# Patient Record
Sex: MALE | Race: WHITE | Employment: OTHER
[De-identification: names, ages, dates, MRNs, and addresses within clinical notes are randomized per-mention and may not be internally consistent; named-entity substitution may affect disease eponyms.]

---

## 2017-01-04 ENCOUNTER — TELEPHONE (OUTPATIENT)
Dept: ONCOLOGY | Facility: CLINIC | Age: 57
End: 2017-01-04

## 2017-01-04 DIAGNOSIS — C80.1 CARCINOMA (HCC): Primary | ICD-10-CM

## 2017-01-04 DIAGNOSIS — C19 COLORECTAL CANCER, STAGE IV (HCC): ICD-10-CM

## 2017-01-04 DIAGNOSIS — C78.7 HEPATIC METASTASES (HCC): ICD-10-CM

## 2017-01-04 RX ORDER — LACTOSE-REDUCED FOOD 0.06G-1/ML
LIQUID (ML) ORAL
Qty: 90 CAN | Refills: 3 | Status: SHIPPED | OUTPATIENT
Start: 2017-01-04 | End: 2017-02-07 | Stop reason: SDUPTHER

## 2017-01-06 ENCOUNTER — TELEPHONE (OUTPATIENT)
Dept: ONCOLOGY | Facility: CLINIC | Age: 57
End: 2017-01-06

## 2017-01-12 ENCOUNTER — OFFICE VISIT (OUTPATIENT)
Dept: ONCOLOGY | Facility: CLINIC | Age: 57
End: 2017-01-12

## 2017-01-12 VITALS
DIASTOLIC BLOOD PRESSURE: 50 MMHG | RESPIRATION RATE: 16 BRPM | HEART RATE: 69 BPM | SYSTOLIC BLOOD PRESSURE: 117 MMHG | WEIGHT: 231 LBS | BODY MASS INDEX: 30.48 KG/M2 | TEMPERATURE: 98 F

## 2017-01-12 DIAGNOSIS — I21.3 ST ELEVATION MYOCARDIAL INFARCTION (STEMI), UNSPECIFIED ARTERY (HCC): ICD-10-CM

## 2017-01-12 DIAGNOSIS — C19 COLORECTAL CANCER, STAGE IV (HCC): Primary | ICD-10-CM

## 2017-01-12 DIAGNOSIS — C78.7 HEPATIC METASTASES (HCC): ICD-10-CM

## 2017-01-12 PROCEDURE — 99214 OFFICE O/P EST MOD 30 MIN: CPT | Performed by: INTERNAL MEDICINE

## 2017-01-12 RX ORDER — OXYCODONE AND ACETAMINOPHEN 10; 325 MG/1; MG/1
1 TABLET ORAL EVERY 6 HOURS PRN
Qty: 120 TABLET | Refills: 0 | Status: SHIPPED | OUTPATIENT
Start: 2017-01-12 | End: 2017-02-10 | Stop reason: SDUPTHER

## 2017-01-16 ENCOUNTER — TELEPHONE (OUTPATIENT)
Dept: ONCOLOGY | Facility: CLINIC | Age: 57
End: 2017-01-16

## 2017-01-24 RX ORDER — SODIUM CHLORIDE 0.9 % (FLUSH) 0.9 %
5 SYRINGE (ML) INJECTION PRN
Status: CANCELLED | OUTPATIENT
Start: 2017-02-07

## 2017-01-24 RX ORDER — SODIUM CHLORIDE 9 MG/ML
INJECTION, SOLUTION INTRAVENOUS CONTINUOUS
Status: CANCELLED | OUTPATIENT
Start: 2017-01-24

## 2017-01-24 RX ORDER — SODIUM CHLORIDE 9 MG/ML
INJECTION, SOLUTION INTRAVENOUS CONTINUOUS
Status: CANCELLED | OUTPATIENT
Start: 2017-03-07

## 2017-01-24 RX ORDER — HEPARIN SODIUM (PORCINE) LOCK FLUSH IV SOLN 100 UNIT/ML 100 UNIT/ML
500 SOLUTION INTRAVENOUS PRN
Status: CANCELLED | OUTPATIENT
Start: 2017-02-07

## 2017-01-24 RX ORDER — PALONOSETRON 0.05 MG/ML
0.25 INJECTION, SOLUTION INTRAVENOUS ONCE
Status: CANCELLED | OUTPATIENT
Start: 2017-01-24

## 2017-01-24 RX ORDER — PALONOSETRON 0.05 MG/ML
0.25 INJECTION, SOLUTION INTRAVENOUS ONCE
Status: CANCELLED | OUTPATIENT
Start: 2017-03-07

## 2017-01-24 RX ORDER — METHYLPREDNISOLONE SODIUM SUCCINATE 125 MG/2ML
125 INJECTION, POWDER, LYOPHILIZED, FOR SOLUTION INTRAMUSCULAR; INTRAVENOUS ONCE
Status: CANCELLED | OUTPATIENT
Start: 2017-02-21 | End: 2017-02-21

## 2017-01-24 RX ORDER — FLUOROURACIL 50 MG/ML
900 INJECTION, SOLUTION INTRAVENOUS ONCE
Status: CANCELLED | OUTPATIENT
Start: 2017-02-21

## 2017-01-24 RX ORDER — SODIUM CHLORIDE 9 MG/ML
INJECTION, SOLUTION INTRAVENOUS CONTINUOUS
Status: CANCELLED | OUTPATIENT
Start: 2017-02-21

## 2017-01-24 RX ORDER — PALONOSETRON 0.05 MG/ML
0.25 INJECTION, SOLUTION INTRAVENOUS ONCE
Status: CANCELLED | OUTPATIENT
Start: 2017-02-21

## 2017-01-24 RX ORDER — DEXTROSE MONOHYDRATE 50 MG/ML
INJECTION, SOLUTION INTRAVENOUS ONCE
Status: CANCELLED | OUTPATIENT
Start: 2017-02-21 | End: 2017-02-21

## 2017-01-24 RX ORDER — SODIUM CHLORIDE 9 MG/ML
INJECTION, SOLUTION INTRAVENOUS ONCE
Status: CANCELLED | OUTPATIENT
Start: 2017-03-07 | End: 2017-03-07

## 2017-01-24 RX ORDER — DIPHENHYDRAMINE HYDROCHLORIDE 50 MG/ML
50 INJECTION INTRAMUSCULAR; INTRAVENOUS ONCE
Status: CANCELLED | OUTPATIENT
Start: 2017-03-07 | End: 2017-03-07

## 2017-01-24 RX ORDER — DIPHENHYDRAMINE HYDROCHLORIDE 50 MG/ML
50 INJECTION INTRAMUSCULAR; INTRAVENOUS ONCE
Status: CANCELLED | OUTPATIENT
Start: 2017-02-07 | End: 2017-02-07

## 2017-01-24 RX ORDER — FLUOROURACIL 50 MG/ML
900 INJECTION, SOLUTION INTRAVENOUS ONCE
Status: CANCELLED | OUTPATIENT
Start: 2017-03-07

## 2017-01-24 RX ORDER — SODIUM CHLORIDE 0.9 % (FLUSH) 0.9 %
10 SYRINGE (ML) INJECTION PRN
Status: CANCELLED | OUTPATIENT
Start: 2017-02-07

## 2017-01-24 RX ORDER — 0.9 % SODIUM CHLORIDE 0.9 %
10 VIAL (ML) INJECTION ONCE
Status: CANCELLED | OUTPATIENT
Start: 2017-02-07 | End: 2017-02-07

## 2017-01-24 RX ORDER — HEPARIN SODIUM (PORCINE) LOCK FLUSH IV SOLN 100 UNIT/ML 100 UNIT/ML
500 SOLUTION INTRAVENOUS PRN
Status: CANCELLED | OUTPATIENT
Start: 2017-02-21

## 2017-01-24 RX ORDER — SODIUM CHLORIDE 0.9 % (FLUSH) 0.9 %
5 SYRINGE (ML) INJECTION PRN
Status: CANCELLED | OUTPATIENT
Start: 2017-01-24

## 2017-01-24 RX ORDER — 0.9 % SODIUM CHLORIDE 0.9 %
10 VIAL (ML) INJECTION ONCE
Status: CANCELLED | OUTPATIENT
Start: 2017-03-07 | End: 2017-03-07

## 2017-01-24 RX ORDER — FLUOROURACIL 50 MG/ML
900 INJECTION, SOLUTION INTRAVENOUS ONCE
Status: CANCELLED | OUTPATIENT
Start: 2017-01-24

## 2017-01-24 RX ORDER — METHYLPREDNISOLONE SODIUM SUCCINATE 125 MG/2ML
125 INJECTION, POWDER, LYOPHILIZED, FOR SOLUTION INTRAMUSCULAR; INTRAVENOUS ONCE
Status: CANCELLED | OUTPATIENT
Start: 2017-03-07 | End: 2017-03-07

## 2017-01-24 RX ORDER — SODIUM CHLORIDE 0.9 % (FLUSH) 0.9 %
5 SYRINGE (ML) INJECTION PRN
Status: CANCELLED | OUTPATIENT
Start: 2017-03-07

## 2017-01-24 RX ORDER — SODIUM CHLORIDE 0.9 % (FLUSH) 0.9 %
10 SYRINGE (ML) INJECTION PRN
Status: CANCELLED | OUTPATIENT
Start: 2017-03-07

## 2017-01-24 RX ORDER — FLUOROURACIL 50 MG/ML
900 INJECTION, SOLUTION INTRAVENOUS ONCE
Status: CANCELLED | OUTPATIENT
Start: 2017-02-07

## 2017-01-24 RX ORDER — SODIUM CHLORIDE 0.9 % (FLUSH) 0.9 %
10 SYRINGE (ML) INJECTION PRN
Status: CANCELLED | OUTPATIENT
Start: 2017-02-21

## 2017-01-24 RX ORDER — METHYLPREDNISOLONE SODIUM SUCCINATE 125 MG/2ML
125 INJECTION, POWDER, LYOPHILIZED, FOR SOLUTION INTRAMUSCULAR; INTRAVENOUS ONCE
Status: CANCELLED | OUTPATIENT
Start: 2017-02-07 | End: 2017-02-07

## 2017-01-24 RX ORDER — METHYLPREDNISOLONE SODIUM SUCCINATE 125 MG/2ML
125 INJECTION, POWDER, LYOPHILIZED, FOR SOLUTION INTRAMUSCULAR; INTRAVENOUS ONCE
Status: CANCELLED | OUTPATIENT
Start: 2017-01-24 | End: 2017-01-24

## 2017-01-24 RX ORDER — SODIUM CHLORIDE 9 MG/ML
INJECTION, SOLUTION INTRAVENOUS ONCE
Status: CANCELLED | OUTPATIENT
Start: 2017-02-21 | End: 2017-02-21

## 2017-01-24 RX ORDER — HEPARIN SODIUM (PORCINE) LOCK FLUSH IV SOLN 100 UNIT/ML 100 UNIT/ML
500 SOLUTION INTRAVENOUS PRN
Status: CANCELLED | OUTPATIENT
Start: 2017-03-07

## 2017-01-24 RX ORDER — DIPHENHYDRAMINE HYDROCHLORIDE 50 MG/ML
50 INJECTION INTRAMUSCULAR; INTRAVENOUS ONCE
Status: CANCELLED | OUTPATIENT
Start: 2017-02-21 | End: 2017-02-21

## 2017-01-24 RX ORDER — DEXTROSE MONOHYDRATE 50 MG/ML
INJECTION, SOLUTION INTRAVENOUS ONCE
Status: CANCELLED | OUTPATIENT
Start: 2017-02-07 | End: 2017-02-07

## 2017-01-24 RX ORDER — PALONOSETRON 0.05 MG/ML
0.25 INJECTION, SOLUTION INTRAVENOUS ONCE
Status: CANCELLED | OUTPATIENT
Start: 2017-02-07

## 2017-01-24 RX ORDER — DEXTROSE MONOHYDRATE 50 MG/ML
INJECTION, SOLUTION INTRAVENOUS ONCE
Status: CANCELLED | OUTPATIENT
Start: 2017-03-07 | End: 2017-03-07

## 2017-01-24 RX ORDER — 0.9 % SODIUM CHLORIDE 0.9 %
10 VIAL (ML) INJECTION ONCE
Status: CANCELLED | OUTPATIENT
Start: 2017-01-24 | End: 2017-01-24

## 2017-01-24 RX ORDER — SODIUM CHLORIDE 9 MG/ML
INJECTION, SOLUTION INTRAVENOUS CONTINUOUS
Status: CANCELLED | OUTPATIENT
Start: 2017-02-07

## 2017-01-24 RX ORDER — 0.9 % SODIUM CHLORIDE 0.9 %
10 VIAL (ML) INJECTION ONCE
Status: CANCELLED | OUTPATIENT
Start: 2017-02-21 | End: 2017-02-21

## 2017-01-24 RX ORDER — SODIUM CHLORIDE 0.9 % (FLUSH) 0.9 %
5 SYRINGE (ML) INJECTION PRN
Status: CANCELLED | OUTPATIENT
Start: 2017-02-21

## 2017-01-24 RX ORDER — SODIUM CHLORIDE 9 MG/ML
INJECTION, SOLUTION INTRAVENOUS ONCE
Status: CANCELLED | OUTPATIENT
Start: 2017-02-07 | End: 2017-02-07

## 2017-01-24 RX ORDER — DIPHENHYDRAMINE HYDROCHLORIDE 50 MG/ML
50 INJECTION INTRAMUSCULAR; INTRAVENOUS ONCE
Status: CANCELLED | OUTPATIENT
Start: 2017-01-24 | End: 2017-01-24

## 2017-01-26 ENCOUNTER — OFFICE VISIT (OUTPATIENT)
Dept: ONCOLOGY | Facility: CLINIC | Age: 57
End: 2017-01-26

## 2017-01-26 VITALS
BODY MASS INDEX: 29.69 KG/M2 | RESPIRATION RATE: 16 BRPM | HEART RATE: 68 BPM | DIASTOLIC BLOOD PRESSURE: 71 MMHG | WEIGHT: 225 LBS | SYSTOLIC BLOOD PRESSURE: 99 MMHG | TEMPERATURE: 97.7 F

## 2017-01-26 DIAGNOSIS — C19 COLORECTAL CANCER, STAGE IV (HCC): Primary | ICD-10-CM

## 2017-01-26 DIAGNOSIS — I21.3 ST ELEVATION MYOCARDIAL INFARCTION (STEMI), UNSPECIFIED ARTERY (HCC): ICD-10-CM

## 2017-01-26 DIAGNOSIS — C78.7 HEPATIC METASTASES (HCC): ICD-10-CM

## 2017-01-26 PROCEDURE — 99214 OFFICE O/P EST MOD 30 MIN: CPT | Performed by: INTERNAL MEDICINE

## 2017-02-07 ENCOUNTER — HOSPITAL ENCOUNTER (OUTPATIENT)
Dept: INFUSION THERAPY | Age: 57
Discharge: HOME | End: 2017-02-07
Payer: COMMERCIAL

## 2017-02-07 VITALS
TEMPERATURE: 97.3 F | SYSTOLIC BLOOD PRESSURE: 136 MMHG | RESPIRATION RATE: 18 BRPM | WEIGHT: 228.6 LBS | DIASTOLIC BLOOD PRESSURE: 84 MMHG | HEART RATE: 75 BPM | BODY MASS INDEX: 30.16 KG/M2

## 2017-02-07 DIAGNOSIS — C78.7 HEPATIC METASTASES (HCC): ICD-10-CM

## 2017-02-07 DIAGNOSIS — C80.1 CARCINOMA (HCC): ICD-10-CM

## 2017-02-07 DIAGNOSIS — C19 COLORECTAL CANCER, STAGE IV (HCC): Primary | ICD-10-CM

## 2017-02-07 DIAGNOSIS — I21.A1 NON-ST ELEVATION MYOCARDIAL INFARCTION (NSTEMI), TYPE 2: ICD-10-CM

## 2017-02-07 DIAGNOSIS — C19 COLORECTAL CANCER, STAGE IV (HCC): ICD-10-CM

## 2017-02-07 LAB
ABSOLUTE EOS #: 0.1 K/UL (ref 0–0.4)
ABSOLUTE LYMPH #: 2.5 K/UL (ref 1–4.8)
ABSOLUTE MONO #: 1.3 K/UL (ref 0.1–1.2)
ALBUMIN SERPL-MCNC: 3.5 G/DL (ref 3.5–5.2)
ALBUMIN/GLOBULIN RATIO: 1.2 (ref 1–2.5)
ALP BLD-CCNC: 110 U/L (ref 40–129)
ALT SERPL-CCNC: 13 U/L (ref 5–41)
ANION GAP SERPL CALCULATED.3IONS-SCNC: 15 MMOL/L (ref 9–17)
AST SERPL-CCNC: 15 U/L
BASOPHILS # BLD: 1 % (ref 0–2)
BASOPHILS ABSOLUTE: 0.1 K/UL (ref 0–0.2)
BILIRUB SERPL-MCNC: 0.15 MG/DL (ref 0.3–1.2)
BUN BLDV-MCNC: 9 MG/DL (ref 6–20)
BUN/CREAT BLD: ABNORMAL (ref 9–20)
CALCIUM SERPL-MCNC: 8.6 MG/DL (ref 8.6–10.4)
CARCINOEMBRYONIC ANTIGEN: 52.8 NG/ML
CHLORIDE BLD-SCNC: 100 MMOL/L (ref 98–107)
CO2: 23 MMOL/L (ref 20–31)
CREAT SERPL-MCNC: 0.64 MG/DL (ref 0.7–1.2)
DIFFERENTIAL TYPE: ABNORMAL
EOSINOPHILS RELATIVE PERCENT: 2 % (ref 1–4)
GFR AFRICAN AMERICAN: >60 ML/MIN
GFR NON-AFRICAN AMERICAN: >60 ML/MIN
GFR SERPL CREATININE-BSD FRML MDRD: ABNORMAL ML/MIN/{1.73_M2}
GFR SERPL CREATININE-BSD FRML MDRD: ABNORMAL ML/MIN/{1.73_M2}
GLUCOSE BLD-MCNC: 253 MG/DL (ref 70–99)
HCT VFR BLD CALC: 32.1 % (ref 41–53)
HEMOGLOBIN: 10 G/DL (ref 13.5–17.5)
LYMPHOCYTES # BLD: 31 % (ref 24–44)
MCH RBC QN AUTO: 22.2 PG (ref 26–34)
MCHC RBC AUTO-ENTMCNC: 31.1 G/DL (ref 31–37)
MCV RBC AUTO: 71.3 FL (ref 80–100)
MONOCYTES # BLD: 16 % (ref 2–11)
PDW BLD-RTO: 16.3 % (ref 12.5–15.4)
PLATELET # BLD: 190 K/UL (ref 140–450)
PLATELET ESTIMATE: ABNORMAL
PMV BLD AUTO: 6.7 FL (ref 6–12)
POTASSIUM SERPL-SCNC: 3.7 MMOL/L (ref 3.7–5.3)
RBC # BLD: 4.5 M/UL (ref 4.5–5.9)
RBC # BLD: ABNORMAL 10*6/UL
SEG NEUTROPHILS: 50 % (ref 36–66)
SEGMENTED NEUTROPHILS ABSOLUTE COUNT: 4 K/UL (ref 1.8–7.7)
SODIUM BLD-SCNC: 138 MMOL/L (ref 135–144)
TOTAL PROTEIN: 6.4 G/DL (ref 6.4–8.3)
WBC # BLD: 8.1 K/UL (ref 3.5–11)
WBC # BLD: ABNORMAL 10*3/UL

## 2017-02-07 PROCEDURE — 80053 COMPREHEN METABOLIC PANEL: CPT

## 2017-02-07 PROCEDURE — 96411 CHEMO IV PUSH ADDL DRUG: CPT

## 2017-02-07 PROCEDURE — 36591 DRAW BLOOD OFF VENOUS DEVICE: CPT

## 2017-02-07 PROCEDURE — 6360000002 HC RX W HCPCS: Performed by: INTERNAL MEDICINE

## 2017-02-07 PROCEDURE — 96417 CHEMO IV INFUS EACH ADDL SEQ: CPT

## 2017-02-07 PROCEDURE — 96415 CHEMO IV INFUSION ADDL HR: CPT

## 2017-02-07 PROCEDURE — 96375 TX/PRO/DX INJ NEW DRUG ADDON: CPT

## 2017-02-07 PROCEDURE — 96368 THER/DIAG CONCURRENT INF: CPT

## 2017-02-07 PROCEDURE — 85025 COMPLETE CBC W/AUTO DIFF WBC: CPT

## 2017-02-07 PROCEDURE — 96416 CHEMO PROLONG INFUSE W/PUMP: CPT

## 2017-02-07 PROCEDURE — 2580000003 HC RX 258: Performed by: INTERNAL MEDICINE

## 2017-02-07 PROCEDURE — 82378 CARCINOEMBRYONIC ANTIGEN: CPT

## 2017-02-07 RX ORDER — FLUOROURACIL 50 MG/ML
900 INJECTION, SOLUTION INTRAVENOUS ONCE
Status: COMPLETED | OUTPATIENT
Start: 2017-02-07 | End: 2017-02-07

## 2017-02-07 RX ORDER — LACTOSE-REDUCED FOOD 0.06G-1/ML
LIQUID (ML) ORAL
Qty: 90 CAN | Refills: 3 | Status: SHIPPED | OUTPATIENT
Start: 2017-02-07 | End: 2017-02-08 | Stop reason: SDUPTHER

## 2017-02-07 RX ORDER — SODIUM CHLORIDE 0.9 % (FLUSH) 0.9 %
10 SYRINGE (ML) INJECTION PRN
Status: ACTIVE | OUTPATIENT
Start: 2017-02-07 | End: 2017-02-08

## 2017-02-07 RX ORDER — OXYCODONE AND ACETAMINOPHEN 10; 325 MG/1; MG/1
1 TABLET ORAL EVERY 6 HOURS PRN
Qty: 120 TABLET | Refills: 0 | Status: CANCELLED | OUTPATIENT
Start: 2017-02-07 | End: 2017-03-09

## 2017-02-07 RX ORDER — DEXTROSE MONOHYDRATE 50 MG/ML
INJECTION, SOLUTION INTRAVENOUS ONCE
Status: COMPLETED | OUTPATIENT
Start: 2017-02-07 | End: 2017-02-07

## 2017-02-07 RX ORDER — PALONOSETRON 0.05 MG/ML
0.25 INJECTION, SOLUTION INTRAVENOUS ONCE
Status: COMPLETED | OUTPATIENT
Start: 2017-02-07 | End: 2017-02-07

## 2017-02-07 RX ADMIN — SODIUM CHLORIDE 12 MG: 9 INJECTION, SOLUTION INTRAVENOUS at 09:19

## 2017-02-07 RX ADMIN — FLUOROURACIL 5550 MG: 50 INJECTION, SOLUTION INTRAVENOUS at 11:51

## 2017-02-07 RX ADMIN — Medication 20 ML: at 08:23

## 2017-02-07 RX ADMIN — DEXTROSE MONOHYDRATE: 50 INJECTION, SOLUTION INTRAVENOUS at 09:04

## 2017-02-07 RX ADMIN — FLUOROURACIL 900 MG: 50 INJECTION, SOLUTION INTRAVENOUS at 11:47

## 2017-02-07 RX ADMIN — LEUCOVORIN CALCIUM 900 MG: 350 INJECTION, POWDER, LYOPHILIZED, FOR SOLUTION INTRAMUSCULAR; INTRAVENOUS at 09:46

## 2017-02-07 RX ADMIN — OXALIPLATIN 200 MG: 5 INJECTION, SOLUTION, CONCENTRATE INTRAVENOUS at 09:46

## 2017-02-07 RX ADMIN — PALONOSETRON HYDROCHLORIDE 0.25 MG: 0.25 INJECTION INTRAVENOUS at 09:17

## 2017-02-07 NOTE — FLOWSHEET NOTE
08:25 Patient presents to infusion room for D1C4 treatment. Patient denies changes since last treatment. Patient c/o slight fatigue, but no other s/s of toxicity at this time. 11:58 Patient discharged in stable condition; no complications during treatment.

## 2017-02-08 ENCOUNTER — TELEPHONE (OUTPATIENT)
Dept: ONCOLOGY | Facility: CLINIC | Age: 57
End: 2017-02-08

## 2017-02-08 DIAGNOSIS — C19 COLORECTAL CANCER, STAGE IV (HCC): ICD-10-CM

## 2017-02-08 DIAGNOSIS — C78.7 HEPATIC METASTASES (HCC): ICD-10-CM

## 2017-02-08 DIAGNOSIS — C80.1 CARCINOMA (HCC): ICD-10-CM

## 2017-02-08 RX ORDER — LACTOSE-REDUCED FOOD 0.06G-1/ML
LIQUID (ML) ORAL
Qty: 90 CAN | Refills: 3 | Status: SHIPPED | OUTPATIENT
Start: 2017-02-08 | End: 2017-06-06

## 2017-02-09 ENCOUNTER — TELEPHONE (OUTPATIENT)
Dept: ONCOLOGY | Facility: CLINIC | Age: 57
End: 2017-02-09

## 2017-02-09 ENCOUNTER — HOSPITAL ENCOUNTER (OUTPATIENT)
Dept: INFUSION THERAPY | Age: 57
Discharge: HOME | End: 2017-02-09
Payer: COMMERCIAL

## 2017-02-09 DIAGNOSIS — C19 COLORECTAL CANCER, STAGE IV (HCC): ICD-10-CM

## 2017-02-09 PROCEDURE — 6360000002 HC RX W HCPCS: Performed by: INTERNAL MEDICINE

## 2017-02-09 PROCEDURE — 2580000003 HC RX 258: Performed by: INTERNAL MEDICINE

## 2017-02-09 PROCEDURE — 96523 IRRIG DRUG DELIVERY DEVICE: CPT

## 2017-02-09 RX ORDER — HEPARIN SODIUM (PORCINE) LOCK FLUSH IV SOLN 100 UNIT/ML 100 UNIT/ML
500 SOLUTION INTRAVENOUS PRN
Status: DISCONTINUED | OUTPATIENT
Start: 2017-02-09 | End: 2017-02-10 | Stop reason: HOSPADM

## 2017-02-09 RX ORDER — HEPARIN SODIUM (PORCINE) LOCK FLUSH IV SOLN 100 UNIT/ML 100 UNIT/ML
500 SOLUTION INTRAVENOUS PRN
Status: CANCELLED | OUTPATIENT
Start: 2017-02-09

## 2017-02-09 RX ORDER — SODIUM CHLORIDE 0.9 % (FLUSH) 0.9 %
10 SYRINGE (ML) INJECTION PRN
Status: CANCELLED | OUTPATIENT
Start: 2017-02-09

## 2017-02-09 RX ORDER — SODIUM CHLORIDE 0.9 % (FLUSH) 0.9 %
10 SYRINGE (ML) INJECTION PRN
Status: DISCONTINUED | OUTPATIENT
Start: 2017-02-09 | End: 2017-02-10 | Stop reason: HOSPADM

## 2017-02-09 RX ADMIN — SODIUM CHLORIDE, PRESERVATIVE FREE 500 UNITS: 5 INJECTION INTRAVENOUS at 10:59

## 2017-02-09 RX ADMIN — SODIUM CHLORIDE, PRESERVATIVE FREE 10 ML: 5 INJECTION INTRAVENOUS at 10:59

## 2017-02-09 NOTE — FLOWSHEET NOTE
rounding; offered support and presence. Chaplains will remain available to offer spiritual and emotional support as needed.      02/09/17 1509   Encounter Summary   Services provided to: Patient   Referral/Consult From: Rounding   Continue Visiting (02/09/17)   Complexity of Encounter Low   Length of Encounter 15 minutes   Routine   Type Follow up   Assessment Calm   Intervention Active listening;Explored feelings, thoughts, concerns;Explored coping resources;Nurtured hope   Outcome Engaged in conversation

## 2017-02-10 RX ORDER — OXYCODONE AND ACETAMINOPHEN 10; 325 MG/1; MG/1
1 TABLET ORAL EVERY 6 HOURS PRN
Qty: 120 TABLET | Refills: 0 | Status: SHIPPED | OUTPATIENT
Start: 2017-02-10 | End: 2017-03-09 | Stop reason: SDUPTHER

## 2017-02-12 ENCOUNTER — HOSPITAL ENCOUNTER (EMERGENCY)
Age: 57
Discharge: HOME | End: 2017-02-12
Attending: EMERGENCY MEDICINE
Payer: COMMERCIAL

## 2017-02-12 ENCOUNTER — APPOINTMENT (OUTPATIENT)
Dept: CT IMAGING | Age: 57
End: 2017-02-12
Payer: COMMERCIAL

## 2017-02-12 VITALS
SYSTOLIC BLOOD PRESSURE: 116 MMHG | OXYGEN SATURATION: 96 % | HEIGHT: 73 IN | WEIGHT: 225 LBS | BODY MASS INDEX: 29.82 KG/M2 | DIASTOLIC BLOOD PRESSURE: 73 MMHG | HEART RATE: 85 BPM | TEMPERATURE: 97.9 F | RESPIRATION RATE: 16 BRPM

## 2017-02-12 DIAGNOSIS — K61.1 PERIRECTAL ABSCESS: Primary | ICD-10-CM

## 2017-02-12 LAB
ABSOLUTE EOS #: 0 K/UL (ref 0–0.4)
ABSOLUTE LYMPH #: 1.95 K/UL (ref 1–4.8)
ABSOLUTE MONO #: 0.78 K/UL (ref 0.1–0.8)
ANION GAP SERPL CALCULATED.3IONS-SCNC: 11 MMOL/L (ref 9–17)
ATYPICAL LYMPHOCYTE ABSOLUTE COUNT: 0.07 K/UL
ATYPICAL LYMPHOCYTES: 1 % (ref 0–10)
BASOPHILS # BLD: 1 % (ref 0–2)
BASOPHILS ABSOLUTE: 0.07 K/UL (ref 0–0.2)
BUN BLDV-MCNC: 8 MG/DL (ref 6–20)
BUN/CREAT BLD: ABNORMAL (ref 9–20)
CALCIUM SERPL-MCNC: 8.8 MG/DL (ref 8.6–10.4)
CHLORIDE BLD-SCNC: 95 MMOL/L (ref 98–107)
CO2: 27 MMOL/L (ref 20–31)
CREAT SERPL-MCNC: 0.64 MG/DL (ref 0.7–1.2)
DIFFERENTIAL TYPE: ABNORMAL
EOSINOPHILS RELATIVE PERCENT: 0 % (ref 1–4)
GFR AFRICAN AMERICAN: >60 ML/MIN
GFR NON-AFRICAN AMERICAN: >60 ML/MIN
GFR SERPL CREATININE-BSD FRML MDRD: ABNORMAL ML/MIN/{1.73_M2}
GFR SERPL CREATININE-BSD FRML MDRD: ABNORMAL ML/MIN/{1.73_M2}
GLUCOSE BLD-MCNC: 169 MG/DL (ref 70–99)
HCT VFR BLD CALC: 32 % (ref 41–53)
HEMOGLOBIN: 10.3 G/DL (ref 13.5–17.5)
INR BLD: 1
LYMPHOCYTES # BLD: 30 % (ref 24–44)
MCH RBC QN AUTO: 22.2 PG (ref 26–34)
MCHC RBC AUTO-ENTMCNC: 32.1 G/DL (ref 31–37)
MCV RBC AUTO: 69.2 FL (ref 80–100)
MONOCYTES # BLD: 12 % (ref 1–7)
MORPHOLOGY: ABNORMAL
PARTIAL THROMBOPLASTIN TIME: 21.3 SEC (ref 21.3–31.3)
PDW BLD-RTO: 16.5 % (ref 12.5–15.4)
PLATELET # BLD: 210 K/UL (ref 140–450)
PLATELET ESTIMATE: ABNORMAL
PMV BLD AUTO: 6.6 FL (ref 6–12)
POTASSIUM SERPL-SCNC: 3.5 MMOL/L (ref 3.7–5.3)
PROTHROMBIN TIME: 10.3 SEC (ref 9.4–12.6)
RBC # BLD: 4.62 M/UL (ref 4.5–5.9)
RBC # BLD: ABNORMAL 10*6/UL
SEG NEUTROPHILS: 56 % (ref 36–66)
SEGMENTED NEUTROPHILS ABSOLUTE COUNT: 3.63 K/UL (ref 1.8–7.7)
SODIUM BLD-SCNC: 133 MMOL/L (ref 135–144)
WBC # BLD: 6.5 K/UL (ref 3.5–11)
WBC # BLD: ABNORMAL 10*3/UL

## 2017-02-12 PROCEDURE — 85730 THROMBOPLASTIN TIME PARTIAL: CPT

## 2017-02-12 PROCEDURE — 99284 EMERGENCY DEPT VISIT MOD MDM: CPT

## 2017-02-12 PROCEDURE — 72193 CT PELVIS W/DYE: CPT | Performed by: RADIOLOGY

## 2017-02-12 PROCEDURE — 6360000004 HC RX CONTRAST MEDICATION: Performed by: EMERGENCY MEDICINE

## 2017-02-12 PROCEDURE — 85025 COMPLETE CBC W/AUTO DIFF WBC: CPT

## 2017-02-12 PROCEDURE — 80048 BASIC METABOLIC PNL TOTAL CA: CPT

## 2017-02-12 PROCEDURE — 85610 PROTHROMBIN TIME: CPT

## 2017-02-12 PROCEDURE — 96374 THER/PROPH/DIAG INJ IV PUSH: CPT

## 2017-02-12 PROCEDURE — 6370000000 HC RX 637 (ALT 250 FOR IP): Performed by: EMERGENCY MEDICINE

## 2017-02-12 PROCEDURE — 6360000002 HC RX W HCPCS: Performed by: EMERGENCY MEDICINE

## 2017-02-12 PROCEDURE — 96372 THER/PROPH/DIAG INJ SC/IM: CPT

## 2017-02-12 PROCEDURE — 72193 CT PELVIS W/DYE: CPT

## 2017-02-12 PROCEDURE — G0383 LEV 4 HOSP TYPE B ED VISIT: HCPCS

## 2017-02-12 PROCEDURE — 2500000003 HC RX 250 WO HCPCS: Performed by: EMERGENCY MEDICINE

## 2017-02-12 RX ORDER — SULFAMETHOXAZOLE AND TRIMETHOPRIM 800; 160 MG/1; MG/1
1 TABLET ORAL 2 TIMES DAILY
Qty: 20 TABLET | Refills: 0 | Status: SHIPPED | OUTPATIENT
Start: 2017-02-12 | End: 2017-02-22

## 2017-02-12 RX ORDER — SULFAMETHOXAZOLE AND TRIMETHOPRIM 800; 160 MG/1; MG/1
1 TABLET ORAL ONCE
Status: COMPLETED | OUTPATIENT
Start: 2017-02-12 | End: 2017-02-12

## 2017-02-12 RX ORDER — LIDOCAINE HYDROCHLORIDE 10 MG/ML
20 INJECTION, SOLUTION INFILTRATION; PERINEURAL ONCE
Status: COMPLETED | OUTPATIENT
Start: 2017-02-12 | End: 2017-02-12

## 2017-02-12 RX ORDER — FENTANYL CITRATE 50 UG/ML
50 INJECTION, SOLUTION INTRAMUSCULAR; INTRAVENOUS ONCE
Status: COMPLETED | OUTPATIENT
Start: 2017-02-12 | End: 2017-02-12

## 2017-02-12 RX ADMIN — IOHEXOL 100 ML: 350 INJECTION, SOLUTION INTRAVENOUS at 21:08

## 2017-02-12 RX ADMIN — SULFAMETHOXAZOLE AND TRIMETHOPRIM 1 TABLET: 800; 160 TABLET ORAL at 23:37

## 2017-02-12 RX ADMIN — FENTANYL CITRATE 50 MCG: 50 INJECTION INTRAMUSCULAR; INTRAVENOUS at 20:15

## 2017-02-12 RX ADMIN — Medication 20 ML: at 20:15

## 2017-02-12 ASSESSMENT — PAIN DESCRIPTION - LOCATION: LOCATION: BUTTOCKS

## 2017-02-12 ASSESSMENT — PAIN DESCRIPTION - ORIENTATION: ORIENTATION: LOWER

## 2017-02-12 ASSESSMENT — PAIN SCALES - GENERAL
PAINLEVEL_OUTOF10: 6
PAINLEVEL_OUTOF10: 6

## 2017-02-13 ASSESSMENT — ENCOUNTER SYMPTOMS
CONSTIPATION: 0
NAUSEA: 0
DIARRHEA: 0
ABDOMINAL PAIN: 0
ANAL BLEEDING: 0
BLOOD IN STOOL: 0
VOMITING: 0
WHEEZING: 0
SHORTNESS OF BREATH: 0

## 2017-02-21 ENCOUNTER — HOSPITAL ENCOUNTER (OUTPATIENT)
Dept: INFUSION THERAPY | Age: 57
Discharge: HOME | End: 2017-02-21
Payer: COMMERCIAL

## 2017-02-21 VITALS
WEIGHT: 230.2 LBS | TEMPERATURE: 97.7 F | OXYGEN SATURATION: 96 % | BODY MASS INDEX: 30.37 KG/M2 | RESPIRATION RATE: 14 BRPM | HEART RATE: 81 BPM | SYSTOLIC BLOOD PRESSURE: 124 MMHG | DIASTOLIC BLOOD PRESSURE: 82 MMHG

## 2017-02-21 DIAGNOSIS — C78.7 HEPATIC METASTASES (HCC): ICD-10-CM

## 2017-02-21 DIAGNOSIS — I21.A1 NON-ST ELEVATION MYOCARDIAL INFARCTION (NSTEMI), TYPE 2: ICD-10-CM

## 2017-02-21 DIAGNOSIS — C19 COLORECTAL CANCER, STAGE IV (HCC): ICD-10-CM

## 2017-02-21 LAB
ABSOLUTE EOS #: 0.1 K/UL (ref 0–0.4)
ABSOLUTE LYMPH #: 1.4 K/UL (ref 1–4.8)
ABSOLUTE MONO #: 0.8 K/UL (ref 0.1–1.2)
ALBUMIN SERPL-MCNC: 3.3 G/DL (ref 3.5–5.2)
ALBUMIN/GLOBULIN RATIO: 1.3 (ref 1–2.5)
ALP BLD-CCNC: 111 U/L (ref 40–129)
ALT SERPL-CCNC: 13 U/L (ref 5–41)
ANION GAP SERPL CALCULATED.3IONS-SCNC: 16 MMOL/L (ref 9–17)
AST SERPL-CCNC: 16 U/L
BASOPHILS # BLD: 1 % (ref 0–2)
BASOPHILS ABSOLUTE: 0 K/UL (ref 0–0.2)
BILIRUB SERPL-MCNC: 0.14 MG/DL (ref 0.3–1.2)
BUN BLDV-MCNC: 6 MG/DL (ref 6–20)
BUN/CREAT BLD: ABNORMAL (ref 9–20)
CALCIUM SERPL-MCNC: 8.5 MG/DL (ref 8.6–10.4)
CARCINOEMBRYONIC ANTIGEN: 31 NG/ML
CHLORIDE BLD-SCNC: 101 MMOL/L (ref 98–107)
CO2: 24 MMOL/L (ref 20–31)
CREAT SERPL-MCNC: 0.63 MG/DL (ref 0.7–1.2)
DIFFERENTIAL TYPE: ABNORMAL
EOSINOPHILS RELATIVE PERCENT: 1 % (ref 1–4)
GFR AFRICAN AMERICAN: >60 ML/MIN
GFR NON-AFRICAN AMERICAN: >60 ML/MIN
GFR SERPL CREATININE-BSD FRML MDRD: ABNORMAL ML/MIN/{1.73_M2}
GFR SERPL CREATININE-BSD FRML MDRD: ABNORMAL ML/MIN/{1.73_M2}
GLUCOSE BLD-MCNC: 235 MG/DL (ref 70–99)
HCT VFR BLD CALC: 30.8 % (ref 41–53)
HEMOGLOBIN: 9.6 G/DL (ref 13.5–17.5)
LYMPHOCYTES # BLD: 28 % (ref 24–44)
MCH RBC QN AUTO: 21.8 PG (ref 26–34)
MCHC RBC AUTO-ENTMCNC: 31.1 G/DL (ref 31–37)
MCV RBC AUTO: 70 FL (ref 80–100)
MONOCYTES # BLD: 16 % (ref 2–11)
PDW BLD-RTO: 16.6 % (ref 12.5–15.4)
PLATELET # BLD: 183 K/UL (ref 140–450)
PLATELET ESTIMATE: ABNORMAL
PMV BLD AUTO: 6.5 FL (ref 6–12)
POTASSIUM SERPL-SCNC: 4 MMOL/L (ref 3.7–5.3)
RBC # BLD: 4.4 M/UL (ref 4.5–5.9)
RBC # BLD: ABNORMAL 10*6/UL
SEG NEUTROPHILS: 54 % (ref 36–66)
SEGMENTED NEUTROPHILS ABSOLUTE COUNT: 2.7 K/UL (ref 1.8–7.7)
SODIUM BLD-SCNC: 141 MMOL/L (ref 135–144)
TOTAL PROTEIN: 5.9 G/DL (ref 6.4–8.3)
WBC # BLD: 5 K/UL (ref 3.5–11)
WBC # BLD: ABNORMAL 10*3/UL

## 2017-02-21 PROCEDURE — 36591 DRAW BLOOD OFF VENOUS DEVICE: CPT

## 2017-02-21 PROCEDURE — 2580000003 HC RX 258: Performed by: INTERNAL MEDICINE

## 2017-02-21 PROCEDURE — 96375 TX/PRO/DX INJ NEW DRUG ADDON: CPT

## 2017-02-21 PROCEDURE — 85025 COMPLETE CBC W/AUTO DIFF WBC: CPT

## 2017-02-21 PROCEDURE — 82378 CARCINOEMBRYONIC ANTIGEN: CPT

## 2017-02-21 PROCEDURE — 96416 CHEMO PROLONG INFUSE W/PUMP: CPT

## 2017-02-21 PROCEDURE — 6360000002 HC RX W HCPCS: Performed by: INTERNAL MEDICINE

## 2017-02-21 PROCEDURE — 80053 COMPREHEN METABOLIC PANEL: CPT

## 2017-02-21 RX ORDER — FLUOROURACIL 50 MG/ML
900 INJECTION, SOLUTION INTRAVENOUS ONCE
Status: DISCONTINUED | OUTPATIENT
Start: 2017-02-21 | End: 2017-02-22 | Stop reason: HOSPADM

## 2017-02-21 RX ORDER — HEPARIN SODIUM (PORCINE) LOCK FLUSH IV SOLN 100 UNIT/ML 100 UNIT/ML
500 SOLUTION INTRAVENOUS PRN
Status: DISCONTINUED | OUTPATIENT
Start: 2017-02-21 | End: 2017-02-22 | Stop reason: HOSPADM

## 2017-02-21 RX ORDER — SODIUM CHLORIDE 0.9 % (FLUSH) 0.9 %
10 SYRINGE (ML) INJECTION PRN
Status: DISCONTINUED | OUTPATIENT
Start: 2017-02-21 | End: 2017-02-22 | Stop reason: HOSPADM

## 2017-02-21 RX ORDER — HEPARIN SODIUM (PORCINE) LOCK FLUSH IV SOLN 100 UNIT/ML 100 UNIT/ML
500 SOLUTION INTRAVENOUS PRN
Status: CANCELLED | OUTPATIENT
Start: 2017-02-21

## 2017-02-21 RX ORDER — DEXTROSE MONOHYDRATE 50 MG/ML
INJECTION, SOLUTION INTRAVENOUS ONCE
Status: DISCONTINUED | OUTPATIENT
Start: 2017-02-21 | End: 2017-02-22 | Stop reason: HOSPADM

## 2017-02-21 RX ORDER — SODIUM CHLORIDE 0.9 % (FLUSH) 0.9 %
10 SYRINGE (ML) INJECTION PRN
Status: CANCELLED | OUTPATIENT
Start: 2017-02-21

## 2017-02-21 RX ORDER — PALONOSETRON 0.05 MG/ML
0.25 INJECTION, SOLUTION INTRAVENOUS ONCE
Status: COMPLETED | OUTPATIENT
Start: 2017-02-21 | End: 2017-02-21

## 2017-02-21 RX ADMIN — PALONOSETRON HYDROCHLORIDE 0.25 MG: 0.25 INJECTION INTRAVENOUS at 11:15

## 2017-02-21 RX ADMIN — FLUOROURACIL 5550 MG: 50 INJECTION, SOLUTION INTRAVENOUS at 11:46

## 2017-02-21 RX ADMIN — Medication 30 ML: at 10:04

## 2017-02-21 RX ADMIN — SODIUM CHLORIDE 12 MG: 9 INJECTION, SOLUTION INTRAVENOUS at 11:18

## 2017-02-21 RX ADMIN — DEXTROSE MONOHYDRATE: 50 INJECTION, SOLUTION INTRAVENOUS at 11:15

## 2017-02-21 ASSESSMENT — PAIN SCALES - GENERAL
PAINLEVEL_OUTOF10: 0
PAINLEVEL_OUTOF10: 0

## 2017-02-21 NOTE — PROGRESS NOTES
Pt here for C5D1. Pt had recent Er visit and surgical consultation related to possible perianal abscess. Labs drawn and results reviewed. Dr. Terese Dietrich notified of Labs drawn from port and results reviewed via phone. Informed Dr. Terese Dietrich of recent Er visit in detail and read ER and Surgical consultation notes to him via phone. Explained that pt states he is not experiencing any current s/s of infection he states that he has no swelling drainage redness or pain in the effected area. Md also aware that pt stopped taking his antibiotics after 4 days when he was supposed to continue them for a total of ten per the Er Dr's orders. Pt also states he was aware he was supposed to follow up with oncology within two days of leaving ER on the 12th he stated he just did not have time for that stuff. After discussing pt condition and recent er visit with Dr Terese Dietrich verbal order to only give 5fu pump today for tx. Each medication was read off to Dr. Patino He on current tx plan. Read back order to Dr Terese Dietrich stating only 5fu pump to be given today he stated yes that is correct and to go ahead and tx him with the pump only. Spoke with Miguelina Maxwell about pump administration she released pre meds and pump for administration. Pt was educated again that if he were to experience any s/s of infection or change in condition to call the office or go to closest ED for evaluation. Pt educated to complete antibiotics as ordered he stated he would finish them as ordered he agreed. Pt was treated without incident and d/c'd in stable condition.   Pt will return for follow up appt on 2/23/17

## 2017-02-22 ENCOUNTER — TELEPHONE (OUTPATIENT)
Dept: INFUSION THERAPY | Age: 57
End: 2017-02-22

## 2017-02-23 ENCOUNTER — OFFICE VISIT (OUTPATIENT)
Dept: ONCOLOGY | Facility: CLINIC | Age: 57
End: 2017-02-23

## 2017-02-23 ENCOUNTER — HOSPITAL ENCOUNTER (OUTPATIENT)
Dept: INFUSION THERAPY | Age: 57
Discharge: HOME | End: 2017-02-23
Payer: COMMERCIAL

## 2017-02-23 VITALS
BODY MASS INDEX: 30.41 KG/M2 | DIASTOLIC BLOOD PRESSURE: 76 MMHG | HEART RATE: 75 BPM | TEMPERATURE: 97.3 F | RESPIRATION RATE: 16 BRPM | WEIGHT: 230.5 LBS | SYSTOLIC BLOOD PRESSURE: 122 MMHG

## 2017-02-23 DIAGNOSIS — C19 COLORECTAL CANCER, STAGE IV (HCC): ICD-10-CM

## 2017-02-23 DIAGNOSIS — I21.3 ST ELEVATION MYOCARDIAL INFARCTION (STEMI), UNSPECIFIED ARTERY (HCC): ICD-10-CM

## 2017-02-23 DIAGNOSIS — C78.7 HEPATIC METASTASES (HCC): ICD-10-CM

## 2017-02-23 DIAGNOSIS — C19 COLORECTAL CANCER, STAGE IV (HCC): Primary | ICD-10-CM

## 2017-02-23 DIAGNOSIS — D50.0 IRON DEFICIENCY ANEMIA DUE TO CHRONIC BLOOD LOSS: ICD-10-CM

## 2017-02-23 PROCEDURE — 96523 IRRIG DRUG DELIVERY DEVICE: CPT

## 2017-02-23 PROCEDURE — 2580000003 HC RX 258: Performed by: INTERNAL MEDICINE

## 2017-02-23 PROCEDURE — 6360000002 HC RX W HCPCS: Performed by: INTERNAL MEDICINE

## 2017-02-23 PROCEDURE — 99214 OFFICE O/P EST MOD 30 MIN: CPT | Performed by: INTERNAL MEDICINE

## 2017-02-23 RX ORDER — DIPHENHYDRAMINE HYDROCHLORIDE 50 MG/ML
50 INJECTION INTRAMUSCULAR; INTRAVENOUS ONCE
Status: CANCELLED | OUTPATIENT
Start: 2017-04-04 | End: 2017-04-04

## 2017-02-23 RX ORDER — FLUOROURACIL 50 MG/ML
900 INJECTION, SOLUTION INTRAVENOUS ONCE
Status: CANCELLED | OUTPATIENT
Start: 2017-04-04

## 2017-02-23 RX ORDER — SODIUM CHLORIDE 9 MG/ML
INJECTION, SOLUTION INTRAVENOUS ONCE
Status: CANCELLED | OUTPATIENT
Start: 2017-03-21 | End: 2017-03-21

## 2017-02-23 RX ORDER — SODIUM CHLORIDE 0.9 % (FLUSH) 0.9 %
20 SYRINGE (ML) INJECTION PRN
Status: CANCELLED | OUTPATIENT
Start: 2017-02-23

## 2017-02-23 RX ORDER — SODIUM CHLORIDE 9 MG/ML
INJECTION, SOLUTION INTRAVENOUS ONCE
Status: CANCELLED | OUTPATIENT
Start: 2017-04-04 | End: 2017-04-04

## 2017-02-23 RX ORDER — SODIUM CHLORIDE 9 MG/ML
INJECTION, SOLUTION INTRAVENOUS CONTINUOUS
Status: CANCELLED | OUTPATIENT
Start: 2017-03-21

## 2017-02-23 RX ORDER — SODIUM CHLORIDE 0.9 % (FLUSH) 0.9 %
10 SYRINGE (ML) INJECTION PRN
Status: CANCELLED | OUTPATIENT
Start: 2017-03-21

## 2017-02-23 RX ORDER — 0.9 % SODIUM CHLORIDE 0.9 %
10 VIAL (ML) INJECTION ONCE
Status: CANCELLED | OUTPATIENT
Start: 2017-03-21 | End: 2017-03-21

## 2017-02-23 RX ORDER — 0.9 % SODIUM CHLORIDE 0.9 %
10 VIAL (ML) INJECTION ONCE
Status: CANCELLED | OUTPATIENT
Start: 2017-04-04 | End: 2017-04-04

## 2017-02-23 RX ORDER — METHYLPREDNISOLONE SODIUM SUCCINATE 125 MG/2ML
125 INJECTION, POWDER, LYOPHILIZED, FOR SOLUTION INTRAMUSCULAR; INTRAVENOUS ONCE
Status: CANCELLED | OUTPATIENT
Start: 2017-03-21 | End: 2017-03-21

## 2017-02-23 RX ORDER — SODIUM CHLORIDE 0.9 % (FLUSH) 0.9 %
5 SYRINGE (ML) INJECTION PRN
Status: CANCELLED | OUTPATIENT
Start: 2017-04-04

## 2017-02-23 RX ORDER — DIPHENHYDRAMINE HYDROCHLORIDE 50 MG/ML
50 INJECTION INTRAMUSCULAR; INTRAVENOUS ONCE
Status: CANCELLED | OUTPATIENT
Start: 2017-03-21 | End: 2017-03-21

## 2017-02-23 RX ORDER — METHYLPREDNISOLONE SODIUM SUCCINATE 125 MG/2ML
125 INJECTION, POWDER, LYOPHILIZED, FOR SOLUTION INTRAMUSCULAR; INTRAVENOUS ONCE
Status: CANCELLED | OUTPATIENT
Start: 2017-04-04 | End: 2017-04-04

## 2017-02-23 RX ORDER — PALONOSETRON 0.05 MG/ML
0.25 INJECTION, SOLUTION INTRAVENOUS ONCE
Status: CANCELLED | OUTPATIENT
Start: 2017-04-04

## 2017-02-23 RX ORDER — HEPARIN SODIUM (PORCINE) LOCK FLUSH IV SOLN 100 UNIT/ML 100 UNIT/ML
500 SOLUTION INTRAVENOUS PRN
Status: CANCELLED | OUTPATIENT
Start: 2017-03-21

## 2017-02-23 RX ORDER — HEPARIN SODIUM (PORCINE) LOCK FLUSH IV SOLN 100 UNIT/ML 100 UNIT/ML
500 SOLUTION INTRAVENOUS PRN
Status: DISCONTINUED | OUTPATIENT
Start: 2017-02-23 | End: 2017-02-24 | Stop reason: HOSPADM

## 2017-02-23 RX ORDER — FLUOROURACIL 50 MG/ML
900 INJECTION, SOLUTION INTRAVENOUS ONCE
Status: CANCELLED | OUTPATIENT
Start: 2017-03-21

## 2017-02-23 RX ORDER — SODIUM CHLORIDE 0.9 % (FLUSH) 0.9 %
10 SYRINGE (ML) INJECTION PRN
Status: CANCELLED | OUTPATIENT
Start: 2017-03-08

## 2017-02-23 RX ORDER — HEPARIN SODIUM (PORCINE) LOCK FLUSH IV SOLN 100 UNIT/ML 100 UNIT/ML
500 SOLUTION INTRAVENOUS PRN
Status: CANCELLED | OUTPATIENT
Start: 2017-03-08

## 2017-02-23 RX ORDER — SODIUM CHLORIDE 0.9 % (FLUSH) 0.9 %
5 SYRINGE (ML) INJECTION PRN
Status: CANCELLED | OUTPATIENT
Start: 2017-03-21

## 2017-02-23 RX ORDER — HEPARIN SODIUM (PORCINE) LOCK FLUSH IV SOLN 100 UNIT/ML 100 UNIT/ML
500 SOLUTION INTRAVENOUS PRN
Status: CANCELLED | OUTPATIENT
Start: 2017-04-04

## 2017-02-23 RX ORDER — DEXTROSE MONOHYDRATE 50 MG/ML
INJECTION, SOLUTION INTRAVENOUS ONCE
Status: CANCELLED | OUTPATIENT
Start: 2017-03-21 | End: 2017-03-21

## 2017-02-23 RX ORDER — SODIUM CHLORIDE 0.9 % (FLUSH) 0.9 %
10 SYRINGE (ML) INJECTION PRN
Status: DISCONTINUED | OUTPATIENT
Start: 2017-02-23 | End: 2017-02-24 | Stop reason: HOSPADM

## 2017-02-23 RX ORDER — DEXTROSE MONOHYDRATE 50 MG/ML
INJECTION, SOLUTION INTRAVENOUS ONCE
Status: CANCELLED | OUTPATIENT
Start: 2017-04-04 | End: 2017-04-04

## 2017-02-23 RX ORDER — SODIUM CHLORIDE 0.9 % (FLUSH) 0.9 %
20 SYRINGE (ML) INJECTION PRN
Status: CANCELLED | OUTPATIENT
Start: 2017-03-08

## 2017-02-23 RX ORDER — SODIUM CHLORIDE 0.9 % (FLUSH) 0.9 %
10 SYRINGE (ML) INJECTION PRN
Status: CANCELLED | OUTPATIENT
Start: 2017-04-04

## 2017-02-23 RX ORDER — PALONOSETRON 0.05 MG/ML
0.25 INJECTION, SOLUTION INTRAVENOUS ONCE
Status: CANCELLED | OUTPATIENT
Start: 2017-03-21

## 2017-02-23 RX ORDER — SODIUM CHLORIDE 9 MG/ML
INJECTION, SOLUTION INTRAVENOUS CONTINUOUS
Status: CANCELLED | OUTPATIENT
Start: 2017-04-04

## 2017-02-23 RX ADMIN — SODIUM CHLORIDE, PRESERVATIVE FREE 500 UNITS: 5 INJECTION INTRAVENOUS at 10:18

## 2017-02-23 RX ADMIN — Medication 10 ML: at 10:18

## 2017-03-07 ENCOUNTER — HOSPITAL ENCOUNTER (OUTPATIENT)
Dept: INFUSION THERAPY | Age: 57
Discharge: HOME | End: 2017-03-07
Payer: COMMERCIAL

## 2017-03-07 VITALS
WEIGHT: 233 LBS | BODY MASS INDEX: 30.88 KG/M2 | TEMPERATURE: 97.5 F | RESPIRATION RATE: 16 BRPM | SYSTOLIC BLOOD PRESSURE: 147 MMHG | DIASTOLIC BLOOD PRESSURE: 87 MMHG | HEIGHT: 73 IN | HEART RATE: 73 BPM

## 2017-03-07 DIAGNOSIS — C78.7 HEPATIC METASTASES (HCC): ICD-10-CM

## 2017-03-07 DIAGNOSIS — I21.A1 NON-ST ELEVATION MYOCARDIAL INFARCTION (NSTEMI), TYPE 2: ICD-10-CM

## 2017-03-07 DIAGNOSIS — C19 COLORECTAL CANCER, STAGE IV (HCC): ICD-10-CM

## 2017-03-07 LAB
ABSOLUTE EOS #: 0.1 K/UL (ref 0–0.4)
ABSOLUTE LYMPH #: 1.4 K/UL (ref 1–4.8)
ABSOLUTE MONO #: 0.9 K/UL (ref 0.1–1.2)
ALBUMIN SERPL-MCNC: 3.7 G/DL (ref 3.5–5.2)
ALBUMIN/GLOBULIN RATIO: 1.3 (ref 1–2.5)
ALP BLD-CCNC: 136 U/L (ref 40–129)
ALT SERPL-CCNC: 12 U/L (ref 5–41)
ANION GAP SERPL CALCULATED.3IONS-SCNC: 16 MMOL/L (ref 9–17)
AST SERPL-CCNC: 18 U/L
BASOPHILS # BLD: 1 % (ref 0–2)
BASOPHILS ABSOLUTE: 0.1 K/UL (ref 0–0.2)
BILIRUB SERPL-MCNC: 0.2 MG/DL (ref 0.3–1.2)
BUN BLDV-MCNC: 9 MG/DL (ref 6–20)
BUN/CREAT BLD: ABNORMAL (ref 9–20)
CALCIUM SERPL-MCNC: 8.8 MG/DL (ref 8.6–10.4)
CARCINOEMBRYONIC ANTIGEN: 20.4 NG/ML
CHLORIDE BLD-SCNC: 102 MMOL/L (ref 98–107)
CO2: 22 MMOL/L (ref 20–31)
CREAT SERPL-MCNC: 0.58 MG/DL (ref 0.7–1.2)
DIFFERENTIAL TYPE: ABNORMAL
EOSINOPHILS RELATIVE PERCENT: 1 % (ref 1–4)
GFR AFRICAN AMERICAN: >60 ML/MIN
GFR NON-AFRICAN AMERICAN: >60 ML/MIN
GFR SERPL CREATININE-BSD FRML MDRD: ABNORMAL ML/MIN/{1.73_M2}
GFR SERPL CREATININE-BSD FRML MDRD: ABNORMAL ML/MIN/{1.73_M2}
GLUCOSE BLD-MCNC: 239 MG/DL (ref 70–99)
HCT VFR BLD CALC: 33.9 % (ref 41–53)
HEMOGLOBIN: 10.8 G/DL (ref 13.5–17.5)
LYMPHOCYTES # BLD: 20 % (ref 24–44)
MCH RBC QN AUTO: 22.7 PG (ref 26–34)
MCHC RBC AUTO-ENTMCNC: 31.8 G/DL (ref 31–37)
MCV RBC AUTO: 71.5 FL (ref 80–100)
MONOCYTES # BLD: 13 % (ref 2–11)
PDW BLD-RTO: 17.6 % (ref 12.5–15.4)
PLATELET # BLD: 185 K/UL (ref 140–450)
PLATELET ESTIMATE: ABNORMAL
PMV BLD AUTO: 6.7 FL (ref 6–12)
POTASSIUM SERPL-SCNC: 4.3 MMOL/L (ref 3.7–5.3)
PROTEIN UA: NEGATIVE
RBC # BLD: 4.74 M/UL (ref 4.5–5.9)
RBC # BLD: ABNORMAL 10*6/UL
SEG NEUTROPHILS: 65 % (ref 36–66)
SEGMENTED NEUTROPHILS ABSOLUTE COUNT: 4.4 K/UL (ref 1.8–7.7)
SODIUM BLD-SCNC: 140 MMOL/L (ref 135–144)
TOTAL PROTEIN: 6.5 G/DL (ref 6.4–8.3)
WBC # BLD: 6.9 K/UL (ref 3.5–11)
WBC # BLD: ABNORMAL 10*3/UL

## 2017-03-07 PROCEDURE — 96416 CHEMO PROLONG INFUSE W/PUMP: CPT

## 2017-03-07 PROCEDURE — 96415 CHEMO IV INFUSION ADDL HR: CPT

## 2017-03-07 PROCEDURE — 96413 CHEMO IV INFUSION 1 HR: CPT

## 2017-03-07 PROCEDURE — 96366 THER/PROPH/DIAG IV INF ADDON: CPT

## 2017-03-07 PROCEDURE — 85025 COMPLETE CBC W/AUTO DIFF WBC: CPT

## 2017-03-07 PROCEDURE — 82378 CARCINOEMBRYONIC ANTIGEN: CPT

## 2017-03-07 PROCEDURE — 96375 TX/PRO/DX INJ NEW DRUG ADDON: CPT

## 2017-03-07 PROCEDURE — 96368 THER/DIAG CONCURRENT INF: CPT

## 2017-03-07 PROCEDURE — 6360000002 HC RX W HCPCS: Performed by: INTERNAL MEDICINE

## 2017-03-07 PROCEDURE — 96411 CHEMO IV PUSH ADDL DRUG: CPT

## 2017-03-07 PROCEDURE — 81003 URINALYSIS AUTO W/O SCOPE: CPT

## 2017-03-07 PROCEDURE — 80053 COMPREHEN METABOLIC PANEL: CPT

## 2017-03-07 PROCEDURE — 2580000003 HC RX 258: Performed by: INTERNAL MEDICINE

## 2017-03-07 PROCEDURE — 36591 DRAW BLOOD OFF VENOUS DEVICE: CPT

## 2017-03-07 RX ORDER — SODIUM CHLORIDE 0.9 % (FLUSH) 0.9 %
20 SYRINGE (ML) INJECTION PRN
Status: CANCELLED | OUTPATIENT
Start: 2017-03-08

## 2017-03-07 RX ORDER — SODIUM CHLORIDE 0.9 % (FLUSH) 0.9 %
10 SYRINGE (ML) INJECTION PRN
Status: DISCONTINUED | OUTPATIENT
Start: 2017-03-07 | End: 2017-03-08 | Stop reason: HOSPADM

## 2017-03-07 RX ORDER — PALONOSETRON 0.05 MG/ML
0.25 INJECTION, SOLUTION INTRAVENOUS ONCE
Status: COMPLETED | OUTPATIENT
Start: 2017-03-07 | End: 2017-03-07

## 2017-03-07 RX ORDER — HEPARIN SODIUM (PORCINE) LOCK FLUSH IV SOLN 100 UNIT/ML 100 UNIT/ML
500 SOLUTION INTRAVENOUS PRN
Status: CANCELLED | OUTPATIENT
Start: 2017-03-08

## 2017-03-07 RX ORDER — FLUOROURACIL 50 MG/ML
900 INJECTION, SOLUTION INTRAVENOUS ONCE
Status: COMPLETED | OUTPATIENT
Start: 2017-03-07 | End: 2017-03-07

## 2017-03-07 RX ORDER — HEPARIN SODIUM (PORCINE) LOCK FLUSH IV SOLN 100 UNIT/ML 100 UNIT/ML
500 SOLUTION INTRAVENOUS PRN
Status: DISCONTINUED | OUTPATIENT
Start: 2017-03-07 | End: 2017-03-08 | Stop reason: HOSPADM

## 2017-03-07 RX ORDER — DEXTROSE MONOHYDRATE 50 MG/ML
INJECTION, SOLUTION INTRAVENOUS ONCE
Status: COMPLETED | OUTPATIENT
Start: 2017-03-07 | End: 2017-03-07

## 2017-03-07 RX ORDER — SODIUM CHLORIDE 9 MG/ML
INJECTION, SOLUTION INTRAVENOUS ONCE
Status: COMPLETED | OUTPATIENT
Start: 2017-03-07 | End: 2017-03-07

## 2017-03-07 RX ORDER — SODIUM CHLORIDE 0.9 % (FLUSH) 0.9 %
10 SYRINGE (ML) INJECTION PRN
Status: CANCELLED | OUTPATIENT
Start: 2017-03-08

## 2017-03-07 RX ADMIN — SODIUM CHLORIDE: 9 INJECTION, SOLUTION INTRAVENOUS at 09:34

## 2017-03-07 RX ADMIN — FLUOROURACIL 5550 MG: 50 INJECTION, SOLUTION INTRAVENOUS at 14:19

## 2017-03-07 RX ADMIN — Medication 10 ML: at 08:50

## 2017-03-07 RX ADMIN — PALONOSETRON HYDROCHLORIDE 0.25 MG: 0.25 INJECTION INTRAVENOUS at 11:36

## 2017-03-07 RX ADMIN — DEXAMETHASONE SODIUM PHOSPHATE 12 MG: 4 INJECTION, SOLUTION INTRA-ARTICULAR; INTRALESIONAL; INTRAMUSCULAR; INTRAVENOUS; SOFT TISSUE at 11:38

## 2017-03-07 RX ADMIN — LEUCOVORIN CALCIUM 900 MG: 200 INJECTION, POWDER, LYOPHILIZED, FOR SOLUTION INTRAMUSCULAR; INTRAVENOUS at 11:58

## 2017-03-07 RX ADMIN — OXALIPLATIN 200 MG: 100 INJECTION, SOLUTION, CONCENTRATE INTRAVENOUS at 11:58

## 2017-03-07 RX ADMIN — FLUOROURACIL 900 MG: 50 INJECTION, SOLUTION INTRAVENOUS at 14:11

## 2017-03-07 RX ADMIN — DEXTROSE MONOHYDRATE: 50 INJECTION, SOLUTION INTRAVENOUS at 11:34

## 2017-03-07 RX ADMIN — IRON SUCROSE 300 MG: 20 INJECTION, SOLUTION INTRAVENOUS at 09:41

## 2017-03-07 RX ADMIN — Medication 10 ML: at 08:49

## 2017-03-07 ASSESSMENT — PAIN DESCRIPTION - LOCATION: LOCATION: BACK;SHOULDER;KNEE

## 2017-03-07 ASSESSMENT — PAIN SCALES - GENERAL: PAINLEVEL_OUTOF10: 3

## 2017-03-07 ASSESSMENT — PAIN DESCRIPTION - PAIN TYPE: TYPE: CHRONIC PAIN

## 2017-03-09 ENCOUNTER — HOSPITAL ENCOUNTER (OUTPATIENT)
Dept: INFUSION THERAPY | Age: 57
Discharge: HOME | End: 2017-03-09
Payer: COMMERCIAL

## 2017-03-09 ENCOUNTER — HOSPITAL ENCOUNTER (OUTPATIENT)
Facility: MEDICAL CENTER | Age: 57
Discharge: HOME | End: 2017-03-09
Payer: COMMERCIAL

## 2017-03-09 ENCOUNTER — OFFICE VISIT (OUTPATIENT)
Dept: ONCOLOGY | Facility: CLINIC | Age: 57
End: 2017-03-09

## 2017-03-09 VITALS
DIASTOLIC BLOOD PRESSURE: 89 MMHG | RESPIRATION RATE: 16 BRPM | TEMPERATURE: 97.4 F | HEART RATE: 65 BPM | SYSTOLIC BLOOD PRESSURE: 142 MMHG | BODY MASS INDEX: 30.87 KG/M2 | WEIGHT: 234 LBS

## 2017-03-09 DIAGNOSIS — C78.7 HEPATIC METASTASES (HCC): ICD-10-CM

## 2017-03-09 DIAGNOSIS — C19 COLORECTAL CANCER, STAGE IV (HCC): Primary | ICD-10-CM

## 2017-03-09 DIAGNOSIS — C19 COLORECTAL CANCER, STAGE IV (HCC): ICD-10-CM

## 2017-03-09 PROCEDURE — 2580000003 HC RX 258: Performed by: INTERNAL MEDICINE

## 2017-03-09 PROCEDURE — 99214 OFFICE O/P EST MOD 30 MIN: CPT | Performed by: INTERNAL MEDICINE

## 2017-03-09 PROCEDURE — 6360000002 HC RX W HCPCS: Performed by: INTERNAL MEDICINE

## 2017-03-09 PROCEDURE — 96523 IRRIG DRUG DELIVERY DEVICE: CPT

## 2017-03-09 RX ORDER — OXYCODONE AND ACETAMINOPHEN 10; 325 MG/1; MG/1
1 TABLET ORAL EVERY 6 HOURS PRN
Qty: 120 TABLET | Refills: 0 | Status: SHIPPED | OUTPATIENT
Start: 2017-03-09 | End: 2017-03-30 | Stop reason: SDUPTHER

## 2017-03-09 RX ORDER — HEPARIN SODIUM (PORCINE) LOCK FLUSH IV SOLN 100 UNIT/ML 100 UNIT/ML
500 SOLUTION INTRAVENOUS PRN
Status: CANCELLED | OUTPATIENT
Start: 2017-03-09

## 2017-03-09 RX ORDER — HEPARIN SODIUM (PORCINE) LOCK FLUSH IV SOLN 100 UNIT/ML 100 UNIT/ML
500 SOLUTION INTRAVENOUS PRN
Status: DISCONTINUED | OUTPATIENT
Start: 2017-03-09 | End: 2017-03-10 | Stop reason: HOSPADM

## 2017-03-09 RX ORDER — SODIUM CHLORIDE 0.9 % (FLUSH) 0.9 %
10 SYRINGE (ML) INJECTION PRN
Status: DISCONTINUED | OUTPATIENT
Start: 2017-03-09 | End: 2017-03-10 | Stop reason: HOSPADM

## 2017-03-09 RX ORDER — SODIUM CHLORIDE 0.9 % (FLUSH) 0.9 %
20 SYRINGE (ML) INJECTION PRN
Status: CANCELLED | OUTPATIENT
Start: 2017-03-09

## 2017-03-09 RX ORDER — SODIUM CHLORIDE 0.9 % (FLUSH) 0.9 %
10 SYRINGE (ML) INJECTION PRN
Status: CANCELLED | OUTPATIENT
Start: 2017-03-09

## 2017-03-09 RX ADMIN — Medication 10 ML: at 12:53

## 2017-03-09 RX ADMIN — SODIUM CHLORIDE, PRESERVATIVE FREE 500 UNITS: 5 INJECTION INTRAVENOUS at 12:53

## 2017-03-13 ENCOUNTER — HOSPITAL ENCOUNTER (OUTPATIENT)
Age: 57
End: 2017-03-13
Payer: COMMERCIAL

## 2017-03-21 ENCOUNTER — HOSPITAL ENCOUNTER (OUTPATIENT)
Dept: INFUSION THERAPY | Age: 57
Discharge: HOME | End: 2017-03-21
Payer: COMMERCIAL

## 2017-03-21 VITALS
BODY MASS INDEX: 30.95 KG/M2 | SYSTOLIC BLOOD PRESSURE: 120 MMHG | HEART RATE: 59 BPM | WEIGHT: 234.6 LBS | TEMPERATURE: 97.6 F | DIASTOLIC BLOOD PRESSURE: 74 MMHG | RESPIRATION RATE: 20 BRPM

## 2017-03-21 DIAGNOSIS — I21.A1 NON-ST ELEVATION MYOCARDIAL INFARCTION (NSTEMI), TYPE 2: ICD-10-CM

## 2017-03-21 DIAGNOSIS — C78.7 HEPATIC METASTASES (HCC): ICD-10-CM

## 2017-03-21 DIAGNOSIS — C19 COLORECTAL CANCER, STAGE IV (HCC): ICD-10-CM

## 2017-03-21 LAB
ABSOLUTE EOS #: 0.13 K/UL (ref 0–0.4)
ABSOLUTE LYMPH #: 1.82 K/UL (ref 1–4.8)
ABSOLUTE MONO #: 1.17 K/UL (ref 0.1–0.8)
ALBUMIN SERPL-MCNC: 3.7 G/DL (ref 3.5–5.2)
ALBUMIN/GLOBULIN RATIO: 1.4 (ref 1–2.5)
ALP BLD-CCNC: 117 U/L (ref 40–129)
ALT SERPL-CCNC: 10 U/L (ref 5–41)
ANION GAP SERPL CALCULATED.3IONS-SCNC: 12 MMOL/L (ref 9–17)
AST SERPL-CCNC: 15 U/L
BASOPHILS # BLD: 1 % (ref 0–2)
BASOPHILS ABSOLUTE: 0.07 K/UL (ref 0–0.2)
BILIRUB SERPL-MCNC: 0.21 MG/DL (ref 0.3–1.2)
BUN BLDV-MCNC: 12 MG/DL (ref 6–20)
BUN/CREAT BLD: ABNORMAL (ref 9–20)
CALCIUM SERPL-MCNC: 8.7 MG/DL (ref 8.6–10.4)
CARCINOEMBRYONIC ANTIGEN: 12.8 NG/ML
CHLORIDE BLD-SCNC: 102 MMOL/L (ref 98–107)
CO2: 26 MMOL/L (ref 20–31)
CREAT SERPL-MCNC: 0.62 MG/DL (ref 0.7–1.2)
DIFFERENTIAL TYPE: ABNORMAL
EOSINOPHILS RELATIVE PERCENT: 2 % (ref 1–4)
GFR AFRICAN AMERICAN: >60 ML/MIN
GFR NON-AFRICAN AMERICAN: >60 ML/MIN
GFR SERPL CREATININE-BSD FRML MDRD: ABNORMAL ML/MIN/{1.73_M2}
GFR SERPL CREATININE-BSD FRML MDRD: ABNORMAL ML/MIN/{1.73_M2}
GLUCOSE BLD-MCNC: 163 MG/DL (ref 70–99)
HCT VFR BLD CALC: 33.7 % (ref 41–53)
HEMOGLOBIN: 10.7 G/DL (ref 13.5–17.5)
LYMPHOCYTES # BLD: 28 % (ref 24–44)
MCH RBC QN AUTO: 22.5 PG (ref 26–34)
MCHC RBC AUTO-ENTMCNC: 31.9 G/DL (ref 31–37)
MCV RBC AUTO: 70.5 FL (ref 80–100)
MONOCYTES # BLD: 18 % (ref 1–7)
MORPHOLOGY: ABNORMAL
PDW BLD-RTO: 20.1 % (ref 12.5–15.4)
PLATELET # BLD: 209 K/UL (ref 140–450)
PLATELET ESTIMATE: ABNORMAL
PMV BLD AUTO: 6.4 FL (ref 6–12)
POTASSIUM SERPL-SCNC: 4 MMOL/L (ref 3.7–5.3)
RBC # BLD: 4.77 M/UL (ref 4.5–5.9)
RBC # BLD: ABNORMAL 10*6/UL
SEG NEUTROPHILS: 51 % (ref 36–66)
SEGMENTED NEUTROPHILS ABSOLUTE COUNT: 3.31 K/UL (ref 1.8–7.7)
SODIUM BLD-SCNC: 140 MMOL/L (ref 135–144)
TOTAL PROTEIN: 6.3 G/DL (ref 6.4–8.3)
WBC # BLD: 6.5 K/UL (ref 3.5–11)
WBC # BLD: ABNORMAL 10*3/UL

## 2017-03-21 PROCEDURE — 96416 CHEMO PROLONG INFUSE W/PUMP: CPT

## 2017-03-21 PROCEDURE — 96415 CHEMO IV INFUSION ADDL HR: CPT

## 2017-03-21 PROCEDURE — 6360000002 HC RX W HCPCS: Performed by: INTERNAL MEDICINE

## 2017-03-21 PROCEDURE — 96368 THER/DIAG CONCURRENT INF: CPT

## 2017-03-21 PROCEDURE — 36591 DRAW BLOOD OFF VENOUS DEVICE: CPT

## 2017-03-21 PROCEDURE — 82378 CARCINOEMBRYONIC ANTIGEN: CPT

## 2017-03-21 PROCEDURE — 96411 CHEMO IV PUSH ADDL DRUG: CPT

## 2017-03-21 PROCEDURE — 85025 COMPLETE CBC W/AUTO DIFF WBC: CPT

## 2017-03-21 PROCEDURE — 96366 THER/PROPH/DIAG IV INF ADDON: CPT

## 2017-03-21 PROCEDURE — 80053 COMPREHEN METABOLIC PANEL: CPT

## 2017-03-21 PROCEDURE — 2580000003 HC RX 258: Performed by: INTERNAL MEDICINE

## 2017-03-21 PROCEDURE — 96375 TX/PRO/DX INJ NEW DRUG ADDON: CPT

## 2017-03-21 PROCEDURE — 96413 CHEMO IV INFUSION 1 HR: CPT

## 2017-03-21 RX ORDER — DEXTROSE MONOHYDRATE 50 MG/ML
INJECTION, SOLUTION INTRAVENOUS ONCE
Status: COMPLETED | OUTPATIENT
Start: 2017-03-21 | End: 2017-03-22

## 2017-03-21 RX ORDER — SODIUM CHLORIDE 0.9 % (FLUSH) 0.9 %
10 SYRINGE (ML) INJECTION PRN
Status: ACTIVE | OUTPATIENT
Start: 2017-03-21 | End: 2017-03-22

## 2017-03-21 RX ORDER — PALONOSETRON 0.05 MG/ML
0.25 INJECTION, SOLUTION INTRAVENOUS ONCE
Status: COMPLETED | OUTPATIENT
Start: 2017-03-21 | End: 2017-03-21

## 2017-03-21 RX ORDER — HEPARIN SODIUM (PORCINE) LOCK FLUSH IV SOLN 100 UNIT/ML 100 UNIT/ML
500 SOLUTION INTRAVENOUS PRN
Status: ACTIVE | OUTPATIENT
Start: 2017-03-21 | End: 2017-03-22

## 2017-03-21 RX ORDER — FLUOROURACIL 50 MG/ML
900 INJECTION, SOLUTION INTRAVENOUS ONCE
Status: COMPLETED | OUTPATIENT
Start: 2017-03-21 | End: 2017-03-21

## 2017-03-21 RX ADMIN — PALONOSETRON HYDROCHLORIDE 0.25 MG: 0.25 INJECTION INTRAVENOUS at 09:49

## 2017-03-21 RX ADMIN — DEXTROSE MONOHYDRATE: 50 INJECTION, SOLUTION INTRAVENOUS at 09:48

## 2017-03-21 RX ADMIN — Medication 10 ML: at 08:56

## 2017-03-21 RX ADMIN — FLUOROURACIL 5550 MG: 50 INJECTION, SOLUTION INTRAVENOUS at 12:57

## 2017-03-21 RX ADMIN — FLUOROURACIL 900 MG: 50 INJECTION, SOLUTION INTRAVENOUS at 12:53

## 2017-03-21 RX ADMIN — DEXAMETHASONE SODIUM PHOSPHATE 12 MG: 4 INJECTION, SOLUTION INTRA-ARTICULAR; INTRALESIONAL; INTRAMUSCULAR; INTRAVENOUS; SOFT TISSUE at 09:51

## 2017-03-21 RX ADMIN — LEUCOVORIN CALCIUM 900 MG: 200 INJECTION, POWDER, LYOPHILIZED, FOR SOLUTION INTRAMUSCULAR; INTRAVENOUS at 10:10

## 2017-03-21 RX ADMIN — OXALIPLATIN 200 MG: 5 INJECTION, SOLUTION, CONCENTRATE INTRAVENOUS at 10:10

## 2017-03-21 ASSESSMENT — PAIN SCALES - GENERAL
PAINLEVEL_OUTOF10: 0
PAINLEVEL_OUTOF10: 0

## 2017-03-21 NOTE — PROGRESS NOTES
Pt here for C7D1 folfox. Denies any new complaints. Labs drawn from port and results reviewed. Pt was treated without incident and d/c'd in stable condition. Pt will return in 2 days for pump d/c.

## 2017-03-22 ENCOUNTER — TELEPHONE (OUTPATIENT)
Dept: INFUSION THERAPY | Age: 57
End: 2017-03-22

## 2017-03-23 ENCOUNTER — HOSPITAL ENCOUNTER (OUTPATIENT)
Age: 57
End: 2017-03-23
Payer: COMMERCIAL

## 2017-03-23 ENCOUNTER — HOSPITAL ENCOUNTER (OUTPATIENT)
Dept: INFUSION THERAPY | Age: 57
Discharge: HOME | End: 2017-03-23
Payer: COMMERCIAL

## 2017-03-23 DIAGNOSIS — C19 COLORECTAL CANCER, STAGE IV (HCC): ICD-10-CM

## 2017-03-23 PROCEDURE — 2580000003 HC RX 258: Performed by: INTERNAL MEDICINE

## 2017-03-23 PROCEDURE — 6360000002 HC RX W HCPCS: Performed by: INTERNAL MEDICINE

## 2017-03-23 PROCEDURE — 96523 IRRIG DRUG DELIVERY DEVICE: CPT

## 2017-03-23 RX ORDER — SODIUM CHLORIDE 0.9 % (FLUSH) 0.9 %
20 SYRINGE (ML) INJECTION PRN
Status: CANCELLED | OUTPATIENT
Start: 2017-03-23

## 2017-03-23 RX ORDER — HEPARIN SODIUM (PORCINE) LOCK FLUSH IV SOLN 100 UNIT/ML 100 UNIT/ML
500 SOLUTION INTRAVENOUS PRN
Status: CANCELLED | OUTPATIENT
Start: 2017-03-23

## 2017-03-23 RX ORDER — SODIUM CHLORIDE 0.9 % (FLUSH) 0.9 %
10 SYRINGE (ML) INJECTION PRN
Status: ACTIVE | OUTPATIENT
Start: 2017-03-23 | End: 2017-03-24

## 2017-03-23 RX ORDER — SODIUM CHLORIDE 0.9 % (FLUSH) 0.9 %
10 SYRINGE (ML) INJECTION PRN
Status: CANCELLED | OUTPATIENT
Start: 2017-03-23

## 2017-03-23 RX ORDER — HEPARIN SODIUM (PORCINE) LOCK FLUSH IV SOLN 100 UNIT/ML 100 UNIT/ML
500 SOLUTION INTRAVENOUS PRN
Status: ACTIVE | OUTPATIENT
Start: 2017-03-23 | End: 2017-03-24

## 2017-03-23 RX ADMIN — SODIUM CHLORIDE, PRESERVATIVE FREE 500 UNITS: 5 INJECTION INTRAVENOUS at 11:16

## 2017-03-23 RX ADMIN — Medication 10 ML: at 11:16

## 2017-03-30 ENCOUNTER — HOSPITAL ENCOUNTER (OUTPATIENT)
Age: 57
Discharge: HOME | End: 2017-03-30
Payer: COMMERCIAL

## 2017-03-30 ENCOUNTER — OFFICE VISIT (OUTPATIENT)
Dept: ONCOLOGY | Age: 57
End: 2017-03-30
Payer: COMMERCIAL

## 2017-03-30 VITALS
HEART RATE: 62 BPM | TEMPERATURE: 97.7 F | WEIGHT: 238.9 LBS | SYSTOLIC BLOOD PRESSURE: 137 MMHG | DIASTOLIC BLOOD PRESSURE: 86 MMHG | RESPIRATION RATE: 18 BRPM | BODY MASS INDEX: 31.52 KG/M2

## 2017-03-30 DIAGNOSIS — C19 COLORECTAL CANCER, STAGE IV (HCC): Primary | ICD-10-CM

## 2017-03-30 DIAGNOSIS — C78.7 HEPATIC METASTASES (HCC): ICD-10-CM

## 2017-03-30 PROCEDURE — 99211 OFF/OP EST MAY X REQ PHY/QHP: CPT | Performed by: INTERNAL MEDICINE

## 2017-03-30 PROCEDURE — 99214 OFFICE O/P EST MOD 30 MIN: CPT | Performed by: INTERNAL MEDICINE

## 2017-03-30 RX ORDER — OXYCODONE AND ACETAMINOPHEN 10; 325 MG/1; MG/1
1 TABLET ORAL EVERY 6 HOURS PRN
Qty: 120 TABLET | Refills: 0 | Status: SHIPPED | OUTPATIENT
Start: 2017-04-04 | End: 2017-05-04 | Stop reason: SDUPTHER

## 2017-03-30 RX ORDER — CLOPIDOGREL BISULFATE 75 MG/1
75 TABLET ORAL DAILY
COMMUNITY
Start: 2017-02-27 | End: 2017-12-14 | Stop reason: ALTCHOICE

## 2017-03-30 RX ORDER — LISINOPRIL 5 MG/1
5 TABLET ORAL DAILY
COMMUNITY
Start: 2017-02-27 | End: 2018-02-13 | Stop reason: ALTCHOICE

## 2017-04-04 ENCOUNTER — HOSPITAL ENCOUNTER (OUTPATIENT)
Dept: INFUSION THERAPY | Age: 57
Discharge: HOME | End: 2017-04-04
Payer: COMMERCIAL

## 2017-04-04 VITALS
TEMPERATURE: 98 F | WEIGHT: 236 LBS | SYSTOLIC BLOOD PRESSURE: 129 MMHG | RESPIRATION RATE: 14 BRPM | OXYGEN SATURATION: 97 % | BODY MASS INDEX: 31.28 KG/M2 | HEART RATE: 80 BPM | DIASTOLIC BLOOD PRESSURE: 74 MMHG | HEIGHT: 73 IN

## 2017-04-04 DIAGNOSIS — C78.7 HEPATIC METASTASES (HCC): ICD-10-CM

## 2017-04-04 DIAGNOSIS — I21.A1 NON-ST ELEVATION MYOCARDIAL INFARCTION (NSTEMI), TYPE 2: ICD-10-CM

## 2017-04-04 DIAGNOSIS — C19 COLORECTAL CANCER, STAGE IV (HCC): ICD-10-CM

## 2017-04-04 LAB
ABSOLUTE EOS #: 0.07 K/UL (ref 0–0.4)
ABSOLUTE LYMPH #: 1.32 K/UL (ref 1–4.8)
ABSOLUTE MONO #: 1.06 K/UL (ref 0.1–0.8)
ALBUMIN SERPL-MCNC: 3.8 G/DL (ref 3.5–5.2)
ALBUMIN/GLOBULIN RATIO: 1.5 (ref 1–2.5)
ALP BLD-CCNC: 116 U/L (ref 40–129)
ALT SERPL-CCNC: 13 U/L (ref 5–41)
ANION GAP SERPL CALCULATED.3IONS-SCNC: 13 MMOL/L (ref 9–17)
AST SERPL-CCNC: 17 U/L
BASOPHILS # BLD: 1 % (ref 0–2)
BASOPHILS ABSOLUTE: 0.07 K/UL (ref 0–0.2)
BILIRUB SERPL-MCNC: 0.24 MG/DL (ref 0.3–1.2)
BUN BLDV-MCNC: 10 MG/DL (ref 6–20)
BUN/CREAT BLD: ABNORMAL (ref 9–20)
CALCIUM SERPL-MCNC: 8.8 MG/DL (ref 8.6–10.4)
CARCINOEMBRYONIC ANTIGEN: 8.1 NG/ML
CHLORIDE BLD-SCNC: 103 MMOL/L (ref 98–107)
CO2: 25 MMOL/L (ref 20–31)
CREAT SERPL-MCNC: 0.54 MG/DL (ref 0.7–1.2)
DIFFERENTIAL TYPE: ABNORMAL
EOSINOPHILS RELATIVE PERCENT: 1 % (ref 1–4)
GFR AFRICAN AMERICAN: >60 ML/MIN
GFR NON-AFRICAN AMERICAN: >60 ML/MIN
GFR SERPL CREATININE-BSD FRML MDRD: ABNORMAL ML/MIN/{1.73_M2}
GFR SERPL CREATININE-BSD FRML MDRD: ABNORMAL ML/MIN/{1.73_M2}
GLUCOSE BLD-MCNC: 193 MG/DL (ref 70–99)
HCT VFR BLD CALC: 35.7 % (ref 41–53)
HEMOGLOBIN: 11.6 G/DL (ref 13.5–17.5)
LYMPHOCYTES # BLD: 20 % (ref 24–44)
MCH RBC QN AUTO: 23.3 PG (ref 26–34)
MCHC RBC AUTO-ENTMCNC: 32.5 G/DL (ref 31–37)
MCV RBC AUTO: 71.5 FL (ref 80–100)
MONOCYTES # BLD: 16 % (ref 1–7)
MORPHOLOGY: ABNORMAL
PDW BLD-RTO: 22 % (ref 12.5–15.4)
PLATELET # BLD: 141 K/UL (ref 140–450)
PLATELET ESTIMATE: ABNORMAL
PMV BLD AUTO: 8.2 FL (ref 6–12)
POTASSIUM SERPL-SCNC: 4.1 MMOL/L (ref 3.7–5.3)
RBC # BLD: 5 M/UL (ref 4.5–5.9)
RBC # BLD: ABNORMAL 10*6/UL
SEG NEUTROPHILS: 62 % (ref 36–66)
SEGMENTED NEUTROPHILS ABSOLUTE COUNT: 4.08 K/UL (ref 1.8–7.7)
SODIUM BLD-SCNC: 141 MMOL/L (ref 135–144)
TOTAL PROTEIN: 6.3 G/DL (ref 6.4–8.3)
WBC # BLD: 6.6 K/UL (ref 3.5–11)
WBC # BLD: ABNORMAL 10*3/UL

## 2017-04-04 PROCEDURE — 36591 DRAW BLOOD OFF VENOUS DEVICE: CPT

## 2017-04-04 PROCEDURE — 82378 CARCINOEMBRYONIC ANTIGEN: CPT

## 2017-04-04 PROCEDURE — 96417 CHEMO IV INFUS EACH ADDL SEQ: CPT

## 2017-04-04 PROCEDURE — 80053 COMPREHEN METABOLIC PANEL: CPT

## 2017-04-04 PROCEDURE — 96416 CHEMO PROLONG INFUSE W/PUMP: CPT

## 2017-04-04 PROCEDURE — 6360000002 HC RX W HCPCS: Performed by: INTERNAL MEDICINE

## 2017-04-04 PROCEDURE — 96375 TX/PRO/DX INJ NEW DRUG ADDON: CPT

## 2017-04-04 PROCEDURE — 96415 CHEMO IV INFUSION ADDL HR: CPT

## 2017-04-04 PROCEDURE — 96411 CHEMO IV PUSH ADDL DRUG: CPT

## 2017-04-04 PROCEDURE — 96374 THER/PROPH/DIAG INJ IV PUSH: CPT

## 2017-04-04 PROCEDURE — 85025 COMPLETE CBC W/AUTO DIFF WBC: CPT

## 2017-04-04 PROCEDURE — 96413 CHEMO IV INFUSION 1 HR: CPT

## 2017-04-04 PROCEDURE — 96409 CHEMO IV PUSH SNGL DRUG: CPT

## 2017-04-04 PROCEDURE — 2580000003 HC RX 258: Performed by: INTERNAL MEDICINE

## 2017-04-04 RX ORDER — SODIUM CHLORIDE 9 MG/ML
INJECTION, SOLUTION INTRAVENOUS ONCE
Status: DISCONTINUED | OUTPATIENT
Start: 2017-04-04 | End: 2017-04-10 | Stop reason: HOSPADM

## 2017-04-04 RX ORDER — DEXTROSE MONOHYDRATE 50 MG/ML
INJECTION, SOLUTION INTRAVENOUS ONCE
Status: COMPLETED | OUTPATIENT
Start: 2017-04-04 | End: 2017-04-04

## 2017-04-04 RX ORDER — SODIUM CHLORIDE 0.9 % (FLUSH) 0.9 %
10 SYRINGE (ML) INJECTION PRN
Status: ACTIVE | OUTPATIENT
Start: 2017-04-04 | End: 2017-04-05

## 2017-04-04 RX ORDER — PALONOSETRON 0.05 MG/ML
0.25 INJECTION, SOLUTION INTRAVENOUS ONCE
Status: COMPLETED | OUTPATIENT
Start: 2017-04-04 | End: 2017-04-04

## 2017-04-04 RX ORDER — FLUOROURACIL 50 MG/ML
900 INJECTION, SOLUTION INTRAVENOUS ONCE
Status: COMPLETED | OUTPATIENT
Start: 2017-04-04 | End: 2017-04-04

## 2017-04-04 RX ORDER — HEPARIN SODIUM (PORCINE) LOCK FLUSH IV SOLN 100 UNIT/ML 100 UNIT/ML
500 SOLUTION INTRAVENOUS PRN
Status: ACTIVE | OUTPATIENT
Start: 2017-04-04 | End: 2017-04-05

## 2017-04-04 RX ADMIN — FLUOROURACIL 5550 MG: 50 INJECTION, SOLUTION INTRAVENOUS at 12:56

## 2017-04-04 RX ADMIN — Medication 20 ML: at 09:45

## 2017-04-04 RX ADMIN — DEXAMETHASONE SODIUM PHOSPHATE 12 MG: 4 INJECTION, SOLUTION INTRA-ARTICULAR; INTRALESIONAL; INTRAMUSCULAR; INTRAVENOUS; SOFT TISSUE at 10:18

## 2017-04-04 RX ADMIN — OXALIPLATIN 200 MG: 5 INJECTION, SOLUTION, CONCENTRATE INTRAVENOUS at 10:41

## 2017-04-04 RX ADMIN — PALONOSETRON HYDROCHLORIDE 0.25 MG: 0.25 INJECTION INTRAVENOUS at 10:17

## 2017-04-04 RX ADMIN — DEXTROSE MONOHYDRATE: 50 INJECTION, SOLUTION INTRAVENOUS at 10:16

## 2017-04-04 RX ADMIN — FLUOROURACIL 900 MG: 50 INJECTION, SOLUTION INTRAVENOUS at 12:51

## 2017-04-04 RX ADMIN — LEUCOVORIN CALCIUM 900 MG: 200 INJECTION, POWDER, LYOPHILIZED, FOR SOLUTION INTRAMUSCULAR; INTRAVENOUS at 10:41

## 2017-04-04 ASSESSMENT — PAIN SCALES - GENERAL
PAINLEVEL_OUTOF10: 0
PAINLEVEL_OUTOF10: 0

## 2017-04-04 NOTE — PROGRESS NOTES
Pt here today for c8d1 with [id number]. Pt labs drawn and reviewed and appropriate for tx. Pt had no new significant findings to report today during tox screen. Pt valdo medication well at this time. Pt to have ct done soon per Md notes between c8 c9. Pt port in good working order with noted blood return and pump connected without issue and functioning at this time. Pt was d/c in stable condition with no s/s of reaction.

## 2017-04-06 ENCOUNTER — HOSPITAL ENCOUNTER (OUTPATIENT)
Dept: INFUSION THERAPY | Age: 57
Discharge: HOME | End: 2017-04-06
Payer: COMMERCIAL

## 2017-04-06 DIAGNOSIS — C19 COLORECTAL CANCER, STAGE IV (HCC): ICD-10-CM

## 2017-04-06 PROCEDURE — 96523 IRRIG DRUG DELIVERY DEVICE: CPT

## 2017-04-06 PROCEDURE — 2580000003 HC RX 258: Performed by: INTERNAL MEDICINE

## 2017-04-06 PROCEDURE — 6360000002 HC RX W HCPCS: Performed by: INTERNAL MEDICINE

## 2017-04-06 RX ORDER — SODIUM CHLORIDE 0.9 % (FLUSH) 0.9 %
10 SYRINGE (ML) INJECTION PRN
Status: CANCELLED | OUTPATIENT
Start: 2017-04-06

## 2017-04-06 RX ORDER — HEPARIN SODIUM (PORCINE) LOCK FLUSH IV SOLN 100 UNIT/ML 100 UNIT/ML
500 SOLUTION INTRAVENOUS PRN
Status: CANCELLED | OUTPATIENT
Start: 2017-04-06

## 2017-04-06 RX ORDER — HEPARIN SODIUM (PORCINE) LOCK FLUSH IV SOLN 100 UNIT/ML 100 UNIT/ML
500 SOLUTION INTRAVENOUS PRN
Status: ACTIVE | OUTPATIENT
Start: 2017-04-06 | End: 2017-04-07

## 2017-04-06 RX ORDER — SODIUM CHLORIDE 0.9 % (FLUSH) 0.9 %
10 SYRINGE (ML) INJECTION PRN
Status: ACTIVE | OUTPATIENT
Start: 2017-04-06 | End: 2017-04-07

## 2017-04-06 RX ORDER — SODIUM CHLORIDE 0.9 % (FLUSH) 0.9 %
20 SYRINGE (ML) INJECTION PRN
Status: CANCELLED | OUTPATIENT
Start: 2017-04-06

## 2017-04-06 RX ADMIN — SODIUM CHLORIDE, PRESERVATIVE FREE 500 UNITS: 5 INJECTION INTRAVENOUS at 12:19

## 2017-04-06 RX ADMIN — Medication 10 ML: at 12:19

## 2017-04-06 NOTE — PROGRESS NOTES
Pt is here for CADD pump d/c. Denies any problems at this time. Pt was discharged in stable condition and will return tomorrow for CT scan.

## 2017-04-07 ENCOUNTER — HOSPITAL ENCOUNTER (OUTPATIENT)
Dept: INFUSION THERAPY | Age: 57
Discharge: HOME | End: 2017-04-07
Payer: COMMERCIAL

## 2017-04-07 ENCOUNTER — HOSPITAL ENCOUNTER (OUTPATIENT)
Dept: CT IMAGING | Age: 57
Discharge: HOME | End: 2017-04-07
Payer: COMMERCIAL

## 2017-04-07 DIAGNOSIS — C19 COLORECTAL CANCER, STAGE IV (HCC): ICD-10-CM

## 2017-04-07 DIAGNOSIS — C78.7 HEPATIC METASTASES (HCC): ICD-10-CM

## 2017-04-07 PROCEDURE — 71260 CT THORAX DX C+: CPT

## 2017-04-07 PROCEDURE — 2580000003 HC RX 258: Performed by: INTERNAL MEDICINE

## 2017-04-07 PROCEDURE — 6360000002 HC RX W HCPCS: Performed by: INTERNAL MEDICINE

## 2017-04-07 PROCEDURE — 6360000004 HC RX CONTRAST MEDICATION: Performed by: INTERNAL MEDICINE

## 2017-04-07 PROCEDURE — 74177 CT ABD & PELVIS W/CONTRAST: CPT

## 2017-04-07 RX ORDER — HEPARIN SODIUM (PORCINE) LOCK FLUSH IV SOLN 100 UNIT/ML 100 UNIT/ML
500 SOLUTION INTRAVENOUS PRN
Status: CANCELLED | OUTPATIENT
Start: 2017-04-07

## 2017-04-07 RX ORDER — SODIUM CHLORIDE 0.9 % (FLUSH) 0.9 %
10 SYRINGE (ML) INJECTION PRN
Status: CANCELLED | OUTPATIENT
Start: 2017-04-07

## 2017-04-07 RX ORDER — SODIUM CHLORIDE 0.9 % (FLUSH) 0.9 %
10 SYRINGE (ML) INJECTION PRN
Status: ACTIVE | OUTPATIENT
Start: 2017-04-07 | End: 2017-04-08

## 2017-04-07 RX ORDER — 0.9 % SODIUM CHLORIDE 0.9 %
100 INTRAVENOUS SOLUTION INTRAVENOUS ONCE
Status: COMPLETED | OUTPATIENT
Start: 2017-04-07 | End: 2017-04-07

## 2017-04-07 RX ORDER — SODIUM CHLORIDE 0.9 % (FLUSH) 0.9 %
20 SYRINGE (ML) INJECTION PRN
Status: CANCELLED | OUTPATIENT
Start: 2017-04-07

## 2017-04-07 RX ORDER — HEPARIN SODIUM (PORCINE) LOCK FLUSH IV SOLN 100 UNIT/ML 100 UNIT/ML
500 SOLUTION INTRAVENOUS PRN
Status: ACTIVE | OUTPATIENT
Start: 2017-04-07 | End: 2017-04-08

## 2017-04-07 RX ORDER — SODIUM CHLORIDE 0.9 % (FLUSH) 0.9 %
10 SYRINGE (ML) INJECTION PRN
Status: DISCONTINUED | OUTPATIENT
Start: 2017-04-07 | End: 2017-04-10 | Stop reason: HOSPADM

## 2017-04-07 RX ADMIN — Medication 10 ML: at 10:27

## 2017-04-07 RX ADMIN — Medication 10 ML: at 09:05

## 2017-04-07 RX ADMIN — Medication 10 ML: at 10:10

## 2017-04-07 RX ADMIN — SODIUM CHLORIDE, PRESERVATIVE FREE 500 UNITS: 5 INJECTION INTRAVENOUS at 10:27

## 2017-04-07 RX ADMIN — IOVERSOL 130 ML: 741 INJECTION INTRA-ARTERIAL; INTRAVENOUS at 10:10

## 2017-04-07 RX ADMIN — SODIUM CHLORIDE 100 ML: 9 INJECTION, SOLUTION INTRAVENOUS at 10:09

## 2017-04-07 RX ADMIN — IOHEXOL 50 ML: 240 INJECTION, SOLUTION INTRATHECAL; INTRAVASCULAR; INTRAVENOUS; ORAL at 10:09

## 2017-04-20 ENCOUNTER — HOSPITAL ENCOUNTER (OUTPATIENT)
Age: 57
Discharge: HOME | End: 2017-04-20
Payer: COMMERCIAL

## 2017-04-20 ENCOUNTER — OFFICE VISIT (OUTPATIENT)
Dept: ONCOLOGY | Age: 57
End: 2017-04-20
Payer: COMMERCIAL

## 2017-04-20 VITALS
SYSTOLIC BLOOD PRESSURE: 116 MMHG | HEART RATE: 59 BPM | WEIGHT: 239.7 LBS | BODY MASS INDEX: 31.62 KG/M2 | TEMPERATURE: 97.8 F | DIASTOLIC BLOOD PRESSURE: 75 MMHG

## 2017-04-20 DIAGNOSIS — C78.7 HEPATIC METASTASES (HCC): ICD-10-CM

## 2017-04-20 DIAGNOSIS — C19 COLORECTAL CANCER, STAGE IV (HCC): Primary | ICD-10-CM

## 2017-04-20 DIAGNOSIS — I21.3 ST ELEVATION MYOCARDIAL INFARCTION (STEMI), UNSPECIFIED ARTERY (HCC): ICD-10-CM

## 2017-04-20 PROCEDURE — 99211 OFF/OP EST MAY X REQ PHY/QHP: CPT

## 2017-04-20 PROCEDURE — 99215 OFFICE O/P EST HI 40 MIN: CPT | Performed by: INTERNAL MEDICINE

## 2017-04-20 RX ORDER — SODIUM CHLORIDE 0.9 % (FLUSH) 0.9 %
10 SYRINGE (ML) INJECTION PRN
Status: CANCELLED | OUTPATIENT
Start: 2017-06-06

## 2017-04-20 RX ORDER — DEXTROSE MONOHYDRATE 50 MG/ML
INJECTION, SOLUTION INTRAVENOUS ONCE
Status: CANCELLED | OUTPATIENT
Start: 2017-04-25

## 2017-04-20 RX ORDER — SODIUM CHLORIDE 0.9 % (FLUSH) 0.9 %
5 SYRINGE (ML) INJECTION PRN
Status: CANCELLED | OUTPATIENT
Start: 2017-06-06

## 2017-04-20 RX ORDER — DEXTROSE MONOHYDRATE 50 MG/ML
INJECTION, SOLUTION INTRAVENOUS ONCE
Status: CANCELLED | OUTPATIENT
Start: 2017-05-23

## 2017-04-20 RX ORDER — METHYLPREDNISOLONE SODIUM SUCCINATE 125 MG/2ML
125 INJECTION, POWDER, LYOPHILIZED, FOR SOLUTION INTRAMUSCULAR; INTRAVENOUS ONCE
Status: CANCELLED | OUTPATIENT
Start: 2017-06-06 | End: 2017-06-20

## 2017-04-20 RX ORDER — DIPHENHYDRAMINE HYDROCHLORIDE 50 MG/ML
50 INJECTION INTRAMUSCULAR; INTRAVENOUS ONCE
Status: CANCELLED | OUTPATIENT
Start: 2017-04-25 | End: 2017-04-25

## 2017-04-20 RX ORDER — METHYLPREDNISOLONE SODIUM SUCCINATE 125 MG/2ML
125 INJECTION, POWDER, LYOPHILIZED, FOR SOLUTION INTRAMUSCULAR; INTRAVENOUS ONCE
Status: CANCELLED | OUTPATIENT
Start: 2017-05-23 | End: 2017-05-30

## 2017-04-20 RX ORDER — DIPHENHYDRAMINE HYDROCHLORIDE 50 MG/ML
50 INJECTION INTRAMUSCULAR; INTRAVENOUS ONCE
Status: CANCELLED | OUTPATIENT
Start: 2017-05-09 | End: 2017-05-09

## 2017-04-20 RX ORDER — 0.9 % SODIUM CHLORIDE 0.9 %
10 VIAL (ML) INJECTION ONCE
Status: CANCELLED | OUTPATIENT
Start: 2017-05-23 | End: 2017-05-30

## 2017-04-20 RX ORDER — PALONOSETRON 0.05 MG/ML
0.25 INJECTION, SOLUTION INTRAVENOUS ONCE
Status: CANCELLED | OUTPATIENT
Start: 2017-04-25

## 2017-04-20 RX ORDER — FLUOROURACIL 50 MG/ML
900 INJECTION, SOLUTION INTRAVENOUS ONCE
Status: CANCELLED | OUTPATIENT
Start: 2017-05-09

## 2017-04-20 RX ORDER — METHYLPREDNISOLONE SODIUM SUCCINATE 125 MG/2ML
125 INJECTION, POWDER, LYOPHILIZED, FOR SOLUTION INTRAMUSCULAR; INTRAVENOUS ONCE
Status: CANCELLED | OUTPATIENT
Start: 2017-04-25 | End: 2017-04-25

## 2017-04-20 RX ORDER — PALONOSETRON 0.05 MG/ML
0.25 INJECTION, SOLUTION INTRAVENOUS ONCE
Status: CANCELLED | OUTPATIENT
Start: 2017-05-09

## 2017-04-20 RX ORDER — FLUOROURACIL 50 MG/ML
900 INJECTION, SOLUTION INTRAVENOUS ONCE
Status: CANCELLED | OUTPATIENT
Start: 2017-05-23

## 2017-04-20 RX ORDER — HEPARIN SODIUM (PORCINE) LOCK FLUSH IV SOLN 100 UNIT/ML 100 UNIT/ML
500 SOLUTION INTRAVENOUS PRN
Status: CANCELLED | OUTPATIENT
Start: 2017-06-06

## 2017-04-20 RX ORDER — PALONOSETRON 0.05 MG/ML
0.25 INJECTION, SOLUTION INTRAVENOUS ONCE
Status: CANCELLED | OUTPATIENT
Start: 2017-06-06

## 2017-04-20 RX ORDER — DEXTROSE MONOHYDRATE 50 MG/ML
INJECTION, SOLUTION INTRAVENOUS ONCE
Status: CANCELLED | OUTPATIENT
Start: 2017-06-06

## 2017-04-20 RX ORDER — SODIUM CHLORIDE 0.9 % (FLUSH) 0.9 %
5 SYRINGE (ML) INJECTION PRN
Status: CANCELLED | OUTPATIENT
Start: 2017-04-25

## 2017-04-20 RX ORDER — 0.9 % SODIUM CHLORIDE 0.9 %
10 VIAL (ML) INJECTION ONCE
Status: CANCELLED | OUTPATIENT
Start: 2017-05-09 | End: 2017-05-09

## 2017-04-20 RX ORDER — SODIUM CHLORIDE 0.9 % (FLUSH) 0.9 %
10 SYRINGE (ML) INJECTION PRN
Status: CANCELLED | OUTPATIENT
Start: 2017-04-25

## 2017-04-20 RX ORDER — SODIUM CHLORIDE 0.9 % (FLUSH) 0.9 %
10 SYRINGE (ML) INJECTION PRN
Status: CANCELLED | OUTPATIENT
Start: 2017-05-09

## 2017-04-20 RX ORDER — FLUOROURACIL 50 MG/ML
900 INJECTION, SOLUTION INTRAVENOUS ONCE
Status: CANCELLED | OUTPATIENT
Start: 2017-06-06

## 2017-04-20 RX ORDER — SODIUM CHLORIDE 0.9 % (FLUSH) 0.9 %
5 SYRINGE (ML) INJECTION PRN
Status: CANCELLED | OUTPATIENT
Start: 2017-05-23

## 2017-04-20 RX ORDER — 0.9 % SODIUM CHLORIDE 0.9 %
10 VIAL (ML) INJECTION ONCE
Status: CANCELLED | OUTPATIENT
Start: 2017-04-25 | End: 2017-04-25

## 2017-04-20 RX ORDER — HEPARIN SODIUM (PORCINE) LOCK FLUSH IV SOLN 100 UNIT/ML 100 UNIT/ML
500 SOLUTION INTRAVENOUS PRN
Status: CANCELLED | OUTPATIENT
Start: 2017-05-23

## 2017-04-20 RX ORDER — SODIUM CHLORIDE 9 MG/ML
INJECTION, SOLUTION INTRAVENOUS CONTINUOUS
Status: CANCELLED | OUTPATIENT
Start: 2017-04-25

## 2017-04-20 RX ORDER — SODIUM CHLORIDE 9 MG/ML
INJECTION, SOLUTION INTRAVENOUS ONCE
Status: CANCELLED | OUTPATIENT
Start: 2017-05-23

## 2017-04-20 RX ORDER — SODIUM CHLORIDE 0.9 % (FLUSH) 0.9 %
5 SYRINGE (ML) INJECTION PRN
Status: CANCELLED | OUTPATIENT
Start: 2017-05-09

## 2017-04-20 RX ORDER — DIPHENHYDRAMINE HYDROCHLORIDE 50 MG/ML
50 INJECTION INTRAMUSCULAR; INTRAVENOUS ONCE
Status: CANCELLED | OUTPATIENT
Start: 2017-06-06 | End: 2017-06-20

## 2017-04-20 RX ORDER — METHYLPREDNISOLONE SODIUM SUCCINATE 125 MG/2ML
125 INJECTION, POWDER, LYOPHILIZED, FOR SOLUTION INTRAMUSCULAR; INTRAVENOUS ONCE
Status: CANCELLED | OUTPATIENT
Start: 2017-05-09 | End: 2017-05-09

## 2017-04-20 RX ORDER — DEXTROSE MONOHYDRATE 50 MG/ML
INJECTION, SOLUTION INTRAVENOUS ONCE
Status: CANCELLED | OUTPATIENT
Start: 2017-05-09

## 2017-04-20 RX ORDER — SODIUM CHLORIDE 9 MG/ML
INJECTION, SOLUTION INTRAVENOUS CONTINUOUS
Status: CANCELLED | OUTPATIENT
Start: 2017-05-09

## 2017-04-20 RX ORDER — SODIUM CHLORIDE 9 MG/ML
INJECTION, SOLUTION INTRAVENOUS ONCE
Status: CANCELLED | OUTPATIENT
Start: 2017-05-09

## 2017-04-20 RX ORDER — DIPHENHYDRAMINE HYDROCHLORIDE 50 MG/ML
50 INJECTION INTRAMUSCULAR; INTRAVENOUS ONCE
Status: CANCELLED | OUTPATIENT
Start: 2017-05-23 | End: 2017-05-30

## 2017-04-20 RX ORDER — 0.9 % SODIUM CHLORIDE 0.9 %
10 VIAL (ML) INJECTION ONCE
Status: CANCELLED | OUTPATIENT
Start: 2017-06-06 | End: 2017-06-20

## 2017-04-20 RX ORDER — SODIUM CHLORIDE 9 MG/ML
INJECTION, SOLUTION INTRAVENOUS ONCE
Status: CANCELLED | OUTPATIENT
Start: 2017-06-06

## 2017-04-20 RX ORDER — HEPARIN SODIUM (PORCINE) LOCK FLUSH IV SOLN 100 UNIT/ML 100 UNIT/ML
500 SOLUTION INTRAVENOUS PRN
Status: CANCELLED | OUTPATIENT
Start: 2017-04-25

## 2017-04-20 RX ORDER — SODIUM CHLORIDE 0.9 % (FLUSH) 0.9 %
10 SYRINGE (ML) INJECTION PRN
Status: CANCELLED | OUTPATIENT
Start: 2017-05-23

## 2017-04-20 RX ORDER — SODIUM CHLORIDE 9 MG/ML
INJECTION, SOLUTION INTRAVENOUS CONTINUOUS
Status: CANCELLED | OUTPATIENT
Start: 2017-05-23

## 2017-04-20 RX ORDER — ASPIRIN 81 MG
81 TABLET, DELAYED RELEASE (ENTERIC COATED) ORAL DAILY
COMMUNITY
Start: 2017-04-10 | End: 2018-02-13 | Stop reason: ALTCHOICE

## 2017-04-20 RX ORDER — HEPARIN SODIUM (PORCINE) LOCK FLUSH IV SOLN 100 UNIT/ML 100 UNIT/ML
500 SOLUTION INTRAVENOUS PRN
Status: CANCELLED | OUTPATIENT
Start: 2017-05-09

## 2017-04-20 RX ORDER — FLUOROURACIL 50 MG/ML
900 INJECTION, SOLUTION INTRAVENOUS ONCE
Status: CANCELLED | OUTPATIENT
Start: 2017-04-25

## 2017-04-20 RX ORDER — SODIUM CHLORIDE 9 MG/ML
INJECTION, SOLUTION INTRAVENOUS CONTINUOUS
Status: CANCELLED | OUTPATIENT
Start: 2017-06-06

## 2017-04-20 RX ORDER — SODIUM CHLORIDE 9 MG/ML
INJECTION, SOLUTION INTRAVENOUS ONCE
Status: CANCELLED | OUTPATIENT
Start: 2017-04-25

## 2017-04-20 RX ORDER — PALONOSETRON 0.05 MG/ML
0.25 INJECTION, SOLUTION INTRAVENOUS ONCE
Status: CANCELLED | OUTPATIENT
Start: 2017-05-23

## 2017-04-24 DIAGNOSIS — C78.7 HEPATIC METASTASES (HCC): ICD-10-CM

## 2017-04-24 DIAGNOSIS — C19 COLORECTAL CANCER, STAGE IV (HCC): Primary | ICD-10-CM

## 2017-04-25 ENCOUNTER — HOSPITAL ENCOUNTER (OUTPATIENT)
Dept: INFUSION THERAPY | Age: 57
Discharge: HOME | End: 2017-04-25
Payer: COMMERCIAL

## 2017-04-25 ENCOUNTER — HOSPITAL ENCOUNTER (OUTPATIENT)
Age: 57
Discharge: HOME | End: 2017-04-25
Payer: COMMERCIAL

## 2017-04-25 ENCOUNTER — OFFICE VISIT (OUTPATIENT)
Dept: ONCOLOGY | Age: 57
End: 2017-04-25
Payer: COMMERCIAL

## 2017-04-25 VITALS
TEMPERATURE: 97.4 F | HEART RATE: 63 BPM | SYSTOLIC BLOOD PRESSURE: 124 MMHG | RESPIRATION RATE: 16 BRPM | WEIGHT: 242 LBS | DIASTOLIC BLOOD PRESSURE: 82 MMHG | BODY MASS INDEX: 31.93 KG/M2

## 2017-04-25 VITALS
SYSTOLIC BLOOD PRESSURE: 124 MMHG | HEART RATE: 63 BPM | DIASTOLIC BLOOD PRESSURE: 82 MMHG | BODY MASS INDEX: 32.03 KG/M2 | TEMPERATURE: 97.4 F | WEIGHT: 242.8 LBS | RESPIRATION RATE: 16 BRPM

## 2017-04-25 DIAGNOSIS — I21.A1 NON-ST ELEVATION MYOCARDIAL INFARCTION (NSTEMI), TYPE 2: ICD-10-CM

## 2017-04-25 DIAGNOSIS — C19 COLORECTAL CANCER, STAGE IV (HCC): Primary | ICD-10-CM

## 2017-04-25 DIAGNOSIS — C19 COLORECTAL CANCER, STAGE IV (HCC): ICD-10-CM

## 2017-04-25 DIAGNOSIS — C78.7 HEPATIC METASTASES (HCC): ICD-10-CM

## 2017-04-25 LAB
ABSOLUTE EOS #: 0.07 K/UL (ref 0–0.4)
ABSOLUTE LYMPH #: 1.58 K/UL (ref 1–4.8)
ABSOLUTE MONO #: 1.06 K/UL (ref 0.1–0.8)
ALBUMIN SERPL-MCNC: 3.9 G/DL (ref 3.5–5.2)
ALBUMIN/GLOBULIN RATIO: 1.6 (ref 1–2.5)
ALP BLD-CCNC: 126 U/L (ref 40–129)
ALT SERPL-CCNC: 16 U/L (ref 5–41)
ANION GAP SERPL CALCULATED.3IONS-SCNC: 16 MMOL/L (ref 9–17)
AST SERPL-CCNC: 19 U/L
BASOPHILS # BLD: 1 %
BASOPHILS ABSOLUTE: 0.07 K/UL (ref 0–0.2)
BILIRUB SERPL-MCNC: 0.28 MG/DL (ref 0.3–1.2)
BUN BLDV-MCNC: 13 MG/DL (ref 6–20)
BUN/CREAT BLD: ABNORMAL (ref 9–20)
CALCIUM SERPL-MCNC: 8.6 MG/DL (ref 8.6–10.4)
CHLORIDE BLD-SCNC: 100 MMOL/L (ref 98–107)
CO2: 23 MMOL/L (ref 20–31)
CREAT SERPL-MCNC: 0.56 MG/DL (ref 0.7–1.2)
DIFFERENTIAL TYPE: ABNORMAL
EOSINOPHILS RELATIVE PERCENT: 1 %
GFR AFRICAN AMERICAN: >60 ML/MIN
GFR NON-AFRICAN AMERICAN: >60 ML/MIN
GFR SERPL CREATININE-BSD FRML MDRD: ABNORMAL ML/MIN/{1.73_M2}
GFR SERPL CREATININE-BSD FRML MDRD: ABNORMAL ML/MIN/{1.73_M2}
GLUCOSE BLD-MCNC: 308 MG/DL (ref 70–99)
HCT VFR BLD CALC: 37.5 % (ref 41–53)
HEMOGLOBIN: 12.1 G/DL (ref 13.5–17.5)
LYMPHOCYTES # BLD: 24 %
MCH RBC QN AUTO: 23.7 PG (ref 26–34)
MCHC RBC AUTO-ENTMCNC: 32.2 G/DL (ref 31–37)
MCV RBC AUTO: 73.7 FL (ref 80–100)
MONOCYTES # BLD: 16 %
MORPHOLOGY: ABNORMAL
PDW BLD-RTO: 23.2 % (ref 12.5–15.4)
PLATELET # BLD: 172 K/UL (ref 140–450)
PLATELET ESTIMATE: ABNORMAL
PMV BLD AUTO: 6.6 FL (ref 6–12)
POTASSIUM SERPL-SCNC: 4.3 MMOL/L (ref 3.7–5.3)
RBC # BLD: 5.09 M/UL (ref 4.5–5.9)
RBC # BLD: ABNORMAL 10*6/UL
SEG NEUTROPHILS: 58 %
SEGMENTED NEUTROPHILS ABSOLUTE COUNT: 3.82 K/UL (ref 1.8–7.7)
SODIUM BLD-SCNC: 139 MMOL/L (ref 135–144)
TOTAL PROTEIN: 6.4 G/DL (ref 6.4–8.3)
WBC # BLD: 6.6 K/UL (ref 3.5–11)
WBC # BLD: ABNORMAL 10*3/UL

## 2017-04-25 PROCEDURE — 96368 THER/DIAG CONCURRENT INF: CPT

## 2017-04-25 PROCEDURE — 96411 CHEMO IV PUSH ADDL DRUG: CPT

## 2017-04-25 PROCEDURE — 96415 CHEMO IV INFUSION ADDL HR: CPT

## 2017-04-25 PROCEDURE — 80053 COMPREHEN METABOLIC PANEL: CPT

## 2017-04-25 PROCEDURE — 96375 TX/PRO/DX INJ NEW DRUG ADDON: CPT

## 2017-04-25 PROCEDURE — 96413 CHEMO IV INFUSION 1 HR: CPT

## 2017-04-25 PROCEDURE — C8957 PROLONGED IV INF, REQ PUMP: HCPCS

## 2017-04-25 PROCEDURE — 6360000002 HC RX W HCPCS: Performed by: INTERNAL MEDICINE

## 2017-04-25 PROCEDURE — 36591 DRAW BLOOD OFF VENOUS DEVICE: CPT

## 2017-04-25 PROCEDURE — 85025 COMPLETE CBC W/AUTO DIFF WBC: CPT

## 2017-04-25 PROCEDURE — 2580000003 HC RX 258: Performed by: INTERNAL MEDICINE

## 2017-04-25 PROCEDURE — 96549 UNLISTED CHEMOTHERAPY PX: CPT

## 2017-04-25 RX ORDER — FLUOROURACIL 50 MG/ML
900 INJECTION, SOLUTION INTRAVENOUS ONCE
Status: COMPLETED | OUTPATIENT
Start: 2017-04-25 | End: 2017-04-25

## 2017-04-25 RX ORDER — SODIUM CHLORIDE 9 MG/ML
INJECTION, SOLUTION INTRAVENOUS ONCE
Status: DISCONTINUED | OUTPATIENT
Start: 2017-04-25 | End: 2017-04-26 | Stop reason: HOSPADM

## 2017-04-25 RX ORDER — HEPARIN SODIUM (PORCINE) LOCK FLUSH IV SOLN 100 UNIT/ML 100 UNIT/ML
500 SOLUTION INTRAVENOUS PRN
Status: DISCONTINUED | OUTPATIENT
Start: 2017-04-25 | End: 2017-04-26 | Stop reason: HOSPADM

## 2017-04-25 RX ORDER — DEXTROSE MONOHYDRATE 50 MG/ML
INJECTION, SOLUTION INTRAVENOUS ONCE
Status: COMPLETED | OUTPATIENT
Start: 2017-04-25 | End: 2017-04-25

## 2017-04-25 RX ORDER — SODIUM CHLORIDE 0.9 % (FLUSH) 0.9 %
10 SYRINGE (ML) INJECTION PRN
Status: DISCONTINUED | OUTPATIENT
Start: 2017-04-25 | End: 2017-04-26 | Stop reason: HOSPADM

## 2017-04-25 RX ORDER — PALONOSETRON 0.05 MG/ML
0.25 INJECTION, SOLUTION INTRAVENOUS ONCE
Status: COMPLETED | OUTPATIENT
Start: 2017-04-25 | End: 2017-04-25

## 2017-04-25 RX ADMIN — PALONOSETRON HYDROCHLORIDE 0.25 MG: 0.25 INJECTION INTRAVENOUS at 10:16

## 2017-04-25 RX ADMIN — FLUOROURACIL 900 MG: 50 INJECTION, SOLUTION INTRAVENOUS at 12:55

## 2017-04-25 RX ADMIN — Medication 10 ML: at 08:45

## 2017-04-25 RX ADMIN — DEXAMETHASONE SODIUM PHOSPHATE 12 MG: 4 INJECTION, SOLUTION INTRAMUSCULAR; INTRAVENOUS at 10:17

## 2017-04-25 RX ADMIN — FLUOROURACIL 5550 MG: 50 INJECTION, SOLUTION INTRAVENOUS at 12:59

## 2017-04-25 RX ADMIN — Medication 10 ML: at 08:46

## 2017-04-25 RX ADMIN — LEUCOVORIN CALCIUM 900 MG: 200 INJECTION, POWDER, LYOPHILIZED, FOR SOLUTION INTRAMUSCULAR; INTRAVENOUS at 10:49

## 2017-04-25 RX ADMIN — DEXTROSE MONOHYDRATE: 50 INJECTION, SOLUTION INTRAVENOUS at 10:05

## 2017-04-25 RX ADMIN — OXALIPLATIN 200 MG: 5 INJECTION, SOLUTION, CONCENTRATE INTRAVENOUS at 10:49

## 2017-04-25 NOTE — PROGRESS NOTES
Pt here for C.9D1. Denies any new complaints. Labs drawn from port and blood gliucose was 308. Notified Dr. Liz Michelle and was instructed to cont with treatment and he will discuss his glucose today at his   Dr. Visit. Pt did not stay for his Dr visit due to another appt. Pt was treated without incident and d/c'd in stable condition.   David Rivers will see Dr. Liz Michelle on 4/27/16

## 2017-04-26 ENCOUNTER — TELEPHONE (OUTPATIENT)
Dept: INFUSION THERAPY | Age: 57
End: 2017-04-26

## 2017-04-27 ENCOUNTER — HOSPITAL ENCOUNTER (OUTPATIENT)
Age: 57
Discharge: HOME | End: 2017-04-27
Payer: COMMERCIAL

## 2017-04-27 ENCOUNTER — HOSPITAL ENCOUNTER (OUTPATIENT)
Dept: INFUSION THERAPY | Age: 57
Discharge: HOME | End: 2017-04-27
Payer: COMMERCIAL

## 2017-04-27 ENCOUNTER — OFFICE VISIT (OUTPATIENT)
Dept: ONCOLOGY | Age: 57
End: 2017-04-27
Payer: COMMERCIAL

## 2017-04-27 VITALS
HEART RATE: 59 BPM | BODY MASS INDEX: 31.53 KG/M2 | RESPIRATION RATE: 16 BRPM | SYSTOLIC BLOOD PRESSURE: 119 MMHG | WEIGHT: 239 LBS | TEMPERATURE: 97.5 F | DIASTOLIC BLOOD PRESSURE: 73 MMHG

## 2017-04-27 DIAGNOSIS — C19 COLORECTAL CANCER, STAGE IV (HCC): ICD-10-CM

## 2017-04-27 DIAGNOSIS — C78.7 HEPATIC METASTASES (HCC): Primary | ICD-10-CM

## 2017-04-27 DIAGNOSIS — I21.3 ST ELEVATION MYOCARDIAL INFARCTION (STEMI), UNSPECIFIED ARTERY (HCC): ICD-10-CM

## 2017-04-27 DIAGNOSIS — Z79.4 UNCONTROLLED TYPE 2 DIABETES MELLITUS WITH HYPEROSMOLARITY WITHOUT COMA, WITH LONG-TERM CURRENT USE OF INSULIN (HCC): ICD-10-CM

## 2017-04-27 DIAGNOSIS — E11.00 UNCONTROLLED TYPE 2 DIABETES MELLITUS WITH HYPEROSMOLARITY WITHOUT COMA, WITH LONG-TERM CURRENT USE OF INSULIN (HCC): ICD-10-CM

## 2017-04-27 PROCEDURE — 99214 OFFICE O/P EST MOD 30 MIN: CPT | Performed by: INTERNAL MEDICINE

## 2017-04-27 PROCEDURE — 6360000002 HC RX W HCPCS: Performed by: INTERNAL MEDICINE

## 2017-04-27 PROCEDURE — 96523 IRRIG DRUG DELIVERY DEVICE: CPT

## 2017-04-27 PROCEDURE — 2580000003 HC RX 258: Performed by: INTERNAL MEDICINE

## 2017-04-27 RX ORDER — SODIUM CHLORIDE 0.9 % (FLUSH) 0.9 %
10 SYRINGE (ML) INJECTION PRN
Status: DISCONTINUED | OUTPATIENT
Start: 2017-04-27 | End: 2017-04-28 | Stop reason: HOSPADM

## 2017-04-27 RX ORDER — SODIUM CHLORIDE 0.9 % (FLUSH) 0.9 %
10 SYRINGE (ML) INJECTION PRN
Status: CANCELLED | OUTPATIENT
Start: 2017-04-27

## 2017-04-27 RX ORDER — HEPARIN SODIUM (PORCINE) LOCK FLUSH IV SOLN 100 UNIT/ML 100 UNIT/ML
500 SOLUTION INTRAVENOUS PRN
Status: DISCONTINUED | OUTPATIENT
Start: 2017-04-27 | End: 2017-04-28 | Stop reason: HOSPADM

## 2017-04-27 RX ORDER — HEPARIN SODIUM (PORCINE) LOCK FLUSH IV SOLN 100 UNIT/ML 100 UNIT/ML
500 SOLUTION INTRAVENOUS PRN
Status: CANCELLED | OUTPATIENT
Start: 2017-04-27

## 2017-04-27 RX ORDER — SODIUM CHLORIDE 0.9 % (FLUSH) 0.9 %
20 SYRINGE (ML) INJECTION PRN
Status: CANCELLED | OUTPATIENT
Start: 2017-04-27

## 2017-04-27 RX ADMIN — SODIUM CHLORIDE, PRESERVATIVE FREE 500 UNITS: 5 INJECTION INTRAVENOUS at 11:14

## 2017-04-27 RX ADMIN — Medication 10 ML: at 11:14

## 2017-05-04 DIAGNOSIS — E11.65 TYPE 2 DIABETES MELLITUS WITH HYPERGLYCEMIA, WITHOUT LONG-TERM CURRENT USE OF INSULIN (HCC): Primary | ICD-10-CM

## 2017-05-04 DIAGNOSIS — C19 COLORECTAL CANCER, STAGE IV (HCC): ICD-10-CM

## 2017-05-07 RX ORDER — OXYCODONE AND ACETAMINOPHEN 10; 325 MG/1; MG/1
1 TABLET ORAL EVERY 6 HOURS PRN
Qty: 120 TABLET | Refills: 0 | Status: SHIPPED | OUTPATIENT
Start: 2017-05-07 | End: 2017-06-02 | Stop reason: SDUPTHER

## 2017-05-08 ENCOUNTER — TELEPHONE (OUTPATIENT)
Dept: ONCOLOGY | Age: 57
End: 2017-05-08

## 2017-05-09 ENCOUNTER — HOSPITAL ENCOUNTER (OUTPATIENT)
Dept: INFUSION THERAPY | Age: 57
Discharge: HOME | End: 2017-05-09
Payer: COMMERCIAL

## 2017-05-09 VITALS
TEMPERATURE: 98.1 F | HEIGHT: 73 IN | BODY MASS INDEX: 31.7 KG/M2 | HEART RATE: 53 BPM | WEIGHT: 239.2 LBS | DIASTOLIC BLOOD PRESSURE: 63 MMHG | SYSTOLIC BLOOD PRESSURE: 105 MMHG | RESPIRATION RATE: 16 BRPM

## 2017-05-09 DIAGNOSIS — C78.7 HEPATIC METASTASES (HCC): ICD-10-CM

## 2017-05-09 DIAGNOSIS — C19 COLORECTAL CANCER, STAGE IV (HCC): ICD-10-CM

## 2017-05-09 DIAGNOSIS — I21.A1 NON-ST ELEVATION MYOCARDIAL INFARCTION (NSTEMI), TYPE 2: ICD-10-CM

## 2017-05-09 LAB
ABSOLUTE EOS #: 0.06 K/UL (ref 0–0.4)
ABSOLUTE LYMPH #: 1.41 K/UL (ref 1–4.8)
ABSOLUTE MONO #: 1.02 K/UL (ref 0.1–0.8)
ALBUMIN SERPL-MCNC: 3.6 G/DL (ref 3.5–5.2)
ALBUMIN/GLOBULIN RATIO: 1.4 (ref 1–2.5)
ALP BLD-CCNC: 99 U/L (ref 40–129)
ALT SERPL-CCNC: 14 U/L (ref 5–41)
ANION GAP SERPL CALCULATED.3IONS-SCNC: 13 MMOL/L (ref 9–17)
AST SERPL-CCNC: 15 U/L
BASOPHILS # BLD: 1 %
BASOPHILS ABSOLUTE: 0.06 K/UL (ref 0–0.2)
BILIRUB SERPL-MCNC: 0.31 MG/DL (ref 0.3–1.2)
BUN BLDV-MCNC: 11 MG/DL (ref 6–20)
BUN/CREAT BLD: ABNORMAL (ref 9–20)
CALCIUM SERPL-MCNC: 8.8 MG/DL (ref 8.6–10.4)
CHLORIDE BLD-SCNC: 104 MMOL/L (ref 98–107)
CO2: 25 MMOL/L (ref 20–31)
CREAT SERPL-MCNC: 0.58 MG/DL (ref 0.7–1.2)
DIFFERENTIAL TYPE: ABNORMAL
EOSINOPHILS RELATIVE PERCENT: 1 %
GFR AFRICAN AMERICAN: >60 ML/MIN
GFR NON-AFRICAN AMERICAN: >60 ML/MIN
GFR SERPL CREATININE-BSD FRML MDRD: ABNORMAL ML/MIN/{1.73_M2}
GFR SERPL CREATININE-BSD FRML MDRD: ABNORMAL ML/MIN/{1.73_M2}
GLUCOSE BLD-MCNC: 189 MG/DL (ref 70–99)
HCT VFR BLD CALC: 35.8 % (ref 41–53)
HEMOGLOBIN: 11.4 G/DL (ref 13.5–17.5)
LYMPHOCYTES # BLD: 22 %
MCH RBC QN AUTO: 23.9 PG (ref 26–34)
MCHC RBC AUTO-ENTMCNC: 32 G/DL (ref 31–37)
MCV RBC AUTO: 74.7 FL (ref 80–100)
MONOCYTES # BLD: 16 %
MORPHOLOGY: ABNORMAL
PDW BLD-RTO: 22.7 % (ref 12.5–15.4)
PLATELET # BLD: 140 K/UL (ref 140–450)
PLATELET ESTIMATE: ABNORMAL
PMV BLD AUTO: 6.3 FL (ref 6–12)
POTASSIUM SERPL-SCNC: 4.2 MMOL/L (ref 3.7–5.3)
PROTEIN UA: NEGATIVE
RBC # BLD: 4.79 M/UL (ref 4.5–5.9)
RBC # BLD: ABNORMAL 10*6/UL
SEG NEUTROPHILS: 60 %
SEGMENTED NEUTROPHILS ABSOLUTE COUNT: 3.85 K/UL (ref 1.8–7.7)
SODIUM BLD-SCNC: 142 MMOL/L (ref 135–144)
TOTAL PROTEIN: 6.1 G/DL (ref 6.4–8.3)
WBC # BLD: 6.4 K/UL (ref 3.5–11)
WBC # BLD: ABNORMAL 10*3/UL

## 2017-05-09 PROCEDURE — 6360000002 HC RX W HCPCS: Performed by: INTERNAL MEDICINE

## 2017-05-09 PROCEDURE — 96415 CHEMO IV INFUSION ADDL HR: CPT

## 2017-05-09 PROCEDURE — 96416 CHEMO PROLONG INFUSE W/PUMP: CPT

## 2017-05-09 PROCEDURE — 80053 COMPREHEN METABOLIC PANEL: CPT

## 2017-05-09 PROCEDURE — 96413 CHEMO IV INFUSION 1 HR: CPT

## 2017-05-09 PROCEDURE — 96411 CHEMO IV PUSH ADDL DRUG: CPT

## 2017-05-09 PROCEDURE — 85025 COMPLETE CBC W/AUTO DIFF WBC: CPT

## 2017-05-09 PROCEDURE — 96375 TX/PRO/DX INJ NEW DRUG ADDON: CPT

## 2017-05-09 PROCEDURE — 81003 URINALYSIS AUTO W/O SCOPE: CPT

## 2017-05-09 PROCEDURE — 2580000003 HC RX 258: Performed by: INTERNAL MEDICINE

## 2017-05-09 PROCEDURE — 36591 DRAW BLOOD OFF VENOUS DEVICE: CPT

## 2017-05-09 PROCEDURE — 96368 THER/DIAG CONCURRENT INF: CPT

## 2017-05-09 RX ORDER — DEXTROSE MONOHYDRATE 50 MG/ML
INJECTION, SOLUTION INTRAVENOUS ONCE
Status: DISCONTINUED | OUTPATIENT
Start: 2017-05-09 | End: 2017-05-10 | Stop reason: HOSPADM

## 2017-05-09 RX ORDER — HEPARIN SODIUM (PORCINE) LOCK FLUSH IV SOLN 100 UNIT/ML 100 UNIT/ML
500 SOLUTION INTRAVENOUS PRN
Status: DISCONTINUED | OUTPATIENT
Start: 2017-05-09 | End: 2017-05-10 | Stop reason: HOSPADM

## 2017-05-09 RX ORDER — PALONOSETRON 0.05 MG/ML
0.25 INJECTION, SOLUTION INTRAVENOUS ONCE
Status: COMPLETED | OUTPATIENT
Start: 2017-05-09 | End: 2017-05-09

## 2017-05-09 RX ORDER — SODIUM CHLORIDE 0.9 % (FLUSH) 0.9 %
10 SYRINGE (ML) INJECTION PRN
Status: DISCONTINUED | OUTPATIENT
Start: 2017-05-09 | End: 2017-05-10 | Stop reason: HOSPADM

## 2017-05-09 RX ORDER — FLUOROURACIL 50 MG/ML
900 INJECTION, SOLUTION INTRAVENOUS ONCE
Status: COMPLETED | OUTPATIENT
Start: 2017-05-09 | End: 2017-05-09

## 2017-05-09 RX ORDER — SODIUM CHLORIDE 9 MG/ML
INJECTION, SOLUTION INTRAVENOUS ONCE
Status: DISCONTINUED | OUTPATIENT
Start: 2017-05-09 | End: 2017-05-10 | Stop reason: HOSPADM

## 2017-05-09 RX ADMIN — Medication 10 ML: at 10:15

## 2017-05-09 RX ADMIN — OXALIPLATIN 200 MG: 5 INJECTION, SOLUTION, CONCENTRATE INTRAVENOUS at 12:07

## 2017-05-09 RX ADMIN — DEXAMETHASONE SODIUM PHOSPHATE 12 MG: 4 INJECTION, SOLUTION INTRAMUSCULAR; INTRAVENOUS at 11:14

## 2017-05-09 RX ADMIN — PALONOSETRON HYDROCHLORIDE 0.25 MG: 0.25 INJECTION INTRAVENOUS at 11:13

## 2017-05-09 RX ADMIN — LEUCOVORIN CALCIUM 900 MG: 200 INJECTION, POWDER, LYOPHILIZED, FOR SOLUTION INTRAMUSCULAR; INTRAVENOUS at 12:07

## 2017-05-09 RX ADMIN — Medication 10 ML: at 10:16

## 2017-05-09 RX ADMIN — FLUOROURACIL 5550 MG: 50 INJECTION, SOLUTION INTRAVENOUS at 15:27

## 2017-05-09 RX ADMIN — DEXTROSE MONOHYDRATE: 50 INJECTION, SOLUTION INTRAVENOUS at 10:56

## 2017-05-09 RX ADMIN — FLUOROURACIL 900 MG: 50 INJECTION, SOLUTION INTRAVENOUS at 14:39

## 2017-05-09 ASSESSMENT — PAIN SCALES - GENERAL: PAINLEVEL_OUTOF10: 0

## 2017-05-09 NOTE — PROGRESS NOTES
Pt here for C.10D1. Denies any new complaints. Labs drawn from port and results reviewed. Pt was treated without incident and d/c'd in stable condition. Pt will return in 2 days for d/c pump.

## 2017-05-10 ENCOUNTER — TELEPHONE (OUTPATIENT)
Dept: INFUSION THERAPY | Age: 57
End: 2017-05-10

## 2017-05-11 ENCOUNTER — HOSPITAL ENCOUNTER (OUTPATIENT)
Dept: INFUSION THERAPY | Age: 57
Discharge: HOME | End: 2017-05-11
Payer: COMMERCIAL

## 2017-05-11 DIAGNOSIS — C19 COLORECTAL CANCER, STAGE IV (HCC): ICD-10-CM

## 2017-05-11 PROCEDURE — 6360000002 HC RX W HCPCS: Performed by: INTERNAL MEDICINE

## 2017-05-11 PROCEDURE — 96523 IRRIG DRUG DELIVERY DEVICE: CPT

## 2017-05-11 PROCEDURE — 2580000003 HC RX 258: Performed by: INTERNAL MEDICINE

## 2017-05-11 RX ORDER — SODIUM CHLORIDE 0.9 % (FLUSH) 0.9 %
10 SYRINGE (ML) INJECTION PRN
Status: DISCONTINUED | OUTPATIENT
Start: 2017-05-11 | End: 2017-05-12 | Stop reason: HOSPADM

## 2017-05-11 RX ORDER — SODIUM CHLORIDE 0.9 % (FLUSH) 0.9 %
10 SYRINGE (ML) INJECTION PRN
Status: CANCELLED | OUTPATIENT
Start: 2017-05-11

## 2017-05-11 RX ORDER — SODIUM CHLORIDE 0.9 % (FLUSH) 0.9 %
20 SYRINGE (ML) INJECTION PRN
Status: CANCELLED | OUTPATIENT
Start: 2017-05-11

## 2017-05-11 RX ORDER — HEPARIN SODIUM (PORCINE) LOCK FLUSH IV SOLN 100 UNIT/ML 100 UNIT/ML
500 SOLUTION INTRAVENOUS PRN
Status: CANCELLED | OUTPATIENT
Start: 2017-05-11

## 2017-05-11 RX ORDER — HEPARIN SODIUM (PORCINE) LOCK FLUSH IV SOLN 100 UNIT/ML 100 UNIT/ML
500 SOLUTION INTRAVENOUS PRN
Status: DISCONTINUED | OUTPATIENT
Start: 2017-05-11 | End: 2017-05-12 | Stop reason: HOSPADM

## 2017-05-11 RX ADMIN — SODIUM CHLORIDE, PRESERVATIVE FREE 500 UNITS: 5 INJECTION INTRAVENOUS at 14:00

## 2017-05-11 RX ADMIN — Medication 10 ML: at 14:00

## 2017-05-17 ENCOUNTER — OFFICE VISIT (OUTPATIENT)
Dept: GASTROENTEROLOGY | Age: 57
End: 2017-05-17
Payer: COMMERCIAL

## 2017-05-17 VITALS
WEIGHT: 240 LBS | HEART RATE: 65 BPM | DIASTOLIC BLOOD PRESSURE: 84 MMHG | HEIGHT: 73 IN | BODY MASS INDEX: 31.81 KG/M2 | TEMPERATURE: 98 F | SYSTOLIC BLOOD PRESSURE: 135 MMHG

## 2017-05-17 DIAGNOSIS — C19 COLORECTAL CANCER, STAGE IV (HCC): Primary | ICD-10-CM

## 2017-05-17 PROCEDURE — 99213 OFFICE O/P EST LOW 20 MIN: CPT | Performed by: INTERNAL MEDICINE

## 2017-05-23 ENCOUNTER — HOSPITAL ENCOUNTER (OUTPATIENT)
Dept: INFUSION THERAPY | Age: 57
Discharge: HOME | End: 2017-05-23
Payer: COMMERCIAL

## 2017-05-23 VITALS
RESPIRATION RATE: 18 BRPM | SYSTOLIC BLOOD PRESSURE: 134 MMHG | DIASTOLIC BLOOD PRESSURE: 79 MMHG | HEART RATE: 62 BPM | WEIGHT: 243.2 LBS | BODY MASS INDEX: 32.09 KG/M2 | TEMPERATURE: 97.7 F

## 2017-05-23 DIAGNOSIS — I21.A1 NON-ST ELEVATION MYOCARDIAL INFARCTION (NSTEMI), TYPE 2: ICD-10-CM

## 2017-05-23 DIAGNOSIS — C19 COLORECTAL CANCER, STAGE IV (HCC): ICD-10-CM

## 2017-05-23 DIAGNOSIS — C78.7 HEPATIC METASTASES (HCC): ICD-10-CM

## 2017-05-23 LAB
ABSOLUTE EOS #: 0 K/UL (ref 0–0.4)
ABSOLUTE LYMPH #: 1.44 K/UL (ref 1–4.8)
ABSOLUTE MONO #: 0.6 K/UL (ref 0.1–0.8)
ALBUMIN SERPL-MCNC: 3.7 G/DL (ref 3.5–5.2)
ALBUMIN/GLOBULIN RATIO: 1.5 (ref 1–2.5)
ALP BLD-CCNC: 109 U/L (ref 40–129)
ALT SERPL-CCNC: 18 U/L (ref 5–41)
ANION GAP SERPL CALCULATED.3IONS-SCNC: 12 MMOL/L (ref 9–17)
AST SERPL-CCNC: 19 U/L
BASOPHILS # BLD: 1 %
BASOPHILS ABSOLUTE: 0.06 K/UL (ref 0–0.2)
BILIRUB SERPL-MCNC: 0.17 MG/DL (ref 0.3–1.2)
BUN BLDV-MCNC: 12 MG/DL (ref 6–20)
BUN/CREAT BLD: ABNORMAL (ref 9–20)
CALCIUM SERPL-MCNC: 9 MG/DL (ref 8.6–10.4)
CHLORIDE BLD-SCNC: 105 MMOL/L (ref 98–107)
CO2: 25 MMOL/L (ref 20–31)
CREAT SERPL-MCNC: 0.58 MG/DL (ref 0.7–1.2)
DIFFERENTIAL TYPE: ABNORMAL
EOSINOPHILS RELATIVE PERCENT: 0 %
GFR AFRICAN AMERICAN: >60 ML/MIN
GFR NON-AFRICAN AMERICAN: >60 ML/MIN
GFR SERPL CREATININE-BSD FRML MDRD: ABNORMAL ML/MIN/{1.73_M2}
GFR SERPL CREATININE-BSD FRML MDRD: ABNORMAL ML/MIN/{1.73_M2}
GLUCOSE BLD-MCNC: 221 MG/DL (ref 70–99)
HCT VFR BLD CALC: 35.4 % (ref 41–53)
HEMOGLOBIN: 11.7 G/DL (ref 13.5–17.5)
LYMPHOCYTES # BLD: 24 %
MCH RBC QN AUTO: 25.2 PG (ref 26–34)
MCHC RBC AUTO-ENTMCNC: 33 G/DL (ref 31–37)
MCV RBC AUTO: 76.3 FL (ref 80–100)
MONOCYTES # BLD: 10 %
MORPHOLOGY: ABNORMAL
PDW BLD-RTO: 21.4 % (ref 12.5–15.4)
PLATELET # BLD: 135 K/UL (ref 140–450)
PLATELET ESTIMATE: ABNORMAL
PMV BLD AUTO: 6.5 FL (ref 6–12)
POTASSIUM SERPL-SCNC: 4.4 MMOL/L (ref 3.7–5.3)
RBC # BLD: 4.64 M/UL (ref 4.5–5.9)
RBC # BLD: ABNORMAL 10*6/UL
SEG NEUTROPHILS: 65 %
SEGMENTED NEUTROPHILS ABSOLUTE COUNT: 3.9 K/UL (ref 1.8–7.7)
SODIUM BLD-SCNC: 142 MMOL/L (ref 135–144)
TOTAL PROTEIN: 6.2 G/DL (ref 6.4–8.3)
WBC # BLD: 6 K/UL (ref 3.5–11)
WBC # BLD: ABNORMAL 10*3/UL

## 2017-05-23 PROCEDURE — 96375 TX/PRO/DX INJ NEW DRUG ADDON: CPT

## 2017-05-23 PROCEDURE — 80053 COMPREHEN METABOLIC PANEL: CPT

## 2017-05-23 PROCEDURE — 96368 THER/DIAG CONCURRENT INF: CPT

## 2017-05-23 PROCEDURE — 96415 CHEMO IV INFUSION ADDL HR: CPT

## 2017-05-23 PROCEDURE — 96413 CHEMO IV INFUSION 1 HR: CPT

## 2017-05-23 PROCEDURE — 2580000003 HC RX 258: Performed by: INTERNAL MEDICINE

## 2017-05-23 PROCEDURE — 96411 CHEMO IV PUSH ADDL DRUG: CPT

## 2017-05-23 PROCEDURE — 96416 CHEMO PROLONG INFUSE W/PUMP: CPT

## 2017-05-23 PROCEDURE — 36591 DRAW BLOOD OFF VENOUS DEVICE: CPT

## 2017-05-23 PROCEDURE — 85025 COMPLETE CBC W/AUTO DIFF WBC: CPT

## 2017-05-23 PROCEDURE — 96549 UNLISTED CHEMOTHERAPY PX: CPT

## 2017-05-23 PROCEDURE — 6360000002 HC RX W HCPCS: Performed by: INTERNAL MEDICINE

## 2017-05-23 RX ORDER — PALONOSETRON 0.05 MG/ML
0.25 INJECTION, SOLUTION INTRAVENOUS ONCE
Status: COMPLETED | OUTPATIENT
Start: 2017-05-23 | End: 2017-05-23

## 2017-05-23 RX ORDER — DEXTROSE MONOHYDRATE 50 MG/ML
1000 INJECTION, SOLUTION INTRAVENOUS ONCE
Status: COMPLETED | OUTPATIENT
Start: 2017-05-23 | End: 2017-05-23

## 2017-05-23 RX ORDER — SODIUM CHLORIDE 0.9 % (FLUSH) 0.9 %
10 SYRINGE (ML) INJECTION PRN
Status: DISCONTINUED | OUTPATIENT
Start: 2017-05-23 | End: 2017-05-24 | Stop reason: HOSPADM

## 2017-05-23 RX ORDER — HEPARIN SODIUM (PORCINE) LOCK FLUSH IV SOLN 100 UNIT/ML 100 UNIT/ML
500 SOLUTION INTRAVENOUS PRN
Status: DISCONTINUED | OUTPATIENT
Start: 2017-05-23 | End: 2017-05-24 | Stop reason: HOSPADM

## 2017-05-23 RX ORDER — FLUOROURACIL 50 MG/ML
900 INJECTION, SOLUTION INTRAVENOUS ONCE
Status: COMPLETED | OUTPATIENT
Start: 2017-05-23 | End: 2017-05-23

## 2017-05-23 RX ORDER — SODIUM CHLORIDE 9 MG/ML
1000 INJECTION, SOLUTION INTRAVENOUS ONCE
Status: DISCONTINUED | OUTPATIENT
Start: 2017-05-23 | End: 2017-05-24 | Stop reason: HOSPADM

## 2017-05-23 RX ADMIN — LEUCOVORIN CALCIUM 900 MG: 350 INJECTION, POWDER, LYOPHILIZED, FOR SOLUTION INTRAMUSCULAR; INTRAVENOUS at 12:06

## 2017-05-23 RX ADMIN — PALONOSETRON HYDROCHLORIDE 0.25 MG: 0.25 INJECTION INTRAVENOUS at 11:40

## 2017-05-23 RX ADMIN — DEXAMETHASONE SODIUM PHOSPHATE 12 MG: 4 INJECTION, SOLUTION INTRAMUSCULAR; INTRAVENOUS at 11:42

## 2017-05-23 RX ADMIN — FLUOROURACIL 5550 MG: 50 INJECTION, SOLUTION INTRAVENOUS at 14:22

## 2017-05-23 RX ADMIN — OXALIPLATIN 200 MG: 5 INJECTION, SOLUTION, CONCENTRATE INTRAVENOUS at 12:05

## 2017-05-23 RX ADMIN — Medication 10 ML: at 10:26

## 2017-05-23 RX ADMIN — Medication 10 ML: at 10:25

## 2017-05-23 RX ADMIN — FLUOROURACIL 900 MG: 50 INJECTION, SOLUTION INTRAVENOUS at 14:13

## 2017-05-23 RX ADMIN — DEXTROSE MONOHYDRATE 250 ML: 50 INJECTION, SOLUTION INTRAVENOUS at 11:25

## 2017-05-23 NOTE — PROGRESS NOTES
Pt here for C.11D1. Denies any new complaints. Labs drawn from port and results reviewed. Pt was treated without incident and d/c'd in stable condition. [facility] will return 5/25/17 for Dr. Emily Nj and marianela ESTES

## 2017-05-24 ENCOUNTER — HOSPITAL ENCOUNTER (OUTPATIENT)
Age: 57
End: 2017-05-24
Payer: COMMERCIAL

## 2017-05-25 ENCOUNTER — ANESTHESIA (OUTPATIENT)
Dept: ENDOSCOPY | Age: 57
End: 2017-05-25
Payer: COMMERCIAL

## 2017-05-25 ENCOUNTER — ANESTHESIA EVENT (OUTPATIENT)
Dept: ENDOSCOPY | Age: 57
End: 2017-05-25
Payer: COMMERCIAL

## 2017-05-25 ENCOUNTER — HOSPITAL ENCOUNTER (OUTPATIENT)
Age: 57
Setting detail: OUTPATIENT SURGERY
Discharge: HOME | End: 2017-05-25
Attending: INTERNAL MEDICINE | Admitting: INTERNAL MEDICINE
Payer: COMMERCIAL

## 2017-05-25 ENCOUNTER — HOSPITAL ENCOUNTER (OUTPATIENT)
Dept: INFUSION THERAPY | Age: 57
Discharge: HOME | End: 2017-05-25
Payer: COMMERCIAL

## 2017-05-25 VITALS — DIASTOLIC BLOOD PRESSURE: 65 MMHG | SYSTOLIC BLOOD PRESSURE: 99 MMHG | OXYGEN SATURATION: 95 %

## 2017-05-25 VITALS
OXYGEN SATURATION: 99 % | HEIGHT: 73 IN | HEART RATE: 57 BPM | DIASTOLIC BLOOD PRESSURE: 74 MMHG | WEIGHT: 240 LBS | BODY MASS INDEX: 31.81 KG/M2 | SYSTOLIC BLOOD PRESSURE: 123 MMHG | TEMPERATURE: 98.1 F | RESPIRATION RATE: 20 BRPM

## 2017-05-25 DIAGNOSIS — C19 COLORECTAL CANCER, STAGE IV (HCC): ICD-10-CM

## 2017-05-25 PROCEDURE — 2580000003 HC RX 258: Performed by: INTERNAL MEDICINE

## 2017-05-25 PROCEDURE — 7100000011 HC PHASE II RECOVERY - ADDTL 15 MIN: Performed by: INTERNAL MEDICINE

## 2017-05-25 PROCEDURE — 2500000003 HC RX 250 WO HCPCS: Performed by: NURSE ANESTHETIST, CERTIFIED REGISTERED

## 2017-05-25 PROCEDURE — 2580000003 HC RX 258: Performed by: NURSE ANESTHETIST, CERTIFIED REGISTERED

## 2017-05-25 PROCEDURE — 96523 IRRIG DRUG DELIVERY DEVICE: CPT

## 2017-05-25 PROCEDURE — 3609008300 HC SIGMOIDOSCOPY W/BIOPSY SINGLE/MULTIPLE: Performed by: INTERNAL MEDICINE

## 2017-05-25 PROCEDURE — 6360000002 HC RX W HCPCS: Performed by: INTERNAL MEDICINE

## 2017-05-25 PROCEDURE — 7100000010 HC PHASE II RECOVERY - FIRST 15 MIN: Performed by: INTERNAL MEDICINE

## 2017-05-25 PROCEDURE — 3700000000 HC ANESTHESIA ATTENDED CARE: Performed by: INTERNAL MEDICINE

## 2017-05-25 PROCEDURE — 6360000002 HC RX W HCPCS: Performed by: NURSE ANESTHETIST, CERTIFIED REGISTERED

## 2017-05-25 RX ORDER — SODIUM CHLORIDE 0.9 % (FLUSH) 0.9 %
20 SYRINGE (ML) INJECTION PRN
Status: CANCELLED | OUTPATIENT
Start: 2017-05-25

## 2017-05-25 RX ORDER — PROPOFOL 10 MG/ML
INJECTION, EMULSION INTRAVENOUS PRN
Status: DISCONTINUED | OUTPATIENT
Start: 2017-05-25 | End: 2017-05-25

## 2017-05-25 RX ORDER — SODIUM CHLORIDE 0.9 % (FLUSH) 0.9 %
10 SYRINGE (ML) INJECTION PRN
Status: DISCONTINUED | OUTPATIENT
Start: 2017-05-25 | End: 2017-05-26 | Stop reason: HOSPADM

## 2017-05-25 RX ORDER — HEPARIN SODIUM (PORCINE) LOCK FLUSH IV SOLN 100 UNIT/ML 100 UNIT/ML
500 SOLUTION INTRAVENOUS PRN
Status: ACTIVE | OUTPATIENT
Start: 2017-05-25 | End: 2017-05-26

## 2017-05-25 RX ORDER — SODIUM CHLORIDE 0.9 % (FLUSH) 0.9 %
10 SYRINGE (ML) INJECTION PRN
Status: CANCELLED | OUTPATIENT
Start: 2017-05-25

## 2017-05-25 RX ORDER — SODIUM CHLORIDE 0.9 % (FLUSH) 0.9 %
10 SYRINGE (ML) INJECTION PRN
Status: ACTIVE | OUTPATIENT
Start: 2017-05-25 | End: 2017-05-26

## 2017-05-25 RX ORDER — HEPARIN SODIUM (PORCINE) LOCK FLUSH IV SOLN 100 UNIT/ML 100 UNIT/ML
500 SOLUTION INTRAVENOUS PRN
Status: DISCONTINUED | OUTPATIENT
Start: 2017-05-25 | End: 2017-05-26 | Stop reason: HOSPADM

## 2017-05-25 RX ORDER — PROPOFOL 10 MG/ML
INJECTION, EMULSION INTRAVENOUS PRN
Status: DISCONTINUED | OUTPATIENT
Start: 2017-05-25 | End: 2017-05-25 | Stop reason: SDUPTHER

## 2017-05-25 RX ORDER — LIDOCAINE HYDROCHLORIDE 10 MG/ML
INJECTION, SOLUTION INFILTRATION; PERINEURAL PRN
Status: DISCONTINUED | OUTPATIENT
Start: 2017-05-25 | End: 2017-05-25 | Stop reason: SDUPTHER

## 2017-05-25 RX ORDER — SODIUM CHLORIDE 9 MG/ML
INJECTION, SOLUTION INTRAVENOUS CONTINUOUS PRN
Status: DISCONTINUED | OUTPATIENT
Start: 2017-05-25 | End: 2017-05-25 | Stop reason: SDUPTHER

## 2017-05-25 RX ORDER — HEPARIN SODIUM (PORCINE) LOCK FLUSH IV SOLN 100 UNIT/ML 100 UNIT/ML
500 SOLUTION INTRAVENOUS PRN
Status: CANCELLED | OUTPATIENT
Start: 2017-05-25

## 2017-05-25 RX ADMIN — SODIUM CHLORIDE: 9 INJECTION, SOLUTION INTRAVENOUS at 13:41

## 2017-05-25 RX ADMIN — Medication 10 ML: at 15:44

## 2017-05-25 RX ADMIN — PROPOFOL 100 MG: 10 INJECTION, EMULSION INTRAVENOUS at 13:41

## 2017-05-25 RX ADMIN — SODIUM CHLORIDE, PRESERVATIVE FREE 500 UNITS: 5 INJECTION INTRAVENOUS at 15:44

## 2017-05-25 RX ADMIN — LIDOCAINE HYDROCHLORIDE 50 MG: 10 INJECTION, SOLUTION INFILTRATION; PERINEURAL at 13:41

## 2017-05-25 RX ADMIN — PROPOFOL 50 MG: 10 INJECTION, EMULSION INTRAVENOUS at 13:43

## 2017-05-25 ASSESSMENT — PAIN - FUNCTIONAL ASSESSMENT: PAIN_FUNCTIONAL_ASSESSMENT: 0-10

## 2017-05-25 ASSESSMENT — PAIN SCALES - GENERAL
PAINLEVEL_OUTOF10: 0

## 2017-06-02 DIAGNOSIS — C19 COLORECTAL CANCER, STAGE IV (HCC): Primary | ICD-10-CM

## 2017-06-02 RX ORDER — OXYCODONE AND ACETAMINOPHEN 10; 325 MG/1; MG/1
1 TABLET ORAL EVERY 6 HOURS PRN
Qty: 120 TABLET | Refills: 0 | Status: SHIPPED | OUTPATIENT
Start: 2017-06-02 | End: 2017-07-06 | Stop reason: SDUPTHER

## 2017-06-06 ENCOUNTER — HOSPITAL ENCOUNTER (OUTPATIENT)
Dept: INFUSION THERAPY | Age: 57
Discharge: HOME | End: 2017-06-06
Payer: COMMERCIAL

## 2017-06-06 VITALS
SYSTOLIC BLOOD PRESSURE: 140 MMHG | RESPIRATION RATE: 16 BRPM | HEART RATE: 79 BPM | TEMPERATURE: 97.9 F | DIASTOLIC BLOOD PRESSURE: 79 MMHG

## 2017-06-06 DIAGNOSIS — C19 COLORECTAL CANCER, STAGE IV (HCC): Primary | ICD-10-CM

## 2017-06-06 DIAGNOSIS — C78.7 HEPATIC METASTASES (HCC): ICD-10-CM

## 2017-06-06 DIAGNOSIS — I21.A1 NON-ST ELEVATION MYOCARDIAL INFARCTION (NSTEMI), TYPE 2: ICD-10-CM

## 2017-06-06 LAB
ABSOLUTE EOS #: 0.06 K/UL (ref 0–0.4)
ABSOLUTE LYMPH #: 1.22 K/UL (ref 1–4.8)
ABSOLUTE MONO #: 1.04 K/UL (ref 0.1–0.8)
ALBUMIN SERPL-MCNC: 3.7 G/DL (ref 3.5–5.2)
ALBUMIN/GLOBULIN RATIO: 1.3 (ref 1–2.5)
ALP BLD-CCNC: 107 U/L (ref 40–129)
ALT SERPL-CCNC: 21 U/L (ref 5–41)
ANION GAP SERPL CALCULATED.3IONS-SCNC: 14 MMOL/L (ref 9–17)
AST SERPL-CCNC: 23 U/L
BASOPHILS # BLD: 1 %
BASOPHILS ABSOLUTE: 0.06 K/UL (ref 0–0.2)
BILIRUB SERPL-MCNC: 0.21 MG/DL (ref 0.3–1.2)
BUN BLDV-MCNC: 10 MG/DL (ref 6–20)
BUN/CREAT BLD: ABNORMAL (ref 9–20)
CALCIUM SERPL-MCNC: 9 MG/DL (ref 8.6–10.4)
CARCINOEMBRYONIC ANTIGEN: 3.4 NG/ML
CHLORIDE BLD-SCNC: 104 MMOL/L (ref 98–107)
CO2: 23 MMOL/L (ref 20–31)
CREAT SERPL-MCNC: 0.63 MG/DL (ref 0.7–1.2)
DIFFERENTIAL TYPE: ABNORMAL
EOSINOPHILS RELATIVE PERCENT: 1 %
GFR AFRICAN AMERICAN: >60 ML/MIN
GFR NON-AFRICAN AMERICAN: >60 ML/MIN
GFR SERPL CREATININE-BSD FRML MDRD: ABNORMAL ML/MIN/{1.73_M2}
GFR SERPL CREATININE-BSD FRML MDRD: ABNORMAL ML/MIN/{1.73_M2}
GLUCOSE BLD-MCNC: 195 MG/DL (ref 70–99)
HCT VFR BLD CALC: 37.6 % (ref 41–53)
HEMOGLOBIN: 12.1 G/DL (ref 13.5–17.5)
LYMPHOCYTES # BLD: 21 %
MCH RBC QN AUTO: 24.6 PG (ref 26–34)
MCHC RBC AUTO-ENTMCNC: 32.1 G/DL (ref 31–37)
MCV RBC AUTO: 76.6 FL (ref 80–100)
MONOCYTES # BLD: 18 %
MORPHOLOGY: ABNORMAL
PDW BLD-RTO: 20.8 % (ref 12.5–15.4)
PLATELET # BLD: 123 K/UL (ref 140–450)
PLATELET ESTIMATE: ABNORMAL
PMV BLD AUTO: 6.8 FL (ref 6–12)
POTASSIUM SERPL-SCNC: 4.2 MMOL/L (ref 3.7–5.3)
RBC # BLD: 4.92 M/UL (ref 4.5–5.9)
RBC # BLD: ABNORMAL 10*6/UL
SEG NEUTROPHILS: 59 %
SEGMENTED NEUTROPHILS ABSOLUTE COUNT: 3.42 K/UL (ref 1.8–7.7)
SODIUM BLD-SCNC: 141 MMOL/L (ref 135–144)
TOTAL PROTEIN: 6.5 G/DL (ref 6.4–8.3)
WBC # BLD: 5.8 K/UL (ref 3.5–11)
WBC # BLD: ABNORMAL 10*3/UL

## 2017-06-06 PROCEDURE — 96413 CHEMO IV INFUSION 1 HR: CPT

## 2017-06-06 PROCEDURE — 96416 CHEMO PROLONG INFUSE W/PUMP: CPT

## 2017-06-06 PROCEDURE — 96415 CHEMO IV INFUSION ADDL HR: CPT

## 2017-06-06 PROCEDURE — 96368 THER/DIAG CONCURRENT INF: CPT

## 2017-06-06 PROCEDURE — 96375 TX/PRO/DX INJ NEW DRUG ADDON: CPT

## 2017-06-06 PROCEDURE — 82378 CARCINOEMBRYONIC ANTIGEN: CPT

## 2017-06-06 PROCEDURE — 80053 COMPREHEN METABOLIC PANEL: CPT

## 2017-06-06 PROCEDURE — 36591 DRAW BLOOD OFF VENOUS DEVICE: CPT

## 2017-06-06 PROCEDURE — 85025 COMPLETE CBC W/AUTO DIFF WBC: CPT

## 2017-06-06 PROCEDURE — 96411 CHEMO IV PUSH ADDL DRUG: CPT

## 2017-06-06 PROCEDURE — 2580000003 HC RX 258: Performed by: INTERNAL MEDICINE

## 2017-06-06 PROCEDURE — 6360000002 HC RX W HCPCS: Performed by: INTERNAL MEDICINE

## 2017-06-06 RX ORDER — FLUOROURACIL 50 MG/ML
900 INJECTION, SOLUTION INTRAVENOUS ONCE
Status: COMPLETED | OUTPATIENT
Start: 2017-06-06 | End: 2017-06-06

## 2017-06-06 RX ORDER — HEPARIN SODIUM (PORCINE) LOCK FLUSH IV SOLN 100 UNIT/ML 100 UNIT/ML
500 SOLUTION INTRAVENOUS PRN
Status: DISCONTINUED | OUTPATIENT
Start: 2017-06-06 | End: 2017-06-07 | Stop reason: HOSPADM

## 2017-06-06 RX ORDER — DEXTROSE MONOHYDRATE 50 MG/ML
INJECTION, SOLUTION INTRAVENOUS ONCE
Status: COMPLETED | OUTPATIENT
Start: 2017-06-06 | End: 2017-06-06

## 2017-06-06 RX ORDER — SODIUM CHLORIDE 0.9 % (FLUSH) 0.9 %
10 SYRINGE (ML) INJECTION PRN
Status: DISCONTINUED | OUTPATIENT
Start: 2017-06-06 | End: 2017-06-07 | Stop reason: HOSPADM

## 2017-06-06 RX ORDER — PALONOSETRON 0.05 MG/ML
0.25 INJECTION, SOLUTION INTRAVENOUS ONCE
Status: COMPLETED | OUTPATIENT
Start: 2017-06-06 | End: 2017-06-06

## 2017-06-06 RX ADMIN — OXALIPLATIN 200 MG: 5 INJECTION, SOLUTION, CONCENTRATE INTRAVENOUS at 11:56

## 2017-06-06 RX ADMIN — Medication 10 ML: at 10:24

## 2017-06-06 RX ADMIN — PALONOSETRON HYDROCHLORIDE 0.25 MG: 0.25 INJECTION INTRAVENOUS at 11:12

## 2017-06-06 RX ADMIN — DEXTROSE MONOHYDRATE: 50 INJECTION, SOLUTION INTRAVENOUS at 10:24

## 2017-06-06 RX ADMIN — FLUOROURACIL 5550 MG: 50 INJECTION, SOLUTION INTRAVENOUS at 14:29

## 2017-06-06 RX ADMIN — DEXAMETHASONE SODIUM PHOSPHATE 12 MG: 4 INJECTION, SOLUTION INTRAMUSCULAR; INTRAVENOUS at 11:12

## 2017-06-06 RX ADMIN — LEUCOVORIN CALCIUM 900 MG: 350 INJECTION, POWDER, LYOPHILIZED, FOR SOLUTION INTRAMUSCULAR; INTRAVENOUS at 11:56

## 2017-06-06 RX ADMIN — Medication 10 ML: at 14:27

## 2017-06-06 RX ADMIN — FLUOROURACIL 900 MG: 50 INJECTION, SOLUTION INTRAVENOUS at 14:26

## 2017-06-06 ASSESSMENT — PAIN DESCRIPTION - LOCATION: LOCATION: BACK

## 2017-06-06 ASSESSMENT — PAIN SCALES - GENERAL: PAINLEVEL_OUTOF10: 2

## 2017-06-06 ASSESSMENT — PAIN DESCRIPTION - ORIENTATION: ORIENTATION: LOWER

## 2017-06-06 NOTE — PROGRESS NOTES
Pt here for C.12D1. Denies any new complaints. Labs drawn from port and results reviewed. Pt did not bring in blood sugar readings or glucometer as Dr. Iglesia Jones had asked pt to do. Reminded pt to bring to next MD visit, pt states understanding. Glucose today = 195. Pt was treated without incident and d/c'd in stable condition. He will return on Thursda for pump d/c.

## 2017-06-06 NOTE — PROGRESS NOTES
Clinical Pharmacist Medication Reconciliation    List of medications patient is currently taking is complete. Source of medications in list is the patient. Medication Compliance Concerns:  Pt states he has not taken any insulin for months as he does not believe he needs to take it. Stressed importance of medication with pt. The following medications were removed from the home medication list as the pt no longer takes:  Baclofen, Pepcid, Milk of Mag, Boost nutritional supp      Please let me know if you have any questions about this encounter. Thanks! Sisi BRONSON,  6/6/2017  11:42 AM

## 2017-06-08 ENCOUNTER — HOSPITAL ENCOUNTER (OUTPATIENT)
Dept: INFUSION THERAPY | Age: 57
Discharge: HOME | End: 2017-06-08
Payer: COMMERCIAL

## 2017-06-08 ENCOUNTER — OFFICE VISIT (OUTPATIENT)
Dept: ONCOLOGY | Age: 57
End: 2017-06-08
Payer: COMMERCIAL

## 2017-06-08 VITALS
RESPIRATION RATE: 18 BRPM | WEIGHT: 233.2 LBS | BODY MASS INDEX: 30.77 KG/M2 | HEART RATE: 78 BPM | TEMPERATURE: 98.3 F | DIASTOLIC BLOOD PRESSURE: 68 MMHG | SYSTOLIC BLOOD PRESSURE: 126 MMHG

## 2017-06-08 DIAGNOSIS — C19 COLORECTAL CANCER, STAGE IV (HCC): Primary | ICD-10-CM

## 2017-06-08 DIAGNOSIS — Z98.61 S/P PTCA (PERCUTANEOUS TRANSLUMINAL CORONARY ANGIOPLASTY): ICD-10-CM

## 2017-06-08 DIAGNOSIS — C78.7 HEPATIC METASTASES (HCC): ICD-10-CM

## 2017-06-08 DIAGNOSIS — C19 COLORECTAL CANCER, STAGE IV (HCC): ICD-10-CM

## 2017-06-08 DIAGNOSIS — I21.3 ST ELEVATION MYOCARDIAL INFARCTION (STEMI), UNSPECIFIED ARTERY (HCC): ICD-10-CM

## 2017-06-08 PROCEDURE — 2580000003 HC RX 258: Performed by: INTERNAL MEDICINE

## 2017-06-08 PROCEDURE — 6360000002 HC RX W HCPCS: Performed by: INTERNAL MEDICINE

## 2017-06-08 PROCEDURE — 96523 IRRIG DRUG DELIVERY DEVICE: CPT

## 2017-06-08 PROCEDURE — 99214 OFFICE O/P EST MOD 30 MIN: CPT | Performed by: INTERNAL MEDICINE

## 2017-06-08 RX ORDER — HEPARIN SODIUM (PORCINE) LOCK FLUSH IV SOLN 100 UNIT/ML 100 UNIT/ML
500 SOLUTION INTRAVENOUS PRN
Status: DISCONTINUED | OUTPATIENT
Start: 2017-06-08 | End: 2017-06-09 | Stop reason: HOSPADM

## 2017-06-08 RX ORDER — SODIUM CHLORIDE 0.9 % (FLUSH) 0.9 %
10 SYRINGE (ML) INJECTION PRN
Status: CANCELLED | OUTPATIENT
Start: 2017-06-08

## 2017-06-08 RX ORDER — HEPARIN SODIUM (PORCINE) LOCK FLUSH IV SOLN 100 UNIT/ML 100 UNIT/ML
500 SOLUTION INTRAVENOUS PRN
Status: CANCELLED | OUTPATIENT
Start: 2017-06-08

## 2017-06-08 RX ORDER — SODIUM CHLORIDE 0.9 % (FLUSH) 0.9 %
10 SYRINGE (ML) INJECTION PRN
Status: DISCONTINUED | OUTPATIENT
Start: 2017-06-08 | End: 2017-06-09 | Stop reason: HOSPADM

## 2017-06-08 RX ORDER — SODIUM CHLORIDE 0.9 % (FLUSH) 0.9 %
20 SYRINGE (ML) INJECTION PRN
Status: CANCELLED | OUTPATIENT
Start: 2017-06-08

## 2017-06-08 RX ADMIN — Medication 10 ML: at 14:38

## 2017-06-08 RX ADMIN — SODIUM CHLORIDE, PRESERVATIVE FREE 500 UNITS: 5 INJECTION INTRAVENOUS at 14:38

## 2017-06-21 ENCOUNTER — HOSPITAL ENCOUNTER (OUTPATIENT)
Dept: RADIATION ONCOLOGY | Age: 57
Discharge: HOME | End: 2017-06-21
Payer: COMMERCIAL

## 2017-06-26 ENCOUNTER — HOSPITAL ENCOUNTER (OUTPATIENT)
Dept: RADIATION ONCOLOGY | Age: 57
Discharge: HOME | End: 2017-06-26
Payer: COMMERCIAL

## 2017-06-30 PROCEDURE — 77332 RADIATION TREATMENT AID(S): CPT | Performed by: RADIOLOGY

## 2017-06-30 PROCEDURE — 77290 THER RAD SIMULAJ FIELD CPLX: CPT | Performed by: RADIOLOGY

## 2017-07-03 ENCOUNTER — HOSPITAL ENCOUNTER (OUTPATIENT)
Dept: RADIATION ONCOLOGY | Age: 57
Discharge: HOME | End: 2017-07-03
Payer: COMMERCIAL

## 2017-07-06 ENCOUNTER — OFFICE VISIT (OUTPATIENT)
Dept: ONCOLOGY | Age: 57
End: 2017-07-06
Payer: COMMERCIAL

## 2017-07-06 VITALS
TEMPERATURE: 97.7 F | BODY MASS INDEX: 31.32 KG/M2 | WEIGHT: 237.4 LBS | HEART RATE: 66 BPM | DIASTOLIC BLOOD PRESSURE: 66 MMHG | SYSTOLIC BLOOD PRESSURE: 125 MMHG

## 2017-07-06 DIAGNOSIS — G62.0 NEUROPATHY DUE TO CHEMOTHERAPEUTIC DRUG (HCC): ICD-10-CM

## 2017-07-06 DIAGNOSIS — T45.1X5A NEUROPATHY DUE TO CHEMOTHERAPEUTIC DRUG (HCC): ICD-10-CM

## 2017-07-06 DIAGNOSIS — M51.37 DEGENERATION OF LUMBAR OR LUMBOSACRAL INTERVERTEBRAL DISC: ICD-10-CM

## 2017-07-06 DIAGNOSIS — C19 COLORECTAL CANCER, STAGE IV (HCC): ICD-10-CM

## 2017-07-06 DIAGNOSIS — C78.7 HEPATIC METASTASES (HCC): Primary | ICD-10-CM

## 2017-07-06 PROCEDURE — 77334 RADIATION TREATMENT AID(S): CPT | Performed by: RADIOLOGY

## 2017-07-06 PROCEDURE — 77300 RADIATION THERAPY DOSE PLAN: CPT | Performed by: RADIOLOGY

## 2017-07-06 PROCEDURE — 99211 OFF/OP EST MAY X REQ PHY/QHP: CPT

## 2017-07-06 PROCEDURE — 77295 3-D RADIOTHERAPY PLAN: CPT | Performed by: RADIOLOGY

## 2017-07-06 PROCEDURE — 99214 OFFICE O/P EST MOD 30 MIN: CPT | Performed by: INTERNAL MEDICINE

## 2017-07-06 PROCEDURE — 77470 SPECIAL RADIATION TREATMENT: CPT | Performed by: RADIOLOGY

## 2017-07-06 RX ORDER — OXYCODONE AND ACETAMINOPHEN 10; 325 MG/1; MG/1
1 TABLET ORAL EVERY 6 HOURS PRN
Qty: 120 TABLET | Refills: 0 | Status: SHIPPED | OUTPATIENT
Start: 2017-07-06 | End: 2017-08-03 | Stop reason: SDUPTHER

## 2017-07-07 ENCOUNTER — TELEPHONE (OUTPATIENT)
Dept: INFUSION THERAPY | Age: 57
End: 2017-07-07

## 2017-07-10 ENCOUNTER — HOSPITAL ENCOUNTER (OUTPATIENT)
Dept: RADIATION ONCOLOGY | Age: 57
Discharge: HOME | End: 2017-07-10
Payer: COMMERCIAL

## 2017-07-10 ENCOUNTER — HOSPITAL ENCOUNTER (OUTPATIENT)
Dept: INFUSION THERAPY | Age: 57
Discharge: HOME | End: 2017-07-10
Payer: COMMERCIAL

## 2017-07-10 VITALS
WEIGHT: 236.4 LBS | TEMPERATURE: 98.2 F | SYSTOLIC BLOOD PRESSURE: 146 MMHG | RESPIRATION RATE: 18 BRPM | DIASTOLIC BLOOD PRESSURE: 88 MMHG | BODY MASS INDEX: 31.19 KG/M2 | HEART RATE: 83 BPM

## 2017-07-10 DIAGNOSIS — I21.A1 NON-ST ELEVATION MYOCARDIAL INFARCTION (NSTEMI), TYPE 2: ICD-10-CM

## 2017-07-10 DIAGNOSIS — C78.7 HEPATIC METASTASES (HCC): ICD-10-CM

## 2017-07-10 DIAGNOSIS — C19 COLORECTAL CANCER, STAGE IV (HCC): ICD-10-CM

## 2017-07-10 LAB
ABSOLUTE EOS #: 0.1 K/UL (ref 0–0.4)
ABSOLUTE LYMPH #: 1.6 K/UL (ref 1–4.8)
ABSOLUTE MONO #: 1 K/UL (ref 0.1–1.2)
ALBUMIN SERPL-MCNC: 3.9 G/DL (ref 3.5–5.2)
ALBUMIN/GLOBULIN RATIO: 1.3 (ref 1–2.5)
ALP BLD-CCNC: 101 U/L (ref 40–129)
ALT SERPL-CCNC: 18 U/L (ref 5–41)
ANION GAP SERPL CALCULATED.3IONS-SCNC: 14 MMOL/L (ref 9–17)
AST SERPL-CCNC: 18 U/L
BASOPHILS # BLD: 1 %
BASOPHILS ABSOLUTE: 0.1 K/UL (ref 0–0.2)
BILIRUB SERPL-MCNC: 0.27 MG/DL (ref 0.3–1.2)
BUN BLDV-MCNC: 8 MG/DL (ref 6–20)
BUN/CREAT BLD: ABNORMAL (ref 9–20)
CALCIUM SERPL-MCNC: 8.9 MG/DL (ref 8.6–10.4)
CHLORIDE BLD-SCNC: 102 MMOL/L (ref 98–107)
CO2: 23 MMOL/L (ref 20–31)
CREAT SERPL-MCNC: 0.66 MG/DL (ref 0.7–1.2)
DIFFERENTIAL TYPE: ABNORMAL
EOSINOPHILS RELATIVE PERCENT: 1 %
GFR AFRICAN AMERICAN: >60 ML/MIN
GFR NON-AFRICAN AMERICAN: >60 ML/MIN
GFR SERPL CREATININE-BSD FRML MDRD: ABNORMAL ML/MIN/{1.73_M2}
GFR SERPL CREATININE-BSD FRML MDRD: ABNORMAL ML/MIN/{1.73_M2}
GLUCOSE BLD-MCNC: 233 MG/DL (ref 70–99)
HCT VFR BLD CALC: 40 % (ref 41–53)
HEMOGLOBIN: 13 G/DL (ref 13.5–17.5)
LYMPHOCYTES # BLD: 19 %
MCH RBC QN AUTO: 25.6 PG (ref 26–34)
MCHC RBC AUTO-ENTMCNC: 32.6 G/DL (ref 31–37)
MCV RBC AUTO: 78.6 FL (ref 80–100)
MONOCYTES # BLD: 11 %
PDW BLD-RTO: 18.6 % (ref 12.5–15.4)
PLATELET # BLD: 196 K/UL (ref 140–450)
PLATELET ESTIMATE: ABNORMAL
PMV BLD AUTO: 6.6 FL (ref 6–12)
POTASSIUM SERPL-SCNC: 3.9 MMOL/L (ref 3.7–5.3)
RBC # BLD: 5.08 M/UL (ref 4.5–5.9)
RBC # BLD: ABNORMAL 10*6/UL
SEG NEUTROPHILS: 68 %
SEGMENTED NEUTROPHILS ABSOLUTE COUNT: 5.9 K/UL (ref 1.8–7.7)
SODIUM BLD-SCNC: 139 MMOL/L (ref 135–144)
TOTAL PROTEIN: 6.9 G/DL (ref 6.4–8.3)
WBC # BLD: 8.6 K/UL (ref 3.5–11)
WBC # BLD: ABNORMAL 10*3/UL

## 2017-07-10 PROCEDURE — 77412 RADIATION TX DELIVERY LVL 3: CPT | Performed by: RADIOLOGY

## 2017-07-10 PROCEDURE — 85025 COMPLETE CBC W/AUTO DIFF WBC: CPT

## 2017-07-10 PROCEDURE — 2580000003 HC RX 258: Performed by: INTERNAL MEDICINE

## 2017-07-10 PROCEDURE — 77280 THER RAD SIMULAJ FIELD SMPL: CPT | Performed by: RADIOLOGY

## 2017-07-10 PROCEDURE — 6360000002 HC RX W HCPCS: Performed by: INTERNAL MEDICINE

## 2017-07-10 PROCEDURE — 96374 THER/PROPH/DIAG INJ IV PUSH: CPT

## 2017-07-10 PROCEDURE — 77387 GUIDANCE FOR RADJ TX DLVR: CPT | Performed by: RADIOLOGY

## 2017-07-10 PROCEDURE — 36591 DRAW BLOOD OFF VENOUS DEVICE: CPT

## 2017-07-10 PROCEDURE — 80053 COMPREHEN METABOLIC PANEL: CPT

## 2017-07-10 PROCEDURE — 96416 CHEMO PROLONG INFUSE W/PUMP: CPT

## 2017-07-10 RX ORDER — SODIUM CHLORIDE 9 MG/ML
INJECTION, SOLUTION INTRAVENOUS ONCE
Status: CANCELLED | OUTPATIENT
Start: 2017-07-24 | End: 2017-07-24

## 2017-07-10 RX ORDER — HEPARIN SODIUM (PORCINE) LOCK FLUSH IV SOLN 100 UNIT/ML 100 UNIT/ML
500 SOLUTION INTRAVENOUS PRN
Status: DISCONTINUED | OUTPATIENT
Start: 2017-07-10 | End: 2017-07-11 | Stop reason: HOSPADM

## 2017-07-10 RX ORDER — SODIUM CHLORIDE 9 MG/ML
INJECTION, SOLUTION INTRAVENOUS CONTINUOUS
Status: CANCELLED | OUTPATIENT
Start: 2017-07-17

## 2017-07-10 RX ORDER — HEPARIN SODIUM (PORCINE) LOCK FLUSH IV SOLN 100 UNIT/ML 100 UNIT/ML
500 SOLUTION INTRAVENOUS PRN
Status: CANCELLED | OUTPATIENT
Start: 2017-07-17

## 2017-07-10 RX ORDER — SODIUM CHLORIDE 9 MG/ML
INJECTION, SOLUTION INTRAVENOUS ONCE
Status: COMPLETED | OUTPATIENT
Start: 2017-07-10 | End: 2017-07-10

## 2017-07-10 RX ORDER — SODIUM CHLORIDE 0.9 % (FLUSH) 0.9 %
10 SYRINGE (ML) INJECTION PRN
Status: CANCELLED | OUTPATIENT
Start: 2017-07-24

## 2017-07-10 RX ORDER — 0.9 % SODIUM CHLORIDE 0.9 %
10 VIAL (ML) INJECTION ONCE
Status: CANCELLED | OUTPATIENT
Start: 2017-07-17 | End: 2017-07-17

## 2017-07-10 RX ORDER — DIPHENHYDRAMINE HYDROCHLORIDE 50 MG/ML
50 INJECTION INTRAMUSCULAR; INTRAVENOUS ONCE
Status: CANCELLED | OUTPATIENT
Start: 2017-07-17 | End: 2017-07-17

## 2017-07-10 RX ORDER — SODIUM CHLORIDE 0.9 % (FLUSH) 0.9 %
5 SYRINGE (ML) INJECTION PRN
Status: CANCELLED | OUTPATIENT
Start: 2017-07-24

## 2017-07-10 RX ORDER — DIPHENHYDRAMINE HYDROCHLORIDE 50 MG/ML
50 INJECTION INTRAMUSCULAR; INTRAVENOUS ONCE
Status: CANCELLED | OUTPATIENT
Start: 2017-07-24 | End: 2017-07-24

## 2017-07-10 RX ORDER — SODIUM CHLORIDE 0.9 % (FLUSH) 0.9 %
10 SYRINGE (ML) INJECTION PRN
Status: DISCONTINUED | OUTPATIENT
Start: 2017-07-10 | End: 2017-07-11 | Stop reason: HOSPADM

## 2017-07-10 RX ORDER — SODIUM CHLORIDE 0.9 % (FLUSH) 0.9 %
5 SYRINGE (ML) INJECTION PRN
Status: CANCELLED | OUTPATIENT
Start: 2017-07-10

## 2017-07-10 RX ORDER — HEPARIN SODIUM (PORCINE) LOCK FLUSH IV SOLN 100 UNIT/ML 100 UNIT/ML
500 SOLUTION INTRAVENOUS PRN
Status: CANCELLED | OUTPATIENT
Start: 2017-07-10

## 2017-07-10 RX ORDER — 0.9 % SODIUM CHLORIDE 0.9 %
10 VIAL (ML) INJECTION ONCE
Status: CANCELLED | OUTPATIENT
Start: 2017-07-10 | End: 2017-07-10

## 2017-07-10 RX ORDER — METHYLPREDNISOLONE SODIUM SUCCINATE 125 MG/2ML
125 INJECTION, POWDER, LYOPHILIZED, FOR SOLUTION INTRAMUSCULAR; INTRAVENOUS ONCE
Status: CANCELLED | OUTPATIENT
Start: 2017-07-24 | End: 2017-07-24

## 2017-07-10 RX ORDER — SODIUM CHLORIDE 9 MG/ML
INJECTION, SOLUTION INTRAVENOUS ONCE
Status: CANCELLED | OUTPATIENT
Start: 2017-07-17 | End: 2017-07-17

## 2017-07-10 RX ORDER — METHYLPREDNISOLONE SODIUM SUCCINATE 125 MG/2ML
125 INJECTION, POWDER, LYOPHILIZED, FOR SOLUTION INTRAMUSCULAR; INTRAVENOUS ONCE
Status: CANCELLED | OUTPATIENT
Start: 2017-07-17 | End: 2017-07-17

## 2017-07-10 RX ORDER — METHYLPREDNISOLONE SODIUM SUCCINATE 125 MG/2ML
125 INJECTION, POWDER, LYOPHILIZED, FOR SOLUTION INTRAMUSCULAR; INTRAVENOUS ONCE
Status: CANCELLED | OUTPATIENT
Start: 2017-07-10 | End: 2017-07-10

## 2017-07-10 RX ORDER — SODIUM CHLORIDE 9 MG/ML
INJECTION, SOLUTION INTRAVENOUS CONTINUOUS
Status: CANCELLED | OUTPATIENT
Start: 2017-07-24

## 2017-07-10 RX ORDER — SODIUM CHLORIDE 0.9 % (FLUSH) 0.9 %
5 SYRINGE (ML) INJECTION PRN
Status: CANCELLED | OUTPATIENT
Start: 2017-07-17

## 2017-07-10 RX ORDER — SODIUM CHLORIDE 9 MG/ML
INJECTION, SOLUTION INTRAVENOUS ONCE
Status: CANCELLED | OUTPATIENT
Start: 2017-07-10 | End: 2017-07-10

## 2017-07-10 RX ORDER — HEPARIN SODIUM (PORCINE) LOCK FLUSH IV SOLN 100 UNIT/ML 100 UNIT/ML
500 SOLUTION INTRAVENOUS PRN
Status: CANCELLED | OUTPATIENT
Start: 2017-07-24

## 2017-07-10 RX ORDER — SODIUM CHLORIDE 0.9 % (FLUSH) 0.9 %
10 SYRINGE (ML) INJECTION PRN
Status: CANCELLED | OUTPATIENT
Start: 2017-07-17

## 2017-07-10 RX ORDER — SODIUM CHLORIDE 0.9 % (FLUSH) 0.9 %
10 SYRINGE (ML) INJECTION PRN
Status: CANCELLED | OUTPATIENT
Start: 2017-07-10

## 2017-07-10 RX ORDER — 0.9 % SODIUM CHLORIDE 0.9 %
10 VIAL (ML) INJECTION ONCE
Status: CANCELLED | OUTPATIENT
Start: 2017-07-24 | End: 2017-07-24

## 2017-07-10 RX ORDER — DIPHENHYDRAMINE HYDROCHLORIDE 50 MG/ML
50 INJECTION INTRAMUSCULAR; INTRAVENOUS ONCE
Status: CANCELLED | OUTPATIENT
Start: 2017-07-10 | End: 2017-07-10

## 2017-07-10 RX ORDER — SODIUM CHLORIDE 9 MG/ML
INJECTION, SOLUTION INTRAVENOUS CONTINUOUS
Status: CANCELLED | OUTPATIENT
Start: 2017-07-10

## 2017-07-10 RX ADMIN — DEXAMETHASONE SODIUM PHOSPHATE 12 MG: 4 INJECTION, SOLUTION INTRAMUSCULAR; INTRAVENOUS at 10:56

## 2017-07-10 RX ADMIN — SODIUM CHLORIDE: 9 INJECTION, SOLUTION INTRAVENOUS at 10:56

## 2017-07-10 RX ADMIN — Medication 10 ML: at 10:03

## 2017-07-10 RX ADMIN — FLUOROURACIL 2100 MG: 50 INJECTION, SOLUTION INTRAVENOUS at 11:40

## 2017-07-10 RX ADMIN — Medication 10 ML: at 10:02

## 2017-07-11 PROCEDURE — 77387 GUIDANCE FOR RADJ TX DLVR: CPT | Performed by: RADIOLOGY

## 2017-07-11 PROCEDURE — 77412 RADIATION TX DELIVERY LVL 3: CPT | Performed by: RADIOLOGY

## 2017-07-12 PROCEDURE — 77387 GUIDANCE FOR RADJ TX DLVR: CPT | Performed by: RADIOLOGY

## 2017-07-12 PROCEDURE — 77412 RADIATION TX DELIVERY LVL 3: CPT | Performed by: RADIOLOGY

## 2017-07-13 PROCEDURE — 77412 RADIATION TX DELIVERY LVL 3: CPT | Performed by: RADIOLOGY

## 2017-07-13 PROCEDURE — 77387 GUIDANCE FOR RADJ TX DLVR: CPT | Performed by: RADIOLOGY

## 2017-07-14 ENCOUNTER — HOSPITAL ENCOUNTER (OUTPATIENT)
Dept: INFUSION THERAPY | Age: 57
Discharge: HOME | End: 2017-07-14
Payer: COMMERCIAL

## 2017-07-14 DIAGNOSIS — C19 COLORECTAL CANCER, STAGE IV (HCC): ICD-10-CM

## 2017-07-14 PROCEDURE — 77387 GUIDANCE FOR RADJ TX DLVR: CPT | Performed by: RADIOLOGY

## 2017-07-14 PROCEDURE — 77336 RADIATION PHYSICS CONSULT: CPT | Performed by: RADIOLOGY

## 2017-07-14 PROCEDURE — 2580000003 HC RX 258: Performed by: INTERNAL MEDICINE

## 2017-07-14 PROCEDURE — 6360000002 HC RX W HCPCS: Performed by: INTERNAL MEDICINE

## 2017-07-14 PROCEDURE — 96523 IRRIG DRUG DELIVERY DEVICE: CPT

## 2017-07-14 PROCEDURE — 77412 RADIATION TX DELIVERY LVL 3: CPT | Performed by: RADIOLOGY

## 2017-07-14 RX ORDER — HEPARIN SODIUM (PORCINE) LOCK FLUSH IV SOLN 100 UNIT/ML 100 UNIT/ML
500 SOLUTION INTRAVENOUS PRN
Status: CANCELLED | OUTPATIENT
Start: 2017-07-14

## 2017-07-14 RX ORDER — SODIUM CHLORIDE 0.9 % (FLUSH) 0.9 %
20 SYRINGE (ML) INJECTION PRN
Status: DISCONTINUED | OUTPATIENT
Start: 2017-07-14 | End: 2017-07-15 | Stop reason: HOSPADM

## 2017-07-14 RX ORDER — SODIUM CHLORIDE 0.9 % (FLUSH) 0.9 %
20 SYRINGE (ML) INJECTION PRN
Status: CANCELLED | OUTPATIENT
Start: 2017-07-14

## 2017-07-14 RX ORDER — HEPARIN SODIUM (PORCINE) LOCK FLUSH IV SOLN 100 UNIT/ML 100 UNIT/ML
500 SOLUTION INTRAVENOUS PRN
Status: DISCONTINUED | OUTPATIENT
Start: 2017-07-14 | End: 2017-07-15 | Stop reason: HOSPADM

## 2017-07-14 RX ORDER — SODIUM CHLORIDE 0.9 % (FLUSH) 0.9 %
10 SYRINGE (ML) INJECTION PRN
Status: CANCELLED | OUTPATIENT
Start: 2017-07-14

## 2017-07-14 RX ADMIN — SODIUM CHLORIDE, PRESERVATIVE FREE 500 UNITS: 5 INJECTION INTRAVENOUS at 10:35

## 2017-07-14 RX ADMIN — Medication 20 ML: at 10:35

## 2017-07-14 NOTE — PROGRESS NOTES
Pt here today for pump d/c. Pt reported to infusion room early today and pump had not completed entire infusion. Pt spoke with unit manager prior to entering the unit about pump. Pt was educated that he has not received the full dose at this time and that he may not receive the full benefit of the medication. The pt expressed understanding of this and requested that the pump be removed so he could go home and rest.  Unit manager was made aware of situation and pt request pump was d/c as requested and port was flushed pt d/c in stable condition.

## 2017-07-17 ENCOUNTER — HOSPITAL ENCOUNTER (OUTPATIENT)
Dept: INFUSION THERAPY | Age: 57
Discharge: HOME | End: 2017-07-17
Payer: COMMERCIAL

## 2017-07-17 ENCOUNTER — HOSPITAL ENCOUNTER (OUTPATIENT)
Dept: RADIATION ONCOLOGY | Age: 57
Discharge: HOME | End: 2017-07-17
Payer: COMMERCIAL

## 2017-07-17 VITALS
DIASTOLIC BLOOD PRESSURE: 81 MMHG | TEMPERATURE: 97.6 F | WEIGHT: 236.2 LBS | RESPIRATION RATE: 16 BRPM | HEART RATE: 76 BPM | SYSTOLIC BLOOD PRESSURE: 123 MMHG | BODY MASS INDEX: 31.16 KG/M2

## 2017-07-17 DIAGNOSIS — C19 COLORECTAL CANCER, STAGE IV (HCC): ICD-10-CM

## 2017-07-17 DIAGNOSIS — I21.A1 NON-ST ELEVATION MYOCARDIAL INFARCTION (NSTEMI), TYPE 2: ICD-10-CM

## 2017-07-17 DIAGNOSIS — C78.7 HEPATIC METASTASES (HCC): ICD-10-CM

## 2017-07-17 LAB
ABSOLUTE EOS #: 0 K/UL (ref 0–0.4)
ABSOLUTE LYMPH #: 1.2 K/UL (ref 1–4.8)
ABSOLUTE MONO #: 0.8 K/UL (ref 0.1–1.2)
ALBUMIN SERPL-MCNC: 3.6 G/DL (ref 3.5–5.2)
ALBUMIN/GLOBULIN RATIO: 1.2 (ref 1–2.5)
ALP BLD-CCNC: 98 U/L (ref 40–129)
ALT SERPL-CCNC: 11 U/L (ref 5–41)
ANION GAP SERPL CALCULATED.3IONS-SCNC: 13 MMOL/L (ref 9–17)
AST SERPL-CCNC: 13 U/L
BASOPHILS # BLD: 1 %
BASOPHILS ABSOLUTE: 0 K/UL (ref 0–0.2)
BILIRUB SERPL-MCNC: 0.35 MG/DL (ref 0.3–1.2)
BUN BLDV-MCNC: 11 MG/DL (ref 6–20)
BUN/CREAT BLD: ABNORMAL (ref 9–20)
CALCIUM SERPL-MCNC: 9.1 MG/DL (ref 8.6–10.4)
CHLORIDE BLD-SCNC: 97 MMOL/L (ref 98–107)
CO2: 25 MMOL/L (ref 20–31)
CREAT SERPL-MCNC: 0.76 MG/DL (ref 0.7–1.2)
DIFFERENTIAL TYPE: ABNORMAL
EOSINOPHILS RELATIVE PERCENT: 1 %
GFR AFRICAN AMERICAN: >60 ML/MIN
GFR NON-AFRICAN AMERICAN: >60 ML/MIN
GFR SERPL CREATININE-BSD FRML MDRD: ABNORMAL ML/MIN/{1.73_M2}
GFR SERPL CREATININE-BSD FRML MDRD: ABNORMAL ML/MIN/{1.73_M2}
GLUCOSE BLD-MCNC: 307 MG/DL (ref 70–99)
HCT VFR BLD CALC: 40.3 % (ref 41–53)
HEMOGLOBIN: 13 G/DL (ref 13.5–17.5)
LYMPHOCYTES # BLD: 19 %
MCH RBC QN AUTO: 26 PG (ref 26–34)
MCHC RBC AUTO-ENTMCNC: 32.2 G/DL (ref 31–37)
MCV RBC AUTO: 80.9 FL (ref 80–100)
MONOCYTES # BLD: 12 %
PDW BLD-RTO: 17.7 % (ref 12.5–15.4)
PLATELET # BLD: 175 K/UL (ref 140–450)
PLATELET ESTIMATE: ABNORMAL
PMV BLD AUTO: 6.8 FL (ref 6–12)
POTASSIUM SERPL-SCNC: 4.1 MMOL/L (ref 3.7–5.3)
RBC # BLD: 4.98 M/UL (ref 4.5–5.9)
RBC # BLD: ABNORMAL 10*6/UL
SEG NEUTROPHILS: 67 %
SEGMENTED NEUTROPHILS ABSOLUTE COUNT: 4.2 K/UL (ref 1.8–7.7)
SODIUM BLD-SCNC: 135 MMOL/L (ref 135–144)
TOTAL PROTEIN: 6.7 G/DL (ref 6.4–8.3)
WBC # BLD: 6.2 K/UL (ref 3.5–11)
WBC # BLD: ABNORMAL 10*3/UL

## 2017-07-17 PROCEDURE — 6360000002 HC RX W HCPCS: Performed by: INTERNAL MEDICINE

## 2017-07-17 PROCEDURE — 6370000000 HC RX 637 (ALT 250 FOR IP): Performed by: INTERNAL MEDICINE

## 2017-07-17 PROCEDURE — 36591 DRAW BLOOD OFF VENOUS DEVICE: CPT

## 2017-07-17 PROCEDURE — 77387 GUIDANCE FOR RADJ TX DLVR: CPT | Performed by: RADIOLOGY

## 2017-07-17 PROCEDURE — 85025 COMPLETE CBC W/AUTO DIFF WBC: CPT

## 2017-07-17 PROCEDURE — 96416 CHEMO PROLONG INFUSE W/PUMP: CPT

## 2017-07-17 PROCEDURE — 2580000003 HC RX 258: Performed by: INTERNAL MEDICINE

## 2017-07-17 PROCEDURE — 96372 THER/PROPH/DIAG INJ SC/IM: CPT

## 2017-07-17 PROCEDURE — 80053 COMPREHEN METABOLIC PANEL: CPT

## 2017-07-17 PROCEDURE — 96375 TX/PRO/DX INJ NEW DRUG ADDON: CPT

## 2017-07-17 PROCEDURE — 96374 THER/PROPH/DIAG INJ IV PUSH: CPT

## 2017-07-17 PROCEDURE — 77412 RADIATION TX DELIVERY LVL 3: CPT | Performed by: RADIOLOGY

## 2017-07-17 RX ORDER — SODIUM CHLORIDE 0.9 % (FLUSH) 0.9 %
10 SYRINGE (ML) INJECTION PRN
Status: DISCONTINUED | OUTPATIENT
Start: 2017-07-17 | End: 2017-07-18 | Stop reason: HOSPADM

## 2017-07-17 RX ORDER — SODIUM CHLORIDE 9 MG/ML
INJECTION, SOLUTION INTRAVENOUS ONCE
Status: DISCONTINUED | OUTPATIENT
Start: 2017-07-17 | End: 2017-07-18 | Stop reason: HOSPADM

## 2017-07-17 RX ORDER — HEPARIN SODIUM (PORCINE) LOCK FLUSH IV SOLN 100 UNIT/ML 100 UNIT/ML
500 SOLUTION INTRAVENOUS PRN
Status: DISCONTINUED | OUTPATIENT
Start: 2017-07-17 | End: 2017-07-18 | Stop reason: HOSPADM

## 2017-07-17 RX ORDER — DEXAMETHASONE SODIUM PHOSPHATE 4 MG/ML
4 INJECTION, SOLUTION INTRA-ARTICULAR; INTRALESIONAL; INTRAMUSCULAR; INTRAVENOUS; SOFT TISSUE ONCE
Status: COMPLETED | OUTPATIENT
Start: 2017-07-17 | End: 2017-07-17

## 2017-07-17 RX ADMIN — INSULIN HUMAN 12 UNITS: 100 INJECTION, SOLUTION PARENTERAL at 11:26

## 2017-07-17 RX ADMIN — Medication 20 ML: at 09:46

## 2017-07-17 RX ADMIN — Medication 10 ML: at 09:45

## 2017-07-17 RX ADMIN — DEXAMETHASONE SODIUM PHOSPHATE 4 MG: 4 INJECTION, SOLUTION INTRAMUSCULAR; INTRAVENOUS at 11:42

## 2017-07-17 RX ADMIN — Medication 10 ML: at 11:45

## 2017-07-17 RX ADMIN — FLUOROURACIL 2050 MG: 50 INJECTION, SOLUTION INTRAVENOUS at 12:20

## 2017-07-17 NOTE — PROGRESS NOTES
Pt here for C2D1 of 5-FU CIV x 96hrs with RT. Denies any new complaints. Labs drawn from port and results reviewed. Blood sugar was 307. Dr. Indu Burrows notified. 12units regular insulin given as ordered and Geovanni Kathleen in pharmacy notified that patient is to receive 4mg. of Dexamethasone today only due to high blood sugar. Patient denies that he is on a sliding scale, but states that he is supposed to check his blood sugar before meals. States that he is supposed to check his blood sugar at bedtime and then give himself Lantus insulin if his blood sugar is greater than 160. Patient was notified that Dr. Indu Burrows would like him to check his blood sugar before breakfast in a.m. and at bedtime and keep a record of it to bring with him to his next appt. Pt was treated without incident and d/c'd in stable condition. Will return on 7/20/17 for appt. with Dr. Indu Burrwos.

## 2017-07-18 PROCEDURE — 77387 GUIDANCE FOR RADJ TX DLVR: CPT | Performed by: RADIOLOGY

## 2017-07-18 PROCEDURE — 77412 RADIATION TX DELIVERY LVL 3: CPT | Performed by: RADIOLOGY

## 2017-07-19 ENCOUNTER — HOSPITAL ENCOUNTER (EMERGENCY)
Age: 57
Discharge: HOME | End: 2017-07-19
Attending: EMERGENCY MEDICINE
Payer: COMMERCIAL

## 2017-07-19 VITALS
OXYGEN SATURATION: 98 % | WEIGHT: 236 LBS | TEMPERATURE: 98 F | DIASTOLIC BLOOD PRESSURE: 89 MMHG | SYSTOLIC BLOOD PRESSURE: 144 MMHG | BODY MASS INDEX: 31.14 KG/M2 | HEART RATE: 88 BPM | RESPIRATION RATE: 16 BRPM

## 2017-07-19 DIAGNOSIS — Z78.9 PROBLEM WITH VASCULAR ACCESS: Primary | ICD-10-CM

## 2017-07-19 PROCEDURE — 99283 EMERGENCY DEPT VISIT LOW MDM: CPT

## 2017-07-19 PROCEDURE — 77412 RADIATION TX DELIVERY LVL 3: CPT | Performed by: RADIOLOGY

## 2017-07-19 PROCEDURE — 77387 GUIDANCE FOR RADJ TX DLVR: CPT | Performed by: RADIOLOGY

## 2017-07-19 ASSESSMENT — ENCOUNTER SYMPTOMS
FACIAL SWELLING: 0
DIARRHEA: 0
BLOOD IN STOOL: 0
VOMITING: 0
COUGH: 0
SHORTNESS OF BREATH: 0
NAUSEA: 0
SINUS PRESSURE: 0
EYE DISCHARGE: 0
COLOR CHANGE: 0
TROUBLE SWALLOWING: 0
ABDOMINAL PAIN: 0
CONSTIPATION: 0
SORE THROAT: 0
RHINORRHEA: 0
WHEEZING: 0
CHEST TIGHTNESS: 0
EYE REDNESS: 0
EYE PAIN: 0
BACK PAIN: 0

## 2017-07-20 ENCOUNTER — HOSPITAL ENCOUNTER (EMERGENCY)
Age: 57
Discharge: HOME | End: 2017-07-20
Attending: EMERGENCY MEDICINE
Payer: COMMERCIAL

## 2017-07-20 ENCOUNTER — OFFICE VISIT (OUTPATIENT)
Dept: ONCOLOGY | Age: 57
End: 2017-07-20
Payer: COMMERCIAL

## 2017-07-20 ENCOUNTER — APPOINTMENT (OUTPATIENT)
Dept: GENERAL RADIOLOGY | Age: 57
End: 2017-07-20
Payer: COMMERCIAL

## 2017-07-20 VITALS
BODY MASS INDEX: 32.07 KG/M2 | TEMPERATURE: 97.3 F | DIASTOLIC BLOOD PRESSURE: 82 MMHG | OXYGEN SATURATION: 96 % | SYSTOLIC BLOOD PRESSURE: 156 MMHG | HEIGHT: 73 IN | WEIGHT: 242 LBS | HEART RATE: 75 BPM

## 2017-07-20 VITALS
TEMPERATURE: 98.3 F | BODY MASS INDEX: 31.99 KG/M2 | HEART RATE: 74 BPM | WEIGHT: 242.5 LBS | DIASTOLIC BLOOD PRESSURE: 76 MMHG | SYSTOLIC BLOOD PRESSURE: 138 MMHG | RESPIRATION RATE: 16 BRPM

## 2017-07-20 DIAGNOSIS — T45.1X5A NEUROPATHY DUE TO CHEMOTHERAPEUTIC DRUG (HCC): ICD-10-CM

## 2017-07-20 DIAGNOSIS — C19 COLORECTAL CANCER, STAGE IV (HCC): Primary | ICD-10-CM

## 2017-07-20 DIAGNOSIS — M25.551 PAIN IN RIGHT HIP: Primary | ICD-10-CM

## 2017-07-20 DIAGNOSIS — G62.0 NEUROPATHY DUE TO CHEMOTHERAPEUTIC DRUG (HCC): ICD-10-CM

## 2017-07-20 DIAGNOSIS — Z98.61 S/P PTCA (PERCUTANEOUS TRANSLUMINAL CORONARY ANGIOPLASTY): ICD-10-CM

## 2017-07-20 DIAGNOSIS — C78.7 HEPATIC METASTASES (HCC): ICD-10-CM

## 2017-07-20 LAB
ABSOLUTE EOS #: 0.08 K/UL (ref 0–0.4)
ABSOLUTE LYMPH #: 0.53 K/UL (ref 1–4.8)
ABSOLUTE MONO #: 0.33 K/UL (ref 0.1–0.8)
ALBUMIN SERPL-MCNC: 3.8 G/DL (ref 3.5–5.2)
ALBUMIN/GLOBULIN RATIO: 1.6 (ref 1–2.5)
ALP BLD-CCNC: 82 U/L (ref 40–129)
ALT SERPL-CCNC: 15 U/L (ref 5–41)
ANION GAP SERPL CALCULATED.3IONS-SCNC: 14 MMOL/L (ref 9–17)
AST SERPL-CCNC: 15 U/L
BASOPHILS # BLD: 1 %
BASOPHILS ABSOLUTE: 0.04 K/UL (ref 0–0.2)
BILIRUB SERPL-MCNC: 0.26 MG/DL (ref 0.3–1.2)
BUN BLDV-MCNC: 8 MG/DL (ref 6–20)
BUN/CREAT BLD: ABNORMAL (ref 9–20)
CALCIUM SERPL-MCNC: 8.8 MG/DL (ref 8.6–10.4)
CHLORIDE BLD-SCNC: 96 MMOL/L (ref 98–107)
CO2: 26 MMOL/L (ref 20–31)
CREAT SERPL-MCNC: 0.66 MG/DL (ref 0.7–1.2)
DIFFERENTIAL TYPE: ABNORMAL
EOSINOPHILS RELATIVE PERCENT: 2 %
GFR AFRICAN AMERICAN: >60 ML/MIN
GFR NON-AFRICAN AMERICAN: >60 ML/MIN
GFR SERPL CREATININE-BSD FRML MDRD: ABNORMAL ML/MIN/{1.73_M2}
GFR SERPL CREATININE-BSD FRML MDRD: ABNORMAL ML/MIN/{1.73_M2}
GLUCOSE BLD-MCNC: 243 MG/DL (ref 70–99)
HCT VFR BLD CALC: 36.8 % (ref 41–53)
HEMOGLOBIN: 12.1 G/DL (ref 13.5–17.5)
LYMPHOCYTES # BLD: 13 %
MCH RBC QN AUTO: 26.1 PG (ref 26–34)
MCHC RBC AUTO-ENTMCNC: 32.9 G/DL (ref 31–37)
MCV RBC AUTO: 79.2 FL (ref 80–100)
MONOCYTES # BLD: 8 %
MORPHOLOGY: ABNORMAL
PDW BLD-RTO: 19.4 % (ref 12.5–15.4)
PLATELET # BLD: 161 K/UL (ref 140–450)
PLATELET ESTIMATE: ABNORMAL
PMV BLD AUTO: 6.2 FL (ref 6–12)
POTASSIUM SERPL-SCNC: 3.7 MMOL/L (ref 3.7–5.3)
RBC # BLD: 4.65 M/UL (ref 4.5–5.9)
RBC # BLD: ABNORMAL 10*6/UL
SEG NEUTROPHILS: 76 %
SEGMENTED NEUTROPHILS ABSOLUTE COUNT: 3.12 K/UL (ref 1.8–7.7)
SODIUM BLD-SCNC: 136 MMOL/L (ref 135–144)
TOTAL PROTEIN: 6.2 G/DL (ref 6.4–8.3)
WBC # BLD: 4.1 K/UL (ref 3.5–11)
WBC # BLD: ABNORMAL 10*3/UL

## 2017-07-20 PROCEDURE — 85025 COMPLETE CBC W/AUTO DIFF WBC: CPT

## 2017-07-20 PROCEDURE — 77387 GUIDANCE FOR RADJ TX DLVR: CPT | Performed by: RADIOLOGY

## 2017-07-20 PROCEDURE — 96374 THER/PROPH/DIAG INJ IV PUSH: CPT

## 2017-07-20 PROCEDURE — 6360000002 HC RX W HCPCS: Performed by: NURSE PRACTITIONER

## 2017-07-20 PROCEDURE — 80053 COMPREHEN METABOLIC PANEL: CPT

## 2017-07-20 PROCEDURE — 73502 X-RAY EXAM HIP UNI 2-3 VIEWS: CPT

## 2017-07-20 PROCEDURE — 99211 OFF/OP EST MAY X REQ PHY/QHP: CPT

## 2017-07-20 PROCEDURE — 99214 OFFICE O/P EST MOD 30 MIN: CPT | Performed by: INTERNAL MEDICINE

## 2017-07-20 PROCEDURE — 99283 EMERGENCY DEPT VISIT LOW MDM: CPT

## 2017-07-20 PROCEDURE — 77412 RADIATION TX DELIVERY LVL 3: CPT | Performed by: RADIOLOGY

## 2017-07-20 PROCEDURE — 96375 TX/PRO/DX INJ NEW DRUG ADDON: CPT

## 2017-07-20 RX ORDER — 0.9 % SODIUM CHLORIDE 0.9 %
10 VIAL (ML) INJECTION ONCE
Status: CANCELLED | OUTPATIENT
Start: 2017-08-07 | End: 2017-08-07

## 2017-07-20 RX ORDER — HEPARIN SODIUM (PORCINE) LOCK FLUSH IV SOLN 100 UNIT/ML 100 UNIT/ML
500 SOLUTION INTRAVENOUS PRN
Status: CANCELLED | OUTPATIENT
Start: 2017-08-07

## 2017-07-20 RX ORDER — 0.9 % SODIUM CHLORIDE 0.9 %
10 VIAL (ML) INJECTION ONCE
Status: CANCELLED | OUTPATIENT
Start: 2017-07-31 | End: 2017-07-31

## 2017-07-20 RX ORDER — METHYLPREDNISOLONE SODIUM SUCCINATE 125 MG/2ML
125 INJECTION, POWDER, LYOPHILIZED, FOR SOLUTION INTRAMUSCULAR; INTRAVENOUS ONCE
Status: CANCELLED | OUTPATIENT
Start: 2017-07-31 | End: 2017-07-31

## 2017-07-20 RX ORDER — SODIUM CHLORIDE 0.9 % (FLUSH) 0.9 %
5 SYRINGE (ML) INJECTION PRN
Status: CANCELLED | OUTPATIENT
Start: 2017-07-31

## 2017-07-20 RX ORDER — SODIUM CHLORIDE 9 MG/ML
INJECTION, SOLUTION INTRAVENOUS ONCE
Status: CANCELLED | OUTPATIENT
Start: 2017-08-07 | End: 2017-08-07

## 2017-07-20 RX ORDER — DIPHENHYDRAMINE HYDROCHLORIDE 50 MG/ML
50 INJECTION INTRAMUSCULAR; INTRAVENOUS ONCE
Status: CANCELLED | OUTPATIENT
Start: 2017-08-07 | End: 2017-08-07

## 2017-07-20 RX ORDER — HEPARIN SODIUM (PORCINE) LOCK FLUSH IV SOLN 100 UNIT/ML 100 UNIT/ML
500 SOLUTION INTRAVENOUS PRN
Status: CANCELLED | OUTPATIENT
Start: 2017-07-31

## 2017-07-20 RX ORDER — SODIUM CHLORIDE 9 MG/ML
INJECTION, SOLUTION INTRAVENOUS CONTINUOUS
Status: CANCELLED | OUTPATIENT
Start: 2017-08-07

## 2017-07-20 RX ORDER — SODIUM CHLORIDE 0.9 % (FLUSH) 0.9 %
10 SYRINGE (ML) INJECTION PRN
Status: CANCELLED | OUTPATIENT
Start: 2017-08-07

## 2017-07-20 RX ORDER — SODIUM CHLORIDE 0.9 % (FLUSH) 0.9 %
10 SYRINGE (ML) INJECTION PRN
Status: CANCELLED | OUTPATIENT
Start: 2017-07-31

## 2017-07-20 RX ORDER — KETOROLAC TROMETHAMINE 30 MG/ML
30 INJECTION, SOLUTION INTRAMUSCULAR; INTRAVENOUS ONCE
Status: COMPLETED | OUTPATIENT
Start: 2017-07-20 | End: 2017-07-20

## 2017-07-20 RX ORDER — DIPHENHYDRAMINE HYDROCHLORIDE 50 MG/ML
50 INJECTION INTRAMUSCULAR; INTRAVENOUS ONCE
Status: CANCELLED | OUTPATIENT
Start: 2017-07-31 | End: 2017-07-31

## 2017-07-20 RX ORDER — SODIUM CHLORIDE 9 MG/ML
INJECTION, SOLUTION INTRAVENOUS CONTINUOUS
Status: CANCELLED | OUTPATIENT
Start: 2017-07-31

## 2017-07-20 RX ORDER — METHYLPREDNISOLONE SODIUM SUCCINATE 125 MG/2ML
125 INJECTION, POWDER, LYOPHILIZED, FOR SOLUTION INTRAMUSCULAR; INTRAVENOUS ONCE
Status: CANCELLED | OUTPATIENT
Start: 2017-08-07 | End: 2017-08-07

## 2017-07-20 RX ORDER — SODIUM CHLORIDE 0.9 % (FLUSH) 0.9 %
5 SYRINGE (ML) INJECTION PRN
Status: CANCELLED | OUTPATIENT
Start: 2017-08-07

## 2017-07-20 RX ORDER — SODIUM CHLORIDE 9 MG/ML
INJECTION, SOLUTION INTRAVENOUS ONCE
Status: CANCELLED | OUTPATIENT
Start: 2017-07-31 | End: 2017-07-31

## 2017-07-20 RX ADMIN — HYDROMORPHONE HYDROCHLORIDE 1 MG: 1 INJECTION, SOLUTION INTRAMUSCULAR; INTRAVENOUS; SUBCUTANEOUS at 21:30

## 2017-07-20 RX ADMIN — KETOROLAC TROMETHAMINE 30 MG: 30 INJECTION, SOLUTION INTRAMUSCULAR at 21:29

## 2017-07-20 ASSESSMENT — PAIN DESCRIPTION - DESCRIPTORS: DESCRIPTORS: STABBING

## 2017-07-20 ASSESSMENT — PAIN DESCRIPTION - LOCATION: LOCATION: LEG;HIP;KNEE

## 2017-07-20 ASSESSMENT — PAIN DESCRIPTION - PAIN TYPE: TYPE: ACUTE PAIN

## 2017-07-20 ASSESSMENT — PAIN SCALES - GENERAL
PAINLEVEL_OUTOF10: 6
PAINLEVEL_OUTOF10: 8

## 2017-07-20 ASSESSMENT — PAIN DESCRIPTION - FREQUENCY: FREQUENCY: CONTINUOUS

## 2017-07-20 ASSESSMENT — PAIN DESCRIPTION - ORIENTATION: ORIENTATION: RIGHT

## 2017-07-21 ENCOUNTER — HOSPITAL ENCOUNTER (OUTPATIENT)
Dept: INFUSION THERAPY | Age: 57
Discharge: HOME | End: 2017-07-21
Payer: COMMERCIAL

## 2017-07-21 DIAGNOSIS — C19 COLORECTAL CANCER, STAGE IV (HCC): ICD-10-CM

## 2017-07-21 PROCEDURE — 77412 RADIATION TX DELIVERY LVL 3: CPT | Performed by: RADIOLOGY

## 2017-07-21 PROCEDURE — 77336 RADIATION PHYSICS CONSULT: CPT | Performed by: RADIOLOGY

## 2017-07-21 PROCEDURE — 77387 GUIDANCE FOR RADJ TX DLVR: CPT | Performed by: RADIOLOGY

## 2017-07-21 PROCEDURE — 2580000003 HC RX 258: Performed by: INTERNAL MEDICINE

## 2017-07-21 PROCEDURE — 6360000002 HC RX W HCPCS: Performed by: INTERNAL MEDICINE

## 2017-07-21 PROCEDURE — 96523 IRRIG DRUG DELIVERY DEVICE: CPT

## 2017-07-21 RX ORDER — SODIUM CHLORIDE 0.9 % (FLUSH) 0.9 %
20 SYRINGE (ML) INJECTION PRN
Status: CANCELLED | OUTPATIENT
Start: 2017-07-21

## 2017-07-21 RX ORDER — SODIUM CHLORIDE 0.9 % (FLUSH) 0.9 %
10 SYRINGE (ML) INJECTION PRN
Status: DISCONTINUED | OUTPATIENT
Start: 2017-07-21 | End: 2017-07-22 | Stop reason: HOSPADM

## 2017-07-21 RX ORDER — HEPARIN SODIUM (PORCINE) LOCK FLUSH IV SOLN 100 UNIT/ML 100 UNIT/ML
500 SOLUTION INTRAVENOUS PRN
Status: DISCONTINUED | OUTPATIENT
Start: 2017-07-21 | End: 2017-07-22 | Stop reason: HOSPADM

## 2017-07-21 RX ORDER — SODIUM CHLORIDE 0.9 % (FLUSH) 0.9 %
10 SYRINGE (ML) INJECTION PRN
Status: CANCELLED | OUTPATIENT
Start: 2017-07-21

## 2017-07-21 RX ORDER — HEPARIN SODIUM (PORCINE) LOCK FLUSH IV SOLN 100 UNIT/ML 100 UNIT/ML
500 SOLUTION INTRAVENOUS PRN
Status: CANCELLED | OUTPATIENT
Start: 2017-07-21

## 2017-07-21 RX ADMIN — SODIUM CHLORIDE, PRESERVATIVE FREE 500 UNITS: 5 INJECTION INTRAVENOUS at 10:43

## 2017-07-21 RX ADMIN — Medication 10 ML: at 10:43

## 2017-07-21 ASSESSMENT — ENCOUNTER SYMPTOMS
SHORTNESS OF BREATH: 0
ABDOMINAL PAIN: 0

## 2017-07-21 NOTE — PROGRESS NOTES
Pt is here for CADD pump d/c. Denies any problems at this time. Pt was discharged in stable condition and will return on 7/24 for chemotherapy.

## 2017-07-23 ASSESSMENT — PAIN DESCRIPTION - PAIN TYPE: TYPE: ACUTE PAIN

## 2017-07-23 ASSESSMENT — PAIN SCALES - GENERAL: PAINLEVEL_OUTOF10: 8

## 2017-07-23 ASSESSMENT — PAIN DESCRIPTION - DESCRIPTORS: DESCRIPTORS: THROBBING;STABBING;CONSTANT

## 2017-07-23 ASSESSMENT — PAIN DESCRIPTION - LOCATION: LOCATION: ABDOMEN;FLANK

## 2017-07-23 ASSESSMENT — PAIN DESCRIPTION - FREQUENCY: FREQUENCY: CONTINUOUS

## 2017-07-23 ASSESSMENT — PAIN DESCRIPTION - ORIENTATION: ORIENTATION: RIGHT

## 2017-07-24 ENCOUNTER — OFFICE VISIT (OUTPATIENT)
Dept: ONCOLOGY | Age: 57
End: 2017-07-24
Payer: COMMERCIAL

## 2017-07-24 ENCOUNTER — HOSPITAL ENCOUNTER (EMERGENCY)
Age: 57
Discharge: HOME | End: 2017-07-24
Attending: EMERGENCY MEDICINE
Payer: COMMERCIAL

## 2017-07-24 ENCOUNTER — HOSPITAL ENCOUNTER (OUTPATIENT)
Dept: INFUSION THERAPY | Age: 57
Discharge: HOME | End: 2017-07-24
Payer: COMMERCIAL

## 2017-07-24 ENCOUNTER — HOSPITAL ENCOUNTER (EMERGENCY)
Age: 57
Discharge: HOME | End: 2017-07-25
Attending: EMERGENCY MEDICINE
Payer: COMMERCIAL

## 2017-07-24 ENCOUNTER — HOSPITAL ENCOUNTER (OUTPATIENT)
Dept: RADIATION ONCOLOGY | Age: 57
Discharge: HOME | End: 2017-07-24
Payer: COMMERCIAL

## 2017-07-24 VITALS
SYSTOLIC BLOOD PRESSURE: 137 MMHG | HEART RATE: 79 BPM | HEIGHT: 73 IN | OXYGEN SATURATION: 96 % | BODY MASS INDEX: 31.81 KG/M2 | DIASTOLIC BLOOD PRESSURE: 78 MMHG | WEIGHT: 240 LBS | RESPIRATION RATE: 20 BRPM | TEMPERATURE: 100.1 F

## 2017-07-24 VITALS
BODY MASS INDEX: 32.2 KG/M2 | SYSTOLIC BLOOD PRESSURE: 133 MMHG | HEIGHT: 73 IN | OXYGEN SATURATION: 97 % | TEMPERATURE: 97.7 F | WEIGHT: 243 LBS | HEART RATE: 105 BPM | DIASTOLIC BLOOD PRESSURE: 98 MMHG | RESPIRATION RATE: 18 BRPM

## 2017-07-24 VITALS
RESPIRATION RATE: 16 BRPM | HEART RATE: 73 BPM | BODY MASS INDEX: 32.06 KG/M2 | SYSTOLIC BLOOD PRESSURE: 126 MMHG | WEIGHT: 243 LBS | TEMPERATURE: 98.1 F | DIASTOLIC BLOOD PRESSURE: 92 MMHG

## 2017-07-24 VITALS
RESPIRATION RATE: 16 BRPM | WEIGHT: 243 LBS | DIASTOLIC BLOOD PRESSURE: 92 MMHG | TEMPERATURE: 98.1 F | SYSTOLIC BLOOD PRESSURE: 126 MMHG | HEART RATE: 73 BPM | BODY MASS INDEX: 32.06 KG/M2

## 2017-07-24 DIAGNOSIS — C19 COLORECTAL CANCER, STAGE IV (HCC): ICD-10-CM

## 2017-07-24 DIAGNOSIS — M25.551 RIGHT HIP PAIN: ICD-10-CM

## 2017-07-24 DIAGNOSIS — M54.50 CHRONIC BILATERAL LOW BACK PAIN WITHOUT SCIATICA: Primary | ICD-10-CM

## 2017-07-24 DIAGNOSIS — C19 COLORECTAL CANCER, STAGE IV (HCC): Primary | ICD-10-CM

## 2017-07-24 DIAGNOSIS — C78.7 HEPATIC METASTASES (HCC): ICD-10-CM

## 2017-07-24 DIAGNOSIS — G89.29 CHRONIC BILATERAL LOW BACK PAIN WITHOUT SCIATICA: Primary | ICD-10-CM

## 2017-07-24 DIAGNOSIS — I21.A1 NON-ST ELEVATION MYOCARDIAL INFARCTION (NSTEMI), TYPE 2: ICD-10-CM

## 2017-07-24 DIAGNOSIS — G89.3 CHRONIC PAIN DUE TO NEOPLASM: Primary | ICD-10-CM

## 2017-07-24 LAB
ABSOLUTE EOS #: 0 K/UL (ref 0–0.4)
ABSOLUTE LYMPH #: 0.8 K/UL (ref 1–4.8)
ABSOLUTE MONO #: 0.8 K/UL (ref 0.1–1.2)
ALBUMIN SERPL-MCNC: 3.6 G/DL (ref 3.5–5.2)
ALBUMIN/GLOBULIN RATIO: 1.3 (ref 1–2.5)
ALP BLD-CCNC: 106 U/L (ref 40–129)
ALT SERPL-CCNC: 14 U/L (ref 5–41)
ANION GAP SERPL CALCULATED.3IONS-SCNC: 15 MMOL/L (ref 9–17)
AST SERPL-CCNC: 15 U/L
BASOPHILS # BLD: 0 %
BASOPHILS ABSOLUTE: 0 K/UL (ref 0–0.2)
BILIRUB SERPL-MCNC: 0.29 MG/DL (ref 0.3–1.2)
BUN BLDV-MCNC: 9 MG/DL (ref 6–20)
BUN/CREAT BLD: ABNORMAL (ref 9–20)
CALCIUM SERPL-MCNC: 8.6 MG/DL (ref 8.6–10.4)
CHLORIDE BLD-SCNC: 100 MMOL/L (ref 98–107)
CO2: 23 MMOL/L (ref 20–31)
CREAT SERPL-MCNC: 0.64 MG/DL (ref 0.7–1.2)
DIFFERENTIAL TYPE: ABNORMAL
EOSINOPHILS RELATIVE PERCENT: 1 %
GFR AFRICAN AMERICAN: >60 ML/MIN
GFR NON-AFRICAN AMERICAN: >60 ML/MIN
GFR SERPL CREATININE-BSD FRML MDRD: ABNORMAL ML/MIN/{1.73_M2}
GFR SERPL CREATININE-BSD FRML MDRD: ABNORMAL ML/MIN/{1.73_M2}
GLUCOSE BLD-MCNC: 247 MG/DL (ref 70–99)
HCT VFR BLD CALC: 36.2 % (ref 41–53)
HEMOGLOBIN: 11.9 G/DL (ref 13.5–17.5)
LYMPHOCYTES # BLD: 14 %
MCH RBC QN AUTO: 26.2 PG (ref 26–34)
MCHC RBC AUTO-ENTMCNC: 32.8 G/DL (ref 31–37)
MCV RBC AUTO: 79.8 FL (ref 80–100)
MONOCYTES # BLD: 13 %
PDW BLD-RTO: 18.5 % (ref 12.5–15.4)
PLATELET # BLD: 124 K/UL (ref 140–450)
PLATELET ESTIMATE: ABNORMAL
PMV BLD AUTO: 6.6 FL (ref 6–12)
POTASSIUM SERPL-SCNC: 3.9 MMOL/L (ref 3.7–5.3)
RBC # BLD: 4.54 M/UL (ref 4.5–5.9)
RBC # BLD: ABNORMAL 10*6/UL
SEG NEUTROPHILS: 72 %
SEGMENTED NEUTROPHILS ABSOLUTE COUNT: 4.1 K/UL (ref 1.8–7.7)
SODIUM BLD-SCNC: 138 MMOL/L (ref 135–144)
TOTAL PROTEIN: 6.4 G/DL (ref 6.4–8.3)
WBC # BLD: 5.8 K/UL (ref 3.5–11)
WBC # BLD: ABNORMAL 10*3/UL

## 2017-07-24 PROCEDURE — 85025 COMPLETE CBC W/AUTO DIFF WBC: CPT

## 2017-07-24 PROCEDURE — 96416 CHEMO PROLONG INFUSE W/PUMP: CPT

## 2017-07-24 PROCEDURE — 77387 GUIDANCE FOR RADJ TX DLVR: CPT | Performed by: RADIOLOGY

## 2017-07-24 PROCEDURE — 36591 DRAW BLOOD OFF VENOUS DEVICE: CPT

## 2017-07-24 PROCEDURE — 96372 THER/PROPH/DIAG INJ SC/IM: CPT

## 2017-07-24 PROCEDURE — 6360000002 HC RX W HCPCS: Performed by: STUDENT IN AN ORGANIZED HEALTH CARE EDUCATION/TRAINING PROGRAM

## 2017-07-24 PROCEDURE — 2580000003 HC RX 258: Performed by: INTERNAL MEDICINE

## 2017-07-24 PROCEDURE — 80053 COMPREHEN METABOLIC PANEL: CPT

## 2017-07-24 PROCEDURE — 99214 OFFICE O/P EST MOD 30 MIN: CPT | Performed by: INTERNAL MEDICINE

## 2017-07-24 PROCEDURE — 96374 THER/PROPH/DIAG INJ IV PUSH: CPT

## 2017-07-24 PROCEDURE — 6360000002 HC RX W HCPCS: Performed by: INTERNAL MEDICINE

## 2017-07-24 PROCEDURE — 99283 EMERGENCY DEPT VISIT LOW MDM: CPT

## 2017-07-24 PROCEDURE — 6360000002 HC RX W HCPCS: Performed by: EMERGENCY MEDICINE

## 2017-07-24 PROCEDURE — 77412 RADIATION TX DELIVERY LVL 3: CPT | Performed by: RADIOLOGY

## 2017-07-24 PROCEDURE — 96375 TX/PRO/DX INJ NEW DRUG ADDON: CPT

## 2017-07-24 RX ORDER — HEPARIN SODIUM (PORCINE) LOCK FLUSH IV SOLN 100 UNIT/ML 100 UNIT/ML
500 SOLUTION INTRAVENOUS PRN
Status: DISCONTINUED | OUTPATIENT
Start: 2017-07-24 | End: 2017-07-25 | Stop reason: HOSPADM

## 2017-07-24 RX ORDER — SODIUM CHLORIDE 9 MG/ML
INJECTION, SOLUTION INTRAVENOUS ONCE
Status: COMPLETED | OUTPATIENT
Start: 2017-07-24 | End: 2017-07-24

## 2017-07-24 RX ORDER — SODIUM CHLORIDE 0.9 % (FLUSH) 0.9 %
10 SYRINGE (ML) INJECTION PRN
Status: DISCONTINUED | OUTPATIENT
Start: 2017-07-24 | End: 2017-07-25 | Stop reason: HOSPADM

## 2017-07-24 RX ADMIN — Medication 10 ML: at 09:49

## 2017-07-24 RX ADMIN — HYDROMORPHONE HYDROCHLORIDE 1 MG: 1 INJECTION, SOLUTION INTRAMUSCULAR; INTRAVENOUS; SUBCUTANEOUS at 02:22

## 2017-07-24 RX ADMIN — SODIUM CHLORIDE: 9 INJECTION, SOLUTION INTRAVENOUS at 13:10

## 2017-07-24 RX ADMIN — HYDROMORPHONE HYDROCHLORIDE 1 MG: 1 INJECTION, SOLUTION INTRAMUSCULAR; INTRAVENOUS; SUBCUTANEOUS at 23:43

## 2017-07-24 RX ADMIN — FLUOROURACIL 2100 MG: 50 INJECTION, SOLUTION INTRAVENOUS at 13:27

## 2017-07-24 RX ADMIN — DEXAMETHASONE SODIUM PHOSPHATE 12 MG: 4 INJECTION, SOLUTION INTRAMUSCULAR; INTRAVENOUS at 13:10

## 2017-07-24 ASSESSMENT — PAIN SCALES - GENERAL
PAINLEVEL_OUTOF10: 8
PAINLEVEL_OUTOF10: 9

## 2017-07-24 ASSESSMENT — PAIN DESCRIPTION - LOCATION
LOCATION: HIP
LOCATION: BACK

## 2017-07-24 ASSESSMENT — ENCOUNTER SYMPTOMS
SORE THROAT: 0
WHEEZING: 0
SHORTNESS OF BREATH: 0
NAUSEA: 0
CHEST TIGHTNESS: 0
VOMITING: 0
COUGH: 0
TROUBLE SWALLOWING: 0
ABDOMINAL PAIN: 0
BACK PAIN: 1

## 2017-07-24 ASSESSMENT — PAIN DESCRIPTION - ONSET: ONSET: PROGRESSIVE

## 2017-07-24 ASSESSMENT — PAIN DESCRIPTION - PAIN TYPE: TYPE: ACUTE PAIN

## 2017-07-24 ASSESSMENT — PAIN DESCRIPTION - ORIENTATION: ORIENTATION: RIGHT

## 2017-07-24 ASSESSMENT — PAIN DESCRIPTION - DESCRIPTORS: DESCRIPTORS: STABBING;CONSTANT

## 2017-07-25 ENCOUNTER — TELEPHONE (OUTPATIENT)
Dept: INFUSION THERAPY | Age: 57
End: 2017-07-25

## 2017-07-25 PROCEDURE — 77412 RADIATION TX DELIVERY LVL 3: CPT | Performed by: RADIOLOGY

## 2017-07-25 PROCEDURE — 77387 GUIDANCE FOR RADJ TX DLVR: CPT | Performed by: RADIOLOGY

## 2017-07-25 RX ORDER — OXYCODONE HYDROCHLORIDE 30 MG/1
30 TABLET, FILM COATED, EXTENDED RELEASE ORAL 2 TIMES DAILY
COMMUNITY
Start: 2017-07-25 | End: 2017-07-25

## 2017-07-25 RX ORDER — FENTANYL 25 UG/H
1 PATCH TRANSDERMAL
Qty: 10 PATCH | Refills: 0 | Status: SHIPPED | OUTPATIENT
Start: 2017-07-25 | End: 2017-08-22 | Stop reason: SDUPTHER

## 2017-07-25 ASSESSMENT — ENCOUNTER SYMPTOMS
CONSTIPATION: 0
SHORTNESS OF BREATH: 0
ABDOMINAL PAIN: 0
BACK PAIN: 1
NAUSEA: 0
DIARRHEA: 0

## 2017-07-25 ASSESSMENT — PAIN DESCRIPTION - LOCATION: LOCATION: BACK

## 2017-07-25 ASSESSMENT — PAIN DESCRIPTION - PAIN TYPE: TYPE: ACUTE PAIN

## 2017-07-25 ASSESSMENT — PAIN SCALES - GENERAL: PAINLEVEL_OUTOF10: 5

## 2017-07-26 PROCEDURE — 77387 GUIDANCE FOR RADJ TX DLVR: CPT | Performed by: RADIOLOGY

## 2017-07-26 PROCEDURE — 77412 RADIATION TX DELIVERY LVL 3: CPT | Performed by: RADIOLOGY

## 2017-07-27 PROCEDURE — 77412 RADIATION TX DELIVERY LVL 3: CPT | Performed by: RADIOLOGY

## 2017-07-27 PROCEDURE — 77387 GUIDANCE FOR RADJ TX DLVR: CPT | Performed by: RADIOLOGY

## 2017-07-28 ENCOUNTER — HOSPITAL ENCOUNTER (OUTPATIENT)
Dept: INFUSION THERAPY | Age: 57
Discharge: HOME | End: 2017-07-28
Payer: COMMERCIAL

## 2017-07-28 ENCOUNTER — TELEPHONE (OUTPATIENT)
Dept: INFUSION THERAPY | Age: 57
End: 2017-07-28

## 2017-07-28 DIAGNOSIS — C19 COLORECTAL CANCER, STAGE IV (HCC): ICD-10-CM

## 2017-07-28 PROCEDURE — 2580000003 HC RX 258: Performed by: INTERNAL MEDICINE

## 2017-07-28 PROCEDURE — 77336 RADIATION PHYSICS CONSULT: CPT | Performed by: RADIOLOGY

## 2017-07-28 PROCEDURE — 96523 IRRIG DRUG DELIVERY DEVICE: CPT

## 2017-07-28 PROCEDURE — 6360000002 HC RX W HCPCS: Performed by: INTERNAL MEDICINE

## 2017-07-28 PROCEDURE — 77387 GUIDANCE FOR RADJ TX DLVR: CPT | Performed by: RADIOLOGY

## 2017-07-28 PROCEDURE — 77412 RADIATION TX DELIVERY LVL 3: CPT | Performed by: RADIOLOGY

## 2017-07-28 RX ORDER — SODIUM CHLORIDE 0.9 % (FLUSH) 0.9 %
10 SYRINGE (ML) INJECTION PRN
Status: DISCONTINUED | OUTPATIENT
Start: 2017-07-28 | End: 2017-07-29 | Stop reason: HOSPADM

## 2017-07-28 RX ORDER — SODIUM CHLORIDE 0.9 % (FLUSH) 0.9 %
10 SYRINGE (ML) INJECTION PRN
Status: CANCELLED | OUTPATIENT
Start: 2017-07-28

## 2017-07-28 RX ORDER — HEPARIN SODIUM (PORCINE) LOCK FLUSH IV SOLN 100 UNIT/ML 100 UNIT/ML
500 SOLUTION INTRAVENOUS PRN
Status: DISCONTINUED | OUTPATIENT
Start: 2017-07-28 | End: 2017-07-29 | Stop reason: HOSPADM

## 2017-07-28 RX ORDER — HEPARIN SODIUM (PORCINE) LOCK FLUSH IV SOLN 100 UNIT/ML 100 UNIT/ML
500 SOLUTION INTRAVENOUS PRN
Status: CANCELLED | OUTPATIENT
Start: 2017-07-28

## 2017-07-28 RX ORDER — SODIUM CHLORIDE 0.9 % (FLUSH) 0.9 %
20 SYRINGE (ML) INJECTION PRN
Status: CANCELLED | OUTPATIENT
Start: 2017-07-28

## 2017-07-28 RX ADMIN — Medication 10 ML: at 11:16

## 2017-07-28 RX ADMIN — SODIUM CHLORIDE, PRESERVATIVE FREE 500 UNITS: 5 INJECTION INTRAVENOUS at 11:16

## 2017-07-28 NOTE — PROGRESS NOTES
Pt is here for CADD pump d/c. Pt stated his pump disconnected while he was sleeping in [facility]. Pt said he reconnected his pump. Advised him not to reconnect but to let us know in the future. Writer noted blood in tubing from port access to CADD tubing connector. Disconnected pump. Pt was discharged in stable condition and will return on Monday for chemotherapy.

## 2017-07-31 ENCOUNTER — HOSPITAL ENCOUNTER (OUTPATIENT)
Dept: INFUSION THERAPY | Age: 57
Discharge: HOME | End: 2017-07-31
Payer: COMMERCIAL

## 2017-07-31 ENCOUNTER — HOSPITAL ENCOUNTER (OUTPATIENT)
Dept: RADIATION ONCOLOGY | Age: 57
Discharge: HOME | End: 2017-07-31
Payer: COMMERCIAL

## 2017-07-31 VITALS
SYSTOLIC BLOOD PRESSURE: 119 MMHG | HEART RATE: 64 BPM | TEMPERATURE: 98.1 F | DIASTOLIC BLOOD PRESSURE: 75 MMHG | WEIGHT: 238 LBS | BODY MASS INDEX: 31.4 KG/M2 | RESPIRATION RATE: 16 BRPM

## 2017-07-31 DIAGNOSIS — C78.7 HEPATIC METASTASES (HCC): ICD-10-CM

## 2017-07-31 DIAGNOSIS — C19 COLORECTAL CANCER, STAGE IV (HCC): ICD-10-CM

## 2017-07-31 DIAGNOSIS — I21.A1 NON-ST ELEVATION MYOCARDIAL INFARCTION (NSTEMI), TYPE 2: ICD-10-CM

## 2017-07-31 LAB
ABSOLUTE EOS #: 0 K/UL (ref 0–0.4)
ABSOLUTE LYMPH #: 0.42 K/UL (ref 1–4.8)
ABSOLUTE MONO #: 0.64 K/UL (ref 0.1–0.8)
ALBUMIN SERPL-MCNC: 3.8 G/DL (ref 3.5–5.2)
ALBUMIN/GLOBULIN RATIO: 1.4 (ref 1–2.5)
ALP BLD-CCNC: 95 U/L (ref 40–129)
ALT SERPL-CCNC: 16 U/L (ref 5–41)
ANION GAP SERPL CALCULATED.3IONS-SCNC: 11 MMOL/L (ref 9–17)
AST SERPL-CCNC: 16 U/L
BASOPHILS # BLD: 0 %
BASOPHILS ABSOLUTE: 0 K/UL (ref 0–0.2)
BILIRUB SERPL-MCNC: 0.35 MG/DL (ref 0.3–1.2)
BUN BLDV-MCNC: 10 MG/DL (ref 6–20)
BUN/CREAT BLD: ABNORMAL (ref 9–20)
CALCIUM SERPL-MCNC: 9 MG/DL (ref 8.6–10.4)
CHLORIDE BLD-SCNC: 103 MMOL/L (ref 98–107)
CO2: 23 MMOL/L (ref 20–31)
CREAT SERPL-MCNC: 0.67 MG/DL (ref 0.7–1.2)
DIFFERENTIAL TYPE: ABNORMAL
EOSINOPHILS RELATIVE PERCENT: 0 %
GFR AFRICAN AMERICAN: >60 ML/MIN
GFR NON-AFRICAN AMERICAN: >60 ML/MIN
GFR SERPL CREATININE-BSD FRML MDRD: ABNORMAL ML/MIN/{1.73_M2}
GFR SERPL CREATININE-BSD FRML MDRD: ABNORMAL ML/MIN/{1.73_M2}
GLUCOSE BLD-MCNC: 266 MG/DL (ref 70–99)
HCT VFR BLD CALC: 38.1 % (ref 41–53)
HEMOGLOBIN: 12.6 G/DL (ref 13.5–17.5)
LYMPHOCYTES # BLD: 8 %
MCH RBC QN AUTO: 26.8 PG (ref 26–34)
MCHC RBC AUTO-ENTMCNC: 33 G/DL (ref 31–37)
MCV RBC AUTO: 81.3 FL (ref 80–100)
MONOCYTES # BLD: 12 %
MORPHOLOGY: ABNORMAL
PDW BLD-RTO: 19 % (ref 12.5–15.4)
PLATELET # BLD: 148 K/UL (ref 140–450)
PLATELET ESTIMATE: ABNORMAL
PMV BLD AUTO: 6.4 FL (ref 6–12)
POTASSIUM SERPL-SCNC: 4.2 MMOL/L (ref 3.7–5.3)
RBC # BLD: 4.69 M/UL (ref 4.5–5.9)
RBC # BLD: ABNORMAL 10*6/UL
SEG NEUTROPHILS: 80 %
SEGMENTED NEUTROPHILS ABSOLUTE COUNT: 4.24 K/UL (ref 1.8–7.7)
SODIUM BLD-SCNC: 137 MMOL/L (ref 135–144)
TOTAL PROTEIN: 6.5 G/DL (ref 6.4–8.3)
WBC # BLD: 5.3 K/UL (ref 3.5–11)
WBC # BLD: ABNORMAL 10*3/UL

## 2017-07-31 PROCEDURE — 2580000003 HC RX 258: Performed by: INTERNAL MEDICINE

## 2017-07-31 PROCEDURE — 77412 RADIATION TX DELIVERY LVL 3: CPT | Performed by: RADIOLOGY

## 2017-07-31 PROCEDURE — 80053 COMPREHEN METABOLIC PANEL: CPT

## 2017-07-31 PROCEDURE — 36591 DRAW BLOOD OFF VENOUS DEVICE: CPT

## 2017-07-31 PROCEDURE — 77387 GUIDANCE FOR RADJ TX DLVR: CPT | Performed by: RADIOLOGY

## 2017-07-31 PROCEDURE — 6360000002 HC RX W HCPCS: Performed by: INTERNAL MEDICINE

## 2017-07-31 PROCEDURE — 96375 TX/PRO/DX INJ NEW DRUG ADDON: CPT

## 2017-07-31 PROCEDURE — 96416 CHEMO PROLONG INFUSE W/PUMP: CPT

## 2017-07-31 PROCEDURE — 85025 COMPLETE CBC W/AUTO DIFF WBC: CPT

## 2017-07-31 RX ORDER — SODIUM CHLORIDE 9 MG/ML
INJECTION, SOLUTION INTRAVENOUS ONCE
Status: DISCONTINUED | OUTPATIENT
Start: 2017-07-31 | End: 2017-08-01 | Stop reason: HOSPADM

## 2017-07-31 RX ORDER — HEPARIN SODIUM (PORCINE) LOCK FLUSH IV SOLN 100 UNIT/ML 100 UNIT/ML
500 SOLUTION INTRAVENOUS PRN
Status: DISCONTINUED | OUTPATIENT
Start: 2017-07-31 | End: 2017-08-01 | Stop reason: HOSPADM

## 2017-07-31 RX ORDER — SODIUM CHLORIDE 0.9 % (FLUSH) 0.9 %
10 SYRINGE (ML) INJECTION PRN
Status: DISCONTINUED | OUTPATIENT
Start: 2017-07-31 | End: 2017-08-01 | Stop reason: HOSPADM

## 2017-07-31 RX ADMIN — Medication 20 ML: at 11:20

## 2017-07-31 RX ADMIN — SODIUM CHLORIDE: 9 INJECTION, SOLUTION INTRAVENOUS at 12:15

## 2017-07-31 RX ADMIN — FLUOROURACIL 2050 MG: 50 INJECTION, SOLUTION INTRAVENOUS at 12:42

## 2017-07-31 RX ADMIN — SODIUM CHLORIDE 12 MG: 9 INJECTION, SOLUTION INTRAVENOUS at 12:19

## 2017-07-31 NOTE — FLOWSHEET NOTE
11:10 Patient presents to infusion room for C4D1 5-FU pump with concurrent radiation therapy. Patient denies any issues or changes since last treatment. 13:15 Patient discharged in stable condition; no complications during treatment today. Patient will return Friday, 8/4/17 for discontinuation of 5-FU pump.

## 2017-08-01 ENCOUNTER — HOSPITAL ENCOUNTER (OUTPATIENT)
Age: 57
Discharge: HOME | End: 2017-08-01
Payer: COMMERCIAL

## 2017-08-01 DIAGNOSIS — C20 MALIGNANT NEOPLASM OF RECTUM (HCC): Primary | ICD-10-CM

## 2017-08-01 DIAGNOSIS — R31.0 HEMATURIA, GROSS: ICD-10-CM

## 2017-08-01 LAB
-: ABNORMAL
AMORPHOUS: ABNORMAL
BACTERIA: ABNORMAL
BILIRUBIN URINE: NEGATIVE
CASTS UA: ABNORMAL /LPF
COLOR: YELLOW
COMMENT UA: ABNORMAL
CRYSTALS, UA: ABNORMAL /HPF
EPITHELIAL CELLS UA: ABNORMAL /HPF (ref 0–5)
GLUCOSE URINE: ABNORMAL
KETONES, URINE: NEGATIVE
LEUKOCYTE ESTERASE, URINE: NEGATIVE
MUCUS: ABNORMAL
NITRITE, URINE: NEGATIVE
OTHER OBSERVATIONS UA: ABNORMAL
PH UA: 5 (ref 5–8)
PROTEIN UA: NEGATIVE
RBC UA: ABNORMAL /HPF (ref 0–2)
RENAL EPITHELIAL, UA: ABNORMAL /HPF
SPECIFIC GRAVITY UA: 1.01 (ref 1–1.03)
TRICHOMONAS: ABNORMAL
TURBIDITY: ABNORMAL
URINE HGB: ABNORMAL
UROBILINOGEN, URINE: NORMAL
WBC UA: ABNORMAL /HPF (ref 0–5)
YEAST: ABNORMAL

## 2017-08-01 PROCEDURE — 77387 GUIDANCE FOR RADJ TX DLVR: CPT | Performed by: RADIOLOGY

## 2017-08-01 PROCEDURE — 81001 URINALYSIS AUTO W/SCOPE: CPT

## 2017-08-01 PROCEDURE — 77412 RADIATION TX DELIVERY LVL 3: CPT | Performed by: RADIOLOGY

## 2017-08-01 RX ORDER — METHYLPREDNISOLONE SODIUM SUCCINATE 125 MG/2ML
125 INJECTION, POWDER, LYOPHILIZED, FOR SOLUTION INTRAMUSCULAR; INTRAVENOUS ONCE
Status: CANCELLED | OUTPATIENT
Start: 2017-08-14 | End: 2017-08-14

## 2017-08-01 RX ORDER — SODIUM CHLORIDE 9 MG/ML
INJECTION, SOLUTION INTRAVENOUS CONTINUOUS
Status: CANCELLED | OUTPATIENT
Start: 2017-08-14

## 2017-08-01 RX ORDER — SODIUM CHLORIDE 9 MG/ML
INJECTION, SOLUTION INTRAVENOUS ONCE
Status: CANCELLED | OUTPATIENT
Start: 2017-08-14 | End: 2017-08-14

## 2017-08-01 RX ORDER — DIPHENHYDRAMINE HYDROCHLORIDE 50 MG/ML
50 INJECTION INTRAMUSCULAR; INTRAVENOUS ONCE
Status: CANCELLED | OUTPATIENT
Start: 2017-08-14 | End: 2017-08-14

## 2017-08-01 RX ORDER — HEPARIN SODIUM (PORCINE) LOCK FLUSH IV SOLN 100 UNIT/ML 100 UNIT/ML
500 SOLUTION INTRAVENOUS PRN
Status: CANCELLED | OUTPATIENT
Start: 2017-08-14

## 2017-08-01 RX ORDER — SODIUM CHLORIDE 0.9 % (FLUSH) 0.9 %
10 SYRINGE (ML) INJECTION PRN
Status: CANCELLED | OUTPATIENT
Start: 2017-08-14

## 2017-08-01 RX ORDER — SODIUM CHLORIDE 0.9 % (FLUSH) 0.9 %
5 SYRINGE (ML) INJECTION PRN
Status: CANCELLED | OUTPATIENT
Start: 2017-08-14

## 2017-08-01 RX ORDER — 0.9 % SODIUM CHLORIDE 0.9 %
10 VIAL (ML) INJECTION ONCE
Status: CANCELLED | OUTPATIENT
Start: 2017-08-14 | End: 2017-08-14

## 2017-08-02 PROCEDURE — 77387 GUIDANCE FOR RADJ TX DLVR: CPT | Performed by: RADIOLOGY

## 2017-08-02 PROCEDURE — 77412 RADIATION TX DELIVERY LVL 3: CPT | Performed by: RADIOLOGY

## 2017-08-03 ENCOUNTER — OFFICE VISIT (OUTPATIENT)
Dept: ONCOLOGY | Age: 57
End: 2017-08-03
Payer: COMMERCIAL

## 2017-08-03 DIAGNOSIS — C78.7 HEPATIC METASTASES (HCC): Primary | ICD-10-CM

## 2017-08-03 DIAGNOSIS — N30.40 RADIATION CYSTITIS: ICD-10-CM

## 2017-08-03 DIAGNOSIS — C19 COLORECTAL CANCER, STAGE IV (HCC): ICD-10-CM

## 2017-08-03 PROCEDURE — 99214 OFFICE O/P EST MOD 30 MIN: CPT | Performed by: INTERNAL MEDICINE

## 2017-08-03 PROCEDURE — 99211 OFF/OP EST MAY X REQ PHY/QHP: CPT

## 2017-08-03 PROCEDURE — 77387 GUIDANCE FOR RADJ TX DLVR: CPT | Performed by: RADIOLOGY

## 2017-08-03 PROCEDURE — 77412 RADIATION TX DELIVERY LVL 3: CPT | Performed by: RADIOLOGY

## 2017-08-03 RX ORDER — OXYCODONE AND ACETAMINOPHEN 10; 325 MG/1; MG/1
1 TABLET ORAL EVERY 6 HOURS PRN
Qty: 120 TABLET | Refills: 0 | Status: SHIPPED | OUTPATIENT
Start: 2017-08-03 | End: 2017-08-31 | Stop reason: SDUPTHER

## 2017-08-03 RX ORDER — GABAPENTIN 300 MG/1
300 CAPSULE ORAL 3 TIMES DAILY
Qty: 90 CAPSULE | Refills: 3 | Status: SHIPPED | OUTPATIENT
Start: 2017-08-03 | End: 2018-02-13 | Stop reason: ALTCHOICE

## 2017-08-04 ENCOUNTER — HOSPITAL ENCOUNTER (OUTPATIENT)
Dept: INFUSION THERAPY | Age: 57
Discharge: HOME | End: 2017-08-04
Payer: COMMERCIAL

## 2017-08-04 DIAGNOSIS — C19 COLORECTAL CANCER, STAGE IV (HCC): ICD-10-CM

## 2017-08-04 DIAGNOSIS — D50.0 BLOOD LOSS ANEMIA: Primary | ICD-10-CM

## 2017-08-04 PROCEDURE — 6360000002 HC RX W HCPCS: Performed by: INTERNAL MEDICINE

## 2017-08-04 PROCEDURE — 77412 RADIATION TX DELIVERY LVL 3: CPT | Performed by: RADIOLOGY

## 2017-08-04 PROCEDURE — 77336 RADIATION PHYSICS CONSULT: CPT | Performed by: RADIOLOGY

## 2017-08-04 PROCEDURE — 2580000003 HC RX 258: Performed by: INTERNAL MEDICINE

## 2017-08-04 PROCEDURE — 96523 IRRIG DRUG DELIVERY DEVICE: CPT

## 2017-08-04 PROCEDURE — 77387 GUIDANCE FOR RADJ TX DLVR: CPT | Performed by: RADIOLOGY

## 2017-08-04 RX ORDER — SODIUM CHLORIDE 0.9 % (FLUSH) 0.9 %
20 SYRINGE (ML) INJECTION PRN
Status: CANCELLED | OUTPATIENT
Start: 2017-08-04

## 2017-08-04 RX ORDER — SODIUM CHLORIDE 0.9 % (FLUSH) 0.9 %
10 SYRINGE (ML) INJECTION PRN
Status: DISCONTINUED | OUTPATIENT
Start: 2017-08-04 | End: 2017-08-05 | Stop reason: HOSPADM

## 2017-08-04 RX ORDER — SODIUM CHLORIDE 0.9 % (FLUSH) 0.9 %
10 SYRINGE (ML) INJECTION PRN
Status: CANCELLED | OUTPATIENT
Start: 2017-08-04

## 2017-08-04 RX ORDER — HEPARIN SODIUM (PORCINE) LOCK FLUSH IV SOLN 100 UNIT/ML 100 UNIT/ML
500 SOLUTION INTRAVENOUS PRN
Status: CANCELLED | OUTPATIENT
Start: 2017-08-04

## 2017-08-04 RX ORDER — HEPARIN SODIUM (PORCINE) LOCK FLUSH IV SOLN 100 UNIT/ML 100 UNIT/ML
500 SOLUTION INTRAVENOUS PRN
Status: DISCONTINUED | OUTPATIENT
Start: 2017-08-04 | End: 2017-08-05 | Stop reason: HOSPADM

## 2017-08-04 RX ADMIN — Medication 10 ML: at 14:31

## 2017-08-04 RX ADMIN — SODIUM CHLORIDE, PRESERVATIVE FREE 500 UNITS: 5 INJECTION INTRAVENOUS at 14:31

## 2017-08-04 NOTE — PROGRESS NOTES
Pt is here for CADD pump d/c. Denies any problems at this time. Pt was discharged in stable condition and will return on 8/7 for chemotherapy.

## 2017-08-07 ENCOUNTER — HOSPITAL ENCOUNTER (OUTPATIENT)
Dept: RADIATION ONCOLOGY | Age: 57
Discharge: HOME | End: 2017-08-07
Payer: COMMERCIAL

## 2017-08-07 ENCOUNTER — HOSPITAL ENCOUNTER (OUTPATIENT)
Dept: INFUSION THERAPY | Age: 57
Discharge: HOME | End: 2017-08-07
Payer: COMMERCIAL

## 2017-08-07 VITALS
RESPIRATION RATE: 16 BRPM | DIASTOLIC BLOOD PRESSURE: 76 MMHG | HEART RATE: 65 BPM | TEMPERATURE: 97.8 F | SYSTOLIC BLOOD PRESSURE: 119 MMHG | WEIGHT: 241.6 LBS | BODY MASS INDEX: 31.88 KG/M2

## 2017-08-07 DIAGNOSIS — C78.7 HEPATIC METASTASES (HCC): ICD-10-CM

## 2017-08-07 DIAGNOSIS — C19 COLORECTAL CANCER, STAGE IV (HCC): ICD-10-CM

## 2017-08-07 DIAGNOSIS — I21.A1 NON-ST ELEVATION MYOCARDIAL INFARCTION (NSTEMI), TYPE 2: ICD-10-CM

## 2017-08-07 LAB
ABSOLUTE EOS #: 0.1 K/UL (ref 0–0.4)
ABSOLUTE LYMPH #: 0.5 K/UL (ref 1–4.8)
ABSOLUTE MONO #: 0.7 K/UL (ref 0.1–1.2)
ALBUMIN SERPL-MCNC: 4 G/DL (ref 3.5–5.2)
ALBUMIN/GLOBULIN RATIO: 1.6 (ref 1–2.5)
ALP BLD-CCNC: 88 U/L (ref 40–129)
ALT SERPL-CCNC: 13 U/L (ref 5–41)
ANION GAP SERPL CALCULATED.3IONS-SCNC: 14 MMOL/L (ref 9–17)
AST SERPL-CCNC: 13 U/L
BASOPHILS # BLD: 0 %
BASOPHILS ABSOLUTE: 0 K/UL (ref 0–0.2)
BILIRUB SERPL-MCNC: 0.37 MG/DL (ref 0.3–1.2)
BUN BLDV-MCNC: 7 MG/DL (ref 6–20)
BUN/CREAT BLD: ABNORMAL (ref 9–20)
CALCIUM SERPL-MCNC: 9 MG/DL (ref 8.6–10.4)
CHLORIDE BLD-SCNC: 100 MMOL/L (ref 98–107)
CO2: 25 MMOL/L (ref 20–31)
CREAT SERPL-MCNC: 0.6 MG/DL (ref 0.7–1.2)
DIFFERENTIAL TYPE: ABNORMAL
EOSINOPHILS RELATIVE PERCENT: 1 %
GFR AFRICAN AMERICAN: >60 ML/MIN
GFR NON-AFRICAN AMERICAN: >60 ML/MIN
GFR SERPL CREATININE-BSD FRML MDRD: ABNORMAL ML/MIN/{1.73_M2}
GFR SERPL CREATININE-BSD FRML MDRD: ABNORMAL ML/MIN/{1.73_M2}
GLUCOSE BLD-MCNC: 293 MG/DL (ref 70–99)
HCT VFR BLD CALC: 37.9 % (ref 41–53)
HEMOGLOBIN: 12.3 G/DL (ref 13.5–17.5)
LYMPHOCYTES # BLD: 9 %
MCH RBC QN AUTO: 26.9 PG (ref 26–34)
MCHC RBC AUTO-ENTMCNC: 32.6 G/DL (ref 31–37)
MCV RBC AUTO: 82.4 FL (ref 80–100)
MONOCYTES # BLD: 14 %
PDW BLD-RTO: 18.6 % (ref 12.5–15.4)
PLATELET # BLD: 161 K/UL (ref 140–450)
PLATELET ESTIMATE: ABNORMAL
PMV BLD AUTO: 6.1 FL (ref 6–12)
POTASSIUM SERPL-SCNC: 3.9 MMOL/L (ref 3.7–5.3)
RBC # BLD: 4.6 M/UL (ref 4.5–5.9)
RBC # BLD: ABNORMAL 10*6/UL
SEG NEUTROPHILS: 76 %
SEGMENTED NEUTROPHILS ABSOLUTE COUNT: 3.8 K/UL (ref 1.8–7.7)
SODIUM BLD-SCNC: 139 MMOL/L (ref 135–144)
TOTAL PROTEIN: 6.5 G/DL (ref 6.4–8.3)
WBC # BLD: 5 K/UL (ref 3.5–11)
WBC # BLD: ABNORMAL 10*3/UL

## 2017-08-07 PROCEDURE — 36591 DRAW BLOOD OFF VENOUS DEVICE: CPT

## 2017-08-07 PROCEDURE — 96413 CHEMO IV INFUSION 1 HR: CPT

## 2017-08-07 PROCEDURE — 85025 COMPLETE CBC W/AUTO DIFF WBC: CPT

## 2017-08-07 PROCEDURE — 96416 CHEMO PROLONG INFUSE W/PUMP: CPT

## 2017-08-07 PROCEDURE — 77412 RADIATION TX DELIVERY LVL 3: CPT | Performed by: RADIOLOGY

## 2017-08-07 PROCEDURE — 96375 TX/PRO/DX INJ NEW DRUG ADDON: CPT

## 2017-08-07 PROCEDURE — 77387 GUIDANCE FOR RADJ TX DLVR: CPT | Performed by: RADIOLOGY

## 2017-08-07 PROCEDURE — 80053 COMPREHEN METABOLIC PANEL: CPT

## 2017-08-07 PROCEDURE — 6360000002 HC RX W HCPCS: Performed by: INTERNAL MEDICINE

## 2017-08-07 PROCEDURE — 2580000003 HC RX 258: Performed by: INTERNAL MEDICINE

## 2017-08-07 RX ORDER — HEPARIN SODIUM (PORCINE) LOCK FLUSH IV SOLN 100 UNIT/ML 100 UNIT/ML
500 SOLUTION INTRAVENOUS PRN
Status: DISCONTINUED | OUTPATIENT
Start: 2017-08-07 | End: 2017-08-08 | Stop reason: HOSPADM

## 2017-08-07 RX ORDER — SODIUM CHLORIDE 9 MG/ML
INJECTION, SOLUTION INTRAVENOUS ONCE
Status: DISCONTINUED | OUTPATIENT
Start: 2017-08-07 | End: 2017-08-08 | Stop reason: HOSPADM

## 2017-08-07 RX ORDER — SODIUM CHLORIDE 0.9 % (FLUSH) 0.9 %
10 SYRINGE (ML) INJECTION PRN
Status: DISCONTINUED | OUTPATIENT
Start: 2017-08-07 | End: 2017-08-08 | Stop reason: HOSPADM

## 2017-08-07 RX ADMIN — FLUOROURACIL 2050 MG: 50 INJECTION, SOLUTION INTRAVENOUS at 10:58

## 2017-08-07 RX ADMIN — SODIUM CHLORIDE 12 MG: 9 INJECTION, SOLUTION INTRAVENOUS at 10:20

## 2017-08-07 RX ADMIN — SODIUM CHLORIDE: 9 INJECTION, SOLUTION INTRAVENOUS at 10:20

## 2017-08-07 RX ADMIN — Medication 10 ML: at 09:46

## 2017-08-07 RX ADMIN — Medication 10 ML: at 09:45

## 2017-08-07 NOTE — PROGRESS NOTES
Pt here for C5D1. Denies any new complaints. Labs drawn from port and results reviewed. Pt was treated without incident and d/c'd in stable condition. Pt will return in 4 days for pump dc.

## 2017-08-08 ENCOUNTER — HOSPITAL ENCOUNTER (OUTPATIENT)
Dept: INFUSION THERAPY | Age: 57
Discharge: HOME | End: 2017-08-08
Payer: COMMERCIAL

## 2017-08-08 ENCOUNTER — HOSPITAL ENCOUNTER (OUTPATIENT)
Age: 57
Discharge: HOME | End: 2017-08-08
Payer: COMMERCIAL

## 2017-08-08 DIAGNOSIS — C19 COLORECTAL CANCER, STAGE IV (HCC): ICD-10-CM

## 2017-08-08 LAB
-: NORMAL
AMORPHOUS: NORMAL
BACTERIA: NORMAL
BILIRUBIN URINE: NEGATIVE
CASTS UA: NORMAL /LPF
COLOR: YELLOW
COMMENT UA: ABNORMAL
CRYSTALS, UA: NORMAL /HPF
EPITHELIAL CELLS UA: NORMAL /HPF (ref 0–5)
GLUCOSE URINE: ABNORMAL
KETONES, URINE: NEGATIVE
LEUKOCYTE ESTERASE, URINE: NEGATIVE
MUCUS: NORMAL
NITRITE, URINE: NEGATIVE
OTHER OBSERVATIONS UA: NORMAL
PH UA: 5 (ref 5–8)
PROTEIN UA: NEGATIVE
RBC UA: NORMAL /HPF (ref 0–2)
RENAL EPITHELIAL, UA: NORMAL /HPF
SPECIFIC GRAVITY UA: 1.01 (ref 1–1.03)
TRICHOMONAS: NORMAL
TURBIDITY: ABNORMAL
URINE HGB: ABNORMAL
UROBILINOGEN, URINE: NORMAL
WBC UA: NORMAL /HPF (ref 0–5)
YEAST: NORMAL

## 2017-08-08 PROCEDURE — 2580000003 HC RX 258: Performed by: INTERNAL MEDICINE

## 2017-08-08 PROCEDURE — 77412 RADIATION TX DELIVERY LVL 3: CPT | Performed by: RADIOLOGY

## 2017-08-08 PROCEDURE — 96523 IRRIG DRUG DELIVERY DEVICE: CPT

## 2017-08-08 PROCEDURE — 77334 RADIATION TREATMENT AID(S): CPT | Performed by: RADIOLOGY

## 2017-08-08 PROCEDURE — 81001 URINALYSIS AUTO W/SCOPE: CPT

## 2017-08-08 PROCEDURE — 77387 GUIDANCE FOR RADJ TX DLVR: CPT | Performed by: RADIOLOGY

## 2017-08-08 PROCEDURE — 77307 TELETHX ISODOSE PLAN CPLX: CPT | Performed by: RADIOLOGY

## 2017-08-08 RX ORDER — SODIUM CHLORIDE 0.9 % (FLUSH) 0.9 %
10 SYRINGE (ML) INJECTION PRN
Status: DISCONTINUED | OUTPATIENT
Start: 2017-08-08 | End: 2017-08-09 | Stop reason: HOSPADM

## 2017-08-08 RX ORDER — SODIUM CHLORIDE 0.9 % (FLUSH) 0.9 %
20 SYRINGE (ML) INJECTION PRN
Status: CANCELLED | OUTPATIENT
Start: 2017-08-08

## 2017-08-08 RX ORDER — HEPARIN SODIUM (PORCINE) LOCK FLUSH IV SOLN 100 UNIT/ML 100 UNIT/ML
500 SOLUTION INTRAVENOUS PRN
Status: CANCELLED | OUTPATIENT
Start: 2017-08-08

## 2017-08-08 RX ORDER — SODIUM CHLORIDE 0.9 % (FLUSH) 0.9 %
10 SYRINGE (ML) INJECTION PRN
Status: CANCELLED | OUTPATIENT
Start: 2017-08-08

## 2017-08-08 RX ADMIN — Medication 10 ML: at 16:05

## 2017-08-08 NOTE — PROGRESS NOTES
Pt is here because his CADD pump was disconnected. Tubing was clotted and CADD pump was running. Educated pt if this happens in the future he is to clamp off and stop chemo pump. Dr. Kobi Higginbotham was notified. Pt was discharged in stable condition and will return on 8/11 for CADD pump d/c.

## 2017-08-09 PROCEDURE — 77412 RADIATION TX DELIVERY LVL 3: CPT | Performed by: RADIOLOGY

## 2017-08-09 PROCEDURE — 77387 GUIDANCE FOR RADJ TX DLVR: CPT | Performed by: RADIOLOGY

## 2017-08-10 PROCEDURE — 77412 RADIATION TX DELIVERY LVL 3: CPT | Performed by: RADIOLOGY

## 2017-08-10 PROCEDURE — 77387 GUIDANCE FOR RADJ TX DLVR: CPT | Performed by: RADIOLOGY

## 2017-08-11 ENCOUNTER — HOSPITAL ENCOUNTER (OUTPATIENT)
Dept: INFUSION THERAPY | Age: 57
Discharge: HOME | End: 2017-08-11
Payer: COMMERCIAL

## 2017-08-11 ENCOUNTER — TELEPHONE (OUTPATIENT)
Dept: INFUSION THERAPY | Age: 57
End: 2017-08-11

## 2017-08-11 DIAGNOSIS — C19 COLORECTAL CANCER, STAGE IV (HCC): ICD-10-CM

## 2017-08-11 PROCEDURE — 96523 IRRIG DRUG DELIVERY DEVICE: CPT

## 2017-08-11 PROCEDURE — 6360000002 HC RX W HCPCS: Performed by: INTERNAL MEDICINE

## 2017-08-11 PROCEDURE — 77412 RADIATION TX DELIVERY LVL 3: CPT | Performed by: RADIOLOGY

## 2017-08-11 PROCEDURE — 2580000003 HC RX 258: Performed by: INTERNAL MEDICINE

## 2017-08-11 PROCEDURE — 77336 RADIATION PHYSICS CONSULT: CPT | Performed by: RADIOLOGY

## 2017-08-11 PROCEDURE — 77387 GUIDANCE FOR RADJ TX DLVR: CPT | Performed by: RADIOLOGY

## 2017-08-11 RX ORDER — HEPARIN SODIUM (PORCINE) LOCK FLUSH IV SOLN 100 UNIT/ML 100 UNIT/ML
500 SOLUTION INTRAVENOUS PRN
Status: CANCELLED | OUTPATIENT
Start: 2017-08-11

## 2017-08-11 RX ORDER — SODIUM CHLORIDE 0.9 % (FLUSH) 0.9 %
10 SYRINGE (ML) INJECTION PRN
Status: CANCELLED | OUTPATIENT
Start: 2017-08-11

## 2017-08-11 RX ORDER — HEPARIN SODIUM (PORCINE) LOCK FLUSH IV SOLN 100 UNIT/ML 100 UNIT/ML
500 SOLUTION INTRAVENOUS PRN
Status: DISCONTINUED | OUTPATIENT
Start: 2017-08-11 | End: 2017-08-12 | Stop reason: HOSPADM

## 2017-08-11 RX ORDER — SODIUM CHLORIDE 0.9 % (FLUSH) 0.9 %
20 SYRINGE (ML) INJECTION PRN
Status: CANCELLED | OUTPATIENT
Start: 2017-08-11

## 2017-08-11 RX ORDER — SODIUM CHLORIDE 0.9 % (FLUSH) 0.9 %
20 SYRINGE (ML) INJECTION PRN
Status: DISCONTINUED | OUTPATIENT
Start: 2017-08-11 | End: 2017-08-12 | Stop reason: HOSPADM

## 2017-08-11 RX ADMIN — SODIUM CHLORIDE, PRESERVATIVE FREE 500 UNITS: 5 INJECTION INTRAVENOUS at 12:42

## 2017-08-11 RX ADMIN — Medication 20 ML: at 12:42

## 2017-08-11 NOTE — PROGRESS NOTES
Pt here for pump d/c today. Pump had completed and pt had pump off when he arrived. Pt port was flushed and tolerated well. Pt was d/c in stable condition.

## 2017-08-11 NOTE — TELEPHONE ENCOUNTER
Left message for pt to remind him that he has an appt today to have his pump removed.   No answer from pt

## 2017-08-14 ENCOUNTER — HOSPITAL ENCOUNTER (OUTPATIENT)
Dept: INFUSION THERAPY | Age: 57
Discharge: HOME | End: 2017-08-14
Payer: COMMERCIAL

## 2017-08-14 ENCOUNTER — HOSPITAL ENCOUNTER (OUTPATIENT)
Dept: RADIATION ONCOLOGY | Age: 57
Discharge: HOME | End: 2017-08-14
Payer: COMMERCIAL

## 2017-08-14 VITALS
DIASTOLIC BLOOD PRESSURE: 84 MMHG | RESPIRATION RATE: 16 BRPM | BODY MASS INDEX: 32.19 KG/M2 | TEMPERATURE: 97.8 F | HEART RATE: 77 BPM | SYSTOLIC BLOOD PRESSURE: 152 MMHG | WEIGHT: 244 LBS

## 2017-08-14 DIAGNOSIS — C19 COLORECTAL CANCER, STAGE IV (HCC): ICD-10-CM

## 2017-08-14 DIAGNOSIS — I21.A1 NON-ST ELEVATION MYOCARDIAL INFARCTION (NSTEMI), TYPE 2: ICD-10-CM

## 2017-08-14 DIAGNOSIS — C78.7 HEPATIC METASTASES (HCC): ICD-10-CM

## 2017-08-14 LAB
ABSOLUTE EOS #: 0 K/UL (ref 0–0.4)
ABSOLUTE LYMPH #: 0.58 K/UL (ref 1–4.8)
ABSOLUTE MONO #: 0.81 K/UL (ref 0.1–0.8)
ALBUMIN SERPL-MCNC: 3.6 G/DL (ref 3.5–5.2)
ALBUMIN/GLOBULIN RATIO: 1.4 (ref 1–2.5)
ALP BLD-CCNC: 90 U/L (ref 40–129)
ALT SERPL-CCNC: 15 U/L (ref 5–41)
ANION GAP SERPL CALCULATED.3IONS-SCNC: 15 MMOL/L (ref 9–17)
AST SERPL-CCNC: 16 U/L
BASOPHILS # BLD: 0 %
BASOPHILS ABSOLUTE: 0 K/UL (ref 0–0.2)
BILIRUB SERPL-MCNC: 0.32 MG/DL (ref 0.3–1.2)
BUN BLDV-MCNC: 9 MG/DL (ref 6–20)
BUN/CREAT BLD: ABNORMAL (ref 9–20)
CALCIUM SERPL-MCNC: 8.5 MG/DL (ref 8.6–10.4)
CARCINOEMBRYONIC ANTIGEN: 8.2 NG/ML
CHLORIDE BLD-SCNC: 99 MMOL/L (ref 98–107)
CO2: 23 MMOL/L (ref 20–31)
CREAT SERPL-MCNC: 0.63 MG/DL (ref 0.7–1.2)
DIFFERENTIAL TYPE: ABNORMAL
EOSINOPHILS RELATIVE PERCENT: 0 %
GFR AFRICAN AMERICAN: >60 ML/MIN
GFR NON-AFRICAN AMERICAN: >60 ML/MIN
GFR SERPL CREATININE-BSD FRML MDRD: ABNORMAL ML/MIN/{1.73_M2}
GFR SERPL CREATININE-BSD FRML MDRD: ABNORMAL ML/MIN/{1.73_M2}
GLUCOSE BLD-MCNC: 294 MG/DL (ref 70–99)
HCT VFR BLD CALC: 37.3 % (ref 41–53)
HEMOGLOBIN: 12.5 G/DL (ref 13.5–17.5)
LYMPHOCYTES # BLD: 10 %
MCH RBC QN AUTO: 27.4 PG (ref 26–34)
MCHC RBC AUTO-ENTMCNC: 33.4 G/DL (ref 31–37)
MCV RBC AUTO: 82.1 FL (ref 80–100)
MONOCYTES # BLD: 14 %
MORPHOLOGY: ABNORMAL
PDW BLD-RTO: 18.9 % (ref 12.5–15.4)
PLATELET # BLD: 157 K/UL (ref 140–450)
PLATELET ESTIMATE: ABNORMAL
PMV BLD AUTO: 6.4 FL (ref 6–12)
POTASSIUM SERPL-SCNC: 4.1 MMOL/L (ref 3.7–5.3)
RBC # BLD: 4.55 M/UL (ref 4.5–5.9)
RBC # BLD: ABNORMAL 10*6/UL
SEG NEUTROPHILS: 76 %
SEGMENTED NEUTROPHILS ABSOLUTE COUNT: 4.41 K/UL (ref 1.8–7.7)
SODIUM BLD-SCNC: 137 MMOL/L (ref 135–144)
TOTAL PROTEIN: 6.2 G/DL (ref 6.4–8.3)
WBC # BLD: 5.8 K/UL (ref 3.5–11)
WBC # BLD: ABNORMAL 10*3/UL

## 2017-08-14 PROCEDURE — 36591 DRAW BLOOD OFF VENOUS DEVICE: CPT

## 2017-08-14 PROCEDURE — 82378 CARCINOEMBRYONIC ANTIGEN: CPT

## 2017-08-14 PROCEDURE — 77387 GUIDANCE FOR RADJ TX DLVR: CPT | Performed by: RADIOLOGY

## 2017-08-14 PROCEDURE — 96416 CHEMO PROLONG INFUSE W/PUMP: CPT

## 2017-08-14 PROCEDURE — 96374 THER/PROPH/DIAG INJ IV PUSH: CPT

## 2017-08-14 PROCEDURE — 77412 RADIATION TX DELIVERY LVL 3: CPT | Performed by: RADIOLOGY

## 2017-08-14 PROCEDURE — 77280 THER RAD SIMULAJ FIELD SMPL: CPT | Performed by: RADIOLOGY

## 2017-08-14 PROCEDURE — 2580000003 HC RX 258: Performed by: INTERNAL MEDICINE

## 2017-08-14 PROCEDURE — 85025 COMPLETE CBC W/AUTO DIFF WBC: CPT

## 2017-08-14 PROCEDURE — 80053 COMPREHEN METABOLIC PANEL: CPT

## 2017-08-14 PROCEDURE — 6360000002 HC RX W HCPCS: Performed by: INTERNAL MEDICINE

## 2017-08-14 RX ORDER — SODIUM CHLORIDE 0.9 % (FLUSH) 0.9 %
10 SYRINGE (ML) INJECTION PRN
Status: DISCONTINUED | OUTPATIENT
Start: 2017-08-14 | End: 2017-08-15 | Stop reason: HOSPADM

## 2017-08-14 RX ORDER — SODIUM CHLORIDE 9 MG/ML
INJECTION, SOLUTION INTRAVENOUS ONCE
Status: COMPLETED | OUTPATIENT
Start: 2017-08-14 | End: 2017-08-14

## 2017-08-14 RX ADMIN — SODIUM CHLORIDE: 9 INJECTION, SOLUTION INTRAVENOUS at 09:22

## 2017-08-14 RX ADMIN — Medication 10 ML: at 10:35

## 2017-08-14 RX ADMIN — DEXAMETHASONE SODIUM PHOSPHATE 12 MG: 4 INJECTION, SOLUTION INTRAMUSCULAR; INTRAVENOUS at 09:39

## 2017-08-14 RX ADMIN — FLUOROURACIL 2100 MG: 50 INJECTION, SOLUTION INTRAVENOUS at 10:37

## 2017-08-14 RX ADMIN — Medication 10 ML: at 08:46

## 2017-08-14 ASSESSMENT — PAIN SCALES - GENERAL: PAINLEVEL_OUTOF10: 7

## 2017-08-14 ASSESSMENT — PAIN DESCRIPTION - ORIENTATION: ORIENTATION: LOWER

## 2017-08-14 ASSESSMENT — PAIN DESCRIPTION - LOCATION: LOCATION: BACK

## 2017-08-14 NOTE — PROGRESS NOTES
Pt here for C.6D1 5FU pump. Denies any new complaints. Labs drawn from port and results reviewed. Pt was treated without incident and d/c'd in stable condition. Pt will return on Friday for pump d/c.

## 2017-08-15 PROCEDURE — 77412 RADIATION TX DELIVERY LVL 3: CPT | Performed by: RADIOLOGY

## 2017-08-15 PROCEDURE — 77387 GUIDANCE FOR RADJ TX DLVR: CPT | Performed by: RADIOLOGY

## 2017-08-16 PROCEDURE — 77412 RADIATION TX DELIVERY LVL 3: CPT | Performed by: RADIOLOGY

## 2017-08-16 PROCEDURE — 77387 GUIDANCE FOR RADJ TX DLVR: CPT | Performed by: RADIOLOGY

## 2017-08-17 ENCOUNTER — OFFICE VISIT (OUTPATIENT)
Dept: ONCOLOGY | Age: 57
End: 2017-08-17
Payer: COMMERCIAL

## 2017-08-17 VITALS
TEMPERATURE: 97.6 F | DIASTOLIC BLOOD PRESSURE: 72 MMHG | HEART RATE: 85 BPM | BODY MASS INDEX: 32.1 KG/M2 | SYSTOLIC BLOOD PRESSURE: 117 MMHG | WEIGHT: 243.3 LBS

## 2017-08-17 DIAGNOSIS — C19 COLORECTAL CANCER, STAGE IV (HCC): Primary | ICD-10-CM

## 2017-08-17 DIAGNOSIS — C78.7 HEPATIC METASTASES (HCC): ICD-10-CM

## 2017-08-17 DIAGNOSIS — K52.0 RADIATION ENTERITIS: ICD-10-CM

## 2017-08-17 PROCEDURE — 99214 OFFICE O/P EST MOD 30 MIN: CPT | Performed by: INTERNAL MEDICINE

## 2017-08-17 PROCEDURE — 77387 GUIDANCE FOR RADJ TX DLVR: CPT | Performed by: RADIOLOGY

## 2017-08-17 PROCEDURE — 77412 RADIATION TX DELIVERY LVL 3: CPT | Performed by: RADIOLOGY

## 2017-08-18 ENCOUNTER — HOSPITAL ENCOUNTER (OUTPATIENT)
Dept: INFUSION THERAPY | Age: 57
Discharge: HOME | End: 2017-08-18
Payer: COMMERCIAL

## 2017-08-18 DIAGNOSIS — C19 COLORECTAL CANCER, STAGE IV (HCC): ICD-10-CM

## 2017-08-18 PROCEDURE — 2580000003 HC RX 258: Performed by: INTERNAL MEDICINE

## 2017-08-18 PROCEDURE — 77412 RADIATION TX DELIVERY LVL 3: CPT | Performed by: RADIOLOGY

## 2017-08-18 PROCEDURE — 77387 GUIDANCE FOR RADJ TX DLVR: CPT | Performed by: RADIOLOGY

## 2017-08-18 PROCEDURE — 6360000002 HC RX W HCPCS: Performed by: INTERNAL MEDICINE

## 2017-08-18 PROCEDURE — 77336 RADIATION PHYSICS CONSULT: CPT | Performed by: RADIOLOGY

## 2017-08-18 PROCEDURE — 96523 IRRIG DRUG DELIVERY DEVICE: CPT

## 2017-08-18 RX ORDER — SODIUM CHLORIDE 0.9 % (FLUSH) 0.9 %
10 SYRINGE (ML) INJECTION PRN
Status: DISCONTINUED | OUTPATIENT
Start: 2017-08-18 | End: 2017-08-19 | Stop reason: HOSPADM

## 2017-08-18 RX ORDER — HEPARIN SODIUM (PORCINE) LOCK FLUSH IV SOLN 100 UNIT/ML 100 UNIT/ML
500 SOLUTION INTRAVENOUS PRN
Status: CANCELLED | OUTPATIENT
Start: 2017-08-18

## 2017-08-18 RX ORDER — HEPARIN SODIUM (PORCINE) LOCK FLUSH IV SOLN 100 UNIT/ML 100 UNIT/ML
500 SOLUTION INTRAVENOUS PRN
Status: DISCONTINUED | OUTPATIENT
Start: 2017-08-18 | End: 2017-08-19 | Stop reason: HOSPADM

## 2017-08-18 RX ORDER — SODIUM CHLORIDE 0.9 % (FLUSH) 0.9 %
20 SYRINGE (ML) INJECTION PRN
Status: CANCELLED | OUTPATIENT
Start: 2017-08-18

## 2017-08-18 RX ORDER — SODIUM CHLORIDE 0.9 % (FLUSH) 0.9 %
10 SYRINGE (ML) INJECTION PRN
Status: CANCELLED | OUTPATIENT
Start: 2017-08-18

## 2017-08-18 RX ADMIN — SODIUM CHLORIDE, PRESERVATIVE FREE 500 UNITS: 5 INJECTION INTRAVENOUS at 11:05

## 2017-08-18 RX ADMIN — Medication 10 ML: at 11:05

## 2017-08-18 NOTE — PROGRESS NOTES
Pt is here for CADD pump d/c. Denies any problems at this time. Pt was discharged in stable condition and will return in 5 weeks for port access and CT.

## 2017-08-18 NOTE — FLOWSHEET NOTE
encountered patient in the lobby waiting for a ride.  offered support. Chaplains will remain available to offer spiritual and emotional support as needed.      08/18/17 1127   Encounter Summary   Services provided to: Patient   Referral/Consult From: Vick   Continue Visiting (08/18/17)   Complexity of Encounter Low   Length of Encounter 15 minutes   Routine   Type Follow up   Assessment Passive   Intervention Active listening;Explored feelings, thoughts, concerns;Explored coping resources   Outcome Engaged in conversation

## 2017-08-23 RX ORDER — FENTANYL 25 UG/H
PATCH TRANSDERMAL
Qty: 10 PATCH | Refills: 0 | Status: SHIPPED | OUTPATIENT
Start: 2017-08-23 | End: 2017-09-21 | Stop reason: SDUPTHER

## 2017-08-31 DIAGNOSIS — C19 COLORECTAL CANCER, STAGE IV (HCC): ICD-10-CM

## 2017-08-31 RX ORDER — OXYCODONE AND ACETAMINOPHEN 10; 325 MG/1; MG/1
TABLET ORAL
Qty: 120 TABLET | Refills: 0 | Status: SHIPPED | OUTPATIENT
Start: 2017-08-31 | End: 2017-09-28 | Stop reason: SDUPTHER

## 2017-09-06 ENCOUNTER — APPOINTMENT (OUTPATIENT)
Dept: GENERAL RADIOLOGY | Age: 57
End: 2017-09-06
Payer: COMMERCIAL

## 2017-09-06 ENCOUNTER — HOSPITAL ENCOUNTER (EMERGENCY)
Age: 57
Discharge: HOME | End: 2017-09-07
Attending: EMERGENCY MEDICINE
Payer: COMMERCIAL

## 2017-09-06 DIAGNOSIS — R68.84 JAW PAIN: Primary | ICD-10-CM

## 2017-09-06 LAB
ABSOLUTE BANDS #: 0.05 K/UL (ref 0–1)
ABSOLUTE EOS #: 0 K/UL (ref 0–0.4)
ABSOLUTE LYMPH #: 0.54 K/UL (ref 1–4.8)
ABSOLUTE MONO #: 0.81 K/UL (ref 0.1–1.3)
ANION GAP SERPL CALCULATED.3IONS-SCNC: 13 MMOL/L (ref 9–17)
BANDS: 1 %
BASOPHILS # BLD: 0 %
BASOPHILS ABSOLUTE: 0 K/UL (ref 0–0.2)
BUN BLDV-MCNC: 7 MG/DL (ref 6–20)
BUN/CREAT BLD: ABNORMAL (ref 9–20)
CALCIUM SERPL-MCNC: 8.8 MG/DL (ref 8.6–10.4)
CHLORIDE BLD-SCNC: 102 MMOL/L (ref 98–107)
CO2: 26 MMOL/L (ref 20–31)
CREAT SERPL-MCNC: 0.57 MG/DL (ref 0.7–1.2)
DIFFERENTIAL TYPE: ABNORMAL
EOSINOPHILS RELATIVE PERCENT: 0 %
GFR AFRICAN AMERICAN: >60 ML/MIN
GFR NON-AFRICAN AMERICAN: >60 ML/MIN
GFR SERPL CREATININE-BSD FRML MDRD: ABNORMAL ML/MIN/{1.73_M2}
GFR SERPL CREATININE-BSD FRML MDRD: ABNORMAL ML/MIN/{1.73_M2}
GLUCOSE BLD-MCNC: 202 MG/DL (ref 70–99)
HCT VFR BLD CALC: 37 % (ref 41–53)
HEMOGLOBIN: 12.7 G/DL (ref 13.5–17.5)
LYMPHOCYTES # BLD: 10 %
MCH RBC QN AUTO: 28.2 PG (ref 26–34)
MCHC RBC AUTO-ENTMCNC: 34.2 G/DL (ref 31–37)
MCV RBC AUTO: 82.5 FL (ref 80–100)
MONOCYTES # BLD: 15 %
MORPHOLOGY: ABNORMAL
PDW BLD-RTO: 17.4 % (ref 11.5–14.9)
PLATELET # BLD: 167 K/UL (ref 150–450)
PLATELET ESTIMATE: ABNORMAL
PMV BLD AUTO: 6.4 FL (ref 6–12)
POTASSIUM SERPL-SCNC: 4 MMOL/L (ref 3.7–5.3)
RBC # BLD: 4.49 M/UL (ref 4.5–5.9)
RBC # BLD: ABNORMAL 10*6/UL
SEG NEUTROPHILS: 74 %
SEGMENTED NEUTROPHILS ABSOLUTE COUNT: 4 K/UL (ref 1.3–9.1)
SODIUM BLD-SCNC: 141 MMOL/L (ref 135–144)
TROPONIN INTERP: NORMAL
TROPONIN T: <0.03 NG/ML
WBC # BLD: 5.4 K/UL (ref 3.5–11)
WBC # BLD: ABNORMAL 10*3/UL

## 2017-09-06 PROCEDURE — 80048 BASIC METABOLIC PNL TOTAL CA: CPT

## 2017-09-06 PROCEDURE — 99285 EMERGENCY DEPT VISIT HI MDM: CPT

## 2017-09-06 PROCEDURE — 93005 ELECTROCARDIOGRAM TRACING: CPT

## 2017-09-06 PROCEDURE — 36415 COLL VENOUS BLD VENIPUNCTURE: CPT

## 2017-09-06 PROCEDURE — 71010 XR CHEST PORTABLE: CPT

## 2017-09-06 PROCEDURE — 84484 ASSAY OF TROPONIN QUANT: CPT

## 2017-09-06 PROCEDURE — 70110 X-RAY EXAM OF JAW 4/> VIEWS: CPT

## 2017-09-06 PROCEDURE — 85025 COMPLETE CBC W/AUTO DIFF WBC: CPT

## 2017-09-07 VITALS
HEART RATE: 72 BPM | SYSTOLIC BLOOD PRESSURE: 110 MMHG | TEMPERATURE: 97.9 F | OXYGEN SATURATION: 94 % | HEIGHT: 74 IN | DIASTOLIC BLOOD PRESSURE: 52 MMHG | BODY MASS INDEX: 30.8 KG/M2 | WEIGHT: 240 LBS | RESPIRATION RATE: 17 BRPM

## 2017-09-13 LAB
EKG ATRIAL RATE: 63 BPM
EKG P AXIS: 44 DEGREES
EKG P-R INTERVAL: 176 MS
EKG Q-T INTERVAL: 420 MS
EKG QRS DURATION: 114 MS
EKG QTC CALCULATION (BAZETT): 429 MS
EKG R AXIS: 49 DEGREES
EKG T AXIS: 40 DEGREES
EKG VENTRICULAR RATE: 63 BPM

## 2017-09-21 DIAGNOSIS — C19 COLORECTAL CANCER, STAGE IV (HCC): Primary | ICD-10-CM

## 2017-09-22 ENCOUNTER — HOSPITAL ENCOUNTER (OUTPATIENT)
Dept: CT IMAGING | Age: 57
Discharge: HOME | End: 2017-09-22
Payer: COMMERCIAL

## 2017-09-22 DIAGNOSIS — C19 COLORECTAL CANCER, STAGE IV (HCC): ICD-10-CM

## 2017-09-22 DIAGNOSIS — C78.7 HEPATIC METASTASES (HCC): ICD-10-CM

## 2017-09-22 PROCEDURE — 74177 CT ABD & PELVIS W/CONTRAST: CPT

## 2017-09-22 PROCEDURE — 2580000003 HC RX 258: Performed by: INTERNAL MEDICINE

## 2017-09-22 PROCEDURE — 71260 CT THORAX DX C+: CPT

## 2017-09-22 PROCEDURE — 6360000004 HC RX CONTRAST MEDICATION: Performed by: INTERNAL MEDICINE

## 2017-09-22 RX ORDER — FENTANYL 25 UG/H
1 PATCH TRANSDERMAL
Qty: 10 PATCH | Refills: 0 | Status: SHIPPED | OUTPATIENT
Start: 2017-09-22 | End: 2017-10-26

## 2017-09-22 RX ORDER — SODIUM CHLORIDE 0.9 % (FLUSH) 0.9 %
10 SYRINGE (ML) INJECTION PRN
Status: DISCONTINUED | OUTPATIENT
Start: 2017-09-22 | End: 2017-09-25 | Stop reason: HOSPADM

## 2017-09-22 RX ORDER — 0.9 % SODIUM CHLORIDE 0.9 %
100 INTRAVENOUS SOLUTION INTRAVENOUS ONCE
Status: COMPLETED | OUTPATIENT
Start: 2017-09-22 | End: 2017-09-22

## 2017-09-22 RX ADMIN — IOPAMIDOL 100 ML: 755 INJECTION, SOLUTION INTRAVENOUS at 09:53

## 2017-09-22 RX ADMIN — SODIUM CHLORIDE 100 ML: 9 INJECTION, SOLUTION INTRAVENOUS at 09:53

## 2017-09-22 RX ADMIN — Medication 10 ML: at 09:53

## 2017-09-22 RX ADMIN — IOHEXOL 50 ML: 240 INJECTION, SOLUTION INTRATHECAL; INTRAVASCULAR; INTRAVENOUS; ORAL at 09:53

## 2017-09-25 ENCOUNTER — TELEPHONE (OUTPATIENT)
Dept: ONCOLOGY | Age: 57
End: 2017-09-25

## 2017-09-27 ENCOUNTER — TELEPHONE (OUTPATIENT)
Dept: ONCOLOGY | Age: 57
End: 2017-09-27

## 2017-09-27 ENCOUNTER — HOSPITAL ENCOUNTER (EMERGENCY)
Age: 57
Discharge: HOME | End: 2017-09-27
Attending: EMERGENCY MEDICINE
Payer: COMMERCIAL

## 2017-09-27 ENCOUNTER — APPOINTMENT (OUTPATIENT)
Dept: GENERAL RADIOLOGY | Age: 57
End: 2017-09-27
Payer: COMMERCIAL

## 2017-09-27 VITALS
RESPIRATION RATE: 20 BRPM | SYSTOLIC BLOOD PRESSURE: 130 MMHG | WEIGHT: 245 LBS | DIASTOLIC BLOOD PRESSURE: 83 MMHG | HEART RATE: 93 BPM | OXYGEN SATURATION: 93 % | TEMPERATURE: 98.2 F | BODY MASS INDEX: 31.44 KG/M2 | HEIGHT: 74 IN

## 2017-09-27 DIAGNOSIS — J20.9 ACUTE WHEEZY BRONCHITIS: Primary | ICD-10-CM

## 2017-09-27 LAB
ABSOLUTE BANDS #: 0.21 K/UL (ref 0–1)
ABSOLUTE EOS #: 0.07 K/UL (ref 0–0.4)
ABSOLUTE LYMPH #: 0.64 K/UL (ref 1–4.8)
ABSOLUTE MONO #: 0.92 K/UL (ref 0.1–1.3)
ANION GAP SERPL CALCULATED.3IONS-SCNC: 15 MMOL/L (ref 9–17)
ATYPICAL LYMPHOCYTE ABSOLUTE COUNT: 0.14 K/UL
ATYPICAL LYMPHOCYTES: 2 %
BANDS: 3 %
BASOPHILS # BLD: 0 %
BASOPHILS ABSOLUTE: 0 K/UL (ref 0–0.2)
BUN BLDV-MCNC: 12 MG/DL (ref 6–20)
BUN/CREAT BLD: ABNORMAL (ref 9–20)
CALCIUM SERPL-MCNC: 9.2 MG/DL (ref 8.6–10.4)
CHLORIDE BLD-SCNC: 94 MMOL/L (ref 98–107)
CO2: 25 MMOL/L (ref 20–31)
CREAT SERPL-MCNC: 0.59 MG/DL (ref 0.7–1.2)
DIFFERENTIAL TYPE: ABNORMAL
EOSINOPHILS RELATIVE PERCENT: 1 %
GFR AFRICAN AMERICAN: >60 ML/MIN
GFR NON-AFRICAN AMERICAN: >60 ML/MIN
GFR SERPL CREATININE-BSD FRML MDRD: ABNORMAL ML/MIN/{1.73_M2}
GFR SERPL CREATININE-BSD FRML MDRD: ABNORMAL ML/MIN/{1.73_M2}
GLUCOSE BLD-MCNC: 259 MG/DL (ref 70–99)
HCT VFR BLD CALC: 39.6 % (ref 41–53)
HEMOGLOBIN: 13.6 G/DL (ref 13.5–17.5)
LYMPHOCYTES # BLD: 9 %
MCH RBC QN AUTO: 28.1 PG (ref 26–34)
MCHC RBC AUTO-ENTMCNC: 34.4 G/DL (ref 31–37)
MCV RBC AUTO: 81.6 FL (ref 80–100)
MONOCYTES # BLD: 13 %
MORPHOLOGY: ABNORMAL
PDW BLD-RTO: 16 % (ref 11.5–14.9)
PLATELET # BLD: 164 K/UL (ref 150–450)
PLATELET ESTIMATE: ABNORMAL
PMV BLD AUTO: 6.7 FL (ref 6–12)
POTASSIUM SERPL-SCNC: 4.1 MMOL/L (ref 3.7–5.3)
RBC # BLD: 4.85 M/UL (ref 4.5–5.9)
RBC # BLD: ABNORMAL 10*6/UL
SEG NEUTROPHILS: 72 %
SEGMENTED NEUTROPHILS ABSOLUTE COUNT: 5.12 K/UL (ref 1.3–9.1)
SODIUM BLD-SCNC: 134 MMOL/L (ref 135–144)
WBC # BLD: 7.1 K/UL (ref 3.5–11)
WBC # BLD: ABNORMAL 10*3/UL

## 2017-09-27 PROCEDURE — 94762 N-INVAS EAR/PLS OXIMTRY CONT: CPT

## 2017-09-27 PROCEDURE — 71020 XR CHEST STANDARD TWO VW: CPT

## 2017-09-27 PROCEDURE — 80048 BASIC METABOLIC PNL TOTAL CA: CPT

## 2017-09-27 PROCEDURE — 94664 DEMO&/EVAL PT USE INHALER: CPT

## 2017-09-27 PROCEDURE — 99285 EMERGENCY DEPT VISIT HI MDM: CPT

## 2017-09-27 PROCEDURE — 6370000000 HC RX 637 (ALT 250 FOR IP): Performed by: EMERGENCY MEDICINE

## 2017-09-27 PROCEDURE — 94640 AIRWAY INHALATION TREATMENT: CPT

## 2017-09-27 PROCEDURE — 36415 COLL VENOUS BLD VENIPUNCTURE: CPT

## 2017-09-27 PROCEDURE — 85025 COMPLETE CBC W/AUTO DIFF WBC: CPT

## 2017-09-27 RX ORDER — PREDNISONE 10 MG/1
TABLET ORAL
Qty: 20 TABLET | Refills: 0 | Status: SHIPPED | OUTPATIENT
Start: 2017-09-27 | End: 2017-10-07

## 2017-09-27 RX ORDER — ALBUTEROL SULFATE 2.5 MG/3ML
5 SOLUTION RESPIRATORY (INHALATION)
Status: DISCONTINUED | OUTPATIENT
Start: 2017-09-27 | End: 2017-09-27

## 2017-09-27 RX ORDER — ALBUTEROL SULFATE 90 UG/1
2 AEROSOL, METERED RESPIRATORY (INHALATION)
Status: DISCONTINUED | OUTPATIENT
Start: 2017-09-27 | End: 2017-09-27

## 2017-09-27 RX ORDER — BENZONATATE 100 MG/1
100 CAPSULE ORAL 3 TIMES DAILY PRN
Qty: 30 CAPSULE | Refills: 0 | Status: SHIPPED | OUTPATIENT
Start: 2017-09-27 | End: 2017-10-04

## 2017-09-27 RX ORDER — IPRATROPIUM BROMIDE AND ALBUTEROL SULFATE 2.5; .5 MG/3ML; MG/3ML
1 SOLUTION RESPIRATORY (INHALATION)
Status: DISCONTINUED | OUTPATIENT
Start: 2017-09-27 | End: 2017-09-27 | Stop reason: HOSPADM

## 2017-09-27 RX ORDER — ALBUTEROL SULFATE 90 UG/1
2 AEROSOL, METERED RESPIRATORY (INHALATION) EVERY 6 HOURS PRN
Qty: 1 INHALER | Refills: 0 | Status: SHIPPED | OUTPATIENT
Start: 2017-09-27 | End: 2018-01-31

## 2017-09-27 RX ORDER — BENZONATATE 100 MG/1
100 CAPSULE ORAL ONCE
Status: COMPLETED | OUTPATIENT
Start: 2017-09-27 | End: 2017-09-27

## 2017-09-27 RX ORDER — PREDNISONE 20 MG/1
60 TABLET ORAL ONCE
Status: COMPLETED | OUTPATIENT
Start: 2017-09-27 | End: 2017-09-27

## 2017-09-27 RX ADMIN — PREDNISONE 60 MG: 20 TABLET ORAL at 17:56

## 2017-09-27 RX ADMIN — BENZONATATE 100 MG: 100 CAPSULE ORAL at 17:56

## 2017-09-27 RX ADMIN — IPRATROPIUM BROMIDE AND ALBUTEROL SULFATE 1 AMPULE: .5; 3 SOLUTION RESPIRATORY (INHALATION) at 17:48

## 2017-09-27 RX ADMIN — IPRATROPIUM BROMIDE AND ALBUTEROL SULFATE 1 AMPULE: .5; 3 SOLUTION RESPIRATORY (INHALATION) at 17:39

## 2017-09-27 ASSESSMENT — ENCOUNTER SYMPTOMS
DIARRHEA: 0
SORE THROAT: 0
SHORTNESS OF BREATH: 1
NAUSEA: 0
EYE PAIN: 0
VOMITING: 0
ABDOMINAL PAIN: 0
BACK PAIN: 0
COUGH: 1

## 2017-09-28 DIAGNOSIS — C19 COLORECTAL CANCER, STAGE IV (HCC): ICD-10-CM

## 2017-09-30 RX ORDER — OXYCODONE AND ACETAMINOPHEN 10; 325 MG/1; MG/1
TABLET ORAL
Qty: 120 TABLET | Refills: 0 | OUTPATIENT
Start: 2017-09-30

## 2017-09-30 RX ORDER — OXYCODONE AND ACETAMINOPHEN 10; 325 MG/1; MG/1
TABLET ORAL
Qty: 120 TABLET | Refills: 0 | Status: SHIPPED | OUTPATIENT
Start: 2017-09-30 | End: 2017-10-31 | Stop reason: SDUPTHER

## 2017-10-10 ENCOUNTER — OFFICE VISIT (OUTPATIENT)
Dept: ONCOLOGY | Age: 57
End: 2017-10-10
Payer: COMMERCIAL

## 2017-10-10 VITALS
TEMPERATURE: 98.2 F | DIASTOLIC BLOOD PRESSURE: 85 MMHG | SYSTOLIC BLOOD PRESSURE: 129 MMHG | RESPIRATION RATE: 18 BRPM | WEIGHT: 253.1 LBS | HEART RATE: 85 BPM | BODY MASS INDEX: 32.5 KG/M2

## 2017-10-10 DIAGNOSIS — C78.7 HEPATIC METASTASES (HCC): ICD-10-CM

## 2017-10-10 DIAGNOSIS — C19 COLORECTAL CANCER, STAGE IV (HCC): Primary | ICD-10-CM

## 2017-10-10 PROCEDURE — 99214 OFFICE O/P EST MOD 30 MIN: CPT | Performed by: INTERNAL MEDICINE

## 2017-10-10 PROCEDURE — 99211 OFF/OP EST MAY X REQ PHY/QHP: CPT

## 2017-10-10 NOTE — PROGRESS NOTES
Chemistry        Component Value Date/Time     (L) 09/27/2017 1743    K 4.1 09/27/2017 1743    CL 94 (L) 09/27/2017 1743    CO2 25 09/27/2017 1743    BUN 12 09/27/2017 1743    CREATININE 0.59 (L) 09/27/2017 1743        Component Value Date/Time    CALCIUM 9.2 09/27/2017 1743    ALKPHOS 90 08/14/2017 0846    AST 16 08/14/2017 0846    ALT 15 08/14/2017 0846    BILITOT 0.32 08/14/2017 0846        Lab Results   Component Value Date    CEA 8.2 (H) 08/14/2017         IMPRESSION:   1. Metastatic rectal cancer with liver metastases  2. Active coronary artery disease status post stenting  3. Rectal bleeding secondary to tumor and antiplatelets, slowed down after switching to plavix   4. Excellent response in the liver with minimal response in the primary tumor  5. Undergoing chemoradiation with 5-FU  6. 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 Management conservative   7. Neuropathy is contributing to his pain. 8. Pain, poorly controlled. PLAN:   1. We reviewed his recent CT which shows enlargement of liver mass. There is no progression of metastatic disease outside of the liver. 2. I believe the increase in cancer marker is reflection of liver mass growth . 3. I explained that ablation would be next step in treatment to address liver lesions. 4. I Would like to repeat his endoscopy to assess his response to chemoradiation  5. I am referring him to Dr. Bronson Dunbar for scope. 6. I am referring him to Dr. Nya Frederick for consideration of liver directed therapy, possibly ablation  7. It is likely that he will go back to chemotherapy but we will await evaluation by GI and IR  8. Return in 1 month to review results and discuss further treatment.

## 2017-10-10 NOTE — LETTER
Clay Dunlap MD    10/11/2017     No primary care provider on file. No primary provider on file. Dear Miguel Mckenzie : Thank you for referring Hugo Urrutia, 1960, to me for evaluation. Below are the relevant portions of my assessment and plan of care. DIAGNOSIS:   1. Metastatic rectal cancer, Kras mutated. 2. Bleeding secondary to the tumor  3. Liver metastases  4. Coronary artery disease, presenting with ventricular fibrillation, needed urgent cardiac catheterization and stent, diagnosis was one week prior to diagnosis of metastatic cancer  CURRENT THERAPY:  1. Status post coronary stenting  2. Started palliative chemotherapy with FOLFOX, Avastin was held because of active bleeding  3. CT scan showed excellent response in the liver. But the primary tumor was almost the same size. 4. Chemoradiation with 5-FU sensitization to deal with the primary tumor  BRIEF CASE HISTORY:   Hugo Urrutia is a very pleasant 49 y.o. male who was admitted to the hospital with  rectal bleeding and change in bowel habits. He is found to have rectal mass. And multiple colonic polyps. bx was done. And showed adenocarcinoma of the rectum, CT scan showed liver metastases that were biopsy-proven to be metastatic disease. Pt had recent MI and stenting. He is on double antiplatelet agents. Initially he was on Berlinta and aspirin but that was later changed to Plavix and aspirin  We decided to start with chemotherapy, we chose the combination of FOLFOX. We held the Avastin because of ongoing bleeding and possible need for surgery. Chemotherapy was well tolerated. Interim CT scan showed excellent response in the liver with significant shrinkage of the liver metastases. However, the primary tumor was almost the same size and that was confirmed with endoscopic ultrasound. The patient was sent back for chemoradiation with 5-FU.   He developed significant radiation enteritis and cystitis , managed conservatively. INTERIM HISTORY: The patient comes in today for a follow up and discuss CT results. His pain is currently under control. His bowel movements are regular, he does not have blood in the stool. He has a cough with wheezing - he had recent chest x-ray that was essentially negative. He is still struggling with neuropathy in the feet but he feels Neurontin is not working. He continues to use Fentanyl patch and Percocet. PAST MEDICAL HISTORY: has a past medical history of Back pain; CAD S/P percutaneous coronary angioplasty; Carcinoma ([facility] [address] Degeneration of lumbar or lumbosacral intervertebral disc; Depression; Diabetes mellitus ([facility] [address] H/O cardiac catheterization; Hepatic metastases ([facility] [address] Hyperlipidemia; Hypertension; Knee pain; and MI, old. PAST SURGICAL HISTORY: has a past surgical history that includes hernia repair; Tunneled venous port placement (Right); Cardiac surgery (11/2016); and sigmoidoscopy flx w/rmvl foreign body (N/A, 5/25/2017). CURRENT MEDICATIONS:  has a current medication list which includes the following prescription(s): oxycodone-acetaminophen, albuterol sulfate hfa, fentanyl, blood glucose test strips, gabapentin, insulin pen needle, aspirin low dose, lisinopril, metoprolol tartrate, clopidogrel, insulin glargine, atorvastatin, amiodarone, and nitroglycerin, and the following Facility-Administered Medications: sodium chloride flush. ALLERGIES:  is allergic to morphine. FAMILY HISTORY: Negative for any hematological or oncological conditions. SOCIAL HISTORY:  reports that he has never smoked. He has never used smokeless tobacco. He reports that he does not drink alcohol or use drugs. REVIEW OF SYSTEMS:  General: no fever or night sweats, + abd pain . Moderate fatigue. ENT: No double or blurred vision, no tinnitus or hearing problem, no dysphagia. Mild Sore throat and dryness, chemo induced.   Respiratory: No chest pain, no shortness of breath, no cough or hemoptysis. Cardiovascular: Denies chest pain, PND or orthopnea. No L E swelling or palpitations. Gastrointestinal: No nausea or vomiting, +abdominal pain, + diarrhea/ rectal bleeding bleeding has stopped. Genitourinary: + dysuria, + intermittent  Hematuria (none recently) , no frequency, urgency or incontinence. Neurological: Denies headaches, decreased LOC, grade 2 sensory neuropathy   Musculoskeletal: No arthralgia or joint swelling. Skin: There are no rashes or bleeding. Psychiatric: No anxiety, no depression. Endocrine: no diabetes or thyroid disease. Hematologic: no bleeding , no adenopathy. PHYSICAL EXAM: Shows a well appearing 49y.o.-year-old male who is not in pain or distress. Vital Signs: Blood pressure 129/85, pulse 85, temperature 98.2 °F (36.8 °C), temperature source Oral, resp. rate 18, weight 253 lb 1.6 oz (114.8 kg). HEENT: Normocephalic and atraumatic. Pupils are equal, round, reactive to light and accommodation. Extraocular muscles are intact. Tooth extraction top right, no bleeding, no infection, well healed. Neck: Showed no JVD, no carotid bruit. Lungs: Clear to auscultation bilaterally. Heart: Regular without any murmur. Abdomen: Soft, nontender. No hepatosplenomegaly. Extremities: Lower extremities show no edema, clubbing, or cyanosis. Neuro exam: intact cranial nerves bilaterally no motor or sensory deficit, gait is normal. Lymphatic: no adenopathy appreciated in the supraclavicular, axillary, cervical or inguinal area  Rectal exam was not done today    REVIEW OF RADIOLOGICAL RESULTS:   09/27/2017 XR CHEST IMPRESSION:  No acute findings. No change.      09/22/2017 CT CHEST ABDOMEN PELVIS IMPRESSION:  *No evidence of metastatic disease seen within the chest.   *Interval enlargement of the patient's known metastatic lesions involving the   liver largest seen within segment 4A/ 8 of the liver measuring 2.8 x 2.5 cm in greatest axial dimensions.  No CT evidence of progression of metastatic   disease outside the liver within the abdomen or pelvis. REVIEW OF LABORATORY DATA:   Lab Results   Component Value Date    WBC 7.1 09/27/2017    HGB 13.6 09/27/2017    HCT 39.6 (L) 09/27/2017    MCV 81.6 09/27/2017     09/27/2017       Chemistry        Component Value Date/Time     (L) 09/27/2017 1743    K 4.1 09/27/2017 1743    CL 94 (L) 09/27/2017 1743    CO2 25 09/27/2017 1743    BUN 12 09/27/2017 1743    CREATININE 0.59 (L) 09/27/2017 1743        Component Value Date/Time    CALCIUM 9.2 09/27/2017 1743    ALKPHOS 90 08/14/2017 0846    AST 16 08/14/2017 0846    ALT 15 08/14/2017 0846    BILITOT 0.32 08/14/2017 0846        Lab Results   Component Value Date    CEA 8.2 (H) 08/14/2017         IMPRESSION:   1. Metastatic rectal cancer with liver metastases  2. Active coronary artery disease status post stenting  3. Rectal bleeding secondary to tumor and antiplatelets, slowed down after switching to plavix   4. Excellent response in the liver with minimal response in the primary tumor  5. Undergoing chemoradiation with 5-FU  6. 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 Management conservative   7. Neuropathy is contributing to his pain. 8. Pain, poorly controlled. PLAN:   1. We reviewed his recent CT which shows enlargement of liver mass. There is no progression of metastatic disease outside of the liver. 2. I believe the increase in cancer marker is reflection of liver mass growth . 3. I explained that ablation would be next step in treatment to address liver lesions. 4. I Would like to repeat his endoscopy to assess his response to chemoradiation  5. I am referring him to Dr. Aby Alas for scope. 6. I am referring him to Dr. Mahnaz Bell for consideration of liver directed therapy, possibly ablation  7.  It is likely that he will go back to chemotherapy but we will await evaluation by GI and IR  8. Return in 1 month to review results and discuss further treatment. If you have questions, please do not hesitate to call me. I look forward to following Lila Higuera along with you.     Sincerely,    Michel Harris MD  Hematology/ Oncology  Cell [phone]

## 2017-10-18 ENCOUNTER — HOSPITAL ENCOUNTER (OUTPATIENT)
Dept: INTERVENTIONAL RADIOLOGY/VASCULAR | Age: 57
Discharge: HOME | End: 2017-10-18

## 2017-10-18 DIAGNOSIS — C78.7 HEPATIC METASTASES (HCC): ICD-10-CM

## 2017-10-24 ENCOUNTER — HOSPITAL ENCOUNTER (OUTPATIENT)
Dept: PREADMISSION TESTING | Age: 57
Discharge: HOME | End: 2017-10-24
Payer: COMMERCIAL

## 2017-10-24 VITALS
DIASTOLIC BLOOD PRESSURE: 92 MMHG | HEART RATE: 76 BPM | RESPIRATION RATE: 20 BRPM | WEIGHT: 252 LBS | SYSTOLIC BLOOD PRESSURE: 128 MMHG | BODY MASS INDEX: 32.34 KG/M2 | HEIGHT: 74 IN

## 2017-10-24 LAB
ABSOLUTE EOS #: 0.2 K/UL (ref 0–0.4)
ABSOLUTE IMMATURE GRANULOCYTE: ABNORMAL K/UL (ref 0–0.3)
ABSOLUTE LYMPH #: 0.7 K/UL (ref 1–4.8)
ABSOLUTE MONO #: 0.6 K/UL (ref 0.2–0.8)
ANION GAP SERPL CALCULATED.3IONS-SCNC: 10 MMOL/L (ref 9–17)
BASOPHILS # BLD: 1 %
BASOPHILS ABSOLUTE: 0 K/UL (ref 0–0.2)
BUN BLDV-MCNC: 9 MG/DL (ref 6–20)
BUN/CREAT BLD: 14 (ref 9–20)
CALCIUM SERPL-MCNC: 9 MG/DL (ref 8.6–10.4)
CHLORIDE BLD-SCNC: 102 MMOL/L (ref 98–107)
CO2: 28 MMOL/L (ref 20–31)
CREAT SERPL-MCNC: 0.64 MG/DL (ref 0.7–1.2)
DIFFERENTIAL TYPE: ABNORMAL
EOSINOPHILS RELATIVE PERCENT: 4 %
GFR AFRICAN AMERICAN: >60 ML/MIN
GFR NON-AFRICAN AMERICAN: >60 ML/MIN
GFR SERPL CREATININE-BSD FRML MDRD: ABNORMAL ML/MIN/{1.73_M2}
GFR SERPL CREATININE-BSD FRML MDRD: ABNORMAL ML/MIN/{1.73_M2}
GLUCOSE BLD-MCNC: 273 MG/DL (ref 70–99)
HCT VFR BLD CALC: 42 % (ref 41–53)
HEMOGLOBIN: 13.9 G/DL (ref 13.5–17.5)
IMMATURE GRANULOCYTES: ABNORMAL %
INR BLD: 1
LYMPHOCYTES # BLD: 12 %
MCH RBC QN AUTO: 27.1 PG (ref 26–34)
MCHC RBC AUTO-ENTMCNC: 33 G/DL (ref 31–37)
MCV RBC AUTO: 82.3 FL (ref 80–100)
MONOCYTES # BLD: 12 %
PARTIAL THROMBOPLASTIN TIME: 24.5 SEC (ref 23–31)
PDW BLD-RTO: 15.3 % (ref 11.5–14.5)
PLATELET # BLD: 229 K/UL (ref 130–400)
PLATELET ESTIMATE: ABNORMAL
PMV BLD AUTO: 6.1 FL (ref 6–12)
POTASSIUM SERPL-SCNC: 5.2 MMOL/L (ref 3.7–5.3)
PROTHROMBIN TIME: 9.9 SEC (ref 9.7–11.6)
RBC # BLD: 5.1 M/UL (ref 4.5–5.9)
RBC # BLD: ABNORMAL 10*6/UL
SEG NEUTROPHILS: 71 %
SEGMENTED NEUTROPHILS ABSOLUTE COUNT: 3.7 K/UL (ref 1.8–7.7)
SODIUM BLD-SCNC: 140 MMOL/L (ref 135–144)
WBC # BLD: 5.3 K/UL (ref 3.5–11)
WBC # BLD: ABNORMAL 10*3/UL

## 2017-10-24 PROCEDURE — 36415 COLL VENOUS BLD VENIPUNCTURE: CPT

## 2017-10-24 PROCEDURE — 85025 COMPLETE CBC W/AUTO DIFF WBC: CPT

## 2017-10-24 PROCEDURE — 85610 PROTHROMBIN TIME: CPT

## 2017-10-24 PROCEDURE — 85730 THROMBOPLASTIN TIME PARTIAL: CPT

## 2017-10-24 PROCEDURE — 80048 BASIC METABOLIC PNL TOTAL CA: CPT

## 2017-10-24 ASSESSMENT — PAIN SCALES - GENERAL: PAINLEVEL_OUTOF10: 3

## 2017-10-24 ASSESSMENT — PAIN DESCRIPTION - ORIENTATION: ORIENTATION: LOWER

## 2017-10-24 ASSESSMENT — PAIN DESCRIPTION - LOCATION: LOCATION: BACK;NECK

## 2017-10-24 ASSESSMENT — PAIN DESCRIPTION - PAIN TYPE: TYPE: CHRONIC PAIN

## 2017-10-24 NOTE — H&P
History and Physical    Pt Name: Siobhan Tijerina  MRN: [id number]  YOB: 1960  Date of evaluation: 10/24/2017  Primary Care Physician: No primary care provider on file. SUBJECTIVE:   History of Chief Complaint: This is Siobhan Tijerina a 50 yo male who presents today for a PAT for scheduled at 8:30 am for a microwave ablation liver. Patient was diagnosed with metastatic rectal cancer (12/2016), with liver metastases. Patient has a histroy of chemotherapy and radiation treatments. Was diagnosed with bronchitis on 9/27/17, patient states still has a cough with wheezing present. Patient takes plavix daily, has a history of  an old MI and cardiac stenting 11/2016. Patient will be seeing cardiologist for cardiac clearance. Patient denies fever or chills, denies chest pain. Past Medical History    has a past medical history of Back pain; CAD S/P percutaneous coronary angioplasty; Carcinoma ([address] Degeneration of lumbar or lumbosacral intervertebral disc; Depression; Diabetes mellitus ([address] H/O cardiac catheterization; Hepatic metastases ([address] Hyperlipidemia; Hypertension; Knee pain; and MI, old. Past Surgical History   has a past surgical history that includes Tunneled venous port placement (Right); Cardiac surgery (11/2016); sigmoidoscopy flx w/rmvl foreign body (N/A, 5/25/2017); and hernia repair. Medications    Current Outpatient Prescriptions:     METFORMIN HCL PO, Take by mouth, Disp: , Rfl:     oxyCODONE-acetaminophen (PERCOCET)  MG per tablet, Take 1 tablet by mouth every 6 hours as needed for Pain ., Disp: 120 tablet, Rfl: 0    albuterol sulfate HFA (PROAIR HFA) 108 (90 Base) MCG/ACT inhaler, Inhale 2 puffs into the lungs every 6 hours as needed for Wheezing, Disp: 1 Inhaler, Rfl: 0    fentaNYL (DURAGESIC) 25 MCG/HR, Place 1 patch onto the skin every 72 hours . , Disp: 10 patch, Rfl: 0    Glucose Blood (BLOOD GLUCOSE TEST STRIPS) STRP, 1 each by In Vitro route daily As needed. , Disp: 100 strip, Rfl: 5    gabapentin (NEURONTIN) 300 MG capsule, Take 1 capsule by mouth 3 times daily, Disp: 90 capsule, Rfl: 3    Insulin Pen Needle 29G X 12.7MM MISC, 1 each by Does not apply route daily, Disp: 100 each, Rfl: 0    ASPIRIN LOW DOSE 81 MG EC tablet, Take 81 mg by mouth daily , Disp: , Rfl:     lisinopril (PRINIVIL;ZESTRIL) 5 MG tablet, Take 5 mg by mouth daily , Disp: , Rfl:     metoprolol tartrate (LOPRESSOR) 25 MG tablet, Take 25 mg by mouth daily , Disp: , Rfl:     clopidogrel (PLAVIX) 75 MG tablet, Take 75 mg by mouth daily , Disp: , Rfl:     insulin glargine (LANTUS) 100 UNIT/ML injection vial, Inject 15 Units into the skin nightly. If blood glucose >200mg/dL then inject 18 Units into the skin., Disp: 1 vial, Rfl: 3    atorvastatin (LIPITOR) 80 MG tablet, Take 1 tablet by mouth nightly, Disp: 30 tablet, Rfl: 3    amiodarone (PACERONE) 400 MG tablet, Take 1 tablet by mouth daily, Disp: 30 tablet, Rfl: 3    nitroGLYCERIN (NITROSTAT) 0.4 MG SL tablet, Place 1 tablet under the tongue every 5 minutes as needed for Chest pain Dissolve 1 tablet under tongue for chest pain and repeat every 5 min up to max of 3 total doses. If no relief after 3 doses call 911, Disp: 25 tablet, Rfl: 3    Current Facility-Administered Medications:     sodium chloride flush 0.9 % injection 10 mL, 10 mL, Intravenous, PRN, Reena Fitzpatrick MD  Allergies  is allergic to morphine. Family History  family history includes Heart Disease in his mother; High Blood Pressure in his mother; High Cholesterol in his father; Stroke in his mother. Social History  TOBACCO:   reports that he has never smoked. He has never used smokeless tobacco.  Drug use:  reports that he does not use drugs. ETOH:   reports that he does not drink alcohol.   OCCUPATION:  Not employed  Lives with: spouse  Who do you see on a regular basis that helps you: spouse    ROS:  CONSTITUTIONAL: Denies fevers, chills, sweats, fatigue, rhythm without murmur, gallop or rub. ABDOMEN: soft, bowel sounds active, non tender, non distended, no masses or organomegaly   EXTREMITIES: no edema bilateral lower extremities , pulses within normal limits  NEURO: Cranial Nerves II-XII  grossly intact  Testing:   EKG: NSR    Hemoglobin   Date/Time Value Ref Range Status   09/27/2017 05:43 PM 13.6 13.5 - 17.5 g/dL Final     Hematocrit   Date/Time Value Ref Range Status   09/27/2017 05:43 PM 39.6 (L) 41 - 53 % Final     WBC   Date/Time Value Ref Range Status   09/27/2017 05:43 PM 7.1 3.5 - 11.0 k/uL Final     Sodium   Date/Time Value Ref Range Status   09/27/2017 05:43  (L) 135 - 144 mmol/L Final     Potassium   Date/Time Value Ref Range Status   09/27/2017 05:43 PM 4.1 3.7 - 5.3 mmol/L Final     Chloride   Date/Time Value Ref Range Status   09/27/2017 05:43 PM 94 (L) 98 - 107 mmol/L Final     CO2   Date/Time Value Ref Range Status   09/27/2017 05:43 PM 25 20 - 31 mmol/L Final     BUN   Date/Time Value Ref Range Status   09/27/2017 05:43 PM 12 6 - 20 mg/dL Final     CREATININE   Date/Time Value Ref Range Status   09/27/2017 05:43 PM 0.59 (L) 0.70 - 1.20 mg/dL Final     Glucose   Date/Time Value Ref Range Status   09/27/2017 05:43  (H) 70 - 99 mg/dL Final   04/26/2012 10:56  (H) 74 - 106 mg/dL Final     INR   Date/Time Value Ref Range Status   02/12/2017 08:12 PM 1.0  Final     Comment:           Therapeutic Range: Moderate Anticoagulant Intensity:     INR = 2.0-3.0   High Anticoagulant Intensity:     INR = 2.5-3.5        [address], [address] [place] [address] [phone]       Chest XRay:        IMPRESSIONS:   Provisional Diagnosis:  Metastatic      has a past medical history of Back pain (1/30/2013); CAD S/P percutaneous coronary angioplasty (11/27/2016); Carcin[address] ([facility] (12/04/2016); Degeneration of lumbar or lumbosacral intervertebral disc (4/15/2014); Depression (5/10/2012);  Diabetes mellitus ([facility] H/O cardiac catheterization; Hepatic metastases ([facility] (12/4/2016); Hyperlipidemia; Hypertension; Knee pain; and MI, old. PLANS:   No diagnosis found.   Microwave ablation liver    Xena All FNP NP-C  Electronically signed 10/24/2017 at 11:15 AM

## 2017-10-24 NOTE — PRE-PROCEDURE INSTRUCTIONS
ARRIVE AT [facility] [address] ON Tuesday, 11/7/2017 at 700 PM    Once you enter the hospital lobby, take the elevators to the second floor. Check-In is at the surgery registration desk. If you have been given a blood band, you must bring it with you the day of surgery. Continue to take your home medications as you normally do up to and including the night before surgery with the exception of any blood thinning medications. Please stop any blood thinning medications as directed by your surgeon or prescribing physician. Failure to stop certain medications may interfere with your scheduled surgery. These may include:  Aspirin, Warfarin (Coumadin), Clopidogrel (Plavix), Ibuprofen (Motrin, Advil), Naproxen (Aleve), Meloxicam (Mobic), Celecoxib (Celebrex), Eliquis, Pradaxa, Xarelto, Effient, Fish Oil, Herbal supplements. Check with your Cardiologist regarding stopping the  Plavix and ASA    If you are diabetic, do not take any of your diabetic medications by mouth the morning of surgery. If you are taking insulin contact the doctor that manages your diabetes for instructions about any changes to your insulin dosages the day before surgery. Do not inject insulin or other injectable diabetic medications the morning of surgery unless otherwise instructed by the doctor who manages your diabetes. Please take the following medication(s) the day of surgery with a small sip of water:  Metoprolol Amiodarone  Please use your inhalers at home the day of surgery. PREPARING FOR YOUR SURGERY:     Before surgery, you can play an important role in your own health. Because skin is not sterile, we need to be sure that your skin is as free of germs as possible before surgery by carefully washing before surgery. Preparing or prepping skin before surgery can reduce the risk of a surgical site infection.   Do not shave the area of your body where your surgery will be performed unless you received specific permission from your physician. You will need to shower at home the night before surgery and the morning of surgery with a special soap called chlorhexidine gluconate (CHG*). *Not to be used by people allergic to Chlorhexidine Gluconate (CHG). Following these instructions will help you be sure that your skin is clean before surgery. Instructions on cleaning your skin before surgery: The night before your surgery:      You will need to shower with warm water (not hot) and the CHG soap.  Use a clean wash cloth and a clean towel. Have clean clothes available to put on after the shower.    First wash your hair with regular shampoo. Rinse your hair and body thoroughly to remove the shampoo. [facility] Wash your face with your regular soap or water only. Thoroughly rinse your body with warm water from the neck down.  Turn water off to prevent rinsing the soap off too soon.  With a clean wet washcloth and half of the CHG soap in the bottle, lather your entire body from the neck down. Do not use CHG soap near your eyes or ears to avoid injury to those areas.  Wash thoroughly, paying special attention to the area where your surgery will be performed.  Wash your body gently for five (5) minutes. Avoid scrubbing your skin too hard.  Turn the water back on and rinse your body thoroughly.  Pat yourself dry with a clean, soft towel. Do not apply lotion, cream or powder.  Dress with clean freshly washed clothes. The morning of surgery:     Repeat shower following steps above - using remaining half of CHG soap in bottle. Patient Instructions:    [facility] If you are having any type of anesthesia you are to have nothing to eat or drink after midnight the night before your surgery. This includes gum, mints, water or smoking or chewing tobacco.  The only exception to this is a small sip of water to take with any morning dose of heart, blood pressure, or seizure medications.   No alcoholic beverages for 24 hours prior to surgery.  Bring a list of all medications you take, along with the dose of the medications and how often you take it. If more convenient bring the pharmacy bottles in a zip lock bag.  Brush your teeth but do not swallow water.  Bring your eyeglasses and case with you. No contacts are to be worn the day of surgery. You also may bring your hearing aids.  If you are on C-PAP or Bi-PAP at home and plan on staying in the hospital overnight for your surgery please bring the machine with you. · Do not wear any jewelry or body piercings day of surgery. Also, NO lotion, perfume or deodorant to be used the day of surgery. No nail polish on the operative extremity (arm/leg surgeries)    · Do not bring any valuables such as jewelry, cash, or credit cards. If you are staying overnight with us, please bring a small bag of personal items.  Please wear loose, comfortable clothing. If you are potentially going to have a cast or brace bring clothing that will fit over them.  In case of illness - If you have cold or flu like symptoms (high fever, runny nose, sore throat, cough, etc.) rash, nausea, vomiting, loose stools, and/or recent contact with someone who has a contagious disease (chicken pox, measles, etc.) Please call your doctor before coming to the hospital.     If your child is having surgery please make arrangements for any other children to be cared for at home on the day of surgery. Other children are not permitted in recovery room and we want you to be able to spend time with the patient. If other arrangements are not available then we suggest that you have a second adult to stay in the waiting room.        Day of Surgery/Procedure:    As a patient at [facility] you can expect quality medical and nursing care that is centered on your individual needs. Our goal is to make your surgical experience as comfortable as possible    . Transportation After Your Surgery/Procedure: You will need a friend or family member to drive you home after your procedure. Your  must be 25years of age or older and able to sign off on your discharge instructions. A taxi cab or any other form of public transportation is not acceptable. Your friend or family member must stay at the hospital throughout your procedure. Someone must remain with you for the first 24 hours after your surgery if you receive anesthesia or medication. If you do not have someone to stay with you, your procedure may be cancelled.       If you have any other questions regarding your procedure or the day of surgery, please call [phone]      _________________________  ____________________________  Signature (Patient)              Signature (Provider) & date

## 2017-10-26 DIAGNOSIS — C19 COLORECTAL CANCER, STAGE IV (HCC): ICD-10-CM

## 2017-10-26 RX ORDER — FENTANYL 25 UG/H
PATCH TRANSDERMAL
Qty: 10 PATCH | Refills: 0 | Status: SHIPPED | OUTPATIENT
Start: 2017-10-26 | End: 2017-11-29 | Stop reason: SDUPTHER

## 2017-10-31 DIAGNOSIS — C19 COLORECTAL CANCER, STAGE IV (HCC): ICD-10-CM

## 2017-11-01 ENCOUNTER — TELEPHONE (OUTPATIENT)
Dept: ONCOLOGY | Age: 57
End: 2017-11-01

## 2017-11-01 RX ORDER — OXYCODONE AND ACETAMINOPHEN 10; 325 MG/1; MG/1
TABLET ORAL
Qty: 120 TABLET | Refills: 0 | Status: SHIPPED | OUTPATIENT
Start: 2017-11-01 | End: 2017-11-29 | Stop reason: SDUPTHER

## 2017-11-01 NOTE — TELEPHONE ENCOUNTER
Manjinder Lu called again today about his percocets. I informed him the request was sent to Dr. Ellen Lama yesterday. So it waiting for his approval. He stated he see's the doctor tomorrow.

## 2017-11-03 ENCOUNTER — TELEPHONE (OUTPATIENT)
Dept: ONCOLOGY | Age: 57
End: 2017-11-03

## 2017-11-03 NOTE — TELEPHONE ENCOUNTER
Patient called the office stating that his Medicare is . Christel Méndez stated that he also no longer has [employer]. Dr Alphonso Hernandez ordered for him to get a colonoscopy and liver ablation, which requires him to get clearance from his cardiologist. Christel Méndez stated that his cardiologist refused to see him because he no longer has insurance and Medicaid is not active. Andrewdevicastillo Méndez is asking if there is any assistance for him to get his cariologist's appt paid for. I sent a secure email to [facility] and Christin Bueno to see if any assistance is available. Will f/u with patient once I talk to Floresita Richmond or [place].  Kelsey Mrorell

## 2017-11-03 NOTE — TELEPHONE ENCOUNTER
I spoke with Sourav Griffith and she stated she spoke with Dr Beata Burgos office and they will not waive $50 copay fee for patient to see cardiologist. Dr Werner Head, please advise how you want to address this.  Kelsey Morrell

## 2017-11-06 ENCOUNTER — TELEPHONE (OUTPATIENT)
Dept: INTERVENTIONAL RADIOLOGY/VASCULAR | Age: 57
End: 2017-11-06

## 2017-11-06 RX ORDER — SODIUM CHLORIDE 9 MG/ML
INJECTION, SOLUTION INTRAVENOUS CONTINUOUS
Status: CANCELLED | OUTPATIENT
Start: 2017-11-07

## 2017-11-06 RX ORDER — SODIUM CHLORIDE 0.9 % (FLUSH) 0.9 %
10 SYRINGE (ML) INJECTION EVERY 12 HOURS SCHEDULED
Status: CANCELLED | OUTPATIENT
Start: 2017-11-06

## 2017-11-06 RX ORDER — SODIUM CHLORIDE, SODIUM LACTATE, POTASSIUM CHLORIDE, CALCIUM CHLORIDE 600; 310; 30; 20 MG/100ML; MG/100ML; MG/100ML; MG/100ML
INJECTION, SOLUTION INTRAVENOUS CONTINUOUS
Status: CANCELLED | OUTPATIENT
Start: 2017-11-07

## 2017-11-06 RX ORDER — LIDOCAINE HYDROCHLORIDE 10 MG/ML
1 INJECTION, SOLUTION EPIDURAL; INFILTRATION; INTRACAUDAL; PERINEURAL
Status: CANCELLED | OUTPATIENT
Start: 2017-11-07 | End: 2017-11-07

## 2017-11-06 RX ORDER — SODIUM CHLORIDE 0.9 % (FLUSH) 0.9 %
10 SYRINGE (ML) INJECTION PRN
Status: CANCELLED | OUTPATIENT
Start: 2017-11-06

## 2017-11-07 ENCOUNTER — HOSPITAL ENCOUNTER (OUTPATIENT)
Dept: CT IMAGING | Age: 57
Discharge: HOME | End: 2017-11-07
Payer: COMMERCIAL

## 2017-11-29 DIAGNOSIS — C19 COLORECTAL CANCER, STAGE IV (HCC): ICD-10-CM

## 2017-11-30 RX ORDER — FENTANYL 25 UG/H
1 PATCH TRANSDERMAL
Qty: 10 PATCH | Refills: 0 | Status: SHIPPED | OUTPATIENT
Start: 2017-11-30 | End: 2017-12-27 | Stop reason: SDUPTHER

## 2017-11-30 RX ORDER — OXYCODONE AND ACETAMINOPHEN 10; 325 MG/1; MG/1
TABLET ORAL
Qty: 120 TABLET | Refills: 0 | Status: SHIPPED | OUTPATIENT
Start: 2017-11-30 | End: 2017-12-27 | Stop reason: SDUPTHER

## 2017-12-08 ENCOUNTER — ANESTHESIA EVENT (OUTPATIENT)
Dept: ENDOSCOPY | Age: 57
End: 2017-12-08
Payer: COMMERCIAL

## 2017-12-08 ENCOUNTER — HOSPITAL ENCOUNTER (OUTPATIENT)
Dept: ULTRASOUND IMAGING | Age: 57
Setting detail: OUTPATIENT SURGERY
Discharge: HOME | End: 2017-12-08
Attending: INTERNAL MEDICINE
Payer: COMMERCIAL

## 2017-12-08 ENCOUNTER — ANESTHESIA (OUTPATIENT)
Dept: ENDOSCOPY | Age: 57
End: 2017-12-08
Payer: COMMERCIAL

## 2017-12-08 ENCOUNTER — HOSPITAL ENCOUNTER (OUTPATIENT)
Age: 57
Setting detail: OUTPATIENT SURGERY
Discharge: HOME | End: 2017-12-08
Attending: INTERNAL MEDICINE | Admitting: INTERNAL MEDICINE
Payer: COMMERCIAL

## 2017-12-08 VITALS
RESPIRATION RATE: 16 BRPM | HEART RATE: 78 BPM | WEIGHT: 260 LBS | OXYGEN SATURATION: 96 % | BODY MASS INDEX: 33.37 KG/M2 | TEMPERATURE: 98 F | DIASTOLIC BLOOD PRESSURE: 77 MMHG | SYSTOLIC BLOOD PRESSURE: 125 MMHG | HEIGHT: 74 IN

## 2017-12-08 VITALS
DIASTOLIC BLOOD PRESSURE: 69 MMHG | SYSTOLIC BLOOD PRESSURE: 100 MMHG | OXYGEN SATURATION: 94 % | RESPIRATION RATE: 19 BRPM

## 2017-12-08 DIAGNOSIS — R10.84 GENERALIZED ABDOMINAL PAIN: ICD-10-CM

## 2017-12-08 LAB — GLUCOSE BLD-MCNC: 246 MG/DL (ref 75–110)

## 2017-12-08 PROCEDURE — 76975 GI ENDOSCOPIC ULTRASOUND: CPT

## 2017-12-08 PROCEDURE — 2580000003 HC RX 258: Performed by: INTERNAL MEDICINE

## 2017-12-08 PROCEDURE — 3609018700 HC ENDOSCOPIC ULTRASOUND: Performed by: INTERNAL MEDICINE

## 2017-12-08 PROCEDURE — 7100000010 HC PHASE II RECOVERY - FIRST 15 MIN: Performed by: INTERNAL MEDICINE

## 2017-12-08 PROCEDURE — 2500000003 HC RX 250 WO HCPCS: Performed by: NURSE ANESTHETIST, CERTIFIED REGISTERED

## 2017-12-08 PROCEDURE — 6360000002 HC RX W HCPCS: Performed by: NURSE ANESTHETIST, CERTIFIED REGISTERED

## 2017-12-08 PROCEDURE — 82947 ASSAY GLUCOSE BLOOD QUANT: CPT

## 2017-12-08 PROCEDURE — 3609008300 HC SIGMOIDOSCOPY W/BIOPSY SINGLE/MULTIPLE: Performed by: INTERNAL MEDICINE

## 2017-12-08 PROCEDURE — 88305 TISSUE EXAM BY PATHOLOGIST: CPT

## 2017-12-08 PROCEDURE — 3700000001 HC ADD 15 MINUTES (ANESTHESIA): Performed by: INTERNAL MEDICINE

## 2017-12-08 PROCEDURE — 3700000000 HC ANESTHESIA ATTENDED CARE: Performed by: INTERNAL MEDICINE

## 2017-12-08 PROCEDURE — 7100000011 HC PHASE II RECOVERY - ADDTL 15 MIN: Performed by: INTERNAL MEDICINE

## 2017-12-08 RX ORDER — PROPOFOL 10 MG/ML
INJECTION, EMULSION INTRAVENOUS CONTINUOUS PRN
Status: DISCONTINUED | OUTPATIENT
Start: 2017-12-08 | End: 2017-12-08 | Stop reason: SDUPTHER

## 2017-12-08 RX ORDER — SODIUM CHLORIDE 9 MG/ML
INJECTION, SOLUTION INTRAVENOUS CONTINUOUS
Status: DISCONTINUED | OUTPATIENT
Start: 2017-12-08 | End: 2017-12-08 | Stop reason: HOSPADM

## 2017-12-08 RX ORDER — LIDOCAINE HYDROCHLORIDE 10 MG/ML
INJECTION, SOLUTION INFILTRATION; PERINEURAL PRN
Status: DISCONTINUED | OUTPATIENT
Start: 2017-12-08 | End: 2017-12-08 | Stop reason: SDUPTHER

## 2017-12-08 RX ADMIN — LIDOCAINE HYDROCHLORIDE 50 MG: 10 INJECTION, SOLUTION INFILTRATION; PERINEURAL at 11:44

## 2017-12-08 RX ADMIN — PROPOFOL 150 MCG/KG/MIN: 10 INJECTION, EMULSION INTRAVENOUS at 11:44

## 2017-12-08 RX ADMIN — SODIUM CHLORIDE: 9 INJECTION, SOLUTION INTRAVENOUS at 10:04

## 2017-12-08 ASSESSMENT — PAIN SCALES - GENERAL
PAINLEVEL_OUTOF10: 0
PAINLEVEL_OUTOF10: 0

## 2017-12-08 ASSESSMENT — ENCOUNTER SYMPTOMS: STRIDOR: 0

## 2017-12-08 ASSESSMENT — PAIN - FUNCTIONAL ASSESSMENT: PAIN_FUNCTIONAL_ASSESSMENT: 0-10

## 2017-12-08 NOTE — H&P
mild.  NOSE:  Nares patent. No rhinorrhea. MOUTH/THROAT:  benign  NECK:supple, no lymphadenopathy  LUNGS: overall decreased breath sounds with prolonged expiration. CARDIOVASCULAR: Heart sounds are normal.  Regular rate and rhythm without murmur. ABDOMEN: soft, non tender. Rotund/distended. EXTREMITIES: mild edema bilateral lower extremities. IMPRESSIONS:   1. Colon cancer history  2.  has a past medical history of Back pain (1/30/2013); CAD S/P percutaneous coronary angioplasty (11/27/2016); Carcinoma ([facility] (12/04/2016); Colon cancer ([facility] Degeneration of lumbar or lumbosacral intervertebral disc (4/15/2014); Depression (5/10/2012); Diabetes mellitus ([facility] H/O cardiac catheterization; Hepatic metastases ([facility] (12/4/2016); Hyperlipidemia; Hypertension; Knee pain; and MI, old. PLANS:   1.  Rectal US    CAITY CAZARES PA-C  Electronically signed 12/8/2017 at 9:59 AM

## 2017-12-08 NOTE — ANESTHESIA PRE PROCEDURE
Department of Anesthesiology  Preprocedure Note       Name:  Fina Pate   Age:  49 y.o.  :  1960                                          MRN:  [id number]         Date:  2017      Surgeon: Jackei Joy):  Emerita Padron MD    Procedure: Procedure(s):  RECTAL ENDOSCOPIC ULTRASOUND WITH PATHOLOGY    Medications prior to admission:   Prior to Admission medications    Medication Sig Start Date End Date Taking? Authorizing Provider   oxyCODONE-acetaminophen (PERCOCET)  MG per tablet Take 1 tablet by mouth every 6 hours as needed for Pain . [address]   Dionisio Fitzpatrick MD   fentaNYL (DURAGESIC) 25 MCG/HR Place 1 patch onto the skin every 72 hours . 17   Dionisio Fitzpatrick MD   METFORMIN HCL PO Take by mouth    Historical Provider, MD   albuterol sulfate HFA (PROAIR HFA) 108 (90 Base) MCG/ACT inhaler Inhale 2 puffs into the lungs every 6 hours as needed for Wheezing 17   Joe Dockery MD   Glucose Blood (BLOOD GLUCOSE TEST STRIPS) STRP 1 each by In Vitro route daily As needed. 17   Shellia Kawasaki, MD   gabapentin (NEURONTIN) 300 MG capsule Take 1 capsule by mouth 3 times daily 8/3/17   Vivien Fitzpatrick MD   Insulin Pen Needle 29G X 12.7MM MISC 1 each by Does not apply route daily 17   [facility], MD   ASPIRIN LOW DOSE 81 MG EC tablet Take 81 mg by mouth daily  4/10/17   Historical Provider, MD   lisinopril (PRINIVIL;ZESTRIL) 5 MG tablet Take 5 mg by mouth daily  17   Historical Provider, MD   metoprolol tartrate (LOPRESSOR) 25 MG tablet Take 25 mg by mouth daily  17   Historical Provider, MD   clopidogrel (PLAVIX) 75 MG tablet Take 75 mg by mouth daily  17   Historical Provider, MD   insulin glargine (LANTUS) 100 UNIT/ML injection vial Inject 15 Units into the skin nightly. If blood glucose >200mg/dL then inject 18 Units into the skin.  16   Fady Merino DPM   atorvastatin (LIPITOR) 80 MG tablet Take 1 tablet by mouth nightly 16 10/24/2017     10/24/2017       CMP:   Lab Results   Component Value Date     10/24/2017    K 5.2 10/24/2017     10/24/2017    CO2 28 10/24/2017    BUN 9 10/24/2017    CREATININE 0.64 10/24/2017    GFRAA >60 10/24/2017    LABGLOM >60 10/24/2017    GLUCOSE 273 10/24/2017    GLUCOSE 195 04/26/2012    PROT 6.2 08/14/2017    CALCIUM 9.0 10/24/2017    BILITOT 0.32 08/14/2017    ALKPHOS 90 08/14/2017    AST 16 08/14/2017    ALT 15 08/14/2017       POC Tests: No results for input(s): POCGLU, POCNA, POCK, POCCL, POCBUN, POCHEMO, POCHCT in the last 72 hours. Coags:   Lab Results   Component Value Date    PROTIME 9.9 10/24/2017    INR 1.0 10/24/2017    APTT 24.5 10/24/2017       HCG (If Applicable): No results found for: PREGTESTUR, PREGSERUM, HCG, HCGQUANT     ABGs: No results found for: PHART, PO2ART, CFY9RUW, NFB0OVU, BEART, A4THAVFX     Type & Screen (If Applicable):  No results found for: LABABO, [address]    Anesthesia Evaluation  Patient summary reviewed and Nursing notes reviewed no history of anesthetic complications:   Airway: Mallampati: II  TM distance: >3 FB   Neck ROM: full  Mouth opening: > = 3 FB Dental:      Comment: Poor dentition, multiple missing teeth    Pulmonary:       (-) stridor                           Cardiovascular:  Exercise tolerance: good (>4 METS),   (+) hypertension:, past MI: no interval change, CAD:, CABG/stent (1 stent a year ago, pt states he saw Cardiologist 1 week ago):, hyperlipidemia        Rhythm: regular  Rate: normal                 ROS comment: Summary  Overall left ventricular systolic function appears to be low normal;  Estimated left ventricular ejection fraction 50%  Inferior wall severe hypokinesis. Aortic valve structure and function normal.  Normal mitral valve structure and function. No pulmonary hypertension.      Neuro/Psych:   (+) depression/anxiety             GI/Hepatic/Renal:   (+) liver disease:,           Endo/Other:    (+) Type II DM, poorly controlled, , .                 Abdominal:   (+) obese,         Vascular:                                        Anesthesia Plan      MAC     ASA 3       Induction: intravenous. Anesthetic plan and risks discussed with patient. Plan discussed with attending.                   Avni Vega CRNA   12/8/2017

## 2017-12-08 NOTE — PROGRESS NOTES
Pt comes to bay asleep. Skin warm and dry. resp easy on 02 @ 3L per nc. IV infusing without problems. side rails up. Wife at bedside.

## 2017-12-08 NOTE — OP NOTE
FLEXIBLE SIGMOIDOSCOPY/ RECTAL EUS       Patient:   Madonna Tirado    :    1960    Facility:   [address] [place] [address]   Referring/PCP: No primary care provider on file. Procedure:   Flexible Sigmoidoscopy with biopsy  Date:     2017  Endoscopist:  Hallie Hernandez MD    Preoperative Diagnosis:  History of metastatic rectal cancer . Anesthesia: MAC    Complications:  None    Description of Procedure:  Informed consent was obtained from the patient after explanation of the procedure including indications, description of the procedure,  benefits and possible risks and complications of the procedure, and alternatives. Questions were answered. The patient's history was reviewed and a directed physical examination was performed prior to the procedure. Patient was monitored throughout the procedure with pulse oximetry and periodic assessment of vital signs. With the patient initially in the left lateral decubitus position, a digital rectal examination was performed and revealed negative without mass, lesions or tenderness. The Olympus video colonoscope was placed in the patient's rectum and advanced without difficulty  to  a distance of 20 cm. The prep was fair. Examination of the mucosa was performed during both introduction and withdrawal of the colonoscope. Retroflexed view of the rectum was performed. The patient  was taken to the recovery area in good condition. Findings:     1. Ulcerated, friable partially circumferential mass causing mild lumen stenosis was seen in the rectum. Biopsies were done  2. Internal hemorrhoids seen during retroflexion view       RECTAL EUS:    - Hypoechoic , heterogenous solid partially circumferential mass measuring 7 x 23 mm in maximum cross sectional diameter was seen in the rectum . Muscularis propria was disrupted   - Internal and external anal sphincters were intact.  - No Lymphadenopathy seen in perirectal area.         Recommendations: Stage T2 N0 MX applies                                       Follow up with oncology .     Electronically signed by Delicia Nelson MD on 12/8/2017 at 12:15 PM

## 2017-12-11 LAB — SURGICAL PATHOLOGY REPORT: NORMAL

## 2017-12-14 ENCOUNTER — HOSPITAL ENCOUNTER (OUTPATIENT)
Dept: INFUSION THERAPY | Age: 57
Discharge: HOME | End: 2017-12-14
Payer: COMMERCIAL

## 2017-12-14 ENCOUNTER — OFFICE VISIT (OUTPATIENT)
Dept: ONCOLOGY | Age: 57
End: 2017-12-14
Payer: COMMERCIAL

## 2017-12-14 VITALS
BODY MASS INDEX: 33.05 KG/M2 | TEMPERATURE: 98 F | DIASTOLIC BLOOD PRESSURE: 91 MMHG | RESPIRATION RATE: 16 BRPM | WEIGHT: 257.4 LBS | SYSTOLIC BLOOD PRESSURE: 128 MMHG | HEART RATE: 110 BPM

## 2017-12-14 DIAGNOSIS — C19 COLORECTAL CANCER, STAGE IV (HCC): ICD-10-CM

## 2017-12-14 DIAGNOSIS — C78.7 HEPATIC METASTASES (HCC): Primary | ICD-10-CM

## 2017-12-14 DIAGNOSIS — C78.7 HEPATIC METASTASES (HCC): ICD-10-CM

## 2017-12-14 PROCEDURE — 99214 OFFICE O/P EST MOD 30 MIN: CPT | Performed by: INTERNAL MEDICINE

## 2017-12-14 PROCEDURE — 1036F TOBACCO NON-USER: CPT | Performed by: INTERNAL MEDICINE

## 2017-12-14 PROCEDURE — G8417 CALC BMI ABV UP PARAM F/U: HCPCS | Performed by: INTERNAL MEDICINE

## 2017-12-14 PROCEDURE — G8428 CUR MEDS NOT DOCUMENT: HCPCS | Performed by: INTERNAL MEDICINE

## 2017-12-14 PROCEDURE — 3017F COLORECTAL CA SCREEN DOC REV: CPT | Performed by: INTERNAL MEDICINE

## 2017-12-14 PROCEDURE — G8598 ASA/ANTIPLAT THER USED: HCPCS | Performed by: INTERNAL MEDICINE

## 2017-12-14 PROCEDURE — G8484 FLU IMMUNIZE NO ADMIN: HCPCS | Performed by: INTERNAL MEDICINE

## 2017-12-14 RX ORDER — DIPHENHYDRAMINE HYDROCHLORIDE 50 MG/ML
50 INJECTION INTRAMUSCULAR; INTRAVENOUS ONCE
Status: CANCELLED | OUTPATIENT
Start: 2018-01-16 | End: 2018-01-02

## 2017-12-14 RX ORDER — SODIUM CHLORIDE 0.9 % (FLUSH) 0.9 %
5 SYRINGE (ML) INJECTION PRN
Status: CANCELLED | OUTPATIENT
Start: 2018-01-16

## 2017-12-14 RX ORDER — SODIUM CHLORIDE 0.9 % (FLUSH) 0.9 %
10 SYRINGE (ML) INJECTION PRN
Status: CANCELLED | OUTPATIENT
Start: 2018-01-16

## 2017-12-14 RX ORDER — ATROPINE SULFATE 0.4 MG/ML
0.4 AMPUL (ML) INJECTION
Status: CANCELLED | OUTPATIENT
Start: 2018-01-30

## 2017-12-14 RX ORDER — SODIUM CHLORIDE 0.9 % (FLUSH) 0.9 %
5 SYRINGE (ML) INJECTION PRN
Status: CANCELLED | OUTPATIENT
Start: 2018-01-30

## 2017-12-14 RX ORDER — SODIUM CHLORIDE 9 MG/ML
INJECTION, SOLUTION INTRAVENOUS ONCE
Status: CANCELLED | OUTPATIENT
Start: 2018-01-30 | End: 2018-01-16

## 2017-12-14 RX ORDER — SODIUM CHLORIDE 9 MG/ML
INJECTION, SOLUTION INTRAVENOUS CONTINUOUS
Status: CANCELLED | OUTPATIENT
Start: 2018-01-16

## 2017-12-14 RX ORDER — DEXTROSE MONOHYDRATE 50 MG/ML
INJECTION, SOLUTION INTRAVENOUS ONCE
Status: CANCELLED | OUTPATIENT
Start: 2018-01-16 | End: 2018-01-02

## 2017-12-14 RX ORDER — HEPARIN SODIUM (PORCINE) LOCK FLUSH IV SOLN 100 UNIT/ML 100 UNIT/ML
500 SOLUTION INTRAVENOUS PRN
Status: CANCELLED | OUTPATIENT
Start: 2018-01-30

## 2017-12-14 RX ORDER — HEPARIN SODIUM (PORCINE) LOCK FLUSH IV SOLN 100 UNIT/ML 100 UNIT/ML
500 SOLUTION INTRAVENOUS PRN
Status: CANCELLED | OUTPATIENT
Start: 2018-01-16

## 2017-12-14 RX ORDER — DIPHENHYDRAMINE HYDROCHLORIDE 50 MG/ML
50 INJECTION INTRAMUSCULAR; INTRAVENOUS ONCE
Status: CANCELLED | OUTPATIENT
Start: 2018-01-30 | End: 2018-01-16

## 2017-12-14 RX ORDER — METHYLPREDNISOLONE SODIUM SUCCINATE 125 MG/2ML
125 INJECTION, POWDER, LYOPHILIZED, FOR SOLUTION INTRAMUSCULAR; INTRAVENOUS ONCE
Status: CANCELLED | OUTPATIENT
Start: 2018-01-16 | End: 2018-01-02

## 2017-12-14 RX ORDER — SODIUM CHLORIDE 9 MG/ML
INJECTION, SOLUTION INTRAVENOUS ONCE
Status: CANCELLED | OUTPATIENT
Start: 2018-01-16 | End: 2018-01-02

## 2017-12-14 RX ORDER — 0.9 % SODIUM CHLORIDE 0.9 %
10 VIAL (ML) INJECTION ONCE
Status: CANCELLED | OUTPATIENT
Start: 2018-01-30 | End: 2018-01-16

## 2017-12-14 RX ORDER — SODIUM CHLORIDE 9 MG/ML
INJECTION, SOLUTION INTRAVENOUS CONTINUOUS
Status: CANCELLED | OUTPATIENT
Start: 2018-01-30

## 2017-12-14 RX ORDER — PALONOSETRON 0.05 MG/ML
0.25 INJECTION, SOLUTION INTRAVENOUS ONCE
Status: CANCELLED | OUTPATIENT
Start: 2018-01-30

## 2017-12-14 RX ORDER — PALONOSETRON 0.05 MG/ML
0.25 INJECTION, SOLUTION INTRAVENOUS ONCE
Status: CANCELLED | OUTPATIENT
Start: 2018-01-16

## 2017-12-14 RX ORDER — 0.9 % SODIUM CHLORIDE 0.9 %
10 VIAL (ML) INJECTION ONCE
Status: CANCELLED | OUTPATIENT
Start: 2018-01-16 | End: 2018-01-02

## 2017-12-14 RX ORDER — ATROPINE SULFATE 0.4 MG/ML
0.4 AMPUL (ML) INJECTION
Status: CANCELLED | OUTPATIENT
Start: 2018-01-16

## 2017-12-14 RX ORDER — SODIUM CHLORIDE 0.9 % (FLUSH) 0.9 %
10 SYRINGE (ML) INJECTION PRN
Status: CANCELLED | OUTPATIENT
Start: 2018-01-30

## 2017-12-14 RX ORDER — DEXTROSE MONOHYDRATE 50 MG/ML
INJECTION, SOLUTION INTRAVENOUS ONCE
Status: CANCELLED | OUTPATIENT
Start: 2018-01-30 | End: 2018-01-16

## 2017-12-14 RX ORDER — METHYLPREDNISOLONE SODIUM SUCCINATE 125 MG/2ML
125 INJECTION, POWDER, LYOPHILIZED, FOR SOLUTION INTRAMUSCULAR; INTRAVENOUS ONCE
Status: CANCELLED | OUTPATIENT
Start: 2018-01-30 | End: 2018-01-16

## 2017-12-14 NOTE — PROGRESS NOTES
[id number]        Lab Results   Component Value Date    CEA 8.2 (H) 08/14/2017         IMPRESSION:   1. Metastatic rectal cancer with liver metastases  2. Active coronary artery disease status post stenting  3. Rectal bleeding secondary to tumor and antiplatelets, slowed down after switching to plavix   4. Excellent response in the liver with minimal response in the primary tumor  5. Undergoing chemoradiation with 5-FU  6. 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 Management conservative   7. Neuropathy is contributing to his pain. 8. Pain, poorly controlled. 9. Progressive/ refractory disease. PLAN:   1. We reviewed recent conloscopy results which shows large tumor and CT shows disease in the liver. 2. We most reviewed CEA which shows increase. 3. We discussed resuming chemo and the patient is agreeable. 4. The patient is very interested in surgery and I explained that he wasn't candidate previously due to cardiac concerns. I will consult with surgeon following CT and commencement of chemo. 5. I am writing for CT to assess disease status. 6. I am writing for chemo orders. 7. Return in 3 weeks to resume chemo.

## 2017-12-14 NOTE — PATIENT INSTRUCTIONS
Need Ct scan last week of December.    Plan chemo first week of January , likely to start chemo then

## 2017-12-18 RX ORDER — DEXAMETHASONE SODIUM PHOSPHATE 10 MG/ML
10 INJECTION INTRAMUSCULAR; INTRAVENOUS ONCE
Status: CANCELLED | OUTPATIENT
Start: 2018-01-16

## 2017-12-21 ENCOUNTER — TELEPHONE (OUTPATIENT)
Dept: ONCOLOGY | Age: 57
End: 2017-12-21

## 2017-12-21 NOTE — TELEPHONE ENCOUNTER
I discussed above with Dr Denis Butler and he stated that he wants liver ablation postponed for the time being due to progression of disease. Dr Denis Butler stated that we will contact IR when he wants to proceed with liver ablation. I called Fidelina Giles and notified her of above. Fidelina Giles communicated understanding.  Erika Evans

## 2017-12-21 NOTE — TELEPHONE ENCOUNTER
Cheyenne Cantu, from IR, called and questioned if Dr Adryan Pérez wants patient's previously scheduled liver ablation that was originally scheduled from October to be scheduled now? Cheyenne Cantu stated that patient lost insurance in October and his liver ablation had to be postponed. Dr Flanagan's note states that patient is to start chemotherapy in January due to worsening of disease (large tumor and disease in the liver). I will call Cheyenne Cantu back at [phone] once above is discussed with Dr Adryan Pérez.  Ana Maria Coles

## 2017-12-27 ENCOUNTER — APPOINTMENT (OUTPATIENT)
Dept: GENERAL RADIOLOGY | Age: 57
End: 2017-12-27
Payer: COMMERCIAL

## 2017-12-27 ENCOUNTER — HOSPITAL ENCOUNTER (EMERGENCY)
Age: 57
Discharge: HOME | End: 2017-12-27
Attending: EMERGENCY MEDICINE
Payer: COMMERCIAL

## 2017-12-27 VITALS
HEIGHT: 73 IN | BODY MASS INDEX: 34.19 KG/M2 | DIASTOLIC BLOOD PRESSURE: 72 MMHG | OXYGEN SATURATION: 96 % | WEIGHT: 258 LBS | HEART RATE: 97 BPM | RESPIRATION RATE: 18 BRPM | SYSTOLIC BLOOD PRESSURE: 110 MMHG | TEMPERATURE: 97.7 F

## 2017-12-27 DIAGNOSIS — R10.9 FLANK PAIN: Primary | ICD-10-CM

## 2017-12-27 DIAGNOSIS — C19 COLORECTAL CANCER, STAGE IV (HCC): ICD-10-CM

## 2017-12-27 LAB
-: ABNORMAL
AMORPHOUS: ABNORMAL
BACTERIA: ABNORMAL
BILIRUBIN URINE: NEGATIVE
CASTS UA: ABNORMAL /LPF
COLOR: YELLOW
COMMENT UA: ABNORMAL
CRYSTALS, UA: ABNORMAL /HPF
EPITHELIAL CELLS UA: ABNORMAL /HPF
GLUCOSE URINE: ABNORMAL
KETONES, URINE: NEGATIVE
LEUKOCYTE ESTERASE, URINE: NEGATIVE
MUCUS: ABNORMAL
NITRITE, URINE: NEGATIVE
OTHER OBSERVATIONS UA: ABNORMAL
PH UA: 5 (ref 5–8)
PROTEIN UA: ABNORMAL
RBC UA: ABNORMAL /HPF
RENAL EPITHELIAL, UA: ABNORMAL /HPF
SPECIFIC GRAVITY UA: 1.04 (ref 1–1.03)
TRICHOMONAS: ABNORMAL
TURBIDITY: CLEAR
URINE HGB: NEGATIVE
UROBILINOGEN, URINE: NORMAL
WBC UA: ABNORMAL /HPF
YEAST: ABNORMAL

## 2017-12-27 PROCEDURE — 81001 URINALYSIS AUTO W/SCOPE: CPT

## 2017-12-27 PROCEDURE — 99283 EMERGENCY DEPT VISIT LOW MDM: CPT

## 2017-12-27 PROCEDURE — 71020 XR CHEST STANDARD TWO VW: CPT

## 2017-12-27 RX ORDER — OXYCODONE AND ACETAMINOPHEN 10; 325 MG/1; MG/1
TABLET ORAL
Qty: 120 TABLET | Refills: 0 | Status: SHIPPED | OUTPATIENT
Start: 2017-12-27 | End: 2018-01-29 | Stop reason: SDUPTHER

## 2017-12-27 RX ORDER — FENTANYL 25 UG/H
1 PATCH TRANSDERMAL
Qty: 10 PATCH | Refills: 0 | Status: SHIPPED | OUTPATIENT
Start: 2017-12-27 | End: 2018-01-29 | Stop reason: SDUPTHER

## 2017-12-27 ASSESSMENT — ENCOUNTER SYMPTOMS
BACK PAIN: 0
ABDOMINAL PAIN: 0
EYE PAIN: 0
VOMITING: 0
DIARRHEA: 0
COUGH: 0
EYE REDNESS: 0
SHORTNESS OF BREATH: 0
EYE DISCHARGE: 0
RHINORRHEA: 0

## 2017-12-27 ASSESSMENT — PAIN DESCRIPTION - PAIN TYPE: TYPE: ACUTE PAIN

## 2017-12-27 ASSESSMENT — PAIN SCALES - GENERAL: PAINLEVEL_OUTOF10: 5

## 2017-12-27 NOTE — ED PROVIDER NOTES
NITROGLYCERIN (NITROSTAT) 0.4 MG SL TABLET    Place 1 tablet under the tongue every 5 minutes as needed for Chest pain Dissolve 1 tablet under tongue for chest pain and repeat every 5 min up to max of 3 total doses. If no relief after 3 doses call 911    OXYCODONE-ACETAMINOPHEN (PERCOCET)  MG PER TABLET    Take 1 tablet by mouth every 6 hours as needed for Pain . Arvell Ross ALLERGIES     Morphine    FAMILY HISTORY       Family Status   Relation Status    Mother     Father Alive    Sister Alive    Brother Alive    Sister Alive    Sister Alive      Family History   Problem Relation Age of Onset    Heart Disease Mother     Stroke Mother     High Blood Pressure Mother     High Cholesterol Father        SOCIAL HISTORY       Social History     Social History    Marital status:      Spouse name: N/A    Number of children: N/A    Years of education: N/A     Social History Main Topics    Smoking status: Never Smoker    Smokeless tobacco: Never Used    Alcohol use No    Drug use: No    Sexual activity: Not Asked     Other Topics Concern    None     Social History Narrative    None       PHYSICAL EXAM     INITIAL VITALS:  height is 6' 1\" (1.854 m) and weight is 258 lb (117 kg). His oral temperature is 97.7 °F (36.5 °C). His blood pressure is 110/72 and his pulse is 97. His respiration is 18 and oxygen saturation is 96%. Physical Exam   Constitutional: He is oriented to person, place, and time. He appears well-developed and well-nourished. HENT:   Head: Normocephalic and atraumatic. Mouth/Throat: Oropharynx is clear and moist.   Eyes: Conjunctivae and EOM are normal. Pupils are equal, round, and reactive to light. Right eye exhibits no discharge. Left eye exhibits no discharge. Neck: Normal range of motion. Neck supple. Cardiovascular: Normal rate, regular rhythm, normal heart sounds and intact distal pulses.     Pulmonary/Chest: Effort normal and breath sounds normal. No completed with a voice recognition program.  Efforts were made to edit the dictations but occasionally words are mis-transcribed.)    Dasia Bernabe MD, DORIE, [facility]  Attending Emergency Physician                     Dasia Bernabe MD  12/27/17 [address], MD  12/27/17 9396

## 2017-12-28 ENCOUNTER — HOSPITAL ENCOUNTER (OUTPATIENT)
Dept: CT IMAGING | Age: 57
Discharge: HOME | End: 2017-12-28
Payer: COMMERCIAL

## 2017-12-28 DIAGNOSIS — C19 COLORECTAL CANCER, STAGE IV (HCC): ICD-10-CM

## 2017-12-28 DIAGNOSIS — C78.7 HEPATIC METASTASES (HCC): ICD-10-CM

## 2017-12-28 LAB
BUN BLDV-MCNC: 7 MG/DL (ref 6–20)
CREAT SERPL-MCNC: 0.59 MG/DL (ref 0.7–1.2)
GFR AFRICAN AMERICAN: >60 ML/MIN
GFR NON-AFRICAN AMERICAN: >60 ML/MIN
GFR SERPL CREATININE-BSD FRML MDRD: ABNORMAL ML/MIN/{1.73_M2}
GFR SERPL CREATININE-BSD FRML MDRD: ABNORMAL ML/MIN/{1.73_M2}

## 2017-12-28 PROCEDURE — 82565 ASSAY OF CREATININE: CPT

## 2017-12-28 PROCEDURE — 74177 CT ABD & PELVIS W/CONTRAST: CPT

## 2017-12-28 PROCEDURE — 36415 COLL VENOUS BLD VENIPUNCTURE: CPT

## 2017-12-28 PROCEDURE — 71260 CT THORAX DX C+: CPT

## 2017-12-28 PROCEDURE — 2580000003 HC RX 258: Performed by: INTERNAL MEDICINE

## 2017-12-28 PROCEDURE — 6360000004 HC RX CONTRAST MEDICATION: Performed by: INTERNAL MEDICINE

## 2017-12-28 PROCEDURE — 84520 ASSAY OF UREA NITROGEN: CPT

## 2017-12-28 RX ORDER — 0.9 % SODIUM CHLORIDE 0.9 %
100 INTRAVENOUS SOLUTION INTRAVENOUS ONCE
Status: COMPLETED | OUTPATIENT
Start: 2017-12-28 | End: 2017-12-28

## 2017-12-28 RX ORDER — SODIUM CHLORIDE 0.9 % (FLUSH) 0.9 %
10 SYRINGE (ML) INJECTION PRN
Status: DISCONTINUED | OUTPATIENT
Start: 2017-12-28 | End: 2017-12-31 | Stop reason: HOSPADM

## 2017-12-28 RX ADMIN — IOHEXOL 50 ML: 240 INJECTION, SOLUTION INTRATHECAL; INTRAVASCULAR; INTRAVENOUS; ORAL at 10:28

## 2017-12-28 RX ADMIN — Medication 10 ML: at 10:28

## 2017-12-28 RX ADMIN — SODIUM CHLORIDE 100 ML: 9 INJECTION, SOLUTION INTRAVENOUS at 10:28

## 2017-12-28 RX ADMIN — IOPAMIDOL 100 ML: 755 INJECTION, SOLUTION INTRAVENOUS at 10:28

## 2018-01-01 ENCOUNTER — HOSPITAL ENCOUNTER (OUTPATIENT)
Dept: INFUSION THERAPY | Age: 58
Discharge: HOME | End: 2018-09-13
Payer: COMMERCIAL

## 2018-01-01 ENCOUNTER — HOSPITAL ENCOUNTER (OUTPATIENT)
Dept: INFUSION THERAPY | Age: 58
Discharge: HOME | End: 2018-06-19
Payer: COMMERCIAL

## 2018-01-01 ENCOUNTER — APPOINTMENT (OUTPATIENT)
Dept: INFUSION THERAPY | Age: 58
End: 2018-01-01
Payer: COMMERCIAL

## 2018-01-01 ENCOUNTER — TELEPHONE (OUTPATIENT)
Dept: ONCOLOGY | Age: 58
End: 2018-01-01

## 2018-01-01 ENCOUNTER — OFFICE VISIT (OUTPATIENT)
Dept: ONCOLOGY | Age: 58
End: 2018-01-01
Payer: COMMERCIAL

## 2018-01-01 ENCOUNTER — HOSPITAL ENCOUNTER (OUTPATIENT)
Dept: INFUSION THERAPY | Age: 58
Discharge: HOME | End: 2018-10-23
Payer: COMMERCIAL

## 2018-01-01 ENCOUNTER — HOSPITAL ENCOUNTER (OUTPATIENT)
Dept: INFUSION THERAPY | Age: 58
Discharge: HOME | End: 2018-06-05
Payer: COMMERCIAL

## 2018-01-01 ENCOUNTER — ANESTHESIA (OUTPATIENT)
Dept: CT IMAGING | Age: 58
End: 2018-01-01

## 2018-01-01 ENCOUNTER — HOSPITAL ENCOUNTER (OUTPATIENT)
Dept: CT IMAGING | Age: 58
Discharge: HOME | End: 2018-10-05
Payer: COMMERCIAL

## 2018-01-01 ENCOUNTER — HOSPITAL ENCOUNTER (OUTPATIENT)
Dept: INFUSION THERAPY | Age: 58
Discharge: HOME | End: 2018-09-27
Payer: COMMERCIAL

## 2018-01-01 ENCOUNTER — HOSPITAL ENCOUNTER (OUTPATIENT)
Dept: INFUSION THERAPY | Age: 58
Discharge: HOME | End: 2018-08-17
Payer: COMMERCIAL

## 2018-01-01 ENCOUNTER — TELEPHONE (OUTPATIENT)
Dept: INFUSION THERAPY | Age: 58
End: 2018-01-01

## 2018-01-01 ENCOUNTER — HOSPITAL ENCOUNTER (OUTPATIENT)
Dept: CT IMAGING | Age: 58
Discharge: HOME | End: 2018-08-26
Payer: COMMERCIAL

## 2018-01-01 ENCOUNTER — HOSPITAL ENCOUNTER (EMERGENCY)
Age: 58
Discharge: HOME | End: 2018-10-10
Attending: EMERGENCY MEDICINE
Payer: COMMERCIAL

## 2018-01-01 ENCOUNTER — HOSPITAL ENCOUNTER (OUTPATIENT)
Dept: INFUSION THERAPY | Age: 58
Discharge: HOME | End: 2018-08-03
Payer: COMMERCIAL

## 2018-01-01 ENCOUNTER — HOSPITAL ENCOUNTER (OUTPATIENT)
Dept: CT IMAGING | Age: 58
Discharge: HOME | End: 2018-07-04
Payer: COMMERCIAL

## 2018-01-01 ENCOUNTER — HOSPITAL ENCOUNTER (OUTPATIENT)
Dept: INFUSION THERAPY | Age: 58
Discharge: HOME | End: 2018-06-21
Payer: COMMERCIAL

## 2018-01-01 ENCOUNTER — HOSPITAL ENCOUNTER (OUTPATIENT)
Dept: INFUSION THERAPY | Age: 58
Discharge: HOME | End: 2018-09-25
Payer: COMMERCIAL

## 2018-01-01 ENCOUNTER — HOSPITAL ENCOUNTER (OUTPATIENT)
Dept: INFUSION THERAPY | Age: 58
Discharge: HOME | End: 2018-06-07
Payer: COMMERCIAL

## 2018-01-01 ENCOUNTER — HOSPITAL ENCOUNTER (OUTPATIENT)
Dept: CT IMAGING | Age: 58
Discharge: HOME | End: 2018-08-25
Payer: COMMERCIAL

## 2018-01-01 ENCOUNTER — HOSPITAL ENCOUNTER (OUTPATIENT)
Dept: INFUSION THERAPY | Age: 58
Discharge: HOME | End: 2018-08-01
Payer: COMMERCIAL

## 2018-01-01 ENCOUNTER — HOSPITAL ENCOUNTER (OUTPATIENT)
Dept: INFUSION THERAPY | Age: 58
Discharge: HOME | End: 2018-07-19
Payer: COMMERCIAL

## 2018-01-01 ENCOUNTER — ANESTHESIA EVENT (OUTPATIENT)
Dept: CT IMAGING | Age: 58
End: 2018-01-01

## 2018-01-01 ENCOUNTER — HOSPITAL ENCOUNTER (OUTPATIENT)
Dept: RADIATION ONCOLOGY | Age: 58
Discharge: HOME | End: 2018-06-13
Payer: COMMERCIAL

## 2018-01-01 ENCOUNTER — HOSPITAL ENCOUNTER (OUTPATIENT)
Dept: INFUSION THERAPY | Age: 58
Discharge: HOME | End: 2018-08-30
Payer: COMMERCIAL

## 2018-01-01 ENCOUNTER — HOSPITAL ENCOUNTER (OUTPATIENT)
Dept: PREADMISSION TESTING | Age: 58
Discharge: HOME | End: 2018-08-14
Payer: COMMERCIAL

## 2018-01-01 ENCOUNTER — HOSPITAL ENCOUNTER (OUTPATIENT)
Dept: INFUSION THERAPY | Age: 58
Discharge: HOME | End: 2018-10-09
Payer: COMMERCIAL

## 2018-01-01 ENCOUNTER — HOSPITAL ENCOUNTER (OUTPATIENT)
Dept: INFUSION THERAPY | Age: 58
Discharge: HOME | End: 2018-09-11
Payer: COMMERCIAL

## 2018-01-01 ENCOUNTER — HOSPITAL ENCOUNTER (OUTPATIENT)
Dept: INFUSION THERAPY | Age: 58
Discharge: HOME | End: 2018-10-10
Payer: COMMERCIAL

## 2018-01-01 ENCOUNTER — HOSPITAL ENCOUNTER (OUTPATIENT)
Dept: INFUSION THERAPY | Age: 58
Discharge: HOME | End: 2018-07-17
Payer: COMMERCIAL

## 2018-01-01 ENCOUNTER — HOSPITAL ENCOUNTER (OUTPATIENT)
Dept: INFUSION THERAPY | Age: 58
End: 2018-01-01
Payer: COMMERCIAL

## 2018-01-01 ENCOUNTER — HOSPITAL ENCOUNTER (EMERGENCY)
Age: 58
Discharge: HOME | End: 2018-09-02
Attending: EMERGENCY MEDICINE
Payer: COMMERCIAL

## 2018-01-01 ENCOUNTER — HOSPITAL ENCOUNTER (OUTPATIENT)
Dept: INFUSION THERAPY | Age: 58
Discharge: HOME | End: 2018-08-28
Payer: COMMERCIAL

## 2018-01-01 ENCOUNTER — HOSPITAL ENCOUNTER (OUTPATIENT)
Dept: INFUSION THERAPY | Age: 58
Discharge: HOME | End: 2018-08-15
Payer: COMMERCIAL

## 2018-01-01 ENCOUNTER — HOSPITAL ENCOUNTER (OUTPATIENT)
Dept: INFUSION THERAPY | Age: 58
Discharge: HOME | End: 2018-06-20
Payer: COMMERCIAL

## 2018-01-01 VITALS
TEMPERATURE: 97.7 F | HEART RATE: 104 BPM | DIASTOLIC BLOOD PRESSURE: 95 MMHG | HEIGHT: 73 IN | WEIGHT: 245 LBS | SYSTOLIC BLOOD PRESSURE: 141 MMHG | BODY MASS INDEX: 32.47 KG/M2 | RESPIRATION RATE: 18 BRPM | OXYGEN SATURATION: 95 %

## 2018-01-01 VITALS
SYSTOLIC BLOOD PRESSURE: 146 MMHG | HEART RATE: 89 BPM | DIASTOLIC BLOOD PRESSURE: 77 MMHG | HEIGHT: 74 IN | OXYGEN SATURATION: 99 % | BODY MASS INDEX: 30.93 KG/M2 | WEIGHT: 241 LBS | TEMPERATURE: 97.2 F | RESPIRATION RATE: 19 BRPM

## 2018-01-01 VITALS — SYSTOLIC BLOOD PRESSURE: 117 MMHG | DIASTOLIC BLOOD PRESSURE: 76 MMHG | TEMPERATURE: 96.4 F | OXYGEN SATURATION: 98 %

## 2018-01-01 VITALS
HEIGHT: 74 IN | BODY MASS INDEX: 30.52 KG/M2 | RESPIRATION RATE: 20 BRPM | WEIGHT: 237.8 LBS | HEART RATE: 91 BPM | TEMPERATURE: 98.1 F | DIASTOLIC BLOOD PRESSURE: 88 MMHG | SYSTOLIC BLOOD PRESSURE: 141 MMHG

## 2018-01-01 VITALS
WEIGHT: 250.4 LBS | RESPIRATION RATE: 17 BRPM | DIASTOLIC BLOOD PRESSURE: 86 MMHG | SYSTOLIC BLOOD PRESSURE: 133 MMHG | HEART RATE: 90 BPM | TEMPERATURE: 97.1 F | BODY MASS INDEX: 33.04 KG/M2

## 2018-01-01 VITALS
SYSTOLIC BLOOD PRESSURE: 156 MMHG | HEART RATE: 89 BPM | BODY MASS INDEX: 32.46 KG/M2 | WEIGHT: 246 LBS | TEMPERATURE: 97.4 F | RESPIRATION RATE: 18 BRPM | DIASTOLIC BLOOD PRESSURE: 93 MMHG

## 2018-01-01 VITALS
RESPIRATION RATE: 16 BRPM | HEART RATE: 88 BPM | BODY MASS INDEX: 32.39 KG/M2 | HEIGHT: 73 IN | DIASTOLIC BLOOD PRESSURE: 90 MMHG | SYSTOLIC BLOOD PRESSURE: 143 MMHG | WEIGHT: 244.4 LBS | TEMPERATURE: 98.2 F

## 2018-01-01 VITALS
WEIGHT: 237 LBS | DIASTOLIC BLOOD PRESSURE: 88 MMHG | HEART RATE: 91 BPM | BODY MASS INDEX: 30.43 KG/M2 | TEMPERATURE: 98.1 F | SYSTOLIC BLOOD PRESSURE: 141 MMHG

## 2018-01-01 VITALS
HEART RATE: 88 BPM | SYSTOLIC BLOOD PRESSURE: 143 MMHG | RESPIRATION RATE: 16 BRPM | WEIGHT: 244.4 LBS | TEMPERATURE: 98.2 F | BODY MASS INDEX: 32.24 KG/M2 | DIASTOLIC BLOOD PRESSURE: 90 MMHG

## 2018-01-01 VITALS
HEART RATE: 98 BPM | BODY MASS INDEX: 32.92 KG/M2 | WEIGHT: 249.5 LBS | SYSTOLIC BLOOD PRESSURE: 150 MMHG | DIASTOLIC BLOOD PRESSURE: 102 MMHG | TEMPERATURE: 97.7 F | RESPIRATION RATE: 18 BRPM

## 2018-01-01 VITALS
RESPIRATION RATE: 17 BRPM | DIASTOLIC BLOOD PRESSURE: 88 MMHG | WEIGHT: 244.6 LBS | HEART RATE: 79 BPM | TEMPERATURE: 96.8 F | SYSTOLIC BLOOD PRESSURE: 144 MMHG | BODY MASS INDEX: 32.27 KG/M2

## 2018-01-01 VITALS
WEIGHT: 240.56 LBS | BODY MASS INDEX: 31.74 KG/M2 | SYSTOLIC BLOOD PRESSURE: 136 MMHG | DIASTOLIC BLOOD PRESSURE: 86 MMHG | TEMPERATURE: 97.7 F | HEART RATE: 69 BPM

## 2018-01-01 VITALS
HEART RATE: 64 BPM | SYSTOLIC BLOOD PRESSURE: 141 MMHG | TEMPERATURE: 97.5 F | BODY MASS INDEX: 32.69 KG/M2 | DIASTOLIC BLOOD PRESSURE: 91 MMHG | WEIGHT: 247.8 LBS

## 2018-01-01 VITALS
RESPIRATION RATE: 16 BRPM | WEIGHT: 242 LBS | OXYGEN SATURATION: 98 % | HEIGHT: 73 IN | BODY MASS INDEX: 32.07 KG/M2 | DIASTOLIC BLOOD PRESSURE: 93 MMHG | SYSTOLIC BLOOD PRESSURE: 142 MMHG | HEART RATE: 85 BPM

## 2018-01-01 VITALS
SYSTOLIC BLOOD PRESSURE: 141 MMHG | TEMPERATURE: 98.7 F | WEIGHT: 247.44 LBS | BODY MASS INDEX: 32.65 KG/M2 | DIASTOLIC BLOOD PRESSURE: 94 MMHG | HEART RATE: 100 BPM

## 2018-01-01 VITALS
HEIGHT: 73 IN | HEART RATE: 94 BPM | WEIGHT: 235 LBS | RESPIRATION RATE: 14 BRPM | DIASTOLIC BLOOD PRESSURE: 80 MMHG | BODY MASS INDEX: 31.14 KG/M2 | SYSTOLIC BLOOD PRESSURE: 137 MMHG | TEMPERATURE: 98.2 F | OXYGEN SATURATION: 95 %

## 2018-01-01 VITALS
WEIGHT: 245.3 LBS | HEART RATE: 93 BPM | BODY MASS INDEX: 32.36 KG/M2 | SYSTOLIC BLOOD PRESSURE: 126 MMHG | TEMPERATURE: 97.5 F | DIASTOLIC BLOOD PRESSURE: 87 MMHG

## 2018-01-01 VITALS
HEART RATE: 81 BPM | SYSTOLIC BLOOD PRESSURE: 156 MMHG | TEMPERATURE: 98.2 F | BODY MASS INDEX: 32.61 KG/M2 | WEIGHT: 247.2 LBS | DIASTOLIC BLOOD PRESSURE: 87 MMHG

## 2018-01-01 VITALS
SYSTOLIC BLOOD PRESSURE: 138 MMHG | TEMPERATURE: 97.7 F | DIASTOLIC BLOOD PRESSURE: 81 MMHG | BODY MASS INDEX: 32.88 KG/M2 | HEART RATE: 83 BPM | RESPIRATION RATE: 16 BRPM | WEIGHT: 249.2 LBS

## 2018-01-01 VITALS — RESPIRATION RATE: 16 BRPM

## 2018-01-01 VITALS
TEMPERATURE: 98.2 F | DIASTOLIC BLOOD PRESSURE: 91 MMHG | HEART RATE: 98 BPM | WEIGHT: 247.3 LBS | BODY MASS INDEX: 32.63 KG/M2 | SYSTOLIC BLOOD PRESSURE: 140 MMHG

## 2018-01-01 DIAGNOSIS — E83.51 HYPOCALCEMIA: ICD-10-CM

## 2018-01-01 DIAGNOSIS — K76.9 LIVER LESION: ICD-10-CM

## 2018-01-01 DIAGNOSIS — C78.7 HEPATIC METASTASES (HCC): ICD-10-CM

## 2018-01-01 DIAGNOSIS — C19 COLORECTAL CANCER, STAGE IV (HCC): ICD-10-CM

## 2018-01-01 DIAGNOSIS — C19 COLORECTAL CANCER, STAGE IV (HCC): Primary | ICD-10-CM

## 2018-01-01 DIAGNOSIS — G62.0 NEUROPATHY DUE TO CHEMOTHERAPEUTIC DRUG (HCC): ICD-10-CM

## 2018-01-01 DIAGNOSIS — I21.3 ST ELEVATION MYOCARDIAL INFARCTION (STEMI), UNSPECIFIED ARTERY (HCC): ICD-10-CM

## 2018-01-01 DIAGNOSIS — T45.1X5A NEUROPATHY DUE TO CHEMOTHERAPEUTIC DRUG (HCC): ICD-10-CM

## 2018-01-01 DIAGNOSIS — C18.7 MALIGNANT NEOPLASM OF SIGMOID COLON (HCC): ICD-10-CM

## 2018-01-01 DIAGNOSIS — C80.1 CARCINOMA (HCC): Primary | ICD-10-CM

## 2018-01-01 DIAGNOSIS — C80.1 CARCINOMA (HCC): ICD-10-CM

## 2018-01-01 DIAGNOSIS — N52.01 ERECTILE DYSFUNCTION DUE TO ARTERIAL INSUFFICIENCY: ICD-10-CM

## 2018-01-01 DIAGNOSIS — C78.7 HEPATIC METASTASES (HCC): Primary | ICD-10-CM

## 2018-01-01 DIAGNOSIS — T82.9XXA CENTRAL LINE COMPLICATION, INITIAL ENCOUNTER: Primary | ICD-10-CM

## 2018-01-01 DIAGNOSIS — Z98.61 S/P PTCA (PERCUTANEOUS TRANSLUMINAL CORONARY ANGIOPLASTY): ICD-10-CM

## 2018-01-01 DIAGNOSIS — E11.65 TYPE 2 DIABETES MELLITUS WITH HYPERGLYCEMIA, WITHOUT LONG-TERM CURRENT USE OF INSULIN (HCC): ICD-10-CM

## 2018-01-01 DIAGNOSIS — M79.661 RIGHT CALF PAIN: Primary | ICD-10-CM

## 2018-01-01 DIAGNOSIS — E87.6 HYPOKALEMIA: ICD-10-CM

## 2018-01-01 LAB
ABSOLUTE EOS #: 0 K/UL (ref 0–0.4)
ABSOLUTE EOS #: 0 K/UL (ref 0–0.4)
ABSOLUTE EOS #: 0.05 K/UL (ref 0–0.4)
ABSOLUTE EOS #: 0.1 K/UL (ref 0–0.4)
ABSOLUTE IMMATURE GRANULOCYTE: ABNORMAL K/UL (ref 0–0.3)
ABSOLUTE LYMPH #: 0.4 K/UL (ref 1–4.8)
ABSOLUTE LYMPH #: 0.5 K/UL (ref 1–4.8)
ABSOLUTE LYMPH #: 0.6 K/UL (ref 1–4.8)
ABSOLUTE LYMPH #: 0.6 K/UL (ref 1–4.8)
ABSOLUTE LYMPH #: 0.62 K/UL (ref 1–4.8)
ABSOLUTE LYMPH #: 0.7 K/UL (ref 1–4.8)
ABSOLUTE LYMPH #: 0.7 K/UL (ref 1–4.8)
ABSOLUTE LYMPH #: 0.8 K/UL (ref 1–4.8)
ABSOLUTE LYMPH #: 0.8 K/UL (ref 1–4.8)
ABSOLUTE LYMPH #: 0.9 K/UL (ref 1–4.8)
ABSOLUTE MONO #: 0.7 K/UL (ref 0.1–1.2)
ABSOLUTE MONO #: 0.7 K/UL (ref 0.1–1.2)
ABSOLUTE MONO #: 0.8 K/UL (ref 0.1–1.2)
ABSOLUTE MONO #: 0.95 K/UL (ref 0.1–0.8)
ABSOLUTE MONO #: 1 K/UL (ref 0.1–1.2)
ABSOLUTE MONO #: 1.2 K/UL (ref 0.1–1.2)
ABSOLUTE MONO #: 1.52 K/UL (ref 0.1–1.2)
ALBUMIN SERPL-MCNC: 2.6 G/DL (ref 3.5–5.2)
ALBUMIN SERPL-MCNC: 3.5 G/DL (ref 3.5–5.2)
ALBUMIN SERPL-MCNC: 3.5 G/DL (ref 3.5–5.2)
ALBUMIN SERPL-MCNC: 3.6 G/DL (ref 3.5–5.2)
ALBUMIN SERPL-MCNC: 3.6 G/DL (ref 3.5–5.2)
ALBUMIN SERPL-MCNC: 3.7 G/DL (ref 3.5–5.2)
ALBUMIN SERPL-MCNC: 3.8 G/DL (ref 3.5–5.2)
ALBUMIN SERPL-MCNC: 4 G/DL (ref 3.5–5.2)
ALBUMIN/GLOBULIN RATIO: 1.2 (ref 1–2.5)
ALBUMIN/GLOBULIN RATIO: 1.3 (ref 1–2.5)
ALBUMIN/GLOBULIN RATIO: 1.4 (ref 1–2.5)
ALBUMIN/GLOBULIN RATIO: 1.5 (ref 1–2.5)
ALP BLD-CCNC: 100 U/L (ref 40–129)
ALP BLD-CCNC: 100 U/L (ref 40–129)
ALP BLD-CCNC: 116 U/L (ref 40–129)
ALP BLD-CCNC: 118 U/L (ref 40–129)
ALP BLD-CCNC: 122 U/L (ref 40–129)
ALP BLD-CCNC: 136 U/L (ref 40–129)
ALP BLD-CCNC: 139 U/L (ref 40–129)
ALP BLD-CCNC: 152 U/L (ref 40–129)
ALP BLD-CCNC: 88 U/L (ref 40–129)
ALP BLD-CCNC: 94 U/L (ref 40–129)
ALT SERPL-CCNC: 16 U/L (ref 5–41)
ALT SERPL-CCNC: 19 U/L (ref 5–41)
ALT SERPL-CCNC: 20 U/L (ref 5–41)
ALT SERPL-CCNC: 22 U/L (ref 5–41)
ALT SERPL-CCNC: 22 U/L (ref 5–41)
ALT SERPL-CCNC: 23 U/L (ref 5–41)
ALT SERPL-CCNC: 24 U/L (ref 5–41)
ALT SERPL-CCNC: 24 U/L (ref 5–41)
ALT SERPL-CCNC: 25 U/L (ref 5–41)
ALT SERPL-CCNC: 52 U/L (ref 5–41)
ANION GAP SERPL CALCULATED.3IONS-SCNC: 11 MMOL/L (ref 9–17)
ANION GAP SERPL CALCULATED.3IONS-SCNC: 11 MMOL/L (ref 9–17)
ANION GAP SERPL CALCULATED.3IONS-SCNC: 13 MMOL/L (ref 9–17)
ANION GAP SERPL CALCULATED.3IONS-SCNC: 14 MMOL/L (ref 9–17)
ANION GAP SERPL CALCULATED.3IONS-SCNC: 15 MMOL/L (ref 9–17)
AST SERPL-CCNC: 18 U/L
AST SERPL-CCNC: 21 U/L
AST SERPL-CCNC: 21 U/L
AST SERPL-CCNC: 24 U/L
AST SERPL-CCNC: 28 U/L
AST SERPL-CCNC: 28 U/L
AST SERPL-CCNC: 29 U/L
AST SERPL-CCNC: 30 U/L
AST SERPL-CCNC: 32 U/L
AST SERPL-CCNC: 32 U/L
BASOPHILS # BLD: 0 % (ref 0–2)
BASOPHILS # BLD: 1 % (ref 0–2)
BASOPHILS ABSOLUTE: 0 K/UL (ref 0–0.2)
BASOPHILS ABSOLUTE: 0.1 K/UL (ref 0–0.2)
BILIRUB SERPL-MCNC: 0.19 MG/DL (ref 0.3–1.2)
BILIRUB SERPL-MCNC: 0.24 MG/DL (ref 0.3–1.2)
BILIRUB SERPL-MCNC: 0.26 MG/DL (ref 0.3–1.2)
BILIRUB SERPL-MCNC: 0.28 MG/DL (ref 0.3–1.2)
BILIRUB SERPL-MCNC: 0.3 MG/DL (ref 0.3–1.2)
BILIRUB SERPL-MCNC: 0.3 MG/DL (ref 0.3–1.2)
BILIRUB SERPL-MCNC: 0.34 MG/DL (ref 0.3–1.2)
BILIRUB SERPL-MCNC: 0.4 MG/DL (ref 0.3–1.2)
BILIRUB SERPL-MCNC: 0.44 MG/DL (ref 0.3–1.2)
BILIRUB SERPL-MCNC: 0.48 MG/DL (ref 0.3–1.2)
BUN BLDV-MCNC: 11 MG/DL (ref 6–20)
BUN BLDV-MCNC: 12 MG/DL (ref 6–20)
BUN BLDV-MCNC: 4 MG/DL (ref 6–20)
BUN BLDV-MCNC: 5 MG/DL (ref 6–20)
BUN BLDV-MCNC: 5 MG/DL (ref 6–20)
BUN BLDV-MCNC: 6 MG/DL (ref 6–20)
BUN BLDV-MCNC: 7 MG/DL (ref 6–20)
BUN BLDV-MCNC: 8 MG/DL (ref 6–20)
BUN BLDV-MCNC: 9 MG/DL (ref 6–20)
BUN BLDV-MCNC: 9 MG/DL (ref 6–20)
BUN/CREAT BLD: ABNORMAL (ref 9–20)
CALCIUM SERPL-MCNC: 5.9 MG/DL (ref 8.6–10.4)
CALCIUM SERPL-MCNC: 8.3 MG/DL (ref 8.6–10.4)
CALCIUM SERPL-MCNC: 8.8 MG/DL (ref 8.6–10.4)
CALCIUM SERPL-MCNC: 8.9 MG/DL (ref 8.6–10.4)
CALCIUM SERPL-MCNC: 8.9 MG/DL (ref 8.6–10.4)
CALCIUM SERPL-MCNC: 9 MG/DL (ref 8.6–10.4)
CALCIUM SERPL-MCNC: 9.1 MG/DL (ref 8.6–10.4)
CARCINOEMBRYONIC ANTIGEN: 125.8 NG/ML
CARCINOEMBRYONIC ANTIGEN: 58.4 NG/ML
CARCINOEMBRYONIC ANTIGEN: 61.2 NG/ML
CARCINOEMBRYONIC ANTIGEN: 78.1 NG/ML
CARCINOEMBRYONIC ANTIGEN: 83.9 NG/ML
CHLORIDE BLD-SCNC: 101 MMOL/L (ref 98–107)
CHLORIDE BLD-SCNC: 102 MMOL/L (ref 98–107)
CHLORIDE BLD-SCNC: 103 MMOL/L (ref 98–107)
CHLORIDE BLD-SCNC: 104 MMOL/L (ref 98–107)
CHLORIDE BLD-SCNC: 116 MMOL/L (ref 98–107)
CHLORIDE BLD-SCNC: 95 MMOL/L (ref 98–107)
CHLORIDE BLD-SCNC: 98 MMOL/L (ref 98–107)
CHLORIDE BLD-SCNC: 98 MMOL/L (ref 98–107)
CHLORIDE BLD-SCNC: 99 MMOL/L (ref 98–107)
CHLORIDE BLD-SCNC: 99 MMOL/L (ref 98–107)
CO2: 16 MMOL/L (ref 20–31)
CO2: 23 MMOL/L (ref 20–31)
CO2: 24 MMOL/L (ref 20–31)
CO2: 24 MMOL/L (ref 20–31)
CO2: 25 MMOL/L (ref 20–31)
CO2: 26 MMOL/L (ref 20–31)
CREAT SERPL-MCNC: 0.41 MG/DL (ref 0.7–1.2)
CREAT SERPL-MCNC: 0.45 MG/DL (ref 0.7–1.2)
CREAT SERPL-MCNC: 0.48 MG/DL (ref 0.7–1.2)
CREAT SERPL-MCNC: 0.5 MG/DL (ref 0.7–1.2)
CREAT SERPL-MCNC: 0.51 MG/DL (ref 0.7–1.2)
CREAT SERPL-MCNC: 0.54 MG/DL (ref 0.7–1.2)
CREAT SERPL-MCNC: 0.55 MG/DL (ref 0.7–1.2)
CREAT SERPL-MCNC: 0.55 MG/DL (ref 0.7–1.2)
CREAT SERPL-MCNC: 0.58 MG/DL (ref 0.7–1.2)
CREAT SERPL-MCNC: <0.4 MG/DL (ref 0.7–1.2)
D-DIMER QUANTITATIVE: 1.84 MG/L FEU
DIFFERENTIAL TYPE: ABNORMAL
EKG ATRIAL RATE: 70 BPM
EKG P AXIS: 33 DEGREES
EKG P-R INTERVAL: 170 MS
EKG Q-T INTERVAL: 392 MS
EKG QRS DURATION: 102 MS
EKG QTC CALCULATION (BAZETT): 423 MS
EKG R AXIS: -9 DEGREES
EKG T AXIS: 36 DEGREES
EKG VENTRICULAR RATE: 70 BPM
EOSINOPHILS RELATIVE PERCENT: 0 % (ref 1–4)
EOSINOPHILS RELATIVE PERCENT: 1 % (ref 1–4)
EOSINOPHILS RELATIVE PERCENT: 2 % (ref 1–4)
EOSINOPHILS RELATIVE PERCENT: 2 % (ref 1–4)
GFR AFRICAN AMERICAN: >60 ML/MIN
GFR AFRICAN AMERICAN: ABNORMAL ML/MIN
GFR NON-AFRICAN AMERICAN: >60 ML/MIN
GFR NON-AFRICAN AMERICAN: ABNORMAL ML/MIN
GFR SERPL CREATININE-BSD FRML MDRD: ABNORMAL ML/MIN/{1.73_M2}
GLUCOSE BLD-MCNC: 200 MG/DL (ref 75–110)
GLUCOSE BLD-MCNC: 213 MG/DL (ref 70–99)
GLUCOSE BLD-MCNC: 215 MG/DL (ref 75–110)
GLUCOSE BLD-MCNC: 223 MG/DL (ref 70–99)
GLUCOSE BLD-MCNC: 223 MG/DL (ref 75–110)
GLUCOSE BLD-MCNC: 246 MG/DL (ref 70–99)
GLUCOSE BLD-MCNC: 259 MG/DL (ref 70–99)
GLUCOSE BLD-MCNC: 270 MG/DL (ref 75–110)
GLUCOSE BLD-MCNC: 274 MG/DL (ref 70–99)
GLUCOSE BLD-MCNC: 281 MG/DL (ref 70–99)
GLUCOSE BLD-MCNC: 288 MG/DL (ref 70–99)
GLUCOSE BLD-MCNC: 300 MG/DL (ref 70–99)
GLUCOSE BLD-MCNC: 328 MG/DL (ref 70–99)
GLUCOSE BLD-MCNC: 368 MG/DL (ref 70–99)
HCT VFR BLD CALC: 30.7 % (ref 41–53)
HCT VFR BLD CALC: 36.3 % (ref 41–53)
HCT VFR BLD CALC: 37.4 % (ref 41–53)
HCT VFR BLD CALC: 37.6 % (ref 41–53)
HCT VFR BLD CALC: 37.6 % (ref 41–53)
HCT VFR BLD CALC: 38.3 % (ref 41–53)
HCT VFR BLD CALC: 38.5 % (ref 41–53)
HCT VFR BLD CALC: 38.6 % (ref 41–53)
HCT VFR BLD CALC: 40.3 % (ref 41–53)
HCT VFR BLD CALC: 41 % (ref 41–53)
HEMOGLOBIN: 10 G/DL (ref 13.5–17.5)
HEMOGLOBIN: 12.1 G/DL (ref 13.5–17.5)
HEMOGLOBIN: 12.4 G/DL (ref 13.5–17.5)
HEMOGLOBIN: 12.5 G/DL (ref 13.5–17.5)
HEMOGLOBIN: 12.7 G/DL (ref 13.5–17.5)
HEMOGLOBIN: 12.8 G/DL (ref 13.5–17.5)
HEMOGLOBIN: 12.8 G/DL (ref 13.5–17.5)
HEMOGLOBIN: 12.9 G/DL (ref 13.5–17.5)
HEMOGLOBIN: 13.4 G/DL (ref 13.5–17.5)
HEMOGLOBIN: 13.6 G/DL (ref 13.5–17.5)
IMMATURE GRANULOCYTES: ABNORMAL %
INR BLD: 1
LYMPHOCYTES # BLD: 11 % (ref 24–44)
LYMPHOCYTES # BLD: 12 % (ref 24–44)
LYMPHOCYTES # BLD: 13 % (ref 24–44)
LYMPHOCYTES # BLD: 14 % (ref 24–44)
LYMPHOCYTES # BLD: 8 % (ref 24–44)
LYMPHOCYTES # BLD: 9 % (ref 24–44)
LYMPHOCYTES # BLD: 9 % (ref 24–44)
MCH RBC QN AUTO: 26.8 PG (ref 26–34)
MCH RBC QN AUTO: 26.9 PG (ref 26–34)
MCH RBC QN AUTO: 27.2 PG (ref 26–34)
MCH RBC QN AUTO: 27.3 PG (ref 26–34)
MCH RBC QN AUTO: 27.5 PG (ref 26–34)
MCH RBC QN AUTO: 27.7 PG (ref 26–34)
MCHC RBC AUTO-ENTMCNC: 32.7 G/DL (ref 31–37)
MCHC RBC AUTO-ENTMCNC: 33.1 G/DL (ref 31–37)
MCHC RBC AUTO-ENTMCNC: 33.2 G/DL (ref 31–37)
MCHC RBC AUTO-ENTMCNC: 33.2 G/DL (ref 31–37)
MCHC RBC AUTO-ENTMCNC: 33.3 G/DL (ref 31–37)
MCHC RBC AUTO-ENTMCNC: 33.4 G/DL (ref 31–37)
MCHC RBC AUTO-ENTMCNC: 33.9 G/DL (ref 31–37)
MCV RBC AUTO: 80.4 FL (ref 80–100)
MCV RBC AUTO: 80.4 FL (ref 80–100)
MCV RBC AUTO: 80.6 FL (ref 80–100)
MCV RBC AUTO: 80.7 FL (ref 80–100)
MCV RBC AUTO: 81.2 FL (ref 80–100)
MCV RBC AUTO: 81.6 FL (ref 80–100)
MCV RBC AUTO: 81.7 FL (ref 80–100)
MCV RBC AUTO: 82.1 FL (ref 80–100)
MCV RBC AUTO: 83.2 FL (ref 80–100)
MCV RBC AUTO: 84 FL (ref 80–100)
MONOCYTES # BLD: 12 % (ref 2–11)
MONOCYTES # BLD: 13 % (ref 2–11)
MONOCYTES # BLD: 15 % (ref 2–11)
MONOCYTES # BLD: 16 % (ref 2–11)
MONOCYTES # BLD: 19 % (ref 1–7)
MONOCYTES # BLD: 22 % (ref 2–11)
MORPHOLOGY: NORMAL
MORPHOLOGY: NORMAL
NRBC AUTOMATED: ABNORMAL PER 100 WBC
PARTIAL THROMBOPLASTIN TIME: 24.3 SEC (ref 23–31)
PDW BLD-RTO: 17.6 % (ref 12.5–15.4)
PDW BLD-RTO: 17.7 % (ref 12.5–15.4)
PDW BLD-RTO: 17.8 % (ref 12.5–15.4)
PDW BLD-RTO: 17.8 % (ref 12.5–15.4)
PDW BLD-RTO: 18.1 % (ref 12.5–15.4)
PDW BLD-RTO: 18.3 % (ref 12.5–15.4)
PDW BLD-RTO: 18.4 % (ref 12.5–15.4)
PDW BLD-RTO: 18.4 % (ref 12.5–15.4)
PDW BLD-RTO: 18.6 % (ref 12.5–15.4)
PDW BLD-RTO: 19 % (ref 12.5–15.4)
PLATELET # BLD: 105 K/UL (ref 140–450)
PLATELET # BLD: 119 K/UL (ref 140–450)
PLATELET # BLD: 124 K/UL (ref 140–450)
PLATELET # BLD: 125 K/UL (ref 140–450)
PLATELET # BLD: 129 K/UL (ref 140–450)
PLATELET # BLD: 133 K/UL (ref 140–450)
PLATELET # BLD: 152 K/UL (ref 140–450)
PLATELET # BLD: 168 K/UL (ref 140–450)
PLATELET # BLD: 176 K/UL (ref 130–400)
PLATELET # BLD: 182 K/UL (ref 140–450)
PLATELET # BLD: 202 K/UL (ref 140–450)
PLATELET ESTIMATE: ABNORMAL
PMV BLD AUTO: 6.4 FL (ref 6–12)
PMV BLD AUTO: 6.4 FL (ref 6–12)
PMV BLD AUTO: 6.6 FL (ref 6–12)
PMV BLD AUTO: 6.7 FL (ref 6–12)
PMV BLD AUTO: 6.7 FL (ref 6–12)
PMV BLD AUTO: 6.8 FL (ref 6–12)
PMV BLD AUTO: 6.8 FL (ref 6–12)
PMV BLD AUTO: 6.9 FL (ref 6–12)
PMV BLD AUTO: 7 FL (ref 6–12)
PMV BLD AUTO: 7.1 FL (ref 6–12)
POTASSIUM SERPL-SCNC: 2.7 MMOL/L (ref 3.7–5.3)
POTASSIUM SERPL-SCNC: 3.9 MMOL/L (ref 3.7–5.3)
POTASSIUM SERPL-SCNC: 4 MMOL/L (ref 3.7–5.3)
POTASSIUM SERPL-SCNC: 4.1 MMOL/L (ref 3.7–5.3)
POTASSIUM SERPL-SCNC: 4.2 MMOL/L (ref 3.7–5.3)
POTASSIUM SERPL-SCNC: 4.4 MMOL/L (ref 3.7–5.3)
PROTEIN UA: NEGATIVE
PROTEIN UA: NEGATIVE
PROTHROMBIN TIME: 10.2 SEC (ref 9.7–11.6)
RBC # BLD: 3.66 M/UL (ref 4.5–5.9)
RBC # BLD: 4.49 M/UL (ref 4.5–5.9)
RBC # BLD: 4.52 M/UL (ref 4.5–5.9)
RBC # BLD: 4.63 M/UL (ref 4.5–5.9)
RBC # BLD: 4.68 M/UL (ref 4.5–5.9)
RBC # BLD: 4.69 M/UL (ref 4.5–5.9)
RBC # BLD: 4.72 M/UL (ref 4.5–5.9)
RBC # BLD: 4.78 M/UL (ref 4.5–5.9)
RBC # BLD: 4.91 M/UL (ref 4.5–5.9)
RBC # BLD: 5.09 M/UL (ref 4.5–5.9)
RBC # BLD: ABNORMAL 10*6/UL
SEG NEUTROPHILS: 69 % (ref 36–66)
SEG NEUTROPHILS: 69 % (ref 36–66)
SEG NEUTROPHILS: 70 % (ref 36–66)
SEG NEUTROPHILS: 72 % (ref 36–66)
SEG NEUTROPHILS: 73 % (ref 36–66)
SEG NEUTROPHILS: 75 % (ref 36–66)
SEGMENTED NEUTROPHILS ABSOLUTE COUNT: 3.3 K/UL (ref 1.8–7.7)
SEGMENTED NEUTROPHILS ABSOLUTE COUNT: 3.3 K/UL (ref 1.8–7.7)
SEGMENTED NEUTROPHILS ABSOLUTE COUNT: 3.6 K/UL (ref 1.8–7.7)
SEGMENTED NEUTROPHILS ABSOLUTE COUNT: 3.6 K/UL (ref 1.8–7.7)
SEGMENTED NEUTROPHILS ABSOLUTE COUNT: 4.2 K/UL (ref 1.8–7.7)
SEGMENTED NEUTROPHILS ABSOLUTE COUNT: 4.4 K/UL (ref 1.8–7.7)
SEGMENTED NEUTROPHILS ABSOLUTE COUNT: 4.76 K/UL (ref 1.8–7.7)
SEGMENTED NEUTROPHILS ABSOLUTE COUNT: 4.9 K/UL (ref 1.8–7.7)
SEGMENTED NEUTROPHILS ABSOLUTE COUNT: 5.3 K/UL (ref 1.8–7.7)
SEGMENTED NEUTROPHILS ABSOLUTE COUNT: 5.6 K/UL (ref 1.8–7.7)
SODIUM BLD-SCNC: 133 MMOL/L (ref 135–144)
SODIUM BLD-SCNC: 135 MMOL/L (ref 135–144)
SODIUM BLD-SCNC: 137 MMOL/L (ref 135–144)
SODIUM BLD-SCNC: 137 MMOL/L (ref 135–144)
SODIUM BLD-SCNC: 138 MMOL/L (ref 135–144)
SODIUM BLD-SCNC: 139 MMOL/L (ref 135–144)
SODIUM BLD-SCNC: 140 MMOL/L (ref 135–144)
SODIUM BLD-SCNC: 141 MMOL/L (ref 135–144)
SODIUM BLD-SCNC: 141 MMOL/L (ref 135–144)
SODIUM BLD-SCNC: 145 MMOL/L (ref 135–144)
TOTAL PROTEIN: 4.5 G/DL (ref 6.4–8.3)
TOTAL PROTEIN: 6 G/DL (ref 6.4–8.3)
TOTAL PROTEIN: 6.3 G/DL (ref 6.4–8.3)
TOTAL PROTEIN: 6.3 G/DL (ref 6.4–8.3)
TOTAL PROTEIN: 6.5 G/DL (ref 6.4–8.3)
TOTAL PROTEIN: 6.6 G/DL (ref 6.4–8.3)
TOTAL PROTEIN: 6.6 G/DL (ref 6.4–8.3)
TOTAL PROTEIN: 6.8 G/DL (ref 6.4–8.3)
TOTAL PROTEIN: 7 G/DL (ref 6.4–8.3)
TOTAL PROTEIN: 7 G/DL (ref 6.4–8.3)
WBC # BLD: 4.7 K/UL (ref 3.5–11)
WBC # BLD: 4.8 K/UL (ref 3.5–11)
WBC # BLD: 4.9 K/UL (ref 3.5–11)
WBC # BLD: 5 K/UL (ref 3.5–11)
WBC # BLD: 5.8 K/UL (ref 3.5–11)
WBC # BLD: 6.2 K/UL (ref 3.5–11)
WBC # BLD: 6.5 K/UL (ref 3.5–11)
WBC # BLD: 6.9 K/UL (ref 3.5–11)
WBC # BLD: 7.3 K/UL (ref 3.5–11)
WBC # BLD: 7.6 K/UL (ref 3.5–11)
WBC # BLD: ABNORMAL 10*3/UL

## 2018-01-01 PROCEDURE — 96413 CHEMO IV INFUSION 1 HR: CPT

## 2018-01-01 PROCEDURE — 2580000003 HC RX 258: Performed by: INTERNAL MEDICINE

## 2018-01-01 PROCEDURE — G8598 ASA/ANTIPLAT THER USED: HCPCS | Performed by: INTERNAL MEDICINE

## 2018-01-01 PROCEDURE — 85025 COMPLETE CBC W/AUTO DIFF WBC: CPT

## 2018-01-01 PROCEDURE — 1036F TOBACCO NON-USER: CPT | Performed by: INTERNAL MEDICINE

## 2018-01-01 PROCEDURE — 96368 THER/DIAG CONCURRENT INF: CPT

## 2018-01-01 PROCEDURE — 7100000010 HC PHASE II RECOVERY - FIRST 15 MIN

## 2018-01-01 PROCEDURE — 3017F COLORECTAL CA SCREEN DOC REV: CPT | Performed by: INTERNAL MEDICINE

## 2018-01-01 PROCEDURE — 96523 IRRIG DRUG DELIVERY DEVICE: CPT

## 2018-01-01 PROCEDURE — 77013 CT GUIDE FOR TISSUE ABLATION: CPT

## 2018-01-01 PROCEDURE — 6360000002 HC RX W HCPCS: Performed by: RADIOLOGY

## 2018-01-01 PROCEDURE — 2720000010 CT GUIDED VISCERAL TISSUE ABLATION

## 2018-01-01 PROCEDURE — 7100000000 HC PACU RECOVERY - FIRST 15 MIN

## 2018-01-01 PROCEDURE — 6360000002 HC RX W HCPCS: Performed by: INTERNAL MEDICINE

## 2018-01-01 PROCEDURE — 6360000002 HC RX W HCPCS: Performed by: NURSE ANESTHETIST, CERTIFIED REGISTERED

## 2018-01-01 PROCEDURE — 3046F HEMOGLOBIN A1C LEVEL >9.0%: CPT | Performed by: INTERNAL MEDICINE

## 2018-01-01 PROCEDURE — G8417 CALC BMI ABV UP PARAM F/U: HCPCS | Performed by: INTERNAL MEDICINE

## 2018-01-01 PROCEDURE — 36591 DRAW BLOOD OFF VENOUS DEVICE: CPT

## 2018-01-01 PROCEDURE — 96549 UNLISTED CHEMOTHERAPY PX: CPT

## 2018-01-01 PROCEDURE — 99212 OFFICE O/P EST SF 10 MIN: CPT | Performed by: RADIOLOGY

## 2018-01-01 PROCEDURE — G8427 DOCREV CUR MEDS BY ELIG CLIN: HCPCS | Performed by: INTERNAL MEDICINE

## 2018-01-01 PROCEDURE — 81003 URINALYSIS AUTO W/O SCOPE: CPT

## 2018-01-01 PROCEDURE — 96415 CHEMO IV INFUSION ADDL HR: CPT

## 2018-01-01 PROCEDURE — 3700000000 HC ANESTHESIA ATTENDED CARE

## 2018-01-01 PROCEDURE — 7100000011 HC PHASE II RECOVERY - ADDTL 15 MIN

## 2018-01-01 PROCEDURE — 90686 IIV4 VACC NO PRSV 0.5 ML IM: CPT | Performed by: INTERNAL MEDICINE

## 2018-01-01 PROCEDURE — 2022F DILAT RTA XM EVC RTNOPTHY: CPT | Performed by: INTERNAL MEDICINE

## 2018-01-01 PROCEDURE — 96523 IRRIG DRUG DELIVERY DEVICE: CPT | Performed by: NURSE PRACTITIONER

## 2018-01-01 PROCEDURE — 80053 COMPREHEN METABOLIC PANEL: CPT

## 2018-01-01 PROCEDURE — 96411 CHEMO IV PUSH ADDL DRUG: CPT

## 2018-01-01 PROCEDURE — 99214 OFFICE O/P EST MOD 30 MIN: CPT | Performed by: INTERNAL MEDICINE

## 2018-01-01 PROCEDURE — 74177 CT ABD & PELVIS W/CONTRAST: CPT

## 2018-01-01 PROCEDURE — G8428 CUR MEDS NOT DOCUMENT: HCPCS | Performed by: INTERNAL MEDICINE

## 2018-01-01 PROCEDURE — 36415 COLL VENOUS BLD VENIPUNCTURE: CPT

## 2018-01-01 PROCEDURE — 71260 CT THORAX DX C+: CPT

## 2018-01-01 PROCEDURE — 82947 ASSAY GLUCOSE BLOOD QUANT: CPT

## 2018-01-01 PROCEDURE — 82378 CARCINOEMBRYONIC ANTIGEN: CPT

## 2018-01-01 PROCEDURE — 96367 TX/PROPH/DG ADDL SEQ IV INF: CPT

## 2018-01-01 PROCEDURE — 7100000001 HC PACU RECOVERY - ADDTL 15 MIN

## 2018-01-01 PROCEDURE — 96375 TX/PRO/DX INJ NEW DRUG ADDON: CPT

## 2018-01-01 PROCEDURE — G8482 FLU IMMUNIZE ORDER/ADMIN: HCPCS | Performed by: INTERNAL MEDICINE

## 2018-01-01 PROCEDURE — 6360000004 HC RX CONTRAST MEDICATION: Performed by: INTERNAL MEDICINE

## 2018-01-01 PROCEDURE — 6370000000 HC RX 637 (ALT 250 FOR IP): Performed by: INTERNAL MEDICINE

## 2018-01-01 PROCEDURE — 96416 CHEMO PROLONG INFUSE W/PUMP: CPT

## 2018-01-01 PROCEDURE — 85379 FIBRIN DEGRADATION QUANT: CPT

## 2018-01-01 PROCEDURE — 85730 THROMBOPLASTIN TIME PARTIAL: CPT

## 2018-01-01 PROCEDURE — 2500000003 HC RX 250 WO HCPCS: Performed by: NURSE ANESTHETIST, CERTIFIED REGISTERED

## 2018-01-01 PROCEDURE — 3700000001 HC ADD 15 MINUTES (ANESTHESIA)

## 2018-01-01 PROCEDURE — 6370000000 HC RX 637 (ALT 250 FOR IP): Performed by: ANESTHESIOLOGY

## 2018-01-01 PROCEDURE — 85610 PROTHROMBIN TIME: CPT

## 2018-01-01 PROCEDURE — 96409 CHEMO IV PUSH SNGL DRUG: CPT

## 2018-01-01 PROCEDURE — 99215 OFFICE O/P EST HI 40 MIN: CPT | Performed by: INTERNAL MEDICINE

## 2018-01-01 PROCEDURE — G0008 ADMIN INFLUENZA VIRUS VAC: HCPCS | Performed by: INTERNAL MEDICINE

## 2018-01-01 PROCEDURE — 93005 ELECTROCARDIOGRAM TRACING: CPT

## 2018-01-01 PROCEDURE — 85049 AUTOMATED PLATELET COUNT: CPT

## 2018-01-01 PROCEDURE — 96417 CHEMO IV INFUS EACH ADDL SEQ: CPT

## 2018-01-01 PROCEDURE — 2500000003 HC RX 250 WO HCPCS: Performed by: RADIOLOGY

## 2018-01-01 PROCEDURE — 99284 EMERGENCY DEPT VISIT MOD MDM: CPT

## 2018-01-01 PROCEDURE — 47382 PERCUT ABLATE LIVER RF: CPT

## 2018-01-01 PROCEDURE — 96365 THER/PROPH/DIAG IV INF INIT: CPT

## 2018-01-01 PROCEDURE — 2580000003 HC RX 258: Performed by: NURSE ANESTHETIST, CERTIFIED REGISTERED

## 2018-01-01 PROCEDURE — 2580000003 HC RX 258: Performed by: RADIOLOGY

## 2018-01-01 PROCEDURE — 99211 OFF/OP EST MAY X REQ PHY/QHP: CPT | Performed by: INTERNAL MEDICINE

## 2018-01-01 PROCEDURE — 36430 TRANSFUSION BLD/BLD COMPNT: CPT

## 2018-01-01 PROCEDURE — 99283 EMERGENCY DEPT VISIT LOW MDM: CPT

## 2018-01-01 RX ORDER — SODIUM CHLORIDE 0.9 % (FLUSH) 0.9 %
10 SYRINGE (ML) INJECTION PRN
Status: CANCELLED | OUTPATIENT
Start: 2018-01-01

## 2018-01-01 RX ORDER — PALONOSETRON 0.05 MG/ML
0.25 INJECTION, SOLUTION INTRAVENOUS ONCE
Status: CANCELLED | OUTPATIENT
Start: 2018-01-01

## 2018-01-01 RX ORDER — HEPARIN SODIUM (PORCINE) LOCK FLUSH IV SOLN 100 UNIT/ML 100 UNIT/ML
500 SOLUTION INTRAVENOUS PRN
Status: ACTIVE | OUTPATIENT
Start: 2018-01-01 | End: 2018-01-01

## 2018-01-01 RX ORDER — HEPARIN SODIUM (PORCINE) LOCK FLUSH IV SOLN 100 UNIT/ML 100 UNIT/ML
500 SOLUTION INTRAVENOUS PRN
Status: DISCONTINUED | OUTPATIENT
Start: 2018-01-01 | End: 2018-01-01 | Stop reason: HOSPADM

## 2018-01-01 RX ORDER — DEXAMETHASONE SODIUM PHOSPHATE 4 MG/ML
4 INJECTION, SOLUTION INTRA-ARTICULAR; INTRALESIONAL; INTRAMUSCULAR; INTRAVENOUS; SOFT TISSUE EVERY 6 HOURS
Status: DISCONTINUED | OUTPATIENT
Start: 2018-01-01 | End: 2018-01-01

## 2018-01-01 RX ORDER — METRONIDAZOLE 500 MG/1
500 TABLET ORAL 2 TIMES DAILY WITH MEALS
Qty: 26 TABLET | Refills: 0 | Status: SHIPPED | OUTPATIENT
Start: 2018-01-01 | End: 2018-01-01

## 2018-01-01 RX ORDER — MIDAZOLAM HYDROCHLORIDE 1 MG/ML
INJECTION INTRAMUSCULAR; INTRAVENOUS PRN
Status: DISCONTINUED | OUTPATIENT
Start: 2018-01-01 | End: 2018-01-01 | Stop reason: SDUPTHER

## 2018-01-01 RX ORDER — SODIUM CHLORIDE 0.9 % (FLUSH) 0.9 %
10 SYRINGE (ML) INJECTION PRN
Status: ACTIVE | OUTPATIENT
Start: 2018-01-01 | End: 2018-01-01

## 2018-01-01 RX ORDER — HYDRALAZINE HYDROCHLORIDE 20 MG/ML
5 INJECTION INTRAMUSCULAR; INTRAVENOUS EVERY 10 MIN PRN
Status: DISCONTINUED | OUTPATIENT
Start: 2018-01-01 | End: 2018-01-01 | Stop reason: CLARIF

## 2018-01-01 RX ORDER — HEPARIN SODIUM (PORCINE) LOCK FLUSH IV SOLN 100 UNIT/ML 100 UNIT/ML
500 SOLUTION INTRAVENOUS PRN
Status: CANCELLED | OUTPATIENT
Start: 2018-01-01

## 2018-01-01 RX ORDER — SODIUM CHLORIDE 0.9 % (FLUSH) 0.9 %
10 SYRINGE (ML) INJECTION PRN
Status: DISCONTINUED | OUTPATIENT
Start: 2018-01-01 | End: 2018-01-01 | Stop reason: HOSPADM

## 2018-01-01 RX ORDER — DIPHENHYDRAMINE HYDROCHLORIDE 50 MG/ML
50 INJECTION INTRAMUSCULAR; INTRAVENOUS ONCE
Status: CANCELLED | OUTPATIENT
Start: 2018-01-01 | End: 2018-01-01

## 2018-01-01 RX ORDER — SODIUM CHLORIDE 9 MG/ML
INJECTION, SOLUTION INTRAVENOUS CONTINUOUS
Status: CANCELLED | OUTPATIENT
Start: 2018-01-01

## 2018-01-01 RX ORDER — DEXAMETHASONE SODIUM PHOSPHATE 10 MG/ML
10 INJECTION, SOLUTION INTRAMUSCULAR; INTRAVENOUS ONCE
Status: COMPLETED | OUTPATIENT
Start: 2018-01-01 | End: 2018-01-01

## 2018-01-01 RX ORDER — 0.9 % SODIUM CHLORIDE 0.9 %
80 INTRAVENOUS SOLUTION INTRAVENOUS ONCE
Status: COMPLETED | OUTPATIENT
Start: 2018-01-01 | End: 2018-01-01

## 2018-01-01 RX ORDER — SODIUM CHLORIDE 0.9 % (FLUSH) 0.9 %
5 SYRINGE (ML) INJECTION PRN
Status: CANCELLED | OUTPATIENT
Start: 2018-01-01

## 2018-01-01 RX ORDER — DEXTROSE MONOHYDRATE 50 MG/ML
INJECTION, SOLUTION INTRAVENOUS ONCE
Status: COMPLETED | OUTPATIENT
Start: 2018-01-01 | End: 2018-01-01

## 2018-01-01 RX ORDER — DEXTROSE MONOHYDRATE 50 MG/ML
INJECTION, SOLUTION INTRAVENOUS ONCE
Status: CANCELLED | OUTPATIENT
Start: 2018-01-01 | End: 2018-01-01

## 2018-01-01 RX ORDER — PALONOSETRON 0.05 MG/ML
0.25 INJECTION, SOLUTION INTRAVENOUS ONCE
Status: COMPLETED | OUTPATIENT
Start: 2018-01-01 | End: 2018-01-01

## 2018-01-01 RX ORDER — DEXAMETHASONE SODIUM PHOSPHATE 4 MG/ML
4 INJECTION, SOLUTION INTRA-ARTICULAR; INTRALESIONAL; INTRAMUSCULAR; INTRAVENOUS; SOFT TISSUE ONCE
Status: COMPLETED | OUTPATIENT
Start: 2018-01-01 | End: 2018-01-01

## 2018-01-01 RX ORDER — FENTANYL 25 UG/H
1 PATCH TRANSDERMAL
Qty: 10 PATCH | Refills: 0 | Status: SHIPPED | OUTPATIENT
Start: 2018-01-01 | End: 2018-01-01 | Stop reason: SDUPTHER

## 2018-01-01 RX ORDER — OXYCODONE AND ACETAMINOPHEN 10; 325 MG/1; MG/1
1 TABLET ORAL EVERY 6 HOURS PRN
Qty: 120 TABLET | Refills: 0 | Status: SHIPPED | OUTPATIENT
Start: 2018-01-01 | End: 2018-01-01 | Stop reason: SDUPTHER

## 2018-01-01 RX ORDER — FENTANYL 25 UG/H
1 PATCH TRANSDERMAL
Qty: 10 PATCH | Refills: 0 | Status: SHIPPED | OUTPATIENT
Start: 2018-01-01 | End: 2018-01-01

## 2018-01-01 RX ORDER — 0.9 % SODIUM CHLORIDE 0.9 %
10 VIAL (ML) INJECTION ONCE
Status: CANCELLED | OUTPATIENT
Start: 2018-01-01 | End: 2018-01-01

## 2018-01-01 RX ORDER — METHYLPREDNISOLONE SODIUM SUCCINATE 125 MG/2ML
125 INJECTION, POWDER, LYOPHILIZED, FOR SOLUTION INTRAMUSCULAR; INTRAVENOUS ONCE
Status: CANCELLED | OUTPATIENT
Start: 2018-01-01 | End: 2018-01-01

## 2018-01-01 RX ORDER — ASPIRIN 81 MG/1
81 TABLET, CHEWABLE ORAL ONCE
Status: COMPLETED | OUTPATIENT
Start: 2018-01-01 | End: 2018-01-01

## 2018-01-01 RX ORDER — SODIUM CHLORIDE 9 MG/ML
INJECTION, SOLUTION INTRAVENOUS CONTINUOUS
Status: DISCONTINUED | OUTPATIENT
Start: 2018-01-01 | End: 2018-01-01 | Stop reason: HOSPADM

## 2018-01-01 RX ORDER — POTASSIUM CHLORIDE 20 MEQ/1
40 TABLET, EXTENDED RELEASE ORAL ONCE
Status: DISCONTINUED | OUTPATIENT
Start: 2018-01-01 | End: 2018-01-01 | Stop reason: HOSPADM

## 2018-01-01 RX ORDER — ONDANSETRON 4 MG/1
4 TABLET, FILM COATED ORAL EVERY 8 HOURS PRN
Qty: 15 TABLET | Refills: 0 | Status: SHIPPED | OUTPATIENT
Start: 2018-01-01 | End: 2018-01-01

## 2018-01-01 RX ORDER — ATROPINE SULFATE 0.4 MG/ML
0.4 AMPUL (ML) INJECTION
Status: COMPLETED | OUTPATIENT
Start: 2018-01-01 | End: 2018-01-01

## 2018-01-01 RX ORDER — SODIUM CHLORIDE 0.9 % (FLUSH) 0.9 %
20 SYRINGE (ML) INJECTION PRN
Status: CANCELLED | OUTPATIENT
Start: 2018-01-01

## 2018-01-01 RX ORDER — FLUOROURACIL 50 MG/ML
400 INJECTION, SOLUTION INTRAVENOUS ONCE
Status: CANCELLED | OUTPATIENT
Start: 2018-01-01

## 2018-01-01 RX ORDER — FLUOROURACIL 50 MG/ML
950 INJECTION, SOLUTION INTRAVENOUS ONCE
Status: COMPLETED | OUTPATIENT
Start: 2018-01-01 | End: 2018-01-01

## 2018-01-01 RX ORDER — FENTANYL CITRATE 50 UG/ML
25 INJECTION, SOLUTION INTRAMUSCULAR; INTRAVENOUS EVERY 5 MIN PRN
Status: DISCONTINUED | OUTPATIENT
Start: 2018-01-01 | End: 2018-01-01 | Stop reason: CLARIF

## 2018-01-01 RX ORDER — POTASSIUM CHLORIDE 20 MEQ/1
20 TABLET, EXTENDED RELEASE ORAL DAILY
Qty: 30 TABLET | Refills: 3 | Status: SHIPPED | OUTPATIENT
Start: 2018-01-01

## 2018-01-01 RX ORDER — FLUOROURACIL 50 MG/ML
950 INJECTION, SOLUTION INTRAVENOUS ONCE
Status: CANCELLED | OUTPATIENT
Start: 2018-01-01

## 2018-01-01 RX ORDER — TADALAFIL 10 MG/1
10 TABLET ORAL PRN
Qty: 6 TABLET | Refills: 3 | Status: SHIPPED | OUTPATIENT
Start: 2018-01-01 | End: 2019-01-01

## 2018-01-01 RX ORDER — SODIUM CHLORIDE 9 MG/ML
INJECTION, SOLUTION INTRAVENOUS ONCE
Status: CANCELLED | OUTPATIENT
Start: 2018-01-01 | End: 2018-01-01

## 2018-01-01 RX ORDER — ONDANSETRON 2 MG/ML
4 INJECTION INTRAMUSCULAR; INTRAVENOUS
Status: DISCONTINUED | OUTPATIENT
Start: 2018-01-01 | End: 2018-01-01 | Stop reason: CLARIF

## 2018-01-01 RX ORDER — PROMETHAZINE HYDROCHLORIDE 25 MG/ML
6.25 INJECTION, SOLUTION INTRAMUSCULAR; INTRAVENOUS
Status: DISCONTINUED | OUTPATIENT
Start: 2018-01-01 | End: 2018-01-01 | Stop reason: CLARIF

## 2018-01-01 RX ORDER — LEVOFLOXACIN 5 MG/ML
500 INJECTION, SOLUTION INTRAVENOUS ONCE
Status: COMPLETED | OUTPATIENT
Start: 2018-01-01 | End: 2018-01-01

## 2018-01-01 RX ORDER — FENTANYL CITRATE 50 UG/ML
INJECTION, SOLUTION INTRAMUSCULAR; INTRAVENOUS PRN
Status: DISCONTINUED | OUTPATIENT
Start: 2018-01-01 | End: 2018-01-01 | Stop reason: SDUPTHER

## 2018-01-01 RX ORDER — SODIUM CHLORIDE 9 MG/ML
INJECTION, SOLUTION INTRAVENOUS ONCE
Status: COMPLETED | OUTPATIENT
Start: 2018-01-01 | End: 2018-01-01

## 2018-01-01 RX ORDER — LEVOFLOXACIN 500 MG/1
500 TABLET, FILM COATED ORAL DAILY
Qty: 13 TABLET | Refills: 0 | Status: SHIPPED | OUTPATIENT
Start: 2018-01-01 | End: 2018-01-01

## 2018-01-01 RX ORDER — EPINEPHRINE 1 MG/ML
0.3 INJECTION, SOLUTION, CONCENTRATE INTRAVENOUS PRN
Status: CANCELLED | OUTPATIENT
Start: 2018-01-01

## 2018-01-01 RX ORDER — PROPOFOL 10 MG/ML
INJECTION, EMULSION INTRAVENOUS PRN
Status: DISCONTINUED | OUTPATIENT
Start: 2018-01-01 | End: 2018-01-01 | Stop reason: SDUPTHER

## 2018-01-01 RX ORDER — LIDOCAINE HYDROCHLORIDE 20 MG/ML
INJECTION, SOLUTION INFILTRATION; PERINEURAL PRN
Status: DISCONTINUED | OUTPATIENT
Start: 2018-01-01 | End: 2018-01-01 | Stop reason: SDUPTHER

## 2018-01-01 RX ORDER — METHYLPREDNISOLONE 4 MG/1
TABLET ORAL
Qty: 1 KIT | Refills: 0 | Status: SHIPPED | OUTPATIENT
Start: 2018-01-01 | End: 2018-01-01

## 2018-01-01 RX ORDER — SODIUM CHLORIDE 9 MG/ML
INJECTION, SOLUTION INTRAVENOUS CONTINUOUS
Status: CANCELLED | OUTPATIENT
Start: 2018-01-01 | End: 2019-08-22

## 2018-01-01 RX ORDER — LABETALOL HYDROCHLORIDE 5 MG/ML
5 INJECTION, SOLUTION INTRAVENOUS EVERY 10 MIN PRN
Status: DISCONTINUED | OUTPATIENT
Start: 2018-01-01 | End: 2018-01-01 | Stop reason: CLARIF

## 2018-01-01 RX ORDER — SODIUM CHLORIDE 9 MG/ML
INJECTION, SOLUTION INTRAVENOUS CONTINUOUS PRN
Status: DISCONTINUED | OUTPATIENT
Start: 2018-01-01 | End: 2018-01-01 | Stop reason: SDUPTHER

## 2018-01-01 RX ORDER — ROCURONIUM BROMIDE 10 MG/ML
INJECTION, SOLUTION INTRAVENOUS PRN
Status: DISCONTINUED | OUTPATIENT
Start: 2018-01-01 | End: 2018-01-01 | Stop reason: SDUPTHER

## 2018-01-01 RX ORDER — ONDANSETRON 2 MG/ML
INJECTION INTRAMUSCULAR; INTRAVENOUS PRN
Status: DISCONTINUED | OUTPATIENT
Start: 2018-01-01 | End: 2018-01-01 | Stop reason: SDUPTHER

## 2018-01-01 RX ORDER — OXYCODONE AND ACETAMINOPHEN 10; 325 MG/1; MG/1
1 TABLET ORAL EVERY 6 HOURS PRN
Qty: 120 TABLET | Refills: 0 | Status: SHIPPED | OUTPATIENT
Start: 2018-01-01 | End: 2018-01-01

## 2018-01-01 RX ORDER — POTASSIUM CHLORIDE 20 MEQ/1
20 TABLET, EXTENDED RELEASE ORAL
Status: COMPLETED | OUTPATIENT
Start: 2018-01-01 | End: 2018-01-01

## 2018-01-01 RX ORDER — DEXTROSE MONOHYDRATE 50 MG/ML
INJECTION, SOLUTION INTRAVENOUS ONCE
Status: DISCONTINUED | OUTPATIENT
Start: 2018-01-01 | End: 2018-01-01 | Stop reason: HOSPADM

## 2018-01-01 RX ORDER — DEXAMETHASONE SODIUM PHOSPHATE 10 MG/ML
10 INJECTION INTRAMUSCULAR; INTRAVENOUS ONCE
Status: COMPLETED | OUTPATIENT
Start: 2018-01-01 | End: 2018-01-01

## 2018-01-01 RX ADMIN — POTASSIUM CHLORIDE 20 MEQ: 20 TABLET, EXTENDED RELEASE ORAL at 12:20

## 2018-01-01 RX ADMIN — LEUCOVORIN CALCIUM 950 MG: 350 INJECTION, POWDER, LYOPHILIZED, FOR SOLUTION INTRAMUSCULAR; INTRAVENOUS at 10:20

## 2018-01-01 RX ADMIN — Medication 10 ML: at 13:58

## 2018-01-01 RX ADMIN — ASPIRIN 81 MG 81 MG: 81 TABLET ORAL at 10:32

## 2018-01-01 RX ADMIN — DEXTROSE MONOHYDRATE: 50 INJECTION, SOLUTION INTRAVENOUS at 09:31

## 2018-01-01 RX ADMIN — IRINOTECAN HYDROCHLORIDE 440 MG: 100 INJECTION, SOLUTION INTRAVENOUS at 13:59

## 2018-01-01 RX ADMIN — BEVACIZUMAB 600 MG: 400 INJECTION, SOLUTION INTRAVENOUS at 11:57

## 2018-01-01 RX ADMIN — ATROPINE SULFATE 0.4 MG: 0.4 INJECTION, SOLUTION INTRAMUSCULAR; INTRAVENOUS; SUBCUTANEOUS at 12:37

## 2018-01-01 RX ADMIN — FLUOROURACIL 5750 MG: 50 INJECTION, SOLUTION INTRAVENOUS at 13:26

## 2018-01-01 RX ADMIN — Medication 10 ML: at 16:06

## 2018-01-01 RX ADMIN — PALONOSETRON 0.25 MG: 0.05 INJECTION, SOLUTION INTRAVENOUS at 10:18

## 2018-01-01 RX ADMIN — PALONOSETRON 0.25 MG: 0.05 INJECTION, SOLUTION INTRAVENOUS at 09:53

## 2018-01-01 RX ADMIN — PALONOSETRON 0.25 MG: 0.05 INJECTION, SOLUTION INTRAVENOUS at 10:13

## 2018-01-01 RX ADMIN — Medication 10 ML: at 15:30

## 2018-01-01 RX ADMIN — Medication 10 ML: at 09:31

## 2018-01-01 RX ADMIN — SODIUM CHLORIDE, PRESERVATIVE FREE 500 UNITS: 5 INJECTION INTRAVENOUS at 15:50

## 2018-01-01 RX ADMIN — FLUOROURACIL 5950 MG: 50 INJECTION, SOLUTION INTRAVENOUS at 15:41

## 2018-01-01 RX ADMIN — PALONOSETRON 0.25 MG: 0.05 INJECTION, SOLUTION INTRAVENOUS at 12:46

## 2018-01-01 RX ADMIN — PHENYLEPHRINE HYDROCHLORIDE 100 MCG: 10 INJECTION INTRAVENOUS at 12:09

## 2018-01-01 RX ADMIN — DEXAMETHASONE SODIUM PHOSPHATE 10 MG: 10 INJECTION, SOLUTION INTRAMUSCULAR; INTRAVENOUS at 10:13

## 2018-01-01 RX ADMIN — ONDANSETRON 4 MG: 2 INJECTION INTRAMUSCULAR; INTRAVENOUS at 13:04

## 2018-01-01 RX ADMIN — FENTANYL CITRATE 100 MCG: 50 INJECTION, SOLUTION INTRAMUSCULAR; INTRAVENOUS at 10:56

## 2018-01-01 RX ADMIN — FLUOROURACIL 950 MG: 50 INJECTION, SOLUTION INTRAVENOUS at 12:29

## 2018-01-01 RX ADMIN — METOPROLOL TARTRATE 25 MG: 25 TABLET ORAL at 10:28

## 2018-01-01 RX ADMIN — Medication 10 ML: at 15:49

## 2018-01-01 RX ADMIN — FLUOROURACIL 950 MG: 50 INJECTION, SOLUTION INTRAVENOUS at 13:22

## 2018-01-01 RX ADMIN — DEXAMETHASONE SODIUM PHOSPHATE 4 MG: 4 INJECTION, SOLUTION INTRAMUSCULAR; INTRAVENOUS at 10:31

## 2018-01-01 RX ADMIN — IOHEXOL 50 ML: 240 INJECTION, SOLUTION INTRATHECAL; INTRAVASCULAR; INTRAVENOUS; ORAL at 12:10

## 2018-01-01 RX ADMIN — Medication 10 ML: at 12:07

## 2018-01-01 RX ADMIN — LIDOCAINE HYDROCHLORIDE 80 MG: 20 INJECTION, SOLUTION INFILTRATION; PERINEURAL at 10:56

## 2018-01-01 RX ADMIN — Medication 10 ML: at 13:23

## 2018-01-01 RX ADMIN — LEUCOVORIN CALCIUM 950 MG: 350 INJECTION, POWDER, LYOPHILIZED, FOR SOLUTION INTRAMUSCULAR; INTRAVENOUS at 10:16

## 2018-01-01 RX ADMIN — OXALIPLATIN 200 MG: 5 INJECTION, SOLUTION, CONCENTRATE INTRAVENOUS at 11:42

## 2018-01-01 RX ADMIN — SODIUM CHLORIDE: 9 INJECTION, SOLUTION INTRAVENOUS at 11:57

## 2018-01-01 RX ADMIN — IOPAMIDOL 100 ML: 755 INJECTION, SOLUTION INTRAVENOUS at 13:52

## 2018-01-01 RX ADMIN — PROPOFOL 170 MG: 10 INJECTION, EMULSION INTRAVENOUS at 10:56

## 2018-01-01 RX ADMIN — OXALIPLATIN 200 MG: 5 INJECTION, SOLUTION, CONCENTRATE INTRAVENOUS at 13:24

## 2018-01-01 RX ADMIN — OXALIPLATIN 200 MG: 5 INJECTION, SOLUTION, CONCENTRATE INTRAVENOUS at 10:23

## 2018-01-01 RX ADMIN — Medication 10 ML: at 09:59

## 2018-01-01 RX ADMIN — IOPAMIDOL 100 ML: 755 INJECTION, SOLUTION INTRAVENOUS at 12:10

## 2018-01-01 RX ADMIN — Medication 10 ML: at 13:21

## 2018-01-01 RX ADMIN — SUGAMMADEX 200 MG: 100 INJECTION, SOLUTION INTRAVENOUS at 13:38

## 2018-01-01 RX ADMIN — Medication 10 ML: at 09:18

## 2018-01-01 RX ADMIN — Medication 10 ML: at 09:14

## 2018-01-01 RX ADMIN — ROCURONIUM BROMIDE 50 MG: 10 INJECTION, SOLUTION INTRAVENOUS at 10:56

## 2018-01-01 RX ADMIN — FLUOROURACIL 5750 MG: 50 INJECTION, SOLUTION INTRAVENOUS at 15:32

## 2018-01-01 RX ADMIN — DEXTROSE MONOHYDRATE: 50 INJECTION, SOLUTION INTRAVENOUS at 12:40

## 2018-01-01 RX ADMIN — Medication 10 ML: at 09:30

## 2018-01-01 RX ADMIN — Medication 10 ML: at 10:16

## 2018-01-01 RX ADMIN — DEXAMETHASONE SODIUM PHOSPHATE 10 MG: 10 INJECTION, SOLUTION INTRAMUSCULAR; INTRAVENOUS at 10:08

## 2018-01-01 RX ADMIN — FLUOROURACIL 950 MG: 50 INJECTION, SOLUTION INTRAVENOUS at 12:15

## 2018-01-01 RX ADMIN — SODIUM CHLORIDE, PRESERVATIVE FREE 500 UNITS: 5 INJECTION INTRAVENOUS at 12:07

## 2018-01-01 RX ADMIN — HEPARIN 500 UNITS: 100 SYRINGE at 13:58

## 2018-01-01 RX ADMIN — Medication 10 ML: at 09:15

## 2018-01-01 RX ADMIN — DEXTROSE MONOHYDRATE: 50 INJECTION, SOLUTION INTRAVENOUS at 11:41

## 2018-01-01 RX ADMIN — HEPARIN SODIUM (PORCINE) LOCK FLUSH IV SOLN 100 UNIT/ML 500 UNITS: 100 SOLUTION at 16:12

## 2018-01-01 RX ADMIN — Medication 10 ML: at 08:15

## 2018-01-01 RX ADMIN — DEXAMETHASONE SODIUM PHOSPHATE 10 MG: 10 INJECTION INTRAMUSCULAR; INTRAVENOUS at 10:38

## 2018-01-01 RX ADMIN — IRINOTECAN HYDROCHLORIDE 440 MG: 40 INJECTION, SOLUTION INTRAVENOUS at 12:46

## 2018-01-01 RX ADMIN — FLUOROURACIL 950 MG: 50 INJECTION, SOLUTION INTRAVENOUS at 15:28

## 2018-01-01 RX ADMIN — DEXTROSE MONOHYDRATE: 50 INJECTION, SOLUTION INTRAVENOUS at 10:17

## 2018-01-01 RX ADMIN — Medication 10 ML: at 10:31

## 2018-01-01 RX ADMIN — Medication 10 ML: at 11:09

## 2018-01-01 RX ADMIN — INFLUENZA A VIRUS A/MICHIGAN/45/2015 X-275 (H1N1) ANTIGEN (FORMALDEHYDE INACTIVATED), INFLUENZA A VIRUS A/SINGAPORE/INFIMH-16-0019/2016 IVR-186 (H3N2) ANTIGEN (FORMALDEHYDE INACTIVATED), INFLUENZA B VIRUS B/PHUKET/3073/2013 ANTIGEN (FORMALDEHYDE INACTIVATED), AND INFLUENZA B VIRUS B/MARYLAND/15/2016 BX-69A ANTIGEN (FORMALDEHYDE INACTIVATED) 0.5 ML: 15; 15; 15; 15 INJECTION, SUSPENSION INTRAMUSCULAR at 11:09

## 2018-01-01 RX ADMIN — SODIUM CHLORIDE, PRESERVATIVE FREE 500 UNITS: 5 INJECTION INTRAVENOUS at 15:30

## 2018-01-01 RX ADMIN — LEUCOVORIN CALCIUM 950 MG: 350 INJECTION, POWDER, LYOPHILIZED, FOR SOLUTION INTRAMUSCULAR; INTRAVENOUS at 11:41

## 2018-01-01 RX ADMIN — BEVACIZUMAB 600 MG: 400 INJECTION, SOLUTION INTRAVENOUS at 13:09

## 2018-01-01 RX ADMIN — Medication 20 ML: at 11:26

## 2018-01-01 RX ADMIN — LEUCOVORIN CALCIUM 950 MG: 350 INJECTION, POWDER, LYOPHILIZED, FOR SOLUTION INTRAMUSCULAR; INTRAVENOUS at 13:24

## 2018-01-01 RX ADMIN — FLUOROURACIL 5750 MG: 50 INJECTION, SOLUTION INTRAVENOUS at 12:22

## 2018-01-01 RX ADMIN — PALONOSETRON 0.25 MG: 0.05 INJECTION, SOLUTION INTRAVENOUS at 10:37

## 2018-01-01 RX ADMIN — FENTANYL CITRATE 50 MCG: 50 INJECTION, SOLUTION INTRAMUSCULAR; INTRAVENOUS at 13:01

## 2018-01-01 RX ADMIN — PALONOSETRON 0.25 MG: 0.05 INJECTION, SOLUTION INTRAVENOUS at 12:37

## 2018-01-01 RX ADMIN — PALONOSETRON 0.25 MG: 0.05 INJECTION, SOLUTION INTRAVENOUS at 13:20

## 2018-01-01 RX ADMIN — DEXTROSE MONOHYDRATE: 50 INJECTION, SOLUTION INTRAVENOUS at 10:12

## 2018-01-01 RX ADMIN — PALONOSETRON 0.25 MG: 0.05 INJECTION, SOLUTION INTRAVENOUS at 11:30

## 2018-01-01 RX ADMIN — DEXAMETHASONE SODIUM PHOSPHATE 10 MG: 10 INJECTION, SOLUTION INTRAMUSCULAR; INTRAVENOUS at 12:57

## 2018-01-01 RX ADMIN — OXALIPLATIN 200 MG: 5 INJECTION, SOLUTION, CONCENTRATE INTRAVENOUS at 10:20

## 2018-01-01 RX ADMIN — Medication 10 ML: at 08:16

## 2018-01-01 RX ADMIN — FLUOROURACIL 950 MG: 50 INJECTION, SOLUTION INTRAVENOUS at 13:47

## 2018-01-01 RX ADMIN — Medication 10 ML: at 14:24

## 2018-01-01 RX ADMIN — DEXAMETHASONE SODIUM PHOSPHATE 10 MG: 10 INJECTION, SOLUTION INTRAMUSCULAR; INTRAVENOUS at 10:24

## 2018-01-01 RX ADMIN — Medication 10 ML: at 16:12

## 2018-01-01 RX ADMIN — Medication 10 ML: at 16:23

## 2018-01-01 RX ADMIN — HEPARIN SODIUM (PORCINE) LOCK FLUSH IV SOLN 100 UNIT/ML 500 UNITS: 100 SOLUTION at 13:59

## 2018-01-01 RX ADMIN — DEXAMETHASONE SODIUM PHOSPHATE 10 MG: 10 INJECTION INTRAMUSCULAR; INTRAVENOUS at 13:21

## 2018-01-01 RX ADMIN — HEPARIN SODIUM (PORCINE) LOCK FLUSH IV SOLN 100 UNIT/ML 500 UNITS: 100 SOLUTION at 13:53

## 2018-01-01 RX ADMIN — ATROPINE SULFATE 0.4 MG: 0.4 INJECTION, SOLUTION INTRAMUSCULAR; INTRAVENOUS; SUBCUTANEOUS at 13:51

## 2018-01-01 RX ADMIN — SODIUM CHLORIDE 80 ML: 0.9 INJECTION, SOLUTION INTRAVENOUS at 12:10

## 2018-01-01 RX ADMIN — LEUCOVORIN CALCIUM 950 MG: 350 INJECTION, POWDER, LYOPHILIZED, FOR SOLUTION INTRAMUSCULAR; INTRAVENOUS at 11:15

## 2018-01-01 RX ADMIN — Medication 10 ML: at 13:51

## 2018-01-01 RX ADMIN — DEXTROSE MONOHYDRATE: 50 INJECTION, SOLUTION INTRAVENOUS at 13:51

## 2018-01-01 RX ADMIN — Medication 10 ML: at 10:00

## 2018-01-01 RX ADMIN — DEXTROSE MONOHYDRATE: 50 INJECTION, SOLUTION INTRAVENOUS at 11:04

## 2018-01-01 RX ADMIN — FLUOROURACIL 950 MG: 50 INJECTION, SOLUTION INTRAVENOUS at 13:28

## 2018-01-01 RX ADMIN — MIDAZOLAM 2 MG: 1 INJECTION INTRAMUSCULAR; INTRAVENOUS at 10:52

## 2018-01-01 RX ADMIN — SODIUM CHLORIDE 80 ML: 9 INJECTION, SOLUTION INTRAVENOUS at 13:51

## 2018-01-01 RX ADMIN — POTASSIUM CHLORIDE 20 MEQ: 20 TABLET, EXTENDED RELEASE ORAL at 13:16

## 2018-01-01 RX ADMIN — IOHEXOL 50 ML: 240 INJECTION, SOLUTION INTRATHECAL; INTRAVASCULAR; INTRAVENOUS; ORAL at 13:51

## 2018-01-01 RX ADMIN — SODIUM CHLORIDE: 9 INJECTION, SOLUTION INTRAVENOUS at 12:28

## 2018-01-01 RX ADMIN — OXALIPLATIN 200 MG: 5 INJECTION, SOLUTION, CONCENTRATE INTRAVENOUS at 11:15

## 2018-01-01 RX ADMIN — INSULIN HUMAN 5 UNITS: 100 INJECTION, SOLUTION PARENTERAL at 12:46

## 2018-01-01 RX ADMIN — Medication 20 ML: at 13:24

## 2018-01-01 RX ADMIN — SODIUM CHLORIDE, PRESERVATIVE FREE 10 ML: 5 INJECTION INTRAVENOUS at 12:10

## 2018-01-01 RX ADMIN — OXALIPLATIN 200 MG: 5 INJECTION, SOLUTION, CONCENTRATE INTRAVENOUS at 10:49

## 2018-01-01 RX ADMIN — FLUOROURACIL 950 MG: 50 INJECTION, SOLUTION INTRAVENOUS at 12:33

## 2018-01-01 RX ADMIN — Medication 10 ML: at 10:32

## 2018-01-01 RX ADMIN — PALONOSETRON 0.25 MG: 0.05 INJECTION, SOLUTION INTRAVENOUS at 10:08

## 2018-01-01 RX ADMIN — OXALIPLATIN 200 MG: 5 INJECTION, SOLUTION, CONCENTRATE INTRAVENOUS at 10:16

## 2018-01-01 RX ADMIN — Medication 10 ML: at 10:28

## 2018-01-01 RX ADMIN — ROCURONIUM BROMIDE 20 MG: 10 INJECTION, SOLUTION INTRAVENOUS at 11:47

## 2018-01-01 RX ADMIN — FLUOROURACIL 5750 MG: 50 INJECTION, SOLUTION INTRAVENOUS at 13:53

## 2018-01-01 RX ADMIN — LEVOFLOXACIN 500 MG: 5 INJECTION, SOLUTION INTRAVENOUS at 11:32

## 2018-01-01 RX ADMIN — DEXAMETHASONE SODIUM PHOSPHATE 10 MG: 10 INJECTION, SOLUTION INTRAMUSCULAR; INTRAVENOUS at 12:38

## 2018-01-01 RX ADMIN — CALCIUM GLUCONATE: 98 INJECTION, SOLUTION INTRAVENOUS at 12:17

## 2018-01-01 RX ADMIN — DEXAMETHASONE SODIUM PHOSPHATE 10 MG: 10 INJECTION, SOLUTION INTRAMUSCULAR; INTRAVENOUS at 09:54

## 2018-01-01 RX ADMIN — METRONIDAZOLE 500 MG: 500 INJECTION, SOLUTION INTRAVENOUS at 11:12

## 2018-01-01 RX ADMIN — LEUCOVORIN CALCIUM 950 MG: 350 INJECTION, POWDER, LYOPHILIZED, FOR SOLUTION INTRAMUSCULAR; INTRAVENOUS at 10:23

## 2018-01-01 RX ADMIN — SODIUM CHLORIDE: 9 INJECTION, SOLUTION INTRAVENOUS at 10:02

## 2018-01-01 RX ADMIN — FLUOROURACIL 5950 MG: 50 INJECTION, SOLUTION INTRAVENOUS at 14:24

## 2018-01-01 RX ADMIN — FLUOROURACIL 5750 MG: 50 INJECTION, SOLUTION INTRAVENOUS at 12:32

## 2018-01-01 RX ADMIN — DEXTROSE MONOHYDRATE: 50 INJECTION, SOLUTION INTRAVENOUS at 09:48

## 2018-01-01 RX ADMIN — FLUOROURACIL 5750 MG: 50 INJECTION, SOLUTION INTRAVENOUS at 13:37

## 2018-01-01 RX ADMIN — SODIUM CHLORIDE: 9 INJECTION, SOLUTION INTRAVENOUS at 10:49

## 2018-01-01 RX ADMIN — DEXAMETHASONE SODIUM PHOSPHATE 10 MG: 10 INJECTION, SOLUTION INTRAMUSCULAR; INTRAVENOUS at 11:31

## 2018-01-01 RX ADMIN — INSULIN HUMAN 8 UNITS: 100 INJECTION, SOLUTION PARENTERAL at 10:29

## 2018-01-01 RX ADMIN — Medication 60 ML: at 11:25

## 2018-01-01 RX ADMIN — INSULIN HUMAN 5 UNITS: 100 INJECTION, SOLUTION PARENTERAL at 11:31

## 2018-01-01 RX ADMIN — DEXTROSE MONOHYDRATE: 50 INJECTION, SOLUTION INTRAVENOUS at 10:35

## 2018-01-01 RX ADMIN — HEPARIN 500 UNITS: 100 SYRINGE at 16:06

## 2018-01-01 RX ADMIN — Medication 10 ML: at 10:27

## 2018-01-01 RX ADMIN — SODIUM CHLORIDE, PRESERVATIVE FREE 500 UNITS: 5 INJECTION INTRAVENOUS at 16:23

## 2018-01-01 RX ADMIN — FLUOROURACIL 5750 MG: 50 INJECTION, SOLUTION INTRAVENOUS at 12:34

## 2018-01-01 RX ADMIN — FENTANYL CITRATE 50 MCG: 50 INJECTION, SOLUTION INTRAMUSCULAR; INTRAVENOUS at 11:49

## 2018-01-01 RX ADMIN — Medication 10 ML: at 13:53

## 2018-01-01 RX ADMIN — Medication 10 ML: at 09:19

## 2018-01-01 RX ADMIN — POTASSIUM CHLORIDE 20 MEQ: 20 TABLET, EXTENDED RELEASE ORAL at 14:46

## 2018-01-01 RX ADMIN — LEUCOVORIN CALCIUM 950 MG: 350 INJECTION, POWDER, LYOPHILIZED, FOR SOLUTION INTRAMUSCULAR; INTRAVENOUS at 10:50

## 2018-01-01 RX ADMIN — Medication 10 ML: at 11:08

## 2018-01-01 ASSESSMENT — PULMONARY FUNCTION TESTS
PIF_VALUE: 18
PIF_VALUE: 8
PIF_VALUE: 15
PIF_VALUE: 16
PIF_VALUE: 40
PIF_VALUE: 19
PIF_VALUE: 19
PIF_VALUE: 18
PIF_VALUE: 0
PIF_VALUE: 29
PIF_VALUE: 18
PIF_VALUE: 19
PIF_VALUE: 18
PIF_VALUE: 0
PIF_VALUE: 19
PIF_VALUE: 0
PIF_VALUE: 13
PIF_VALUE: 18
PIF_VALUE: 0
PIF_VALUE: 0
PIF_VALUE: 18
PIF_VALUE: 18
PIF_VALUE: 19
PIF_VALUE: 13
PIF_VALUE: 19
PIF_VALUE: 19
PIF_VALUE: 0
PIF_VALUE: 18
PIF_VALUE: 19
PIF_VALUE: 17
PIF_VALUE: 18
PIF_VALUE: 18
PIF_VALUE: 0
PIF_VALUE: 2
PIF_VALUE: 18
PIF_VALUE: 18
PIF_VALUE: 3
PIF_VALUE: 19
PIF_VALUE: 18
PIF_VALUE: 0
PIF_VALUE: 19
PIF_VALUE: 0
PIF_VALUE: 0
PIF_VALUE: 23
PIF_VALUE: 19
PIF_VALUE: 32
PIF_VALUE: 18
PIF_VALUE: 1
PIF_VALUE: 18
PIF_VALUE: 19
PIF_VALUE: 2
PIF_VALUE: 18
PIF_VALUE: 19
PIF_VALUE: 19
PIF_VALUE: 18
PIF_VALUE: 19
PIF_VALUE: 19
PIF_VALUE: 4
PIF_VALUE: 18
PIF_VALUE: 0
PIF_VALUE: 0
PIF_VALUE: 19
PIF_VALUE: 18
PIF_VALUE: 0
PIF_VALUE: 18
PIF_VALUE: 18
PIF_VALUE: 5
PIF_VALUE: 14
PIF_VALUE: 0
PIF_VALUE: 18
PIF_VALUE: 2
PIF_VALUE: 19
PIF_VALUE: 0
PIF_VALUE: 19
PIF_VALUE: 18
PIF_VALUE: 19
PIF_VALUE: 3
PIF_VALUE: 2
PIF_VALUE: 0
PIF_VALUE: 18
PIF_VALUE: 0
PIF_VALUE: 19
PIF_VALUE: 18
PIF_VALUE: 16
PIF_VALUE: 18
PIF_VALUE: 1
PIF_VALUE: 18
PIF_VALUE: 19
PIF_VALUE: 18
PIF_VALUE: 18
PIF_VALUE: 19
PIF_VALUE: 18
PIF_VALUE: 8
PIF_VALUE: 19
PIF_VALUE: 18
PIF_VALUE: 19
PIF_VALUE: 18
PIF_VALUE: 18
PIF_VALUE: 16
PIF_VALUE: 18
PIF_VALUE: 20
PIF_VALUE: 18
PIF_VALUE: 19
PIF_VALUE: 16
PIF_VALUE: 1
PIF_VALUE: 8
PIF_VALUE: 18
PIF_VALUE: 19
PIF_VALUE: 17
PIF_VALUE: 18
PIF_VALUE: 0
PIF_VALUE: 18
PIF_VALUE: 0
PIF_VALUE: 18
PIF_VALUE: 19
PIF_VALUE: 19
PIF_VALUE: 18
PIF_VALUE: 19
PIF_VALUE: 19
PIF_VALUE: 18
PIF_VALUE: 19
PIF_VALUE: 18
PIF_VALUE: 19
PIF_VALUE: 16
PIF_VALUE: 18
PIF_VALUE: 17
PIF_VALUE: 18
PIF_VALUE: 18
PIF_VALUE: 19
PIF_VALUE: 18
PIF_VALUE: 3
PIF_VALUE: 18
PIF_VALUE: 19
PIF_VALUE: 17
PIF_VALUE: 18
PIF_VALUE: 18
PIF_VALUE: 0
PIF_VALUE: 19
PIF_VALUE: 2
PIF_VALUE: 18
PIF_VALUE: 19
PIF_VALUE: 18
PIF_VALUE: 6
PIF_VALUE: 18
PIF_VALUE: 28
PIF_VALUE: 0
PIF_VALUE: 30
PIF_VALUE: 18
PIF_VALUE: 0
PIF_VALUE: 20
PIF_VALUE: 19
PIF_VALUE: 18
PIF_VALUE: 0
PIF_VALUE: 18
PIF_VALUE: 19
PIF_VALUE: 19
PIF_VALUE: 18
PIF_VALUE: 19
PIF_VALUE: 18
PIF_VALUE: 0
PIF_VALUE: 18
PIF_VALUE: 19
PIF_VALUE: 16
PIF_VALUE: 19
PIF_VALUE: 20
PIF_VALUE: 19
PIF_VALUE: 19
PIF_VALUE: 0
PIF_VALUE: 17
PIF_VALUE: 0
PIF_VALUE: 19
PIF_VALUE: 18
PIF_VALUE: 0
PIF_VALUE: 19
PIF_VALUE: 30
PIF_VALUE: 18
PIF_VALUE: 0
PIF_VALUE: 18
PIF_VALUE: 18
PIF_VALUE: 0
PIF_VALUE: 0
PIF_VALUE: 20
PIF_VALUE: 1
PIF_VALUE: 18
PIF_VALUE: 19

## 2018-01-01 ASSESSMENT — ENCOUNTER SYMPTOMS
COLOR CHANGE: 0
COLOR CHANGE: 0
NAUSEA: 0
SHORTNESS OF BREATH: 0
TROUBLE SWALLOWING: 0
WHEEZING: 0
ABDOMINAL PAIN: 0
RHINORRHEA: 0
EYE DISCHARGE: 0
BACK PAIN: 0
SHORTNESS OF BREATH: 0
EYE PAIN: 0
DIARRHEA: 0
EYE ITCHING: 0
VOMITING: 0
SORE THROAT: 0
VOICE CHANGE: 0
CONSTIPATION: 0
COUGH: 0
ABDOMINAL DISTENTION: 0

## 2018-01-01 ASSESSMENT — PAIN SCALES - GENERAL
PAINLEVEL_OUTOF10: 7
PAINLEVEL_OUTOF10: 6
PAINLEVEL_OUTOF10: 0

## 2018-01-01 ASSESSMENT — PAIN DESCRIPTION - PAIN TYPE
TYPE: CHRONIC PAIN
TYPE: ACUTE PAIN

## 2018-01-01 ASSESSMENT — PAIN DESCRIPTION - DESCRIPTORS
DESCRIPTORS: ACHING
DESCRIPTORS: ACHING

## 2018-01-01 ASSESSMENT — PAIN DESCRIPTION - LOCATION
LOCATION: GENERALIZED
LOCATION: LEG

## 2018-01-01 ASSESSMENT — PAIN DESCRIPTION - FREQUENCY
FREQUENCY: CONTINUOUS
FREQUENCY: CONTINUOUS

## 2018-01-01 ASSESSMENT — PAIN - FUNCTIONAL ASSESSMENT: PAIN_FUNCTIONAL_ASSESSMENT: 0-10

## 2018-01-01 ASSESSMENT — PAIN DESCRIPTION - PROGRESSION: CLINICAL_PROGRESSION: NOT CHANGED

## 2018-01-01 ASSESSMENT — PAIN DESCRIPTION - ONSET: ONSET: ON-GOING

## 2018-01-01 ASSESSMENT — PAIN DESCRIPTION - ORIENTATION: ORIENTATION: RIGHT

## 2018-01-09 ENCOUNTER — TELEPHONE (OUTPATIENT)
Dept: INFUSION THERAPY | Age: 58
End: 2018-01-09

## 2018-01-10 ENCOUNTER — APPOINTMENT (OUTPATIENT)
Dept: GENERAL RADIOLOGY | Age: 58
End: 2018-01-10
Payer: COMMERCIAL

## 2018-01-10 ENCOUNTER — HOSPITAL ENCOUNTER (EMERGENCY)
Age: 58
Discharge: HOME | End: 2018-01-10
Attending: EMERGENCY MEDICINE
Payer: COMMERCIAL

## 2018-01-10 VITALS
HEIGHT: 73 IN | HEART RATE: 94 BPM | OXYGEN SATURATION: 95 % | DIASTOLIC BLOOD PRESSURE: 85 MMHG | SYSTOLIC BLOOD PRESSURE: 149 MMHG | WEIGHT: 255 LBS | BODY MASS INDEX: 33.8 KG/M2 | RESPIRATION RATE: 16 BRPM | TEMPERATURE: 98.5 F

## 2018-01-10 DIAGNOSIS — M62.838 SPASM OF MUSCLE: Primary | ICD-10-CM

## 2018-01-10 PROCEDURE — 72072 X-RAY EXAM THORAC SPINE 3VWS: CPT

## 2018-01-10 PROCEDURE — 6360000002 HC RX W HCPCS: Performed by: PHYSICIAN ASSISTANT

## 2018-01-10 PROCEDURE — 96372 THER/PROPH/DIAG INJ SC/IM: CPT

## 2018-01-10 PROCEDURE — 99283 EMERGENCY DEPT VISIT LOW MDM: CPT

## 2018-01-10 RX ORDER — KETOROLAC TROMETHAMINE 30 MG/ML
30 INJECTION, SOLUTION INTRAMUSCULAR; INTRAVENOUS ONCE
Status: COMPLETED | OUTPATIENT
Start: 2018-01-10 | End: 2018-01-10

## 2018-01-10 RX ORDER — ORPHENADRINE CITRATE 30 MG/ML
60 INJECTION INTRAMUSCULAR; INTRAVENOUS ONCE
Status: COMPLETED | OUTPATIENT
Start: 2018-01-10 | End: 2018-01-10

## 2018-01-10 RX ADMIN — KETOROLAC TROMETHAMINE 30 MG: 30 INJECTION, SOLUTION INTRAMUSCULAR at 21:08

## 2018-01-10 RX ADMIN — ORPHENADRINE CITRATE 60 MG: 30 INJECTION INTRAMUSCULAR; INTRAVENOUS at 21:08

## 2018-01-10 ASSESSMENT — PAIN DESCRIPTION - PAIN TYPE: TYPE: ACUTE PAIN

## 2018-01-10 ASSESSMENT — PAIN DESCRIPTION - ORIENTATION: ORIENTATION: UPPER

## 2018-01-10 ASSESSMENT — PAIN SCALES - GENERAL
PAINLEVEL_OUTOF10: 6
PAINLEVEL_OUTOF10: 8

## 2018-01-10 ASSESSMENT — PAIN DESCRIPTION - LOCATION: LOCATION: BACK

## 2018-01-10 ASSESSMENT — PAIN DESCRIPTION - FREQUENCY: FREQUENCY: CONTINUOUS

## 2018-01-10 ASSESSMENT — PAIN DESCRIPTION - DESCRIPTORS: DESCRIPTORS: ACHING

## 2018-01-11 NOTE — ED PROVIDER NOTES
[address] ED  eMERGENCY dEPARTMENT eNCOUnter   Independent Attestation     Pt Name: Addie Terrell  MRN: [id number]  [place] 1960  Date of evaluation: 1/10/18       Addie Terrell is a 56 y.o. male who presents with Back Pain        I was personally available for consultation in the Emergency Department. Saloni Barnett MD, [address]cy [address], [address] [facility], [place],[address]  Attending Emergency Physician                    Alfred Huston MD  01/10/18 4066
breath sounds normal. No respiratory distress. No wheezes. No rales. No chest tenderness. Musculoskeletal: Normal range of motion. Mild paraspinal muscle tenderness over Thoracic area, no midline tenderness, no step-off deformity, no swelling, no rashes, pt able to stand on toes and heels. Pt ambulatory.  are 5 over 5 bilaterally   Neurological: Alert and oriented to person, place, and time. GCS score is 15.  5/5 strength in bilateral lower extremities with sensation to light touch intact. Skin: Skin is warm and dry. No rash noted. No erythema. No pallor. DIAGNOSTIC RESULTS   RADIOLOGY:   All plain film, CT, MRI, and formal ultrasound images (except ED bedside ultrasound) are read by the radiologist and the images and interpretations are directly viewed by the emergency physician. XR THORACIC SPINE (3 VIEWS)   Preliminary Result   1. No evidence of acute compression fracture or malalignment in the thoracic   spine. 2. Poorly visualized left T2 pedicle, compatible with known metastasis in   this location (seen on CT chest dated 12/28/2017. LABS:  Labs Reviewed - No data to display    All other labs were within normal range or not returned as of this dictation. EMERGENCY DEPARTMENT COURSE and DIFFERENTIAL DIAGNOSIS/MDM:   Vitals:    Vitals:    01/10/18 1912   BP: (!) 149/85   Pulse: 94   Resp: 16   Temp: 98.5 °F (36.9 °C)   TempSrc: Oral   SpO2: 95%   Weight: 255 lb (115.7 kg)   Height: 6' 1\" (1.854 m)           ED MEDICATIONS  Orders Placed This Encounter   Medications    ketorolac (TORADOL) injection 30 mg    orphenadrine (NORFLEX) injection 60 mg         CONSULTS:  None    PROCEDURES:  None    Patient instructed to return to the emergency room if symptoms worsen, return, or any other concern right away which is agreed. Follow up with PCP in 2-3 days for re-evaluation.   The patient presents with low back pain without signs of spinal cord compression, cauda equina

## 2018-01-12 ENCOUNTER — TELEPHONE (OUTPATIENT)
Dept: ONCOLOGY | Age: 58
End: 2018-01-12

## 2018-01-15 DIAGNOSIS — C19 COLORECTAL CANCER, STAGE IV (HCC): Primary | ICD-10-CM

## 2018-01-16 ENCOUNTER — OFFICE VISIT (OUTPATIENT)
Dept: ONCOLOGY | Age: 58
End: 2018-01-16
Payer: COMMERCIAL

## 2018-01-16 ENCOUNTER — HOSPITAL ENCOUNTER (OUTPATIENT)
Dept: INFUSION THERAPY | Age: 58
Discharge: HOME | End: 2018-01-16
Payer: COMMERCIAL

## 2018-01-16 VITALS
HEART RATE: 80 BPM | SYSTOLIC BLOOD PRESSURE: 120 MMHG | RESPIRATION RATE: 16 BRPM | WEIGHT: 254.5 LBS | DIASTOLIC BLOOD PRESSURE: 85 MMHG | TEMPERATURE: 97.8 F | BODY MASS INDEX: 33.58 KG/M2

## 2018-01-16 DIAGNOSIS — C19 COLORECTAL CANCER, STAGE IV (HCC): ICD-10-CM

## 2018-01-16 DIAGNOSIS — C19 COLORECTAL CANCER, STAGE IV (HCC): Primary | ICD-10-CM

## 2018-01-16 DIAGNOSIS — C78.7 HEPATIC METASTASES (HCC): ICD-10-CM

## 2018-01-16 LAB
ABSOLUTE EOS #: 0.1 K/UL (ref 0–0.4)
ABSOLUTE IMMATURE GRANULOCYTE: ABNORMAL K/UL (ref 0–0.3)
ABSOLUTE LYMPH #: 0.7 K/UL (ref 1–4.8)
ABSOLUTE MONO #: 0.7 K/UL (ref 0.1–1.2)
ALBUMIN SERPL-MCNC: 3.8 G/DL (ref 3.5–5.2)
ALBUMIN/GLOBULIN RATIO: 1.2 (ref 1–2.5)
ALP BLD-CCNC: 135 U/L (ref 40–129)
ALT SERPL-CCNC: 11 U/L (ref 5–41)
ANION GAP SERPL CALCULATED.3IONS-SCNC: 15 MMOL/L (ref 9–17)
AST SERPL-CCNC: 14 U/L
BASOPHILS # BLD: 1 % (ref 0–2)
BASOPHILS ABSOLUTE: 0.1 K/UL (ref 0–0.2)
BILIRUB SERPL-MCNC: 0.2 MG/DL (ref 0.3–1.2)
BUN BLDV-MCNC: 9 MG/DL (ref 6–20)
BUN/CREAT BLD: ABNORMAL (ref 9–20)
CALCIUM SERPL-MCNC: 9 MG/DL (ref 8.6–10.4)
CARCINOEMBRYONIC ANTIGEN: 247.2 NG/ML
CHLORIDE BLD-SCNC: 99 MMOL/L (ref 98–107)
CO2: 26 MMOL/L (ref 20–31)
CREAT SERPL-MCNC: 0.61 MG/DL (ref 0.7–1.2)
DIFFERENTIAL TYPE: ABNORMAL
EOSINOPHILS RELATIVE PERCENT: 2 % (ref 1–4)
GFR AFRICAN AMERICAN: >60 ML/MIN
GFR NON-AFRICAN AMERICAN: >60 ML/MIN
GFR SERPL CREATININE-BSD FRML MDRD: ABNORMAL ML/MIN/{1.73_M2}
GFR SERPL CREATININE-BSD FRML MDRD: ABNORMAL ML/MIN/{1.73_M2}
GLUCOSE BLD-MCNC: 350 MG/DL (ref 70–99)
HCT VFR BLD CALC: 39.6 % (ref 41–53)
HEMOGLOBIN: 13.3 G/DL (ref 13.5–17.5)
IMMATURE GRANULOCYTES: ABNORMAL %
LYMPHOCYTES # BLD: 12 % (ref 24–44)
MCH RBC QN AUTO: 26.1 PG (ref 26–34)
MCHC RBC AUTO-ENTMCNC: 33.6 G/DL (ref 31–37)
MCV RBC AUTO: 77.5 FL (ref 80–100)
MONOCYTES # BLD: 11 % (ref 2–11)
NRBC AUTOMATED: ABNORMAL PER 100 WBC
PDW BLD-RTO: 15.2 % (ref 12.5–15.4)
PLATELET # BLD: 209 K/UL (ref 140–450)
PLATELET ESTIMATE: ABNORMAL
PMV BLD AUTO: 6.4 FL (ref 6–12)
POTASSIUM SERPL-SCNC: 4.3 MMOL/L (ref 3.7–5.3)
PROTEIN UA: NEGATIVE
RBC # BLD: 5.11 M/UL (ref 4.5–5.9)
RBC # BLD: ABNORMAL 10*6/UL
SEG NEUTROPHILS: 74 % (ref 36–66)
SEGMENTED NEUTROPHILS ABSOLUTE COUNT: 4.7 K/UL (ref 1.8–7.7)
SODIUM BLD-SCNC: 140 MMOL/L (ref 135–144)
TOTAL PROTEIN: 7 G/DL (ref 6.4–8.3)
WBC # BLD: 6.3 K/UL (ref 3.5–11)
WBC # BLD: ABNORMAL 10*3/UL

## 2018-01-16 PROCEDURE — G8427 DOCREV CUR MEDS BY ELIG CLIN: HCPCS | Performed by: INTERNAL MEDICINE

## 2018-01-16 PROCEDURE — 82378 CARCINOEMBRYONIC ANTIGEN: CPT

## 2018-01-16 PROCEDURE — 99214 OFFICE O/P EST MOD 30 MIN: CPT | Performed by: INTERNAL MEDICINE

## 2018-01-16 PROCEDURE — 1036F TOBACCO NON-USER: CPT | Performed by: INTERNAL MEDICINE

## 2018-01-16 PROCEDURE — 96417 CHEMO IV INFUS EACH ADDL SEQ: CPT

## 2018-01-16 PROCEDURE — 80053 COMPREHEN METABOLIC PANEL: CPT

## 2018-01-16 PROCEDURE — 3017F COLORECTAL CA SCREEN DOC REV: CPT | Performed by: INTERNAL MEDICINE

## 2018-01-16 PROCEDURE — 85025 COMPLETE CBC W/AUTO DIFF WBC: CPT

## 2018-01-16 PROCEDURE — 96375 TX/PRO/DX INJ NEW DRUG ADDON: CPT

## 2018-01-16 PROCEDURE — 96415 CHEMO IV INFUSION ADDL HR: CPT

## 2018-01-16 PROCEDURE — 6360000002 HC RX W HCPCS: Performed by: INTERNAL MEDICINE

## 2018-01-16 PROCEDURE — G8598 ASA/ANTIPLAT THER USED: HCPCS | Performed by: INTERNAL MEDICINE

## 2018-01-16 PROCEDURE — 36591 DRAW BLOOD OFF VENOUS DEVICE: CPT

## 2018-01-16 PROCEDURE — 96413 CHEMO IV INFUSION 1 HR: CPT

## 2018-01-16 PROCEDURE — G8484 FLU IMMUNIZE NO ADMIN: HCPCS | Performed by: INTERNAL MEDICINE

## 2018-01-16 PROCEDURE — 96416 CHEMO PROLONG INFUSE W/PUMP: CPT

## 2018-01-16 PROCEDURE — 81003 URINALYSIS AUTO W/O SCOPE: CPT

## 2018-01-16 PROCEDURE — G8417 CALC BMI ABV UP PARAM F/U: HCPCS | Performed by: INTERNAL MEDICINE

## 2018-01-16 PROCEDURE — 2580000003 HC RX 258: Performed by: INTERNAL MEDICINE

## 2018-01-16 RX ORDER — PALONOSETRON 0.05 MG/ML
0.25 INJECTION, SOLUTION INTRAVENOUS ONCE
Status: COMPLETED | OUTPATIENT
Start: 2018-01-16 | End: 2018-01-16

## 2018-01-16 RX ORDER — SODIUM CHLORIDE 9 MG/ML
INJECTION, SOLUTION INTRAVENOUS ONCE
Status: COMPLETED | OUTPATIENT
Start: 2018-01-16 | End: 2018-01-16

## 2018-01-16 RX ORDER — CYCLOBENZAPRINE HCL 10 MG
10 TABLET ORAL 2 TIMES DAILY PRN
Qty: 20 TABLET | Refills: 0 | Status: SHIPPED | OUTPATIENT
Start: 2018-01-16 | End: 2018-01-26

## 2018-01-16 RX ORDER — DEXTROSE MONOHYDRATE 50 MG/ML
INJECTION, SOLUTION INTRAVENOUS ONCE
Status: COMPLETED | OUTPATIENT
Start: 2018-01-16 | End: 2018-01-16

## 2018-01-16 RX ORDER — HEPARIN SODIUM (PORCINE) LOCK FLUSH IV SOLN 100 UNIT/ML 100 UNIT/ML
500 SOLUTION INTRAVENOUS PRN
Status: DISCONTINUED | OUTPATIENT
Start: 2018-01-16 | End: 2018-01-17 | Stop reason: HOSPADM

## 2018-01-16 RX ORDER — SODIUM CHLORIDE 0.9 % (FLUSH) 0.9 %
10 SYRINGE (ML) INJECTION PRN
Status: DISCONTINUED | OUTPATIENT
Start: 2018-01-16 | End: 2018-01-17 | Stop reason: HOSPADM

## 2018-01-16 RX ORDER — FAMOTIDINE 20 MG/1
TABLET, FILM COATED ORAL
COMMUNITY
Start: 2017-12-22 | End: 2018-03-07

## 2018-01-16 RX ORDER — CLOPIDOGREL BISULFATE 75 MG/1
TABLET ORAL
COMMUNITY
Start: 2017-12-21 | End: 2018-02-13 | Stop reason: ALTCHOICE

## 2018-01-16 RX ORDER — DEXAMETHASONE SODIUM PHOSPHATE 10 MG/ML
10 INJECTION INTRAMUSCULAR; INTRAVENOUS ONCE
Status: COMPLETED | OUTPATIENT
Start: 2018-01-16 | End: 2018-01-16

## 2018-01-16 RX ORDER — ATROPINE SULFATE 0.4 MG/ML
0.4 AMPUL (ML) INJECTION
Status: COMPLETED | OUTPATIENT
Start: 2018-01-16 | End: 2018-01-16

## 2018-01-16 RX ADMIN — ATROPINE SULFATE 0.4 MG: 0.4 INJECTION, SOLUTION INTRAMUSCULAR; INTRAVENOUS; SUBCUTANEOUS at 12:30

## 2018-01-16 RX ADMIN — Medication 600 MG: at 10:59

## 2018-01-16 RX ADMIN — DEXTROSE MONOHYDRATE: 50 INJECTION, SOLUTION INTRAVENOUS at 12:43

## 2018-01-16 RX ADMIN — IRINOTECAN HYDROCHLORIDE 440 MG: 100 INJECTION, SOLUTION INTRAVENOUS at 12:45

## 2018-01-16 RX ADMIN — Medication 10 MG: at 10:42

## 2018-01-16 RX ADMIN — SODIUM CHLORIDE: 9 INJECTION, SOLUTION INTRAVENOUS at 10:29

## 2018-01-16 RX ADMIN — Medication 5950 MG: at 14:59

## 2018-01-16 RX ADMIN — PALONOSETRON HYDROCHLORIDE 0.25 MG: 0.25 INJECTION INTRAVENOUS at 10:34
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
## 2018-01-16 NOTE — PROGRESS NOTES
Pt here for C1D1. Denies any new complaints. Labs drawn from port and results reviewed. Pt was treated without incident and d/c'd in stable condition Sulema Coelho will return 1/18 for pump DC and 1/24 for Dr. Rakesh Arvizu. Saw Dr. Hernandez prior to treatment. Informed him of blood sugar 350. Will continue with treatment.

## 2018-01-18 ENCOUNTER — HOSPITAL ENCOUNTER (OUTPATIENT)
Dept: INFUSION THERAPY | Age: 58
Discharge: HOME | End: 2018-01-18
Payer: COMMERCIAL

## 2018-01-18 DIAGNOSIS — C19 COLORECTAL CANCER, STAGE IV (HCC): ICD-10-CM

## 2018-01-18 PROCEDURE — 96523 IRRIG DRUG DELIVERY DEVICE: CPT

## 2018-01-18 PROCEDURE — 2580000003 HC RX 258: Performed by: INTERNAL MEDICINE

## 2018-01-18 PROCEDURE — 6360000002 HC RX W HCPCS: Performed by: INTERNAL MEDICINE

## 2018-01-18 RX ORDER — SODIUM CHLORIDE 0.9 % (FLUSH) 0.9 %
10 SYRINGE (ML) INJECTION PRN
Status: CANCELLED | OUTPATIENT
Start: 2018-01-18

## 2018-01-18 RX ORDER — SODIUM CHLORIDE 0.9 % (FLUSH) 0.9 %
20 SYRINGE (ML) INJECTION PRN
Status: CANCELLED | OUTPATIENT
Start: 2018-01-18

## 2018-01-18 RX ORDER — SODIUM CHLORIDE 0.9 % (FLUSH) 0.9 %
10 SYRINGE (ML) INJECTION PRN
Status: DISCONTINUED | OUTPATIENT
Start: 2018-01-18 | End: 2018-01-19 | Stop reason: HOSPADM

## 2018-01-18 RX ORDER — HEPARIN SODIUM (PORCINE) LOCK FLUSH IV SOLN 100 UNIT/ML 100 UNIT/ML
500 SOLUTION INTRAVENOUS PRN
Status: CANCELLED | OUTPATIENT
Start: 2018-01-18

## 2018-01-18 RX ORDER — HEPARIN SODIUM (PORCINE) LOCK FLUSH IV SOLN 100 UNIT/ML 100 UNIT/ML
500 SOLUTION INTRAVENOUS PRN
Status: DISCONTINUED | OUTPATIENT
Start: 2018-01-18 | End: 2018-01-19 | Stop reason: HOSPADM

## 2018-01-18 RX ADMIN — Medication 10 ML: at 15:20

## 2018-01-18 RX ADMIN — SODIUM CHLORIDE, PRESERVATIVE FREE 500 UNITS: 5 INJECTION INTRAVENOUS at 15:20

## 2018-01-18 NOTE — PROGRESS NOTES
Pt is here for CADD pump d/c. Denies any problems at this time. Pt was discharged in stable condition and will return on 1/30 for chemotherapy.

## 2018-01-25 ENCOUNTER — HOSPITAL ENCOUNTER (EMERGENCY)
Age: 58
Discharge: HOME | End: 2018-01-26
Attending: EMERGENCY MEDICINE
Payer: COMMERCIAL

## 2018-01-25 VITALS
TEMPERATURE: 97.4 F | SYSTOLIC BLOOD PRESSURE: 141 MMHG | HEIGHT: 74 IN | DIASTOLIC BLOOD PRESSURE: 92 MMHG | OXYGEN SATURATION: 94 % | BODY MASS INDEX: 32.73 KG/M2 | HEART RATE: 106 BPM | RESPIRATION RATE: 18 BRPM | WEIGHT: 255 LBS

## 2018-01-25 DIAGNOSIS — M25.512 ACUTE PAIN OF LEFT SHOULDER: Primary | ICD-10-CM

## 2018-01-25 PROCEDURE — 99284 EMERGENCY DEPT VISIT MOD MDM: CPT

## 2018-01-25 ASSESSMENT — PAIN DESCRIPTION - LOCATION: LOCATION: SHOULDER

## 2018-01-25 ASSESSMENT — PAIN SCALES - GENERAL: PAINLEVEL_OUTOF10: 8

## 2018-01-25 ASSESSMENT — PAIN DESCRIPTION - PAIN TYPE: TYPE: ACUTE PAIN

## 2018-01-25 ASSESSMENT — PAIN DESCRIPTION - ORIENTATION: ORIENTATION: LEFT

## 2018-01-26 ENCOUNTER — APPOINTMENT (OUTPATIENT)
Dept: GENERAL RADIOLOGY | Age: 58
End: 2018-01-26
Payer: COMMERCIAL

## 2018-01-26 PROCEDURE — 73030 X-RAY EXAM OF SHOULDER: CPT

## 2018-01-26 PROCEDURE — 96372 THER/PROPH/DIAG INJ SC/IM: CPT

## 2018-01-26 PROCEDURE — 6360000002 HC RX W HCPCS: Performed by: PHYSICIAN ASSISTANT

## 2018-01-26 RX ORDER — ORPHENADRINE CITRATE 30 MG/ML
60 INJECTION INTRAMUSCULAR; INTRAVENOUS ONCE
Status: COMPLETED | OUTPATIENT
Start: 2018-01-26 | End: 2018-01-26

## 2018-01-26 RX ADMIN — ORPHENADRINE CITRATE 60 MG: 30 INJECTION INTRAMUSCULAR; INTRAVENOUS at 00:14

## 2018-01-26 NOTE — ED NOTES
..Pt discharged; pt A&Ox4 at this time. Pt given discharge instructions, this RN went over discharge instructions with pt and pt denies questions about care at home. Pt informed to follow up with (PCP/SPECIALSIT). Pt informed that they can return to the department for reevaluation at anytime if symptoms worsen. Pt ambulatory out of department with steady gait and paperwork in hand.        Kenney Ta RN  01/26/18 4137
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
## 2018-01-29 DIAGNOSIS — C19 COLORECTAL CANCER, STAGE IV (HCC): ICD-10-CM

## 2018-01-29 RX ORDER — OXYCODONE AND ACETAMINOPHEN 10; 325 MG/1; MG/1
TABLET ORAL
Qty: 120 TABLET | Refills: 0 | Status: SHIPPED | OUTPATIENT
Start: 2018-01-29 | End: 2018-02-23 | Stop reason: SDUPTHER

## 2018-01-29 RX ORDER — FENTANYL 25 UG/H
1 PATCH TRANSDERMAL
Qty: 10 PATCH | Refills: 0 | Status: SHIPPED | OUTPATIENT
Start: 2018-01-29 | End: 2018-02-23 | Stop reason: SDUPTHER

## 2018-01-29 NOTE — TELEPHONE ENCOUNTER
Pt requesting refill on percocet and fentanyl patches.  Will route to Dr. Daphne Duncan for approval.

## 2018-01-30 ENCOUNTER — HOSPITAL ENCOUNTER (OUTPATIENT)
Dept: INFUSION THERAPY | Age: 58
Discharge: HOME | End: 2018-01-30
Payer: COMMERCIAL

## 2018-01-30 ENCOUNTER — OFFICE VISIT (OUTPATIENT)
Dept: ONCOLOGY | Age: 58
End: 2018-01-30
Payer: COMMERCIAL

## 2018-01-30 VITALS
BODY MASS INDEX: 32.87 KG/M2 | DIASTOLIC BLOOD PRESSURE: 89 MMHG | TEMPERATURE: 97.5 F | SYSTOLIC BLOOD PRESSURE: 134 MMHG | WEIGHT: 256 LBS | RESPIRATION RATE: 18 BRPM | HEART RATE: 94 BPM

## 2018-01-30 VITALS
BODY MASS INDEX: 32.89 KG/M2 | HEART RATE: 94 BPM | RESPIRATION RATE: 18 BRPM | DIASTOLIC BLOOD PRESSURE: 89 MMHG | TEMPERATURE: 97.5 F | SYSTOLIC BLOOD PRESSURE: 134 MMHG | WEIGHT: 256.2 LBS

## 2018-01-30 DIAGNOSIS — C19 COLORECTAL CANCER, STAGE IV (HCC): Primary | ICD-10-CM

## 2018-01-30 DIAGNOSIS — C78.7 HEPATIC METASTASES (HCC): ICD-10-CM

## 2018-01-30 DIAGNOSIS — C19 COLORECTAL CANCER, STAGE IV (HCC): ICD-10-CM

## 2018-01-30 LAB
ABSOLUTE EOS #: 0.2 K/UL (ref 0–0.4)
ABSOLUTE IMMATURE GRANULOCYTE: ABNORMAL K/UL (ref 0–0.3)
ABSOLUTE LYMPH #: 0.9 K/UL (ref 1–4.8)
ABSOLUTE MONO #: 0.8 K/UL (ref 0.1–1.2)
ALBUMIN SERPL-MCNC: 3.6 G/DL (ref 3.5–5.2)
ALBUMIN/GLOBULIN RATIO: 1.2 (ref 1–2.5)
ALP BLD-CCNC: 125 U/L (ref 40–129)
ALT SERPL-CCNC: <5 U/L (ref 5–41)
ANION GAP SERPL CALCULATED.3IONS-SCNC: 15 MMOL/L (ref 9–17)
AST SERPL-CCNC: <5 U/L
BASOPHILS # BLD: 1 % (ref 0–2)
BASOPHILS ABSOLUTE: 0.1 K/UL (ref 0–0.2)
BILIRUB SERPL-MCNC: 0.16 MG/DL (ref 0.3–1.2)
BUN BLDV-MCNC: 6 MG/DL (ref 6–20)
BUN/CREAT BLD: ABNORMAL (ref 9–20)
CALCIUM SERPL-MCNC: 8.5 MG/DL (ref 8.6–10.4)
CARCINOEMBRYONIC ANTIGEN: 227.9 NG/ML
CHLORIDE BLD-SCNC: 95 MMOL/L (ref 98–107)
CO2: 25 MMOL/L (ref 20–31)
CREAT SERPL-MCNC: 0.54 MG/DL (ref 0.7–1.2)
DIFFERENTIAL TYPE: ABNORMAL
EOSINOPHILS RELATIVE PERCENT: 3 % (ref 1–4)
GFR AFRICAN AMERICAN: >60 ML/MIN
GFR NON-AFRICAN AMERICAN: >60 ML/MIN
GFR SERPL CREATININE-BSD FRML MDRD: ABNORMAL ML/MIN/{1.73_M2}
GFR SERPL CREATININE-BSD FRML MDRD: ABNORMAL ML/MIN/{1.73_M2}
GLUCOSE BLD-MCNC: 298 MG/DL (ref 70–99)
HCT VFR BLD CALC: 38.3 % (ref 41–53)
HEMOGLOBIN: 13.4 G/DL (ref 13.5–17.5)
IMMATURE GRANULOCYTES: ABNORMAL %
LYMPHOCYTES # BLD: 16 % (ref 24–44)
MCH RBC QN AUTO: 27.1 PG (ref 26–34)
MCHC RBC AUTO-ENTMCNC: 35.2 G/DL (ref 31–37)
MCV RBC AUTO: 77.2 FL (ref 80–100)
MONOCYTES # BLD: 14 % (ref 2–11)
NRBC AUTOMATED: ABNORMAL PER 100 WBC
PDW BLD-RTO: 15.3 % (ref 12.5–15.4)
PLATELET # BLD: 205 K/UL (ref 140–450)
PLATELET ESTIMATE: ABNORMAL
PMV BLD AUTO: 6.5 FL (ref 6–12)
POTASSIUM SERPL-SCNC: 4.2 MMOL/L (ref 3.7–5.3)
PROTEIN UA: NEGATIVE
RBC # BLD: 4.96 M/UL (ref 4.5–5.9)
RBC # BLD: ABNORMAL 10*6/UL
SEG NEUTROPHILS: 66 % (ref 36–66)
SEGMENTED NEUTROPHILS ABSOLUTE COUNT: 3.7 K/UL (ref 1.8–7.7)
SODIUM BLD-SCNC: 135 MMOL/L (ref 135–144)
TOTAL PROTEIN: 6.5 G/DL (ref 6.4–8.3)
WBC # BLD: 5.6 K/UL (ref 3.5–11)
WBC # BLD: ABNORMAL 10*3/UL

## 2018-01-30 PROCEDURE — 96417 CHEMO IV INFUS EACH ADDL SEQ: CPT | Performed by: NURSE PRACTITIONER

## 2018-01-30 PROCEDURE — 3017F COLORECTAL CA SCREEN DOC REV: CPT | Performed by: INTERNAL MEDICINE

## 2018-01-30 PROCEDURE — G8428 CUR MEDS NOT DOCUMENT: HCPCS | Performed by: INTERNAL MEDICINE

## 2018-01-30 PROCEDURE — 81003 URINALYSIS AUTO W/O SCOPE: CPT

## 2018-01-30 PROCEDURE — 99215 OFFICE O/P EST HI 40 MIN: CPT | Performed by: INTERNAL MEDICINE

## 2018-01-30 PROCEDURE — 1036F TOBACCO NON-USER: CPT | Performed by: INTERNAL MEDICINE

## 2018-01-30 PROCEDURE — G8417 CALC BMI ABV UP PARAM F/U: HCPCS | Performed by: INTERNAL MEDICINE

## 2018-01-30 PROCEDURE — 80053 COMPREHEN METABOLIC PANEL: CPT

## 2018-01-30 PROCEDURE — 85025 COMPLETE CBC W/AUTO DIFF WBC: CPT

## 2018-01-30 PROCEDURE — 6360000002 HC RX W HCPCS: Performed by: INTERNAL MEDICINE

## 2018-01-30 PROCEDURE — G8598 ASA/ANTIPLAT THER USED: HCPCS | Performed by: INTERNAL MEDICINE

## 2018-01-30 PROCEDURE — 96413 CHEMO IV INFUSION 1 HR: CPT | Performed by: NURSE PRACTITIONER

## 2018-01-30 PROCEDURE — 96415 CHEMO IV INFUSION ADDL HR: CPT

## 2018-01-30 PROCEDURE — 96375 TX/PRO/DX INJ NEW DRUG ADDON: CPT | Performed by: NURSE PRACTITIONER

## 2018-01-30 PROCEDURE — 2580000003 HC RX 258: Performed by: INTERNAL MEDICINE

## 2018-01-30 PROCEDURE — 82378 CARCINOEMBRYONIC ANTIGEN: CPT

## 2018-01-30 PROCEDURE — 36591 DRAW BLOOD OFF VENOUS DEVICE: CPT | Performed by: NURSE PRACTITIONER

## 2018-01-30 PROCEDURE — G8484 FLU IMMUNIZE NO ADMIN: HCPCS | Performed by: INTERNAL MEDICINE

## 2018-01-30 PROCEDURE — 96416 CHEMO PROLONG INFUSE W/PUMP: CPT | Performed by: NURSE PRACTITIONER

## 2018-01-30 RX ORDER — DEXTROSE MONOHYDRATE 50 MG/ML
INJECTION, SOLUTION INTRAVENOUS ONCE
Status: DISCONTINUED | OUTPATIENT
Start: 2018-01-30 | End: 2018-01-31 | Stop reason: HOSPADM

## 2018-01-30 RX ORDER — ATROPINE SULFATE 0.4 MG/ML
0.4 AMPUL (ML) INJECTION
Status: COMPLETED | OUTPATIENT
Start: 2018-01-30 | End: 2018-01-30

## 2018-01-30 RX ORDER — DEXAMETHASONE SODIUM PHOSPHATE 10 MG/ML
10 INJECTION INTRAMUSCULAR; INTRAVENOUS ONCE
Status: COMPLETED | OUTPATIENT
Start: 2018-01-30 | End: 2018-01-30

## 2018-01-30 RX ORDER — SODIUM CHLORIDE 0.9 % (FLUSH) 0.9 %
10 SYRINGE (ML) INJECTION PRN
Status: DISCONTINUED | OUTPATIENT
Start: 2018-01-30 | End: 2018-01-31 | Stop reason: HOSPADM

## 2018-01-30 RX ORDER — PALONOSETRON 0.05 MG/ML
0.25 INJECTION, SOLUTION INTRAVENOUS ONCE
Status: COMPLETED | OUTPATIENT
Start: 2018-01-30 | End: 2018-01-30

## 2018-01-30 RX ORDER — SODIUM CHLORIDE 9 MG/ML
INJECTION, SOLUTION INTRAVENOUS ONCE
Status: COMPLETED | OUTPATIENT
Start: 2018-01-30 | End: 2018-01-30

## 2018-01-30 RX ADMIN — PALONOSETRON HYDROCHLORIDE 0.25 MG: 0.25 INJECTION INTRAVENOUS at 12:30

## 2018-01-30 RX ADMIN — IRINOTECAN HYDROCHLORIDE 440 MG: 100 INJECTION, SOLUTION INTRAVENOUS at 12:38

## 2018-01-30 RX ADMIN — Medication 10 ML: at 10:13

## 2018-01-30 RX ADMIN — Medication 10 ML: at 10:19

## 2018-01-30 RX ADMIN — DEXAMETHASONE SODIUM PHOSPHATE 10 MG: 10 INJECTION INTRAMUSCULAR; INTRAVENOUS at 12:31

## 2018-01-30 RX ADMIN — ATROPINE SULFATE 0.4 MG: 0.4 INJECTION, SOLUTION INTRAMUSCULAR; INTRAVENOUS; SUBCUTANEOUS at 12:34

## 2018-01-30 RX ADMIN — Medication 600 MG: at 11:26

## 2018-01-30 RX ADMIN — FLUOROURACIL 5950 MG: 50 INJECTION, SOLUTION INTRAVENOUS at 14:07

## 2018-01-30 RX ADMIN — DEXTROSE MONOHYDRATE: 50 INJECTION, SOLUTION INTRAVENOUS at 12:35

## 2018-01-30 RX ADMIN — SODIUM CHLORIDE: 9 INJECTION, SOLUTION INTRAVENOUS at 10:57

## 2018-01-30 ASSESSMENT — PAIN DESCRIPTION - ORIENTATION: ORIENTATION: LEFT

## 2018-01-30 ASSESSMENT — PAIN SCALES - GENERAL: PAINLEVEL_OUTOF10: 3

## 2018-01-30 ASSESSMENT — PAIN DESCRIPTION - DESCRIPTORS: DESCRIPTORS: ACHING

## 2018-01-30 ASSESSMENT — PAIN DESCRIPTION - LOCATION: LOCATION: SHOULDER

## 2018-01-30 ASSESSMENT — PAIN DESCRIPTION - FREQUENCY: FREQUENCY: CONTINUOUS

## 2018-01-30 ASSESSMENT — PAIN DESCRIPTION - PAIN TYPE: TYPE: CHRONIC PAIN

## 2018-01-30 NOTE — PROGRESS NOTES
CREATININE 0.61 (L) 01/16/2018 0908        Component Value Date/Time    CALCIUM 9.0 01/16/2018 0908    ALKPHOS 135 (H) 01/16/2018 0908    AST 14 01/16/2018 0908    ALT 11 01/16/2018 0908    BILITOT 0.20 (L) 01/16/2018 0908        Lab Results   Component Value Date    .2 (H) 01/16/2018         IMPRESSION:   1. Metastatic rectal cancer with liver metastases  2. Active coronary artery disease status post stenting  3. Rectal bleeding secondary to tumor and antiplatelets, slowed down after switching to plavix   4. Excellent response in the liver with minimal response in the primary tumor  5. Undergoing chemoradiation with 5-FU  6. 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 Management conservative   7. Neuropathy is contributing to his pain. 8. Pain, poorly controlled. 9. Progressive/ refractory disease. 10. 01/2018 palliative chemo started    PLAN:   1. We reviewed treatment plan , hoping for response  2. The patient is still insisting on removal of primary tumor. If he has a good response,w e will consider surgery, likely in tertiary center   3. I am writing for refill in inhaler. 4. Return in 2 weeks for cycle #3.

## 2018-01-31 ENCOUNTER — HOSPITAL ENCOUNTER (EMERGENCY)
Age: 58
Discharge: HOME | End: 2018-01-31
Attending: EMERGENCY MEDICINE
Payer: COMMERCIAL

## 2018-01-31 ENCOUNTER — APPOINTMENT (OUTPATIENT)
Dept: GENERAL RADIOLOGY | Age: 58
End: 2018-01-31
Payer: COMMERCIAL

## 2018-01-31 VITALS
OXYGEN SATURATION: 98 % | RESPIRATION RATE: 18 BRPM | HEART RATE: 98 BPM | BODY MASS INDEX: 32.85 KG/M2 | SYSTOLIC BLOOD PRESSURE: 138 MMHG | TEMPERATURE: 97.5 F | DIASTOLIC BLOOD PRESSURE: 77 MMHG | HEIGHT: 74 IN | WEIGHT: 256 LBS

## 2018-01-31 DIAGNOSIS — B34.9 VIRAL ILLNESS: Primary | ICD-10-CM

## 2018-01-31 DIAGNOSIS — R05.9 COUGH: ICD-10-CM

## 2018-01-31 LAB
DIRECT EXAM: NORMAL
Lab: NORMAL
SPECIMEN DESCRIPTION: NORMAL
SPECIMEN DESCRIPTION: NORMAL
STATUS: NORMAL

## 2018-01-31 PROCEDURE — 6370000000 HC RX 637 (ALT 250 FOR IP): Performed by: EMERGENCY MEDICINE

## 2018-01-31 PROCEDURE — 99283 EMERGENCY DEPT VISIT LOW MDM: CPT

## 2018-01-31 PROCEDURE — 71046 X-RAY EXAM CHEST 2 VIEWS: CPT

## 2018-01-31 PROCEDURE — 87804 INFLUENZA ASSAY W/OPTIC: CPT

## 2018-01-31 RX ORDER — ACETAMINOPHEN 500 MG
500 TABLET ORAL EVERY 6 HOURS PRN
Qty: 60 TABLET | Refills: 0 | Status: SHIPPED | OUTPATIENT
Start: 2018-01-31 | End: 2018-03-07

## 2018-01-31 RX ORDER — LORATADINE 10 MG/1
10 TABLET ORAL DAILY
Qty: 30 TABLET | Refills: 0 | Status: SHIPPED | OUTPATIENT
Start: 2018-01-31 | End: 2018-03-07

## 2018-01-31 RX ORDER — ALBUTEROL SULFATE 90 UG/1
2 AEROSOL, METERED RESPIRATORY (INHALATION) EVERY 6 HOURS PRN
Qty: 1 INHALER | Refills: 0 | Status: SHIPPED | OUTPATIENT
Start: 2018-01-31 | End: 2018-03-07

## 2018-01-31 RX ORDER — ACETAMINOPHEN 325 MG/1
650 TABLET ORAL ONCE
Status: COMPLETED | OUTPATIENT
Start: 2018-01-31 | End: 2018-01-31

## 2018-01-31 RX ADMIN — ACETAMINOPHEN 650 MG: 325 TABLET, FILM COATED ORAL at 02:30

## 2018-01-31 ASSESSMENT — ENCOUNTER SYMPTOMS
VOMITING: 0
DIARRHEA: 0
COUGH: 1
BACK PAIN: 0
SORE THROAT: 0
SHORTNESS OF BREATH: 0
EYE PAIN: 0
ABDOMINAL PAIN: 0
NAUSEA: 0
RHINORRHEA: 1

## 2018-01-31 ASSESSMENT — PAIN SCALES - GENERAL
PAINLEVEL_OUTOF10: 5
PAINLEVEL_OUTOF10: 5

## 2018-01-31 NOTE — ED PROVIDER NOTES
emergency department for uncontrolled fevers, vomiting, shortness of breath, chest pain, or any other concerns. Patient acknowledges plan, voices understanding of it and is in agreement with it. CRITICAL CARE:       CONSULTS:  None      FINAL IMPRESSION      1. Viral illness    2.  Cough          DISPOSITION/PLAN:  DISPOSITION Decision To Discharge 01/31/2018 02:18:19 AM        PATIENT REFERRED TO:  [facility] ED  [address] [id number]  [phone]  Go to   As needed, If symptoms worsen      DISCHARGE MEDICATIONS:  New Prescriptions    ACETAMINOPHEN (APAP EXTRA STRENGTH) 500 MG TABLET    Take 1 tablet by mouth every 6 hours as needed for Pain    LORATADINE (CLARITIN) 10 MG TABLET    Take 1 tablet by mouth daily       (Please note that portions of this note were completed with a voice recognition program.  Efforts were made to edit the dictations but occasionally words are mis-transcribed.)    Jaskaran Chauhan MD  Attending Emergency Physician            Jaskaran Chauhan MD  01/31/18 3968

## 2018-02-01 ENCOUNTER — HOSPITAL ENCOUNTER (OUTPATIENT)
Dept: INFUSION THERAPY | Age: 58
Discharge: HOME | End: 2018-02-01
Payer: COMMERCIAL

## 2018-02-01 ENCOUNTER — HOSPITAL ENCOUNTER (OUTPATIENT)
Dept: RADIATION ONCOLOGY | Age: 58
Discharge: HOME | End: 2018-02-01
Payer: COMMERCIAL

## 2018-02-01 DIAGNOSIS — C19 COLORECTAL CANCER, STAGE IV (HCC): ICD-10-CM

## 2018-02-01 PROCEDURE — 6360000002 HC RX W HCPCS: Performed by: INTERNAL MEDICINE

## 2018-02-01 PROCEDURE — 99212 OFFICE O/P EST SF 10 MIN: CPT | Performed by: RADIOLOGY

## 2018-02-01 PROCEDURE — 2580000003 HC RX 258: Performed by: INTERNAL MEDICINE

## 2018-02-01 PROCEDURE — 96523 IRRIG DRUG DELIVERY DEVICE: CPT

## 2018-02-01 RX ORDER — SODIUM CHLORIDE 0.9 % (FLUSH) 0.9 %
20 SYRINGE (ML) INJECTION PRN
Status: CANCELLED | OUTPATIENT
Start: 2018-02-01

## 2018-02-01 RX ORDER — HEPARIN SODIUM (PORCINE) LOCK FLUSH IV SOLN 100 UNIT/ML 100 UNIT/ML
500 SOLUTION INTRAVENOUS PRN
Status: CANCELLED | OUTPATIENT
Start: 2018-02-01

## 2018-02-01 RX ORDER — SODIUM CHLORIDE 0.9 % (FLUSH) 0.9 %
10 SYRINGE (ML) INJECTION PRN
Status: CANCELLED | OUTPATIENT
Start: 2018-02-01

## 2018-02-01 RX ORDER — SODIUM CHLORIDE 0.9 % (FLUSH) 0.9 %
10 SYRINGE (ML) INJECTION PRN
Status: DISCONTINUED | OUTPATIENT
Start: 2018-02-01 | End: 2018-02-02 | Stop reason: HOSPADM

## 2018-02-01 RX ORDER — HEPARIN SODIUM (PORCINE) LOCK FLUSH IV SOLN 100 UNIT/ML 100 UNIT/ML
500 SOLUTION INTRAVENOUS PRN
Status: DISCONTINUED | OUTPATIENT
Start: 2018-02-01 | End: 2018-02-02 | Stop reason: HOSPADM

## 2018-02-01 RX ADMIN — SODIUM CHLORIDE, PRESERVATIVE FREE 500 UNITS: 5 INJECTION INTRAVENOUS at 14:05

## 2018-02-01 RX ADMIN — Medication 10 ML: at 14:05

## 2018-02-01 NOTE — PROGRESS NOTES
Pt is here for CADD pump d/c. Denies any problems at this time. Pt was discharged in stable condition and will return on 2/13 for C3D1.

## 2018-02-13 ENCOUNTER — OFFICE VISIT (OUTPATIENT)
Dept: ONCOLOGY | Age: 58
End: 2018-02-13
Payer: COMMERCIAL

## 2018-02-13 ENCOUNTER — HOSPITAL ENCOUNTER (OUTPATIENT)
Dept: INFUSION THERAPY | Age: 58
Discharge: HOME | End: 2018-02-13
Payer: COMMERCIAL

## 2018-02-13 VITALS
HEART RATE: 90 BPM | TEMPERATURE: 97.3 F | WEIGHT: 253 LBS | BODY MASS INDEX: 32.48 KG/M2 | SYSTOLIC BLOOD PRESSURE: 146 MMHG | DIASTOLIC BLOOD PRESSURE: 92 MMHG

## 2018-02-13 VITALS
SYSTOLIC BLOOD PRESSURE: 146 MMHG | WEIGHT: 253 LBS | DIASTOLIC BLOOD PRESSURE: 92 MMHG | TEMPERATURE: 97.3 F | HEART RATE: 90 BPM | RESPIRATION RATE: 20 BRPM | BODY MASS INDEX: 32.48 KG/M2

## 2018-02-13 DIAGNOSIS — C19 COLORECTAL CANCER, STAGE IV (HCC): Primary | ICD-10-CM

## 2018-02-13 DIAGNOSIS — I21.3 ST ELEVATION MYOCARDIAL INFARCTION (STEMI), UNSPECIFIED ARTERY (HCC): ICD-10-CM

## 2018-02-13 DIAGNOSIS — C78.7 HEPATIC METASTASES (HCC): ICD-10-CM

## 2018-02-13 DIAGNOSIS — Z98.61 S/P PTCA (PERCUTANEOUS TRANSLUMINAL CORONARY ANGIOPLASTY): ICD-10-CM

## 2018-02-13 DIAGNOSIS — C19 COLORECTAL CANCER, STAGE IV (HCC): ICD-10-CM

## 2018-02-13 LAB
ABSOLUTE EOS #: 0.1 K/UL (ref 0–0.4)
ABSOLUTE IMMATURE GRANULOCYTE: ABNORMAL K/UL (ref 0–0.3)
ABSOLUTE LYMPH #: 1 K/UL (ref 1–4.8)
ABSOLUTE MONO #: 0.8 K/UL (ref 0.1–1.2)
ALBUMIN SERPL-MCNC: 4 G/DL (ref 3.5–5.2)
ALBUMIN/GLOBULIN RATIO: 1.3 (ref 1–2.5)
ALP BLD-CCNC: 134 U/L (ref 40–129)
ALT SERPL-CCNC: 18 U/L (ref 5–41)
ANION GAP SERPL CALCULATED.3IONS-SCNC: 16 MMOL/L (ref 9–17)
AST SERPL-CCNC: 15 U/L
BASOPHILS # BLD: 1 % (ref 0–2)
BASOPHILS ABSOLUTE: 0.1 K/UL (ref 0–0.2)
BILIRUB SERPL-MCNC: 0.24 MG/DL (ref 0.3–1.2)
BUN BLDV-MCNC: 9 MG/DL (ref 6–20)
BUN/CREAT BLD: ABNORMAL (ref 9–20)
CALCIUM SERPL-MCNC: 9.3 MG/DL (ref 8.6–10.4)
CARCINOEMBRYONIC ANTIGEN: 176.6 NG/ML
CHLORIDE BLD-SCNC: 96 MMOL/L (ref 98–107)
CO2: 25 MMOL/L (ref 20–31)
CREAT SERPL-MCNC: 0.61 MG/DL (ref 0.7–1.2)
DIFFERENTIAL TYPE: ABNORMAL
EOSINOPHILS RELATIVE PERCENT: 1 % (ref 1–4)
GFR AFRICAN AMERICAN: >60 ML/MIN
GFR NON-AFRICAN AMERICAN: >60 ML/MIN
GFR SERPL CREATININE-BSD FRML MDRD: ABNORMAL ML/MIN/{1.73_M2}
GFR SERPL CREATININE-BSD FRML MDRD: ABNORMAL ML/MIN/{1.73_M2}
GLUCOSE BLD-MCNC: 389 MG/DL (ref 70–99)
HCT VFR BLD CALC: 40.5 % (ref 41–53)
HEMOGLOBIN: 13.9 G/DL (ref 13.5–17.5)
IMMATURE GRANULOCYTES: ABNORMAL %
LYMPHOCYTES # BLD: 15 % (ref 24–44)
MCH RBC QN AUTO: 26.5 PG (ref 26–34)
MCHC RBC AUTO-ENTMCNC: 34.3 G/DL (ref 31–37)
MCV RBC AUTO: 77.4 FL (ref 80–100)
MONOCYTES # BLD: 12 % (ref 2–11)
NRBC AUTOMATED: ABNORMAL PER 100 WBC
PDW BLD-RTO: 15.9 % (ref 12.5–15.4)
PLATELET # BLD: 199 K/UL (ref 140–450)
PLATELET ESTIMATE: ABNORMAL
PMV BLD AUTO: 6.5 FL (ref 6–12)
POTASSIUM SERPL-SCNC: 4.4 MMOL/L (ref 3.7–5.3)
PROTEIN UA: NEGATIVE
RBC # BLD: 5.24 M/UL (ref 4.5–5.9)
RBC # BLD: ABNORMAL 10*6/UL
SEG NEUTROPHILS: 71 % (ref 36–66)
SEGMENTED NEUTROPHILS ABSOLUTE COUNT: 5 K/UL (ref 1.8–7.7)
SODIUM BLD-SCNC: 137 MMOL/L (ref 135–144)
TOTAL PROTEIN: 7.1 G/DL (ref 6.4–8.3)
WBC # BLD: 7 K/UL (ref 3.5–11)
WBC # BLD: ABNORMAL 10*3/UL

## 2018-02-13 PROCEDURE — 96372 THER/PROPH/DIAG INJ SC/IM: CPT

## 2018-02-13 PROCEDURE — 96375 TX/PRO/DX INJ NEW DRUG ADDON: CPT

## 2018-02-13 PROCEDURE — 82378 CARCINOEMBRYONIC ANTIGEN: CPT

## 2018-02-13 PROCEDURE — 36591 DRAW BLOOD OFF VENOUS DEVICE: CPT

## 2018-02-13 PROCEDURE — 96415 CHEMO IV INFUSION ADDL HR: CPT

## 2018-02-13 PROCEDURE — 80053 COMPREHEN METABOLIC PANEL: CPT

## 2018-02-13 PROCEDURE — 3017F COLORECTAL CA SCREEN DOC REV: CPT | Performed by: INTERNAL MEDICINE

## 2018-02-13 PROCEDURE — G8427 DOCREV CUR MEDS BY ELIG CLIN: HCPCS | Performed by: INTERNAL MEDICINE

## 2018-02-13 PROCEDURE — 96417 CHEMO IV INFUS EACH ADDL SEQ: CPT

## 2018-02-13 PROCEDURE — 96366 THER/PROPH/DIAG IV INF ADDON: CPT

## 2018-02-13 PROCEDURE — G8484 FLU IMMUNIZE NO ADMIN: HCPCS | Performed by: INTERNAL MEDICINE

## 2018-02-13 PROCEDURE — 6360000002 HC RX W HCPCS: Performed by: INTERNAL MEDICINE

## 2018-02-13 PROCEDURE — 1036F TOBACCO NON-USER: CPT | Performed by: INTERNAL MEDICINE

## 2018-02-13 PROCEDURE — 85025 COMPLETE CBC W/AUTO DIFF WBC: CPT

## 2018-02-13 PROCEDURE — 99214 OFFICE O/P EST MOD 30 MIN: CPT | Performed by: INTERNAL MEDICINE

## 2018-02-13 PROCEDURE — 96413 CHEMO IV INFUSION 1 HR: CPT

## 2018-02-13 PROCEDURE — 81003 URINALYSIS AUTO W/O SCOPE: CPT

## 2018-02-13 PROCEDURE — G8598 ASA/ANTIPLAT THER USED: HCPCS | Performed by: INTERNAL MEDICINE

## 2018-02-13 PROCEDURE — 2580000003 HC RX 258: Performed by: INTERNAL MEDICINE

## 2018-02-13 PROCEDURE — G8417 CALC BMI ABV UP PARAM F/U: HCPCS | Performed by: INTERNAL MEDICINE

## 2018-02-13 PROCEDURE — 96416 CHEMO PROLONG INFUSE W/PUMP: CPT

## 2018-02-13 PROCEDURE — 6370000000 HC RX 637 (ALT 250 FOR IP): Performed by: INTERNAL MEDICINE

## 2018-02-13 RX ORDER — METHYLPREDNISOLONE SODIUM SUCCINATE 125 MG/2ML
125 INJECTION, POWDER, LYOPHILIZED, FOR SOLUTION INTRAMUSCULAR; INTRAVENOUS ONCE
Status: CANCELLED | OUTPATIENT
Start: 2018-02-13 | End: 2018-02-13

## 2018-02-13 RX ORDER — SODIUM CHLORIDE 9 MG/ML
INJECTION, SOLUTION INTRAVENOUS ONCE
Status: COMPLETED | OUTPATIENT
Start: 2018-02-13 | End: 2018-02-13

## 2018-02-13 RX ORDER — SODIUM CHLORIDE 9 MG/ML
INJECTION, SOLUTION INTRAVENOUS CONTINUOUS
Status: CANCELLED | OUTPATIENT
Start: 2018-02-13

## 2018-02-13 RX ORDER — SODIUM CHLORIDE 0.9 % (FLUSH) 0.9 %
5 SYRINGE (ML) INJECTION PRN
Status: CANCELLED | OUTPATIENT
Start: 2018-02-13

## 2018-02-13 RX ORDER — HEPARIN SODIUM (PORCINE) LOCK FLUSH IV SOLN 100 UNIT/ML 100 UNIT/ML
500 SOLUTION INTRAVENOUS PRN
Status: DISCONTINUED | OUTPATIENT
Start: 2018-02-13 | End: 2018-02-14 | Stop reason: HOSPADM

## 2018-02-13 RX ORDER — DEXTROSE MONOHYDRATE 50 MG/ML
INJECTION, SOLUTION INTRAVENOUS ONCE
Status: COMPLETED | OUTPATIENT
Start: 2018-02-13 | End: 2018-02-13

## 2018-02-13 RX ORDER — DIPHENHYDRAMINE HYDROCHLORIDE 50 MG/ML
50 INJECTION INTRAMUSCULAR; INTRAVENOUS ONCE
Status: CANCELLED | OUTPATIENT
Start: 2018-02-13 | End: 2018-02-13

## 2018-02-13 RX ORDER — PALONOSETRON 0.05 MG/ML
0.25 INJECTION, SOLUTION INTRAVENOUS ONCE
Status: COMPLETED | OUTPATIENT
Start: 2018-02-13 | End: 2018-02-13

## 2018-02-13 RX ORDER — 0.9 % SODIUM CHLORIDE 0.9 %
10 VIAL (ML) INJECTION ONCE
Status: CANCELLED | OUTPATIENT
Start: 2018-02-13 | End: 2018-02-13

## 2018-02-13 RX ORDER — SODIUM CHLORIDE 0.9 % (FLUSH) 0.9 %
10 SYRINGE (ML) INJECTION PRN
Status: DISCONTINUED | OUTPATIENT
Start: 2018-02-13 | End: 2018-02-14 | Stop reason: HOSPADM

## 2018-02-13 RX ORDER — DEXAMETHASONE SODIUM PHOSPHATE 10 MG/ML
10 INJECTION INTRAMUSCULAR; INTRAVENOUS ONCE
Status: COMPLETED | OUTPATIENT
Start: 2018-02-13 | End: 2018-02-13

## 2018-02-13 RX ORDER — EPINEPHRINE 1 MG/ML
0.3 INJECTION, SOLUTION, CONCENTRATE INTRAVENOUS PRN
Status: CANCELLED | OUTPATIENT
Start: 2018-02-13

## 2018-02-13 RX ORDER — ATROPINE SULFATE 0.4 MG/ML
0.4 AMPUL (ML) INJECTION
Status: COMPLETED | OUTPATIENT
Start: 2018-02-13 | End: 2018-02-13

## 2018-02-13 RX ADMIN — Medication 10 MG: at 10:33

## 2018-02-13 RX ADMIN — Medication 10 ML: at 08:59

## 2018-02-13 RX ADMIN — DEXTROSE MONOHYDRATE: 50 INJECTION, SOLUTION INTRAVENOUS at 10:31

## 2018-02-13 RX ADMIN — ATROPINE SULFATE 0.4 MG: 0.4 INJECTION, SOLUTION INTRAMUSCULAR; INTRAVENOUS; SUBCUTANEOUS at 11:06

## 2018-02-13 RX ADMIN — FLUOROURACIL 5950 MG: 50 INJECTION, SOLUTION INTRAVENOUS at 12:51

## 2018-02-13 RX ADMIN — Medication 10 ML: at 12:51

## 2018-02-13 RX ADMIN — INSULIN HUMAN 12 UNITS: 100 INJECTION, SOLUTION PARENTERAL at 11:42

## 2018-02-13 RX ADMIN — BEVACIZUMAB 600 MG: 400 INJECTION, SOLUTION INTRAVENOUS at 09:53

## 2018-02-13 RX ADMIN — Medication 10 ML: at 09:00

## 2018-02-13 RX ADMIN — PALONOSETRON HYDROCHLORIDE 0.25 MG: 0.25 INJECTION INTRAVENOUS at 10:31

## 2018-02-13 RX ADMIN — SODIUM CHLORIDE: 9 INJECTION, SOLUTION INTRAVENOUS at 09:53

## 2018-02-13 RX ADMIN — IRINOTECAN HYDROCHLORIDE 440 MG: 100 INJECTION, SOLUTION INTRAVENOUS at 11:07

## 2018-02-13 NOTE — PROGRESS NOTES
DIAGNOSIS:   1. Metastatic rectal cancer, Kras mutated. 2. Bleeding secondary to the tumor  3. Liver metastases  4. Coronary artery disease, presenting with ventricular fibrillation, needed urgent cardiac catheterization and stent, diagnosis was one week prior to diagnosis of metastatic cancer  CURRENT THERAPY:  1. Status post coronary stenting  2. Started palliative chemotherapy with FOLFOX, Avastin was held because of active bleeding  3. CT scan showed excellent response in the liver. But the primary tumor was almost the same size. 4. Chemoradiation with 5-FU sensitization to deal with the primary tumor  5. Palliative Avastin/Camptosar/Adrucil started 01/2018  BRIEF CASE HISTORY:   Donte Wolf is a very pleasant 56 y.o. male who was admitted to the hospital with  rectal bleeding and change in bowel habits. He is found to have rectal mass. And multiple colonic polyps. bx was done. And showed adenocarcinoma of the rectum, CT scan showed liver metastases that were biopsy-proven to be metastatic disease. Pt had recent MI and stenting. He is on double antiplatelet agents. Initially he was on Berlinta and aspirin but that was later changed to Plavix and aspirin  We decided to start with chemotherapy, we chose the combination of FOLFOX. We held the Avastin because of ongoing bleeding and possible need for surgery. Chemotherapy was well tolerated. Interim CT scan showed excellent response in the liver with significant shrinkage of the liver metastases. However, the primary tumor was almost the same size and that was confirmed with endoscopic ultrasound. The patient was sent back for chemoradiation with 5-FU. He developed significant radiation enteritis and cystitis, managed conservatively. INTERIM HISTORY: The patient comes in today for a follow up and cycle #3 of chemo. He feels he tolerated treatment well. He presented in ER with dry mouth, cough, chills, flu testing was negative.  He feels

## 2018-02-13 NOTE — PATIENT INSTRUCTIONS
Proceed with chemo per orders.    rv in 2 weeks with chemo and labs  Refer to [facility] colorectal surgery

## 2018-02-15 ENCOUNTER — HOSPITAL ENCOUNTER (OUTPATIENT)
Dept: INFUSION THERAPY | Age: 58
Discharge: HOME | End: 2018-02-15
Payer: COMMERCIAL

## 2018-02-15 DIAGNOSIS — C19 COLORECTAL CANCER, STAGE IV (HCC): ICD-10-CM

## 2018-02-15 PROCEDURE — 2580000003 HC RX 258: Performed by: INTERNAL MEDICINE

## 2018-02-15 PROCEDURE — 6360000002 HC RX W HCPCS: Performed by: INTERNAL MEDICINE

## 2018-02-15 PROCEDURE — 96523 IRRIG DRUG DELIVERY DEVICE: CPT

## 2018-02-15 RX ORDER — SODIUM CHLORIDE 0.9 % (FLUSH) 0.9 %
10 SYRINGE (ML) INJECTION PRN
Status: DISCONTINUED | OUTPATIENT
Start: 2018-02-15 | End: 2018-02-16 | Stop reason: HOSPADM

## 2018-02-15 RX ORDER — SODIUM CHLORIDE 0.9 % (FLUSH) 0.9 %
20 SYRINGE (ML) INJECTION PRN
Status: CANCELLED | OUTPATIENT
Start: 2018-02-15

## 2018-02-15 RX ORDER — HEPARIN SODIUM (PORCINE) LOCK FLUSH IV SOLN 100 UNIT/ML 100 UNIT/ML
500 SOLUTION INTRAVENOUS PRN
Status: DISCONTINUED | OUTPATIENT
Start: 2018-02-15 | End: 2018-02-16 | Stop reason: HOSPADM

## 2018-02-15 RX ORDER — SODIUM CHLORIDE 0.9 % (FLUSH) 0.9 %
10 SYRINGE (ML) INJECTION PRN
Status: CANCELLED | OUTPATIENT
Start: 2018-02-15

## 2018-02-15 RX ORDER — HEPARIN SODIUM (PORCINE) LOCK FLUSH IV SOLN 100 UNIT/ML 100 UNIT/ML
500 SOLUTION INTRAVENOUS PRN
Status: CANCELLED | OUTPATIENT
Start: 2018-02-15

## 2018-02-15 RX ADMIN — SODIUM CHLORIDE, PRESERVATIVE FREE 500 UNITS: 5 INJECTION INTRAVENOUS at 11:01

## 2018-02-15 RX ADMIN — Medication 10 ML: at 11:01

## 2018-02-15 NOTE — PROGRESS NOTES
Patient here to have CADD pump DC'd. Pt. denies C/O. CADD pump DC'd and port flushed and heparinized. Patient will return on 2/27/18 for C4D1 FOLFIRI with Avastin.

## 2018-02-23 DIAGNOSIS — C19 COLORECTAL CANCER, STAGE IV (HCC): ICD-10-CM

## 2018-02-26 RX ORDER — SODIUM CHLORIDE 0.9 % (FLUSH) 0.9 %
5 SYRINGE (ML) INJECTION PRN
Status: CANCELLED | OUTPATIENT
Start: 2018-02-27

## 2018-02-26 RX ORDER — OXYCODONE AND ACETAMINOPHEN 10; 325 MG/1; MG/1
TABLET ORAL
Qty: 120 TABLET | Refills: 0 | Status: SHIPPED | OUTPATIENT
Start: 2018-02-27 | End: 2018-03-26 | Stop reason: SDUPTHER

## 2018-02-26 RX ORDER — SODIUM CHLORIDE 9 MG/ML
INJECTION, SOLUTION INTRAVENOUS CONTINUOUS
Status: CANCELLED | OUTPATIENT
Start: 2018-02-27

## 2018-02-26 RX ORDER — DIPHENHYDRAMINE HYDROCHLORIDE 50 MG/ML
50 INJECTION INTRAMUSCULAR; INTRAVENOUS ONCE
Status: CANCELLED | OUTPATIENT
Start: 2018-02-27 | End: 2018-02-27

## 2018-02-26 RX ORDER — 0.9 % SODIUM CHLORIDE 0.9 %
10 VIAL (ML) INJECTION ONCE
Status: CANCELLED | OUTPATIENT
Start: 2018-02-27 | End: 2018-02-27

## 2018-02-26 RX ORDER — EPINEPHRINE 1 MG/ML
0.3 INJECTION, SOLUTION, CONCENTRATE INTRAVENOUS PRN
Status: CANCELLED | OUTPATIENT
Start: 2018-02-27

## 2018-02-26 RX ORDER — FENTANYL 25 UG/H
1 PATCH TRANSDERMAL
Qty: 10 PATCH | Refills: 0 | Status: SHIPPED | OUTPATIENT
Start: 2018-02-27 | End: 2018-03-26 | Stop reason: SDUPTHER

## 2018-02-26 RX ORDER — METHYLPREDNISOLONE SODIUM SUCCINATE 125 MG/2ML
125 INJECTION, POWDER, LYOPHILIZED, FOR SOLUTION INTRAMUSCULAR; INTRAVENOUS ONCE
Status: CANCELLED | OUTPATIENT
Start: 2018-02-27 | End: 2018-02-27

## 2018-02-27 ENCOUNTER — HOSPITAL ENCOUNTER (OUTPATIENT)
Dept: INFUSION THERAPY | Age: 58
Discharge: HOME | End: 2018-02-27
Payer: COMMERCIAL

## 2018-02-27 ENCOUNTER — OFFICE VISIT (OUTPATIENT)
Dept: ONCOLOGY | Age: 58
End: 2018-02-27
Payer: COMMERCIAL

## 2018-02-27 VITALS
BODY MASS INDEX: 32.84 KG/M2 | RESPIRATION RATE: 20 BRPM | HEART RATE: 89 BPM | DIASTOLIC BLOOD PRESSURE: 95 MMHG | SYSTOLIC BLOOD PRESSURE: 150 MMHG | TEMPERATURE: 97 F | WEIGHT: 255.8 LBS

## 2018-02-27 VITALS
WEIGHT: 255 LBS | HEART RATE: 89 BPM | BODY MASS INDEX: 32.74 KG/M2 | SYSTOLIC BLOOD PRESSURE: 150 MMHG | TEMPERATURE: 97 F | DIASTOLIC BLOOD PRESSURE: 95 MMHG

## 2018-02-27 DIAGNOSIS — C19 COLORECTAL CANCER, STAGE IV (HCC): ICD-10-CM

## 2018-02-27 DIAGNOSIS — C78.7 HEPATIC METASTASES (HCC): ICD-10-CM

## 2018-02-27 DIAGNOSIS — C19 COLORECTAL CANCER, STAGE IV (HCC): Primary | ICD-10-CM

## 2018-02-27 LAB
ABSOLUTE EOS #: 0.1 K/UL (ref 0–0.4)
ABSOLUTE IMMATURE GRANULOCYTE: ABNORMAL K/UL (ref 0–0.3)
ABSOLUTE LYMPH #: 0.8 K/UL (ref 1–4.8)
ABSOLUTE MONO #: 0.7 K/UL (ref 0.1–1.2)
ALBUMIN SERPL-MCNC: 4 G/DL (ref 3.5–5.2)
ALBUMIN/GLOBULIN RATIO: 1.4 (ref 1–2.5)
ALP BLD-CCNC: 111 U/L (ref 40–129)
ALT SERPL-CCNC: 20 U/L (ref 5–41)
ANION GAP SERPL CALCULATED.3IONS-SCNC: 14 MMOL/L (ref 9–17)
AST SERPL-CCNC: 19 U/L
BASOPHILS # BLD: 1 % (ref 0–2)
BASOPHILS ABSOLUTE: 0 K/UL (ref 0–0.2)
BILIRUB SERPL-MCNC: 0.3 MG/DL (ref 0.3–1.2)
BUN BLDV-MCNC: 7 MG/DL (ref 6–20)
BUN/CREAT BLD: ABNORMAL (ref 9–20)
CALCIUM SERPL-MCNC: 9.2 MG/DL (ref 8.6–10.4)
CARCINOEMBRYONIC ANTIGEN: 150.2 NG/ML
CHLORIDE BLD-SCNC: 95 MMOL/L (ref 98–107)
CO2: 25 MMOL/L (ref 20–31)
CREAT SERPL-MCNC: 0.58 MG/DL (ref 0.7–1.2)
DIFFERENTIAL TYPE: ABNORMAL
EOSINOPHILS RELATIVE PERCENT: 2 % (ref 1–4)
GFR AFRICAN AMERICAN: >60 ML/MIN
GFR NON-AFRICAN AMERICAN: >60 ML/MIN
GFR SERPL CREATININE-BSD FRML MDRD: ABNORMAL ML/MIN/{1.73_M2}
GFR SERPL CREATININE-BSD FRML MDRD: ABNORMAL ML/MIN/{1.73_M2}
GLUCOSE BLD-MCNC: 367 MG/DL (ref 70–99)
HCT VFR BLD CALC: 39 % (ref 41–53)
HEMOGLOBIN: 13.2 G/DL (ref 13.5–17.5)
IMMATURE GRANULOCYTES: ABNORMAL %
LYMPHOCYTES # BLD: 14 % (ref 24–44)
MCH RBC QN AUTO: 26.1 PG (ref 26–34)
MCHC RBC AUTO-ENTMCNC: 33.8 G/DL (ref 31–37)
MCV RBC AUTO: 77.4 FL (ref 80–100)
MONOCYTES # BLD: 12 % (ref 2–11)
NRBC AUTOMATED: ABNORMAL PER 100 WBC
PDW BLD-RTO: 16.5 % (ref 12.5–15.4)
PLATELET # BLD: 192 K/UL (ref 140–450)
PLATELET ESTIMATE: ABNORMAL
PMV BLD AUTO: 6.5 FL (ref 6–12)
POTASSIUM SERPL-SCNC: 4.4 MMOL/L (ref 3.7–5.3)
PROTEIN UA: NEGATIVE
RBC # BLD: 5.04 M/UL (ref 4.5–5.9)
RBC # BLD: ABNORMAL 10*6/UL
SEG NEUTROPHILS: 71 % (ref 36–66)
SEGMENTED NEUTROPHILS ABSOLUTE COUNT: 4.1 K/UL (ref 1.8–7.7)
SODIUM BLD-SCNC: 134 MMOL/L (ref 135–144)
TOTAL PROTEIN: 6.8 G/DL (ref 6.4–8.3)
WBC # BLD: 5.8 K/UL (ref 3.5–11)
WBC # BLD: ABNORMAL 10*3/UL

## 2018-02-27 PROCEDURE — 96413 CHEMO IV INFUSION 1 HR: CPT

## 2018-02-27 PROCEDURE — 96417 CHEMO IV INFUS EACH ADDL SEQ: CPT

## 2018-02-27 PROCEDURE — 2580000003 HC RX 258: Performed by: INTERNAL MEDICINE

## 2018-02-27 PROCEDURE — G8598 ASA/ANTIPLAT THER USED: HCPCS | Performed by: INTERNAL MEDICINE

## 2018-02-27 PROCEDURE — 81003 URINALYSIS AUTO W/O SCOPE: CPT

## 2018-02-27 PROCEDURE — 99214 OFFICE O/P EST MOD 30 MIN: CPT | Performed by: INTERNAL MEDICINE

## 2018-02-27 PROCEDURE — 1036F TOBACCO NON-USER: CPT | Performed by: INTERNAL MEDICINE

## 2018-02-27 PROCEDURE — 36591 DRAW BLOOD OFF VENOUS DEVICE: CPT

## 2018-02-27 PROCEDURE — 96416 CHEMO PROLONG INFUSE W/PUMP: CPT

## 2018-02-27 PROCEDURE — 82378 CARCINOEMBRYONIC ANTIGEN: CPT

## 2018-02-27 PROCEDURE — 80053 COMPREHEN METABOLIC PANEL: CPT

## 2018-02-27 PROCEDURE — G8417 CALC BMI ABV UP PARAM F/U: HCPCS | Performed by: INTERNAL MEDICINE

## 2018-02-27 PROCEDURE — 3017F COLORECTAL CA SCREEN DOC REV: CPT | Performed by: INTERNAL MEDICINE

## 2018-02-27 PROCEDURE — 96415 CHEMO IV INFUSION ADDL HR: CPT

## 2018-02-27 PROCEDURE — 96375 TX/PRO/DX INJ NEW DRUG ADDON: CPT

## 2018-02-27 PROCEDURE — G8484 FLU IMMUNIZE NO ADMIN: HCPCS | Performed by: INTERNAL MEDICINE

## 2018-02-27 PROCEDURE — G8427 DOCREV CUR MEDS BY ELIG CLIN: HCPCS | Performed by: INTERNAL MEDICINE

## 2018-02-27 PROCEDURE — 85025 COMPLETE CBC W/AUTO DIFF WBC: CPT

## 2018-02-27 PROCEDURE — 6360000002 HC RX W HCPCS: Performed by: INTERNAL MEDICINE

## 2018-02-27 RX ORDER — DEXTROSE MONOHYDRATE 50 MG/ML
INJECTION, SOLUTION INTRAVENOUS ONCE
Status: COMPLETED | OUTPATIENT
Start: 2018-02-27 | End: 2018-02-27

## 2018-02-27 RX ORDER — PALONOSETRON 0.05 MG/ML
0.25 INJECTION, SOLUTION INTRAVENOUS ONCE
Status: CANCELLED | OUTPATIENT
Start: 2018-03-13

## 2018-02-27 RX ORDER — SODIUM CHLORIDE 9 MG/ML
INJECTION, SOLUTION INTRAVENOUS CONTINUOUS
Status: CANCELLED | OUTPATIENT
Start: 2018-03-27

## 2018-02-27 RX ORDER — HEPARIN SODIUM (PORCINE) LOCK FLUSH IV SOLN 100 UNIT/ML 100 UNIT/ML
500 SOLUTION INTRAVENOUS PRN
Status: CANCELLED | OUTPATIENT
Start: 2018-03-13

## 2018-02-27 RX ORDER — SODIUM CHLORIDE 0.9 % (FLUSH) 0.9 %
10 SYRINGE (ML) INJECTION PRN
Status: DISCONTINUED | OUTPATIENT
Start: 2018-02-27 | End: 2018-02-28 | Stop reason: HOSPADM

## 2018-02-27 RX ORDER — HEPARIN SODIUM (PORCINE) LOCK FLUSH IV SOLN 100 UNIT/ML 100 UNIT/ML
500 SOLUTION INTRAVENOUS PRN
Status: CANCELLED | OUTPATIENT
Start: 2018-03-27

## 2018-02-27 RX ORDER — DEXTROSE MONOHYDRATE 50 MG/ML
INJECTION, SOLUTION INTRAVENOUS ONCE
Status: CANCELLED | OUTPATIENT
Start: 2018-03-27 | End: 2018-03-27

## 2018-02-27 RX ORDER — DEXAMETHASONE SODIUM PHOSPHATE 10 MG/ML
10 INJECTION INTRAMUSCULAR; INTRAVENOUS ONCE
Status: COMPLETED | OUTPATIENT
Start: 2018-02-27 | End: 2018-02-27

## 2018-02-27 RX ORDER — HEPARIN SODIUM (PORCINE) LOCK FLUSH IV SOLN 100 UNIT/ML 100 UNIT/ML
500 SOLUTION INTRAVENOUS PRN
Status: DISCONTINUED | OUTPATIENT
Start: 2018-02-27 | End: 2018-02-28 | Stop reason: HOSPADM

## 2018-02-27 RX ORDER — SODIUM CHLORIDE 9 MG/ML
INJECTION, SOLUTION INTRAVENOUS ONCE
Status: COMPLETED | OUTPATIENT
Start: 2018-02-27 | End: 2018-02-27

## 2018-02-27 RX ORDER — DEXTROSE MONOHYDRATE 50 MG/ML
INJECTION, SOLUTION INTRAVENOUS ONCE
Status: CANCELLED | OUTPATIENT
Start: 2018-03-13 | End: 2018-03-13

## 2018-02-27 RX ORDER — ATROPINE SULFATE 0.4 MG/ML
0.4 AMPUL (ML) INJECTION
Status: CANCELLED | OUTPATIENT
Start: 2018-03-27

## 2018-02-27 RX ORDER — DIPHENHYDRAMINE HYDROCHLORIDE 50 MG/ML
50 INJECTION INTRAMUSCULAR; INTRAVENOUS ONCE
Status: CANCELLED | OUTPATIENT
Start: 2018-03-13 | End: 2018-03-13

## 2018-02-27 RX ORDER — SODIUM CHLORIDE 0.9 % (FLUSH) 0.9 %
10 SYRINGE (ML) INJECTION PRN
Status: CANCELLED | OUTPATIENT
Start: 2018-03-13

## 2018-02-27 RX ORDER — SODIUM CHLORIDE 9 MG/ML
INJECTION, SOLUTION INTRAVENOUS ONCE
Status: CANCELLED | OUTPATIENT
Start: 2018-03-27 | End: 2018-03-27

## 2018-02-27 RX ORDER — PALONOSETRON 0.05 MG/ML
0.25 INJECTION, SOLUTION INTRAVENOUS ONCE
Status: COMPLETED | OUTPATIENT
Start: 2018-02-27 | End: 2018-02-27

## 2018-02-27 RX ORDER — 0.9 % SODIUM CHLORIDE 0.9 %
10 VIAL (ML) INJECTION ONCE
Status: CANCELLED | OUTPATIENT
Start: 2018-03-27 | End: 2018-03-27

## 2018-02-27 RX ORDER — SODIUM CHLORIDE 0.9 % (FLUSH) 0.9 %
5 SYRINGE (ML) INJECTION PRN
Status: CANCELLED | OUTPATIENT
Start: 2018-03-13

## 2018-02-27 RX ORDER — ATROPINE SULFATE 0.4 MG/ML
0.4 AMPUL (ML) INJECTION
Status: COMPLETED | OUTPATIENT
Start: 2018-02-27 | End: 2018-02-27

## 2018-02-27 RX ORDER — METHYLPREDNISOLONE SODIUM SUCCINATE 125 MG/2ML
125 INJECTION, POWDER, LYOPHILIZED, FOR SOLUTION INTRAMUSCULAR; INTRAVENOUS ONCE
Status: CANCELLED | OUTPATIENT
Start: 2018-03-27 | End: 2018-03-27

## 2018-02-27 RX ORDER — SODIUM CHLORIDE 9 MG/ML
INJECTION, SOLUTION INTRAVENOUS CONTINUOUS
Status: CANCELLED | OUTPATIENT
Start: 2018-03-13

## 2018-02-27 RX ORDER — PALONOSETRON 0.05 MG/ML
0.25 INJECTION, SOLUTION INTRAVENOUS ONCE
Status: CANCELLED | OUTPATIENT
Start: 2018-03-27

## 2018-02-27 RX ORDER — DIPHENHYDRAMINE HYDROCHLORIDE 50 MG/ML
50 INJECTION INTRAMUSCULAR; INTRAVENOUS ONCE
Status: CANCELLED | OUTPATIENT
Start: 2018-03-27 | End: 2018-03-27

## 2018-02-27 RX ORDER — 0.9 % SODIUM CHLORIDE 0.9 %
10 VIAL (ML) INJECTION ONCE
Status: CANCELLED | OUTPATIENT
Start: 2018-03-13 | End: 2018-03-13

## 2018-02-27 RX ORDER — METHYLPREDNISOLONE SODIUM SUCCINATE 125 MG/2ML
125 INJECTION, POWDER, LYOPHILIZED, FOR SOLUTION INTRAMUSCULAR; INTRAVENOUS ONCE
Status: CANCELLED | OUTPATIENT
Start: 2018-03-13 | End: 2018-03-13

## 2018-02-27 RX ORDER — SODIUM CHLORIDE 9 MG/ML
INJECTION, SOLUTION INTRAVENOUS ONCE
Status: CANCELLED | OUTPATIENT
Start: 2018-03-13 | End: 2018-03-13

## 2018-02-27 RX ORDER — SODIUM CHLORIDE 0.9 % (FLUSH) 0.9 %
5 SYRINGE (ML) INJECTION PRN
Status: CANCELLED | OUTPATIENT
Start: 2018-03-27

## 2018-02-27 RX ORDER — SODIUM CHLORIDE 0.9 % (FLUSH) 0.9 %
10 SYRINGE (ML) INJECTION PRN
Status: CANCELLED | OUTPATIENT
Start: 2018-03-27

## 2018-02-27 RX ORDER — ATROPINE SULFATE 0.4 MG/ML
0.4 AMPUL (ML) INJECTION
Status: CANCELLED | OUTPATIENT
Start: 2018-03-13

## 2018-02-27 RX ADMIN — Medication 10 MG: at 10:42

## 2018-02-27 RX ADMIN — DEXTROSE MONOHYDRATE: 50 INJECTION, SOLUTION INTRAVENOUS at 11:45

## 2018-02-27 RX ADMIN — Medication 10 ML: at 09:23

## 2018-02-27 RX ADMIN — Medication 10 ML: at 09:24

## 2018-02-27 RX ADMIN — Medication 10 ML: at 13:38

## 2018-02-27 RX ADMIN — IRINOTECAN HYDROCHLORIDE 440 MG: 100 INJECTION, SOLUTION INTRAVENOUS at 11:51

## 2018-02-27 RX ADMIN — FLUOROURACIL 5950 MG: 50 INJECTION, SOLUTION INTRAVENOUS at 13:38

## 2018-02-27 RX ADMIN — SODIUM CHLORIDE: 9 INJECTION, SOLUTION INTRAVENOUS at 10:40

## 2018-02-27 RX ADMIN — BEVACIZUMAB 600 MG: 400 INJECTION, SOLUTION INTRAVENOUS at 11:03

## 2018-02-27 RX ADMIN — ATROPINE SULFATE 0.4 MG: 0.4 INJECTION, SOLUTION INTRAMUSCULAR; INTRAVENOUS; SUBCUTANEOUS at 11:43

## 2018-02-27 RX ADMIN — PALONOSETRON HYDROCHLORIDE 0.25 MG: 0.25 INJECTION INTRAVENOUS at 10:41

## 2018-02-27 ASSESSMENT — PAIN DESCRIPTION - ORIENTATION: ORIENTATION: UPPER

## 2018-02-27 ASSESSMENT — PAIN SCALES - GENERAL: PAINLEVEL_OUTOF10: 5

## 2018-02-27 ASSESSMENT — PAIN DESCRIPTION - PAIN TYPE: TYPE: ACUTE PAIN

## 2018-02-27 ASSESSMENT — PAIN DESCRIPTION - LOCATION: LOCATION: BACK

## 2018-02-27 NOTE — PROGRESS NOTES
DIAGNOSIS:   1. Metastatic rectal cancer, Kras mutated. 2. Bleeding secondary to the tumor  3. Liver metastases  4. Coronary artery disease, presenting with ventricular fibrillation, needed urgent cardiac catheterization and stent, diagnosis was one week prior to diagnosis of metastatic cancer  CURRENT THERAPY:  1. Status post coronary stenting  2. Started palliative chemotherapy with FOLFOX, Avastin was held because of active bleeding  3. CT scan showed excellent response in the liver. But the primary tumor was almost the same size. 4. Chemoradiation with 5-FU sensitization to deal with the primary tumor  5. Palliative Avastin/Camptosar/Adrucil started 01/2018  BRIEF CASE HISTORY:   Donte Wolf is a very pleasant 56 y.o. male who was admitted to the hospital with  rectal bleeding and change in bowel habits. He is found to have rectal mass. And multiple colonic polyps. bx was done. And showed adenocarcinoma of the rectum, CT scan showed liver metastases that were biopsy-proven to be metastatic disease. Pt had recent MI and stenting. He is on double antiplatelet agents. Initially he was on Berlinta and aspirin but that was later changed to Plavix and aspirin  We decided to start with chemotherapy, we chose the combination of FOLFOX. We held the Avastin because of ongoing bleeding and possible need for surgery. Chemotherapy was well tolerated. Interim CT scan showed excellent response in the liver with significant shrinkage of the liver metastases. However, the primary tumor was almost the same size and that was confirmed with endoscopic ultrasound. The patient was sent back for chemoradiation with 5-FU. He developed significant radiation enteritis and cystitis, managed conservatively. INTERIM HISTORY: The patient comes in today for a follow up and cycle #4 of chemo. He has fatigue but denies nausea, vomiting, neuropathy.  He hasn't been using insulin - he claims he doesn't understand how

## 2018-02-27 NOTE — PROGRESS NOTES
Pt here for C4D1 Avastin, Irinotecan, 5FU. Denies any new complaints. Labs drawn from port and results reviewed. Dr Sanjay Levi notified of glucose 367, no orders received. Dr Sanjay Levi saw pt at chairside, refer to his note. Voicemail left for Adamaris Dale, dietician, to speak with pt in regards to a diabetic diet and his elevated glucose levels. Pt was treated without incident and d/c'd in stable condition. Pt will return in 2 days for pump dc.

## 2018-02-28 ENCOUNTER — TELEPHONE (OUTPATIENT)
Dept: ONCOLOGY | Age: 58
End: 2018-02-28

## 2018-02-28 NOTE — TELEPHONE ENCOUNTER
NUTRITION NOTE    Received verbal referral from MANUEL Ospina per Dr Tommie Finley for diet education on carb controlled diet. Will follow up with patient. Note: Pt has received diabetic diet education in the past and is likely noncompliant, but will offer education.     Mabel Carney RD, LD  Oncology Dietitian  [facility]  Office: [phone]  Pager: [phone]

## 2018-03-01 ENCOUNTER — HOSPITAL ENCOUNTER (OUTPATIENT)
Dept: INFUSION THERAPY | Age: 58
End: 2018-03-01
Payer: COMMERCIAL

## 2018-03-01 ENCOUNTER — TELEPHONE (OUTPATIENT)
Dept: INFUSION THERAPY | Age: 58
End: 2018-03-01

## 2018-03-01 ENCOUNTER — TELEPHONE (OUTPATIENT)
Dept: ONCOLOGY | Age: 58
End: 2018-03-01

## 2018-03-01 NOTE — TELEPHONE ENCOUNTER
Pt called and states his pump shut off last night for about 18 hours. He states he did not turn the pump off, but thinks he \"bumped it\". He states there is still \"a ton of chemo in the bag\" and he will come in tomorrow for it to be taken off. Writer told pt he needed to come in today to have the pump checked by nurse to make sure it is not malfunctioned. He states it is working well now, and his needle in intact. Writer told him he still needed to come in to be evaluated. Pt states he will come in around 2 after his wife gets home from work so she can bring him. Writer informed infusion nurses.

## 2018-03-02 ENCOUNTER — HOSPITAL ENCOUNTER (OUTPATIENT)
Dept: INFUSION THERAPY | Age: 58
Discharge: HOME | End: 2018-03-02
Payer: COMMERCIAL

## 2018-03-02 DIAGNOSIS — C19 COLORECTAL CANCER, STAGE IV (HCC): ICD-10-CM

## 2018-03-02 PROCEDURE — 96523 IRRIG DRUG DELIVERY DEVICE: CPT

## 2018-03-02 PROCEDURE — 6360000002 HC RX W HCPCS: Performed by: INTERNAL MEDICINE

## 2018-03-02 PROCEDURE — 2580000003 HC RX 258: Performed by: INTERNAL MEDICINE

## 2018-03-02 RX ORDER — SODIUM CHLORIDE 0.9 % (FLUSH) 0.9 %
20 SYRINGE (ML) INJECTION PRN
Status: CANCELLED | OUTPATIENT
Start: 2018-03-02

## 2018-03-02 RX ORDER — SODIUM CHLORIDE 0.9 % (FLUSH) 0.9 %
20 SYRINGE (ML) INJECTION PRN
Status: DISCONTINUED | OUTPATIENT
Start: 2018-03-02 | End: 2018-03-03 | Stop reason: HOSPADM

## 2018-03-02 RX ORDER — SODIUM CHLORIDE 0.9 % (FLUSH) 0.9 %
10 SYRINGE (ML) INJECTION PRN
Status: CANCELLED | OUTPATIENT
Start: 2018-03-02

## 2018-03-02 RX ORDER — HEPARIN SODIUM (PORCINE) LOCK FLUSH IV SOLN 100 UNIT/ML 100 UNIT/ML
500 SOLUTION INTRAVENOUS PRN
Status: CANCELLED | OUTPATIENT
Start: 2018-03-02

## 2018-03-02 RX ORDER — HEPARIN SODIUM (PORCINE) LOCK FLUSH IV SOLN 100 UNIT/ML 100 UNIT/ML
500 SOLUTION INTRAVENOUS PRN
Status: DISCONTINUED | OUTPATIENT
Start: 2018-03-02 | End: 2018-03-03 | Stop reason: HOSPADM

## 2018-03-02 RX ADMIN — SODIUM CHLORIDE, PRESERVATIVE FREE 500 UNITS: 5 INJECTION INTRAVENOUS at 09:33

## 2018-03-02 RX ADMIN — Medication 10 ML: at 09:33

## 2018-03-07 ENCOUNTER — HOSPITAL ENCOUNTER (EMERGENCY)
Age: 58
Discharge: HOME | End: 2018-03-07
Attending: EMERGENCY MEDICINE
Payer: COMMERCIAL

## 2018-03-07 ENCOUNTER — APPOINTMENT (OUTPATIENT)
Dept: GENERAL RADIOLOGY | Age: 58
End: 2018-03-07
Payer: COMMERCIAL

## 2018-03-07 VITALS
TEMPERATURE: 97.5 F | OXYGEN SATURATION: 94 % | SYSTOLIC BLOOD PRESSURE: 129 MMHG | DIASTOLIC BLOOD PRESSURE: 70 MMHG | RESPIRATION RATE: 20 BRPM | WEIGHT: 250 LBS | HEIGHT: 74 IN | HEART RATE: 97 BPM | BODY MASS INDEX: 32.08 KG/M2

## 2018-03-07 DIAGNOSIS — M79.642 LEFT HAND PAIN: Primary | ICD-10-CM

## 2018-03-07 DIAGNOSIS — M25.532 LEFT WRIST PAIN: ICD-10-CM

## 2018-03-07 PROCEDURE — 73130 X-RAY EXAM OF HAND: CPT

## 2018-03-07 PROCEDURE — 99283 EMERGENCY DEPT VISIT LOW MDM: CPT

## 2018-03-07 PROCEDURE — 73110 X-RAY EXAM OF WRIST: CPT

## 2018-03-07 RX ORDER — CYCLOBENZAPRINE HCL 10 MG
10 TABLET ORAL 3 TIMES DAILY PRN
Qty: 15 TABLET | Refills: 0 | Status: SHIPPED | OUTPATIENT
Start: 2018-03-07 | End: 2018-01-01 | Stop reason: ALTCHOICE

## 2018-03-07 ASSESSMENT — ENCOUNTER SYMPTOMS
SHORTNESS OF BREATH: 0
NAUSEA: 0
VOMITING: 0
COUGH: 0
TROUBLE SWALLOWING: 0
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
## 2018-03-13 ENCOUNTER — HOSPITAL ENCOUNTER (OUTPATIENT)
Dept: INFUSION THERAPY | Age: 58
Discharge: HOME | End: 2018-03-13
Payer: COMMERCIAL

## 2018-03-13 ENCOUNTER — OFFICE VISIT (OUTPATIENT)
Dept: ONCOLOGY | Age: 58
End: 2018-03-13
Payer: COMMERCIAL

## 2018-03-13 VITALS
HEART RATE: 99 BPM | BODY MASS INDEX: 32.35 KG/M2 | DIASTOLIC BLOOD PRESSURE: 91 MMHG | SYSTOLIC BLOOD PRESSURE: 138 MMHG | WEIGHT: 252 LBS | TEMPERATURE: 97.8 F

## 2018-03-13 DIAGNOSIS — C19 COLORECTAL CANCER, STAGE IV (HCC): ICD-10-CM

## 2018-03-13 DIAGNOSIS — C78.7 HEPATIC METASTASES (HCC): ICD-10-CM

## 2018-03-13 DIAGNOSIS — C19 COLORECTAL CANCER, STAGE IV (HCC): Primary | ICD-10-CM

## 2018-03-13 LAB
ABSOLUTE EOS #: 0.1 K/UL (ref 0–0.4)
ABSOLUTE IMMATURE GRANULOCYTE: ABNORMAL K/UL (ref 0–0.3)
ABSOLUTE LYMPH #: 0.8 K/UL (ref 1–4.8)
ABSOLUTE MONO #: 0.7 K/UL (ref 0.1–1.2)
ALBUMIN SERPL-MCNC: 3.7 G/DL (ref 3.5–5.2)
ALBUMIN/GLOBULIN RATIO: 1.3 (ref 1–2.5)
ALP BLD-CCNC: 122 U/L (ref 40–129)
ALT SERPL-CCNC: 15 U/L (ref 5–41)
ANION GAP SERPL CALCULATED.3IONS-SCNC: 15 MMOL/L (ref 9–17)
AST SERPL-CCNC: 19 U/L
BASOPHILS # BLD: 1 % (ref 0–2)
BASOPHILS ABSOLUTE: 0 K/UL (ref 0–0.2)
BILIRUB SERPL-MCNC: 0.3 MG/DL (ref 0.3–1.2)
BUN BLDV-MCNC: 10 MG/DL (ref 6–20)
BUN/CREAT BLD: ABNORMAL (ref 9–20)
CALCIUM SERPL-MCNC: 8.8 MG/DL (ref 8.6–10.4)
CARCINOEMBRYONIC ANTIGEN: 111.4 NG/ML
CHLORIDE BLD-SCNC: 99 MMOL/L (ref 98–107)
CO2: 24 MMOL/L (ref 20–31)
CREAT SERPL-MCNC: 0.52 MG/DL (ref 0.7–1.2)
DIFFERENTIAL TYPE: ABNORMAL
EOSINOPHILS RELATIVE PERCENT: 1 % (ref 1–4)
GFR AFRICAN AMERICAN: >60 ML/MIN
GFR NON-AFRICAN AMERICAN: >60 ML/MIN
GFR SERPL CREATININE-BSD FRML MDRD: ABNORMAL ML/MIN/{1.73_M2}
GFR SERPL CREATININE-BSD FRML MDRD: ABNORMAL ML/MIN/{1.73_M2}
GLUCOSE BLD-MCNC: 332 MG/DL (ref 70–99)
HCT VFR BLD CALC: 37.8 % (ref 41–53)
HEMOGLOBIN: 12.9 G/DL (ref 13.5–17.5)
IMMATURE GRANULOCYTES: ABNORMAL %
LYMPHOCYTES # BLD: 15 % (ref 24–44)
MCH RBC QN AUTO: 26.3 PG (ref 26–34)
MCHC RBC AUTO-ENTMCNC: 34 G/DL (ref 31–37)
MCV RBC AUTO: 77.4 FL (ref 80–100)
MONOCYTES # BLD: 15 % (ref 2–11)
NRBC AUTOMATED: ABNORMAL PER 100 WBC
PDW BLD-RTO: 17.7 % (ref 12.5–15.4)
PLATELET # BLD: 199 K/UL (ref 140–450)
PLATELET ESTIMATE: ABNORMAL
PMV BLD AUTO: 6.6 FL (ref 6–12)
POTASSIUM SERPL-SCNC: 4.3 MMOL/L (ref 3.7–5.3)
PROTEIN UA: NEGATIVE
RBC # BLD: 4.89 M/UL (ref 4.5–5.9)
RBC # BLD: ABNORMAL 10*6/UL
SEG NEUTROPHILS: 68 % (ref 36–66)
SEGMENTED NEUTROPHILS ABSOLUTE COUNT: 3.5 K/UL (ref 1.8–7.7)
SODIUM BLD-SCNC: 138 MMOL/L (ref 135–144)
TOTAL PROTEIN: 6.5 G/DL (ref 6.4–8.3)
WBC # BLD: 5.1 K/UL (ref 3.5–11)
WBC # BLD: ABNORMAL 10*3/UL

## 2018-03-13 PROCEDURE — G8484 FLU IMMUNIZE NO ADMIN: HCPCS | Performed by: INTERNAL MEDICINE

## 2018-03-13 PROCEDURE — 80053 COMPREHEN METABOLIC PANEL: CPT

## 2018-03-13 PROCEDURE — 85025 COMPLETE CBC W/AUTO DIFF WBC: CPT

## 2018-03-13 PROCEDURE — G8417 CALC BMI ABV UP PARAM F/U: HCPCS | Performed by: INTERNAL MEDICINE

## 2018-03-13 PROCEDURE — 96375 TX/PRO/DX INJ NEW DRUG ADDON: CPT | Performed by: NURSE PRACTITIONER

## 2018-03-13 PROCEDURE — 3017F COLORECTAL CA SCREEN DOC REV: CPT | Performed by: INTERNAL MEDICINE

## 2018-03-13 PROCEDURE — 96415 CHEMO IV INFUSION ADDL HR: CPT | Performed by: NURSE PRACTITIONER

## 2018-03-13 PROCEDURE — 99214 OFFICE O/P EST MOD 30 MIN: CPT | Performed by: INTERNAL MEDICINE

## 2018-03-13 PROCEDURE — 6370000000 HC RX 637 (ALT 250 FOR IP): Performed by: INTERNAL MEDICINE

## 2018-03-13 PROCEDURE — 6360000002 HC RX W HCPCS: Performed by: INTERNAL MEDICINE

## 2018-03-13 PROCEDURE — 81003 URINALYSIS AUTO W/O SCOPE: CPT

## 2018-03-13 PROCEDURE — 36591 DRAW BLOOD OFF VENOUS DEVICE: CPT | Performed by: NURSE PRACTITIONER

## 2018-03-13 PROCEDURE — G8598 ASA/ANTIPLAT THER USED: HCPCS | Performed by: INTERNAL MEDICINE

## 2018-03-13 PROCEDURE — 96413 CHEMO IV INFUSION 1 HR: CPT | Performed by: NURSE PRACTITIONER

## 2018-03-13 PROCEDURE — G8427 DOCREV CUR MEDS BY ELIG CLIN: HCPCS | Performed by: INTERNAL MEDICINE

## 2018-03-13 PROCEDURE — 82378 CARCINOEMBRYONIC ANTIGEN: CPT

## 2018-03-13 PROCEDURE — 2580000003 HC RX 258: Performed by: INTERNAL MEDICINE

## 2018-03-13 PROCEDURE — 1036F TOBACCO NON-USER: CPT | Performed by: INTERNAL MEDICINE

## 2018-03-13 PROCEDURE — 96417 CHEMO IV INFUS EACH ADDL SEQ: CPT | Performed by: NURSE PRACTITIONER

## 2018-03-13 RX ORDER — DIPHENHYDRAMINE HYDROCHLORIDE 50 MG/ML
50 INJECTION INTRAMUSCULAR; INTRAVENOUS ONCE
Status: CANCELLED | OUTPATIENT
Start: 2018-04-10 | End: 2018-04-10

## 2018-03-13 RX ORDER — ATROPINE SULFATE 0.4 MG/ML
0.4 AMPUL (ML) INJECTION
Status: CANCELLED | OUTPATIENT
Start: 2018-04-10

## 2018-03-13 RX ORDER — SODIUM CHLORIDE 9 MG/ML
INJECTION, SOLUTION INTRAVENOUS CONTINUOUS
Status: CANCELLED | OUTPATIENT
Start: 2018-04-10

## 2018-03-13 RX ORDER — PALONOSETRON 0.05 MG/ML
0.25 INJECTION, SOLUTION INTRAVENOUS ONCE
Status: COMPLETED | OUTPATIENT
Start: 2018-03-13 | End: 2018-03-13

## 2018-03-13 RX ORDER — HEPARIN SODIUM (PORCINE) LOCK FLUSH IV SOLN 100 UNIT/ML 100 UNIT/ML
500 SOLUTION INTRAVENOUS PRN
Status: CANCELLED | OUTPATIENT
Start: 2018-04-24

## 2018-03-13 RX ORDER — ATROPINE SULFATE 0.4 MG/ML
0.4 AMPUL (ML) INJECTION
Status: CANCELLED | OUTPATIENT
Start: 2018-04-24

## 2018-03-13 RX ORDER — 0.9 % SODIUM CHLORIDE 0.9 %
10 VIAL (ML) INJECTION ONCE
Status: COMPLETED | OUTPATIENT
Start: 2018-03-13 | End: 2018-03-13

## 2018-03-13 RX ORDER — SODIUM CHLORIDE 9 MG/ML
INJECTION, SOLUTION INTRAVENOUS ONCE
Status: CANCELLED | OUTPATIENT
Start: 2018-05-08 | End: 2018-05-08

## 2018-03-13 RX ORDER — 0.9 % SODIUM CHLORIDE 0.9 %
10 VIAL (ML) INJECTION ONCE
Status: CANCELLED | OUTPATIENT
Start: 2018-05-22 | End: 2018-05-22

## 2018-03-13 RX ORDER — SODIUM CHLORIDE 9 MG/ML
INJECTION, SOLUTION INTRAVENOUS ONCE
Status: CANCELLED | OUTPATIENT
Start: 2018-04-10 | End: 2018-04-10

## 2018-03-13 RX ORDER — DEXTROSE MONOHYDRATE 50 MG/ML
INJECTION, SOLUTION INTRAVENOUS ONCE
Status: CANCELLED | OUTPATIENT
Start: 2018-05-22 | End: 2018-05-22

## 2018-03-13 RX ORDER — SODIUM CHLORIDE 0.9 % (FLUSH) 0.9 %
5 SYRINGE (ML) INJECTION PRN
Status: CANCELLED | OUTPATIENT
Start: 2018-04-10

## 2018-03-13 RX ORDER — SODIUM CHLORIDE 9 MG/ML
INJECTION, SOLUTION INTRAVENOUS CONTINUOUS
Status: CANCELLED | OUTPATIENT
Start: 2018-05-08

## 2018-03-13 RX ORDER — METHYLPREDNISOLONE SODIUM SUCCINATE 125 MG/2ML
125 INJECTION, POWDER, LYOPHILIZED, FOR SOLUTION INTRAMUSCULAR; INTRAVENOUS ONCE
Status: CANCELLED | OUTPATIENT
Start: 2018-05-22 | End: 2018-05-22

## 2018-03-13 RX ORDER — ATROPINE SULFATE 0.4 MG/ML
0.4 AMPUL (ML) INJECTION
Status: CANCELLED | OUTPATIENT
Start: 2018-05-22

## 2018-03-13 RX ORDER — DEXTROSE MONOHYDRATE 50 MG/ML
INJECTION, SOLUTION INTRAVENOUS ONCE
Status: CANCELLED | OUTPATIENT
Start: 2018-04-24 | End: 2018-04-24

## 2018-03-13 RX ORDER — PALONOSETRON 0.05 MG/ML
0.25 INJECTION, SOLUTION INTRAVENOUS ONCE
Status: CANCELLED | OUTPATIENT
Start: 2018-05-22

## 2018-03-13 RX ORDER — PALONOSETRON 0.05 MG/ML
0.25 INJECTION, SOLUTION INTRAVENOUS ONCE
Status: CANCELLED | OUTPATIENT
Start: 2018-04-10

## 2018-03-13 RX ORDER — SODIUM CHLORIDE 0.9 % (FLUSH) 0.9 %
10 SYRINGE (ML) INJECTION PRN
Status: CANCELLED | OUTPATIENT
Start: 2018-05-08

## 2018-03-13 RX ORDER — METHYLPREDNISOLONE SODIUM SUCCINATE 125 MG/2ML
125 INJECTION, POWDER, LYOPHILIZED, FOR SOLUTION INTRAMUSCULAR; INTRAVENOUS ONCE
Status: CANCELLED | OUTPATIENT
Start: 2018-04-24 | End: 2018-04-24

## 2018-03-13 RX ORDER — SODIUM CHLORIDE 0.9 % (FLUSH) 0.9 %
10 SYRINGE (ML) INJECTION PRN
Status: CANCELLED | OUTPATIENT
Start: 2018-04-10

## 2018-03-13 RX ORDER — DEXTROSE MONOHYDRATE 50 MG/ML
INJECTION, SOLUTION INTRAVENOUS ONCE
Status: CANCELLED | OUTPATIENT
Start: 2018-04-10 | End: 2018-04-10

## 2018-03-13 RX ORDER — 0.9 % SODIUM CHLORIDE 0.9 %
10 VIAL (ML) INJECTION ONCE
Status: CANCELLED | OUTPATIENT
Start: 2018-04-10 | End: 2018-04-10

## 2018-03-13 RX ORDER — DEXTROSE MONOHYDRATE 50 MG/ML
INJECTION, SOLUTION INTRAVENOUS ONCE
Status: CANCELLED | OUTPATIENT
Start: 2018-05-08 | End: 2018-05-08

## 2018-03-13 RX ORDER — PALONOSETRON 0.05 MG/ML
0.25 INJECTION, SOLUTION INTRAVENOUS ONCE
Status: CANCELLED | OUTPATIENT
Start: 2018-05-08

## 2018-03-13 RX ORDER — ATROPINE SULFATE 0.4 MG/ML
0.4 AMPUL (ML) INJECTION
Status: COMPLETED | OUTPATIENT
Start: 2018-03-13 | End: 2018-03-13

## 2018-03-13 RX ORDER — DEXTROSE MONOHYDRATE 50 MG/ML
INJECTION, SOLUTION INTRAVENOUS ONCE
Status: DISCONTINUED | OUTPATIENT
Start: 2018-03-13 | End: 2018-03-14 | Stop reason: HOSPADM

## 2018-03-13 RX ORDER — ATROPINE SULFATE 0.4 MG/ML
0.4 AMPUL (ML) INJECTION
Status: CANCELLED | OUTPATIENT
Start: 2018-05-08

## 2018-03-13 RX ORDER — PALONOSETRON 0.05 MG/ML
0.25 INJECTION, SOLUTION INTRAVENOUS ONCE
Status: CANCELLED | OUTPATIENT
Start: 2018-04-24

## 2018-03-13 RX ORDER — SODIUM CHLORIDE 0.9 % (FLUSH) 0.9 %
5 SYRINGE (ML) INJECTION PRN
Status: CANCELLED | OUTPATIENT
Start: 2018-05-08

## 2018-03-13 RX ORDER — METHYLPREDNISOLONE SODIUM SUCCINATE 125 MG/2ML
125 INJECTION, POWDER, LYOPHILIZED, FOR SOLUTION INTRAMUSCULAR; INTRAVENOUS ONCE
Status: CANCELLED | OUTPATIENT
Start: 2018-04-10 | End: 2018-04-10

## 2018-03-13 RX ORDER — DIPHENHYDRAMINE HYDROCHLORIDE 50 MG/ML
50 INJECTION INTRAMUSCULAR; INTRAVENOUS ONCE
Status: CANCELLED | OUTPATIENT
Start: 2018-05-22 | End: 2018-05-22

## 2018-03-13 RX ORDER — SODIUM CHLORIDE 9 MG/ML
INJECTION, SOLUTION INTRAVENOUS CONTINUOUS
Status: CANCELLED | OUTPATIENT
Start: 2018-04-24

## 2018-03-13 RX ORDER — DIPHENHYDRAMINE HYDROCHLORIDE 50 MG/ML
50 INJECTION INTRAMUSCULAR; INTRAVENOUS ONCE
Status: CANCELLED | OUTPATIENT
Start: 2018-05-08 | End: 2018-05-08

## 2018-03-13 RX ORDER — DEXAMETHASONE SODIUM PHOSPHATE 10 MG/ML
10 INJECTION INTRAMUSCULAR; INTRAVENOUS ONCE
Status: COMPLETED | OUTPATIENT
Start: 2018-03-13 | End: 2018-03-13

## 2018-03-13 RX ORDER — 0.9 % SODIUM CHLORIDE 0.9 %
10 VIAL (ML) INJECTION ONCE
Status: CANCELLED | OUTPATIENT
Start: 2018-04-24 | End: 2018-04-24

## 2018-03-13 RX ORDER — HEPARIN SODIUM (PORCINE) LOCK FLUSH IV SOLN 100 UNIT/ML 100 UNIT/ML
500 SOLUTION INTRAVENOUS PRN
Status: CANCELLED | OUTPATIENT
Start: 2018-05-22

## 2018-03-13 RX ORDER — SODIUM CHLORIDE 0.9 % (FLUSH) 0.9 %
5 SYRINGE (ML) INJECTION PRN
Status: CANCELLED | OUTPATIENT
Start: 2018-04-24

## 2018-03-13 RX ORDER — SODIUM CHLORIDE 0.9 % (FLUSH) 0.9 %
10 SYRINGE (ML) INJECTION PRN
Status: CANCELLED | OUTPATIENT
Start: 2018-04-24

## 2018-03-13 RX ORDER — SODIUM CHLORIDE 0.9 % (FLUSH) 0.9 %
5 SYRINGE (ML) INJECTION PRN
Status: CANCELLED | OUTPATIENT
Start: 2018-05-22

## 2018-03-13 RX ORDER — METHYLPREDNISOLONE SODIUM SUCCINATE 125 MG/2ML
125 INJECTION, POWDER, LYOPHILIZED, FOR SOLUTION INTRAMUSCULAR; INTRAVENOUS ONCE
Status: CANCELLED | OUTPATIENT
Start: 2018-05-08 | End: 2018-05-08

## 2018-03-13 RX ORDER — SODIUM CHLORIDE 9 MG/ML
INJECTION, SOLUTION INTRAVENOUS ONCE
Status: CANCELLED | OUTPATIENT
Start: 2018-05-22 | End: 2018-05-22

## 2018-03-13 RX ORDER — SODIUM CHLORIDE 9 MG/ML
INJECTION, SOLUTION INTRAVENOUS ONCE
Status: CANCELLED | OUTPATIENT
Start: 2018-04-24 | End: 2018-04-24

## 2018-03-13 RX ORDER — DIPHENHYDRAMINE HYDROCHLORIDE 50 MG/ML
50 INJECTION INTRAMUSCULAR; INTRAVENOUS ONCE
Status: CANCELLED | OUTPATIENT
Start: 2018-04-24 | End: 2018-04-24

## 2018-03-13 RX ORDER — SODIUM CHLORIDE 0.9 % (FLUSH) 0.9 %
10 SYRINGE (ML) INJECTION PRN
Status: CANCELLED | OUTPATIENT
Start: 2018-05-22

## 2018-03-13 RX ORDER — HEPARIN SODIUM (PORCINE) LOCK FLUSH IV SOLN 100 UNIT/ML 100 UNIT/ML
500 SOLUTION INTRAVENOUS PRN
Status: CANCELLED | OUTPATIENT
Start: 2018-04-10

## 2018-03-13 RX ORDER — SODIUM CHLORIDE 9 MG/ML
INJECTION, SOLUTION INTRAVENOUS CONTINUOUS
Status: CANCELLED | OUTPATIENT
Start: 2018-05-22

## 2018-03-13 RX ORDER — HEPARIN SODIUM (PORCINE) LOCK FLUSH IV SOLN 100 UNIT/ML 100 UNIT/ML
500 SOLUTION INTRAVENOUS PRN
Status: CANCELLED | OUTPATIENT
Start: 2018-05-08

## 2018-03-13 RX ORDER — SODIUM CHLORIDE 9 MG/ML
INJECTION, SOLUTION INTRAVENOUS ONCE
Status: COMPLETED | OUTPATIENT
Start: 2018-03-13 | End: 2018-03-13

## 2018-03-13 RX ORDER — 0.9 % SODIUM CHLORIDE 0.9 %
10 VIAL (ML) INJECTION ONCE
Status: CANCELLED | OUTPATIENT
Start: 2018-05-08 | End: 2018-05-08

## 2018-03-13 RX ADMIN — IRINOTECAN HYDROCHLORIDE 440 MG: 100 INJECTION, SOLUTION INTRAVENOUS at 11:44

## 2018-03-13 RX ADMIN — FLUOROURACIL 5950 MG: 50 INJECTION, SOLUTION INTRAVENOUS at 13:36

## 2018-03-13 RX ADMIN — DEXAMETHASONE SODIUM PHOSPHATE 10 MG: 10 INJECTION INTRAMUSCULAR; INTRAVENOUS at 10:34

## 2018-03-13 RX ADMIN — Medication 10 ML: at 09:23

## 2018-03-13 RX ADMIN — PALONOSETRON HYDROCHLORIDE 0.25 MG: 0.25 INJECTION INTRAVENOUS at 10:33

## 2018-03-13 RX ADMIN — SODIUM CHLORIDE: 9 INJECTION, SOLUTION INTRAVENOUS at 10:07

## 2018-03-13 RX ADMIN — INSULIN HUMAN 10 UNITS: 100 INJECTION, SOLUTION PARENTERAL at 10:37

## 2018-03-13 RX ADMIN — BEVACIZUMAB 600 MG: 400 INJECTION, SOLUTION INTRAVENOUS at 10:41

## 2018-03-13 RX ADMIN — ATROPINE SULFATE 0.4 MG: 0.4 INJECTION, SOLUTION INTRAMUSCULAR; INTRAVENOUS; SUBCUTANEOUS at 11:17
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
## 2018-03-15 ENCOUNTER — HOSPITAL ENCOUNTER (OUTPATIENT)
Dept: INFUSION THERAPY | Age: 58
Discharge: HOME | End: 2018-03-15
Payer: COMMERCIAL

## 2018-03-15 DIAGNOSIS — C19 COLORECTAL CANCER, STAGE IV (HCC): ICD-10-CM

## 2018-03-15 PROCEDURE — 96523 IRRIG DRUG DELIVERY DEVICE: CPT

## 2018-03-15 PROCEDURE — 6360000002 HC RX W HCPCS: Performed by: INTERNAL MEDICINE

## 2018-03-15 PROCEDURE — 2580000003 HC RX 258: Performed by: INTERNAL MEDICINE

## 2018-03-15 RX ORDER — HEPARIN SODIUM (PORCINE) LOCK FLUSH IV SOLN 100 UNIT/ML 100 UNIT/ML
500 SOLUTION INTRAVENOUS PRN
Status: DISCONTINUED | OUTPATIENT
Start: 2018-03-15 | End: 2018-03-16 | Stop reason: HOSPADM

## 2018-03-15 RX ORDER — SODIUM CHLORIDE 0.9 % (FLUSH) 0.9 %
10 SYRINGE (ML) INJECTION PRN
Status: DISCONTINUED | OUTPATIENT
Start: 2018-03-15 | End: 2018-03-16 | Stop reason: HOSPADM

## 2018-03-15 RX ORDER — SODIUM CHLORIDE 0.9 % (FLUSH) 0.9 %
20 SYRINGE (ML) INJECTION PRN
Status: CANCELLED | OUTPATIENT
Start: 2018-03-15

## 2018-03-15 RX ORDER — HEPARIN SODIUM (PORCINE) LOCK FLUSH IV SOLN 100 UNIT/ML 100 UNIT/ML
500 SOLUTION INTRAVENOUS PRN
Status: CANCELLED | OUTPATIENT
Start: 2018-03-15

## 2018-03-15 RX ORDER — SODIUM CHLORIDE 0.9 % (FLUSH) 0.9 %
10 SYRINGE (ML) INJECTION PRN
Status: CANCELLED | OUTPATIENT
Start: 2018-03-15

## 2018-03-15 RX ADMIN — Medication 10 ML: at 12:29

## 2018-03-15 RX ADMIN — SODIUM CHLORIDE, PRESERVATIVE FREE 500 UNITS: 5 INJECTION INTRAVENOUS at 12:29

## 2018-03-26 DIAGNOSIS — C19 COLORECTAL CANCER, STAGE IV (HCC): ICD-10-CM

## 2018-03-26 DIAGNOSIS — C78.7 HEPATIC METASTASES (HCC): Primary | ICD-10-CM

## 2018-03-26 RX ORDER — FENTANYL 25 UG/H
1 PATCH TRANSDERMAL
Qty: 10 PATCH | Refills: 0 | Status: SHIPPED | OUTPATIENT
Start: 2018-03-26 | End: 2018-04-23 | Stop reason: SDUPTHER

## 2018-03-26 RX ORDER — OXYCODONE AND ACETAMINOPHEN 10; 325 MG/1; MG/1
TABLET ORAL
Qty: 120 TABLET | Refills: 0 | Status: SHIPPED | OUTPATIENT
Start: 2018-03-26 | End: 2018-04-23 | Stop reason: SDUPTHER

## 2018-03-27 ENCOUNTER — HOSPITAL ENCOUNTER (OUTPATIENT)
Dept: INFUSION THERAPY | Age: 58
Discharge: HOME | End: 2018-03-27
Payer: COMMERCIAL

## 2018-03-27 ENCOUNTER — OFFICE VISIT (OUTPATIENT)
Dept: ONCOLOGY | Age: 58
End: 2018-03-27
Payer: COMMERCIAL

## 2018-03-27 VITALS
DIASTOLIC BLOOD PRESSURE: 94 MMHG | BODY MASS INDEX: 32.95 KG/M2 | WEIGHT: 256.6 LBS | TEMPERATURE: 97.6 F | RESPIRATION RATE: 16 BRPM | HEART RATE: 84 BPM | SYSTOLIC BLOOD PRESSURE: 148 MMHG

## 2018-03-27 VITALS
BODY MASS INDEX: 32.95 KG/M2 | TEMPERATURE: 97.6 F | DIASTOLIC BLOOD PRESSURE: 89 MMHG | HEART RATE: 84 BPM | RESPIRATION RATE: 16 BRPM | SYSTOLIC BLOOD PRESSURE: 154 MMHG | WEIGHT: 256.6 LBS

## 2018-03-27 DIAGNOSIS — C78.7 HEPATIC METASTASES (HCC): ICD-10-CM

## 2018-03-27 DIAGNOSIS — C19 COLORECTAL CANCER, STAGE IV (HCC): ICD-10-CM

## 2018-03-27 DIAGNOSIS — I21.3 ST ELEVATION MYOCARDIAL INFARCTION (STEMI), UNSPECIFIED ARTERY (HCC): ICD-10-CM

## 2018-03-27 DIAGNOSIS — C19 COLORECTAL CANCER, STAGE IV (HCC): Primary | ICD-10-CM

## 2018-03-27 LAB
ABSOLUTE EOS #: 0.2 K/UL (ref 0–0.4)
ABSOLUTE IMMATURE GRANULOCYTE: ABNORMAL K/UL (ref 0–0.3)
ABSOLUTE LYMPH #: 0.8 K/UL (ref 1–4.8)
ABSOLUTE MONO #: 0.8 K/UL (ref 0.1–1.2)
ALBUMIN SERPL-MCNC: 3.7 G/DL (ref 3.5–5.2)
ALBUMIN/GLOBULIN RATIO: 1.4 (ref 1–2.5)
ALP BLD-CCNC: 107 U/L (ref 40–129)
ALT SERPL-CCNC: 22 U/L (ref 5–41)
ANION GAP SERPL CALCULATED.3IONS-SCNC: 13 MMOL/L (ref 9–17)
AST SERPL-CCNC: 22 U/L
BASOPHILS # BLD: 1 % (ref 0–2)
BASOPHILS ABSOLUTE: 0.1 K/UL (ref 0–0.2)
BILIRUB SERPL-MCNC: 0.27 MG/DL (ref 0.3–1.2)
BUN BLDV-MCNC: 10 MG/DL (ref 6–20)
BUN/CREAT BLD: ABNORMAL (ref 9–20)
CALCIUM SERPL-MCNC: 8.7 MG/DL (ref 8.6–10.4)
CARCINOEMBRYONIC ANTIGEN: 75.8 NG/ML
CHLORIDE BLD-SCNC: 97 MMOL/L (ref 98–107)
CO2: 26 MMOL/L (ref 20–31)
CREAT SERPL-MCNC: 0.51 MG/DL (ref 0.7–1.2)
DIFFERENTIAL TYPE: ABNORMAL
EOSINOPHILS RELATIVE PERCENT: 3 % (ref 1–4)
GFR AFRICAN AMERICAN: >60 ML/MIN
GFR NON-AFRICAN AMERICAN: >60 ML/MIN
GFR SERPL CREATININE-BSD FRML MDRD: ABNORMAL ML/MIN/{1.73_M2}
GFR SERPL CREATININE-BSD FRML MDRD: ABNORMAL ML/MIN/{1.73_M2}
GLUCOSE BLD-MCNC: 290 MG/DL (ref 70–99)
HCT VFR BLD CALC: 37 % (ref 41–53)
HEMOGLOBIN: 12.3 G/DL (ref 13.5–17.5)
IMMATURE GRANULOCYTES: ABNORMAL %
LYMPHOCYTES # BLD: 12 % (ref 24–44)
MCH RBC QN AUTO: 26.5 PG (ref 26–34)
MCHC RBC AUTO-ENTMCNC: 33.3 G/DL (ref 31–37)
MCV RBC AUTO: 79.5 FL (ref 80–100)
MONOCYTES # BLD: 12 % (ref 2–11)
NRBC AUTOMATED: ABNORMAL PER 100 WBC
PDW BLD-RTO: 18.3 % (ref 12.5–15.4)
PLATELET # BLD: 180 K/UL (ref 140–450)
PLATELET ESTIMATE: ABNORMAL
PMV BLD AUTO: 6.5 FL (ref 6–12)
POTASSIUM SERPL-SCNC: 4.2 MMOL/L (ref 3.7–5.3)
PROTEIN UA: NEGATIVE
RBC # BLD: 4.66 M/UL (ref 4.5–5.9)
RBC # BLD: ABNORMAL 10*6/UL
SEG NEUTROPHILS: 72 % (ref 36–66)
SEGMENTED NEUTROPHILS ABSOLUTE COUNT: 4.5 K/UL (ref 1.8–7.7)
SODIUM BLD-SCNC: 136 MMOL/L (ref 135–144)
TOTAL PROTEIN: 6.3 G/DL (ref 6.4–8.3)
WBC # BLD: 6.2 K/UL (ref 3.5–11)
WBC # BLD: ABNORMAL 10*3/UL

## 2018-03-27 PROCEDURE — 6360000002 HC RX W HCPCS: Performed by: INTERNAL MEDICINE

## 2018-03-27 PROCEDURE — 96375 TX/PRO/DX INJ NEW DRUG ADDON: CPT | Performed by: NURSE PRACTITIONER

## 2018-03-27 PROCEDURE — 36415 COLL VENOUS BLD VENIPUNCTURE: CPT

## 2018-03-27 PROCEDURE — 96413 CHEMO IV INFUSION 1 HR: CPT | Performed by: NURSE PRACTITIONER

## 2018-03-27 PROCEDURE — G8427 DOCREV CUR MEDS BY ELIG CLIN: HCPCS | Performed by: INTERNAL MEDICINE

## 2018-03-27 PROCEDURE — 96415 CHEMO IV INFUSION ADDL HR: CPT | Performed by: NURSE PRACTITIONER

## 2018-03-27 PROCEDURE — G8598 ASA/ANTIPLAT THER USED: HCPCS | Performed by: INTERNAL MEDICINE

## 2018-03-27 PROCEDURE — 96416 CHEMO PROLONG INFUSE W/PUMP: CPT | Performed by: NURSE PRACTITIONER

## 2018-03-27 PROCEDURE — 82378 CARCINOEMBRYONIC ANTIGEN: CPT

## 2018-03-27 PROCEDURE — 96417 CHEMO IV INFUS EACH ADDL SEQ: CPT | Performed by: NURSE PRACTITIONER

## 2018-03-27 PROCEDURE — 80053 COMPREHEN METABOLIC PANEL: CPT

## 2018-03-27 PROCEDURE — 36591 DRAW BLOOD OFF VENOUS DEVICE: CPT | Performed by: NURSE PRACTITIONER

## 2018-03-27 PROCEDURE — 2580000003 HC RX 258: Performed by: INTERNAL MEDICINE

## 2018-03-27 PROCEDURE — 85025 COMPLETE CBC W/AUTO DIFF WBC: CPT

## 2018-03-27 PROCEDURE — G8417 CALC BMI ABV UP PARAM F/U: HCPCS | Performed by: INTERNAL MEDICINE

## 2018-03-27 PROCEDURE — 3017F COLORECTAL CA SCREEN DOC REV: CPT | Performed by: INTERNAL MEDICINE

## 2018-03-27 PROCEDURE — 81003 URINALYSIS AUTO W/O SCOPE: CPT

## 2018-03-27 PROCEDURE — G8484 FLU IMMUNIZE NO ADMIN: HCPCS | Performed by: INTERNAL MEDICINE

## 2018-03-27 PROCEDURE — 1036F TOBACCO NON-USER: CPT | Performed by: INTERNAL MEDICINE

## 2018-03-27 PROCEDURE — 99214 OFFICE O/P EST MOD 30 MIN: CPT | Performed by: INTERNAL MEDICINE

## 2018-03-27 RX ORDER — HEPARIN SODIUM (PORCINE) LOCK FLUSH IV SOLN 100 UNIT/ML 100 UNIT/ML
500 SOLUTION INTRAVENOUS PRN
Status: DISCONTINUED | OUTPATIENT
Start: 2018-03-27 | End: 2018-03-28 | Stop reason: HOSPADM

## 2018-03-27 RX ORDER — ATROPINE SULFATE 0.4 MG/ML
0.4 AMPUL (ML) INJECTION
Status: COMPLETED | OUTPATIENT
Start: 2018-03-27 | End: 2018-03-27

## 2018-03-27 RX ORDER — SODIUM CHLORIDE 9 MG/ML
INJECTION, SOLUTION INTRAVENOUS ONCE
Status: DISCONTINUED | OUTPATIENT
Start: 2018-03-27 | End: 2018-03-28 | Stop reason: HOSPADM

## 2018-03-27 RX ORDER — PALONOSETRON 0.05 MG/ML
0.25 INJECTION, SOLUTION INTRAVENOUS ONCE
Status: COMPLETED | OUTPATIENT
Start: 2018-03-27 | End: 2018-03-27

## 2018-03-27 RX ORDER — SODIUM CHLORIDE 0.9 % (FLUSH) 0.9 %
10 SYRINGE (ML) INJECTION PRN
Status: DISCONTINUED | OUTPATIENT
Start: 2018-03-27 | End: 2018-03-28 | Stop reason: HOSPADM

## 2018-03-27 RX ORDER — DEXTROSE MONOHYDRATE 50 MG/ML
INJECTION, SOLUTION INTRAVENOUS ONCE
Status: DISCONTINUED | OUTPATIENT
Start: 2018-03-27 | End: 2018-03-28 | Stop reason: HOSPADM

## 2018-03-27 RX ORDER — DEXAMETHASONE SODIUM PHOSPHATE 10 MG/ML
10 INJECTION, SOLUTION INTRAMUSCULAR; INTRAVENOUS ONCE
Status: COMPLETED | OUTPATIENT
Start: 2018-03-27 | End: 2018-03-27

## 2018-03-27 RX ADMIN — ATROPINE SULFATE 0.4 MG: 0.4 INJECTION, SOLUTION INTRAMUSCULAR; INTRAVENOUS; SUBCUTANEOUS at 11:57

## 2018-03-27 RX ADMIN — DEXAMETHASONE SODIUM PHOSPHATE 10 MG: 10 INJECTION, SOLUTION INTRAMUSCULAR; INTRAVENOUS at 10:49

## 2018-03-27 RX ADMIN — BEVACIZUMAB 600 MG: 400 INJECTION, SOLUTION INTRAVENOUS at 10:53

## 2018-03-27 RX ADMIN — Medication 10 ML: at 09:26

## 2018-03-27 RX ADMIN — SODIUM CHLORIDE: 9 INJECTION, SOLUTION INTRAVENOUS at 10:47

## 2018-03-27 RX ADMIN — PALONOSETRON HYDROCHLORIDE 0.25 MG: 0.25 INJECTION INTRAVENOUS at 10:48

## 2018-03-27 RX ADMIN — Medication 10 ML: at 09:29

## 2018-03-27 RX ADMIN — IRINOTECAN HYDROCHLORIDE 440 MG: 100 INJECTION, SOLUTION INTRAVENOUS at 11:59

## 2018-03-27 RX ADMIN — FLUOROURACIL 5950 MG: 50 INJECTION, SOLUTION INTRAVENOUS at 13:42

## 2018-03-27 ASSESSMENT — PAIN DESCRIPTION - PAIN TYPE: TYPE: CHRONIC PAIN

## 2018-03-27 ASSESSMENT — PAIN DESCRIPTION - DESCRIPTORS: DESCRIPTORS: ACHING

## 2018-03-27 ASSESSMENT — PAIN SCALES - GENERAL: PAINLEVEL_OUTOF10: 6

## 2018-03-27 ASSESSMENT — PAIN DESCRIPTION - FREQUENCY: FREQUENCY: INTERMITTENT

## 2018-03-27 NOTE — PROGRESS NOTES
Patient tolerated treatment well. Dr. John Laura discussed patient's glucose level. Patient did not give Dr. John Laura a direct answer at first regarding if he was taking his insulin or not. Dr. John Laura explained how important it was for him to be compliant with all of his medications. Patient verbalized understanding. He stated he has not been taking his insulin. He has been eating and drinking whatever he wants. He stated it was one on the pleasures in life that he still has. I explained to him the importance again of getting his blood sugars under control. How it effects all of his bodies organs. That if he was going to fight the cancer he needed to be compliant with his insulin as well. He called his pharmacy and he stated he was going to get his insulin when he left here.

## 2018-03-27 NOTE — PROGRESS NOTES
DIAGNOSIS:   1. Metastatic rectal cancer, Kras mutated. 2. Bleeding secondary to the tumor  3. Liver metastases  4. Coronary artery disease, presenting with ventricular fibrillation, needed urgent cardiac catheterization and stent, diagnosis was one week prior to diagnosis of metastatic cancer  CURRENT THERAPY:  1. Status post coronary stenting  2. Started palliative chemotherapy with FOLFOX, Avastin was held because of active bleeding  3. CT scan showed excellent response in the liver. But the primary tumor was almost the same size. 4. Chemoradiation with 5-FU sensitization to deal with the primary tumor  5. Palliative Avastin+ FOLFIRI started 1/16/18  BRIEF CASE HISTORY:   Harry Benson is a very pleasant 56 y.o. male who was admitted to the hospital with  rectal bleeding and change in bowel habits. He is found to have rectal mass. And multiple colonic polyps. bx was done. And showed adenocarcinoma of the rectum, CT scan showed liver metastases that were biopsy-proven to be metastatic disease. Pt had recent MI and stenting. He is on double antiplatelet agents. Initially he was on Berlinta and aspirin but that was later changed to Plavix and aspirin  We decided to start with chemotherapy, we chose the combination of FOLFOX. We held the Avastin because of ongoing bleeding and possible need for surgery. Chemotherapy was well tolerated. Interim CT scan showed excellent response in the liver with significant shrinkage of the liver metastases. However, the primary tumor was almost the same size and that was confirmed with endoscopic ultrasound. The patient was sent back for chemoradiation with 5-FU. He developed significant radiation enteritis and cystitis, managed conservatively. After completion of chemoradiation, there was significant progression of his liver metastases. So he was restarted on chemotherapy. We chose the combination of FOLFIRI.   Plus Avastin  The patient was sent to [address] [place] [address] for consideration of palliative resection of primary tumor. INTERIM HISTORY: The patient comes in today for a follow up and cycle #6 of chemo. He reports his pain, fatigue, and neuropathy are unchanged. He denies rectal bleeding, diarrhea. His glucose is not under control and he is not using insulin. PAST MEDICAL HISTORY: has a past medical history of Back pain; CAD S/P percutaneous coronary angioplasty; Carcinoma ([address] Colon cancer ([address] Degeneration of lumbar or lumbosacral intervertebral disc; Depression; Diabetes mellitus ([address] H/O cardiac catheterization; Hepatic metastases ([address] Hyperlipidemia; Hypertension; Knee pain; and MI, old. PAST SURGICAL HISTORY: has a past surgical history that includes Tunneled venous port placement (Right); pr sigmoidoscopy flx w/rmvl foreign body (N/A, 5/25/2017); hernia repair; Coronary angioplasty with stent (11/2016); pr sigmoidoscopy flx w/rmvl foreign body (12/8/2017); and Endoscopic ultrasonography, GI (N/A, 12/8/2017). CURRENT MEDICATIONS:  has a current medication list which includes the following prescription(s): metoprolol tartrate, fentanyl, oxycodone-acetaminophen, blood glucose test strips, insulin pen needle, insulin glargine, nitroglycerin, and cyclobenzaprine, and the following Facility-Administered Medications: sodium chloride flush and sodium chloride flush. ALLERGIES:  is allergic to morphine. FAMILY HISTORY: Negative for any hematological or oncological conditions. SOCIAL HISTORY:  reports that he has never smoked. He has never used smokeless tobacco. He reports that he does not drink alcohol or use drugs. REVIEW OF SYSTEMS:  General: no fever or night sweats. Moderate fatigue. Sweating   ENT: No double or blurred vision, no tinnitus or hearing problem, no dysphagia. Mild Sore throat and dryness, chemo induced. Dry mouth.   Respiratory: No chest pain, no shortness of breath, no cough or

## 2018-03-28 NOTE — PROGRESS NOTES
NUTRITION NOTE    Received verbal referral from RN re: DM noncompliance. Pt known to be noncompliant as he believes it interferes with his quality of life. Will follow up with pt.     Yvon Lee RD, LD  Oncology Dietitian  [facility]  Office: [phone]  Pager: [phone]

## 2018-03-29 ENCOUNTER — HOSPITAL ENCOUNTER (OUTPATIENT)
Dept: INFUSION THERAPY | Age: 58
Discharge: HOME | End: 2018-03-29
Payer: COMMERCIAL

## 2018-03-29 DIAGNOSIS — C19 COLORECTAL CANCER, STAGE IV (HCC): ICD-10-CM

## 2018-03-29 PROCEDURE — 2580000003 HC RX 258: Performed by: INTERNAL MEDICINE

## 2018-03-29 PROCEDURE — 96523 IRRIG DRUG DELIVERY DEVICE: CPT

## 2018-03-29 PROCEDURE — 6360000002 HC RX W HCPCS: Performed by: INTERNAL MEDICINE

## 2018-03-29 RX ORDER — HEPARIN SODIUM (PORCINE) LOCK FLUSH IV SOLN 100 UNIT/ML 100 UNIT/ML
500 SOLUTION INTRAVENOUS PRN
Status: DISCONTINUED | OUTPATIENT
Start: 2018-03-29 | End: 2018-03-30 | Stop reason: HOSPADM

## 2018-03-29 RX ORDER — SODIUM CHLORIDE 0.9 % (FLUSH) 0.9 %
10 SYRINGE (ML) INJECTION PRN
Status: CANCELLED | OUTPATIENT
Start: 2018-03-29

## 2018-03-29 RX ORDER — SODIUM CHLORIDE 0.9 % (FLUSH) 0.9 %
20 SYRINGE (ML) INJECTION PRN
Status: CANCELLED | OUTPATIENT
Start: 2018-03-29

## 2018-03-29 RX ORDER — HEPARIN SODIUM (PORCINE) LOCK FLUSH IV SOLN 100 UNIT/ML 100 UNIT/ML
500 SOLUTION INTRAVENOUS PRN
Status: CANCELLED | OUTPATIENT
Start: 2018-03-29

## 2018-03-29 RX ORDER — SODIUM CHLORIDE 0.9 % (FLUSH) 0.9 %
10 SYRINGE (ML) INJECTION PRN
Status: DISCONTINUED | OUTPATIENT
Start: 2018-03-29 | End: 2018-03-30 | Stop reason: HOSPADM

## 2018-03-29 RX ADMIN — SODIUM CHLORIDE, PRESERVATIVE FREE 500 UNITS: 5 INJECTION INTRAVENOUS at 12:25

## 2018-03-29 RX ADMIN — Medication 10 ML: at 12:25

## 2018-03-29 NOTE — ONCOLOGY
Waldemar Patel here for pump discontinuation, he denies any complaints   Port flushed with normal saline and heparin solution per protocal   Good blood return, needle removed and bandaid applied

## 2018-04-10 ENCOUNTER — HOSPITAL ENCOUNTER (OUTPATIENT)
Dept: INFUSION THERAPY | Age: 58
Discharge: HOME | End: 2018-04-10
Payer: COMMERCIAL

## 2018-04-10 ENCOUNTER — OFFICE VISIT (OUTPATIENT)
Dept: ONCOLOGY | Age: 58
End: 2018-04-10
Payer: COMMERCIAL

## 2018-04-10 VITALS
WEIGHT: 250.4 LBS | HEART RATE: 58 BPM | TEMPERATURE: 97.3 F | HEIGHT: 74 IN | BODY MASS INDEX: 32.14 KG/M2 | RESPIRATION RATE: 18 BRPM | SYSTOLIC BLOOD PRESSURE: 155 MMHG | DIASTOLIC BLOOD PRESSURE: 89 MMHG

## 2018-04-10 VITALS
RESPIRATION RATE: 18 BRPM | HEART RATE: 68 BPM | BODY MASS INDEX: 32.15 KG/M2 | DIASTOLIC BLOOD PRESSURE: 94 MMHG | SYSTOLIC BLOOD PRESSURE: 146 MMHG | TEMPERATURE: 97.3 F | WEIGHT: 250.4 LBS

## 2018-04-10 DIAGNOSIS — C78.7 HEPATIC METASTASES (HCC): ICD-10-CM

## 2018-04-10 DIAGNOSIS — C19 COLORECTAL CANCER, STAGE IV (HCC): Primary | ICD-10-CM

## 2018-04-10 DIAGNOSIS — C19 COLORECTAL CANCER, STAGE IV (HCC): ICD-10-CM

## 2018-04-10 LAB
ABSOLUTE EOS #: 0.1 K/UL (ref 0–0.4)
ABSOLUTE IMMATURE GRANULOCYTE: ABNORMAL K/UL (ref 0–0.3)
ABSOLUTE LYMPH #: 0.7 K/UL (ref 1–4.8)
ABSOLUTE MONO #: 0.7 K/UL (ref 0.1–1.2)
ALBUMIN SERPL-MCNC: 3.8 G/DL (ref 3.5–5.2)
ALBUMIN/GLOBULIN RATIO: 1.5 (ref 1–2.5)
ALP BLD-CCNC: 92 U/L (ref 40–129)
ALT SERPL-CCNC: 23 U/L (ref 5–41)
ANION GAP SERPL CALCULATED.3IONS-SCNC: 11 MMOL/L (ref 9–17)
AST SERPL-CCNC: 19 U/L
BASOPHILS # BLD: 1 % (ref 0–2)
BASOPHILS ABSOLUTE: 0 K/UL (ref 0–0.2)
BILIRUB SERPL-MCNC: 0.32 MG/DL (ref 0.3–1.2)
BUN BLDV-MCNC: 9 MG/DL (ref 6–20)
BUN/CREAT BLD: ABNORMAL (ref 9–20)
CALCIUM SERPL-MCNC: 8.9 MG/DL (ref 8.6–10.4)
CHLORIDE BLD-SCNC: 100 MMOL/L (ref 98–107)
CO2: 27 MMOL/L (ref 20–31)
CREAT SERPL-MCNC: 0.51 MG/DL (ref 0.7–1.2)
DIFFERENTIAL TYPE: ABNORMAL
EOSINOPHILS RELATIVE PERCENT: 3 % (ref 1–4)
GFR AFRICAN AMERICAN: >60 ML/MIN
GFR NON-AFRICAN AMERICAN: >60 ML/MIN
GFR SERPL CREATININE-BSD FRML MDRD: ABNORMAL ML/MIN/{1.73_M2}
GFR SERPL CREATININE-BSD FRML MDRD: ABNORMAL ML/MIN/{1.73_M2}
GLUCOSE BLD-MCNC: 233 MG/DL (ref 70–99)
HCT VFR BLD CALC: 38.2 % (ref 41–53)
HEMOGLOBIN: 12.7 G/DL (ref 13.5–17.5)
IMMATURE GRANULOCYTES: ABNORMAL %
LYMPHOCYTES # BLD: 14 % (ref 24–44)
MCH RBC QN AUTO: 26.6 PG (ref 26–34)
MCHC RBC AUTO-ENTMCNC: 33.3 G/DL (ref 31–37)
MCV RBC AUTO: 79.9 FL (ref 80–100)
MONOCYTES # BLD: 13 % (ref 2–11)
NRBC AUTOMATED: ABNORMAL PER 100 WBC
PDW BLD-RTO: 18.5 % (ref 12.5–15.4)
PLATELET # BLD: 189 K/UL (ref 140–450)
PLATELET ESTIMATE: ABNORMAL
PMV BLD AUTO: 6.4 FL (ref 6–12)
POTASSIUM SERPL-SCNC: 4.6 MMOL/L (ref 3.7–5.3)
PROTEIN UA: NEGATIVE
RBC # BLD: 4.78 M/UL (ref 4.5–5.9)
RBC # BLD: ABNORMAL 10*6/UL
SEG NEUTROPHILS: 69 % (ref 36–66)
SEGMENTED NEUTROPHILS ABSOLUTE COUNT: 3.7 K/UL (ref 1.8–7.7)
SODIUM BLD-SCNC: 138 MMOL/L (ref 135–144)
TOTAL PROTEIN: 6.4 G/DL (ref 6.4–8.3)
WBC # BLD: 5.2 K/UL (ref 3.5–11)
WBC # BLD: ABNORMAL 10*3/UL

## 2018-04-10 PROCEDURE — 1036F TOBACCO NON-USER: CPT | Performed by: INTERNAL MEDICINE

## 2018-04-10 PROCEDURE — 80053 COMPREHEN METABOLIC PANEL: CPT

## 2018-04-10 PROCEDURE — 81003 URINALYSIS AUTO W/O SCOPE: CPT

## 2018-04-10 PROCEDURE — 2580000003 HC RX 258: Performed by: INTERNAL MEDICINE

## 2018-04-10 PROCEDURE — 6360000002 HC RX W HCPCS: Performed by: INTERNAL MEDICINE

## 2018-04-10 PROCEDURE — 99214 OFFICE O/P EST MOD 30 MIN: CPT | Performed by: INTERNAL MEDICINE

## 2018-04-10 PROCEDURE — 96416 CHEMO PROLONG INFUSE W/PUMP: CPT

## 2018-04-10 PROCEDURE — 96413 CHEMO IV INFUSION 1 HR: CPT

## 2018-04-10 PROCEDURE — 36591 DRAW BLOOD OFF VENOUS DEVICE: CPT

## 2018-04-10 PROCEDURE — 96375 TX/PRO/DX INJ NEW DRUG ADDON: CPT

## 2018-04-10 PROCEDURE — G8598 ASA/ANTIPLAT THER USED: HCPCS | Performed by: INTERNAL MEDICINE

## 2018-04-10 PROCEDURE — 96417 CHEMO IV INFUS EACH ADDL SEQ: CPT

## 2018-04-10 PROCEDURE — 96415 CHEMO IV INFUSION ADDL HR: CPT

## 2018-04-10 PROCEDURE — 85025 COMPLETE CBC W/AUTO DIFF WBC: CPT

## 2018-04-10 PROCEDURE — 3017F COLORECTAL CA SCREEN DOC REV: CPT | Performed by: INTERNAL MEDICINE

## 2018-04-10 PROCEDURE — G8417 CALC BMI ABV UP PARAM F/U: HCPCS | Performed by: INTERNAL MEDICINE

## 2018-04-10 PROCEDURE — G8427 DOCREV CUR MEDS BY ELIG CLIN: HCPCS | Performed by: INTERNAL MEDICINE

## 2018-04-10 RX ORDER — METHYLPREDNISOLONE SODIUM SUCCINATE 125 MG/2ML
125 INJECTION, POWDER, LYOPHILIZED, FOR SOLUTION INTRAMUSCULAR; INTRAVENOUS ONCE
Status: CANCELLED | OUTPATIENT
Start: 2018-01-01 | End: 2018-01-01

## 2018-04-10 RX ORDER — HEPARIN SODIUM (PORCINE) LOCK FLUSH IV SOLN 100 UNIT/ML 100 UNIT/ML
500 SOLUTION INTRAVENOUS PRN
Status: CANCELLED | OUTPATIENT
Start: 2018-01-01

## 2018-04-10 RX ORDER — 0.9 % SODIUM CHLORIDE 0.9 %
10 VIAL (ML) INJECTION ONCE
Status: CANCELLED | OUTPATIENT
Start: 2018-01-01 | End: 2018-01-01

## 2018-04-10 RX ORDER — ATROPINE SULFATE 0.4 MG/ML
0.4 AMPUL (ML) INJECTION
Status: CANCELLED | OUTPATIENT
Start: 2018-01-01

## 2018-04-10 RX ORDER — DIPHENHYDRAMINE HYDROCHLORIDE 50 MG/ML
50 INJECTION INTRAMUSCULAR; INTRAVENOUS ONCE
Status: CANCELLED | OUTPATIENT
Start: 2018-01-01 | End: 2018-01-01

## 2018-04-10 RX ORDER — PALONOSETRON 0.05 MG/ML
0.25 INJECTION, SOLUTION INTRAVENOUS ONCE
Status: COMPLETED | OUTPATIENT
Start: 2018-04-10 | End: 2018-04-10

## 2018-04-10 RX ORDER — SODIUM CHLORIDE 0.9 % (FLUSH) 0.9 %
5 SYRINGE (ML) INJECTION PRN
Status: CANCELLED | OUTPATIENT
Start: 2018-01-01

## 2018-04-10 RX ORDER — PALONOSETRON 0.05 MG/ML
0.25 INJECTION, SOLUTION INTRAVENOUS ONCE
Status: CANCELLED | OUTPATIENT
Start: 2018-01-01

## 2018-04-10 RX ORDER — SODIUM CHLORIDE 9 MG/ML
INJECTION, SOLUTION INTRAVENOUS ONCE
Status: COMPLETED | OUTPATIENT
Start: 2018-04-10 | End: 2018-04-10

## 2018-04-10 RX ORDER — DEXTROSE MONOHYDRATE 50 MG/ML
INJECTION, SOLUTION INTRAVENOUS ONCE
Status: CANCELLED | OUTPATIENT
Start: 2018-01-01 | End: 2018-01-01

## 2018-04-10 RX ORDER — SODIUM CHLORIDE 9 MG/ML
INJECTION, SOLUTION INTRAVENOUS CONTINUOUS
Status: CANCELLED | OUTPATIENT
Start: 2018-01-01

## 2018-04-10 RX ORDER — SODIUM CHLORIDE 9 MG/ML
INJECTION, SOLUTION INTRAVENOUS ONCE
Status: CANCELLED | OUTPATIENT
Start: 2018-01-01 | End: 2018-01-01

## 2018-04-10 RX ORDER — ATROPINE SULFATE 0.4 MG/ML
0.4 AMPUL (ML) INJECTION
Status: COMPLETED | OUTPATIENT
Start: 2018-04-10 | End: 2018-04-10

## 2018-04-10 RX ORDER — HEPARIN SODIUM (PORCINE) LOCK FLUSH IV SOLN 100 UNIT/ML 100 UNIT/ML
500 SOLUTION INTRAVENOUS PRN
Status: DISCONTINUED | OUTPATIENT
Start: 2018-04-10 | End: 2018-04-11 | Stop reason: HOSPADM

## 2018-04-10 RX ORDER — SODIUM CHLORIDE 0.9 % (FLUSH) 0.9 %
10 SYRINGE (ML) INJECTION PRN
Status: CANCELLED | OUTPATIENT
Start: 2018-01-01

## 2018-04-10 RX ORDER — DEXAMETHASONE SODIUM PHOSPHATE 10 MG/ML
10 INJECTION, SOLUTION INTRAMUSCULAR; INTRAVENOUS ONCE
Status: COMPLETED | OUTPATIENT
Start: 2018-04-10 | End: 2018-04-10

## 2018-04-10 RX ORDER — DEXTROSE MONOHYDRATE 50 MG/ML
INJECTION, SOLUTION INTRAVENOUS ONCE
Status: COMPLETED | OUTPATIENT
Start: 2018-04-10 | End: 2018-04-10

## 2018-04-10 RX ORDER — SODIUM CHLORIDE 0.9 % (FLUSH) 0.9 %
10 SYRINGE (ML) INJECTION PRN
Status: DISCONTINUED | OUTPATIENT
Start: 2018-04-10 | End: 2018-04-11 | Stop reason: HOSPADM

## 2018-04-10 RX ADMIN — ALTEPLASE 2 MG: 2.2 INJECTION, POWDER, LYOPHILIZED, FOR SOLUTION INTRAVENOUS at 10:17

## 2018-04-10 RX ADMIN — Medication 10 ML: at 10:45

## 2018-04-10 RX ADMIN — FLUOROURACIL 5950 MG: 50 INJECTION, SOLUTION INTRAVENOUS at 14:11

## 2018-04-10 RX ADMIN — DEXAMETHASONE SODIUM PHOSPHATE 10 MG: 10 INJECTION, SOLUTION INTRAMUSCULAR; INTRAVENOUS at 11:20

## 2018-04-10 RX ADMIN — Medication 10 ML: at 09:43

## 2018-04-10 RX ADMIN — BEVACIZUMAB 600 MG: 400 INJECTION, SOLUTION INTRAVENOUS at 11:34

## 2018-04-10 RX ADMIN — SODIUM CHLORIDE: 9 INJECTION, SOLUTION INTRAVENOUS at 11:04

## 2018-04-10 RX ADMIN — DEXTROSE MONOHYDRATE: 50 INJECTION, SOLUTION INTRAVENOUS at 12:17

## 2018-04-10 RX ADMIN — Medication 10 ML: at 10:46

## 2018-04-10 RX ADMIN — IRINOTECAN HYDROCHLORIDE 440 MG: 100 INJECTION, SOLUTION INTRAVENOUS at 12:26

## 2018-04-10 RX ADMIN — PALONOSETRON HYDROCHLORIDE 0.25 MG: 0.25 INJECTION INTRAVENOUS at 11:18

## 2018-04-10 RX ADMIN — ATROPINE SULFATE 0.4 MG: 0.4 INJECTION, SOLUTION INTRAMUSCULAR; INTRAVENOUS; SUBCUTANEOUS at 12:13

## 2018-04-10 ASSESSMENT — PAIN DESCRIPTION - PAIN TYPE: TYPE: CHRONIC PAIN

## 2018-04-10 ASSESSMENT — PAIN DESCRIPTION - FREQUENCY: FREQUENCY: CONTINUOUS

## 2018-04-10 ASSESSMENT — PAIN DESCRIPTION - DESCRIPTORS: DESCRIPTORS: ACHING

## 2018-04-10 ASSESSMENT — PAIN DESCRIPTION - ORIENTATION: ORIENTATION: UPPER

## 2018-04-10 ASSESSMENT — PAIN DESCRIPTION - LOCATION: LOCATION: BACK

## 2018-04-10 ASSESSMENT — PAIN SCALES - GENERAL: PAINLEVEL_OUTOF10: 7

## 2018-04-10 NOTE — PROGRESS NOTES
NUTRITION NOTE    RD offered pt diabetic education. Pt reports \"the only thing I get pleasure from right now is food so I am going to eat what I want. I already cut out pop, so that's where i'm starting. \" RD encouraged pt to request diet education once ready for lifestyle change. Pt not compliant with insulin at this time. RD gave pt Carbohydrate Counting for Diabetics and Nutrition Label Reading for Diabetics handouts.      Antonette Acosta RD, LD  Oncology Dietitian  [facility]  Office: [phone]  Cell: [phone]

## 2018-04-10 NOTE — PROGRESS NOTES
Pt here for C7D1. Pt seen by Dr. Geovani Farris today. Labs drawn and results reviewed. Discussed insulin with pt as he has not been taking it. Pt states he will  at the pharmacy, since it is a pen he will need a teach on the use of it. Pt states he will bring insulin pen in when he returns for his pump d/c. Pt was treated without incident and d/c'd in stable condition. Pt will return in 2 days for pump d/c and teach by treating nurse on insulin pen usage.

## 2018-04-12 ENCOUNTER — HOSPITAL ENCOUNTER (OUTPATIENT)
Dept: INFUSION THERAPY | Age: 58
Discharge: HOME | End: 2018-04-12
Payer: COMMERCIAL

## 2018-04-12 DIAGNOSIS — C19 COLORECTAL CANCER, STAGE IV (HCC): ICD-10-CM

## 2018-04-12 PROCEDURE — 96523 IRRIG DRUG DELIVERY DEVICE: CPT

## 2018-04-12 PROCEDURE — 2580000003 HC RX 258: Performed by: INTERNAL MEDICINE

## 2018-04-12 PROCEDURE — 6360000002 HC RX W HCPCS: Performed by: INTERNAL MEDICINE

## 2018-04-12 RX ORDER — HEPARIN SODIUM (PORCINE) LOCK FLUSH IV SOLN 100 UNIT/ML 100 UNIT/ML
500 SOLUTION INTRAVENOUS PRN
Status: CANCELLED | OUTPATIENT
Start: 2018-04-12

## 2018-04-12 RX ORDER — SODIUM CHLORIDE 0.9 % (FLUSH) 0.9 %
20 SYRINGE (ML) INJECTION PRN
Status: CANCELLED | OUTPATIENT
Start: 2018-04-12

## 2018-04-12 RX ORDER — SODIUM CHLORIDE 0.9 % (FLUSH) 0.9 %
10 SYRINGE (ML) INJECTION PRN
Status: DISCONTINUED | OUTPATIENT
Start: 2018-04-12 | End: 2018-04-13 | Stop reason: HOSPADM

## 2018-04-12 RX ORDER — HEPARIN SODIUM (PORCINE) LOCK FLUSH IV SOLN 100 UNIT/ML 100 UNIT/ML
500 SOLUTION INTRAVENOUS PRN
Status: DISCONTINUED | OUTPATIENT
Start: 2018-04-12 | End: 2018-04-13 | Stop reason: HOSPADM

## 2018-04-12 RX ORDER — SODIUM CHLORIDE 0.9 % (FLUSH) 0.9 %
10 SYRINGE (ML) INJECTION PRN
Status: CANCELLED | OUTPATIENT
Start: 2018-04-12

## 2018-04-12 RX ADMIN — Medication 10 ML: at 14:23

## 2018-04-12 RX ADMIN — SODIUM CHLORIDE, PRESERVATIVE FREE 500 UNITS: 5 INJECTION INTRAVENOUS at 14:23

## 2018-04-23 DIAGNOSIS — C78.7 HEPATIC METASTASES (HCC): ICD-10-CM

## 2018-04-23 DIAGNOSIS — C19 COLORECTAL CANCER, STAGE IV (HCC): ICD-10-CM

## 2018-04-23 RX ORDER — OXYCODONE AND ACETAMINOPHEN 10; 325 MG/1; MG/1
TABLET ORAL
Qty: 120 TABLET | Refills: 0 | Status: SHIPPED | OUTPATIENT
Start: 2018-04-23 | End: 2018-05-17

## 2018-04-23 RX ORDER — FENTANYL 25 UG/H
1 PATCH TRANSDERMAL
Qty: 10 PATCH | Refills: 0 | Status: SHIPPED | OUTPATIENT
Start: 2018-04-23 | End: 2018-05-22 | Stop reason: SDUPTHER

## 2018-04-24 ENCOUNTER — OFFICE VISIT (OUTPATIENT)
Dept: ONCOLOGY | Age: 58
End: 2018-04-24
Payer: COMMERCIAL

## 2018-04-24 ENCOUNTER — HOSPITAL ENCOUNTER (OUTPATIENT)
Dept: INFUSION THERAPY | Age: 58
Discharge: HOME | End: 2018-04-24
Payer: COMMERCIAL

## 2018-04-24 VITALS
DIASTOLIC BLOOD PRESSURE: 88 MMHG | BODY MASS INDEX: 32.25 KG/M2 | HEART RATE: 84 BPM | SYSTOLIC BLOOD PRESSURE: 132 MMHG | TEMPERATURE: 97.5 F | WEIGHT: 251.2 LBS | RESPIRATION RATE: 16 BRPM

## 2018-04-24 VITALS
RESPIRATION RATE: 18 BRPM | BODY MASS INDEX: 32.25 KG/M2 | TEMPERATURE: 97.5 F | WEIGHT: 251.2 LBS | DIASTOLIC BLOOD PRESSURE: 88 MMHG | SYSTOLIC BLOOD PRESSURE: 132 MMHG | HEART RATE: 84 BPM

## 2018-04-24 DIAGNOSIS — C19 COLORECTAL CANCER, STAGE IV (HCC): Primary | ICD-10-CM

## 2018-04-24 DIAGNOSIS — C78.7 HEPATIC METASTASES (HCC): ICD-10-CM

## 2018-04-24 DIAGNOSIS — C19 COLORECTAL CANCER, STAGE IV (HCC): ICD-10-CM

## 2018-04-24 DIAGNOSIS — I21.3 ST ELEVATION MYOCARDIAL INFARCTION (STEMI), UNSPECIFIED ARTERY (HCC): ICD-10-CM

## 2018-04-24 LAB
ABSOLUTE EOS #: 0.1 K/UL (ref 0–0.4)
ABSOLUTE IMMATURE GRANULOCYTE: ABNORMAL K/UL (ref 0–0.3)
ABSOLUTE LYMPH #: 0.7 K/UL (ref 1–4.8)
ABSOLUTE MONO #: 0.7 K/UL (ref 0.1–1.2)
ALBUMIN SERPL-MCNC: 3.9 G/DL (ref 3.5–5.2)
ALBUMIN/GLOBULIN RATIO: 1.4 (ref 1–2.5)
ALP BLD-CCNC: 88 U/L (ref 40–129)
ALT SERPL-CCNC: 25 U/L (ref 5–41)
ANION GAP SERPL CALCULATED.3IONS-SCNC: 17 MMOL/L (ref 9–17)
AST SERPL-CCNC: 21 U/L
BASOPHILS # BLD: 1 % (ref 0–2)
BASOPHILS ABSOLUTE: 0 K/UL (ref 0–0.2)
BILIRUB SERPL-MCNC: 0.25 MG/DL (ref 0.3–1.2)
BUN BLDV-MCNC: 13 MG/DL (ref 6–20)
BUN/CREAT BLD: ABNORMAL (ref 9–20)
CALCIUM SERPL-MCNC: 8.5 MG/DL (ref 8.6–10.4)
CARCINOEMBRYONIC ANTIGEN: 57.1 NG/ML
CHLORIDE BLD-SCNC: 97 MMOL/L (ref 98–107)
CO2: 23 MMOL/L (ref 20–31)
CREAT SERPL-MCNC: 0.55 MG/DL (ref 0.7–1.2)
DIFFERENTIAL TYPE: ABNORMAL
EOSINOPHILS RELATIVE PERCENT: 1 % (ref 1–4)
GFR AFRICAN AMERICAN: >60 ML/MIN
GFR NON-AFRICAN AMERICAN: >60 ML/MIN
GFR SERPL CREATININE-BSD FRML MDRD: ABNORMAL ML/MIN/{1.73_M2}
GFR SERPL CREATININE-BSD FRML MDRD: ABNORMAL ML/MIN/{1.73_M2}
GLUCOSE BLD-MCNC: 316 MG/DL (ref 70–99)
HCT VFR BLD CALC: 37 % (ref 41–53)
HEMOGLOBIN: 12.4 G/DL (ref 13.5–17.5)
IMMATURE GRANULOCYTES: ABNORMAL %
LYMPHOCYTES # BLD: 12 % (ref 24–44)
MCH RBC QN AUTO: 27 PG (ref 26–34)
MCHC RBC AUTO-ENTMCNC: 33.5 G/DL (ref 31–37)
MCV RBC AUTO: 80.6 FL (ref 80–100)
MONOCYTES # BLD: 12 % (ref 2–11)
NRBC AUTOMATED: ABNORMAL PER 100 WBC
PDW BLD-RTO: 18.9 % (ref 12.5–15.4)
PLATELET # BLD: 167 K/UL (ref 140–450)
PLATELET ESTIMATE: ABNORMAL
PMV BLD AUTO: 6.6 FL (ref 6–12)
POTASSIUM SERPL-SCNC: 4.2 MMOL/L (ref 3.7–5.3)
PROTEIN UA: NEGATIVE
RBC # BLD: 4.59 M/UL (ref 4.5–5.9)
RBC # BLD: ABNORMAL 10*6/UL
SEG NEUTROPHILS: 74 % (ref 36–66)
SEGMENTED NEUTROPHILS ABSOLUTE COUNT: 4.3 K/UL (ref 1.8–7.7)
SODIUM BLD-SCNC: 137 MMOL/L (ref 135–144)
TOTAL PROTEIN: 6.6 G/DL (ref 6.4–8.3)
WBC # BLD: 5.7 K/UL (ref 3.5–11)
WBC # BLD: ABNORMAL 10*3/UL

## 2018-04-24 PROCEDURE — 82378 CARCINOEMBRYONIC ANTIGEN: CPT

## 2018-04-24 PROCEDURE — 2580000003 HC RX 258: Performed by: INTERNAL MEDICINE

## 2018-04-24 PROCEDURE — 1036F TOBACCO NON-USER: CPT | Performed by: INTERNAL MEDICINE

## 2018-04-24 PROCEDURE — 85025 COMPLETE CBC W/AUTO DIFF WBC: CPT

## 2018-04-24 PROCEDURE — 96413 CHEMO IV INFUSION 1 HR: CPT | Performed by: NURSE PRACTITIONER

## 2018-04-24 PROCEDURE — G8427 DOCREV CUR MEDS BY ELIG CLIN: HCPCS | Performed by: INTERNAL MEDICINE

## 2018-04-24 PROCEDURE — G8417 CALC BMI ABV UP PARAM F/U: HCPCS | Performed by: INTERNAL MEDICINE

## 2018-04-24 PROCEDURE — 81003 URINALYSIS AUTO W/O SCOPE: CPT

## 2018-04-24 PROCEDURE — 80053 COMPREHEN METABOLIC PANEL: CPT

## 2018-04-24 PROCEDURE — 96416 CHEMO PROLONG INFUSE W/PUMP: CPT | Performed by: NURSE PRACTITIONER

## 2018-04-24 PROCEDURE — G8598 ASA/ANTIPLAT THER USED: HCPCS | Performed by: INTERNAL MEDICINE

## 2018-04-24 PROCEDURE — 96375 TX/PRO/DX INJ NEW DRUG ADDON: CPT | Performed by: NURSE PRACTITIONER

## 2018-04-24 PROCEDURE — 36591 DRAW BLOOD OFF VENOUS DEVICE: CPT | Performed by: NURSE PRACTITIONER

## 2018-04-24 PROCEDURE — 96415 CHEMO IV INFUSION ADDL HR: CPT | Performed by: NURSE PRACTITIONER

## 2018-04-24 PROCEDURE — 6360000002 HC RX W HCPCS: Performed by: INTERNAL MEDICINE

## 2018-04-24 PROCEDURE — 96417 CHEMO IV INFUS EACH ADDL SEQ: CPT | Performed by: NURSE PRACTITIONER

## 2018-04-24 PROCEDURE — 99214 OFFICE O/P EST MOD 30 MIN: CPT | Performed by: INTERNAL MEDICINE

## 2018-04-24 PROCEDURE — 3017F COLORECTAL CA SCREEN DOC REV: CPT | Performed by: INTERNAL MEDICINE

## 2018-04-24 RX ORDER — HEPARIN SODIUM (PORCINE) LOCK FLUSH IV SOLN 100 UNIT/ML 100 UNIT/ML
500 SOLUTION INTRAVENOUS PRN
Status: DISCONTINUED | OUTPATIENT
Start: 2018-04-24 | End: 2018-04-25 | Stop reason: HOSPADM

## 2018-04-24 RX ORDER — SODIUM CHLORIDE 9 MG/ML
INJECTION, SOLUTION INTRAVENOUS ONCE
Status: COMPLETED | OUTPATIENT
Start: 2018-04-24 | End: 2018-04-24

## 2018-04-24 RX ORDER — ATROPINE SULFATE 0.4 MG/ML
0.4 AMPUL (ML) INJECTION
Status: COMPLETED | OUTPATIENT
Start: 2018-04-24 | End: 2018-04-24

## 2018-04-24 RX ORDER — DEXAMETHASONE SODIUM PHOSPHATE 10 MG/ML
10 INJECTION, SOLUTION INTRAMUSCULAR; INTRAVENOUS ONCE
Status: COMPLETED | OUTPATIENT
Start: 2018-04-24 | End: 2018-04-24

## 2018-04-24 RX ORDER — SODIUM CHLORIDE 0.9 % (FLUSH) 0.9 %
10 SYRINGE (ML) INJECTION PRN
Status: DISCONTINUED | OUTPATIENT
Start: 2018-04-24 | End: 2018-04-25 | Stop reason: HOSPADM

## 2018-04-24 RX ORDER — PALONOSETRON 0.05 MG/ML
0.25 INJECTION, SOLUTION INTRAVENOUS ONCE
Status: COMPLETED | OUTPATIENT
Start: 2018-04-24 | End: 2018-04-24

## 2018-04-24 RX ADMIN — IRINOTECAN HYDROCHLORIDE 440 MG: 100 INJECTION, SOLUTION INTRAVENOUS at 13:24

## 2018-04-24 RX ADMIN — ATROPINE SULFATE 0.4 MG: 0.4 INJECTION, SOLUTION INTRAMUSCULAR; INTRAVENOUS; SUBCUTANEOUS at 13:23

## 2018-04-24 RX ADMIN — FLUOROURACIL 5950 MG: 50 INJECTION, SOLUTION INTRAVENOUS at 15:12

## 2018-04-24 RX ADMIN — Medication 10 ML: at 11:20

## 2018-04-24 RX ADMIN — SODIUM CHLORIDE: 9 INJECTION, SOLUTION INTRAVENOUS at 12:12

## 2018-04-24 RX ADMIN — DEXAMETHASONE SODIUM PHOSPHATE 10 MG: 10 INJECTION, SOLUTION INTRAMUSCULAR; INTRAVENOUS at 13:19

## 2018-04-24 RX ADMIN — BEVACIZUMAB 600 MG: 400 INJECTION, SOLUTION INTRAVENOUS at 12:39

## 2018-04-24 RX ADMIN — PALONOSETRON 0.25 MG: 0.05 INJECTION, SOLUTION INTRAVENOUS at 13:17

## 2018-04-24 ASSESSMENT — PAIN SCALES - GENERAL: PAINLEVEL_OUTOF10: 4

## 2018-04-24 ASSESSMENT — PAIN DESCRIPTION - PAIN TYPE: TYPE: CHRONIC PAIN

## 2018-04-24 NOTE — PROGRESS NOTES
Patient was seen by Dr. Merced Rodriguez today. Blood sugar reviewed with Dr. Paulina Jamil. Dr. Merced Rodriguez wanted to know if the patient was giving himself insulin according to the sliding scale. I spoke to Sd Borja and he stated he is not using the sliding scale. He has not been back to the pharmacy. Unfortunately every cycle of chemo we educate the patient on this. He is noncompliant with his blood glucose and its treatment. Dr. Paulina Jamil reordered his insulin with sliding scale and sent it to his pharmacy. I printed the sliding scale and reviewed it with Sd Borja and gave him the copy with the sliding scale highlighted. Dr. Paulina Jamil gave patient the insulin with sliding scale script to take to the pharmacy today. Sd Borja verbalized understanding.

## 2018-04-24 NOTE — PLAN OF CARE
Problem: Pain:  Goal: Pain level will decrease  Pain level will decrease   Outcome: Met This Shift    Goal: Control of acute pain  Control of acute pain   Outcome: Met This Shift    Goal: Control of chronic pain  Control of chronic pain   Outcome: Ongoing

## 2018-04-26 ENCOUNTER — HOSPITAL ENCOUNTER (OUTPATIENT)
Dept: INFUSION THERAPY | Age: 58
Discharge: HOME | End: 2018-04-26
Payer: COMMERCIAL

## 2018-04-26 DIAGNOSIS — C19 COLORECTAL CANCER, STAGE IV (HCC): ICD-10-CM

## 2018-04-26 PROCEDURE — 6360000002 HC RX W HCPCS: Performed by: INTERNAL MEDICINE

## 2018-04-26 PROCEDURE — 96523 IRRIG DRUG DELIVERY DEVICE: CPT

## 2018-04-26 PROCEDURE — 2580000003 HC RX 258: Performed by: INTERNAL MEDICINE

## 2018-04-26 RX ORDER — SODIUM CHLORIDE 0.9 % (FLUSH) 0.9 %
20 SYRINGE (ML) INJECTION PRN
Status: CANCELLED | OUTPATIENT
Start: 2018-04-26

## 2018-04-26 RX ORDER — HEPARIN SODIUM (PORCINE) LOCK FLUSH IV SOLN 100 UNIT/ML 100 UNIT/ML
500 SOLUTION INTRAVENOUS PRN
Status: DISCONTINUED | OUTPATIENT
Start: 2018-04-26 | End: 2018-04-27 | Stop reason: HOSPADM

## 2018-04-26 RX ORDER — SODIUM CHLORIDE 0.9 % (FLUSH) 0.9 %
10 SYRINGE (ML) INJECTION PRN
Status: CANCELLED | OUTPATIENT
Start: 2018-04-26

## 2018-04-26 RX ORDER — HEPARIN SODIUM (PORCINE) LOCK FLUSH IV SOLN 100 UNIT/ML 100 UNIT/ML
500 SOLUTION INTRAVENOUS PRN
Status: CANCELLED | OUTPATIENT
Start: 2018-04-26

## 2018-04-26 RX ORDER — SODIUM CHLORIDE 0.9 % (FLUSH) 0.9 %
20 SYRINGE (ML) INJECTION PRN
Status: DISCONTINUED | OUTPATIENT
Start: 2018-04-26 | End: 2018-04-27 | Stop reason: HOSPADM

## 2018-04-26 RX ADMIN — Medication 10 ML: at 13:45

## 2018-04-26 RX ADMIN — SODIUM CHLORIDE, PRESERVATIVE FREE 500 UNITS: 5 INJECTION INTRAVENOUS at 13:45

## 2018-04-26 NOTE — PROGRESS NOTES
Pt is here for CADD pump d/c. Pt states that he his blood sugars are still elevated. Asked pt if he is taking his Lantus and he replied no, pt states that he has to  his new sliding scale from the pharmacy. Educated pt on the importance of monitoring his glucose levels and taking his insulin as prescribed. Pt was discharged in stable condition and will return on 5-8-18 for C9D1.

## 2018-05-06 ENCOUNTER — APPOINTMENT (OUTPATIENT)
Dept: GENERAL RADIOLOGY | Age: 58
End: 2018-05-06
Payer: COMMERCIAL

## 2018-05-06 ENCOUNTER — HOSPITAL ENCOUNTER (EMERGENCY)
Age: 58
Discharge: HOME | End: 2018-05-06
Attending: EMERGENCY MEDICINE
Payer: COMMERCIAL

## 2018-05-06 VITALS
DIASTOLIC BLOOD PRESSURE: 81 MMHG | WEIGHT: 250 LBS | RESPIRATION RATE: 18 BRPM | BODY MASS INDEX: 33.13 KG/M2 | HEIGHT: 73 IN | TEMPERATURE: 98.3 F | SYSTOLIC BLOOD PRESSURE: 137 MMHG | HEART RATE: 83 BPM | OXYGEN SATURATION: 98 %

## 2018-05-06 DIAGNOSIS — L03.116 CELLULITIS OF LEFT LOWER EXTREMITY: Primary | ICD-10-CM

## 2018-05-06 PROCEDURE — 6370000000 HC RX 637 (ALT 250 FOR IP): Performed by: PHYSICIAN ASSISTANT

## 2018-05-06 PROCEDURE — 99283 EMERGENCY DEPT VISIT LOW MDM: CPT

## 2018-05-06 PROCEDURE — 73610 X-RAY EXAM OF ANKLE: CPT

## 2018-05-06 RX ORDER — CEPHALEXIN 250 MG/1
500 CAPSULE ORAL ONCE
Status: COMPLETED | OUTPATIENT
Start: 2018-05-06 | End: 2018-05-06

## 2018-05-06 RX ORDER — CEPHALEXIN 500 MG/1
500 CAPSULE ORAL 4 TIMES DAILY
Qty: 40 CAPSULE | Refills: 0 | Status: SHIPPED | OUTPATIENT
Start: 2018-05-06 | End: 2018-05-16

## 2018-05-06 RX ADMIN — CEPHALEXIN 500 MG: 250 CAPSULE ORAL at 21:12

## 2018-05-06 ASSESSMENT — PAIN SCALES - GENERAL: PAINLEVEL_OUTOF10: 5

## 2018-05-07 ASSESSMENT — ENCOUNTER SYMPTOMS: COLOR CHANGE: 1

## 2018-05-08 ENCOUNTER — OFFICE VISIT (OUTPATIENT)
Dept: ONCOLOGY | Age: 58
End: 2018-05-08
Payer: COMMERCIAL

## 2018-05-08 ENCOUNTER — HOSPITAL ENCOUNTER (OUTPATIENT)
Dept: INFUSION THERAPY | Age: 58
Discharge: HOME | End: 2018-05-08
Payer: COMMERCIAL

## 2018-05-08 VITALS
SYSTOLIC BLOOD PRESSURE: 137 MMHG | HEART RATE: 80 BPM | BODY MASS INDEX: 32.72 KG/M2 | TEMPERATURE: 97.6 F | WEIGHT: 248 LBS | DIASTOLIC BLOOD PRESSURE: 86 MMHG

## 2018-05-08 VITALS
HEART RATE: 80 BPM | SYSTOLIC BLOOD PRESSURE: 137 MMHG | DIASTOLIC BLOOD PRESSURE: 86 MMHG | BODY MASS INDEX: 32.72 KG/M2 | TEMPERATURE: 97.6 F | RESPIRATION RATE: 18 BRPM | WEIGHT: 248 LBS

## 2018-05-08 DIAGNOSIS — C19 COLORECTAL CANCER, STAGE IV (HCC): Primary | ICD-10-CM

## 2018-05-08 DIAGNOSIS — C78.7 HEPATIC METASTASES (HCC): ICD-10-CM

## 2018-05-08 DIAGNOSIS — C19 COLORECTAL CANCER, STAGE IV (HCC): ICD-10-CM

## 2018-05-08 LAB
ABSOLUTE EOS #: 0 K/UL (ref 0–0.4)
ABSOLUTE IMMATURE GRANULOCYTE: ABNORMAL K/UL (ref 0–0.3)
ABSOLUTE LYMPH #: 0.5 K/UL (ref 1–4.8)
ABSOLUTE MONO #: 0.7 K/UL (ref 0.1–1.2)
ALBUMIN SERPL-MCNC: 3.9 G/DL (ref 3.5–5.2)
ALBUMIN/GLOBULIN RATIO: 1.4 (ref 1–2.5)
ALP BLD-CCNC: 96 U/L (ref 40–129)
ALT SERPL-CCNC: 21 U/L (ref 5–41)
ANION GAP SERPL CALCULATED.3IONS-SCNC: 11 MMOL/L (ref 9–17)
AST SERPL-CCNC: 21 U/L
BASOPHILS # BLD: 0 % (ref 0–2)
BASOPHILS ABSOLUTE: 0 K/UL (ref 0–0.2)
BILIRUB SERPL-MCNC: 0.39 MG/DL (ref 0.3–1.2)
BUN BLDV-MCNC: 8 MG/DL (ref 6–20)
BUN/CREAT BLD: ABNORMAL (ref 9–20)
CALCIUM SERPL-MCNC: 9.4 MG/DL (ref 8.6–10.4)
CARCINOEMBRYONIC ANTIGEN: 64.3 NG/ML
CHLORIDE BLD-SCNC: 103 MMOL/L (ref 98–107)
CO2: 27 MMOL/L (ref 20–31)
CREAT SERPL-MCNC: 0.51 MG/DL (ref 0.7–1.2)
DIFFERENTIAL TYPE: ABNORMAL
EOSINOPHILS RELATIVE PERCENT: 1 % (ref 1–4)
GFR AFRICAN AMERICAN: >60 ML/MIN
GFR NON-AFRICAN AMERICAN: >60 ML/MIN
GFR SERPL CREATININE-BSD FRML MDRD: ABNORMAL ML/MIN/{1.73_M2}
GFR SERPL CREATININE-BSD FRML MDRD: ABNORMAL ML/MIN/{1.73_M2}
GLUCOSE BLD-MCNC: 278 MG/DL (ref 70–99)
HCT VFR BLD CALC: 38 % (ref 41–53)
HEMOGLOBIN: 12.5 G/DL (ref 13.5–17.5)
IMMATURE GRANULOCYTES: ABNORMAL %
LYMPHOCYTES # BLD: 10 % (ref 24–44)
MCH RBC QN AUTO: 27 PG (ref 26–34)
MCHC RBC AUTO-ENTMCNC: 32.8 G/DL (ref 31–37)
MCV RBC AUTO: 82.3 FL (ref 80–100)
MONOCYTES # BLD: 13 % (ref 2–11)
NRBC AUTOMATED: ABNORMAL PER 100 WBC
PDW BLD-RTO: 18.6 % (ref 12.5–15.4)
PLATELET # BLD: 188 K/UL (ref 140–450)
PLATELET ESTIMATE: ABNORMAL
PMV BLD AUTO: 6.6 FL (ref 6–12)
POTASSIUM SERPL-SCNC: 4.5 MMOL/L (ref 3.7–5.3)
PROTEIN UA: NEGATIVE
RBC # BLD: 4.62 M/UL (ref 4.5–5.9)
RBC # BLD: ABNORMAL 10*6/UL
SEG NEUTROPHILS: 76 % (ref 36–66)
SEGMENTED NEUTROPHILS ABSOLUTE COUNT: 3.9 K/UL (ref 1.8–7.7)
SODIUM BLD-SCNC: 141 MMOL/L (ref 135–144)
TOTAL PROTEIN: 6.7 G/DL (ref 6.4–8.3)
WBC # BLD: 5.2 K/UL (ref 3.5–11)
WBC # BLD: ABNORMAL 10*3/UL

## 2018-05-08 PROCEDURE — 96413 CHEMO IV INFUSION 1 HR: CPT

## 2018-05-08 PROCEDURE — 80053 COMPREHEN METABOLIC PANEL: CPT

## 2018-05-08 PROCEDURE — 81003 URINALYSIS AUTO W/O SCOPE: CPT

## 2018-05-08 PROCEDURE — G8417 CALC BMI ABV UP PARAM F/U: HCPCS | Performed by: INTERNAL MEDICINE

## 2018-05-08 PROCEDURE — 99214 OFFICE O/P EST MOD 30 MIN: CPT | Performed by: INTERNAL MEDICINE

## 2018-05-08 PROCEDURE — 96417 CHEMO IV INFUS EACH ADDL SEQ: CPT

## 2018-05-08 PROCEDURE — 6360000002 HC RX W HCPCS: Performed by: INTERNAL MEDICINE

## 2018-05-08 PROCEDURE — G8598 ASA/ANTIPLAT THER USED: HCPCS | Performed by: INTERNAL MEDICINE

## 2018-05-08 PROCEDURE — 96415 CHEMO IV INFUSION ADDL HR: CPT

## 2018-05-08 PROCEDURE — 1036F TOBACCO NON-USER: CPT | Performed by: INTERNAL MEDICINE

## 2018-05-08 PROCEDURE — G8427 DOCREV CUR MEDS BY ELIG CLIN: HCPCS | Performed by: INTERNAL MEDICINE

## 2018-05-08 PROCEDURE — 2580000003 HC RX 258: Performed by: INTERNAL MEDICINE

## 2018-05-08 PROCEDURE — 96375 TX/PRO/DX INJ NEW DRUG ADDON: CPT

## 2018-05-08 PROCEDURE — 82378 CARCINOEMBRYONIC ANTIGEN: CPT

## 2018-05-08 PROCEDURE — 85025 COMPLETE CBC W/AUTO DIFF WBC: CPT

## 2018-05-08 PROCEDURE — 36591 DRAW BLOOD OFF VENOUS DEVICE: CPT

## 2018-05-08 PROCEDURE — 96416 CHEMO PROLONG INFUSE W/PUMP: CPT

## 2018-05-08 PROCEDURE — 3017F COLORECTAL CA SCREEN DOC REV: CPT | Performed by: INTERNAL MEDICINE

## 2018-05-08 RX ORDER — SODIUM CHLORIDE 9 MG/ML
INJECTION, SOLUTION INTRAVENOUS ONCE
Status: DISCONTINUED | OUTPATIENT
Start: 2018-05-08 | End: 2018-05-09 | Stop reason: HOSPADM

## 2018-05-08 RX ORDER — DEXTROSE MONOHYDRATE 50 MG/ML
INJECTION, SOLUTION INTRAVENOUS ONCE
Status: COMPLETED | OUTPATIENT
Start: 2018-05-08 | End: 2018-05-08

## 2018-05-08 RX ORDER — HEPARIN SODIUM (PORCINE) LOCK FLUSH IV SOLN 100 UNIT/ML 100 UNIT/ML
500 SOLUTION INTRAVENOUS PRN
Status: DISCONTINUED | OUTPATIENT
Start: 2018-05-08 | End: 2018-05-09 | Stop reason: HOSPADM

## 2018-05-08 RX ORDER — ATROPINE SULFATE 0.4 MG/ML
0.4 AMPUL (ML) INJECTION
Status: COMPLETED | OUTPATIENT
Start: 2018-05-08 | End: 2018-05-08

## 2018-05-08 RX ORDER — PALONOSETRON 0.05 MG/ML
0.25 INJECTION, SOLUTION INTRAVENOUS ONCE
Status: COMPLETED | OUTPATIENT
Start: 2018-05-08 | End: 2018-05-08

## 2018-05-08 RX ORDER — DEXAMETHASONE SODIUM PHOSPHATE 10 MG/ML
10 INJECTION, SOLUTION INTRAMUSCULAR; INTRAVENOUS ONCE
Status: COMPLETED | OUTPATIENT
Start: 2018-05-08 | End: 2018-05-08

## 2018-05-08 RX ORDER — SODIUM CHLORIDE 0.9 % (FLUSH) 0.9 %
10 SYRINGE (ML) INJECTION PRN
Status: DISCONTINUED | OUTPATIENT
Start: 2018-05-08 | End: 2018-05-09 | Stop reason: HOSPADM

## 2018-05-08 RX ADMIN — SODIUM CHLORIDE: 9 INJECTION, SOLUTION INTRAVENOUS at 10:12

## 2018-05-08 RX ADMIN — BEVACIZUMAB 600 MG: 400 INJECTION, SOLUTION INTRAVENOUS at 11:20

## 2018-05-08 RX ADMIN — IRINOTECAN HYDROCHLORIDE 440 MG: 100 INJECTION, SOLUTION INTRAVENOUS at 11:59

## 2018-05-08 RX ADMIN — DEXAMETHASONE SODIUM PHOSPHATE 10 MG: 10 INJECTION, SOLUTION INTRAMUSCULAR; INTRAVENOUS at 10:41

## 2018-05-08 RX ADMIN — FLUOROURACIL 5950 MG: 50 INJECTION, SOLUTION INTRAVENOUS at 13:52

## 2018-05-08 RX ADMIN — Medication 10 ML: at 09:41

## 2018-05-08 RX ADMIN — ATROPINE SULFATE 0.4 MG: 0.4 INJECTION, SOLUTION INTRAMUSCULAR; INTRAVENOUS; SUBCUTANEOUS at 11:07

## 2018-05-08 RX ADMIN — DEXTROSE MONOHYDRATE: 50 INJECTION, SOLUTION INTRAVENOUS at 11:50

## 2018-05-08 RX ADMIN — Medication 10 ML: at 09:40

## 2018-05-08 RX ADMIN — PALONOSETRON HYDROCHLORIDE 0.25 MG: 0.25 INJECTION INTRAVENOUS at 10:35

## 2018-05-08 NOTE — PLAN OF CARE
Problem: Safety:  Goal: Free from accidental physical injury  Free from accidental physical injury  Outcome: Ongoing    Goal: Free from intentional harm  Free from intentional harm  Outcome: Ongoing

## 2018-05-08 NOTE — PROGRESS NOTES
Pt here for C.9D1. Denies any new complaints. Labs drawn from port and results reviewed. Pt was treated without incident and d/c'd in stable condition. Prince Abi will return 5/22 for treatment.

## 2018-05-10 ENCOUNTER — HOSPITAL ENCOUNTER (OUTPATIENT)
Dept: INFUSION THERAPY | Age: 58
Discharge: HOME | End: 2018-05-10
Payer: COMMERCIAL

## 2018-05-10 DIAGNOSIS — C19 COLORECTAL CANCER, STAGE IV (HCC): ICD-10-CM

## 2018-05-10 PROCEDURE — 96523 IRRIG DRUG DELIVERY DEVICE: CPT

## 2018-05-10 PROCEDURE — 2580000003 HC RX 258: Performed by: INTERNAL MEDICINE

## 2018-05-10 PROCEDURE — 6360000002 HC RX W HCPCS: Performed by: INTERNAL MEDICINE

## 2018-05-10 RX ORDER — HEPARIN SODIUM (PORCINE) LOCK FLUSH IV SOLN 100 UNIT/ML 100 UNIT/ML
500 SOLUTION INTRAVENOUS PRN
Status: CANCELLED | OUTPATIENT
Start: 2018-05-10

## 2018-05-10 RX ORDER — SODIUM CHLORIDE 0.9 % (FLUSH) 0.9 %
10 SYRINGE (ML) INJECTION PRN
Status: CANCELLED | OUTPATIENT
Start: 2018-05-10

## 2018-05-10 RX ORDER — HEPARIN SODIUM (PORCINE) LOCK FLUSH IV SOLN 100 UNIT/ML 100 UNIT/ML
500 SOLUTION INTRAVENOUS PRN
Status: DISCONTINUED | OUTPATIENT
Start: 2018-05-10 | End: 2018-05-11 | Stop reason: HOSPADM

## 2018-05-10 RX ORDER — SODIUM CHLORIDE 0.9 % (FLUSH) 0.9 %
20 SYRINGE (ML) INJECTION PRN
Status: CANCELLED | OUTPATIENT
Start: 2018-05-10

## 2018-05-10 RX ORDER — SODIUM CHLORIDE 0.9 % (FLUSH) 0.9 %
10 SYRINGE (ML) INJECTION PRN
Status: DISCONTINUED | OUTPATIENT
Start: 2018-05-10 | End: 2018-05-11 | Stop reason: HOSPADM

## 2018-05-10 RX ADMIN — HEPARIN SODIUM (PORCINE) LOCK FLUSH IV SOLN 100 UNIT/ML 500 UNITS: 100 SOLUTION at 12:01

## 2018-05-10 RX ADMIN — Medication 10 ML: at 12:00

## 2018-05-22 ENCOUNTER — HOSPITAL ENCOUNTER (OUTPATIENT)
Dept: INFUSION THERAPY | Age: 58
Discharge: HOME | End: 2018-05-22
Payer: COMMERCIAL

## 2018-05-22 ENCOUNTER — OFFICE VISIT (OUTPATIENT)
Dept: ONCOLOGY | Age: 58
End: 2018-05-22
Payer: COMMERCIAL

## 2018-05-22 VITALS
WEIGHT: 249.6 LBS | RESPIRATION RATE: 16 BRPM | HEART RATE: 72 BPM | DIASTOLIC BLOOD PRESSURE: 79 MMHG | SYSTOLIC BLOOD PRESSURE: 161 MMHG | BODY MASS INDEX: 32.93 KG/M2 | TEMPERATURE: 97.5 F

## 2018-05-22 VITALS
RESPIRATION RATE: 16 BRPM | DIASTOLIC BLOOD PRESSURE: 79 MMHG | TEMPERATURE: 97.5 F | HEART RATE: 72 BPM | SYSTOLIC BLOOD PRESSURE: 161 MMHG | WEIGHT: 249.6 LBS | BODY MASS INDEX: 32.93 KG/M2

## 2018-05-22 DIAGNOSIS — G62.0 NEUROPATHY DUE TO CHEMOTHERAPEUTIC DRUG (HCC): ICD-10-CM

## 2018-05-22 DIAGNOSIS — C19 COLORECTAL CANCER, STAGE IV (HCC): Primary | ICD-10-CM

## 2018-05-22 DIAGNOSIS — T45.1X5A NEUROPATHY DUE TO CHEMOTHERAPEUTIC DRUG (HCC): ICD-10-CM

## 2018-05-22 DIAGNOSIS — C78.7 HEPATIC METASTASES (HCC): ICD-10-CM

## 2018-05-22 DIAGNOSIS — C19 COLORECTAL CANCER, STAGE IV (HCC): ICD-10-CM

## 2018-05-22 LAB
ABSOLUTE EOS #: 0.1 K/UL (ref 0–0.4)
ABSOLUTE IMMATURE GRANULOCYTE: ABNORMAL K/UL (ref 0–0.3)
ABSOLUTE LYMPH #: 0.8 K/UL (ref 1–4.8)
ABSOLUTE MONO #: 0.7 K/UL (ref 0.1–1.2)
ALBUMIN SERPL-MCNC: 3.5 G/DL (ref 3.5–5.2)
ALBUMIN/GLOBULIN RATIO: 1.5 (ref 1–2.5)
ALP BLD-CCNC: 78 U/L (ref 40–129)
ALT SERPL-CCNC: 21 U/L (ref 5–41)
ANION GAP SERPL CALCULATED.3IONS-SCNC: 13 MMOL/L (ref 9–17)
AST SERPL-CCNC: 21 U/L
BASOPHILS # BLD: 1 % (ref 0–2)
BASOPHILS ABSOLUTE: 0 K/UL (ref 0–0.2)
BILIRUB SERPL-MCNC: 0.31 MG/DL (ref 0.3–1.2)
BUN BLDV-MCNC: 9 MG/DL (ref 6–20)
BUN/CREAT BLD: ABNORMAL (ref 9–20)
CALCIUM SERPL-MCNC: 8.6 MG/DL (ref 8.6–10.4)
CARCINOEMBRYONIC ANTIGEN: 48.9 NG/ML
CHLORIDE BLD-SCNC: 104 MMOL/L (ref 98–107)
CO2: 24 MMOL/L (ref 20–31)
CREAT SERPL-MCNC: 0.47 MG/DL (ref 0.7–1.2)
DIFFERENTIAL TYPE: ABNORMAL
EOSINOPHILS RELATIVE PERCENT: 1 % (ref 1–4)
GFR AFRICAN AMERICAN: >60 ML/MIN
GFR NON-AFRICAN AMERICAN: >60 ML/MIN
GFR SERPL CREATININE-BSD FRML MDRD: ABNORMAL ML/MIN/{1.73_M2}
GFR SERPL CREATININE-BSD FRML MDRD: ABNORMAL ML/MIN/{1.73_M2}
GLUCOSE BLD-MCNC: 202 MG/DL (ref 70–99)
HCT VFR BLD CALC: 35.4 % (ref 41–53)
HEMOGLOBIN: 11.9 G/DL (ref 13.5–17.5)
IMMATURE GRANULOCYTES: ABNORMAL %
LYMPHOCYTES # BLD: 15 % (ref 24–44)
MCH RBC QN AUTO: 27 PG (ref 26–34)
MCHC RBC AUTO-ENTMCNC: 33.6 G/DL (ref 31–37)
MCV RBC AUTO: 80.4 FL (ref 80–100)
MONOCYTES # BLD: 13 % (ref 2–11)
NRBC AUTOMATED: ABNORMAL PER 100 WBC
PDW BLD-RTO: 18.3 % (ref 12.5–15.4)
PLATELET # BLD: 157 K/UL (ref 140–450)
PLATELET ESTIMATE: ABNORMAL
PMV BLD AUTO: 6.7 FL (ref 6–12)
POTASSIUM SERPL-SCNC: 4 MMOL/L (ref 3.7–5.3)
PROTEIN UA: NEGATIVE
RBC # BLD: 4.4 M/UL (ref 4.5–5.9)
RBC # BLD: ABNORMAL 10*6/UL
SEG NEUTROPHILS: 70 % (ref 36–66)
SEGMENTED NEUTROPHILS ABSOLUTE COUNT: 3.7 K/UL (ref 1.8–7.7)
SODIUM BLD-SCNC: 141 MMOL/L (ref 135–144)
TOTAL PROTEIN: 5.9 G/DL (ref 6.4–8.3)
WBC # BLD: 5.2 K/UL (ref 3.5–11)
WBC # BLD: ABNORMAL 10*3/UL

## 2018-05-22 PROCEDURE — 36593 DECLOT VASCULAR DEVICE: CPT

## 2018-05-22 PROCEDURE — G8417 CALC BMI ABV UP PARAM F/U: HCPCS | Performed by: INTERNAL MEDICINE

## 2018-05-22 PROCEDURE — 6360000002 HC RX W HCPCS: Performed by: INTERNAL MEDICINE

## 2018-05-22 PROCEDURE — 96413 CHEMO IV INFUSION 1 HR: CPT

## 2018-05-22 PROCEDURE — G8427 DOCREV CUR MEDS BY ELIG CLIN: HCPCS | Performed by: INTERNAL MEDICINE

## 2018-05-22 PROCEDURE — 85025 COMPLETE CBC W/AUTO DIFF WBC: CPT

## 2018-05-22 PROCEDURE — 36591 DRAW BLOOD OFF VENOUS DEVICE: CPT

## 2018-05-22 PROCEDURE — 96417 CHEMO IV INFUS EACH ADDL SEQ: CPT

## 2018-05-22 PROCEDURE — 96416 CHEMO PROLONG INFUSE W/PUMP: CPT

## 2018-05-22 PROCEDURE — 81003 URINALYSIS AUTO W/O SCOPE: CPT

## 2018-05-22 PROCEDURE — 3017F COLORECTAL CA SCREEN DOC REV: CPT | Performed by: INTERNAL MEDICINE

## 2018-05-22 PROCEDURE — 82378 CARCINOEMBRYONIC ANTIGEN: CPT

## 2018-05-22 PROCEDURE — 96415 CHEMO IV INFUSION ADDL HR: CPT

## 2018-05-22 PROCEDURE — G8598 ASA/ANTIPLAT THER USED: HCPCS | Performed by: INTERNAL MEDICINE

## 2018-05-22 PROCEDURE — 80053 COMPREHEN METABOLIC PANEL: CPT

## 2018-05-22 PROCEDURE — 96375 TX/PRO/DX INJ NEW DRUG ADDON: CPT

## 2018-05-22 PROCEDURE — 99214 OFFICE O/P EST MOD 30 MIN: CPT | Performed by: INTERNAL MEDICINE

## 2018-05-22 PROCEDURE — 1036F TOBACCO NON-USER: CPT | Performed by: INTERNAL MEDICINE

## 2018-05-22 PROCEDURE — 2580000003 HC RX 258: Performed by: INTERNAL MEDICINE

## 2018-05-22 RX ORDER — FENTANYL 25 UG/H
1 PATCH TRANSDERMAL
Qty: 10 PATCH | Refills: 0 | Status: SHIPPED | OUTPATIENT
Start: 2018-05-22 | End: 2018-01-01 | Stop reason: SDUPTHER

## 2018-05-22 RX ORDER — OXYCODONE AND ACETAMINOPHEN 10; 325 MG/1; MG/1
1 TABLET ORAL EVERY 4 HOURS PRN
COMMUNITY
End: 2018-05-22 | Stop reason: SDUPTHER

## 2018-05-22 RX ORDER — PALONOSETRON 0.05 MG/ML
0.25 INJECTION, SOLUTION INTRAVENOUS ONCE
Status: COMPLETED | OUTPATIENT
Start: 2018-05-22 | End: 2018-05-22

## 2018-05-22 RX ORDER — DEXAMETHASONE SODIUM PHOSPHATE 10 MG/ML
10 INJECTION, SOLUTION INTRAMUSCULAR; INTRAVENOUS ONCE
Status: COMPLETED | OUTPATIENT
Start: 2018-05-22 | End: 2018-05-22

## 2018-05-22 RX ORDER — DEXTROSE MONOHYDRATE 50 MG/ML
INJECTION, SOLUTION INTRAVENOUS ONCE
Status: DISCONTINUED | OUTPATIENT
Start: 2018-05-22 | End: 2018-05-23 | Stop reason: HOSPADM

## 2018-05-22 RX ORDER — OXYCODONE AND ACETAMINOPHEN 10; 325 MG/1; MG/1
1 TABLET ORAL EVERY 6 HOURS PRN
Qty: 120 TABLET | Refills: 0 | Status: SHIPPED | OUTPATIENT
Start: 2018-05-22 | End: 2018-01-01 | Stop reason: SDUPTHER

## 2018-05-22 RX ORDER — ATROPINE SULFATE 0.4 MG/ML
0.4 AMPUL (ML) INJECTION
Status: COMPLETED | OUTPATIENT
Start: 2018-05-22 | End: 2018-05-22

## 2018-05-22 RX ORDER — SODIUM CHLORIDE 0.9 % (FLUSH) 0.9 %
20 SYRINGE (ML) INJECTION PRN
Status: CANCELLED | OUTPATIENT
Start: 2018-05-22

## 2018-05-22 RX ORDER — HEPARIN SODIUM (PORCINE) LOCK FLUSH IV SOLN 100 UNIT/ML 100 UNIT/ML
500 SOLUTION INTRAVENOUS PRN
Status: CANCELLED | OUTPATIENT
Start: 2018-05-22

## 2018-05-22 RX ORDER — SODIUM CHLORIDE 0.9 % (FLUSH) 0.9 %
10 SYRINGE (ML) INJECTION PRN
Status: DISCONTINUED | OUTPATIENT
Start: 2018-05-22 | End: 2018-05-23 | Stop reason: HOSPADM

## 2018-05-22 RX ORDER — SODIUM CHLORIDE 0.9 % (FLUSH) 0.9 %
10 SYRINGE (ML) INJECTION PRN
Status: CANCELLED | OUTPATIENT
Start: 2018-05-22

## 2018-05-22 RX ORDER — SODIUM CHLORIDE 9 MG/ML
INJECTION, SOLUTION INTRAVENOUS ONCE
Status: DISCONTINUED | OUTPATIENT
Start: 2018-05-22 | End: 2018-05-23 | Stop reason: HOSPADM

## 2018-05-22 RX ADMIN — PALONOSETRON 0.25 MG: 0.05 INJECTION, SOLUTION INTRAVENOUS at 11:15

## 2018-05-22 RX ADMIN — SODIUM CHLORIDE: 9 INJECTION, SOLUTION INTRAVENOUS at 11:15

## 2018-05-22 RX ADMIN — FLUOROURACIL 5950 MG: 50 INJECTION, SOLUTION INTRAVENOUS at 14:08

## 2018-05-22 RX ADMIN — Medication 10 ML: at 10:06

## 2018-05-22 RX ADMIN — Medication 10 ML: at 10:28

## 2018-05-22 RX ADMIN — IRINOTECAN HYDROCHLORIDE 440 MG: 100 INJECTION, SOLUTION INTRAVENOUS at 12:37

## 2018-05-22 RX ADMIN — Medication 10 ML: at 10:04

## 2018-05-22 RX ADMIN — ATROPINE SULFATE 0.4 MG: 0.4 INJECTION, SOLUTION INTRAMUSCULAR; INTRAVENOUS; SUBCUTANEOUS at 12:32

## 2018-05-22 RX ADMIN — BEVACIZUMAB 600 MG: 400 INJECTION, SOLUTION INTRAVENOUS at 11:44

## 2018-05-22 RX ADMIN — DEXAMETHASONE SODIUM PHOSPHATE 10 MG: 10 INJECTION, SOLUTION INTRAMUSCULAR; INTRAVENOUS at 11:18

## 2018-05-22 RX ADMIN — WATER 2.2 ML: 1 INJECTION INTRAMUSCULAR; INTRAVENOUS; SUBCUTANEOUS at 10:10

## 2018-05-22 RX ADMIN — ALTEPLASE 2 MG: 2.2 INJECTION, POWDER, LYOPHILIZED, FOR SOLUTION INTRAVENOUS at 10:10

## 2018-05-22 ASSESSMENT — PAIN SCALES - GENERAL: PAINLEVEL_OUTOF10: 7

## 2018-05-22 ASSESSMENT — PAIN DESCRIPTION - PAIN TYPE: TYPE: CHRONIC PAIN

## 2018-05-22 ASSESSMENT — PAIN DESCRIPTION - LOCATION: LOCATION: GENERALIZED

## 2018-05-22 NOTE — PROGRESS NOTES
Dose check of Avastin with pharmacy with patients weight change   Will continue dose of 600 mg since this is how the medication is supplied   Patient was seen by Dr Liliam Chua today and will continue with chemotherapy   Valentina Bosworth RN

## 2018-05-22 NOTE — PLAN OF CARE
Problem: SAFETY  Goal: Free from accidental physical injury  Outcome: Met This Shift    Goal: Free from intentional harm  Outcome: Met This Shift

## 2018-05-24 ENCOUNTER — HOSPITAL ENCOUNTER (OUTPATIENT)
Dept: INFUSION THERAPY | Age: 58
Discharge: HOME | End: 2018-05-24
Payer: COMMERCIAL

## 2018-05-24 DIAGNOSIS — C19 COLORECTAL CANCER, STAGE IV (HCC): ICD-10-CM

## 2018-05-24 DIAGNOSIS — C78.7 HEPATIC METASTASES (HCC): ICD-10-CM

## 2018-05-24 PROCEDURE — 96523 IRRIG DRUG DELIVERY DEVICE: CPT | Performed by: NURSE PRACTITIONER

## 2018-05-24 PROCEDURE — 6360000002 HC RX W HCPCS: Performed by: INTERNAL MEDICINE

## 2018-05-24 RX ORDER — HEPARIN SODIUM (PORCINE) LOCK FLUSH IV SOLN 100 UNIT/ML 100 UNIT/ML
500 SOLUTION INTRAVENOUS PRN
Status: DISCONTINUED | OUTPATIENT
Start: 2018-05-24 | End: 2018-05-25 | Stop reason: HOSPADM

## 2018-05-24 RX ORDER — HEPARIN SODIUM (PORCINE) LOCK FLUSH IV SOLN 100 UNIT/ML 100 UNIT/ML
500 SOLUTION INTRAVENOUS PRN
Status: CANCELLED | OUTPATIENT
Start: 2018-05-24

## 2018-05-24 RX ORDER — SODIUM CHLORIDE 0.9 % (FLUSH) 0.9 %
20 SYRINGE (ML) INJECTION PRN
Status: CANCELLED | OUTPATIENT
Start: 2018-05-24

## 2018-05-24 RX ORDER — SODIUM CHLORIDE 0.9 % (FLUSH) 0.9 %
10 SYRINGE (ML) INJECTION PRN
Status: DISCONTINUED | OUTPATIENT
Start: 2018-05-24 | End: 2018-05-25 | Stop reason: HOSPADM

## 2018-05-24 RX ORDER — SODIUM CHLORIDE 0.9 % (FLUSH) 0.9 %
10 SYRINGE (ML) INJECTION PRN
Status: CANCELLED | OUTPATIENT
Start: 2018-05-24

## 2018-05-24 RX ADMIN — HEPARIN SODIUM (PORCINE) LOCK FLUSH IV SOLN 100 UNIT/ML 500 UNITS: 100 SOLUTION at 12:48

## 2018-06-05 NOTE — FLOWSHEET NOTE
Patient saw Dr. Pito Florentino this a.m. prior to chemo appt. See physician's office visit note for toxicity assessment.

## 2018-06-05 NOTE — PROGRESS NOTES
Pt here for C11D1 Avastin, Irinotecan, 5-FU. Denies any new complaints. Labs drawn from port and results reviewed. Pt was treated without incident and d/c'd in stable condition. Will return on 6/19/18 for C12D1 Avastin, Irinotecan, 5-FU.

## 2018-06-07 NOTE — PROGRESS NOTES
Pt is here for CADD pump d/c. Denies any problems at this time. Pt was discharged in stable condition and will return on 6-19-18 for C12D1.

## 2018-06-19 NOTE — PROGRESS NOTES
Pt here for C12D1 Avastin, Irinotecan, 5Fu. Denies any new complaints. Labs drawn from port and results reviewed. Pt was treated without incident and d/c'd in stable condition. Pt will return in 2 days for pump dc and follow up with Dr Elizabeth Gatica.

## 2018-06-20 NOTE — PROGRESS NOTES
Pt came in stating that his port needle was d/c'd when he caught the tubing while taking out the trash. States he stopped the pump and clamped the tubing right away. Tubing clamped proximal to needle. 140.6 ml 5FU remaining in cadd pump. Needle and connector removed and cadd pump given to pharmacy to flush. Pt re-accessed and cadd pump restarted. Attempted to reach Dr. Luis Bullard to update. Message left on his voicemail.

## 2018-06-21 NOTE — PROGRESS NOTES
Pt is here for CADD pump d/c. Denies any problems at this time. Pt was discharged in stable condition.

## 2018-07-10 NOTE — PROGRESS NOTES
DIAGNOSIS:   1. Metastatic rectal cancer, Kras mutated. 2. Bleeding secondary to the tumor  3. Liver metastases  4. Coronary artery disease, presenting with ventricular fibrillation, needed urgent cardiac catheterization and stent, diagnosis was one week prior to diagnosis of metastatic cancer  CURRENT THERAPY:  1. Status post coronary stenting  2. Started palliative chemotherapy with FOLFOX, Avastin was held because of active bleeding  3. CT scan showed excellent response in the liver. But the primary tumor was almost the same size. 4. Chemoradiation with 5-FU sensitization to deal with the primary tumor  5. Palliative Avastin+ FOLFIRI started 1/16/18  6. CT showed interval response, CEA rising, 07/2018, plan for FOLFOX with liver directed therapy  BRIEF CASE HISTORY:   Kajal Arguello is a very pleasant 56 y.o. male who was admitted to the hospital with  rectal bleeding and change in bowel habits. He is found to have rectal mass. And multiple colonic polyps. bx was done. And showed adenocarcinoma of the rectum, CT scan showed liver metastases that were biopsy-proven to be metastatic disease. Pt had recent MI and stenting. He is on double antiplatelet agents. Initially he was on Berlinta and aspirin but that was later changed to Plavix and aspirin  We decided to start with chemotherapy, we chose the combination of FOLFOX. We held the Avastin because of ongoing bleeding and possible need for surgery. Chemotherapy was well tolerated. Interim CT scan showed excellent response in the liver with significant shrinkage of the liver metastases. However, the primary tumor was almost the same size and that was confirmed with endoscopic ultrasound. The patient was sent back for chemoradiation with 5-FU. He developed significant radiation enteritis and cystitis, managed conservatively. After completion of chemoradiation, there was significant progression of his liver metastases.   So he was restarted on chemotherapy. We chose the combination of FOLFIRI. Plus Avastin  The patient was sent to [facility] for consideration of palliative resection of primary tumor. INTERIM HISTORY: The patient comes in today for a follow up and to review recent CT. He continues to have intermittent abd pain and peripheral neuropathy grade 1, symptoms are unchanged, he is worried about his rising markers       PAST MEDICAL HISTORY: has a past medical history of Back pain; CAD S/P percutaneous coronary angioplasty; Carcin[address] [address] Colon cancer ([address] Degeneration of lumbar or lumbosacral intervertebral disc; Depression; Diabetes mellitus ([facility] [address] H/O cardiac catheterization; Hepatic metastases ([facility] [address] Hyperlipidemia; Hypertension; Knee pain; and MI, old. PAST SURGICAL HISTORY: has a past surgical history that includes Tunneled venous port placement (Right); pr sigmoidoscopy flx w/rmvl foreign body (N/A, 5/25/2017); hernia repair; Coronary angioplasty with stent (11/2016); pr sigmoidoscopy flx w/rmvl foreign body (12/8/2017); and Endoscopic ultrasonography, GI (N/A, 12/8/2017). CURRENT MEDICATIONS:  has a current medication list which includes the following prescription(s): fentanyl, oxycodone-acetaminophen, metoprolol tartrate, blood glucose test strips, insulin pen needle, insulin glargine, nitroglycerin, insulin regular, and cyclobenzaprine, and the following Facility-Administered Medications: sodium chloride flush. ALLERGIES:  is allergic to morphine. FAMILY HISTORY: Negative for any hematological or oncological conditions. SOCIAL HISTORY:  reports that he has never smoked. He has never used smokeless tobacco. He reports that he does not drink alcohol or use drugs. REVIEW OF SYSTEMS:  General: no fever or night sweats. Moderate fatigue - unchanged  ENT: No double or blurred vision, no tinnitus or hearing problem, no dysphagia.    Respiratory: No chest pain, no shortness of breath, no cough or  Overall this appears similar in appearance to the prior exam but   likely could reflect residual or recurrent tumor. 3. Partially calcified soft tissue lesions left pelvic sidewall overall   decreased in size since 12/03/2016 but are otherwise stable from recent   exams.  Findings likely reflect treated metastatic lymph nodes however active   disease cannot be excluded. 4. No other evidence of metastatic disease in the chest.   5. Although the bladder is not well-distended, there is diffuse   circumferential bladder wall thickening which could be related to infection   or inflammation.  Recommend correlation with any recent radiation. 6. Slight interval increase in size with areas of mixed sclerosis of a T2   lytic lesion, which could reflect post treatment changes as well. PATHOLOGY:      REVIEW OF LABORATORY DATA:   Lab Results   Component Value Date    WBC 6.2 06/19/2018    HGB 12.8 (L) 06/19/2018    HCT 38.5 (L) 06/19/2018    MCV 80.6 06/19/2018     06/19/2018       Chemistry        Component Value Date/Time     06/19/2018 1115    K 4.1 06/19/2018 1115    CL 99 06/19/2018 1115    CO2 25 06/19/2018 1115    BUN 8 06/19/2018 1115    CREATININE 0.55 (L) 06/19/2018 1115        Component Value Date/Time    CALCIUM 8.3 (L) 06/19/2018 1115    ALKPHOS 100 06/19/2018 1115    AST 21 06/19/2018 1115    ALT 23 06/19/2018 1115    BILITOT 0.24 (L) 06/19/2018 1115        Lab Results   Component Value Date    CEA 83.9 (H) 06/19/2018         IMPRESSION:   1. Metastatic rectal cancer with liver metastases  2. Active coronary artery disease status post stenting  3. Rectal bleeding secondary to tumor and antiplatelets, slowed down after switching to plavix   4. Excellent response in the liver with minimal response in the primary tumor after FOLFOX. 5. Underwent chemoradiation for the primary tumor with 5-FU based therapy  6.  Rapid progression of liver metastases and development of T2 metastatic

## 2018-07-17 NOTE — PROGRESS NOTES
Patient arrived for C1D1 new protocal of oxaliplatin, leucovorin and 5FU  Patient denies any new complaints   Labs collected and reviewed - proceed with treatment   Tolerated treatment well  Return 7/19 for pump off  Patient ambulated to exit in stable condition   Marci Soriano RN

## 2018-07-19 NOTE — TELEPHONE ENCOUNTER
Called patient to see when he would be in. His appt was at 1330 pm. His pump would have completed earlier today. He stated he was sleeping, he would be here soon. Reminded patient that it could cause his port to clot off if he left it off without flushing it. He verbalized understanding.

## 2018-07-31 NOTE — TELEPHONE ENCOUNTER
Pt missed chemo appt for 0800 and MD follow up at [address] [facility]. Called pt and he states he thought his appt was at 1000. Pt rescheduled for chemo at 0800 tomorrow , MD visit 8/2/18 at 1400, and pump d/c Friday at 1130. Pt voices understanding and states he will need a ride home tomorrow and transportation for Thursday and Friday. His wife can drop him off on her way to work tomorrow. [address] [facility] [address] notified of pt's need for transportation home tomorrow and states he will make arrangements. States pt has medicaid that will pay for transportation on Thursday and Friday. Pt aware that he needs to schedule transportation for his MD follow up on Thursday and his pump d/c on Friday.

## 2018-07-31 NOTE — TELEPHONE ENCOUNTER
received call from [employer] Nurse asking about transportation for patient this week. Nurse reported patient has appointments Wednesday, Thursday, Friday. Patient has Medicaid provided transportation available to him which he is aware of. He needs to schedule rides 48 hours in advance. Patient is able to use Medicaid transport for Thursday and Friday appointments. Nurse reported patient can get to appointment tomorrow but will need ride home.  will make referral to [employer] for ride home.

## 2018-08-01 NOTE — PROGRESS NOTES
Patient was offered new alisha pack for his pump but declined d/t he had a alisha pack at home. Patient tolerated treatment well and was discharged home per self in stable condition.
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
## 2018-08-10 NOTE — PRE-PROCEDURE INSTRUCTIONS
clothing after bathing.  Shampoo hair before surgery   Keep nails clean    Do not apply alcohol-based hair or skin products,    Do not apply lotion, emollients, cosmetics, perfumes or deodorant the day of surgery.  Do not shave the area of your body where your surgery will be performed unless you receive specific permission from your surgeon. Patient Instructions:    [facility] If you are having any type of anesthesia you are to have nothing to eat or drink after midnight the night before your surgery. This includes gum, hard candy, mints, water or smoking or chewing tobacco.  The only exception to this is a small sip of water to take with any morning dose of heart, blood pressure, or seizure medications. No alcoholic beverages for 24 hours prior to surgery.  Brush your teeth but do not swallow water.  Bring your eyeglasses and case with you. No contacts are to be worn the day of surgery. You also may bring your hearing aids.  If you are on C-PAP or Bi-PAP at home and plan on staying in the hospital overnight for your surgery please bring the machine with you.  Do not wear any jewelry or body piercings day of surgery. Also, NO lotion, perfume or deodorant to be used the day of surgery. No nail polish on the operative extremity (arm/leg surgeries)     If you are staying overnight with us, please bring a small bag of necessary personal items.  Please wear loose, comfortable clothing. If you are potentially going to have a cast or brace bring clothing that will fit over them.                                                                                                                            In case of illness - If you have cold or flu like symptoms (high fever, runny nose, sore throat, cough, etc.) rash, nausea, vomiting, loose stools, and/or recent contact with someone who has a contagious disease (chicken pox, measles, etc.) Please call your doctor before coming to the
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
## 2018-08-15 NOTE — LETTER
[facility]  [facility] [address]  Phone: [address] Jadyn Alejandra MD        August 15, 2018     [facility] [address]      Dear Michael Kirby: We are sorry that you missed your appointment with [facility] on 8/15/2018. Your health and follow-up medical care are important to us. Please call our office as soon as possible so that we may reschedule your appointment. If you have already rescheduled your appointment, please disregard this letter.     Sincerely,    Sunil Trejo MD

## 2018-08-15 NOTE — TELEPHONE ENCOUNTER
Patient was a NO Show this morning for his chemotherapy. I tried to call him. His voicemail stated he was not receiving calls at this time. I was unable to leave a message. This is not the first time the patient has not shown for his appointment time/day. I will send a letter.

## 2018-08-23 NOTE — BRIEF OP NOTE
Brief Postoperative Note    Jossy Tello  YOB: 1960  [id number]    Pre-operative Diagnosis: Colorectal metastasis to liver     Post-operative Diagnosis: Same    Procedure: CT guided thermal ablation of liver masses     Anesthesia: General and Local    Surgeons/Assistants: Stefani Bustos MD    Estimated Blood Loss: less than 50     Complications: None    Specimens: Was Not Obtained    Findings: Cryoablation of a mass at junction of segments 3/4B with two 17 gauge Icerod needles at (10 min freeze, 5 min thaw x 2). Microwave ablation of lesion at segment 8/4A with a 14 gauge Amica antenna at 40 W x 15 minutes.      Electronically signed by Stefani Bustos MD on 8/23/2018 at 1:57 PM

## 2018-08-28 NOTE — PROGRESS NOTES
he was restarted on chemotherapy. We chose the combination of FOLFIRI plus Avastin  The patient was sent to [facility] for consideration of palliative resection of primary tumor. He underwent cryoablation of the liver 08/23/2018      INTERIM HISTORY: The patient comes in today for a follow up and cycle #4 of treatment. He has undergone liver directed therapy, he was unable to eat for a few days and has lost some weight. His appetite has improved but not fully recovered. He denies fever, chills, night sweats, nausea, vomiting. PAST MEDICAL HISTORY: has a past medical history of Back pain; CAD S/P percutaneous coronary angioplasty; Carcinoma ([facility] [address] Colon cancer ([address] Degeneration of lumbar or lumbosacral intervertebral disc; Depression; Diabetes mellitus ([address] H/O cardiac catheterization; Hepatic metastases ([address] Hyperlipidemia; Hypertension; Knee pain; and MI, old. PAST SURGICAL HISTORY: has a past surgical history that includes Tunneled venous port placement (Right); pr sigmoidoscopy flx w/rmvl foreign body (N/A, 5/25/2017); hernia repair; Coronary angioplasty with stent (11/2016); pr sigmoidoscopy flx w/rmvl foreign body (12/8/2017); Endoscopic ultrasonography, GI (N/A, 12/8/2017); and liver biopsy (Right, 08/23/2018).      CURRENT MEDICATIONS:  has a current medication list which includes the following prescription(s): levofloxacin, metronidazole, ondansetron, methylprednisolone, fentanyl, oxycodone-acetaminophen, insulin regular, metoprolol tartrate, blood glucose test strips, insulin pen needle, insulin glargine, and nitroglycerin, and the following Facility-Administered Medications: dextrose, oxaliplatin (ELOXATIN) 200 mg in dextrose 5 % 250 mL chemo IVPB, leucovorin calcium (WELLCOVORIN) 950 mg in dextrose 5 % 250 mL IVPB, fluorouracil, fluorouracil (ADRUCIL) 5,750 mg in sodium chloride 0.9 % 250 mL chemo infusion, sodium chloride flush, heparin flush, and sodium chloride flush.    ALLERGIES:  is allergic to morphine. FAMILY HISTORY: Negative for any hematological or oncological conditions. SOCIAL HISTORY:  reports that he has never smoked. He has never used smokeless tobacco. He reports that he does not drink alcohol or use drugs. REVIEW OF SYSTEMS:  General: no fever or night sweats. Moderate fatigue - unchanged; some weight loss; appetite recovering  ENT: No double or blurred vision, no tinnitus or hearing problem, no dysphagia. Respiratory: No chest pain, no shortness of breath, no cough or hemoptysis. Cardiovascular: Denies chest pain, PND or orthopnea. No L E swelling or palpitations. Gastrointestinal: No nausea or vomiting, abdominal pain, constipation. Diarrhea unchanged from baseline. Genitourinary: No dysuria, Hematuria, no frequency, urgency or incontinence. Neurological: Denies headaches, decreased LOC, grade 2 sensory neuropathy   Musculoskeletal: No arthralgia or joint swelling. Diffused aches, slightly worse  Skin: There are no rashes or bleeding. Psychiatric: No anxiety, + depression. Endocrine: No diabetes or thyroid disease. Hematologic: No bleeding, no adenopathy. PHYSICAL EXAM: Shows a well appearing 56y.o.-year-old male who is not in pain or distress. Vital Signs: Blood pressure (!) 141/88, pulse 91, temperature 98.1 °F (36.7 °C), temperature source Oral, weight 237 lb (107.5 kg). HEENT: Normocephalic and atraumatic. Pupils are equal, round, reactive to light and accommodation. Extraocular muscles are intact. Tooth extraction top right, no bleeding, no infection, well healed. Neck: Showed no JVD, no carotid bruit. Lungs: Wheezing bilaterally. Clear to auscultation bilaterally. Heart: Regular without any murmur. Abdomen: Soft, nontender. No hepatosplenomegaly. Extremities: Lower extremities show no edema, clubbing, or cyanosis.   Neuro exam: intact cranial nerves bilaterally no motor or sensory deficit, gait is normal. Lymphatic: no check.   3. Continue with FOLFOX unchanged. 4. I am concerned about weight, will monitor and assess in 2 weeks. 5. I explained plan to monitor liver for response, continue to work with GI.   6. Return in 2 weeks.

## 2018-08-28 NOTE — PROGRESS NOTES
Pt here for C4D1. Pt seen by Dr. Yang Kelly. Labs drawn from port and results reviewed. Dr. Yang Kelly notified of Alk Phos = 136, ALT = 52, proceed with chemotherapy. Pt was treated without incident and d/c'd in stable condition. Pt will return in 2 days for pump d/c.

## 2018-08-30 NOTE — PROGRESS NOTES
Patient was to be here at 130 PM for appointment but showed up at [facility].  He came in without dressing on his port. He stated that it fell off while taking a shower. We reiterated that he needs a dressing on port to prevent infections.

## 2018-09-02 NOTE — ED PROVIDER NOTES
Smoking status: Never Smoker    Smokeless tobacco: Never Used    Alcohol use No    Drug use: No    Sexual activity: Not on file     Other Topics Concern    Not on file     Social History Narrative    No narrative on file       Family History   Problem Relation Age of Onset    Heart Disease Mother     Stroke Mother     High Blood Pressure Mother     High Cholesterol Father        Allergies:  Morphine    Home Medications:  Prior to Admission medications    Medication Sig Start Date End Date Taking? Authorizing Provider   levofloxacin (LEVAQUIN) 500 MG tablet Take 1 tablet by mouth daily for 10 days 8/23/18 9/2/18  Lexii Ortega MD   metroNIDAZOLE (FLAGYL) 500 MG tablet Take 1 tablet by mouth 2 times daily (with meals) for 13 days 8/23/18 9/5/18  Lexii Ortega MD   fentaNYL (DURAGESIC) 25 MCG/HR Place 1 patch onto the skin every 72 hours for 30 days. . 8/22/18 9/21/18  Zane Dudley MD   oxyCODONE-acetaminophen (PERCOCET)  MG per tablet Take 1 tablet by mouth every 6 hours as needed for Pain for up to 30 days. . 8/22/18 9/21/18  Dionisio Fitzpatrick MD   insulin regular (NOVOLIN R) 100 UNIT/ML injection Use per sliding scale specified  150-200 3 units  201-250 5 units   251-300 8 units  301-350 11 units   251-400 15 units   More than 400 call MD 4/24/18   Zane Dudley MD   metoprolol tartrate (LOPRESSOR) 25 MG tablet Take 1 tablet by mouth daily  3/12/18   Historical Provider, MD   Glucose Blood (BLOOD GLUCOSE TEST STRIPS) STRP 1 each by In Vitro route daily As needed. 8/24/17   Dionisio Fitzpatrick MD   Insulin Pen Needle 29G X 12.7MM MISC 1 each by Does not apply route daily 5/4/17   [facility], MD   insulin glargine (LANTUS) 100 UNIT/ML injection vial Inject 15 Units into the skin nightly. If blood glucose >200mg/dL then inject 18 Units into the skin.  12/17/16   Al Perish, DPM   nitroGLYCERIN (NITROSTAT) 0.4 MG SL tablet Place 1 tablet under the tongue every 5 minutes as needed for Chest pain Dissolve 1 tablet under tongue for chest pain and repeat every 5 min up to max of 3 total doses. If no relief after 3 doses call 911 11/29/16   ASIM Aguillon - CNP       REVIEW OF SYSTEMS    (2-9 systems for level 4, 10 or more for level 5)      Review of Systems   Respiratory: Negative for shortness of breath. Cardiovascular: Negative for chest pain, palpitations and leg swelling. Musculoskeletal: Negative for joint swelling. Skin: Negative for color change, pallor and rash. PHYSICAL EXAM   (up to 7 for level 4, 8 or more for level 5)      INITIAL VITALS:   /80   Pulse 94   Temp 98.2 °F (36.8 °C) (Oral)   Resp 14   Ht 6' 1\" (1.854 m)   Wt 235 lb (106.6 kg)   SpO2 95%   BMI 31.00 kg/m²     Physical Exam   Constitutional: He appears well-developed and well-nourished. No distress. Cardiovascular: Normal rate, regular rhythm and normal heart sounds. No murmur heard. Bilateral femoral, DP, and PT pulses intact and symmetrical   Pulmonary/Chest: Effort normal and breath sounds normal. No respiratory distress. He has no wheezes. He has no rales. Abdominal: Soft. Bowel sounds are normal. He exhibits no distension. There is no tenderness. There is no rebound and no guarding. Musculoskeletal:   Mild right calf tenderness to palpation, no palpable cord. No lower extremity edema       DIFFERENTIAL  DIAGNOSIS     PLAN (LABS / IMAGING / EKG):  Orders Placed This Encounter   Procedures    VL DUP LOWER EXTREMITY VENOUS RIGHT    D-Dimer, Quantitative       MEDICATIONS ORDERED:  No orders of the defined types were placed in this encounter.       DDX: DVT, calf strain, arterial injury    DIAGNOSTIC RESULTS / EMERGENCY DEPARTMENT COURSE / MDM     LABS:  Results for orders placed or performed during the hospital encounter of 09/01/18   D-Dimer, Quantitative   Result Value Ref Range    D-Dimer, Quant 1.84 (H) <0.50 mg/L FEU       IMPRESSION: calf strain    RADIOLOGY:  Vl Dup Lower Extremity Venous Right    Result Date: 9/2/2018    [facility]  Vascular Lower Extremities DVT Study Procedure   Patient Name  BRITTANY [facility]  Date of Study           09/01/2018                Jayme Burgess   Date of Birth 1960   Gender                  Male   Age           56 year(s)   Race                       Room Number   03   Corporate ID  [id number]  #   Patient Ximena Myers  [de-identified]  #   MR #          [id number]       Dominica Aase   Accession #   [id number]    Interpreting Physician  Florencia Carmen   Referring                  Referring Physician     Yvonne Brooks MD  Nurse  Practitioner  Procedure Type of Study:   Veins: Lower Extremities DVT Study, Venous Scan Lower Right. Indications for Study:Pain and Positive D-Dimer. Patient Status: In Patient. Technical Quality:Adequate visualization. Conclusions   Summary   Simultaneous real time imaging utilizing B-Mode, color doppler and  spectral waveform analysis was performed on the right lower extremity for  venous examination of the deep and superficial systems. Findings are:   Right:  No evidence of deep or superficial venous thrombosis. Signature   ----------------------------------------------------------------  Electronically signed by Alec Barlow(Sonographer) on  09/02/2018 12:12 AM  ----------------------------------------------------------------   ----------------------------------------------------------------  Electronically signed by Nabeel Lambert(Interpreting physician)  on 09/02/2018 11:49 AM  ----------------------------------------------------------------  Findings:   Right Impression:                        Left Impression:  The common femoral, femoral, popliteal   The common femoral vein  and tibial veins demonstrate normal      demonstrates normal  compressibility and augmentation. compressibility and augmentation.    Normal compressibility of the great  saphenous vein.   Normal compressibility of the small  saphenous vein. Allergies   - Allergy:Morphine(Drug). Velocities are measured in cm/s ; Diameters are measured in cm Right Lower Extremities DVT Study Measurements Right 2D Measurements +------------------------------------+----------+---------------+----------+ ! Location                            ! Visualized! Compressibility! Thrombosis! +------------------------------------+----------+---------------+----------+ ! Common Femoral                      !Yes       ! Yes            ! None      ! +------------------------------------+----------+---------------+----------+ ! Prox Femoral                        !Yes       ! Yes            ! None      ! +------------------------------------+----------+---------------+----------+ ! Mid Femoral                         !Yes       ! Yes            ! None      ! +------------------------------------+----------+---------------+----------+ ! Dist Femoral                        !Yes       ! Yes            ! None      ! +------------------------------------+----------+---------------+----------+ ! Popliteal                           !Yes       ! Yes            ! None      ! +------------------------------------+----------+---------------+----------+ ! Sapheno Femoral Junction            ! Yes       ! Yes            ! None      ! +------------------------------------+----------+---------------+----------+ ! PTV                                 ! Yes       ! Yes            ! None      ! +------------------------------------+----------+---------------+----------+ ! Peroneal                            !Yes       ! Yes            ! None      ! +------------------------------------+----------+---------------+----------+ ! Gastroc                             ! Yes       ! Yes            ! None      ! +------------------------------------+----------+---------------+----------+ ! GSV Thigh                           ! Yes       ! Yes            ! None      !
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
## 2018-09-11 NOTE — PROGRESS NOTES
Jose C Scott in treatment area after being seen by md  Labs reviewed  [place] already accessed.  Good blood return   Chemotherapy administered as ordered see MAR  Connected to 5FU CADD pump to run over 46hrs  Pt tolerated treatment well, discharged to home  Pump double checked with Corina Estrada RN  RV on 9-13-18

## 2018-09-13 NOTE — PROGRESS NOTES
Patient arrived for pump off  Ambulated to exit in stable condition   Return 9/25 jerman and Dr Fernando Tapia RN

## 2018-09-25 NOTE — PROGRESS NOTES
DIAGNOSIS:   1. Metastatic rectal cancer, Kras mutated. 2. Bleeding secondary to the tumor  3. Liver metastases  4. Coronary artery disease, presenting with ventricular fibrillation, needed urgent cardiac catheterization and stent, diagnosis was one week prior to diagnosis of metastatic cancer    CURRENT THERAPY:  1. Status post coronary stenting  2. Started palliative chemotherapy with FOLFOX, Avastin was held because of active bleeding  3. CT scan showed excellent response in the liver. But the primary tumor was almost the same size. 4. Chemoradiation with 5-FU sensitization to deal with the primary tumor  5. Palliative Avastin+ FOLFIRI started 1/16/18  6. CT showed interval response, CEA rising, 07/2018, plan for FOLFOX with liver directed therapy (done 08/23/2018)    BRIEF CASE HISTORY:   Remberto Lopez is a very pleasant 56 y.o. male who was admitted to the hospital with  rectal bleeding and change in bowel habits. He is found to have rectal mass. And multiple colonic polyps. bx was done. And showed adenocarcinoma of the rectum, CT scan showed liver metastases that were biopsy-proven to be metastatic disease. Pt had recent MI and stenting. He is on double antiplatelet agents. Initially he was on Berlinta and aspirin but that was later changed to Plavix and aspirin  We decided to start with chemotherapy, we chose the combination of FOLFOX. We held the Avastin because of ongoing bleeding and possible need for surgery. Chemotherapy was well tolerated. Interim CT scan showed excellent response in the liver with significant shrinkage of the liver metastases. However, the primary tumor was almost the same size and that was confirmed with endoscopic ultrasound. The patient was sent back for chemoradiation with 5-FU. He developed significant radiation enteritis and cystitis, managed conservatively. After completion of chemoradiation, there was significant progression of his liver metastases.   So

## 2018-09-25 NOTE — PROGRESS NOTES
Pt here for C6D1. Pt seen by Dr. Trent Casiano, order received for CEA with every cycle. Influenza shot given today. Labs drawn from port and results reviewed. Pt was treated without incident and d/c'd in stable condition. Pt will return in 2 days for pump d/c.

## 2018-09-27 NOTE — PROGRESS NOTES
Pt is here for CADD pump d/c. Denies any problems at this time. Pt was discharged in stable condition and will return on 10/9 for C7D1.

## 2018-10-09 NOTE — PROGRESS NOTES
Patient was due this morning at 8 am. He showed at 60 920 06 98, expecting to be treated. His labs were drawn. He had a low K+ and Ca+. I spoke to Dr. Marry Lewis. He would not give orders till he seen the patient. He would fit him in when he could d/t to patient being late. Patient updated. Patient stated he never looked at his schedule. We have spoke to Natalia Naranjo about keeping his appointment time multiple times. He refuses any other day except Tuesday. He wants 10am. I spoke to [facility]. Instructed her to put him at 10am. On Tuesdays for treatment. If it is not available on his treatment day. Then schedule him for a week later, per patient's request. She verbalized understanding. K+ being replaced orally and Calcium being replaced IVBP. See Mar. Patient seen by Dr. Marry Lewis, see his dictation.
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
## 2018-10-10 NOTE — ED NOTES
Went into Room 2 to give pt. His potassium that Dr. Richard Dangelo had ordered, pt was gone along with his infusion pump and tubing. This RN did not access his port. This RN spoke with Jalyn CAMPUZANO at [facility] and informed her that pt had left with infusion pump and that he also left without receiving his potassium and that pt had told this rn that he had not filled his Potassium script given to him yesterday by his cancer DrLevi Pearson RN informed this RN that Dr. Mallika Quiles did not want the pt's infusion restarted or his port accessed. They would address these issues with pt when and if he came to the [facility].      Amrit Crawley RN  10/10/18 [id number]

## 2018-10-10 NOTE — ED NOTES
Spoke with Jalyn CAMPUZANO at [facility] and explained to her that this pt had accidentally disconnected his daley needle from his port. Per Zak Guillen RN, Pt placed daley needle on registation counter in the main ER waiting room, never stopping the infusion nor clamping off the tubing, thus contaminating everything. Upon my arrival in room 2 where pt had been placed, I found the infusion pump with tubing and IV cap laying on bedside table with fluid slowly dripping out end of exposed IV cap. THis RN clamped infusion tubing and had pt stopped the infusion pump. This RN explained to pt. That the infusion tubing was contaminated because the line had been left completely open and the end of the IV cap still connected to the open tubing was laying on the floor in the pt's room. Pt. Stated that he just needed a new needle put in and tubing reconnected. This RN explained to the pt. That we would try to find tubing here in the hospital that could replace the contaminated tubing, but that I could not and would not reuse the tubing connected to infusion pump due to contamination and severe risk of infection to pt. This RN explained that she would contact the cancer center in [facility] and cab pt there if necessary so they could reconnect his infusion if we could not locate appropriate tubing here. Pt. Stated he would try to find a ride there and back as he did not want to be cabbed there and then get stuck there with no way back home.      Shanelle Courtney RN  10/10/18 0360

## 2018-10-10 NOTE — PROGRESS NOTES
Port looked healed, no redness or swelling noted. The needle site was small and scabbed over. No trauma noted.

## 2018-10-10 NOTE — PROGRESS NOTES
Patient came in to have his pump put back on. I explained to him what Dr. Renee Bishop instructions were. He verbalized understanding. I re-accessed his port, see documentation in LDA tab. He had 100.9 mls infused and 149.1mls in the reservoir. He had a new story when he arrived, about what happened to his pump. The pump, tubing and the smartsite was intact. He stated that he was arrested and the police officers pulled it out. I explained that it did not matter how this happened. It matters that it keeps happening to him. I reminded him how dangerous chemotherapy is when not used or cared for properly. He was talking about all his home issues. I reminded him how important his treatment is to his life. He verbalized understanding. [place] blood return noted. Flushed with NS/Heparin, needle removed. Patient stable and ambulatory at d/c.

## 2018-10-23 NOTE — PROGRESS NOTES
he was restarted on chemotherapy. We chose the combination of FOLFIRI plus Avastin. The patient was sent to [facility] for consideration of palliative resection of primary tumor. He underwent cryoablation of the liver 08/23/2018. INTERIM HISTORY: The patient comes in today for a follow up for metastatic rectal cancer and cycle #8 of treatment. He recently presented in ER due to pump disconnection. He has had cough. He reports he is monitoring his glucose. PAST MEDICAL HISTORY: has a past medical history of Back pain; CAD S/P percutaneous coronary angioplasty; Carcinoma ([address] Colon cancer ([address] Degeneration of lumbar or lumbosacral intervertebral disc; Depression; Diabetes mellitus ([address] H/O cardiac catheterization; Hepatic metastases ([address] Hyperlipidemia; Hypertension; Knee pain; and MI, old. PAST SURGICAL HISTORY: has a past surgical history that includes Tunneled venous port placement (Right); pr sigmoidoscopy flx w/rmvl foreign body (N/A, 5/25/2017); hernia repair; Coronary angioplasty with stent (11/2016); pr sigmoidoscopy flx w/rmvl foreign body (12/8/2017); Endoscopic ultrasonography, GI (N/A, 12/8/2017); and liver biopsy (Right, 08/23/2018). CURRENT MEDICATIONS:  has a current medication list which includes the following prescription(s): fentanyl, oxycodone-acetaminophen, potassium chloride, tadalafil, insulin regular, metoprolol tartrate, blood glucose test strips, insulin pen needle, insulin glargine, and nitroglycerin, and the following Facility-Administered Medications: sodium chloride flush. ALLERGIES:  is allergic to morphine. FAMILY HISTORY: Negative for any hematological or oncological conditions. SOCIAL HISTORY:  reports that he has never smoked. He has never used smokeless tobacco. He reports that he does not drink alcohol or use drugs. REVIEW OF SYSTEMS:  General: No fever or night sweats.  Moderate fatigue - unchanged; weight stable; appetite

## 2019-01-01 ENCOUNTER — APPOINTMENT (OUTPATIENT)
Dept: GENERAL RADIOLOGY | Age: 59
End: 2019-01-01
Payer: MEDICARE

## 2019-01-01 ENCOUNTER — APPOINTMENT (OUTPATIENT)
Dept: NUCLEAR MEDICINE | Age: 59
DRG: 871 | End: 2019-01-01
Payer: MEDICARE

## 2019-01-01 ENCOUNTER — HOSPITAL ENCOUNTER (INPATIENT)
Age: 59
LOS: 5 days | Discharge: LEFT BEFORE DISCHARGE | DRG: 871 | End: 2019-05-24
Attending: EMERGENCY MEDICINE | Admitting: INTERNAL MEDICINE
Payer: MEDICARE

## 2019-01-01 ENCOUNTER — APPOINTMENT (OUTPATIENT)
Dept: GENERAL RADIOLOGY | Age: 59
DRG: 871 | End: 2019-01-01
Payer: MEDICARE

## 2019-01-01 ENCOUNTER — APPOINTMENT (OUTPATIENT)
Dept: CT IMAGING | Age: 59
End: 2019-01-01
Payer: MEDICARE

## 2019-01-01 ENCOUNTER — HOSPITAL ENCOUNTER (INPATIENT)
Age: 59
LOS: 2 days | Discharge: HOME | DRG: 871 | End: 2019-05-18
Attending: EMERGENCY MEDICINE | Admitting: INTERNAL MEDICINE
Payer: MEDICARE

## 2019-01-01 ENCOUNTER — APPOINTMENT (OUTPATIENT)
Dept: MRI IMAGING | Age: 59
DRG: 435 | End: 2019-01-01
Payer: MEDICARE

## 2019-01-01 ENCOUNTER — HOSPITAL ENCOUNTER (INPATIENT)
Age: 59
LOS: 4 days | Discharge: HOME | DRG: 435 | End: 2019-05-07
Attending: EMERGENCY MEDICINE | Admitting: INTERNAL MEDICINE
Payer: MEDICARE

## 2019-01-01 ENCOUNTER — HOSPITAL ENCOUNTER (INPATIENT)
Age: 59
LOS: 5 days | Discharge: HOME | DRG: 871 | End: 2019-04-22
Attending: EMERGENCY MEDICINE | Admitting: INTERNAL MEDICINE
Payer: MEDICARE

## 2019-01-01 ENCOUNTER — APPOINTMENT (OUTPATIENT)
Dept: CT IMAGING | Age: 59
DRG: 871 | End: 2019-01-01
Payer: MEDICARE

## 2019-01-01 ENCOUNTER — APPOINTMENT (OUTPATIENT)
Dept: GENERAL RADIOLOGY | Age: 59
DRG: 435 | End: 2019-01-01
Payer: MEDICARE

## 2019-01-01 ENCOUNTER — APPOINTMENT (OUTPATIENT)
Dept: INTERVENTIONAL RADIOLOGY/VASCULAR | Age: 59
DRG: 871 | End: 2019-01-01
Payer: MEDICARE

## 2019-01-01 ENCOUNTER — APPOINTMENT (OUTPATIENT)
Dept: INTERVENTIONAL RADIOLOGY/VASCULAR | Age: 59
DRG: 435 | End: 2019-01-01
Payer: MEDICARE

## 2019-01-01 ENCOUNTER — HOSPITAL ENCOUNTER (EMERGENCY)
Age: 59
Discharge: HOME | End: 2019-05-10
Attending: EMERGENCY MEDICINE
Payer: MEDICARE

## 2019-01-01 ENCOUNTER — HOSPITAL ENCOUNTER (EMERGENCY)
Age: 59
Discharge: HOME | End: 2019-04-08
Attending: EMERGENCY MEDICINE
Payer: MEDICARE

## 2019-01-01 ENCOUNTER — APPOINTMENT (OUTPATIENT)
Dept: ULTRASOUND IMAGING | Age: 59
DRG: 435 | End: 2019-01-01
Payer: MEDICARE

## 2019-01-01 ENCOUNTER — APPOINTMENT (OUTPATIENT)
Dept: ULTRASOUND IMAGING | Age: 59
DRG: 871 | End: 2019-01-01
Payer: MEDICARE

## 2019-01-01 ENCOUNTER — HOSPITAL ENCOUNTER (EMERGENCY)
Age: 59
Discharge: HOME | End: 2019-04-01
Attending: EMERGENCY MEDICINE
Payer: MEDICARE

## 2019-01-01 ENCOUNTER — HOSPITAL ENCOUNTER (EMERGENCY)
Age: 59
Discharge: HOME | End: 2019-01-13
Attending: EMERGENCY MEDICINE
Payer: MEDICARE

## 2019-01-01 ENCOUNTER — HOSPITAL ENCOUNTER (EMERGENCY)
Age: 59
Discharge: HOME | End: 2019-04-07
Attending: EMERGENCY MEDICINE
Payer: MEDICARE

## 2019-01-01 VITALS
TEMPERATURE: 98.4 F | BODY MASS INDEX: 25.73 KG/M2 | DIASTOLIC BLOOD PRESSURE: 80 MMHG | HEART RATE: 108 BPM | SYSTOLIC BLOOD PRESSURE: 120 MMHG | HEIGHT: 72 IN | OXYGEN SATURATION: 95 % | WEIGHT: 190 LBS | RESPIRATION RATE: 24 BRPM

## 2019-01-01 VITALS
DIASTOLIC BLOOD PRESSURE: 82 MMHG | HEART RATE: 92 BPM | OXYGEN SATURATION: 97 % | RESPIRATION RATE: 12 BRPM | WEIGHT: 210 LBS | HEIGHT: 73 IN | BODY MASS INDEX: 27.83 KG/M2 | TEMPERATURE: 97.8 F | SYSTOLIC BLOOD PRESSURE: 126 MMHG

## 2019-01-01 VITALS
OXYGEN SATURATION: 98 % | RESPIRATION RATE: 16 BRPM | TEMPERATURE: 97.8 F | SYSTOLIC BLOOD PRESSURE: 107 MMHG | DIASTOLIC BLOOD PRESSURE: 67 MMHG | BODY MASS INDEX: 34.4 KG/M2 | HEIGHT: 70 IN | HEART RATE: 101 BPM | WEIGHT: 240.3 LBS

## 2019-01-01 VITALS
TEMPERATURE: 97.7 F | RESPIRATION RATE: 20 BRPM | HEART RATE: 90 BPM | SYSTOLIC BLOOD PRESSURE: 122 MMHG | BODY MASS INDEX: 23.62 KG/M2 | OXYGEN SATURATION: 97 % | WEIGHT: 190 LBS | DIASTOLIC BLOOD PRESSURE: 73 MMHG | HEIGHT: 75 IN

## 2019-01-01 VITALS
WEIGHT: 215 LBS | BODY MASS INDEX: 27.59 KG/M2 | OXYGEN SATURATION: 92 % | SYSTOLIC BLOOD PRESSURE: 121 MMHG | DIASTOLIC BLOOD PRESSURE: 73 MMHG | TEMPERATURE: 99.2 F | HEART RATE: 91 BPM | RESPIRATION RATE: 21 BRPM | HEIGHT: 74 IN

## 2019-01-01 VITALS
HEART RATE: 98 BPM | SYSTOLIC BLOOD PRESSURE: 111 MMHG | TEMPERATURE: 98.4 F | WEIGHT: 230.88 LBS | OXYGEN SATURATION: 96 % | HEIGHT: 70 IN | DIASTOLIC BLOOD PRESSURE: 68 MMHG | BODY MASS INDEX: 33.05 KG/M2 | RESPIRATION RATE: 16 BRPM

## 2019-01-01 VITALS
HEART RATE: 98 BPM | BODY MASS INDEX: 25.57 KG/M2 | RESPIRATION RATE: 19 BRPM | HEIGHT: 76 IN | DIASTOLIC BLOOD PRESSURE: 79 MMHG | SYSTOLIC BLOOD PRESSURE: 134 MMHG | WEIGHT: 210 LBS | OXYGEN SATURATION: 99 % | TEMPERATURE: 97.8 F

## 2019-01-01 VITALS
HEIGHT: 75 IN | OXYGEN SATURATION: 100 % | RESPIRATION RATE: 19 BRPM | SYSTOLIC BLOOD PRESSURE: 124 MMHG | HEART RATE: 87 BPM | WEIGHT: 193.34 LBS | DIASTOLIC BLOOD PRESSURE: 83 MMHG | BODY MASS INDEX: 24.04 KG/M2 | TEMPERATURE: 97.3 F

## 2019-01-01 VITALS
DIASTOLIC BLOOD PRESSURE: 83 MMHG | OXYGEN SATURATION: 99 % | TEMPERATURE: 97.7 F | RESPIRATION RATE: 16 BRPM | SYSTOLIC BLOOD PRESSURE: 127 MMHG | WEIGHT: 240 LBS | HEIGHT: 74 IN | BODY MASS INDEX: 30.8 KG/M2 | HEART RATE: 82 BPM

## 2019-01-01 DIAGNOSIS — R18.0 MALIGNANT ASCITES: ICD-10-CM

## 2019-01-01 DIAGNOSIS — S89.91XA INJURY OF RIGHT KNEE, INITIAL ENCOUNTER: ICD-10-CM

## 2019-01-01 DIAGNOSIS — G47.9 DIFFICULTY SLEEPING: ICD-10-CM
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
## 2019-01-01 DIAGNOSIS — A41.9 SEPTIC SHOCK (HCC): Primary | ICD-10-CM

## 2019-01-01 DIAGNOSIS — R07.9 CHEST PAIN, UNSPECIFIED TYPE: Primary | ICD-10-CM

## 2019-01-01 DIAGNOSIS — S89.92XA KNEE INJURY, LEFT, INITIAL ENCOUNTER: ICD-10-CM

## 2019-01-01 DIAGNOSIS — Z85.038 H/O COLON CANCER, STAGE IV: ICD-10-CM

## 2019-01-01 DIAGNOSIS — M25.561 CHRONIC PAIN OF BOTH KNEES: ICD-10-CM

## 2019-01-01 DIAGNOSIS — R61 NIGHT SWEAT: Primary | ICD-10-CM

## 2019-01-01 DIAGNOSIS — I48.0 PAROXYSMAL ATRIAL FIBRILLATION (HCC): ICD-10-CM

## 2019-01-01 DIAGNOSIS — R05.9 COUGH: Primary | ICD-10-CM

## 2019-01-01 DIAGNOSIS — A41.9 SEPTICEMIA (HCC): Primary | ICD-10-CM

## 2019-01-01 DIAGNOSIS — S09.90XA INJURY OF HEAD, INITIAL ENCOUNTER: ICD-10-CM

## 2019-01-01 DIAGNOSIS — R65.21 SEPTIC SHOCK (HCC): Primary | ICD-10-CM

## 2019-01-01 DIAGNOSIS — E87.1 HYPONATREMIA: ICD-10-CM

## 2019-01-01 DIAGNOSIS — E11.65 TYPE 2 DIABETES MELLITUS WITH HYPERGLYCEMIA, WITHOUT LONG-TERM CURRENT USE OF INSULIN (HCC): ICD-10-CM

## 2019-01-01 DIAGNOSIS — S33.5XXS LUMBAR SPRAIN, SEQUELA: ICD-10-CM

## 2019-01-01 DIAGNOSIS — M25.562 CHRONIC PAIN OF BOTH KNEES: ICD-10-CM

## 2019-01-01 DIAGNOSIS — G89.29 CHRONIC PAIN OF BOTH KNEES: ICD-10-CM

## 2019-01-01 DIAGNOSIS — M51.37 DEGENERATION OF LUMBAR OR LUMBOSACRAL INTERVERTEBRAL DISC: ICD-10-CM

## 2019-01-01 LAB
-: ABNORMAL
ABO/RH: NORMAL
ABSOLUTE BANDS #: 0.7 K/UL (ref 0–1)
ABSOLUTE BANDS #: 0.76 K/UL (ref 0–1)
ABSOLUTE BANDS #: 1.03 K/UL (ref 0–1)
ABSOLUTE BANDS #: 1.62 K/UL (ref 0–1)
ABSOLUTE BANDS #: 2.55 K/UL (ref 0–1)
ABSOLUTE EOS #: 0 K/UL (ref 0–0.4)
ABSOLUTE EOS #: 0.12 K/UL (ref 0–0.4)
ABSOLUTE IMMATURE GRANULOCYTE: ABNORMAL K/UL (ref 0–0.3)
ABSOLUTE LYMPH #: 0.23 K/UL (ref 1–4.8)
ABSOLUTE LYMPH #: 0.34 K/UL (ref 1–4.8)
ABSOLUTE LYMPH #: 0.44 K/UL (ref 1–4.8)
ABSOLUTE LYMPH #: 0.48 K/UL (ref 1–4.8)
ABSOLUTE LYMPH #: 0.51 K/UL (ref 1–4.8)
ABSOLUTE LYMPH #: 0.51 K/UL (ref 1–4.8)
ABSOLUTE LYMPH #: 0.71 K/UL (ref 1–4.8)
ABSOLUTE LYMPH #: 0.76 K/UL (ref 1–4.8)
ABSOLUTE LYMPH #: 0.82 K/UL (ref 1–4.8)
ABSOLUTE LYMPH #: 1.2 K/UL (ref 1–4.8)
ABSOLUTE LYMPH #: 1.5 K/UL (ref 1–4.8)
ABSOLUTE MONO #: 0.7 K/UL (ref 0.1–1.3)
ABSOLUTE MONO #: 0.94 K/UL (ref 0.1–1.3)
ABSOLUTE MONO #: 1.09 K/UL (ref 0.1–1.3)
ABSOLUTE MONO #: 1.2 K/UL (ref 0.1–1.3)
ABSOLUTE MONO #: 1.3 K/UL (ref 0.1–1.3)
ABSOLUTE MONO #: 1.33 K/UL (ref 0.1–1.3)
ABSOLUTE MONO #: 1.37 K/UL (ref 0.1–1.3)
ABSOLUTE MONO #: 1.39 K/UL (ref 0.1–1.3)
ABSOLUTE MONO #: 1.76 K/UL (ref 0.1–1.3)
ABSOLUTE MONO #: 1.78 K/UL (ref 0.1–1.3)
ABSOLUTE MONO #: 1.87 K/UL (ref 0.1–1.3)
ALBUMIN FLUID: 0.8 G/DL
ALBUMIN FLUID: 0.9 G/DL
ALBUMIN FLUID: 0.9 G/DL
ALBUMIN FLUID: 1 G/DL
ALBUMIN SERPL-MCNC: 1.8 G/DL (ref 3.5–5.2)
ALBUMIN SERPL-MCNC: 1.9 G/DL (ref 3.5–5.2)
ALBUMIN SERPL-MCNC: 2 G/DL (ref 3.5–5.2)
ALBUMIN SERPL-MCNC: 2.1 G/DL (ref 3.5–5.2)
ALBUMIN SERPL-MCNC: 2.1 G/DL (ref 3.5–5.2)
ALBUMIN SERPL-MCNC: 2.2 G/DL (ref 3.5–5.2)
ALBUMIN SERPL-MCNC: 2.4 G/DL (ref 3.5–5.2)
ALBUMIN SERPL-MCNC: 2.4 G/DL (ref 3.5–5.2)
ALBUMIN SERPL-MCNC: 2.5 G/DL (ref 3.5–5.2)
ALBUMIN SERPL-MCNC: 2.6 G/DL (ref 3.5–5.2)
ALBUMIN SERPL-MCNC: 2.7 G/DL (ref 3.5–5.2)
ALBUMIN SERPL-MCNC: 2.9 G/DL (ref 3.5–5.2)
ALBUMIN/GLOBULIN RATIO: ABNORMAL (ref 1–2.5)
ALP BLD-CCNC: 282 U/L (ref 40–129)
ALP BLD-CCNC: 317 U/L (ref 40–129)
ALP BLD-CCNC: 341 U/L (ref 40–129)
ALP BLD-CCNC: 346 U/L (ref 40–129)
ALP BLD-CCNC: 417 U/L (ref 40–129)
ALP BLD-CCNC: 455 U/L (ref 40–129)
ALP BLD-CCNC: 463 U/L (ref 40–129)
ALP BLD-CCNC: 471 U/L (ref 40–129)
ALP BLD-CCNC: 472 U/L (ref 40–129)
ALP BLD-CCNC: 504 U/L (ref 40–129)
ALP BLD-CCNC: 521 U/L (ref 40–129)
ALP BLD-CCNC: 587 U/L (ref 40–129)
ALP BLD-CCNC: 589 U/L (ref 40–129)
ALP BLD-CCNC: 603 U/L (ref 40–129)
ALP BLD-CCNC: 712 U/L (ref 40–129)
ALP BLD-CCNC: 717 U/L (ref 40–129)
ALP BLD-CCNC: 766 U/L (ref 40–129)
ALP BLD-CCNC: 864 U/L (ref 40–129)
ALT SERPL-CCNC: 12 U/L (ref 5–41)
ALT SERPL-CCNC: 14 U/L (ref 5–41)
ALT SERPL-CCNC: 15 U/L (ref 5–41)
ALT SERPL-CCNC: 16 U/L (ref 5–41)
ALT SERPL-CCNC: 17 U/L (ref 5–41)
ALT SERPL-CCNC: 18 U/L (ref 5–41)
ALT SERPL-CCNC: 19 U/L (ref 5–41)
ALT SERPL-CCNC: 22 U/L (ref 5–41)
ALT SERPL-CCNC: 22 U/L (ref 5–41)
ALT SERPL-CCNC: 23 U/L (ref 5–41)
AMMONIA: 27 UMOL/L (ref 16–60)
AMMONIA: 36 UMOL/L (ref 16–60)
AMMONIA: 50 UMOL/L (ref 16–60)
AMORPHOUS: ABNORMAL
AMYLASE FLUID: 5 U/L
AMYLASE FLUID: 6 U/L
AMYLASE FLUID: 6 U/L
AMYLASE: 14 U/L (ref 28–100)
ANION GAP SERPL CALCULATED.3IONS-SCNC: 10 MMOL/L (ref 9–17)
ANION GAP SERPL CALCULATED.3IONS-SCNC: 11 MMOL/L (ref 9–17)
ANION GAP SERPL CALCULATED.3IONS-SCNC: 12 MMOL/L (ref 9–17)
ANION GAP SERPL CALCULATED.3IONS-SCNC: 13 MMOL/L (ref 9–17)
ANION GAP SERPL CALCULATED.3IONS-SCNC: 14 MMOL/L (ref 9–17)
ANION GAP SERPL CALCULATED.3IONS-SCNC: 14 MMOL/L (ref 9–17)
ANION GAP SERPL CALCULATED.3IONS-SCNC: 15 MMOL/L (ref 9–17)
ANION GAP SERPL CALCULATED.3IONS-SCNC: 15 MMOL/L (ref 9–17)
ANION GAP SERPL CALCULATED.3IONS-SCNC: 16 MMOL/L (ref 9–17)
ANION GAP SERPL CALCULATED.3IONS-SCNC: 18 MMOL/L (ref 9–17)
ANION GAP SERPL CALCULATED.3IONS-SCNC: 18 MMOL/L (ref 9–17)
ANION GAP SERPL CALCULATED.3IONS-SCNC: 23 MMOL/L (ref 9–17)
ANION GAP SERPL CALCULATED.3IONS-SCNC: 8 MMOL/L (ref 9–17)
ANTIBODY SCREEN: NEGATIVE
APPEARANCE FLUID: ABNORMAL
APPEARANCE FLUID: ABNORMAL
APPEARANCE FLUID: CLEAR
APPEARANCE FLUID: NORMAL
ARM BAND NUMBER: NORMAL
AST SERPL-CCNC: 106 U/L
AST SERPL-CCNC: 108 U/L
AST SERPL-CCNC: 108 U/L
AST SERPL-CCNC: 115 U/L
AST SERPL-CCNC: 115 U/L
AST SERPL-CCNC: 119 U/L
AST SERPL-CCNC: 129 U/L
AST SERPL-CCNC: 146 U/L
AST SERPL-CCNC: 151 U/L
AST SERPL-CCNC: 38 U/L
AST SERPL-CCNC: 42 U/L
AST SERPL-CCNC: 46 U/L
AST SERPL-CCNC: 46 U/L
AST SERPL-CCNC: 47 U/L
AST SERPL-CCNC: 63 U/L
AST SERPL-CCNC: 74 U/L
AST SERPL-CCNC: 80 U/L
AST SERPL-CCNC: 95 U/L
ATYPICAL LYMPHOCYTE ABSOLUTE COUNT: 0.23 K/UL
ATYPICAL LYMPHOCYTES: 2 %
BACTERIA: ABNORMAL
BANDS: 11 % (ref 0–10)
BANDS: 15 % (ref 0–10)
BANDS: 3 % (ref 0–10)
BANDS: 6 % (ref 0–10)
BANDS: 6 % (ref 0–10)
BASO FLUID: ABNORMAL %
BASOPHILS # BLD: 0 % (ref 0–2)
BASOPHILS # BLD: 1 % (ref 0–2)
BASOPHILS ABSOLUTE: 0 K/UL (ref 0–0.2)
BASOPHILS ABSOLUTE: 0.1 K/UL (ref 0–0.2)
BASOPHILS ABSOLUTE: 0.11 K/UL (ref 0–0.2)
BASOPHILS ABSOLUTE: 0.12 K/UL (ref 0–0.2)
BASOPHILS ABSOLUTE: 0.13 K/UL (ref 0–0.2)
BASOPHILS ABSOLUTE: 0.25 K/UL (ref 0–0.2)
BILIRUB SERPL-MCNC: 0.44 MG/DL (ref 0.3–1.2)
BILIRUB SERPL-MCNC: 0.47 MG/DL (ref 0.3–1.2)
BILIRUB SERPL-MCNC: 0.49 MG/DL (ref 0.3–1.2)
BILIRUB SERPL-MCNC: 0.56 MG/DL (ref 0.3–1.2)
BILIRUB SERPL-MCNC: 0.56 MG/DL (ref 0.3–1.2)
BILIRUB SERPL-MCNC: 0.61 MG/DL (ref 0.3–1.2)
BILIRUB SERPL-MCNC: 0.77 MG/DL (ref 0.3–1.2)
BILIRUB SERPL-MCNC: 1.38 MG/DL (ref 0.3–1.2)
BILIRUB SERPL-MCNC: 1.51 MG/DL (ref 0.3–1.2)
BILIRUB SERPL-MCNC: 2.09 MG/DL (ref 0.3–1.2)
BILIRUB SERPL-MCNC: 3.46 MG/DL (ref 0.3–1.2)
BILIRUB SERPL-MCNC: 3.83 MG/DL (ref 0.3–1.2)
BILIRUB SERPL-MCNC: 4.23 MG/DL (ref 0.3–1.2)
BILIRUB SERPL-MCNC: 4.36 MG/DL (ref 0.3–1.2)
BILIRUB SERPL-MCNC: 5.54 MG/DL (ref 0.3–1.2)
BILIRUB SERPL-MCNC: 5.58 MG/DL (ref 0.3–1.2)
BILIRUB SERPL-MCNC: 5.97 MG/DL (ref 0.3–1.2)
BILIRUB SERPL-MCNC: 6.19 MG/DL (ref 0.3–1.2)
BILIRUBIN DIRECT: 0.35 MG/DL
BILIRUBIN DIRECT: 1.29 MG/DL
BILIRUBIN DIRECT: 4.89 MG/DL
BILIRUBIN DIRECT: 5.44 MG/DL
BILIRUBIN URINE: ABNORMAL
BILIRUBIN URINE: ABNORMAL
BILIRUBIN URINE: NEGATIVE
BILIRUBIN URINE: NEGATIVE
BILIRUBIN, INDIRECT: 0.21 MG/DL (ref 0–1)
BILIRUBIN, INDIRECT: 0.75 MG/DL (ref 0–1)
BILIRUBIN, INDIRECT: 0.8 MG/DL (ref 0–1)
BILIRUBIN, INDIRECT: 1.08 MG/DL (ref 0–1)
BLD PROD TYP BPU: NORMAL
BNP INTERPRETATION: ABNORMAL
BUN BLDV-MCNC: 3 MG/DL (ref 6–20)
BUN BLDV-MCNC: 4 MG/DL (ref 6–20)
BUN BLDV-MCNC: 5 MG/DL (ref 6–20)
BUN BLDV-MCNC: 6 MG/DL (ref 6–20)
BUN BLDV-MCNC: 7 MG/DL (ref 6–20)
BUN/CREAT BLD: ABNORMAL (ref 9–20)
CALCIUM IONIZED: 1.08 MMOL/L (ref 1.13–1.33)
CALCIUM IONIZED: 1.09 MMOL/L (ref 1.13–1.33)
CALCIUM SERPL-MCNC: 7.2 MG/DL (ref 8.6–10.4)
CALCIUM SERPL-MCNC: 7.2 MG/DL (ref 8.6–10.4)
CALCIUM SERPL-MCNC: 7.3 MG/DL (ref 8.6–10.4)
CALCIUM SERPL-MCNC: 7.3 MG/DL (ref 8.6–10.4)
CALCIUM SERPL-MCNC: 7.4 MG/DL (ref 8.6–10.4)
CALCIUM SERPL-MCNC: 7.5 MG/DL (ref 8.6–10.4)
CALCIUM SERPL-MCNC: 7.6 MG/DL (ref 8.6–10.4)
CALCIUM SERPL-MCNC: 7.6 MG/DL (ref 8.6–10.4)
CALCIUM SERPL-MCNC: 7.7 MG/DL (ref 8.6–10.4)
CALCIUM SERPL-MCNC: 7.8 MG/DL (ref 8.6–10.4)
CALCIUM SERPL-MCNC: 7.9 MG/DL (ref 8.6–10.4)
CALCIUM SERPL-MCNC: 8.1 MG/DL (ref 8.6–10.4)
CALCIUM SERPL-MCNC: 8.1 MG/DL (ref 8.6–10.4)
CALCIUM SERPL-MCNC: 8.3 MG/DL (ref 8.6–10.4)
CALCIUM SERPL-MCNC: 8.5 MG/DL (ref 8.6–10.4)
CALCIUM SERPL-MCNC: 8.5 MG/DL (ref 8.6–10.4)
CALCIUM SERPL-MCNC: 8.7 MG/DL (ref 8.6–10.4)
CARCINOEMBRYONIC ANTIGEN: 4028 NG/ML
CASTS UA: ABNORMAL /LPF
CHLORIDE BLD-SCNC: 82 MMOL/L (ref 98–107)
CHLORIDE BLD-SCNC: 82 MMOL/L (ref 98–107)
CHLORIDE BLD-SCNC: 84 MMOL/L (ref 98–107)
CHLORIDE BLD-SCNC: 86 MMOL/L (ref 98–107)
CHLORIDE BLD-SCNC: 87 MMOL/L (ref 98–107)
CHLORIDE BLD-SCNC: 88 MMOL/L (ref 98–107)
CHLORIDE BLD-SCNC: 89 MMOL/L (ref 98–107)
CHLORIDE BLD-SCNC: 90 MMOL/L (ref 98–107)
CHLORIDE BLD-SCNC: 91 MMOL/L (ref 98–107)
CHLORIDE BLD-SCNC: 92 MMOL/L (ref 98–107)
CHLORIDE BLD-SCNC: 92 MMOL/L (ref 98–107)
CHLORIDE BLD-SCNC: 94 MMOL/L (ref 98–107)
CHLORIDE BLD-SCNC: 95 MMOL/L (ref 98–107)
CHLORIDE BLD-SCNC: 97 MMOL/L (ref 98–107)
CHLORIDE, UR: 36 MMOL/L
CO2: 20 MMOL/L (ref 20–31)
CO2: 21 MMOL/L (ref 20–31)
CO2: 23 MMOL/L (ref 20–31)
CO2: 24 MMOL/L (ref 20–31)
CO2: 24 MMOL/L (ref 20–31)
CO2: 25 MMOL/L (ref 20–31)
CO2: 26 MMOL/L (ref 20–31)
CO2: 26 MMOL/L (ref 20–31)
CO2: 27 MMOL/L (ref 20–31)
CO2: 28 MMOL/L (ref 20–31)
CO2: 29 MMOL/L (ref 20–31)
CO2: 30 MMOL/L (ref 20–31)
COLOR FLUID: YELLOW
COLOR: ABNORMAL
COLOR: ABNORMAL
COLOR: YELLOW
COLOR: YELLOW
COMMENT UA: ABNORMAL
CREAT SERPL-MCNC: 0.41 MG/DL (ref 0.7–1.2)
CREAT SERPL-MCNC: 0.41 MG/DL (ref 0.7–1.2)
CREAT SERPL-MCNC: 0.44 MG/DL (ref 0.7–1.2)
CREAT SERPL-MCNC: 0.44 MG/DL (ref 0.7–1.2)
CREAT SERPL-MCNC: 0.45 MG/DL (ref 0.7–1.2)
CREAT SERPL-MCNC: 0.45 MG/DL (ref 0.7–1.2)
CREAT SERPL-MCNC: 0.46 MG/DL (ref 0.7–1.2)
CREAT SERPL-MCNC: 0.47 MG/DL (ref 0.7–1.2)
CREAT SERPL-MCNC: 0.48 MG/DL (ref 0.7–1.2)
CREAT SERPL-MCNC: 0.49 MG/DL (ref 0.7–1.2)
CREAT SERPL-MCNC: 0.49 MG/DL (ref 0.7–1.2)
CREAT SERPL-MCNC: 0.5 MG/DL (ref 0.7–1.2)
CREAT SERPL-MCNC: 0.53 MG/DL (ref 0.7–1.2)
CREAT SERPL-MCNC: 0.67 MG/DL (ref 0.7–1.2)
CREAT SERPL-MCNC: 1.06 MG/DL (ref 0.7–1.2)
CREAT SERPL-MCNC: 1.9 MG/DL (ref 0.7–1.2)
CREAT SERPL-MCNC: 2.58 MG/DL (ref 0.7–1.2)
CREAT SERPL-MCNC: <0.4 MG/DL (ref 0.7–1.2)
CROSSMATCH RESULT: NORMAL
CRYSTALS, UA: ABNORMAL /HPF
CULTURE: ABNORMAL
CULTURE: NO GROWTH
CULTURE: NORMAL
D-DIMER QUANTITATIVE: 6.09 MG/L FEU (ref 0–0.59)
DATE, STOOL #1: NORMAL
DATE, STOOL #2: NORMAL
DATE, STOOL #3: NORMAL
DIFFERENTIAL TYPE: ABNORMAL
DIRECT EXAM: ABNORMAL
DIRECT EXAM: NORMAL
DISPENSE STATUS BLOOD BANK: NORMAL
EKG ATRIAL RATE: 103 BPM
EKG ATRIAL RATE: 108 BPM
EKG ATRIAL RATE: 135 BPM
EKG ATRIAL RATE: 158 BPM
EKG ATRIAL RATE: 85 BPM
EKG ATRIAL RATE: 99 BPM
EKG P AXIS: 29 DEGREES
EKG P AXIS: 32 DEGREES
EKG P AXIS: 38 DEGREES
EKG P AXIS: 42 DEGREES
EKG P AXIS: 45 DEGREES
EKG P-R INTERVAL: 140 MS
EKG P-R INTERVAL: 152 MS
EKG P-R INTERVAL: 154 MS
EKG P-R INTERVAL: 156 MS
EKG P-R INTERVAL: 170 MS
EKG Q-T INTERVAL: 288 MS
EKG Q-T INTERVAL: 328 MS
EKG Q-T INTERVAL: 342 MS
EKG Q-T INTERVAL: 354 MS
EKG Q-T INTERVAL: 354 MS
EKG Q-T INTERVAL: 380 MS
EKG QRS DURATION: 100 MS
EKG QRS DURATION: 100 MS
EKG QRS DURATION: 102 MS
EKG QRS DURATION: 102 MS
EKG QRS DURATION: 106 MS
EKG QRS DURATION: 96 MS
EKG QTC CALCULATION (BAZETT): 432 MS
EKG QTC CALCULATION (BAZETT): 438 MS
EKG QTC CALCULATION (BAZETT): 452 MS
EKG QTC CALCULATION (BAZETT): 463 MS
EKG QTC CALCULATION (BAZETT): 474 MS
EKG QTC CALCULATION (BAZETT): 531 MS
EKG R AXIS: -29 DEGREES
EKG R AXIS: 1 DEGREES
EKG R AXIS: 10 DEGREES
EKG R AXIS: 11 DEGREES
EKG R AXIS: 13 DEGREES
EKG R AXIS: 5 DEGREES
EKG T AXIS: 157 DEGREES
EKG T AXIS: 31 DEGREES
EKG T AXIS: 37 DEGREES
EKG T AXIS: 41 DEGREES
EKG T AXIS: 43 DEGREES
EKG T AXIS: 57 DEGREES
EKG VENTRICULAR RATE: 103 BPM
EKG VENTRICULAR RATE: 108 BPM
EKG VENTRICULAR RATE: 135 BPM
EKG VENTRICULAR RATE: 158 BPM
EKG VENTRICULAR RATE: 85 BPM
EKG VENTRICULAR RATE: 99 BPM
EOSINOPHIL FLUID: ABNORMAL %
EOSINOPHILS RELATIVE PERCENT: 0 % (ref 0–4)
EOSINOPHILS RELATIVE PERCENT: 1 % (ref 0–4)
EPITHELIAL CELLS UA: ABNORMAL /HPF
ESTIMATED AVERAGE GLUCOSE: 283 MG/DL
EXPIRATION DATE: NORMAL
FERRITIN: 3254 UG/L (ref 30–400)
FLUID DIFF COMMENT: ABNORMAL
GFR AFRICAN AMERICAN: 31 ML/MIN
GFR AFRICAN AMERICAN: 45 ML/MIN
GFR AFRICAN AMERICAN: >60 ML/MIN
GFR AFRICAN AMERICAN: ABNORMAL ML/MIN
GFR NON-AFRICAN AMERICAN: 26 ML/MIN
GFR NON-AFRICAN AMERICAN: 37 ML/MIN
GFR NON-AFRICAN AMERICAN: >60 ML/MIN
GFR NON-AFRICAN AMERICAN: ABNORMAL ML/MIN
GFR SERPL CREATININE-BSD FRML MDRD: ABNORMAL ML/MIN/{1.73_M2}
GLOBULIN: ABNORMAL G/DL (ref 1.5–3.8)
GLUCOSE BLD-MCNC: 105 MG/DL (ref 75–110)
GLUCOSE BLD-MCNC: 106 MG/DL (ref 75–110)
GLUCOSE BLD-MCNC: 109 MG/DL (ref 70–99)
GLUCOSE BLD-MCNC: 110 MG/DL (ref 70–99)
GLUCOSE BLD-MCNC: 118 MG/DL (ref 75–110)
GLUCOSE BLD-MCNC: 124 MG/DL (ref 75–110)
GLUCOSE BLD-MCNC: 125 MG/DL (ref 75–110)
GLUCOSE BLD-MCNC: 134 MG/DL (ref 70–99)
GLUCOSE BLD-MCNC: 136 MG/DL (ref 75–110)
GLUCOSE BLD-MCNC: 136 MG/DL (ref 75–110)
GLUCOSE BLD-MCNC: 137 MG/DL (ref 75–110)
GLUCOSE BLD-MCNC: 138 MG/DL (ref 75–110)
GLUCOSE BLD-MCNC: 140 MG/DL (ref 70–99)
GLUCOSE BLD-MCNC: 140 MG/DL (ref 75–110)
GLUCOSE BLD-MCNC: 142 MG/DL (ref 70–99)
GLUCOSE BLD-MCNC: 143 MG/DL (ref 75–110)
GLUCOSE BLD-MCNC: 144 MG/DL (ref 75–110)
GLUCOSE BLD-MCNC: 146 MG/DL (ref 75–110)
GLUCOSE BLD-MCNC: 149 MG/DL (ref 75–110)
GLUCOSE BLD-MCNC: 149 MG/DL (ref 75–110)
GLUCOSE BLD-MCNC: 150 MG/DL (ref 75–110)
GLUCOSE BLD-MCNC: 150 MG/DL (ref 75–110)
GLUCOSE BLD-MCNC: 153 MG/DL (ref 75–110)
GLUCOSE BLD-MCNC: 153 MG/DL (ref 75–110)
GLUCOSE BLD-MCNC: 154 MG/DL (ref 70–99)
GLUCOSE BLD-MCNC: 157 MG/DL (ref 70–99)
GLUCOSE BLD-MCNC: 160 MG/DL (ref 75–110)
GLUCOSE BLD-MCNC: 160 MG/DL (ref 75–110)
GLUCOSE BLD-MCNC: 166 MG/DL (ref 70–99)
GLUCOSE BLD-MCNC: 166 MG/DL (ref 75–110)
GLUCOSE BLD-MCNC: 174 MG/DL (ref 75–110)
GLUCOSE BLD-MCNC: 178 MG/DL (ref 75–110)
GLUCOSE BLD-MCNC: 178 MG/DL (ref 75–110)
GLUCOSE BLD-MCNC: 180 MG/DL (ref 70–99)
GLUCOSE BLD-MCNC: 180 MG/DL (ref 75–110)
GLUCOSE BLD-MCNC: 185 MG/DL (ref 70–99)
GLUCOSE BLD-MCNC: 185 MG/DL (ref 75–110)
GLUCOSE BLD-MCNC: 185 MG/DL (ref 75–110)
GLUCOSE BLD-MCNC: 189 MG/DL (ref 75–110)
GLUCOSE BLD-MCNC: 192 MG/DL (ref 75–110)
GLUCOSE BLD-MCNC: 194 MG/DL (ref 70–99)
GLUCOSE BLD-MCNC: 198 MG/DL (ref 75–110)
GLUCOSE BLD-MCNC: 202 MG/DL (ref 75–110)
GLUCOSE BLD-MCNC: 204 MG/DL (ref 75–110)
GLUCOSE BLD-MCNC: 209 MG/DL (ref 75–110)
GLUCOSE BLD-MCNC: 216 MG/DL (ref 75–110)
GLUCOSE BLD-MCNC: 226 MG/DL (ref 75–110)
GLUCOSE BLD-MCNC: 228 MG/DL (ref 75–110)
GLUCOSE BLD-MCNC: 231 MG/DL (ref 70–99)
GLUCOSE BLD-MCNC: 233 MG/DL (ref 75–110)
GLUCOSE BLD-MCNC: 236 MG/DL (ref 75–110)
GLUCOSE BLD-MCNC: 237 MG/DL (ref 70–99)
GLUCOSE BLD-MCNC: 240 MG/DL (ref 75–110)
GLUCOSE BLD-MCNC: 251 MG/DL (ref 70–99)
GLUCOSE BLD-MCNC: 253 MG/DL (ref 75–110)
GLUCOSE BLD-MCNC: 256 MG/DL (ref 75–110)
GLUCOSE BLD-MCNC: 264 MG/DL (ref 70–99)
GLUCOSE BLD-MCNC: 266 MG/DL (ref 75–110)
GLUCOSE BLD-MCNC: 272 MG/DL (ref 75–110)
GLUCOSE BLD-MCNC: 273 MG/DL (ref 70–99)
GLUCOSE BLD-MCNC: 294 MG/DL (ref 75–110)
GLUCOSE BLD-MCNC: 294 MG/DL (ref 75–110)
GLUCOSE BLD-MCNC: 300 MG/DL (ref 75–110)
GLUCOSE BLD-MCNC: 302 MG/DL (ref 75–110)
GLUCOSE BLD-MCNC: 311 MG/DL (ref 75–110)
GLUCOSE BLD-MCNC: 324 MG/DL (ref 70–99)
GLUCOSE BLD-MCNC: 326 MG/DL (ref 75–110)
GLUCOSE BLD-MCNC: 351 MG/DL (ref 70–99)
GLUCOSE BLD-MCNC: 352 MG/DL (ref 70–99)
GLUCOSE URINE: ABNORMAL
GLUCOSE URINE: ABNORMAL
GLUCOSE URINE: NEGATIVE
GLUCOSE URINE: NEGATIVE
GLUCOSE, FLUID: 149 MG/DL
HBA1C MFR BLD: 11.5 % (ref 4–6)
HCT VFR BLD CALC: 22.2 % (ref 41–53)
HCT VFR BLD CALC: 22.4 % (ref 41–53)
HCT VFR BLD CALC: 23.7 % (ref 41–53)
HCT VFR BLD CALC: 24 % (ref 41–53)
HCT VFR BLD CALC: 24.1 % (ref 41–53)
HCT VFR BLD CALC: 24.2 % (ref 41–53)
HCT VFR BLD CALC: 24.3 % (ref 41–53)
HCT VFR BLD CALC: 24.8 % (ref 41–53)
HCT VFR BLD CALC: 24.9 % (ref 41–53)
HCT VFR BLD CALC: 25.2 % (ref 41–53)
HCT VFR BLD CALC: 25.2 % (ref 41–53)
HCT VFR BLD CALC: 25.3 % (ref 41–53)
HCT VFR BLD CALC: 25.4 % (ref 41–53)
HCT VFR BLD CALC: 25.5 % (ref 41–53)
HCT VFR BLD CALC: 25.8 % (ref 41–53)
HCT VFR BLD CALC: 25.9 % (ref 41–53)
HCT VFR BLD CALC: 26.2 % (ref 41–53)
HCT VFR BLD CALC: 27.3 % (ref 41–53)
HCT VFR BLD CALC: 27.8 % (ref 41–53)
HCT VFR BLD CALC: 27.9 % (ref 41–53)
HCT VFR BLD CALC: 30.6 % (ref 41–53)
HCT VFR BLD CALC: 31.4 % (ref 41–53)
HCT VFR BLD CALC: 31.5 % (ref 41–53)
HCT VFR BLD CALC: 31.6 % (ref 41–53)
HCT VFR BLD CALC: 31.7 % (ref 41–53)
HEMOCCULT SP1 STL QL: NEGATIVE
HEMOCCULT SP2 STL QL: NORMAL
HEMOCCULT SP3 STL QL: NORMAL
HEMOGLOBIN: 10 G/DL (ref 13.5–17.5)
HEMOGLOBIN: 10.3 G/DL (ref 13.5–17.5)
HEMOGLOBIN: 6.9 G/DL (ref 13.5–17.5)
HEMOGLOBIN: 7 G/DL (ref 13.5–17.5)
HEMOGLOBIN: 7.3 G/DL (ref 13.5–17.5)
HEMOGLOBIN: 7.5 G/DL (ref 13.5–17.5)
HEMOGLOBIN: 7.6 G/DL (ref 13.5–17.5)
HEMOGLOBIN: 7.7 G/DL (ref 13.5–17.5)
HEMOGLOBIN: 7.9 G/DL (ref 13.5–17.5)
HEMOGLOBIN: 7.9 G/DL (ref 13.5–17.5)
HEMOGLOBIN: 8 G/DL (ref 13.5–17.5)
HEMOGLOBIN: 8.1 G/DL (ref 13.5–17.5)
HEMOGLOBIN: 8.2 G/DL (ref 13.5–17.5)
HEMOGLOBIN: 8.4 G/DL (ref 13.5–17.5)
HEMOGLOBIN: 8.5 G/DL (ref 13.5–17.5)
HEMOGLOBIN: 8.5 G/DL (ref 13.5–17.5)
HEMOGLOBIN: 9.5 G/DL (ref 13.5–17.5)
HEMOGLOBIN: 9.7 G/DL (ref 13.5–17.5)
HEMOGLOBIN: 9.9 G/DL (ref 13.5–17.5)
IMMATURE GRANULOCYTES: ABNORMAL %
INR BLD: 1.2
INR BLD: 1.4
INR BLD: 1.6
INR BLD: 1.6
IRON SATURATION: 27 % (ref 20–55)
IRON: 24 UG/DL (ref 59–158)
KETONES, URINE: ABNORMAL
KETONES, URINE: NEGATIVE
LACTATE DEHYDROGENASE, FLUID: 253 U/L
LACTATE DEHYDROGENASE, FLUID: 284 U/L
LACTATE DEHYDROGENASE: 1126 U/L (ref 135–225)
LACTATE DEHYDROGENASE: 805 U/L (ref 135–225)
LACTATE DEHYDROGENASE: 940 U/L (ref 135–225)
LACTIC ACID, SEPSIS WHOLE BLOOD: ABNORMAL MMOL/L (ref 0.5–1.9)
LACTIC ACID, SEPSIS: 2.1 MMOL/L (ref 0.5–1.9)
LACTIC ACID, SEPSIS: 2.3 MMOL/L (ref 0.5–1.9)
LACTIC ACID, SEPSIS: 6.1 MMOL/L (ref 0.5–1.9)
LACTIC ACID, SEPSIS: 6.9 MMOL/L (ref 0.5–1.9)
LACTIC ACID, SEPSIS: 7.3 MMOL/L (ref 0.5–1.9)
LACTIC ACID, SEPSIS: 9.8 MMOL/L (ref 0.5–1.9)
LACTIC ACID, WHOLE BLOOD: ABNORMAL MMOL/L (ref 0.7–2.1)
LACTIC ACID, WHOLE BLOOD: ABNORMAL MMOL/L (ref 0.7–2.1)
LACTIC ACID: 2.7 MMOL/L (ref 0.5–2.2)
LACTIC ACID: 2.7 MMOL/L (ref 0.5–2.2)
LACTIC ACID: 4.4 MMOL/L (ref 0.5–2.2)
LACTIC ACID: 4.5 MMOL/L (ref 0.5–2.2)
LACTIC ACID: 4.6 MMOL/L (ref 0.5–2.2)
LACTIC ACID: 4.7 MMOL/L (ref 0.5–2.2)
LACTIC ACID: 5.1 MMOL/L (ref 0.5–2.2)
LACTIC ACID: 6.2 MMOL/L (ref 0.5–2.2)
LACTIC ACID: 6.4 MMOL/L (ref 0.5–2.2)
LEUKOCYTE ESTERASE, URINE: ABNORMAL
LEUKOCYTE ESTERASE, URINE: ABNORMAL
LEUKOCYTE ESTERASE, URINE: NEGATIVE
LEUKOCYTE ESTERASE, URINE: NEGATIVE
LIPASE: 13 U/L (ref 13–60)
LIPASE: 14 U/L (ref 13–60)
LIPASE: 15 U/L (ref 13–60)
LIPASE: 15 U/L (ref 13–60)
LYMPHOCYTES # BLD: 11 % (ref 24–44)
LYMPHOCYTES # BLD: 12 % (ref 24–44)
LYMPHOCYTES # BLD: 2 % (ref 24–44)
LYMPHOCYTES # BLD: 3 % (ref 24–44)
LYMPHOCYTES # BLD: 3 % (ref 24–44)
LYMPHOCYTES # BLD: 4 % (ref 24–44)
LYMPHOCYTES # BLD: 6 % (ref 24–44)
LYMPHOCYTES # BLD: 7 % (ref 24–44)
LYMPHOCYTES # BLD: 7 % (ref 24–44)
LYMPHOCYTES, BODY FLUID: 15 %
LYMPHOCYTES, BODY FLUID: 6 %
LYMPHOCYTES, BODY FLUID: 9 %
LYMPHOCYTES, BODY FLUID: 9 %
Lab: ABNORMAL
Lab: NORMAL
MAGNESIUM: 1.5 MG/DL (ref 1.6–2.6)
MAGNESIUM: 1.6 MG/DL (ref 1.6–2.6)
MAGNESIUM: 1.6 MG/DL (ref 1.6–2.6)
MAGNESIUM: 1.7 MG/DL (ref 1.6–2.6)
MCH RBC QN AUTO: 23.2 PG (ref 26–34)
MCH RBC QN AUTO: 23.5 PG (ref 26–34)
MCH RBC QN AUTO: 23.6 PG (ref 26–34)
MCH RBC QN AUTO: 23.7 PG (ref 26–34)
MCH RBC QN AUTO: 24.1 PG (ref 26–34)
MCH RBC QN AUTO: 24.2 PG (ref 26–34)
MCH RBC QN AUTO: 24.5 PG (ref 26–34)
MCH RBC QN AUTO: 24.6 PG (ref 26–34)
MCH RBC QN AUTO: 24.7 PG (ref 26–34)
MCH RBC QN AUTO: 24.7 PG (ref 26–34)
MCH RBC QN AUTO: 25 PG (ref 26–34)
MCH RBC QN AUTO: 25 PG (ref 26–34)
MCH RBC QN AUTO: 25.5 PG (ref 26–34)
MCH RBC QN AUTO: 25.9 PG (ref 26–34)
MCH RBC QN AUTO: 25.9 PG (ref 26–34)
MCH RBC QN AUTO: 26.1 PG (ref 26–34)
MCH RBC QN AUTO: 26.2 PG (ref 26–34)
MCH RBC QN AUTO: 26.4 PG (ref 26–34)
MCHC RBC AUTO-ENTMCNC: 30.1 G/DL (ref 31–37)
MCHC RBC AUTO-ENTMCNC: 30.2 G/DL (ref 31–37)
MCHC RBC AUTO-ENTMCNC: 30.2 G/DL (ref 31–37)
MCHC RBC AUTO-ENTMCNC: 30.3 G/DL (ref 31–37)
MCHC RBC AUTO-ENTMCNC: 30.4 G/DL (ref 31–37)
MCHC RBC AUTO-ENTMCNC: 30.4 G/DL (ref 31–37)
MCHC RBC AUTO-ENTMCNC: 30.5 G/DL (ref 31–37)
MCHC RBC AUTO-ENTMCNC: 30.5 G/DL (ref 31–37)
MCHC RBC AUTO-ENTMCNC: 30.7 G/DL (ref 31–37)
MCHC RBC AUTO-ENTMCNC: 30.9 G/DL (ref 31–37)
MCHC RBC AUTO-ENTMCNC: 31.1 G/DL (ref 31–37)
MCHC RBC AUTO-ENTMCNC: 31.1 G/DL (ref 31–37)
MCHC RBC AUTO-ENTMCNC: 31.2 G/DL (ref 31–37)
MCHC RBC AUTO-ENTMCNC: 31.3 G/DL (ref 31–37)
MCHC RBC AUTO-ENTMCNC: 31.3 G/DL (ref 31–37)
MCHC RBC AUTO-ENTMCNC: 31.4 G/DL (ref 31–37)
MCHC RBC AUTO-ENTMCNC: 31.5 G/DL (ref 31–37)
MCHC RBC AUTO-ENTMCNC: 31.7 G/DL (ref 31–37)
MCHC RBC AUTO-ENTMCNC: 31.9 G/DL (ref 31–37)
MCHC RBC AUTO-ENTMCNC: 32.7 G/DL (ref 31–37)
MCV RBC AUTO: 75.4 FL (ref 80–100)
MCV RBC AUTO: 75.6 FL (ref 80–100)
MCV RBC AUTO: 75.6 FL (ref 80–100)
MCV RBC AUTO: 75.7 FL (ref 80–100)
MCV RBC AUTO: 76.1 FL (ref 80–100)
MCV RBC AUTO: 76.2 FL (ref 80–100)
MCV RBC AUTO: 76.3 FL (ref 80–100)
MCV RBC AUTO: 76.4 FL (ref 80–100)
MCV RBC AUTO: 77.2 FL (ref 80–100)
MCV RBC AUTO: 77.2 FL (ref 80–100)
MCV RBC AUTO: 77.4 FL (ref 80–100)
MCV RBC AUTO: 77.5 FL (ref 80–100)
MCV RBC AUTO: 78.3 FL (ref 80–100)
MCV RBC AUTO: 79.8 FL (ref 80–100)
MCV RBC AUTO: 79.9 FL (ref 80–100)
MCV RBC AUTO: 80.9 FL (ref 80–100)
MCV RBC AUTO: 80.9 FL (ref 80–100)
MCV RBC AUTO: 81.4 FL (ref 80–100)
MCV RBC AUTO: 83 FL (ref 80–100)
MCV RBC AUTO: 83.2 FL (ref 80–100)
MCV RBC AUTO: 83.7 FL (ref 80–100)
MCV RBC AUTO: 85.7 FL (ref 80–100)
MCV RBC AUTO: 86.2 FL (ref 80–100)
MCV RBC AUTO: 87 FL (ref 80–100)
METAMYELOCYTES ABSOLUTE COUNT: 0.15 K/UL
METAMYELOCYTES ABSOLUTE COUNT: 0.51 K/UL
METAMYELOCYTES: 1 %
METAMYELOCYTES: 2 %
MONOCYTE, FLUID: 30 %
MONOCYTE, FLUID: 35 %
MONOCYTE, FLUID: 65 %
MONOCYTE, FLUID: 73 %
MONOCYTES # BLD: 10 % (ref 1–7)
MONOCYTES # BLD: 11 % (ref 1–7)
MONOCYTES # BLD: 12 % (ref 1–7)
MONOCYTES # BLD: 13 % (ref 1–7)
MONOCYTES # BLD: 6 % (ref 1–7)
MONOCYTES # BLD: 7 % (ref 1–7)
MONOCYTES # BLD: 8 % (ref 1–7)
MONOCYTES # BLD: 8 % (ref 1–7)
MONOCYTES # BLD: 9 % (ref 1–7)
MORPHOLOGY: ABNORMAL
MUCUS: ABNORMAL
NEUTROPHIL, FLUID: 21 %
NEUTROPHIL, FLUID: 21 %
NEUTROPHIL, FLUID: 55 %
NEUTROPHIL, FLUID: 56 %
NITRITE, URINE: NEGATIVE
NITRITE, URINE: POSITIVE
NRBC AUTOMATED: ABNORMAL PER 100 WBC
OSMOLALITY URINE: 420 MOSM/KG (ref 80–1300)
OTHER CELLS FLUID: 5 %
OTHER CELLS FLUID: ABNORMAL %
OTHER OBSERVATIONS UA: ABNORMAL
PARTIAL THROMBOPLASTIN TIME: 33 SEC (ref 24–36)
PARTIAL THROMBOPLASTIN TIME: 33.2 SEC (ref 24–36)
PARTIAL THROMBOPLASTIN TIME: 34.2 SEC (ref 24–36)
PARTIAL THROMBOPLASTIN TIME: 35.3 SEC (ref 24–36)
PARTIAL THROMBOPLASTIN TIME: 37 SEC (ref 24–36)
PDW BLD-RTO: 19 % (ref 11.5–14.9)
PDW BLD-RTO: 19.3 % (ref 11.5–14.9)
PDW BLD-RTO: 19.3 % (ref 11.5–14.9)
PDW BLD-RTO: 19.5 % (ref 11.5–14.9)
PDW BLD-RTO: 19.7 % (ref 11.5–14.9)
PDW BLD-RTO: 19.7 % (ref 11.5–14.9)
PDW BLD-RTO: 20.6 % (ref 11.5–14.9)
PDW BLD-RTO: 20.7 % (ref 11.5–14.9)
PDW BLD-RTO: 21.5 % (ref 11.5–14.9)
PDW BLD-RTO: 21.5 % (ref 11.5–14.9)
PDW BLD-RTO: 21.8 % (ref 11.5–14.9)
PDW BLD-RTO: 21.8 % (ref 11.5–14.9)
PDW BLD-RTO: 22.2 % (ref 11.5–14.9)
PDW BLD-RTO: 22.6 % (ref 11.5–14.9)
PDW BLD-RTO: 22.7 % (ref 11.5–14.9)
PDW BLD-RTO: 23.1 % (ref 11.5–14.9)
PDW BLD-RTO: 23.7 % (ref 11.5–14.9)
PDW BLD-RTO: 24 % (ref 11.5–14.9)
PDW BLD-RTO: 24.4 % (ref 11.5–14.9)
PDW BLD-RTO: 24.6 % (ref 11.5–14.9)
PDW BLD-RTO: 25.2 % (ref 11.5–14.9)
PDW BLD-RTO: 25.6 % (ref 11.5–14.9)
PDW BLD-RTO: 25.7 % (ref 11.5–14.9)
PDW BLD-RTO: 26.2 % (ref 11.5–14.9)
PH UA: 5 (ref 5–8)
PH UA: 5 (ref 5–8)
PH UA: 5.5 (ref 5–8)
PH UA: 6 (ref 5–8)
PHOSPHORUS: 2.4 MG/DL (ref 2.5–4.5)
PLATELET # BLD: 100 K/UL (ref 150–450)
PLATELET # BLD: 106 K/UL (ref 150–450)
PLATELET # BLD: 113 K/UL (ref 150–450)
PLATELET # BLD: 137 K/UL (ref 150–450)
PLATELET # BLD: 150 K/UL (ref 150–450)
PLATELET # BLD: 151 K/UL (ref 150–450)
PLATELET # BLD: 166 K/UL (ref 150–450)
PLATELET # BLD: 178 K/UL (ref 150–450)
PLATELET # BLD: 181 K/UL (ref 150–450)
PLATELET # BLD: 184 K/UL (ref 150–450)
PLATELET # BLD: 193 K/UL (ref 150–450)
PLATELET # BLD: 206 K/UL (ref 150–450)
PLATELET # BLD: 210 K/UL (ref 150–450)
PLATELET # BLD: 211 K/UL (ref 150–450)
PLATELET # BLD: 217 K/UL (ref 150–450)
PLATELET # BLD: 246 K/UL (ref 150–450)
PLATELET # BLD: 250 K/UL (ref 150–450)
PLATELET # BLD: 283 K/UL (ref 150–450)
PLATELET # BLD: 72 K/UL (ref 150–450)
PLATELET # BLD: 75 K/UL (ref 150–450)
PLATELET # BLD: 76 K/UL (ref 150–450)
PLATELET # BLD: 80 K/UL (ref 150–450)
PLATELET # BLD: 92 K/UL (ref 150–450)
PLATELET # BLD: 98 K/UL (ref 150–450)
PLATELET ESTIMATE: ABNORMAL
PMV BLD AUTO: 6.8 FL (ref 6–12)
PMV BLD AUTO: 6.9 FL (ref 6–12)
PMV BLD AUTO: 7 FL (ref 6–12)
PMV BLD AUTO: 7.1 FL (ref 6–12)
PMV BLD AUTO: 7.1 FL (ref 6–12)
PMV BLD AUTO: 7.2 FL (ref 6–12)
PMV BLD AUTO: 7.3 FL (ref 6–12)
PMV BLD AUTO: 7.4 FL (ref 6–12)
PMV BLD AUTO: 7.6 FL (ref 6–12)
PMV BLD AUTO: 7.6 FL (ref 6–12)
PMV BLD AUTO: 7.8 FL (ref 6–12)
PMV BLD AUTO: 8.1 FL (ref 6–12)
PMV BLD AUTO: 8.3 FL (ref 6–12)
POTASSIUM SERPL-SCNC: 3.4 MMOL/L (ref 3.7–5.3)
POTASSIUM SERPL-SCNC: 3.4 MMOL/L (ref 3.7–5.3)
POTASSIUM SERPL-SCNC: 3.5 MMOL/L (ref 3.7–5.3)
POTASSIUM SERPL-SCNC: 3.6 MMOL/L (ref 3.7–5.3)
POTASSIUM SERPL-SCNC: 3.7 MMOL/L (ref 3.7–5.3)
POTASSIUM SERPL-SCNC: 3.8 MMOL/L (ref 3.7–5.3)
POTASSIUM SERPL-SCNC: 3.8 MMOL/L (ref 3.7–5.3)
POTASSIUM SERPL-SCNC: 3.9 MMOL/L (ref 3.7–5.3)
POTASSIUM SERPL-SCNC: 4 MMOL/L (ref 3.7–5.3)
POTASSIUM SERPL-SCNC: 4.1 MMOL/L (ref 3.7–5.3)
POTASSIUM SERPL-SCNC: 4.4 MMOL/L (ref 3.7–5.3)
POTASSIUM SERPL-SCNC: 4.4 MMOL/L (ref 3.7–5.3)
POTASSIUM SERPL-SCNC: 4.5 MMOL/L (ref 3.7–5.3)
POTASSIUM SERPL-SCNC: 4.6 MMOL/L (ref 3.7–5.3)
POTASSIUM SERPL-SCNC: 5.3 MMOL/L (ref 3.7–5.3)
PRO-BNP: 360 PG/ML
PRO-BNP: 932 PG/ML
PRO-BNP: 955 PG/ML
PROCALCITONIN: 0.47 NG/ML
PROTEIN UA: ABNORMAL
PROTEIN UA: NEGATIVE
PROTHROMBIN TIME: 15.1 SEC (ref 11.8–14.6)
PROTHROMBIN TIME: 16.9 SEC (ref 11.8–14.6)
PROTHROMBIN TIME: 17.4 SEC (ref 11.8–14.6)
PROTHROMBIN TIME: 17.6 SEC (ref 11.8–14.6)
PROTHROMBIN TIME: 18.6 SEC (ref 11.8–14.6)
PROTHROMBIN TIME: 18.7 SEC (ref 11.8–14.6)
RBC # BLD: 2.83 M/UL (ref 4.5–5.9)
RBC # BLD: 2.85 M/UL (ref 4.5–5.9)
RBC # BLD: 2.88 M/UL (ref 4.5–5.9)
RBC # BLD: 2.9 M/UL (ref 4.5–5.9)
RBC # BLD: 2.91 M/UL (ref 4.5–5.9)
RBC # BLD: 2.91 M/UL (ref 4.5–5.9)
RBC # BLD: 2.96 M/UL (ref 4.5–5.9)
RBC # BLD: 3.08 M/UL (ref 4.5–5.9)
RBC # BLD: 3.09 M/UL (ref 4.5–5.9)
RBC # BLD: 3.15 M/UL (ref 4.5–5.9)
RBC # BLD: 3.17 M/UL (ref 4.5–5.9)
RBC # BLD: 3.22 M/UL (ref 4.5–5.9)
RBC # BLD: 3.34 M/UL (ref 4.5–5.9)
RBC # BLD: 3.34 M/UL (ref 4.5–5.9)
RBC # BLD: 3.37 M/UL (ref 4.5–5.9)
RBC # BLD: 3.39 M/UL (ref 4.5–5.9)
RBC # BLD: 3.47 M/UL (ref 4.5–5.9)
RBC # BLD: 3.58 M/UL (ref 4.5–5.9)
RBC # BLD: 3.6 M/UL (ref 4.5–5.9)
RBC # BLD: 3.91 M/UL (ref 4.5–5.9)
RBC # BLD: 3.93 M/UL (ref 4.5–5.9)
RBC # BLD: 4.13 M/UL (ref 4.5–5.9)
RBC # BLD: 4.13 M/UL (ref 4.5–5.9)
RBC # BLD: 4.19 M/UL (ref 4.5–5.9)
RBC # BLD: ABNORMAL 10*6/UL
RBC FLUID: 321 /MM3
RBC FLUID: 350 /MM3
RBC FLUID: 351 /MM3
RBC FLUID: 490 /MM3
RBC UA: ABNORMAL /HPF
RENAL EPITHELIAL, UA: ABNORMAL /HPF
SEG NEUTROPHILS: 71 % (ref 36–66)
SEG NEUTROPHILS: 73 % (ref 36–66)
SEG NEUTROPHILS: 77 % (ref 36–66)
SEG NEUTROPHILS: 78 % (ref 36–66)
SEG NEUTROPHILS: 79 % (ref 36–66)
SEG NEUTROPHILS: 81 % (ref 36–66)
SEG NEUTROPHILS: 82 % (ref 36–66)
SEG NEUTROPHILS: 84 % (ref 36–66)
SEG NEUTROPHILS: 85 % (ref 36–66)
SEG NEUTROPHILS: 86 % (ref 36–66)
SEG NEUTROPHILS: 87 % (ref 36–66)
SEGMENTED NEUTROPHILS ABSOLUTE COUNT: 10.07 K/UL (ref 1.3–9.1)
SEGMENTED NEUTROPHILS ABSOLUTE COUNT: 10.32 K/UL (ref 1.3–9.1)
SEGMENTED NEUTROPHILS ABSOLUTE COUNT: 10.53 K/UL (ref 1.3–9.1)
SEGMENTED NEUTROPHILS ABSOLUTE COUNT: 10.73 K/UL (ref 1.3–9.1)
SEGMENTED NEUTROPHILS ABSOLUTE COUNT: 12.07 K/UL (ref 1.3–9.1)
SEGMENTED NEUTROPHILS ABSOLUTE COUNT: 14.36 K/UL (ref 1.3–9.1)
SEGMENTED NEUTROPHILS ABSOLUTE COUNT: 21.59 K/UL (ref 1.3–9.1)
SEGMENTED NEUTROPHILS ABSOLUTE COUNT: 8.06 K/UL (ref 1.3–9.1)
SEGMENTED NEUTROPHILS ABSOLUTE COUNT: 8.39 K/UL (ref 1.3–9.1)
SEGMENTED NEUTROPHILS ABSOLUTE COUNT: 9.47 K/UL (ref 1.3–9.1)
SEGMENTED NEUTROPHILS ABSOLUTE COUNT: 9.8 K/UL (ref 1.3–9.1)
SERUM OSMOLALITY: 279 MOSM/KG (ref 275–295)
SODIUM BLD-SCNC: 127 MMOL/L (ref 135–144)
SODIUM BLD-SCNC: 128 MMOL/L (ref 135–144)
SODIUM BLD-SCNC: 129 MMOL/L (ref 135–144)
SODIUM BLD-SCNC: 130 MMOL/L (ref 135–144)
SODIUM BLD-SCNC: 130 MMOL/L (ref 135–144)
SODIUM BLD-SCNC: 131 MMOL/L (ref 135–144)
SODIUM BLD-SCNC: 132 MMOL/L (ref 135–144)
SODIUM BLD-SCNC: 133 MMOL/L (ref 135–144)
SODIUM BLD-SCNC: 133 MMOL/L (ref 135–144)
SODIUM BLD-SCNC: 134 MMOL/L (ref 135–144)
SODIUM BLD-SCNC: 135 MMOL/L (ref 135–144)
SODIUM BLD-SCNC: 135 MMOL/L (ref 135–144)
SODIUM BLD-SCNC: 137 MMOL/L (ref 135–144)
SODIUM,UR: <20 MMOL/L
SPECIFIC GRAVITY UA: 1.01 (ref 1–1.03)
SPECIFIC GRAVITY UA: 1.01 (ref 1–1.03)
SPECIFIC GRAVITY UA: 1.02 (ref 1–1.03)
SPECIFIC GRAVITY UA: 1.02 (ref 1–1.03)
SPECIMEN DESCRIPTION: ABNORMAL
SPECIMEN DESCRIPTION: NORMAL
SPECIMEN TYPE: ABNORMAL
SPECIMEN TYPE: ABNORMAL
SPECIMEN TYPE: NORMAL
STATUS: NORMAL
SURGICAL PATHOLOGY REPORT: NORMAL
SURGICAL PATHOLOGY REPORT: NORMAL
TIME, STOOL #1: NORMAL
TIME, STOOL #2: NORMAL
TIME, STOOL #3: NORMAL
TOTAL IRON BINDING CAPACITY: 89 UG/DL (ref 250–450)
TOTAL PROTEIN, BODY FLUID: 1.7 G/DL
TOTAL PROTEIN, BODY FLUID: 1.9 G/DL
TOTAL PROTEIN, BODY FLUID: 2 G/DL
TOTAL PROTEIN, BODY FLUID: 2.2 G/DL
TOTAL PROTEIN: 5.2 G/DL (ref 6.4–8.3)
TOTAL PROTEIN: 5.3 G/DL (ref 6.4–8.3)
TOTAL PROTEIN: 5.5 G/DL (ref 6.4–8.3)
TOTAL PROTEIN: 5.5 G/DL (ref 6.4–8.3)
TOTAL PROTEIN: 5.6 G/DL (ref 6.4–8.3)
TOTAL PROTEIN: 5.6 G/DL (ref 6.4–8.3)
TOTAL PROTEIN: 5.7 G/DL (ref 6.4–8.3)
TOTAL PROTEIN: 5.7 G/DL (ref 6.4–8.3)
TOTAL PROTEIN: 5.8 G/DL (ref 6.4–8.3)
TOTAL PROTEIN: 5.8 G/DL (ref 6.4–8.3)
TOTAL PROTEIN: 5.9 G/DL (ref 6.4–8.3)
TOTAL PROTEIN: 5.9 G/DL (ref 6.4–8.3)
TOTAL PROTEIN: 6.1 G/DL (ref 6.4–8.3)
TOTAL PROTEIN: 6.4 G/DL (ref 6.4–8.3)
TOTAL PROTEIN: 6.6 G/DL (ref 6.4–8.3)
TOTAL PROTEIN: 6.8 G/DL (ref 6.4–8.3)
TOTAL PROTEIN: 6.9 G/DL (ref 6.4–8.3)
TOTAL PROTEIN: 7 G/DL (ref 6.4–8.3)
TOTAL PROTEIN: 7.2 G/DL (ref 6.4–8.3)
TRANSFUSION STATUS: NORMAL
TRICHOMONAS: ABNORMAL
TROPONIN INTERP: NORMAL
TROPONIN T: NORMAL NG/ML
TROPONIN, HIGH SENSITIVITY: 11 NG/L (ref 0–22)
TROPONIN, HIGH SENSITIVITY: 20 NG/L (ref 0–22)
TROPONIN, HIGH SENSITIVITY: 6 NG/L (ref 0–22)
TROPONIN, HIGH SENSITIVITY: 7 NG/L (ref 0–22)
TROPONIN, HIGH SENSITIVITY: 8 NG/L (ref 0–22)
TROPONIN, HIGH SENSITIVITY: 9 NG/L (ref 0–22)
TROPONIN, HIGH SENSITIVITY: <6 NG/L (ref 0–22)
TROPONIN, HIGH SENSITIVITY: <6 NG/L (ref 0–22)
TSH SERPL DL<=0.05 MIU/L-ACNC: 1.15 MIU/L (ref 0.3–5)
TSH SERPL DL<=0.05 MIU/L-ACNC: 2.43 MIU/L (ref 0.3–5)
TURBIDITY: ABNORMAL
TURBIDITY: ABNORMAL
TURBIDITY: CLEAR
TURBIDITY: CLEAR
UNIT DIVISION: 0
UNIT NUMBER: NORMAL
UNSATURATED IRON BINDING CAPACITY: 65 UG/DL (ref 112–347)
URIC ACID: 2.2 MG/DL (ref 3.4–7)
URINE HGB: ABNORMAL
URINE HGB: NEGATIVE
UROBILINOGEN, URINE: ABNORMAL
UROBILINOGEN, URINE: NORMAL
VANCOMYCIN TROUGH DATE LAST DOSE: ABNORMAL
VANCOMYCIN TROUGH DATE LAST DOSE: NORMAL
VANCOMYCIN TROUGH DOSE AMOUNT: 1250
VANCOMYCIN TROUGH DOSE AMOUNT: 1250
VANCOMYCIN TROUGH DOSE AMOUNT: 1500
VANCOMYCIN TROUGH DOSE AMOUNT: 1500
VANCOMYCIN TROUGH DOSE AMOUNT: NORMAL
VANCOMYCIN TROUGH TIME LAST DOSE: 1245
VANCOMYCIN TROUGH TIME LAST DOSE: 23
VANCOMYCIN TROUGH TIME LAST DOSE: 335
VANCOMYCIN TROUGH TIME LAST DOSE: 450
VANCOMYCIN TROUGH TIME LAST DOSE: 533
VANCOMYCIN TROUGH: 11.4 UG/ML (ref 10–20)
VANCOMYCIN TROUGH: 20.1 UG/ML (ref 10–20)
VANCOMYCIN TROUGH: 5.7 UG/ML (ref 10–20)
VANCOMYCIN TROUGH: 6 UG/ML (ref 10–20)
VANCOMYCIN TROUGH: 7.4 UG/ML (ref 10–20)
WBC # BLD: 10.2 K/UL (ref 3.5–11)
WBC # BLD: 10.9 K/UL (ref 3.5–11)
WBC # BLD: 11.5 K/UL (ref 3.5–11)
WBC # BLD: 11.7 K/UL (ref 3.5–11)
WBC # BLD: 11.7 K/UL (ref 3.5–11)
WBC # BLD: 12.1 K/UL (ref 3.5–11)
WBC # BLD: 12.3 K/UL (ref 3.5–11)
WBC # BLD: 12.4 K/UL (ref 3.5–11)
WBC # BLD: 12.5 K/UL (ref 3.5–11)
WBC # BLD: 12.6 K/UL (ref 3.5–11)
WBC # BLD: 12.7 K/UL (ref 3.5–11)
WBC # BLD: 12.8 K/UL (ref 3.5–11)
WBC # BLD: 14.3 K/UL (ref 3.5–11)
WBC # BLD: 14.4 K/UL (ref 3.5–11)
WBC # BLD: 14.7 K/UL (ref 3.5–11)
WBC # BLD: 15.8 K/UL (ref 3.5–11)
WBC # BLD: 15.9 K/UL (ref 3.5–11)
WBC # BLD: 17 K/UL (ref 3.5–11)
WBC # BLD: 17.1 K/UL (ref 3.5–11)
WBC # BLD: 17.4 K/UL (ref 3.5–11)
WBC # BLD: 25.4 K/UL (ref 3.5–11)
WBC # BLD: 8.1 K/UL (ref 3.5–11)
WBC # BLD: 8.4 K/UL (ref 3.5–11)
WBC # BLD: 9.2 K/UL (ref 3.5–11)
WBC # BLD: ABNORMAL 10*3/UL
WBC FLUID: 231 /MM3
WBC FLUID: 240 /MM3
WBC FLUID: 283 /MM3
WBC FLUID: 327 /MM3
WBC UA: ABNORMAL /HPF
YEAST: ABNORMAL

## 2019-01-01 PROCEDURE — 6370000000 HC RX 637 (ALT 250 FOR IP): Performed by: STUDENT IN AN ORGANIZED HEALTH CARE EDUCATION/TRAINING PROGRAM

## 2019-01-01 PROCEDURE — 85379 FIBRIN DEGRADATION QUANT: CPT

## 2019-01-01 PROCEDURE — 82140 ASSAY OF AMMONIA: CPT

## 2019-01-01 PROCEDURE — 82565 ASSAY OF CREATININE: CPT

## 2019-01-01 PROCEDURE — 85025 COMPLETE CBC W/AUTO DIFF WBC: CPT

## 2019-01-01 PROCEDURE — 2580000003 HC RX 258: Performed by: STUDENT IN AN ORGANIZED HEALTH CARE EDUCATION/TRAINING PROGRAM

## 2019-01-01 PROCEDURE — 6360000002 HC RX W HCPCS: Performed by: NURSE PRACTITIONER

## 2019-01-01 PROCEDURE — 2580000003 HC RX 258: Performed by: EMERGENCY MEDICINE

## 2019-01-01 PROCEDURE — 80053 COMPREHEN METABOLIC PANEL: CPT

## 2019-01-01 PROCEDURE — 71045 X-RAY EXAM CHEST 1 VIEW: CPT

## 2019-01-01 PROCEDURE — 70450 CT HEAD/BRAIN W/O DYE: CPT

## 2019-01-01 PROCEDURE — 82947 ASSAY GLUCOSE BLOOD QUANT: CPT

## 2019-01-01 PROCEDURE — 71046 X-RAY EXAM CHEST 2 VIEWS: CPT

## 2019-01-01 PROCEDURE — 86920 COMPATIBILITY TEST SPIN: CPT

## 2019-01-01 PROCEDURE — 87075 CULTR BACTERIA EXCEPT BLOOD: CPT

## 2019-01-01 PROCEDURE — 97530 THERAPEUTIC ACTIVITIES: CPT

## 2019-01-01 PROCEDURE — 74177 CT ABD & PELVIS W/CONTRAST: CPT

## 2019-01-01 PROCEDURE — P9047 ALBUMIN (HUMAN), 25%, 50ML: HCPCS | Performed by: NURSE PRACTITIONER

## 2019-01-01 PROCEDURE — 2580000003 HC RX 258: Performed by: INTERNAL MEDICINE

## 2019-01-01 PROCEDURE — 36415 COLL VENOUS BLD VENIPUNCTURE: CPT

## 2019-01-01 PROCEDURE — 99999 PR OFFICE/OUTPT VISIT,PROCEDURE ONLY: CPT | Performed by: INTERNAL MEDICINE

## 2019-01-01 PROCEDURE — 99239 HOSP IP/OBS DSCHRG MGMT >30: CPT | Performed by: INTERNAL MEDICINE

## 2019-01-01 PROCEDURE — 84157 ASSAY OF PROTEIN OTHER: CPT

## 2019-01-01 PROCEDURE — 6370000000 HC RX 637 (ALT 250 FOR IP): Performed by: NURSE PRACTITIONER

## 2019-01-01 PROCEDURE — 6360000002 HC RX W HCPCS: Performed by: EMERGENCY MEDICINE

## 2019-01-01 PROCEDURE — 2500000003 HC RX 250 WO HCPCS: Performed by: STUDENT IN AN ORGANIZED HEALTH CARE EDUCATION/TRAINING PROGRAM

## 2019-01-01 PROCEDURE — 99232 SBSQ HOSP IP/OBS MODERATE 35: CPT | Performed by: INTERNAL MEDICINE

## 2019-01-01 PROCEDURE — 80202 ASSAY OF VANCOMYCIN: CPT

## 2019-01-01 PROCEDURE — 97110 THERAPEUTIC EXERCISES: CPT

## 2019-01-01 PROCEDURE — 99222 1ST HOSP IP/OBS MODERATE 55: CPT | Performed by: INTERNAL MEDICINE

## 2019-01-01 PROCEDURE — 99285 EMERGENCY DEPT VISIT HI MDM: CPT

## 2019-01-01 PROCEDURE — 85610 PROTHROMBIN TIME: CPT

## 2019-01-01 PROCEDURE — 80048 BASIC METABOLIC PNL TOTAL CA: CPT

## 2019-01-01 PROCEDURE — 87086 URINE CULTURE/COLONY COUNT: CPT

## 2019-01-01 PROCEDURE — 6360000002 HC RX W HCPCS: Performed by: STUDENT IN AN ORGANIZED HEALTH CARE EDUCATION/TRAINING PROGRAM

## 2019-01-01 PROCEDURE — 2709999900 IR US GUIDED PARACENTESIS

## 2019-01-01 PROCEDURE — 6360000002 HC RX W HCPCS: Performed by: INTERNAL MEDICINE

## 2019-01-01 PROCEDURE — 93005 ELECTROCARDIOGRAM TRACING: CPT

## 2019-01-01 PROCEDURE — 99233 SBSQ HOSP IP/OBS HIGH 50: CPT | Performed by: INTERNAL MEDICINE

## 2019-01-01 PROCEDURE — 76705 ECHO EXAM OF ABDOMEN: CPT

## 2019-01-01 PROCEDURE — 87040 BLOOD CULTURE FOR BACTERIA: CPT

## 2019-01-01 PROCEDURE — 2500000003 HC RX 250 WO HCPCS: Performed by: INTERNAL MEDICINE

## 2019-01-01 PROCEDURE — APPNB30 APP NON BILLABLE TIME 0-30 MINS: Performed by: NURSE PRACTITIONER

## 2019-01-01 PROCEDURE — 96366 THER/PROPH/DIAG IV INF ADDON: CPT

## 2019-01-01 PROCEDURE — 71260 CT THORAX DX C+: CPT

## 2019-01-01 PROCEDURE — 99284 EMERGENCY DEPT VISIT MOD MDM: CPT

## 2019-01-01 PROCEDURE — 78227 HEPATOBIL SYST IMAGE W/DRUG: CPT

## 2019-01-01 PROCEDURE — P9047 ALBUMIN (HUMAN), 25%, 50ML: HCPCS | Performed by: STUDENT IN AN ORGANIZED HEALTH CARE EDUCATION/TRAINING PROGRAM

## 2019-01-01 PROCEDURE — 87070 CULTURE OTHR SPECIMN AEROBIC: CPT

## 2019-01-01 PROCEDURE — 6370000000 HC RX 637 (ALT 250 FOR IP): Performed by: INTERNAL MEDICINE

## 2019-01-01 PROCEDURE — 86901 BLOOD TYPING SEROLOGIC RH(D): CPT

## 2019-01-01 PROCEDURE — 88108 CYTOPATH CONCENTRATE TECH: CPT

## 2019-01-01 PROCEDURE — 85027 COMPLETE CBC AUTOMATED: CPT

## 2019-01-01 PROCEDURE — 97162 PT EVAL MOD COMPLEX 30 MIN: CPT

## 2019-01-01 PROCEDURE — 2060000000 HC ICU INTERMEDIATE R&B

## 2019-01-01 PROCEDURE — 89051 BODY FLUID CELL COUNT: CPT

## 2019-01-01 PROCEDURE — 82150 ASSAY OF AMYLASE: CPT

## 2019-01-01 PROCEDURE — P9047 ALBUMIN (HUMAN), 25%, 50ML: HCPCS | Performed by: INTERNAL MEDICINE

## 2019-01-01 PROCEDURE — 83605 ASSAY OF LACTIC ACID: CPT

## 2019-01-01 PROCEDURE — 82378 CARCINOEMBRYONIC ANTIGEN: CPT

## 2019-01-01 PROCEDURE — 80076 HEPATIC FUNCTION PANEL: CPT

## 2019-01-01 PROCEDURE — 83615 LACTATE (LD) (LDH) ENZYME: CPT

## 2019-01-01 PROCEDURE — 83735 ASSAY OF MAGNESIUM: CPT

## 2019-01-01 PROCEDURE — 88112 CYTOPATH CELL ENHANCE TECH: CPT

## 2019-01-01 PROCEDURE — 1200000000 HC SEMI PRIVATE

## 2019-01-01 PROCEDURE — 88305 TISSUE EXAM BY PATHOLOGIST: CPT

## 2019-01-01 PROCEDURE — 73562 X-RAY EXAM OF KNEE 3: CPT

## 2019-01-01 PROCEDURE — 82042 OTHER SOURCE ALBUMIN QUAN EA: CPT

## 2019-01-01 PROCEDURE — 99223 1ST HOSP IP/OBS HIGH 75: CPT | Performed by: INTERNAL MEDICINE

## 2019-01-01 PROCEDURE — 2580000003 HC RX 258: Performed by: NURSE PRACTITIONER

## 2019-01-01 PROCEDURE — 80051 ELECTROLYTE PANEL: CPT

## 2019-01-01 PROCEDURE — 81001 URINALYSIS AUTO W/SCOPE: CPT

## 2019-01-01 PROCEDURE — 0W9G3ZZ DRAINAGE OF PERITONEAL CAVITY, PERCUTANEOUS APPROACH: ICD-10-PCS | Performed by: RADIOLOGY

## 2019-01-01 PROCEDURE — 83690 ASSAY OF LIPASE: CPT

## 2019-01-01 PROCEDURE — A9537 TC99M MEBROFENIN: HCPCS | Performed by: INTERNAL MEDICINE

## 2019-01-01 PROCEDURE — 85730 THROMBOPLASTIN TIME PARTIAL: CPT

## 2019-01-01 PROCEDURE — 2500000003 HC RX 250 WO HCPCS: Performed by: RADIOLOGY

## 2019-01-01 PROCEDURE — 87149 DNA/RNA DIRECT PROBE: CPT

## 2019-01-01 PROCEDURE — 85018 HEMOGLOBIN: CPT

## 2019-01-01 PROCEDURE — 83540 ASSAY OF IRON: CPT

## 2019-01-01 PROCEDURE — 49083 ABD PARACENTESIS W/IMAGING: CPT | Performed by: RADIOLOGY

## 2019-01-01 PROCEDURE — 6370000000 HC RX 637 (ALT 250 FOR IP): Performed by: PHYSICIAN ASSISTANT

## 2019-01-01 PROCEDURE — 96365 THER/PROPH/DIAG IV INF INIT: CPT

## 2019-01-01 PROCEDURE — 36430 TRANSFUSION BLD/BLD COMPNT: CPT

## 2019-01-01 PROCEDURE — 82040 ASSAY OF SERUM ALBUMIN: CPT

## 2019-01-01 PROCEDURE — 83880 ASSAY OF NATRIURETIC PEPTIDE: CPT

## 2019-01-01 PROCEDURE — 87205 SMEAR GRAM STAIN: CPT

## 2019-01-01 PROCEDURE — 86900 BLOOD TYPING SEROLOGIC ABO: CPT

## 2019-01-01 PROCEDURE — 97116 GAIT TRAINING THERAPY: CPT

## 2019-01-01 PROCEDURE — 84484 ASSAY OF TROPONIN QUANT: CPT

## 2019-01-01 PROCEDURE — 97166 OT EVAL MOD COMPLEX 45 MIN: CPT

## 2019-01-01 PROCEDURE — 82728 ASSAY OF FERRITIN: CPT

## 2019-01-01 PROCEDURE — 2000000000 HC ICU R&B

## 2019-01-01 PROCEDURE — 83550 IRON BINDING TEST: CPT

## 2019-01-01 PROCEDURE — 96367 TX/PROPH/DG ADDL SEQ IV INF: CPT

## 2019-01-01 PROCEDURE — 84155 ASSAY OF PROTEIN SERUM: CPT

## 2019-01-01 PROCEDURE — 84443 ASSAY THYROID STIM HORMONE: CPT

## 2019-01-01 PROCEDURE — 6360000004 HC RX CONTRAST MEDICATION: Performed by: EMERGENCY MEDICINE

## 2019-01-01 PROCEDURE — 82436 ASSAY OF URINE CHLORIDE: CPT

## 2019-01-01 PROCEDURE — 96375 TX/PRO/DX INJ NEW DRUG ADDON: CPT

## 2019-01-01 PROCEDURE — 85014 HEMATOCRIT: CPT

## 2019-01-01 PROCEDURE — 86850 RBC ANTIBODY SCREEN: CPT

## 2019-01-01 PROCEDURE — 99283 EMERGENCY DEPT VISIT LOW MDM: CPT

## 2019-01-01 PROCEDURE — 83036 HEMOGLOBIN GLYCOSYLATED A1C: CPT

## 2019-01-01 PROCEDURE — 2500000003 HC RX 250 WO HCPCS: Performed by: EMERGENCY MEDICINE

## 2019-01-01 PROCEDURE — 83935 ASSAY OF URINE OSMOLALITY: CPT

## 2019-01-01 PROCEDURE — G0328 FECAL BLOOD SCRN IMMUNOASSAY: HCPCS

## 2019-01-01 PROCEDURE — 97535 SELF CARE MNGMENT TRAINING: CPT

## 2019-01-01 PROCEDURE — P9016 RBC LEUKOCYTES REDUCED: HCPCS

## 2019-01-01 PROCEDURE — 6370000000 HC RX 637 (ALT 250 FOR IP): Performed by: EMERGENCY MEDICINE

## 2019-01-01 PROCEDURE — 82945 GLUCOSE OTHER FLUID: CPT

## 2019-01-01 PROCEDURE — 3430000000 HC RX DIAGNOSTIC RADIOPHARMACEUTICAL: Performed by: INTERNAL MEDICINE

## 2019-01-01 PROCEDURE — 83930 ASSAY OF BLOOD OSMOLALITY: CPT

## 2019-01-01 PROCEDURE — 6360000004 HC RX CONTRAST MEDICATION: Performed by: INTERNAL MEDICINE

## 2019-01-01 PROCEDURE — 82330 ASSAY OF CALCIUM: CPT

## 2019-01-01 PROCEDURE — 84100 ASSAY OF PHOSPHORUS: CPT

## 2019-01-01 PROCEDURE — 87804 INFLUENZA ASSAY W/OPTIC: CPT

## 2019-01-01 PROCEDURE — 84300 ASSAY OF URINE SODIUM: CPT

## 2019-01-01 PROCEDURE — 84145 PROCALCITONIN (PCT): CPT

## 2019-01-01 PROCEDURE — 84550 ASSAY OF BLOOD/URIC ACID: CPT

## 2019-01-01 PROCEDURE — 78226 HEPATOBILIARY SYSTEM IMAGING: CPT

## 2019-01-01 RX ORDER — SODIUM CHLORIDE 9 MG/ML
INJECTION, SOLUTION INTRAVENOUS CONTINUOUS
Status: DISCONTINUED | OUTPATIENT
Start: 2019-01-01 | End: 2019-01-01 | Stop reason: SDUPTHER

## 2019-01-01 RX ORDER — LACTULOSE 10 G/15ML
25 SOLUTION ORAL 2 TIMES DAILY
Status: DISCONTINUED | OUTPATIENT
Start: 2019-01-01 | End: 2019-01-01 | Stop reason: HOSPADM

## 2019-01-01 RX ORDER — LIDOCAINE HYDROCHLORIDE 10 MG/ML
20 INJECTION, SOLUTION INFILTRATION; PERINEURAL ONCE
Status: COMPLETED | OUTPATIENT
Start: 2019-01-01 | End: 2019-01-01

## 2019-01-01 RX ORDER — SODIUM CHLORIDE 0.9 % (FLUSH) 0.9 %
10 SYRINGE (ML) INJECTION PRN
Status: DISCONTINUED | OUTPATIENT
Start: 2019-01-01 | End: 2019-01-01 | Stop reason: HOSPADM

## 2019-01-01 RX ORDER — MAGNESIUM SULFATE 1 G/100ML
1 INJECTION INTRAVENOUS PRN
Status: DISCONTINUED | OUTPATIENT
Start: 2019-01-01 | End: 2019-01-01 | Stop reason: HOSPADM

## 2019-01-01 RX ORDER — PANTOPRAZOLE SODIUM 40 MG/1
40 TABLET, DELAYED RELEASE ORAL
Status: DISCONTINUED | OUTPATIENT
Start: 2019-01-01 | End: 2019-01-01

## 2019-01-01 RX ORDER — 0.9 % SODIUM CHLORIDE 0.9 %
80 INTRAVENOUS SOLUTION INTRAVENOUS ONCE
Status: COMPLETED | OUTPATIENT
Start: 2019-01-01 | End: 2019-01-01

## 2019-01-01 RX ORDER — ACETAMINOPHEN 325 MG/1
650 TABLET ORAL EVERY 4 HOURS PRN
Status: DISCONTINUED | OUTPATIENT
Start: 2019-01-01 | End: 2019-01-01

## 2019-01-01 RX ORDER — SODIUM CHLORIDE 0.9 % (FLUSH) 0.9 %
10 SYRINGE (ML) INJECTION EVERY 12 HOURS SCHEDULED
Status: DISCONTINUED | OUTPATIENT
Start: 2019-01-01 | End: 2019-01-01 | Stop reason: HOSPADM

## 2019-01-01 RX ORDER — LIDOCAINE HYDROCHLORIDE 10 MG/ML
INJECTION, SOLUTION INFILTRATION; PERINEURAL
Status: COMPLETED | OUTPATIENT
Start: 2019-01-01 | End: 2019-01-01

## 2019-01-01 RX ORDER — NALOXONE HYDROCHLORIDE 4 MG/.1ML
4 SPRAY NASAL
COMMUNITY

## 2019-01-01 RX ORDER — 0.9 % SODIUM CHLORIDE 0.9 %
1000 INTRAVENOUS SOLUTION INTRAVENOUS ONCE
Status: COMPLETED | OUTPATIENT
Start: 2019-01-01 | End: 2019-01-01

## 2019-01-01 RX ORDER — SODIUM CHLORIDE 9 MG/ML
INJECTION, SOLUTION INTRAVENOUS CONTINUOUS
Status: DISCONTINUED | OUTPATIENT
Start: 2019-01-01 | End: 2019-01-01

## 2019-01-01 RX ORDER — SODIUM CHLORIDE 0.9 % (FLUSH) 0.9 %
10 SYRINGE (ML) INJECTION PRN
Status: DISCONTINUED | OUTPATIENT
Start: 2019-01-01 | End: 2019-01-01 | Stop reason: SDUPTHER

## 2019-01-01 RX ORDER — NICOTINE POLACRILEX 4 MG
15 LOZENGE BUCCAL PRN
Status: DISCONTINUED | OUTPATIENT
Start: 2019-01-01 | End: 2019-01-01 | Stop reason: HOSPADM

## 2019-01-01 RX ORDER — SODIUM CHLORIDE 0.9 % (FLUSH) 0.9 %
10 SYRINGE (ML) INJECTION PRN
Status: CANCELLED | OUTPATIENT
Start: 2019-01-01

## 2019-01-01 RX ORDER — OXYCODONE AND ACETAMINOPHEN 10; 325 MG/1; MG/1
2 TABLET ORAL EVERY 6 HOURS PRN
Status: ON HOLD | COMMUNITY
End: 2019-01-01 | Stop reason: CLARIF

## 2019-01-01 RX ORDER — ACETAMINOPHEN 500 MG
1000 TABLET ORAL ONCE
Status: COMPLETED | OUTPATIENT
Start: 2019-01-01 | End: 2019-01-01

## 2019-01-01 RX ORDER — ALBUMIN (HUMAN) 12.5 G/50ML
50 SOLUTION INTRAVENOUS ONCE
Status: COMPLETED | OUTPATIENT
Start: 2019-01-01 | End: 2019-01-01

## 2019-01-01 RX ORDER — DIPHENHYDRAMINE HCL 25 MG
25 TABLET ORAL EVERY 6 HOURS PRN
Status: DISCONTINUED | OUTPATIENT
Start: 2019-01-01 | End: 2019-01-01 | Stop reason: HOSPADM

## 2019-01-01 RX ORDER — SODIUM CHLORIDE 0.9 % (FLUSH) 0.9 %
10 SYRINGE (ML) INJECTION EVERY 12 HOURS SCHEDULED
Status: CANCELLED | OUTPATIENT
Start: 2019-01-01

## 2019-01-01 RX ORDER — OXYCODONE HYDROCHLORIDE AND ACETAMINOPHEN 5; 325 MG/1; MG/1
2 TABLET ORAL EVERY 6 HOURS PRN
Status: DISCONTINUED | OUTPATIENT
Start: 2019-01-01 | End: 2019-01-01

## 2019-01-01 RX ORDER — BLOOD-GLUCOSE METER
1 KIT MISCELLANEOUS DAILY
Qty: 1 KIT | Refills: 0 | Status: SHIPPED | OUTPATIENT
Start: 2019-01-01

## 2019-01-01 RX ORDER — ALBUMIN (HUMAN) 12.5 G/50ML
50 SOLUTION INTRAVENOUS ONCE
Status: DISCONTINUED | OUTPATIENT
Start: 2019-01-01 | End: 2019-01-01 | Stop reason: HOSPADM

## 2019-01-01 RX ORDER — 0.9 % SODIUM CHLORIDE 0.9 %
30 INTRAVENOUS SOLUTION INTRAVENOUS ONCE
Status: COMPLETED | OUTPATIENT
Start: 2019-01-01 | End: 2019-01-01

## 2019-01-01 RX ORDER — FLECAINIDE ACETATE 50 MG/1
50 TABLET ORAL ONCE
Status: COMPLETED | OUTPATIENT
Start: 2019-01-01 | End: 2019-01-01

## 2019-01-01 RX ORDER — FENTANYL CITRATE 50 UG/ML
25 INJECTION, SOLUTION INTRAMUSCULAR; INTRAVENOUS
Status: DISCONTINUED | OUTPATIENT
Start: 2019-01-01 | End: 2019-01-01

## 2019-01-01 RX ORDER — FENTANYL 50 UG/H
1 PATCH TRANSDERMAL
Status: DISCONTINUED | OUTPATIENT
Start: 2019-01-01 | End: 2019-01-01 | Stop reason: HOSPADM

## 2019-01-01 RX ORDER — OXYCODONE HYDROCHLORIDE 10 MG/1
20 TABLET ORAL 3 TIMES DAILY PRN
Status: DISCONTINUED | OUTPATIENT
Start: 2019-01-01 | End: 2019-01-01 | Stop reason: HOSPADM

## 2019-01-01 RX ORDER — ACETAMINOPHEN 325 MG/1
650 TABLET ORAL EVERY 4 HOURS PRN
Status: DISCONTINUED | OUTPATIENT
Start: 2019-01-01 | End: 2019-01-01 | Stop reason: HOSPADM

## 2019-01-01 RX ORDER — OXYCODONE HYDROCHLORIDE 10 MG/1
20 TABLET ORAL 3 TIMES DAILY PRN
Status: DISCONTINUED | OUTPATIENT
Start: 2019-01-01 | End: 2019-01-01

## 2019-01-01 RX ORDER — OXYCODONE HYDROCHLORIDE AND ACETAMINOPHEN 5; 325 MG/1; MG/1
1 TABLET ORAL EVERY 4 HOURS PRN
Status: DISCONTINUED | OUTPATIENT
Start: 2019-01-01 | End: 2019-01-01

## 2019-01-01 RX ORDER — DEXTROSE MONOHYDRATE 25 G/50ML
12.5 INJECTION, SOLUTION INTRAVENOUS PRN
Status: DISCONTINUED | OUTPATIENT
Start: 2019-01-01 | End: 2019-01-01 | Stop reason: HOSPADM

## 2019-01-01 RX ORDER — ALBUMIN (HUMAN) 12.5 G/50ML
25 SOLUTION INTRAVENOUS ONCE
Status: COMPLETED | OUTPATIENT
Start: 2019-01-01 | End: 2019-01-01

## 2019-01-01 RX ORDER — SODIUM PHOSPHATE, DIBASIC AND SODIUM PHOSPHATE, MONOBASIC 7; 19 G/133ML; G/133ML
1 ENEMA RECTAL
Status: ACTIVE | OUTPATIENT
Start: 2019-01-01 | End: 2019-01-01

## 2019-01-01 RX ORDER — 0.9 % SODIUM CHLORIDE 0.9 %
500 INTRAVENOUS SOLUTION INTRAVENOUS ONCE
Status: DISCONTINUED | OUTPATIENT
Start: 2019-01-01 | End: 2019-01-01 | Stop reason: HOSPADM

## 2019-01-01 RX ORDER — ONDANSETRON 2 MG/ML
4 INJECTION INTRAMUSCULAR; INTRAVENOUS EVERY 6 HOURS PRN
Status: DISCONTINUED | OUTPATIENT
Start: 2019-01-01 | End: 2019-01-01 | Stop reason: HOSPADM

## 2019-01-01 RX ORDER — METOPROLOL TARTRATE 5 MG/5ML
5 INJECTION INTRAVENOUS ONCE
Status: COMPLETED | OUTPATIENT
Start: 2019-01-01 | End: 2019-01-01

## 2019-01-01 RX ORDER — OXYCODONE HYDROCHLORIDE 5 MG/1
5 TABLET ORAL EVERY 4 HOURS PRN
Status: DISCONTINUED | OUTPATIENT
Start: 2019-01-01 | End: 2019-01-01

## 2019-01-01 RX ORDER — FAMOTIDINE 20 MG/1
20 TABLET, FILM COATED ORAL 2 TIMES DAILY
Status: DISCONTINUED | OUTPATIENT
Start: 2019-01-01 | End: 2019-01-01 | Stop reason: HOSPADM

## 2019-01-01 RX ORDER — INSULIN GLARGINE 100 [IU]/ML
15 INJECTION, SOLUTION SUBCUTANEOUS NIGHTLY
Status: DISCONTINUED | OUTPATIENT
Start: 2019-01-01 | End: 2019-01-01 | Stop reason: HOSPADM

## 2019-01-01 RX ORDER — OXYCODONE HYDROCHLORIDE AND ACETAMINOPHEN 5; 325 MG/1; MG/1
2 TABLET ORAL EVERY 4 HOURS PRN
Status: DISCONTINUED | OUTPATIENT
Start: 2019-01-01 | End: 2019-01-01

## 2019-01-01 RX ORDER — ALBUMIN (HUMAN) 12.5 G/50ML
25 SOLUTION INTRAVENOUS 2 TIMES DAILY
Status: DISCONTINUED | OUTPATIENT
Start: 2019-01-01 | End: 2019-01-01 | Stop reason: HOSPADM

## 2019-01-01 RX ORDER — ALBUTEROL SULFATE 90 UG/1
2 AEROSOL, METERED RESPIRATORY (INHALATION) EVERY 6 HOURS PRN
Status: DISCONTINUED | OUTPATIENT
Start: 2019-01-01 | End: 2019-01-01 | Stop reason: HOSPADM

## 2019-01-01 RX ORDER — OXYCODONE HYDROCHLORIDE 5 MG/1
10 TABLET ORAL ONCE
Status: COMPLETED | OUTPATIENT
Start: 2019-01-01 | End: 2019-01-01

## 2019-01-01 RX ORDER — DIPHENHYDRAMINE HCL 25 MG
25 TABLET ORAL NIGHTLY PRN
Status: DISCONTINUED | OUTPATIENT
Start: 2019-01-01 | End: 2019-01-01 | Stop reason: HOSPADM

## 2019-01-01 RX ORDER — ACETAMINOPHEN 650 MG/1
650 SUPPOSITORY RECTAL ONCE
Status: COMPLETED | OUTPATIENT
Start: 2019-01-01 | End: 2019-01-01

## 2019-01-01 RX ORDER — SODIUM CHLORIDE 9 MG/ML
INJECTION, SOLUTION INTRAVENOUS CONTINUOUS
Status: DISCONTINUED | OUTPATIENT
Start: 2019-01-01 | End: 2019-01-01 | Stop reason: HOSPADM

## 2019-01-01 RX ORDER — FENTANYL 50 UG/H
1 PATCH TRANSDERMAL
COMMUNITY
Start: 2019-01-01

## 2019-01-01 RX ORDER — LACTULOSE 10 G/15ML
20 SOLUTION ORAL 2 TIMES DAILY
Status: DISCONTINUED | OUTPATIENT
Start: 2019-01-01 | End: 2019-01-01

## 2019-01-01 RX ORDER — GLIMEPIRIDE 4 MG/1
4 TABLET ORAL EVERY MORNING
Qty: 30 TABLET | Refills: 3 | Status: SHIPPED | OUTPATIENT
Start: 2019-01-01

## 2019-01-01 RX ORDER — LEVOFLOXACIN 5 MG/ML
500 INJECTION, SOLUTION INTRAVENOUS EVERY 24 HOURS
Status: DISCONTINUED | OUTPATIENT
Start: 2019-01-01 | End: 2019-01-01 | Stop reason: HOSPADM

## 2019-01-01 RX ORDER — DEXTROSE MONOHYDRATE 50 MG/ML
100 INJECTION, SOLUTION INTRAVENOUS PRN
Status: DISCONTINUED | OUTPATIENT
Start: 2019-01-01 | End: 2019-01-01 | Stop reason: HOSPADM

## 2019-01-01 RX ORDER — GUAIFENESIN/DEXTROMETHORPHAN 100-10MG/5
5 SYRUP ORAL 3 TIMES DAILY PRN
Qty: 75 ML | Refills: 0 | Status: SHIPPED | OUTPATIENT
Start: 2019-01-01 | End: 2019-01-01

## 2019-01-01 RX ORDER — LANCETS 30 GAUGE
1 EACH MISCELLANEOUS DAILY
Qty: 100 EACH | Refills: 3 | Status: SHIPPED | OUTPATIENT
Start: 2019-01-01

## 2019-01-01 RX ORDER — POTASSIUM CHLORIDE 20 MEQ/1
20 TABLET, EXTENDED RELEASE ORAL DAILY
Status: DISCONTINUED | OUTPATIENT
Start: 2019-01-01 | End: 2019-01-01 | Stop reason: HOSPADM

## 2019-01-01 RX ORDER — FENTANYL CITRATE 50 UG/ML
50 INJECTION, SOLUTION INTRAMUSCULAR; INTRAVENOUS ONCE
Status: DISCONTINUED | OUTPATIENT
Start: 2019-01-01 | End: 2019-01-01 | Stop reason: HOSPADM

## 2019-01-01 RX ORDER — POTASSIUM CHLORIDE 20 MEQ/1
40 TABLET, EXTENDED RELEASE ORAL PRN
Status: DISCONTINUED | OUTPATIENT
Start: 2019-01-01 | End: 2019-01-01 | Stop reason: HOSPADM

## 2019-01-01 RX ORDER — ONDANSETRON 2 MG/ML
4 INJECTION INTRAMUSCULAR; INTRAVENOUS EVERY 6 HOURS PRN
Status: CANCELLED | OUTPATIENT
Start: 2019-01-01

## 2019-01-01 RX ORDER — ACETAMINOPHEN 500 MG
500 TABLET ORAL EVERY 6 HOURS PRN
Status: DISCONTINUED | OUTPATIENT
Start: 2019-01-01 | End: 2019-01-01 | Stop reason: HOSPADM

## 2019-01-01 RX ORDER — 0.9 % SODIUM CHLORIDE 0.9 %
250 INTRAVENOUS SOLUTION INTRAVENOUS ONCE
Status: COMPLETED | OUTPATIENT
Start: 2019-01-01 | End: 2019-01-01

## 2019-01-01 RX ORDER — OXYCODONE HYDROCHLORIDE 5 MG/1
5 TABLET ORAL ONCE
Status: COMPLETED | OUTPATIENT
Start: 2019-01-01 | End: 2019-01-01

## 2019-01-01 RX ORDER — POTASSIUM CHLORIDE 7.45 MG/ML
10 INJECTION INTRAVENOUS PRN
Status: DISCONTINUED | OUTPATIENT
Start: 2019-01-01 | End: 2019-01-01 | Stop reason: HOSPADM

## 2019-01-01 RX ORDER — MORPHINE SULFATE 2 MG/ML
2 INJECTION, SOLUTION INTRAMUSCULAR; INTRAVENOUS EVERY 4 HOURS PRN
Status: DISCONTINUED | OUTPATIENT
Start: 2019-01-01 | End: 2019-01-01

## 2019-01-01 RX ORDER — IBUPROFEN 400 MG/1
400 TABLET ORAL EVERY 6 HOURS PRN
Status: DISCONTINUED | OUTPATIENT
Start: 2019-01-01 | End: 2019-01-01

## 2019-01-01 RX ORDER — OXYCODONE AND ACETAMINOPHEN 10; 325 MG/1; MG/1
1 TABLET ORAL 4 TIMES DAILY
Status: ON HOLD | COMMUNITY
End: 2019-01-01

## 2019-01-01 RX ORDER — OXYCODONE HCL 20 MG/1
20 TABLET, FILM COATED, EXTENDED RELEASE ORAL EVERY 6 HOURS PRN
Status: DISCONTINUED | OUTPATIENT
Start: 2019-01-01 | End: 2019-01-01 | Stop reason: HOSPADM

## 2019-01-01 RX ORDER — OXYCODONE HYDROCHLORIDE 10 MG/1
20 TABLET ORAL 3 TIMES DAILY PRN
COMMUNITY

## 2019-01-01 RX ORDER — NITROGLYCERIN 0.4 MG/1
0.4 TABLET SUBLINGUAL EVERY 5 MIN PRN
Status: DISCONTINUED | OUTPATIENT
Start: 2019-01-01 | End: 2019-01-01 | Stop reason: HOSPADM

## 2019-01-01 RX ORDER — ONDANSETRON 2 MG/ML
4 INJECTION INTRAMUSCULAR; INTRAVENOUS EVERY 8 HOURS PRN
Status: DISCONTINUED | OUTPATIENT
Start: 2019-01-01 | End: 2019-01-01 | Stop reason: HOSPADM

## 2019-01-01 RX ORDER — FENTANYL 50 UG/H
1 PATCH TRANSDERMAL
Status: DISCONTINUED | OUTPATIENT
Start: 2019-01-01 | End: 2019-01-01

## 2019-01-01 RX ORDER — DIPHENHYDRAMINE HCL 25 MG
25 TABLET ORAL ONCE
Status: COMPLETED | OUTPATIENT
Start: 2019-01-01 | End: 2019-01-01

## 2019-01-01 RX ORDER — OXYCODONE HYDROCHLORIDE 10 MG/1
10 TABLET ORAL 3 TIMES DAILY PRN
Status: DISCONTINUED | OUTPATIENT
Start: 2019-01-01 | End: 2019-01-01

## 2019-01-01 RX ORDER — LACTULOSE 10 G/15ML
20 SOLUTION ORAL DAILY
Status: DISCONTINUED | OUTPATIENT
Start: 2019-01-01 | End: 2019-01-01 | Stop reason: HOSPADM

## 2019-01-01 RX ORDER — SODIUM CHLORIDE 0.9 % (FLUSH) 0.9 %
10 SYRINGE (ML) INJECTION EVERY 12 HOURS SCHEDULED
Status: DISCONTINUED | OUTPATIENT
Start: 2019-01-01 | End: 2019-01-01 | Stop reason: SDUPTHER

## 2019-01-01 RX ORDER — INSULIN GLARGINE 100 [IU]/ML
20 INJECTION, SOLUTION SUBCUTANEOUS NIGHTLY
COMMUNITY

## 2019-01-01 RX ORDER — OXYCODONE HCL 20 MG/1
20 TABLET, FILM COATED, EXTENDED RELEASE ORAL EVERY 8 HOURS PRN
Status: DISCONTINUED | OUTPATIENT
Start: 2019-01-01 | End: 2019-01-01

## 2019-01-01 RX ORDER — CALCIUM CARBONATE 200(500)MG
500 TABLET,CHEWABLE ORAL 3 TIMES DAILY PRN
Status: DISCONTINUED | OUTPATIENT
Start: 2019-01-01 | End: 2019-01-01 | Stop reason: HOSPADM

## 2019-01-01 RX ORDER — ASPIRIN 81 MG/1
324 TABLET, CHEWABLE ORAL ONCE
Status: COMPLETED | OUTPATIENT
Start: 2019-01-01 | End: 2019-01-01

## 2019-01-01 RX ORDER — DIPHENHYDRAMINE HCL 25 MG
25 CAPSULE ORAL NIGHTLY PRN
Qty: 12 CAPSULE | Refills: 0 | Status: SHIPPED | OUTPATIENT
Start: 2019-01-01

## 2019-01-01 RX ORDER — FUROSEMIDE 20 MG/1
20 TABLET ORAL DAILY
Status: DISCONTINUED | OUTPATIENT
Start: 2019-01-01 | End: 2019-01-01 | Stop reason: HOSPADM

## 2019-01-01 RX ORDER — POTASSIUM CHLORIDE 20 MEQ/1
40 TABLET, EXTENDED RELEASE ORAL ONCE
Status: COMPLETED | OUTPATIENT
Start: 2019-01-01 | End: 2019-01-01

## 2019-01-01 RX ADMIN — VANCOMYCIN HYDROCHLORIDE 1500 MG: 5 INJECTION, POWDER, LYOPHILIZED, FOR SOLUTION INTRAVENOUS at 13:20

## 2019-01-01 RX ADMIN — ALBUMIN (HUMAN) 25 G: 0.25 INJECTION, SOLUTION INTRAVENOUS at 14:06

## 2019-01-01 RX ADMIN — IOVERSOL 75 ML: 741 INJECTION INTRA-ARTERIAL; INTRAVENOUS at 12:47

## 2019-01-01 RX ADMIN — INSULIN LISPRO 4 UNITS: 100 INJECTION, SOLUTION INTRAVENOUS; SUBCUTANEOUS at 17:26

## 2019-01-01 RX ADMIN — ACETAMINOPHEN 500 MG: 500 TABLET, FILM COATED ORAL at 21:53

## 2019-01-01 RX ADMIN — LIDOCAINE HYDROCHLORIDE 20 ML: 10 INJECTION, SOLUTION INFILTRATION; PERINEURAL at 02:06

## 2019-01-01 RX ADMIN — PIPERACILLIN SODIUM,TAZOBACTAM SODIUM 3.38 G: 3; .375 INJECTION, POWDER, FOR SOLUTION INTRAVENOUS at 21:47

## 2019-01-01 RX ADMIN — SODIUM CHLORIDE 25 ML: 9 INJECTION, SOLUTION INTRAVENOUS at 12:32

## 2019-01-01 RX ADMIN — Medication 10 ML: at 04:13

## 2019-01-01 RX ADMIN — PIPERACILLIN SODIUM,TAZOBACTAM SODIUM 3.38 G: 3; .375 INJECTION, POWDER, FOR SOLUTION INTRAVENOUS at 01:24

## 2019-01-01 RX ADMIN — INSULIN LISPRO 4 UNITS: 100 INJECTION, SOLUTION INTRAVENOUS; SUBCUTANEOUS at 12:03

## 2019-01-01 RX ADMIN — AZITHROMYCIN MONOHYDRATE 500 MG: 500 INJECTION, POWDER, LYOPHILIZED, FOR SOLUTION INTRAVENOUS at 16:08

## 2019-01-01 RX ADMIN — INSULIN LISPRO 1 UNITS: 100 INJECTION, SOLUTION INTRAVENOUS; SUBCUTANEOUS at 21:05

## 2019-01-01 RX ADMIN — CEFTRIAXONE SODIUM 1 G: 1 INJECTION, POWDER, FOR SOLUTION INTRAMUSCULAR; INTRAVENOUS at 13:56

## 2019-01-01 RX ADMIN — INSULIN LISPRO 6 UNITS: 100 INJECTION, SOLUTION INTRAVENOUS; SUBCUTANEOUS at 13:53

## 2019-01-01 RX ADMIN — Medication 10 ML: at 14:47

## 2019-01-01 RX ADMIN — OXYCODONE HYDROCHLORIDE 5 MG: 5 TABLET ORAL at 00:24

## 2019-01-01 RX ADMIN — INSULIN GLARGINE 15 UNITS: 100 INJECTION, SOLUTION SUBCUTANEOUS at 21:04

## 2019-01-01 RX ADMIN — OXYCODONE HYDROCHLORIDE 20 MG: 10 TABLET ORAL at 11:42

## 2019-01-01 RX ADMIN — VANCOMYCIN HYDROCHLORIDE 1500 MG: 5 INJECTION, POWDER, LYOPHILIZED, FOR SOLUTION INTRAVENOUS at 12:45

## 2019-01-01 RX ADMIN — Medication 10 ML: at 13:11

## 2019-01-01 RX ADMIN — ACETAMINOPHEN 650 MG: 650 SUPPOSITORY RECTAL at 03:33

## 2019-01-01 RX ADMIN — PIPERACILLIN SODIUM,TAZOBACTAM SODIUM 3.38 G: 3; .375 INJECTION, POWDER, FOR SOLUTION INTRAVENOUS at 02:59

## 2019-01-01 RX ADMIN — POTASSIUM CHLORIDE 20 MEQ: 1500 TABLET, EXTENDED RELEASE ORAL at 09:20

## 2019-01-01 RX ADMIN — ENOXAPARIN SODIUM 30 MG: 30 INJECTION SUBCUTANEOUS at 09:08

## 2019-01-01 RX ADMIN — VANCOMYCIN HYDROCHLORIDE 1500 MG: 5 INJECTION, POWDER, LYOPHILIZED, FOR SOLUTION INTRAVENOUS at 16:01

## 2019-01-01 RX ADMIN — LEVOFLOXACIN 500 MG: 5 INJECTION, SOLUTION INTRAVENOUS at 17:12

## 2019-01-01 RX ADMIN — OXYCODONE HYDROCHLORIDE 20 MG: 10 TABLET ORAL at 04:53

## 2019-01-01 RX ADMIN — METRONIDAZOLE 500 MG: 500 INJECTION, SOLUTION INTRAVENOUS at 00:56

## 2019-01-01 RX ADMIN — OXYCODONE HYDROCHLORIDE 20 MG: 10 TABLET ORAL at 23:43

## 2019-01-01 RX ADMIN — CEFTRIAXONE SODIUM 1 G: 1 INJECTION, POWDER, FOR SOLUTION INTRAMUSCULAR; INTRAVENOUS at 11:46

## 2019-01-01 RX ADMIN — METRONIDAZOLE 500 MG: 500 INJECTION, SOLUTION INTRAVENOUS at 16:26

## 2019-01-01 RX ADMIN — ALBUMIN (HUMAN) 25 G: 0.25 INJECTION, SOLUTION INTRAVENOUS at 12:36

## 2019-01-01 RX ADMIN — FAMOTIDINE 20 MG: 20 TABLET ORAL at 08:05

## 2019-01-01 RX ADMIN — INSULIN GLARGINE 15 UNITS: 100 INJECTION, SOLUTION SUBCUTANEOUS at 20:57

## 2019-01-01 RX ADMIN — SODIUM CHLORIDE: 9 INJECTION, SOLUTION INTRAVENOUS at 09:22

## 2019-01-01 RX ADMIN — VANCOMYCIN HYDROCHLORIDE 1250 MG: 5 INJECTION, POWDER, LYOPHILIZED, FOR SOLUTION INTRAVENOUS at 00:23

## 2019-01-01 RX ADMIN — VANCOMYCIN HYDROCHLORIDE 1500 MG: 5 INJECTION, POWDER, LYOPHILIZED, FOR SOLUTION INTRAVENOUS at 11:38

## 2019-01-01 RX ADMIN — SODIUM CHLORIDE 2586 ML: 9 INJECTION, SOLUTION INTRAVENOUS at 12:39

## 2019-01-01 RX ADMIN — FLECAINIDE ACETATE 50 MG: 50 TABLET ORAL at 03:26

## 2019-01-01 RX ADMIN — SODIUM CHLORIDE 1000 ML: 9 INJECTION, SOLUTION INTRAVENOUS at 07:41

## 2019-01-01 RX ADMIN — OXYCODONE HYDROCHLORIDE 20 MG: 10 TABLET ORAL at 17:18

## 2019-01-01 RX ADMIN — PIPERACILLIN SODIUM,TAZOBACTAM SODIUM 3.38 G: 3; .375 INJECTION, POWDER, FOR SOLUTION INTRAVENOUS at 08:08

## 2019-01-01 RX ADMIN — IOVERSOL 75 ML: 741 INJECTION INTRA-ARTERIAL; INTRAVENOUS at 04:12

## 2019-01-01 RX ADMIN — METOPROLOL TARTRATE 5 MG: 1 INJECTION, SOLUTION INTRAVENOUS at 02:53

## 2019-01-01 RX ADMIN — OXYCODONE AND ACETAMINOPHEN 2 TABLET: 5; 325 TABLET ORAL at 03:22

## 2019-01-01 RX ADMIN — INSULIN GLARGINE 15 UNITS: 100 INJECTION, SOLUTION SUBCUTANEOUS at 21:52

## 2019-01-01 RX ADMIN — INSULIN LISPRO 2 UNITS: 100 INJECTION, SOLUTION INTRAVENOUS; SUBCUTANEOUS at 22:02

## 2019-01-01 RX ADMIN — POTASSIUM CHLORIDE 20 MEQ: 1500 TABLET, EXTENDED RELEASE ORAL at 10:42

## 2019-01-01 RX ADMIN — PIPERACILLIN SODIUM,TAZOBACTAM SODIUM 3.38 G: 3; .375 INJECTION, POWDER, FOR SOLUTION INTRAVENOUS at 23:38

## 2019-01-01 RX ADMIN — SODIUM CHLORIDE: 9 INJECTION, SOLUTION INTRAVENOUS at 15:00

## 2019-01-01 RX ADMIN — ENOXAPARIN SODIUM 30 MG: 30 INJECTION SUBCUTANEOUS at 08:06

## 2019-01-01 RX ADMIN — METRONIDAZOLE 500 MG: 500 INJECTION, SOLUTION INTRAVENOUS at 08:13

## 2019-01-01 RX ADMIN — SODIUM CHLORIDE 1000 ML: 9 INJECTION, SOLUTION INTRAVENOUS at 04:10

## 2019-01-01 RX ADMIN — CALCIUM GLUCONATE 3 G: 98 INJECTION, SOLUTION INTRAVENOUS at 12:53

## 2019-01-01 RX ADMIN — OXYCODONE HYDROCHLORIDE 20 MG: 10 TABLET ORAL at 16:26

## 2019-01-01 RX ADMIN — AZITHROMYCIN MONOHYDRATE 500 MG: 500 INJECTION, POWDER, LYOPHILIZED, FOR SOLUTION INTRAVENOUS at 17:33

## 2019-01-01 RX ADMIN — PIPERACILLIN SODIUM,TAZOBACTAM SODIUM 3.38 G: 3; .375 INJECTION, POWDER, FOR SOLUTION INTRAVENOUS at 09:25

## 2019-01-01 RX ADMIN — SODIUM CHLORIDE: 9 INJECTION, SOLUTION INTRAVENOUS at 23:03

## 2019-01-01 RX ADMIN — CEFTRIAXONE SODIUM 1 G: 1 INJECTION, POWDER, FOR SOLUTION INTRAMUSCULAR; INTRAVENOUS at 12:19

## 2019-01-01 RX ADMIN — METRONIDAZOLE 500 MG: 500 INJECTION, SOLUTION INTRAVENOUS at 15:44

## 2019-01-01 RX ADMIN — VANCOMYCIN HYDROCHLORIDE 1250 MG: 5 INJECTION, POWDER, LYOPHILIZED, FOR SOLUTION INTRAVENOUS at 05:33

## 2019-01-01 RX ADMIN — Medication 10 ML: at 21:04

## 2019-01-01 RX ADMIN — VANCOMYCIN HYDROCHLORIDE 1000 MG: 1 INJECTION, POWDER, LYOPHILIZED, FOR SOLUTION INTRAVENOUS at 23:42

## 2019-01-01 RX ADMIN — LACTULOSE 25.33 G: 20 SOLUTION ORAL at 08:05

## 2019-01-01 RX ADMIN — OXYCODONE HYDROCHLORIDE 20 MG: 10 TABLET ORAL at 19:19

## 2019-01-01 RX ADMIN — SODIUM CHLORIDE 1000 ML: 9 INJECTION, SOLUTION INTRAVENOUS at 01:24

## 2019-01-01 RX ADMIN — OXYCODONE HYDROCHLORIDE 20 MG: 20 TABLET, FILM COATED, EXTENDED RELEASE ORAL at 17:33

## 2019-01-01 RX ADMIN — FAMOTIDINE 20 MG: 20 TABLET ORAL at 20:09

## 2019-01-01 RX ADMIN — VANCOMYCIN HYDROCHLORIDE 1500 MG: 5 INJECTION, POWDER, LYOPHILIZED, FOR SOLUTION INTRAVENOUS at 09:26

## 2019-01-01 RX ADMIN — INSULIN LISPRO 5 UNITS: 100 INJECTION, SOLUTION INTRAVENOUS; SUBCUTANEOUS at 17:34

## 2019-01-01 RX ADMIN — CEFTRIAXONE SODIUM 1 G: 1 INJECTION, POWDER, FOR SOLUTION INTRAMUSCULAR; INTRAVENOUS at 11:26

## 2019-01-01 RX ADMIN — METRONIDAZOLE 500 MG: 500 INJECTION, SOLUTION INTRAVENOUS at 04:59

## 2019-01-01 RX ADMIN — Medication 10 ML: at 10:06

## 2019-01-01 RX ADMIN — PIPERACILLIN SODIUM,TAZOBACTAM SODIUM 3.38 G: 3; .375 INJECTION, POWDER, FOR SOLUTION INTRAVENOUS at 18:35

## 2019-01-01 RX ADMIN — PIPERACILLIN SODIUM,TAZOBACTAM SODIUM 3.38 G: 3; .375 INJECTION, POWDER, FOR SOLUTION INTRAVENOUS at 11:13

## 2019-01-01 RX ADMIN — VANCOMYCIN HYDROCHLORIDE 1250 MG: 5 INJECTION, POWDER, LYOPHILIZED, FOR SOLUTION INTRAVENOUS at 00:56

## 2019-01-01 RX ADMIN — VANCOMYCIN HYDROCHLORIDE 1500 MG: 5 INJECTION, POWDER, LYOPHILIZED, FOR SOLUTION INTRAVENOUS at 07:45

## 2019-01-01 RX ADMIN — LIDOCAINE HYDROCHLORIDE 5 ML: 10 INJECTION, SOLUTION INFILTRATION; PERINEURAL at 13:52

## 2019-01-01 RX ADMIN — SODIUM CHLORIDE: 9 INJECTION, SOLUTION INTRAVENOUS at 12:29

## 2019-01-01 RX ADMIN — SODIUM CHLORIDE: 9 INJECTION, SOLUTION INTRAVENOUS at 13:56

## 2019-01-01 RX ADMIN — INSULIN LISPRO 2 UNITS: 100 INJECTION, SOLUTION INTRAVENOUS; SUBCUTANEOUS at 20:20

## 2019-01-01 RX ADMIN — PIPERACILLIN SODIUM,TAZOBACTAM SODIUM 3.38 G: 3; .375 INJECTION, POWDER, FOR SOLUTION INTRAVENOUS at 10:46

## 2019-01-01 RX ADMIN — SODIUM CHLORIDE: 9 INJECTION, SOLUTION INTRAVENOUS at 12:47

## 2019-01-01 RX ADMIN — PIPERACILLIN SODIUM,TAZOBACTAM SODIUM 3.38 G: 3; .375 INJECTION, POWDER, FOR SOLUTION INTRAVENOUS at 18:26

## 2019-01-01 RX ADMIN — ANTACID TABLETS 500 MG: 500 TABLET, CHEWABLE ORAL at 00:55

## 2019-01-01 RX ADMIN — SODIUM CHLORIDE 1000 ML: 9 INJECTION, SOLUTION INTRAVENOUS at 21:40

## 2019-01-01 RX ADMIN — PIPERACILLIN SODIUM,TAZOBACTAM SODIUM 3.38 G: 3; .375 INJECTION, POWDER, FOR SOLUTION INTRAVENOUS at 17:35

## 2019-01-01 RX ADMIN — OXYCODONE HYDROCHLORIDE 20 MG: 10 TABLET ORAL at 12:15

## 2019-01-01 RX ADMIN — SODIUM CHLORIDE: 9 INJECTION, SOLUTION INTRAVENOUS at 04:42

## 2019-01-01 RX ADMIN — VANCOMYCIN HYDROCHLORIDE 1500 MG: 5 INJECTION, POWDER, LYOPHILIZED, FOR SOLUTION INTRAVENOUS at 23:30

## 2019-01-01 RX ADMIN — INSULIN LISPRO 1 UNITS: 100 INJECTION, SOLUTION INTRAVENOUS; SUBCUTANEOUS at 12:27

## 2019-01-01 RX ADMIN — OXYCODONE HYDROCHLORIDE 20 MG: 10 TABLET ORAL at 12:47

## 2019-01-01 RX ADMIN — IOVERSOL 75 ML: 741 INJECTION INTRA-ARTERIAL; INTRAVENOUS at 00:36

## 2019-01-01 RX ADMIN — Medication 10 ML: at 00:50

## 2019-01-01 RX ADMIN — METRONIDAZOLE 500 MG: 500 INJECTION, SOLUTION INTRAVENOUS at 22:12

## 2019-01-01 RX ADMIN — SODIUM CHLORIDE: 9 INJECTION, SOLUTION INTRAVENOUS at 02:37

## 2019-01-01 RX ADMIN — OXYCODONE HYDROCHLORIDE 20 MG: 10 TABLET ORAL at 20:01

## 2019-01-01 RX ADMIN — OXYCODONE HYDROCHLORIDE 20 MG: 20 TABLET, FILM COATED, EXTENDED RELEASE ORAL at 21:04

## 2019-01-01 RX ADMIN — INSULIN LISPRO 3 UNITS: 100 INJECTION, SOLUTION INTRAVENOUS; SUBCUTANEOUS at 12:52

## 2019-01-01 RX ADMIN — INSULIN LISPRO 3 UNITS: 100 INJECTION, SOLUTION INTRAVENOUS; SUBCUTANEOUS at 23:39

## 2019-01-01 RX ADMIN — SODIUM CHLORIDE 1000 ML: 9 INJECTION, SOLUTION INTRAVENOUS at 02:52

## 2019-01-01 RX ADMIN — Medication 10 ML: at 22:10

## 2019-01-01 RX ADMIN — Medication 10 ML: at 22:45

## 2019-01-01 RX ADMIN — ENOXAPARIN SODIUM 30 MG: 30 INJECTION SUBCUTANEOUS at 20:09

## 2019-01-01 RX ADMIN — OXYCODONE HYDROCHLORIDE 20 MG: 20 TABLET, FILM COATED, EXTENDED RELEASE ORAL at 07:00

## 2019-01-01 RX ADMIN — VANCOMYCIN HYDROCHLORIDE 1500 MG: 5 INJECTION, POWDER, LYOPHILIZED, FOR SOLUTION INTRAVENOUS at 03:54

## 2019-01-01 RX ADMIN — ENOXAPARIN SODIUM 40 MG: 100 INJECTION SUBCUTANEOUS at 17:34

## 2019-01-01 RX ADMIN — PIPERACILLIN SODIUM,TAZOBACTAM SODIUM 3.38 G: 3; .375 INJECTION, POWDER, FOR SOLUTION INTRAVENOUS at 04:12

## 2019-01-01 RX ADMIN — OXYCODONE HYDROCHLORIDE 20 MG: 20 TABLET, FILM COATED, EXTENDED RELEASE ORAL at 23:36

## 2019-01-01 RX ADMIN — ALBUMIN (HUMAN) 25 G: 0.25 INJECTION, SOLUTION INTRAVENOUS at 21:03

## 2019-01-01 RX ADMIN — CEFTRIAXONE SODIUM 2 G: 2 INJECTION, POWDER, FOR SOLUTION INTRAMUSCULAR; INTRAVENOUS at 05:15

## 2019-01-01 RX ADMIN — INSULIN LISPRO 2 UNITS: 100 INJECTION, SOLUTION INTRAVENOUS; SUBCUTANEOUS at 17:34

## 2019-01-01 RX ADMIN — POTASSIUM CHLORIDE 20 MEQ: 1500 TABLET, EXTENDED RELEASE ORAL at 09:25

## 2019-01-01 RX ADMIN — INSULIN LISPRO 1 UNITS: 100 INJECTION, SOLUTION INTRAVENOUS; SUBCUTANEOUS at 21:52

## 2019-01-01 RX ADMIN — SODIUM CHLORIDE 80 ML: 9 INJECTION, SOLUTION INTRAVENOUS at 22:45

## 2019-01-01 RX ADMIN — OXYCODONE HYDROCHLORIDE 20 MG: 10 TABLET ORAL at 21:17

## 2019-01-01 RX ADMIN — INSULIN LISPRO 2 UNITS: 100 INJECTION, SOLUTION INTRAVENOUS; SUBCUTANEOUS at 07:39

## 2019-01-01 RX ADMIN — VANCOMYCIN HYDROCHLORIDE 1250 MG: 5 INJECTION, POWDER, LYOPHILIZED, FOR SOLUTION INTRAVENOUS at 13:05

## 2019-01-01 RX ADMIN — SODIUM CHLORIDE: 9 INJECTION, SOLUTION INTRAVENOUS at 14:18

## 2019-01-01 RX ADMIN — DIPHENHYDRAMINE HCL 25 MG: 25 TABLET ORAL at 01:19

## 2019-01-01 RX ADMIN — PIPERACILLIN SODIUM,TAZOBACTAM SODIUM 3.38 G: 3; .375 INJECTION, POWDER, FOR SOLUTION INTRAVENOUS at 04:30

## 2019-01-01 RX ADMIN — SODIUM CHLORIDE 80 ML: 9 INJECTION, SOLUTION INTRAVENOUS at 00:36

## 2019-01-01 RX ADMIN — Medication 10 ML: at 21:03

## 2019-01-01 RX ADMIN — LACTULOSE 25.33 G: 20 SOLUTION ORAL at 20:09

## 2019-01-01 RX ADMIN — Medication 10 ML: at 00:36

## 2019-01-01 RX ADMIN — PIPERACILLIN SODIUM,TAZOBACTAM SODIUM 3.38 G: 3; .375 INJECTION, POWDER, FOR SOLUTION INTRAVENOUS at 23:23

## 2019-01-01 RX ADMIN — ENOXAPARIN SODIUM 30 MG: 30 INJECTION SUBCUTANEOUS at 08:08

## 2019-01-01 RX ADMIN — Medication 10 ML: at 12:47

## 2019-01-01 RX ADMIN — METRONIDAZOLE 500 MG: 500 INJECTION, SOLUTION INTRAVENOUS at 21:07

## 2019-01-01 RX ADMIN — ENOXAPARIN SODIUM 40 MG: 100 INJECTION SUBCUTANEOUS at 10:20

## 2019-01-01 RX ADMIN — FAMOTIDINE 20 MG: 10 INJECTION, SOLUTION INTRAVENOUS at 09:56

## 2019-01-01 RX ADMIN — SINCALIDE 2.1 MCG: 5 INJECTION, POWDER, LYOPHILIZED, FOR SOLUTION INTRAVENOUS at 14:47

## 2019-01-01 RX ADMIN — INSULIN LISPRO 8 UNITS: 100 INJECTION, SOLUTION INTRAVENOUS; SUBCUTANEOUS at 18:00

## 2019-01-01 RX ADMIN — OXYCODONE HYDROCHLORIDE 5 MG: 5 TABLET ORAL at 22:34

## 2019-01-01 RX ADMIN — OXYCODONE HYDROCHLORIDE 20 MG: 10 TABLET ORAL at 12:26

## 2019-01-01 RX ADMIN — SODIUM CHLORIDE 80 ML: 9 INJECTION, SOLUTION INTRAVENOUS at 00:50

## 2019-01-01 RX ADMIN — VANCOMYCIN HYDROCHLORIDE 1500 MG: 5 INJECTION, POWDER, LYOPHILIZED, FOR SOLUTION INTRAVENOUS at 07:48

## 2019-01-01 RX ADMIN — ENOXAPARIN SODIUM 30 MG: 30 INJECTION SUBCUTANEOUS at 08:30

## 2019-01-01 RX ADMIN — ALBUMIN (HUMAN) 25 G: 0.25 INJECTION, SOLUTION INTRAVENOUS at 14:39

## 2019-01-01 RX ADMIN — INSULIN LISPRO 6 UNITS: 100 INJECTION, SOLUTION INTRAVENOUS; SUBCUTANEOUS at 17:20

## 2019-01-01 RX ADMIN — VANCOMYCIN HYDROCHLORIDE 1500 MG: 5 INJECTION, POWDER, LYOPHILIZED, FOR SOLUTION INTRAVENOUS at 18:02

## 2019-01-01 RX ADMIN — OXYCODONE HYDROCHLORIDE 20 MG: 20 TABLET, FILM COATED, EXTENDED RELEASE ORAL at 03:24

## 2019-01-01 RX ADMIN — INSULIN LISPRO 1 UNITS: 100 INJECTION, SOLUTION INTRAVENOUS; SUBCUTANEOUS at 17:07

## 2019-01-01 RX ADMIN — OXYCODONE HYDROCHLORIDE 20 MG: 10 TABLET ORAL at 21:33

## 2019-01-01 RX ADMIN — CEFTRIAXONE SODIUM 2 G: 2 INJECTION, POWDER, FOR SOLUTION INTRAMUSCULAR; INTRAVENOUS at 17:41

## 2019-01-01 RX ADMIN — SODIUM CHLORIDE 1000 ML: 9 INJECTION, SOLUTION INTRAVENOUS at 05:39

## 2019-01-01 RX ADMIN — SODIUM CHLORIDE 1000 ML: 9 INJECTION, SOLUTION INTRAVENOUS at 11:49

## 2019-01-01 RX ADMIN — ENOXAPARIN SODIUM 40 MG: 100 INJECTION SUBCUTANEOUS at 09:20

## 2019-01-01 RX ADMIN — INSULIN LISPRO 4 UNITS: 100 INJECTION, SOLUTION INTRAVENOUS; SUBCUTANEOUS at 20:19

## 2019-01-01 RX ADMIN — OXYCODONE HYDROCHLORIDE 5 MG: 5 TABLET ORAL at 16:24

## 2019-01-01 RX ADMIN — POTASSIUM CHLORIDE 40 MEQ: 1500 TABLET, EXTENDED RELEASE ORAL at 16:20

## 2019-01-01 RX ADMIN — PIPERACILLIN SODIUM,TAZOBACTAM SODIUM 3.38 G: 3; .375 INJECTION, POWDER, FOR SOLUTION INTRAVENOUS at 15:56

## 2019-01-01 RX ADMIN — OXYCODONE HYDROCHLORIDE 20 MG: 10 TABLET ORAL at 01:12

## 2019-01-01 RX ADMIN — SODIUM CHLORIDE 80 ML: 9 INJECTION, SOLUTION INTRAVENOUS at 12:47

## 2019-01-01 RX ADMIN — OXYCODONE HYDROCHLORIDE 20 MG: 10 TABLET ORAL at 04:42

## 2019-01-01 RX ADMIN — SODIUM CHLORIDE 2925 ML: 9 INJECTION, SOLUTION INTRAVENOUS at 22:04

## 2019-01-01 RX ADMIN — CEFTRIAXONE SODIUM 2 G: 2 INJECTION, POWDER, FOR SOLUTION INTRAMUSCULAR; INTRAVENOUS at 03:54

## 2019-01-01 RX ADMIN — VANCOMYCIN HYDROCHLORIDE 1500 MG: 5 INJECTION, POWDER, LYOPHILIZED, FOR SOLUTION INTRAVENOUS at 03:35

## 2019-01-01 RX ADMIN — POTASSIUM CHLORIDE 40 MEQ: 1500 TABLET, EXTENDED RELEASE ORAL at 12:27

## 2019-01-01 RX ADMIN — OXYCODONE HYDROCHLORIDE 20 MG: 10 TABLET ORAL at 23:03

## 2019-01-01 RX ADMIN — FAMOTIDINE 20 MG: 20 TABLET ORAL at 10:13

## 2019-01-01 RX ADMIN — SODIUM CHLORIDE: 9 INJECTION, SOLUTION INTRAVENOUS at 15:48

## 2019-01-01 RX ADMIN — LACTULOSE 25.33 G: 20 SOLUTION ORAL at 13:28

## 2019-01-01 RX ADMIN — ENOXAPARIN SODIUM 40 MG: 100 INJECTION SUBCUTANEOUS at 07:39

## 2019-01-01 RX ADMIN — PIPERACILLIN SODIUM,TAZOBACTAM SODIUM 3.38 G: 3; .375 INJECTION, POWDER, FOR SOLUTION INTRAVENOUS at 05:25

## 2019-01-01 RX ADMIN — PIPERACILLIN SODIUM,TAZOBACTAM SODIUM 3.38 G: 3; .375 INJECTION, POWDER, FOR SOLUTION INTRAVENOUS at 05:49

## 2019-01-01 RX ADMIN — INSULIN GLARGINE 15 UNITS: 100 INJECTION, SOLUTION SUBCUTANEOUS at 21:11

## 2019-01-01 RX ADMIN — DIPHENHYDRAMINE HCL 25 MG: 25 TABLET ORAL at 00:06

## 2019-01-01 RX ADMIN — CEFTRIAXONE SODIUM 1 G: 1 INJECTION, POWDER, FOR SOLUTION INTRAMUSCULAR; INTRAVENOUS at 12:26

## 2019-01-01 RX ADMIN — OXYCODONE HYDROCHLORIDE 20 MG: 10 TABLET ORAL at 19:38

## 2019-01-01 RX ADMIN — INSULIN LISPRO 4 UNITS: 100 INJECTION, SOLUTION INTRAVENOUS; SUBCUTANEOUS at 16:15

## 2019-01-01 RX ADMIN — PIPERACILLIN SODIUM,TAZOBACTAM SODIUM 3.38 G: 3; .375 INJECTION, POWDER, FOR SOLUTION INTRAVENOUS at 04:53

## 2019-01-01 RX ADMIN — OXYCODONE HYDROCHLORIDE 5 MG: 5 TABLET ORAL at 20:09

## 2019-01-01 RX ADMIN — SODIUM CHLORIDE 1000 ML: 9 INJECTION, SOLUTION INTRAVENOUS at 23:24

## 2019-01-01 RX ADMIN — INSULIN LISPRO 2 UNITS: 100 INJECTION, SOLUTION INTRAVENOUS; SUBCUTANEOUS at 18:10

## 2019-01-01 RX ADMIN — VANCOMYCIN HYDROCHLORIDE 1500 MG: 5 INJECTION, POWDER, LYOPHILIZED, FOR SOLUTION INTRAVENOUS at 00:36

## 2019-01-01 RX ADMIN — MAGNESIUM SULFATE HEPTAHYDRATE 1 G: 1 INJECTION, SOLUTION INTRAVENOUS at 15:37

## 2019-01-01 RX ADMIN — PIPERACILLIN SODIUM,TAZOBACTAM SODIUM 3.38 G: 3; .375 INJECTION, POWDER, FOR SOLUTION INTRAVENOUS at 23:58

## 2019-01-01 RX ADMIN — IOVERSOL 75 ML: 741 INJECTION INTRA-ARTERIAL; INTRAVENOUS at 00:50

## 2019-01-01 RX ADMIN — ASPIRIN 81 MG 324 MG: 81 TABLET ORAL at 23:41

## 2019-01-01 RX ADMIN — FAMOTIDINE 20 MG: 10 INJECTION, SOLUTION INTRAVENOUS at 09:45

## 2019-01-01 RX ADMIN — AZITHROMYCIN MONOHYDRATE 500 MG: 500 INJECTION, POWDER, LYOPHILIZED, FOR SOLUTION INTRAVENOUS at 20:16

## 2019-01-01 RX ADMIN — OXYCODONE HYDROCHLORIDE 20 MG: 20 TABLET, FILM COATED, EXTENDED RELEASE ORAL at 21:03

## 2019-01-01 RX ADMIN — LEVOFLOXACIN 500 MG: 5 INJECTION, SOLUTION INTRAVENOUS at 16:01

## 2019-01-01 RX ADMIN — PIPERACILLIN SODIUM,TAZOBACTAM SODIUM 3.38 G: 3; .375 INJECTION, POWDER, FOR SOLUTION INTRAVENOUS at 03:00

## 2019-01-01 RX ADMIN — INSULIN LISPRO 6 UNITS: 100 INJECTION, SOLUTION INTRAVENOUS; SUBCUTANEOUS at 17:32

## 2019-01-01 RX ADMIN — OXYCODONE HYDROCHLORIDE 20 MG: 10 TABLET ORAL at 16:44

## 2019-01-01 RX ADMIN — INSULIN LISPRO 3 UNITS: 100 INJECTION, SOLUTION INTRAVENOUS; SUBCUTANEOUS at 17:11

## 2019-01-01 RX ADMIN — SODIUM CHLORIDE 80 ML: 9 INJECTION, SOLUTION INTRAVENOUS at 04:12

## 2019-01-01 RX ADMIN — SODIUM CHLORIDE: 9 INJECTION, SOLUTION INTRAVENOUS at 01:00

## 2019-01-01 RX ADMIN — OXYCODONE HYDROCHLORIDE 20 MG: 10 TABLET ORAL at 00:06

## 2019-01-01 RX ADMIN — SODIUM CHLORIDE: 9 INJECTION, SOLUTION INTRAVENOUS at 03:23

## 2019-01-01 RX ADMIN — INSULIN LISPRO 4 UNITS: 100 INJECTION, SOLUTION INTRAVENOUS; SUBCUTANEOUS at 11:51

## 2019-01-01 RX ADMIN — INSULIN LISPRO 2 UNITS: 100 INJECTION, SOLUTION INTRAVENOUS; SUBCUTANEOUS at 10:41

## 2019-01-01 RX ADMIN — METRONIDAZOLE 500 MG: 500 INJECTION, SOLUTION INTRAVENOUS at 22:36

## 2019-01-01 RX ADMIN — LEVOFLOXACIN 500 MG: 5 INJECTION, SOLUTION INTRAVENOUS at 17:07

## 2019-01-01 RX ADMIN — PIPERACILLIN SODIUM,TAZOBACTAM SODIUM 3.38 G: 3; .375 INJECTION, POWDER, FOR SOLUTION INTRAVENOUS at 22:02

## 2019-01-01 RX ADMIN — METRONIDAZOLE 500 MG: 500 INJECTION, SOLUTION INTRAVENOUS at 08:42

## 2019-01-01 RX ADMIN — OXYCODONE HYDROCHLORIDE 20 MG: 20 TABLET, FILM COATED, EXTENDED RELEASE ORAL at 13:05

## 2019-01-01 RX ADMIN — FAMOTIDINE 20 MG: 10 INJECTION, SOLUTION INTRAVENOUS at 11:33

## 2019-01-01 RX ADMIN — OXYCODONE HYDROCHLORIDE 20 MG: 20 TABLET, FILM COATED, EXTENDED RELEASE ORAL at 16:57

## 2019-01-01 RX ADMIN — OXYCODONE HYDROCHLORIDE 20 MG: 10 TABLET ORAL at 07:34

## 2019-01-01 RX ADMIN — Medication 10 ML: at 13:57

## 2019-01-01 RX ADMIN — OXYCODONE HYDROCHLORIDE 20 MG: 10 TABLET ORAL at 04:32

## 2019-01-01 RX ADMIN — VANCOMYCIN HYDROCHLORIDE 1250 MG: 5 INJECTION, POWDER, LYOPHILIZED, FOR SOLUTION INTRAVENOUS at 13:38

## 2019-01-01 RX ADMIN — PIPERACILLIN SODIUM,TAZOBACTAM SODIUM 3.38 G: 3; .375 INJECTION, POWDER, FOR SOLUTION INTRAVENOUS at 11:25

## 2019-01-01 RX ADMIN — VANCOMYCIN HYDROCHLORIDE 1250 MG: 5 INJECTION, POWDER, LYOPHILIZED, FOR SOLUTION INTRAVENOUS at 13:01

## 2019-01-01 RX ADMIN — SODIUM CHLORIDE: 9 INJECTION, SOLUTION INTRAVENOUS at 23:30

## 2019-01-01 RX ADMIN — POTASSIUM CHLORIDE 20 MEQ: 1500 TABLET, EXTENDED RELEASE ORAL at 07:39

## 2019-01-01 RX ADMIN — INSULIN LISPRO 8 UNITS: 100 INJECTION, SOLUTION INTRAVENOUS; SUBCUTANEOUS at 12:49

## 2019-01-01 RX ADMIN — ALBUMIN (HUMAN) 25 G: 0.25 INJECTION, SOLUTION INTRAVENOUS at 09:45

## 2019-01-01 RX ADMIN — OXYCODONE HYDROCHLORIDE 20 MG: 20 TABLET, FILM COATED, EXTENDED RELEASE ORAL at 10:02

## 2019-01-01 RX ADMIN — OXYCODONE HYDROCHLORIDE 20 MG: 10 TABLET ORAL at 15:05

## 2019-01-01 RX ADMIN — Medication 3.7 MILLICURIE: at 09:20

## 2019-01-01 RX ADMIN — VANCOMYCIN HYDROCHLORIDE 1500 MG: 5 INJECTION, POWDER, LYOPHILIZED, FOR SOLUTION INTRAVENOUS at 15:24

## 2019-01-01 RX ADMIN — PIPERACILLIN SODIUM,TAZOBACTAM SODIUM 3.38 G: 3; .375 INJECTION, POWDER, FOR SOLUTION INTRAVENOUS at 10:21

## 2019-01-01 RX ADMIN — INSULIN LISPRO 2 UNITS: 100 INJECTION, SOLUTION INTRAVENOUS; SUBCUTANEOUS at 13:40

## 2019-01-01 RX ADMIN — PIPERACILLIN SODIUM,TAZOBACTAM SODIUM 3.38 G: 3; .375 INJECTION, POWDER, FOR SOLUTION INTRAVENOUS at 12:39

## 2019-01-01 RX ADMIN — VANCOMYCIN HYDROCHLORIDE 1250 MG: 5 INJECTION, POWDER, LYOPHILIZED, FOR SOLUTION INTRAVENOUS at 20:01

## 2019-01-01 RX ADMIN — VANCOMYCIN HYDROCHLORIDE 1500 MG: 5 INJECTION, POWDER, LYOPHILIZED, FOR SOLUTION INTRAVENOUS at 20:09

## 2019-01-01 RX ADMIN — PIPERACILLIN SODIUM,TAZOBACTAM SODIUM 3.38 G: 3; .375 INJECTION, POWDER, FOR SOLUTION INTRAVENOUS at 18:45

## 2019-01-01 RX ADMIN — IOVERSOL 75 ML: 741 INJECTION INTRA-ARTERIAL; INTRAVENOUS at 22:45

## 2019-01-01 RX ADMIN — METRONIDAZOLE 500 MG: 500 INJECTION, SOLUTION INTRAVENOUS at 06:26

## 2019-01-01 RX ADMIN — OXYCODONE HYDROCHLORIDE 5 MG: 5 TABLET ORAL at 03:53

## 2019-01-01 RX ADMIN — VANCOMYCIN HYDROCHLORIDE 1250 MG: 5 INJECTION, POWDER, LYOPHILIZED, FOR SOLUTION INTRAVENOUS at 00:52

## 2019-01-01 RX ADMIN — VANCOMYCIN HYDROCHLORIDE 1500 MG: 5 INJECTION, POWDER, LYOPHILIZED, FOR SOLUTION INTRAVENOUS at 16:00

## 2019-01-01 RX ADMIN — METRONIDAZOLE 500 MG: 500 INJECTION, SOLUTION INTRAVENOUS at 15:49

## 2019-01-01 RX ADMIN — PIPERACILLIN SODIUM,TAZOBACTAM SODIUM 3.38 G: 3; .375 INJECTION, POWDER, FOR SOLUTION INTRAVENOUS at 03:22

## 2019-01-01 RX ADMIN — ACETAMINOPHEN 1000 MG: 500 TABLET, FILM COATED ORAL at 23:23

## 2019-01-01 RX ADMIN — OXYCODONE HYDROCHLORIDE 20 MG: 10 TABLET ORAL at 09:22

## 2019-01-01 RX ADMIN — Medication 10 ML: at 09:18

## 2019-01-01 RX ADMIN — Medication 6.3 MILLICURIE: at 13:11

## 2019-01-01 RX ADMIN — ALBUMIN (HUMAN) 25 G: 0.25 INJECTION, SOLUTION INTRAVENOUS at 18:40

## 2019-01-01 RX ADMIN — FAMOTIDINE 20 MG: 20 TABLET ORAL at 20:07

## 2019-01-01 RX ADMIN — ENOXAPARIN SODIUM 40 MG: 100 INJECTION SUBCUTANEOUS at 10:41

## 2019-01-01 RX ADMIN — INSULIN GLARGINE 15 UNITS: 100 INJECTION, SOLUTION SUBCUTANEOUS at 22:06

## 2019-01-01 RX ADMIN — OXYCODONE HYDROCHLORIDE 10 MG: 5 TABLET ORAL at 22:03

## 2019-01-01 RX ADMIN — AZITHROMYCIN MONOHYDRATE 500 MG: 500 INJECTION, POWDER, LYOPHILIZED, FOR SOLUTION INTRAVENOUS at 16:44

## 2019-01-01 RX ADMIN — OXYCODONE HYDROCHLORIDE 20 MG: 10 TABLET ORAL at 09:24

## 2019-01-01 RX ADMIN — OXYCODONE HYDROCHLORIDE 20 MG: 10 TABLET ORAL at 08:05

## 2019-01-01 RX ADMIN — CEFTRIAXONE SODIUM 2 G: 2 INJECTION, POWDER, FOR SOLUTION INTRAMUSCULAR; INTRAVENOUS at 16:27

## 2019-01-01 RX ADMIN — OXYCODONE HYDROCHLORIDE 20 MG: 10 TABLET ORAL at 16:02

## 2019-01-01 RX ADMIN — INSULIN LISPRO 1 UNITS: 100 INJECTION, SOLUTION INTRAVENOUS; SUBCUTANEOUS at 22:06

## 2019-01-01 RX ADMIN — Medication 10 ML: at 08:42

## 2019-01-01 RX ADMIN — OXYCODONE HYDROCHLORIDE 5 MG: 5 TABLET ORAL at 08:41

## 2019-01-01 RX ADMIN — INSULIN LISPRO 1 UNITS: 100 INJECTION, SOLUTION INTRAVENOUS; SUBCUTANEOUS at 11:32

## 2019-01-01 RX ADMIN — FAMOTIDINE 20 MG: 10 INJECTION, SOLUTION INTRAVENOUS at 22:06

## 2019-01-01 RX ADMIN — OXYCODONE HYDROCHLORIDE 5 MG: 5 TABLET ORAL at 00:48

## 2019-01-01 RX ADMIN — VANCOMYCIN HYDROCHLORIDE 1500 MG: 5 INJECTION, POWDER, LYOPHILIZED, FOR SOLUTION INTRAVENOUS at 23:38

## 2019-01-01 RX ADMIN — INSULIN LISPRO 1 UNITS: 100 INJECTION, SOLUTION INTRAVENOUS; SUBCUTANEOUS at 21:04

## 2019-01-01 RX ADMIN — FAMOTIDINE 20 MG: 10 INJECTION, SOLUTION INTRAVENOUS at 21:03

## 2019-01-01 RX ADMIN — INSULIN LISPRO 6 UNITS: 100 INJECTION, SOLUTION INTRAVENOUS; SUBCUTANEOUS at 09:26

## 2019-01-01 RX ADMIN — VANCOMYCIN HYDROCHLORIDE 1500 MG: 5 INJECTION, POWDER, LYOPHILIZED, FOR SOLUTION INTRAVENOUS at 04:50

## 2019-01-01 RX ADMIN — METRONIDAZOLE 500 MG: 500 INJECTION, SOLUTION INTRAVENOUS at 17:30

## 2019-01-01 RX ADMIN — Medication 10 ML: at 20:07

## 2019-01-01 RX ADMIN — Medication 10 ML: at 20:10

## 2019-01-01 RX ADMIN — INSULIN LISPRO 1 UNITS: 100 INJECTION, SOLUTION INTRAVENOUS; SUBCUTANEOUS at 21:11

## 2019-01-01 RX ADMIN — ENOXAPARIN SODIUM 30 MG: 30 INJECTION SUBCUTANEOUS at 20:07

## 2019-01-01 RX ADMIN — SODIUM CHLORIDE: 9 INJECTION, SOLUTION INTRAVENOUS at 16:11

## 2019-01-01 RX ADMIN — PIPERACILLIN SODIUM,TAZOBACTAM SODIUM 3.38 G: 3; .375 INJECTION, POWDER, FOR SOLUTION INTRAVENOUS at 12:15

## 2019-01-01 RX ADMIN — OXYCODONE HYDROCHLORIDE 20 MG: 20 TABLET, FILM COATED, EXTENDED RELEASE ORAL at 05:05

## 2019-01-01 RX ADMIN — SODIUM CHLORIDE: 9 INJECTION, SOLUTION INTRAVENOUS at 09:34

## 2019-01-01 RX ADMIN — MAGNESIUM SULFATE HEPTAHYDRATE 1 G: 1 INJECTION, SOLUTION INTRAVENOUS at 14:36

## 2019-01-01 RX ADMIN — OXYCODONE HYDROCHLORIDE 20 MG: 20 TABLET, FILM COATED, EXTENDED RELEASE ORAL at 14:39

## 2019-01-01 RX ADMIN — ALBUMIN (HUMAN) 50 G: 0.25 INJECTION, SOLUTION INTRAVENOUS at 01:37

## 2019-01-01 ASSESSMENT — PAIN SCALES - GENERAL
PAINLEVEL_OUTOF10: 9
PAINLEVEL_OUTOF10: 3
PAINLEVEL_OUTOF10: 3
PAINLEVEL_OUTOF10: 4
PAINLEVEL_OUTOF10: 8
PAINLEVEL_OUTOF10: 5
PAINLEVEL_OUTOF10: 7
PAINLEVEL_OUTOF10: 7
PAINLEVEL_OUTOF10: 0
PAINLEVEL_OUTOF10: 9
PAINLEVEL_OUTOF10: 0
PAINLEVEL_OUTOF10: 8
PAINLEVEL_OUTOF10: 8
PAINLEVEL_OUTOF10: 0
PAINLEVEL_OUTOF10: 9
PAINLEVEL_OUTOF10: 7
PAINLEVEL_OUTOF10: 10
PAINLEVEL_OUTOF10: 0
PAINLEVEL_OUTOF10: 8
PAINLEVEL_OUTOF10: 10
PAINLEVEL_OUTOF10: 5
PAINLEVEL_OUTOF10: 7
PAINLEVEL_OUTOF10: 7
PAINLEVEL_OUTOF10: 8
PAINLEVEL_OUTOF10: 8
PAINLEVEL_OUTOF10: 2
PAINLEVEL_OUTOF10: 3
PAINLEVEL_OUTOF10: 3
PAINLEVEL_OUTOF10: 0
PAINLEVEL_OUTOF10: 4
PAINLEVEL_OUTOF10: 7
PAINLEVEL_OUTOF10: 3
PAINLEVEL_OUTOF10: 8
PAINLEVEL_OUTOF10: 5
PAINLEVEL_OUTOF10: 7
PAINLEVEL_OUTOF10: 10
PAINLEVEL_OUTOF10: 8
PAINLEVEL_OUTOF10: 3
PAINLEVEL_OUTOF10: 8
PAINLEVEL_OUTOF10: 7
PAINLEVEL_OUTOF10: 7
PAINLEVEL_OUTOF10: 8
PAINLEVEL_OUTOF10: 7
PAINLEVEL_OUTOF10: 7
PAINLEVEL_OUTOF10: 8
PAINLEVEL_OUTOF10: 8
PAINLEVEL_OUTOF10: 5
PAINLEVEL_OUTOF10: 7
PAINLEVEL_OUTOF10: 0
PAINLEVEL_OUTOF10: 7
PAINLEVEL_OUTOF10: 8
PAINLEVEL_OUTOF10: 6
PAINLEVEL_OUTOF10: 7
PAINLEVEL_OUTOF10: 7
PAINLEVEL_OUTOF10: 8
PAINLEVEL_OUTOF10: 9
PAINLEVEL_OUTOF10: 7
PAINLEVEL_OUTOF10: 6
PAINLEVEL_OUTOF10: 5
PAINLEVEL_OUTOF10: 0
PAINLEVEL_OUTOF10: 7
PAINLEVEL_OUTOF10: 4
PAINLEVEL_OUTOF10: 10
PAINLEVEL_OUTOF10: 4
PAINLEVEL_OUTOF10: 6
PAINLEVEL_OUTOF10: 4
PAINLEVEL_OUTOF10: 7
PAINLEVEL_OUTOF10: 9
PAINLEVEL_OUTOF10: 5
PAINLEVEL_OUTOF10: 6
PAINLEVEL_OUTOF10: 4
PAINLEVEL_OUTOF10: 7
PAINLEVEL_OUTOF10: 7
PAINLEVEL_OUTOF10: 10
PAINLEVEL_OUTOF10: 5
PAINLEVEL_OUTOF10: 7
PAINLEVEL_OUTOF10: 9
PAINLEVEL_OUTOF10: 3
PAINLEVEL_OUTOF10: 7
PAINLEVEL_OUTOF10: 9
PAINLEVEL_OUTOF10: 9
PAINLEVEL_OUTOF10: 7
PAINLEVEL_OUTOF10: 10
PAINLEVEL_OUTOF10: 6
PAINLEVEL_OUTOF10: 8
PAINLEVEL_OUTOF10: 7
PAINLEVEL_OUTOF10: 0
PAINLEVEL_OUTOF10: 5
PAINLEVEL_OUTOF10: 4
PAINLEVEL_OUTOF10: 0

## 2019-01-01 ASSESSMENT — ENCOUNTER SYMPTOMS
RHINORRHEA: 0
NAUSEA: 0
STRIDOR: 0
SHORTNESS OF BREATH: 0
EYE PAIN: 0
ANAL BLEEDING: 0
CONSTIPATION: 0
DIARRHEA: 0
DIARRHEA: 0
TROUBLE SWALLOWING: 0
VOMITING: 0
SHORTNESS OF BREATH: 0
EYE ITCHING: 0
COUGH: 1
DIARRHEA: 0
BLOOD IN STOOL: 0
SHORTNESS OF BREATH: 0
CHEST TIGHTNESS: 0
SINUS PAIN: 0
FACIAL SWELLING: 0
NAUSEA: 0
RHINORRHEA: 0
SHORTNESS OF BREATH: 0
BACK PAIN: 1
ABDOMINAL DISTENTION: 1
SHORTNESS OF BREATH: 0
SHORTNESS OF BREATH: 0
CONSTIPATION: 0
SINUS PAIN: 0
CONSTIPATION: 0
VOMITING: 0
NAUSEA: 0
RESPIRATORY NEGATIVE: 1
ABDOMINAL PAIN: 1
DIARRHEA: 0
SORE THROAT: 0
EYE REDNESS: 0
TROUBLE SWALLOWING: 0
PHOTOPHOBIA: 0
STRIDOR: 0
BACK PAIN: 1
WHEEZING: 0
DIARRHEA: 0
COUGH: 0
DIARRHEA: 0
BACK PAIN: 0
SINUS PAIN: 0
VOMITING: 0
BLOOD IN STOOL: 0
COUGH: 0
NAUSEA: 0
COUGH: 0
BACK PAIN: 1
BACK PAIN: 1
ABDOMINAL PAIN: 1
EYE DISCHARGE: 0
ABDOMINAL PAIN: 0
SHORTNESS OF BREATH: 0
NAUSEA: 1
VOMITING: 0
NAUSEA: 0
EYE REDNESS: 0
BACK PAIN: 0
SINUS PRESSURE: 0
SORE THROAT: 0
SHORTNESS OF BREATH: 0
COUGH: 0
FACIAL SWELLING: 0
COUGH: 0
DIARRHEA: 0
RECTAL PAIN: 0
SORE THROAT: 0
CONSTIPATION: 0
BLOOD IN STOOL: 0
VOMITING: 0
VOMITING: 0
BACK PAIN: 0
BLOOD IN STOOL: 0
EYE DISCHARGE: 0
CONSTIPATION: 0
WHEEZING: 0
SHORTNESS OF BREATH: 0
CONSTIPATION: 0
COUGH: 0
ANAL BLEEDING: 0
SORE THROAT: 0
SORE THROAT: 0
VOMITING: 0
ABDOMINAL DISTENTION: 1
WHEEZING: 0
EYE PAIN: 0
RECTAL PAIN: 0
SORE THROAT: 0
COUGH: 0
NAUSEA: 0
VOMITING: 0
ABDOMINAL PAIN: 1
SINUS PAIN: 0
SHORTNESS OF BREATH: 1
ABDOMINAL DISTENTION: 1
VOICE CHANGE: 0
APNEA: 0
NAUSEA: 0
ABDOMINAL PAIN: 0
ABDOMINAL PAIN: 0
SHORTNESS OF BREATH: 0
NAUSEA: 0
ABDOMINAL PAIN: 0
ABDOMINAL PAIN: 1
EYE PAIN: 0
NAUSEA: 0
WHEEZING: 0
BACK PAIN: 1
EYE REDNESS: 0
VOMITING: 0
VOMITING: 0
CHOKING: 0
NAUSEA: 0
VOMITING: 0
DIARRHEA: 0
SHORTNESS OF BREATH: 0
COUGH: 1
SINUS PAIN: 0
DIARRHEA: 0
RECTAL PAIN: 0
NAUSEA: 0
NAUSEA: 0
ANAL BLEEDING: 0
VOMITING: 0
ABDOMINAL PAIN: 1
COUGH: 0
SHORTNESS OF BREATH: 0
SINUS PAIN: 0
CONSTIPATION: 1
VOICE CHANGE: 0
BACK PAIN: 0
CHEST TIGHTNESS: 0
NAUSEA: 1
EYE ITCHING: 0
COUGH: 0
WHEEZING: 0
ABDOMINAL PAIN: 1
TROUBLE SWALLOWING: 0
VOMITING: 0
TROUBLE SWALLOWING: 0
COUGH: 0
ABDOMINAL PAIN: 1
CHEST TIGHTNESS: 0
VOMITING: 0
EYE REDNESS: 0
ABDOMINAL PAIN: 1
EYES NEGATIVE: 1
ABDOMINAL PAIN: 0
CHOKING: 0
ABDOMINAL DISTENTION: 1
SHORTNESS OF BREATH: 0
SORE THROAT: 0
NAUSEA: 0
RESPIRATORY NEGATIVE: 1
SHORTNESS OF BREATH: 1
BACK PAIN: 1
STRIDOR: 0
NAUSEA: 0
EYE REDNESS: 0
TROUBLE SWALLOWING: 0
DIARRHEA: 0
CONSTIPATION: 0
COLOR CHANGE: 1
COUGH: 0
SORE THROAT: 0
ABDOMINAL PAIN: 1
BLOOD IN STOOL: 0
RESPIRATORY NEGATIVE: 1
ANAL BLEEDING: 0
SHORTNESS OF BREATH: 0
BACK PAIN: 0
ABDOMINAL PAIN: 1
RECTAL PAIN: 0
COUGH: 0
CHEST TIGHTNESS: 0
VOMITING: 0
BLOOD IN STOOL: 0
SINUS PRESSURE: 0
RESPIRATORY NEGATIVE: 1
SINUS PRESSURE: 0
CHEST TIGHTNESS: 0

## 2019-01-01 ASSESSMENT — PAIN DESCRIPTION - LOCATION
LOCATION: GENERALIZED
LOCATION: ABDOMEN;GENERALIZED
LOCATION: BACK
LOCATION: GENERALIZED
LOCATION: ABDOMEN
LOCATION: BACK
LOCATION: BACK
LOCATION: ABDOMEN
LOCATION: GENERALIZED
LOCATION: BACK
LOCATION: ABDOMEN
LOCATION: GENERALIZED
LOCATION: ABDOMEN
LOCATION: CHEST
LOCATION: GENERALIZED
LOCATION: BACK
LOCATION: BACK
LOCATION: HEAD
LOCATION: BACK
LOCATION: CHEST
LOCATION: ABDOMEN

## 2019-01-01 ASSESSMENT — PAIN DESCRIPTION - PAIN TYPE
TYPE: CHRONIC PAIN;ACUTE PAIN
TYPE: ACUTE PAIN
TYPE: CHRONIC PAIN
TYPE: CHRONIC PAIN
TYPE: ACUTE PAIN
TYPE: CHRONIC PAIN
TYPE: ACUTE PAIN
TYPE: CHRONIC PAIN
TYPE: ACUTE PAIN;CHRONIC PAIN
TYPE: ACUTE PAIN
TYPE: CHRONIC PAIN

## 2019-01-01 ASSESSMENT — HEART SCORE: ECG: 0

## 2019-01-01 ASSESSMENT — PAIN DESCRIPTION - PROGRESSION
CLINICAL_PROGRESSION: GRADUALLY WORSENING
CLINICAL_PROGRESSION: GRADUALLY WORSENING

## 2019-01-01 ASSESSMENT — PAIN DESCRIPTION - ORIENTATION
ORIENTATION: LOWER;MID
ORIENTATION: LOWER
ORIENTATION: RIGHT;UPPER
ORIENTATION: MID
ORIENTATION: RIGHT;UPPER
ORIENTATION: LOWER
ORIENTATION: LOWER;MID
ORIENTATION: LOWER
ORIENTATION: RIGHT

## 2019-01-01 ASSESSMENT — PAIN DESCRIPTION - DESCRIPTORS
DESCRIPTORS: ACHING;THROBBING
DESCRIPTORS: TINGLING
DESCRIPTORS: ACHING;THROBBING
DESCRIPTORS: BURNING;ACHING
DESCRIPTORS: DULL;ACHING
DESCRIPTORS: DULL;ACHING
DESCRIPTORS: CONSTANT;ACHING
DESCRIPTORS: CONSTANT
DESCRIPTORS: TINGLING;NUMBNESS

## 2019-01-01 ASSESSMENT — PAIN DESCRIPTION - FREQUENCY
FREQUENCY: CONTINUOUS

## 2019-01-01 ASSESSMENT — PAIN DESCRIPTION - ONSET: ONSET: ON-GOING

## 2019-01-01 ASSESSMENT — PAIN SCALES - WONG BAKER: WONGBAKER_NUMERICALRESPONSE: 0

## 2019-04-02 NOTE — ED PROVIDER NOTES
[address] ED  eMERGENCY dEPARTMENT eNCOUnter      Pt Name: Mila Fleming  MRN: [id number]  [place] 1960  Date of evaluation: 4/1/19      CHIEF COMPLAINT:   Chief Complaint   Patient presents with    Fall    Headache     HISTORY OF PRESENT ILLNESS    Mila Fleming is a 66 y.o. male who presents with complaints of head injury and knee pain after a fall. Pt states he is weak from his cancer and lost his balance PTA. States he did fall and hit his forehead on the asphalt. Pt states he also landed on his knees. States the right one took more of the force. He denies loc or emesis. Denies lightheadedness, dizziness, neck pain, cp, sob, nausea, vomiting, abd pain, numbness. Pt does have some chronic upper back pain from the cancer. States this has not changed in severity since the fall. Pt is not on blood thinners. Pt reports he does take oxycodone and fentanyl patches for cancer pain. Reports will soon be out of medication and needs something before he leaves so  He can sleep tonight. No other complaints. REVIEW OF SYSTEMS     Constitutional: Denies recent fever, chills. Eyes: No visual changes. Neck: No neck pain. Respiratory: Denies recent shortness of breath. Cardiac:  Denies recent chest pain. GI: denies any recent abdominal pain, c/o nausea, no vomiting. Denies Blood in the stool or black tarry stools. : denies dysuria. Musculoskeletal: Denies focal weakness. Neurologic: c/o headache  Skin:  Denies any rash. Negative in 10 essential Systems except as mentioned above and in the HPI.       PAST MEDICAL HISTORY   PMH:  has a past medical history of Back pain, CAD S/P percutaneous coronary angioplasty, Carcinoma ([address] Colon cancer ([address] Degeneration of lumbar or lumbosacral intervertebral disc, Depression, Diabetes mellitus ([address] H/O cardiac catheterization, Hepatic metastases ([address] Hyperlipidemia, Hypertension, Knee pain, and MI, old. none otherwise stated from nurses notes  Surgical History:  has a past surgical history that includes Tunneled venous port placement (Right); pr sigmoidoscopy flx w/rmvl foreign body (N/A, 5/25/2017); hernia repair; Coronary angioplasty with stent (11/2016); pr sigmoidoscopy flx w/rmvl foreign body (12/8/2017); Endoscopic ultrasonography, GI (N/A, 12/8/2017); and liver biopsy (Right, 08/23/2018). none otherwise stated from nurses notes  Social History:  reports that he has never smoked. He has never used smokeless tobacco. He reports that he does not drink alcohol or use drugs. , lives at home with others  Family History: none  Psychiatric History: none    Allergies:is allergic to morphine. PHYSICAL EXAM     INITIAL VITALS: /79   Pulse 98   Temp 97.8 °F (36.6 °C) (Oral)   Resp 19   Ht 6' 4\" (1.93 m)   Wt 210 lb (95.3 kg)   SpO2 99%   BMI 25.56 kg/m²   Constitutional:  Well developed, A&O times 3  Eyes:  Pupils equal and readily reactive to light at 3 mm bilaterally, extraocular muscles are intact bilaterally  HENT:  Atraumatic, external ears normal, no hemotympanum, no bulging in the TMs bilaterally nose normal.    Neck:  Supple, no midline tenderness, range of motion is normal.  Respiratory:  Clear to auscultation bilaterally with good air exchange  Cardiovascular:  RRR with normal S1 and S2  GI:  Soft, nondistended/normal BS, and nontender   Musculoskeletal:  No edema, no tenderness, no deformities, good strength bilaterally is 5 out of 5. Mild bony tenderness over bilateral patellas. No hand, ankle, cervical tenderness. Mild thoracic midline tenderness. No step off deformity, bruising, swelling noted. Back:  No CVA tenderness. Normal to inspection, no tenderness to palpation, no midline tenderness. Integument: bruising and abrasions noted on left palm, ankle and bilateral patellas.    Neuro examination:  CN II-XII intact, Bilateral Upper and Lower extremities with 5/5 strength both proximally and distally, and intact sensation to light touch. Finger to nose intact with no pronator drift. PERRL at 3 mm bilaterally without nystagmus. Roscoe Coma Scale*  Patient characteristics Points   Eyes open   Spontaneously 4   To speech 3   To pain 2   Never 1   Best verbal response   Oriented 5   Confused 4   Inappropriate words 3   Incomprehensible sounds 2   Silent 1   Best motor response   Obeys commands 6   Localizes pain 5   Flexion withdrawal 4   Decerebrate flexion 3   Decerebrate extension 2   No response 1   Total 15         EMERGENCY DEPARTMENT COURSE:     Orders Placed This Encounter   Medications    oxyCODONE (ROXICODONE) immediate release tablet 10 mg     Fall, hit head. Pt states he lost his balance due to weakness from cancer. No loc or emesis. Bilateral knee pain, headache, left ankle and hand bruising/ abrasions. No neuro deficits on exam.   Pt is not on blood thinners. Will get ct head, and bilateral knee xrays. Pt refused ankle and hand xray. States he wants pain medication but does not want it right now. States he will need it prior to discharge so he can sleep. States Forrestine Plover is why im here. \"      Ct head is unremarkable. xrays of bilateral knees show small suprapatellar joint effusion. Will provided pt with oxycodone. He has a  home. He is to follow up with PCP. Head injury instructions discussed with patient. Discussed results and plan with the pt. They expressed appropriate understanding. Pt given close follow up, supportive care instructions and strict return instructions at the bedside. Differential diagnosis includes but is not limited to subarachnoid hemorrhage, subdural hemorrhage, skull fracture, concussion, contusion      The patient has been instructed to return if the symptoms worsen or change in any way. The patient verbalized understanding. FINAL IMPRESSION:     1. Fall, initial encounter    2.  Injury of head, initial encounter    3. Injury of right knee, initial encounter    4.  Knee injury, left, initial encounter          DISPOSITION:  DISPOSITION       PATIENT REFERRED TO:  [facility] ED  [address] [facility] [address]  [phone]    If symptoms worsen    pcp  see clinic list          DISCHARGE MEDICATIONS:  New Prescriptions    No medications on file       (Please note that portions of this note were completed with a voice recognition program.  Efforts were made to edit the dictations but occasionally words are mis-transcribed.)       Inga Malhotra PA-C  04/01/19 [id number]

## 2019-04-02 NOTE — ED PROVIDER NOTES
[address] ED  eMERGENCY dEPARTMENT eNCOUnter   Independent Attestation     Pt Name: Leonor Corea  MRN: [id number]  [place] 1960  Date of evaluation: 4/1/19   Leonor Corea is a 66 y.o. male who presents with Fall and Headache    Vitals:   Vitals:    04/01/19 2040   BP: 134/79   Pulse: 98   Resp: 19   Temp: 97.8 °F (36.6 °C)   TempSrc: Oral   SpO2: 99%   Weight: 210 lb (95.3 kg)   Height: 6' 4\" (1.93 m)     Impression:   1. Fall, initial encounter    2. Injury of head, initial encounter    3. Injury of right knee, initial encounter    4. Knee injury, left, initial encounter      I was personally available for consultation in the Emergency Department. I have reviewed the chart and agree with the documentation as recorded by the [facility], including the assessment, treatment plan and disposition.   Rey Carey MD  Attending Emergency  Physician                  Rey Carey MD  04/01/19 5660

## 2019-04-07 NOTE — ED PROVIDER NOTES
Past Surgical History:   Procedure Laterality Date    CORONARY ANGIOPLASTY WITH STENT PLACEMENT  11/2016    x1 stent    ENDOSCOPIC ULTRASOUND (LOWER) N/A 12/8/2017    RECTAL ENDOSCOPIC ULTRASOUND WITH PATHOLOGY performed by Jostin Carlton MD at [address] LIVER BIOPSY Right 08/23/2018    CT guided Visceral Tissue Ablation    MA SIGMOIDOSCOPY FLX W/RMVL FOREIGN BODY N/A 5/25/2017    SIGMOIDOSCOPY BIOPSY FLEXIBLE performed by Jostin Carlton MD at [facility]    MA SIGMOIDOSCOPY FLX W/RMVL FOREIGN BODY  12/8/2017    SIGMOIDOSCOPY BIOPSY FLEXIBLE performed by Jostin Carlton MD at [facility]    TUNNELED VENOUS PORT PLACEMENT Right     right upper chest for cancer treatment     CURRENT MEDICATIONS       Discharge Medication List as of 4/7/2019  2:52 AM      CONTINUE these medications which have NOT CHANGED    Details   fentaNYL (DURAGESIC) 25 MCG/HR Place 1 patch onto the skin every 72 hours. .Historical Med      oxyCODONE-acetaminophen (PERCOCET)  MG per tablet Take 1 tablet by mouth 4 times daily. Marjorie Soni Historical Med      naloxone (NARCAN) 4 MG/0.1ML LIQD nasal spray Take 4 mg by mouthHistorical Med      potassium chloride (KLOR-CON M) 20 MEQ extended release tablet Take 1 tablet by mouth daily, Disp-30 tablet, R-3Normal      metoprolol tartrate (LOPRESSOR) 25 MG tablet Take 1 tablet by mouth daily Historical Med      Glucose Blood (BLOOD GLUCOSE TEST STRIPS) STRP 1 each by In Vitro route daily As needed. , Disp-100 strip, R-5Normal      Insulin Pen Needle 29G X 12.7MM MISC DAILY Starting 5/4/2017, Until Discontinued, Disp-100 each, R-0, Normal      nitroGLYCERIN (NITROSTAT) 0.4 MG SL tablet Place 1 tablet under the tongue every 5 minutes as needed for Chest pain Dissolve 1 tablet under tongue for chest pain and repeat every 5 min up to max of 3 total doses.   If no relief after 3 doses call 911, Disp-25 tablet, R-3           ALLERGIES     is allergic to morphine. FAMILY HISTORY     indicated that his mother is . He indicated that his father is alive. He indicated that all of his three sisters are alive. He indicated that his brother is alive. SOCIAL HISTORY       Social History     Tobacco Use    Smoking status: Never Smoker    Smokeless tobacco: Never Used   Substance Use Topics    Alcohol use: No    Drug use: No     PHYSICAL EXAM     INITIAL VITALS: /82 Comment: Simultaneous filing. User may not have seen previous data. Pulse 92 Comment: Simultaneous filing. User may not have seen previous data. Temp 97.8 °F (36.6 °C) (Oral)   Resp 12 Comment: Simultaneous filing. User may not have seen previous data. Ht 6' 1\" (1.854 m)   Wt 210 lb (95.3 kg)   SpO2 97% Comment: Simultaneous filing. User may not have seen previous data. BMI 27.71 kg/m²    Physical Exam   Constitutional: He is oriented to person, place, and time. He appears well-developed and well-nourished. No distress. HENT:   Head: Normocephalic and atraumatic. Nose: Nose normal.   Mouth/Throat: Oropharynx is clear and moist.   Eyes: Pupils are equal, round, and reactive to light. Conjunctivae and EOM are normal.   Cardiovascular: Normal rate, regular rhythm and normal heart sounds. Exam reveals no gallop and no friction rub. No murmur heard. Pulmonary/Chest: Effort normal and breath sounds normal. No respiratory distress. He has no wheezes. Abdominal: Soft. Bowel sounds are normal. He exhibits no distension. There is no tenderness. Musculoskeletal: Normal range of motion. He exhibits no edema or tenderness. Neurological: He is alert and oriented to person, place, and time. He exhibits normal muscle tone. Coordination normal.   Skin: Skin is warm and dry. No rash noted. He is not diaphoretic. Nursing note and vitals reviewed. MEDICAL DECISION MAKIN y.o. male with known malignancy presenting with night sweats.  Patient had some transient tingling in his right chest, but this has resolved. Patient reports he feels at his baseline. Will check cbc, electrolytes. Patient has no fever, tachycardia or tachypnea. No hypoxia. Doubt PE with no sustained symptoms. Likely related to his cancer. Labs reveal no leukocytosis or neutropenia, no significant electrolyte abnormalities from prior - slight hyponatremia/hypochloremia, likely related to mild dehydration. UA without signs of infection. Will discharge home, follow up with oncologist as scheduled. Return precautions given verbally and in discharge paperwork, all questions answered. Patient agrees with plan of care. CRITICAL CARE:       PROCEDURES:    Procedures    DIAGNOSTIC RESULTS   EKG:All EKG's are interpreted by the Emergency Department Physician who either signs or Co-signs this chart in the absence of a cardiologist.      RADIOLOGY:All plain film, CT, MRI, and formal ultrasound images (except ED bedside ultrasound) are read by the radiologist, see reports below, unless otherwisenoted in MDM or here. No orders to display     LABS: All lab results were reviewed by myself, and all abnormals are listed below.   Labs Reviewed   CBC WITH AUTO DIFFERENTIAL - Abnormal; Notable for the following components:       Result Value    RBC 4.13 (*)     Hemoglobin 10.3 (*)     Hematocrit 31.6 (*)     MCV 76.4 (*)     MCH 25.0 (*)     RDW 19.5 (*)     Seg Neutrophils 79 (*)     Lymphocytes 7 (*)     Monocytes 13 (*)     Absolute Lymph # 0.71 (*)     Absolute Mono # 1.33 (*)     All other components within normal limits   COMPREHENSIVE METABOLIC PANEL W/ REFLEX TO MG FOR LOW K - Abnormal; Notable for the following components:    Glucose 273 (*)     BUN 4 (*)     CREATININE 0.50 (*)     Calcium 8.5 (*)     Sodium 131 (*)     Chloride 90 (*)     Alkaline Phosphatase 282 (*)     Alb 2.9 (*)     All other components within normal limits   URINALYSIS - Abnormal; Notable for the following components:    Urine Hgb SMALL (*) All other components within normal limits   MICROSCOPIC URINALYSIS - Abnormal; Notable for the following components:    Bacteria, UA FEW (*)     All other components within normal limits     EMERGENCY DEPARTMENTCOURSE:   Vitals:    Vitals:    04/07/19 0030   BP: 126/82   Pulse: 92   Resp: 12   Temp: 97.8 °F (36.6 °C)   TempSrc: Oral   SpO2: 97%   Weight: 210 lb (95.3 kg)   Height: 6' 1\" (1.854 m)       The patient was given the following medications while in the emergency department:  No orders of the defined types were placed in this encounter. CONSULTS:  None    FINAL IMPRESSION      1. Night sweat          DISPOSITION/PLAN   DISPOSITION Decision To Discharge 04/07/2019 02:52:20 AM      PATIENT REFERRED TO:  No follow-up provider specified. DISCHARGE MEDICATIONS:  Discharge Medication List as of 4/7/2019  2:52 AM        Fritz Hernandez MD  Attending Emergency Physician  [employer] voice recognition software used in portions of this document.                     Fritz Hernandez MD  04/07/19 [id number]

## 2019-04-08 NOTE — ED PROVIDER NOTES
[address] ED  eMERGENCY dEPARTMENT eNCOUnter   Attending Attestation     Pt Name: Keira Wyatt  MRN: [id number]  [place] 1960  Date of evaluation: 4/7/19       Keira Wyatt is a 66 y.o. male who presents with Chest Pain and Back Pain      History:    patient is here mostly actually because he's not able to get any sleep at home. Patient does not have a bed to sleep and at this current time and is just undergoing chemotherapy and under a lot of pain from his liver cancer. Patient is here though because he's been having some tingling sensation in the right side of his chest.  Patient denies shortness of breath or palpitations. Exam: Vitals:   Vitals:    04/07/19 2320 04/07/19 2323   Pulse: 100    Resp: 20    Temp:  99.2 °F (37.3 °C)   TempSrc:  Oral   SpO2: 94%    Weight: 215 lb (97.5 kg)    Height: 6' 2\" (1.88 m)        Heart is tachycardic but regular no murmurs. Lungs clear to auscultation bilaterally. Abdomen soft nontender nondistended. I performed a history and physical examination of the patient and discussed management with the resident. I reviewed the residents note and agree with the documented findings and plan of care. Any areas of disagreement are noted on the chart. I was personally present for the key portions of any procedures. I have documented in the chart those procedures where I was not present during the key portions. I have personally reviewed all images and agree with the resident's interpretation. I have reviewed the emergency nurses triage note. I agree with the chief complaint, past medical history, past surgical history, allergies, medications, social and family history as documented unless otherwise noted below. Documentation of the HPI, Physical Exam and Medical Decision Making performed by medical students or scribes is based on my personal performance of the HPI, PE and MDM.  I personally evaluated and examined the patient in conjunction with the APC and agree with the assessment, treatment plan, and disposition of the patient as recorded by the APC. Additional findings are as noted.     Kendra Medina MD  Attending Emergency  Physician              Dhara Oliver MD  04/07/19 3277

## 2019-04-08 NOTE — ED PROVIDER NOTES
[address]  Emergency Department Encounter  EmergencyMedicine Resident     Pt Name:Edison Mendoza  MRN: [id number]  [place] 1960  Date of evaluation: 4/7/19  PCP:  No primary care provider on file. CHIEF COMPLAINT       Chief Complaint   Patient presents with    Chest Pain    Back Pain       HISTORY OF PRESENT ILLNESS  (Location/Symptom, Timing/Onset, Context/Setting, Quality, Duration, Modifying Factors, Severity.)      Michelle Presley is a 66 y.o. male who presents with chest pain that started at rest.  Patient describes the pain as a tingling sensation. Patient denies any shortness of breath diaphoresis nausea. Patient also notes back pain consistent with previous episodes of back pain. Patient notes he has a history of cancer with metastases to the liver. Patient denies any headache. Patient states he isn't taking his home pain medications which have been helping but he is concerned because he has been unable to fall asleep secondary to the pain. Patient notes that he has an appointment on Tuesday with his primary care physician for evaluation of difficulty sleeping. PAST MEDICAL / SURGICAL / SOCIAL / FAMILY HISTORY      has a past medical history of Back pain, CAD S/P percutaneous coronary angioplasty, Carcinoma ([address] Colon cancer ([address] Degeneration of lumbar or lumbosacral intervertebral disc, Depression, Diabetes mellitus ([address] H/O cardiac catheterization, Hepatic metastases ([address] Hyperlipidemia, Hypertension, Knee pain, and MI, old.     has a past surgical history that includes Tunneled venous port placement (Right); pr sigmoidoscopy flx w/rmvl foreign body (N/A, 5/25/2017); hernia repair; Coronary angioplasty with stent (11/2016); pr sigmoidoscopy flx w/rmvl foreign body (12/8/2017); Endoscopic ultrasonography, GI (N/A, 12/8/2017); and liver biopsy (Right, 08/23/2018).     Social History     Socioeconomic History    Marital status:      Spouse name: Not on file    Number of children: Not on file    Years of education: Not on file    Highest education level: Not on file   Occupational History    Not on file   Social Needs    Financial resource strain: Not on file    Food insecurity:     Worry: Not on file     Inability: Not on file    Transportation needs:     Medical: Not on file     Non-medical: Not on file   Tobacco Use    Smoking status: Never Smoker    Smokeless tobacco: Never Used   Substance and Sexual Activity    Alcohol use: Yes     Comment: occ    Drug use: No    Sexual activity: Not on file   Lifestyle    Physical activity:     Days per week: Not on file     Minutes per session: Not on file    Stress: Not on file   Relationships    Social connections:     Talks on phone: Not on file     Gets together: Not on file     Attends [employer] service: Not on file     Active member of club or organization: Not on file     Attends meetings of clubs or organizations: Not on file     Relationship status: Not on file    Intimate partner violence:     Fear of current or ex partner: Not on file     Emotionally abused: Not on file     Physically abused: Not on file     Forced sexual activity: Not on file   Other Topics Concern    Not on file   Social History Narrative    Not on file       Patient was advised to stop smoking or to avoid tobacco use    Family History   Problem Relation Age of Onset    Heart Disease Mother     Stroke Mother     High Blood Pressure Mother     High Cholesterol Father        Allergies:  Morphine    Home Medications:     Prior to Admission medications    Medication Sig Start Date End Date Taking? Authorizing Provider   diphenhydrAMINE (BENADRYL) 25 MG capsule Take 1 capsule by mouth nightly as needed for Sleep 4/8/19  Yes Cindy Grade, DO   fentaNYL (DURAGESIC) 25 MCG/HR Place 1 patch onto the skin every 72 hours. .    Historical Provider, MD   naloxone [facility]) 4 MG/0.1ML LIQD nasal spray Take 4 mg by mouth    Historical Provider, MD   oxyCODONE-acetaminophen (PERCOCET)  MG per tablet Take 1 tablet by mouth 4 times daily. Sandi Coto Historical Provider, MD   potassium chloride (KLOR-CON M) 20 MEQ extended release tablet Take 1 tablet by mouth daily 10/9/18   Logan Acosta MD   metoprolol tartrate (LOPRESSOR) 25 MG tablet Take 1 tablet by mouth daily  3/12/18   Historical Provider, MD   Glucose Blood (BLOOD GLUCOSE TEST STRIPS) STRP 1 each by In Vitro route daily As needed. 8/24/17   Dionisio Fitzpatrick MD   Insulin Pen Needle 29G X 12.7MM MISC 1 each by Does not apply route daily 5/4/17   Celi Burgess MD   nitroGLYCERIN (NITROSTAT) 0.4 MG SL tablet Place 1 tablet under the tongue every 5 minutes as needed for Chest pain Dissolve 1 tablet under tongue for chest pain and repeat every 5 min up to max of 3 total doses. If no relief after 3 doses call 911 11/29/16   Sarika Aguirre APRN - CNP       REVIEW OF SYSTEMS    (2-9 systems for level 4, 10 or more for level 5)      Review of Systems   Constitutional: Positive for fatigue. Negative for chills and fever. HENT: Negative for congestion, trouble swallowing and voice change. Eyes: Negative for visual disturbance. Respiratory: Negative for shortness of breath and wheezing. Cardiovascular: Positive for chest pain. Gastrointestinal: Negative for abdominal pain, nausea and vomiting. Genitourinary: Negative for difficulty urinating. Musculoskeletal: Positive for back pain. Skin: Negative for rash. Neurological: Negative for headaches. Psychiatric/Behavioral: Positive for sleep disturbance. Negative for suicidal ideas. PHYSICAL EXAM   (up to 7 for level 4, 8 or more for level 5)      INITIAL VITALS:  /73   Pulse 91   Temp 99.2 °F (37.3 °C) (Oral)   Resp 21   Ht 6' 2\" (1.88 m)   Wt 215 lb (97.5 kg)   SpO2 92%   BMI 27.60 kg/m²     Physical Exam   Constitutional: He is oriented to person, place, and time.  He appears well-developed and well-nourished. No distress. HENT:   Head: Normocephalic and atraumatic. Eyes: Pupils are equal, round, and reactive to light. Conjunctivae and EOM are normal.   Neck: Normal range of motion. Neck supple. Cardiovascular: Normal rate, regular rhythm, normal heart sounds and intact distal pulses. Pulmonary/Chest: Effort normal and breath sounds normal. No stridor. No respiratory distress. He has no wheezes. Abdominal: Soft. Bowel sounds are normal. There is no tenderness. There is no rebound. Musculoskeletal: Normal range of motion. He exhibits no edema. Neurological: He is alert and oriented to person, place, and time. No cranial nerve deficit. Skin: Skin is warm and dry. He is not diaphoretic. Psychiatric: He has a normal mood and affect. Thought content normal.   Nursing note and vitals reviewed.       DIFFERENTIAL  DIAGNOSIS     PLAN (LABS / IMAGING / EKG):  Orders Placed This Encounter   Procedures    XR CHEST STANDARD (2 VW)    CT Chest Pulmonary Embolism W Contrast    CBC Auto Differential    Basic Metabolic Panel    Troponin    D-Dimer, Quantitative    EKG 12 Lead       MEDICATIONS ORDERED:  Orders Placed This Encounter   Medications    aspirin chewable tablet 324 mg    0.9 % sodium chloride bolus    sodium chloride flush 0.9 % injection 10 mL    ioversol (OPTIRAY) 74 % injection 75 mL    diphenhydrAMINE (BENADRYL) tablet 25 mg    diphenhydrAMINE (BENADRYL) 25 MG capsule     Sig: Take 1 capsule by mouth nightly as needed for Sleep     Dispense:  12 capsule     Refill:  0       DDX: ACS/NSTEMI/STEMI/angina, arrhythmia, trauma, aortic dissection,  PE, PNA, pneumothroax, esophageal rupture, tamponade, Cocaine use pneumonia, pericarditis, GERD, MSK, Endocarditis, anxiety        Scores/Criteria: Heart Score    Heart Score for chest pain patients  History: Slightly Suspicious  ECG: Normal  Patient Age: > 39 and < 65 years  *Risk factors for Atherosclerotic disease: Coronary Artery Disease  Risk Factors: > 3 Risk factors or history of atherosclerotic disease*  Troponin: < 1X normal limit  Heart Score Total: 3    Score 0 - 3 = 2.5%  MACE over next 6 wks = Discharge home  Score 4 - 6 = 20.3%  MACE over next 6 wks = Obs admit  Score 7 - 10 = 72.7%  MACE over next 6 wks = Early invasive Rx      DIAGNOSTIC RESULTS / EMERGENCY DEPARTMENT COURSE / MDM     LABS:  Results for orders placed or performed during the hospital encounter of 04/07/19   CBC Auto Differential   Result Value Ref Range    WBC 11.7 (H) 3.5 - 11.0 k/uL    RBC 4.13 (L) 4.5 - 5.9 m/uL    Hemoglobin 10.0 (L) 13.5 - 17.5 g/dL    Hematocrit 31.5 (L) 41 - 53 %    MCV 76.2 (L) 80 - 100 fL    MCH 24.2 (L) 26 - 34 pg    MCHC 31.7 31 - 37 g/dL    RDW 19.0 (H) 11.5 - 14.9 %    Platelets 327 392 - 916 k/uL    MPV 7.0 6.0 - 12.0 fL    NRBC Automated NOT REPORTED per 100 WBC    Differential Type NOT REPORTED     Immature Granulocytes NOT REPORTED 0 %    Absolute Immature Granulocyte NOT REPORTED 0.00 - 0.30 k/uL    WBC Morphology NOT REPORTED     RBC Morphology NOT REPORTED     Platelet Estimate NOT REPORTED     Seg Neutrophils 81 (H) 36 - 66 %    Lymphocytes 7 (L) 24 - 44 %    Atypical Lymphocytes 2 %    Monocytes 8 (H) 1 - 7 %    Eosinophils % 1 0 - 4 %    Basophils 1 0 - 2 %    Segs Absolute 9.47 (H) 1.3 - 9.1 k/uL    Absolute Lymph # 0.82 (L) 1.0 - 4.8 k/uL    Atypical Lymphocytes Absolute 0.23 k/uL    Absolute Mono # 0.94 0.1 - 1.3 k/uL    Absolute Eos # 0.12 0.0 - 0.4 k/uL    Basophils # 0.12 0.0 - 0.2 k/uL    Morphology MICROCYTOSIS PRESENT     Morphology HYPOCHROMIA PRESENT     Morphology ANISOCYTOSIS PRESENT    Basic Metabolic Panel   Result Value Ref Range    Glucose 351 (H) 70 - 99 mg/dL    BUN 6 6 - 20 mg/dL    CREATININE 0.47 (L) 0.70 - 1.20 mg/dL    Bun/Cre Ratio NOT REPORTED 9 - 20    Calcium 8.7 8.6 - 10.4 mg/dL    Sodium 131 (L) 135 - 144 mmol/L    Potassium 4.6 3.7 - 5.3 mmol/L    Chloride 90 (L) 98 - 107 mmol/L    CO2 26 20 - 31 mmol/L    Anion Gap 15 9 - 17 mmol/L    GFR Non-African American >60 >60 mL/min    GFR African American >60 >60 mL/min    GFR Comment          GFR Staging NOT REPORTED    Troponin   Result Value Ref Range    Troponin, High Sensitivity <6 0 - 22 ng/L    Troponin T NOT REPORTED <0.03 ng/mL    Troponin Interp NOT REPORTED    D-Dimer, Quantitative   Result Value Ref Range    D-Dimer, Quant 6.09 (H) 0.00 - 0.59 mg/L FEU       IMPRESSION: Patient is a 61-year-old male with history of cancer presents with chest pain and back pain as noted above we'll obtain CBC BMP EKG troponin chest x-ray and give aspirin and obtain d-dimer. Patient requesting discharge. Patient was given written and verbalinstructions prior to discharge. Patient understood and agreed. The patient had no further questions. RADIOLOGY:  Xr Chest Standard (2 Vw)    Result Date: 4/8/2019  EXAMINATION: TWO VIEWS OF THE CHEST 4/8/2019 12:18 am COMPARISON: None. HISTORY: ORDERING SYSTEM PROVIDED HISTORY: Chest pain sob TECHNOLOGIST PROVIDED HISTORY: Chest pain sob Ordering Physician Provided Reason for Exam: chest pain, sob Acuity: Acute Type of Exam: Initial Additional signs and symptoms: Chest pain (tingling), shortness of breath, hx liver ca, neck ca, and colon ca Relevant Medical/Surgical History: Chest pain (tingling), shortness of breath, hx liver ca, neck ca, and colon ca FINDINGS: A right-sided jase catheter is present with its tip in the SVC. No infiltrate or consolidation or effusion is identified. The heart size is normal.     No acute abnormality detected. Ct Chest Pulmonary Embolism W Contrast    Result Date: 4/8/2019  EXAMINATION: CTA OF THE CHEST 4/8/2019 12:31 am TECHNIQUE: CTA of the chest was performed after the administration of intravenous contrast.  Multiplanar reformatted images are provided for review. MIP images are provided for review.  Dose modulation, iterative reconstruction, and/or weight based adjustment of the mA/kV was utilized to reduce the radiation dose to as low as reasonably achievable. COMPARISON: CT chest done July 2, 2018. HISTORY: ORDERING SYSTEM PROVIDED HISTORY: CP, elevated d-dimer TECHNOLOGIST PROVIDED HISTORY: Ordering Physician Provided Reason for Exam: chest pain, elevated d dimer Acuity: Acute Type of Exam: Initial Relevant Medical/Surgical History: h/o colorectal ca, liver ca, diabetes, htn FINDINGS: Pulmonary Arteries: Pulmonary arteries are adequately opacified for evaluation. No evidence of intraluminal filling defect to suggest pulmonary embolism. Main pulmonary artery is normal in caliber. Mediastinum: No evidence of mediastinal lymphadenopathy. The heart and pericardium demonstrate no acute abnormality. Coronary artery calcifications. There is no acute abnormality of the thoracic aorta. Lungs/pleura: The lungs are without acute process. No focal consolidation or pulmonary edema. Mild bilateral dependent and basilar subsegmental atelectasis. No evidence of pleural effusion or pneumothorax. Upper Abdomen: Partially visualized area of ill-defined hypoattenuation in the medial left hepatic lobe is not well evaluated. Soft Tissues/Bones: Mixed osseous lucency and sclerosis in the T2 and T4 vertebral bodies and posterior elements is increased compared to CT chest done July 2, 2018 and may represent osseous metastatic disease. Multilevel degenerative disc disease. Right IJ port with tip at the superior atrial caval junction. 1.  No acute pulmonary thromboembolic disease. No pulmonary hypertension or right ventricular strain. 2.  No pulmonary mass or consolidation. No intrathoracic lymphadenopathy. 3.  Partially visualized area of ill-defined hypoattenuation in the medial left hepatic lobe is not well evaluated. Underlying malignancy is not excluded.  4.  Mixed osseous lucency and sclerosis in the T2 and T4 vertebral bodies and posterior elements is increased compared to CT chest done July 2, 2018 and may represent osseous metastatic disease. EKG Interpretation    Interpreted by me    Rhythm: normal sinus   Rate: normal  Axis: normal  Ectopy: none  Conduction: normal  ST Segments: no acute change  T Waves: no acute change  Q Waves: none    Clinical Impression: no acute changes    All EKG's are interpreted by the Emergency Department Physician who either signs or Co-signsthis chart in the absence of a cardiologist.    EMERGENCY DEPARTMENT COURSE:  ED Course as of Apr 08 0118 Mon Apr 08, 2019   0007 Troponin, High Sensitivity: <6 [BL]   0007 WBC(!): 11.7 [BL]   0007 Glucose(!): 351 [BL]   0026 D-Dimer, Quant(!): 6.09 [BL]   0026 XR CHEST STANDARD (2 VW) [BL]      ED Course User Index  [BL] Ortizjuan Rogers, DO        PROCEDURES:  None    CONSULTS:  None      FINAL IMPRESSION      1. Chest pain, unspecified type    2.  Difficulty sleeping          DISPOSITION / PLAN     DISPOSITION Decision To Discharge    PATIENT REFERRED TO:  [facility] ED  [place] [address]  [phone]    As needed    PCP  Previously scheduled appointment Tuesday Wednesday of this week          DISCHARGE MEDICATIONS:  New Prescriptions    DIPHENHYDRAMINE (BENADRYL) 25 MG CAPSULE    Take 1 capsule by mouth nightly as needed for Sleep       Ortiz Nine, [address] [facility]  Emergency Medicine Resident    (Please note thatportions of this note were completed with a voice recognition program.  Efforts were made to edit the dictations but occasionally words are mis-transcribed.)       Diana Rogers DO  Resident  04/08/19 [id number]

## 2019-04-17 PROBLEM — R65.21 SEPTIC SHOCK (HCC): Status: ACTIVE | Noted: 2019-01-01

## 2019-04-17 PROBLEM — A41.9 SEPTIC SHOCK (HCC): Status: ACTIVE | Noted: 2019-01-01

## 2019-04-18 PROBLEM — E43 SEVERE MALNUTRITION (HCC): Status: ACTIVE | Noted: 2019-01-01

## 2019-04-18 NOTE — PLAN OF CARE
Problem: Falls - Risk of:  Goal: Will remain free from falls  Description  Will remain free from falls  Outcome: Ongoing  Note:   No falls this shift. Call light within reach and siderails x2. Bed in lowest position. Patient safety maintained. Goal: Absence of physical injury  Description  Absence of physical injury  Outcome: Ongoing  Note:   No falls this shift. Call light within reach and siderails x2. Bed in lowest position. Patient safety maintained. Problem: Pain:  Goal: Pain level will decrease  Description  Pain level will decrease  Outcome: Ongoing  Note:   Pain decreased with prescribed medication.

## 2019-04-18 NOTE — PROGRESS NOTES
the consult. Will continue to follow. Gustavo Michaud Pharm. [address] in-patient pharmacy

## 2019-04-18 NOTE — PROGRESS NOTES
ADMISSION NOTE       Patient admitted to room  2090. Time of admit:  0050    Admit from:  ER    Reason for admission:  Septic shock    Where patient has been residing for the last 24 hrs:  Private residence    Family at bedside:  Yes, wife    Patient is currently in pain yes    Patient has been oriented to room, educated on how to use call light, and to call for assistance prior to getting up. Bed in lowest and locked position. 2 siderails up for safety. Call light within reach. Patient in no distress.

## 2019-04-18 NOTE — PLAN OF CARE
Nutrition Problem: Severe malnutrition  Intervention: Food and/or Nutrient Delivery: Continue current diet  Nutritional Goals: PO intakes are greater than 75% at meals

## 2019-04-18 NOTE — CARE COORDINATION
[facility]  DVT Prophylaxis and Vaccine Status  Work List  Mandatory for all patients      Patient must be on both Chemical prophylaxis and Mechanical prophylaxis.  If chemical/mechanical prophylaxis is not ordered, the physician must document a reason for not using prophylaxis     Chemical Prophylaxis  Is patient on chemical prophylaxis: Yes  If no chemical prophylaxis Is a order in for No Chemical VTE prophylaxis not applicable  If no was the physician notified not applicable      Mechanical Prophylaxis  Is patient on mechanical prophylaxis, intermittent pneumatic compression device: Yes  If no was the physician notified not applicable        Pneumonia Vaccine  Vaccine indicated:  Not indicated  If indicated was the vaccine given: not applicable    Influenza Vaccine (applicable from October through March):  Vaccine indicated: Not indicated  If indicated was the vaccine given: not applicable    Patient Education  Education completed on DVT prophylaxis: yes

## 2019-04-18 NOTE — ED PROVIDER NOTES
[address] [facility]  eMERGENCY dEPARTMENT eNCOUnter    Pt Name: Gama Prince  MRN: [id number]  [place] 1960  Date of evaluation: 4/17/19  CHIEF COMPLAINT       Chief Complaint   Patient presents with    Chest Pain    Fatigue     HISTORY OF PRESENT ILLNESS   Feels too weak to get to the toilet alone, which is new for him. Scheduled to see a new oncologist in [facility] per patient, for possible new treatment of his metastatic colon cancer. Chest Pain   Pain location:  Unable to specify  Pain quality comment:  Tingling sensation  Pain radiates to:  Does not radiate  Pain severity:  Mild  Onset quality:  Gradual  Timing:  Intermittent  Chronicity:  Recurrent  Relieved by:  None tried  Worsened by:  Nothing  Associated symptoms: abdominal pain, fatigue and weakness    Associated symptoms: no altered mental status, no back pain, no cough, no diaphoresis, no dizziness, no fever, no lower extremity edema, no nausea, no shortness of breath, no syncope and no vomiting    Fatigue   Severity:  Severe  Onset quality:  Gradual  Duration:  2 days  Timing:  Constant  Progression:  Worsening  Chronicity:  New  Context: not change in medication, not decreased sleep, not dehydration, not increased activity and not recent infection    Relieved by:  Nothing  Worsened by: Activity  Associated symptoms: abdominal pain, chest pain and frequency    Associated symptoms: no cough, no diarrhea, no dizziness, no dysuria, no fever, no foul-smelling urine, no loss of consciousness, no melena, no nausea, no shortness of breath, no syncope and no vomiting        REVIEW OF SYSTEMS     Review of Systems   Constitutional: Positive for fatigue. Negative for chills, diaphoresis and fever. HENT: Negative for congestion, rhinorrhea and sore throat. Eyes: Negative for pain and redness. Respiratory: Negative for cough and shortness of breath. Cardiovascular: Positive for chest pain. Negative for leg swelling and syncope. Gastrointestinal: Positive for abdominal pain. Negative for diarrhea, melena, nausea and vomiting. Genitourinary: Positive for frequency. Negative for dysuria. Musculoskeletal: Negative for back pain and joint swelling. Skin: Negative for rash. Neurological: Positive for weakness. Negative for dizziness, loss of consciousness and syncope. All other systems reviewed and are negative.     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    with cardiac arrest at [address] [place] [address]       Past Surgical History:   Procedure Laterality Date    CORONARY ANGIOPLASTY WITH STENT PLACEMENT  11/2016    x1 stent    ENDOSCOPIC ULTRASOUND (LOWER) N/A 12/8/2017    RECTAL ENDOSCOPIC ULTRASOUND WITH PATHOLOGY performed by Angela Hough MD at [address] LIVER BIOPSY Right 08/23/2018    CT guided Visceral Tissue Ablation    FL SIGMOIDOSCOPY FLX W/RMVL FOREIGN BODY N/A 5/25/2017    SIGMOIDOSCOPY BIOPSY FLEXIBLE performed by Angela Hough MD at [facility]    FL SIGMOIDOSCOPY FLX W/RMVL FOREIGN BODY  12/8/2017    SIGMOIDOSCOPY BIOPSY FLEXIBLE performed by Angela Hough MD at [facility]    TUNNELED VENOUS PORT PLACEMENT Right     right upper chest for cancer treatment     CURRENT MEDICATIONS       Current Discharge Medication List      CONTINUE these medications which have NOT CHANGED    Details   oxyCODONE HCl (OXY-IR) 10 MG immediate release tablet Take 20 mg by mouth 3 times daily as needed for Pain. diphenhydrAMINE (BENADRYL) 25 MG capsule Take 1 capsule by mouth nightly as needed for Sleep  Qty: 12 capsule, Refills: 0      fentaNYL (DURAGESIC) 50 MCG/HR Place 1 patch onto the skin every 72 hours. potassium chloride (KLOR-CON M) 20 MEQ extended release tablet Take 1 tablet by mouth daily  Qty: 30 tablet, Refills: 3      metoprolol tartrate (LOPRESSOR) 25 MG tablet Take 1 tablet by mouth daily       Glucose Blood (BLOOD GLUCOSE TEST STRIPS) STRP 1 each by In Vitro route daily As needed. Qty: 100 strip, Refills: 5      Insulin Pen Needle 29G X 12.7MM MISC 1 each by Does not apply route daily  Qty: 100 each, Refills: 0      naloxone (NARCAN) 4 MG/0.1ML LIQD nasal spray Take 4 mg by mouth      nitroGLYCERIN (NITROSTAT) 0.4 MG SL tablet Place 1 tablet under the tongue every 5 minutes as needed for Chest pain Dissolve 1 tablet under tongue for chest pain and repeat every 5 min up to max of 3 total doses. If no relief after 3 doses call 911  Qty: 25 tablet, Refills: 3           ALLERGIES     is allergic to morphine. FAMILY HISTORY     indicated that his mother is . He indicated that his father is alive. He indicated that all of his three sisters are alive. He indicated that his brother is alive. SOCIAL HISTORY       Social History     Tobacco Use    Smoking status: Never Smoker    Smokeless tobacco: Never Used   Substance Use Topics    Alcohol use: Not Currently    Drug use: No     PHYSICAL EXAM     INITIAL VITALS: /75   Pulse 103   Temp 98.1 °F (36.7 °C) (Oral)   Resp 18   Ht 6' 3\" (1.905 m)   Wt 193 lb 5.5 oz (87.7 kg)   SpO2 94%   BMI 24.17 kg/m²    Physical Exam   Constitutional: He is oriented to person, place, and time. He appears well-developed and well-nourished. No distress. HENT:   Head: Normocephalic and atraumatic.    Nose: Nose normal.   Mouth/Throat: Oropharynx is clear and moist.   Eyes: Pupils are equal, round, and reactive to light. Conjunctivae and EOM are normal.   Cardiovascular: Normal rate, regular rhythm and normal heart sounds. Exam reveals no gallop and no friction rub. No murmur heard. Pulmonary/Chest: Effort normal and breath sounds normal. No respiratory distress. He has no wheezes. Abdominal: Soft. Bowel sounds are normal. He exhibits no distension. There is tenderness (epigastric). Musculoskeletal: Normal range of motion. He exhibits no edema or tenderness. Neurological: He is alert and oriented to person, place, and time. He exhibits normal muscle tone. Coordination normal.   Skin: Skin is warm and dry. No rash noted. He is not diaphoretic. Nursing note and vitals reviewed. MEDICAL DECISION MAKIN y.o. male with history of metatstatic colon cancer to liver presenting with fatigue and weakness. He has intermittent chest pain, for which he has been worked up for in the past, and this is unchanged from prior. He denies any LE swelling, shortness of breath. DDx includes, but is not limited to: infection, sepsis, worsening of cancer, ACS/MI, PE. Will get labs, hydrate, and re-assess. Patient is SIRS negative at this time, although he is tachycardic. 9:37 PM Code sepsis called, lactate of 4.4, WBC of 12.1. Will get CT abdomen to rule out intra-abdominal source. Sepsis Times and Checklist  Vital Signs: BP: 112/75  Pulse: 103  Resp: 18  Temp: 98.1 °F (36.7 °C) SpO2: 94 %  SIRS (>2)   Temp > 38.3C or < 36C   HR > 90   RR > 20   WBC > 12 or < 4 or >10% bands  SIRS (>2) and confirmed or suspected source of infection = Sepsis    Sepsis Identified   Date: 19  Time:   Sepsis Orders:   CBC: Yes   CMP: Yes   PT/PTT: Yes   Blood Cultures x2: Yes   Urinalysis and Urine Culture: Yes   Lactate: Yes - 4.4   Broad Spectrum Antibiotics Given (within 3 hours of sepsis identification, after blood cultures):   Yes               IV Crystalloid given: Yes    If lactate >2.0 MUST repeat within 6 hours    Is lactate > 4.0:  Yes  If lactate >4.0 OR hypotension 30ml/kg crystalloid MUST be ordered. Fluids must be completed within 3 hours of sepsis identification. Septic Shock Identified (Initial lactate >4.0 or persistent hypotension after 30ml/kg fluid bolus): For septic shock sepsis focus physical exam must be completed AND documented (within 6 hours). Date: 4/18/19 Time: 1:00am      Sepsis focus exam completed. For persistent hypotension after 30ml/kg fluid bolus vasopressors must be started (within 6 hours)          CRITICAL CARE:   This patient had a high probability of imminent or life-threatening deterioration due to septic shock, which required my direct attention, intervention, and personal management. I have personally provided 30 minutes of critical care time exclusive of time spent on separately billable procedures. Time includes: review of laboratory data, radiology results, discussion with consultants, and monitoring for potential decompensation. Interventions were performed as documented above. PROCEDURES:    Procedures    DIAGNOSTIC RESULTS   EKG:All EKG's are interpreted by the Emergency Department Physician who either signs or Co-signs this chart in the absence of a cardiologist.    EKG Interpretation    Interpreted by emergency department physician    Rhythm: sinus tachycardia  Rate: 108  Axis: normal  Ectopy: none  Conduction: normal  ST Segments: normal  T Waves: inversion in  III  Q Waves: none    Clinical Impression: non-specific EKG    Cais Cart     RADIOLOGY:All plain film, CT, MRI, and formal ultrasound images (except ED bedside ultrasound) are read by the radiologist, see reports below, unless otherwisenoted in MDM or here. CT ABDOMEN PELVIS W IV CONTRAST Additional Contrast? None   Final Result   Moderate gallbladder wall thickening nonspecific. Please correlate with exam   findings.       Innumerable hepatic lesions suggestive of metastatic disease as on prior imaging studies. Bibasilar consolidative changes favor bibasilar atelectasis. XR CHEST STANDARD (2 VW)   Final Result   Limited due to low lung volumes. Left lateral basilar opacity may represent   atelectasis or pneumonia. Consider repeating study with full inspiration. US GALLBLADDER RUQ    (Results Pending)     LABS: All lab results were reviewed by myself, and all abnormals are listed below.   Labs Reviewed   CBC WITH AUTO DIFFERENTIAL - Abnormal; Notable for the following components:       Result Value    WBC 12.1 (*)     RBC 4.19 (*)     Hemoglobin 9.9 (*)     Hematocrit 31.7 (*)     MCV 75.6 (*)     MCH 23.7 (*)     RDW 19.3 (*)     Seg Neutrophils 87 (*)     Lymphocytes 4 (*)     Monocytes 9 (*)     Segs Absolute 10.53 (*)     Absolute Lymph # 0.48 (*)     All other components within normal limits   BASIC METABOLIC PANEL W/ REFLEX TO MG FOR LOW K - Abnormal; Notable for the following components:    Glucose 324 (*)     BUN 3 (*)     CREATININE 0.46 (*)     Calcium 8.1 (*)     Sodium 127 (*)     Chloride 82 (*)     All other components within normal limits   HEPATIC FUNCTION PANEL - Abnormal; Notable for the following components:    Alb 2.7 (*)     Alkaline Phosphatase 417 (*)     AST 63 (*)     Bilirubin, Direct 0.35 (*)     All other components within normal limits   BRAIN NATRIURETIC PEPTIDE - Abnormal; Notable for the following components:    Pro- (*)     All other components within normal limits   PROTIME-INR - Abnormal; Notable for the following components:    Protime 15.1 (*)     All other components within normal limits   URINALYSIS - Abnormal; Notable for the following components:    Glucose, Ur 2+ (*)     Urine Hgb TRACE (*)     Protein, UA TRACE (*)     Urobilinogen, Urine ELEVATED (*)     All other components within normal limits   LACTIC ACID - Abnormal; Notable for the following components:    Lactic Acid 4.4 (*)     All other components within normal limits LACTIC ACID - Abnormal; Notable for the following components:    Lactic Acid 2.7 (*)     All other components within normal limits   MICROSCOPIC URINALYSIS - Abnormal; Notable for the following components:    Bacteria, UA FEW (*)     All other components within normal limits   LACTATE, SEPSIS - Abnormal; Notable for the following components:    Lactic Acid, Sepsis 2.1 (*)     All other components within normal limits   LACTATE, SEPSIS - Abnormal; Notable for the following components:    Lactic Acid, Sepsis 2.3 (*)     All other components within normal limits   CBC - Abnormal; Notable for the following components:    RBC 3.47 (*)     Hemoglobin 8.2 (*)     Hematocrit 26.2 (*)     MCV 75.7 (*)     MCH 23.5 (*)     RDW 19.3 (*)     All other components within normal limits   COMPREHENSIVE METABOLIC PANEL W/ REFLEX TO MG FOR LOW K - Abnormal; Notable for the following components:    Glucose 251 (*)     BUN 3 (*)     CREATININE <0.40 (*)     Calcium 7.2 (*)     Sodium 131 (*)     Potassium 3.6 (*)     Chloride 91 (*)     Alkaline Phosphatase 317 (*)     AST 47 (*)     Total Protein 5.6 (*)     Alb 2.2 (*)     All other components within normal limits   PHOSPHORUS - Abnormal; Notable for the following components:    Phosphorus 2.4 (*)     All other components within normal limits   PROCALCITONIN - Abnormal; Notable for the following components:    Procalcitonin 0.47 (*)     All other components within normal limits   CULTURE BLOOD #1   CULTURE BLOOD #2   URINE CULTURE   LIPASE   TROPONIN   APTT   MAGNESIUM   CALCIUM, IONIZED   HEMOGLOBIN A1C   POCT GLUCOSE     EMERGENCY DEPARTMENTCOURSE:   Vitals:    Vitals:    04/17/19 2330 04/17/19 2357 04/18/19 0000 04/18/19 0049   BP: 112/62 112/62 127/65 112/75   Pulse: 87 86 87 103   Resp: 19 17 19 18   Temp:  97.7 °F (36.5 °C)  98.1 °F (36.7 °C)   TempSrc:  Oral  Oral   SpO2: 93% 94% 93% 94%   Weight:    193 lb 5.5 oz (87.7 kg)   Height:    6' 3\" (1.905 m)       The patient was given the following medications while in the emergency department:  Orders Placed This Encounter   Medications    0.9 % sodium chloride bolus    0.9 % sodium chloride IV bolus 2,925 mL    0.9 % sodium chloride bolus    sodium chloride flush 0.9 % injection 10 mL    ioversol (OPTIRAY) 74 % injection 75 mL    piperacillin-tazobactam (ZOSYN) 3.375 g in dextrose 5 % 50 mL IVPB (mini-bag)    vancomycin (VANCOCIN) 1,500 mg in dextrose 5 % 250 mL IVPB    sodium chloride flush 0.9 % injection 10 mL    DISCONTD: sodium chloride flush 0.9 % injection 10 mL    acetaminophen (TYLENOL) tablet 650 mg    enoxaparin (LOVENOX) injection 40 mg    ondansetron (ZOFRAN) injection 4 mg    OR Linked Order Group     oxyCODONE-acetaminophen (PERCOCET) 5-325 MG per tablet 1 tablet     oxyCODONE-acetaminophen (PERCOCET) 5-325 MG per tablet 2 tablet    vancomycin (VANCOCIN) intermittent dosing (placeholder)    vancomycin (VANCOCIN) 1,500 mg in dextrose 5 % 250 mL IVPB    diphenhydrAMINE (BENADRYL) tablet 25 mg    fentaNYL (DURAGESIC) 50 MCG/HR 1 patch    nitroGLYCERIN (NITROSTAT) SL tablet 0.4 mg    oxyCODONE HCl (OXY-IR) immediate release tablet 20 mg    potassium chloride (KLOR-CON M) extended release tablet 20 mEq    glucose (GLUTOSE) 40 % oral gel 15 g    dextrose 50 % solution 12.5 g    glucagon (rDNA) injection 1 mg    dextrose 5 % solution    insulin lispro (HUMALOG) injection vial 0-12 Units    insulin lispro (HUMALOG) injection vial 0-6 Units    0.9 % sodium chloride infusion    piperacillin-tazobactam (ZOSYN) 3.375 g in dextrose 5 % 50 mL IVPB (mini-bag)     CONSULTS:  PHARMACY TO DOSE VANCOMYCIN  IP CONSULT TO INTERNAL MEDICINE    FINAL IMPRESSION      1.  Septic shock [facility])          DISPOSITION/PLAN   DISPOSITION Admitted 04/17/2019 11:38:10 PM      PATIENT REFERRED TO:  Gale Booth, DO  [address] [place] [address] 22.  [phone]          DISCHARGE MEDICATIONS:  Current Discharge Medication List        Ruben Fleming MD  Attending Emergency Physician  [place]  voice recognition software used in portions of this document.                     Ruben Fleming MD  04/18/19 6692

## 2019-04-18 NOTE — DISCHARGE INSTR - COC
Continuity of Care Form    Patient Name: Consuelo Soto   :  1960  MRN:  [id number]    Admit date:  2019  Discharge date:  ***    Code Status Order: Full Code   Advance Directives:   [facility] Documentation     Date/Time Healthcare Directive Type of Healthcare Directive Copy in [address] Agent's Name Healthcare Agent's Phone Number    [phone]  No, patient does not have an advance directive for healthcare treatment -- -- -- -- --          Admitting Physician:  Olamide Berrios MD  PCP: No primary care provider on file. Discharging Nurse: [facility] Unit/Room#: 2090/2090-01  Discharging Unit Phone Number: ***    Emergency Contact:   Extended Emergency Contact Information  Primary Emergency Contact: Pierre Sales  Address: [address], [address] Phone: [phone]  Work Phone: [phone]  Mobile Phone: [phone]  Relation: Spouse  Hearing or visual needs: None  Other needs: None  Preferred language: English   needed?  No    Past Surgical History:  Past Surgical History:   Procedure Laterality Date    CORONARY ANGIOPLASTY WITH STENT PLACEMENT  11/2016    x1 stent    ENDOSCOPIC ULTRASOUND (LOWER) N/A 2017    RECTAL ENDOSCOPIC ULTRASOUND WITH PATHOLOGY performed by Jostin Carlton MD at [address] LIVER BIOPSY Right 2018    CT guided Visceral Tissue Ablation    HI SIGMOIDOSCOPY FLX W/RMVL FOREIGN BODY N/A 2017    SIGMOIDOSCOPY BIOPSY FLEXIBLE performed by Jostin Carlton MD at [facility]    HI SIGMOIDOSCOPY FLX W/RMVL FOREIGN BODY  2017    SIGMOIDOSCOPY BIOPSY FLEXIBLE performed by Jostin Carlton MD at [facility]    TUNNELED VENOUS PORT PLACEMENT Right     right upper chest for cancer treatment       Immunization History:   Immunization History   Administered Date(s) Administered    Assessment Score:  Readmission Risk              Risk of Unplanned Readmission:        33           Discharging to Facility/ Agency   · Name:   · Address:  · Phone:  · Fax:    Dialysis Facility (if applicable)   · Name:  · Address:  · Dialysis Schedule:  · Phone:  · Fax:    / signature: {Esignature:411984360}    PHYSICIAN SECTION    Prognosis: {Prognosis:8480959370}    Condition at Discharge: [address] Patient Condition:563309232}    Rehab Potential (if transferring to Rehab): {Prognosis:6093635536}    Recommended Labs or Other Treatments After Discharge: ***    Physician Certification: I certify the above information and transfer of Kory Gonzales  is necessary for the continuing treatment of the diagnosis listed and that he requires {Admit to Appropriate Level of Care:56160} for {GREATER/LESS:865971373} 30 days.      Update Admission H&P: {CHP DME Changes in GLFKN:327148713}    PHYSICIAN SIGNATURE:  Electronically signed by Cristina Corbin MD on 4/18/19 at 1:59 PM
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
## 2019-04-18 NOTE — ED NOTES
Report given to José Miguel Hamilton RN from [address]. Report method in person   The following was reviewed with receiving RN:   Current vital signs:  /62   Pulse 86   Temp 97.7 °F (36.5 °C) (Oral)   Resp 17   Ht 6' 3\" (1.905 m)   Wt 215 lb (97.5 kg)   SpO2 94%   BMI 26.87 kg/m²                MEWS Score: 1     Any medication or safety alerts were reviewed. Any pending diagnostics and notifications were also reviewed, as well as any safety concerns or issues, abnormal labs, abnormal imaging, and abnormal assessment findings. Questions were answered.             Alexandria Del Toro RN  04/18/19 0053

## 2019-04-18 NOTE — PROGRESS NOTES
Physical Therapy    Facility/Department: [address] [place] [address] PROGRESSIVE CARE  Initial Assessment    NAME: Audra Jean-Baptiste  : 1960  MRN: [id number]    Date of Service: 2019    Discharge Recommendations:  24 hour supervision or assist   PT Equipment Recommendations  Equipment Needed: Yes  Lorie Hughess: Rolling(RW for longer distances and outdoor surfaces)  Cane: (will assess gait with cane next session)    Assessment   Neurological  Neurological (WDL): Within Defined Limits  Body structures, Functions, Activity limitations: Decreased functional mobility ; Decreased ADL status; Decreased strength;Decreased endurance;Decreased balance;Decreased safe awareness  Assessment: Pt admitted to ED with chest pain and septic shock. Pt impulsive and performs activity quickly. Pt is unsteady during movement. Pt performed bed mobility SBA, transfers and gait with RW CGA. Pt with decreased safety awareness. Per pt, unable to use RW in the house, not enough room to use safely. Pt wants to try cane for ambulation  Treatment Diagnosis: decreased functional mobility  Specific instructions for Next Treatment: Progress ambulation to cane as tolerated  Prognosis: Good  Decision Making: Medium Complexity  Exam: PT POC, functional mobility  Patient Education: PT POC  REQUIRES PT FOLLOW UP: Yes  Activity Tolerance  Activity Tolerance: Patient Tolerated treatment well       Patient Diagnosis(es): The encounter diagnosis was Septic shock ([address]      has a past medical history of Back pain, CAD S/P percutaneous coronary angioplasty, Carcinoma ([address] Colon cancer ([address] Degeneration of lumbar or lumbosacral intervertebral disc, Depression, Diabetes mellitus ([address] H/O cardiac catheterization, Hepatic metastases ([address] Hyperlipidemia, Hypertension, Knee pain, and MI, old.   has a past surgical history that includes Tunneled venous port placement (Right); pr sigmoidoscopy flx w/rmvl foreign body (N/A, 2017); hernia repair; Coronary angioplasty with Transportation: Jailyn Myron  Occupation: Retired  Additional Comments: Pt reports he has strong family support and family can provide assist as needed including any necessary physical assist for transfers. Cognition        Objective          AROM RLE (degrees)  RLE AROM: WFL  AROM LLE (degrees)  LLE AROM : WFL  AROM RUE (degrees)  RUE AROM : WFL  AROM LUE (degrees)  LUE AROM : WFL  Strength RLE  Comment: grossly 4/5  Strength LLE  Comment: grossly 4/5  Strength RUE  Comment: See OT eval  Strength LUE  Comment: See OT eval     Sensation  Overall Sensation Status: Impaired(whole body)  Bed mobility  Supine to Sit: Stand by assistance  Sit to Supine: Stand by assistance  Scooting: Stand by assistance  Transfers  Sit to Stand: Contact guard assistance  Stand to sit: Contact guard assistance  Comment: pt impulsive, use of RW  Ambulation  Ambulation?: Yes  More Ambulation?: Yes  Ambulation 1  Surface: level tile  Device: (IV pole)  Assistance: Contact guard assistance  Quality of Gait: impulsivity, decreased endurance, decreased sima  Distance: 10 ft to bathroom  Comments: amb to bathroom with use of IV pole. Pt impulsive   Ambulation 2  Surface - 2: level tile  Device 2: [address] [facility] 2: Contact guard assistance  Quality of Gait 2: decreased endurance  Distance: 30 ft  Comments: Pt amb to [facility] and left with transporter in w/c to take to ultrasound.  Pt with decreased safety awareness  Stairs/Curb  Stairs?: No     Balance  Posture: Good  Sitting - Static: Good  Sitting - Dynamic: Good  Standing - Static: Good  Standing - Dynamic: Good;-  Comments: with and without RW        Plan   Plan  Times per week: 5-7x/wk  Specific instructions for Next Treatment: Progress ambulation to cane as tolerated  Current Treatment Recommendations: Strengthening, ROM, Balance Training, Transfer Training, Functional Mobility Training, IADL Training, Endurance Training, Gait Training, Stair training, Home Exercise Program, Safety

## 2019-04-18 NOTE — FLOWSHEET NOTE
04/18/19 1127   Encounter Summary   Services provided to: Patient   Referral/Consult From: Vick   Continue Visiting   (4/18/19V)   Volunteer Visit Yes   Complexity of Encounter Low   Length of Encounter 15 minutes   Routine   Type Initial   Spiritual/Church   Type Spiritual support   Intervention Communion   Sacraments   Communion Patient received communion

## 2019-04-18 NOTE — PROGRESS NOTES
Nutrition Assessment    Type and Reason for Visit: Positive Nutrition Screen(Unplanned weight loss, decreased appetite)    Nutrition Recommendations: Continue Carbohydrate control diet. Start Ensure High Protein 2x/day. Nutrition Assessment: Patient is severely malnourished as evidence by significant weight loss over the past 2 weeks and severe muscle mass and fat loss. Patient is at risk for further compromise due to colorectal cancer with mets. Diet education for cancer and diabetic diet provided. Will start Ensure High Protein 2x/day. Will monitor p.o intakes. Malnutrition Assessment:  · Malnutrition Status: Meets the criteria for severe malnutrition  · Context: Chronic illness  · Findings of the 6 clinical characteristics of malnutrition (Minimum of 2 out of 6 clinical characteristics is required to make the diagnosis of moderate or severe Protein Calorie Malnutrition based on AND/ASPEN Guidelines):  1. Energy Intake-Less than or equal to 75% of estimated energy requirement, Greater than or equal to 7 days    2. Weight Loss-7.5% loss or greater, (2 weeks)  3. Fat Loss-Severe subcutaneous fat loss, Triceps  4. Muscle Loss-Severe muscle mass loss, Clavicles (pectoralis and deltoids), Calf (gastrocnemius)  5. Fluid Accumulation-No significant fluid accumulation, Extremities  6.   Strength-Not measured    Nutrition Risk Level: High    Nutrient Needs:  · Estimated Daily Total Kcal: 8523-7143 kcals based on 28-30 kcal/ kg of admission weight   · Estimated Daily Protein (g): 105-123 gm of protein based on 1.2-1.4 gm/ kg of admission weight     Nutrition Diagnosis:   · Problem: Severe malnutrition  · Etiology: related to Catabolic illness(cancer with mets)     Signs and symptoms:  as evidenced by Severe loss of subcutaneous fat, Severe muscle loss, Diet history of poor intake, Weight loss greater than or equal to 2% in 1 week, Intake 50-75%    Objective Information:  · Current Nutrition Therapies:  · Oral Diet Orders: Carb Control 4 Carbs/Meal   · Oral Diet intake: 51-75%  · Anthropometric Measures:  · Ht: 6' 3\" (190.5 cm)   · Current Body Wt: 193 lb (87.5 kg)  · Admission Body Wt: 193 lb (87.5 kg)  · Usual Body Wt: 214 lb (97.1 kg)  · % Weight Change:  ,  9.8% loss in 2 weeks  · Ideal Body Wt: 196 lb (88.9 kg), % Ideal Body 98%  · BMI Classification: BMI 18.5 - 24.9 Normal Weight    Nutrition Interventions:   Continue current diet  Continued Inpatient Monitoring, Education Completed(Provided and reviewed handouts on Type 2 Diabetes Nutrition Therapy, Nutrition during and after cancer treatments, Food intake patterens and Protein content of foods.)    Nutrition Evaluation:   · Evaluation: Goals set   · Goals: PO intakes are greater than 75% at meals    · Monitoring: Meal Intake, Weight, Pertinent Labs, Monitor Bowel Function, I&O, Diet Tolerance      Nayely ESQUIVEL, R.D, L.D,  Clinical Dietitian  Cell # [phone]  Office # [phone]

## 2019-04-18 NOTE — H&P
body (12/8/2017); Endoscopic ultrasonography, GI (N/A, 12/8/2017); and liver biopsy (Right, 08/23/2018). FAMILY HISTORY    Patient family history includes Heart Disease in his mother; High Blood Pressure in his mother; High Cholesterol in his father; Stroke in his mother. SOCIAL HISTORY    Patient  reports that he has never smoked. He has never used smokeless tobacco. He reports that he drank alcohol. He reports that he does not use drugs. HOME MEDICATIONS        Prior to Admission medications    Medication Sig Start Date End Date Taking? Authorizing Provider   oxyCODONE HCl (OXY-IR) 10 MG immediate release tablet Take 20 mg by mouth 3 times daily as needed for Pain. Yes Historical Provider, MD   diphenhydrAMINE (BENADRYL) 25 MG capsule Take 1 capsule by mouth nightly as needed for Sleep 4/8/19  Yes Marii Olmos,    fentaNYL (DURAGESIC) 50 MCG/HR Place 1 patch onto the skin every 72 hours. 4/20/19  Yes Historical Provider, MD   potassium chloride (KLOR-CON M) 20 MEQ extended release tablet Take 1 tablet by mouth daily 10/9/18  Yes Danya Combs MD   metoprolol tartrate (LOPRESSOR) 25 MG tablet Take 1 tablet by mouth daily  3/12/18  Yes Historical Provider, MD   Glucose Blood (BLOOD GLUCOSE TEST STRIPS) STRP 1 each by In Vitro route daily As needed. 8/24/17  Yes Danya Combs MD   Insulin Pen Needle 29G X 12.7MM MISC 1 each by Does not apply route daily 5/4/17  Yes Jos Petersen MD   naloxone [facility]) 4 MG/0.1ML LIQD nasal spray Take 4 mg by mouth    Historical Provider, MD   nitroGLYCERIN (NITROSTAT) 0.4 MG SL tablet Place 1 tablet under the tongue every 5 minutes as needed for Chest pain Dissolve 1 tablet under tongue for chest pain and repeat every 5 min up to max of 3 total doses. If no relief after 3 doses call 911 11/29/16   ASIM Kilpatrick - AIME       ALLERGIES      Morphine    REVIEW OF SYSTEMS     Review of Systems   Constitutional: Positive for fatigue.  Negative for chills, diaphoresis and fever. HENT: Negative for congestion and sore throat. Respiratory: Negative for cough, shortness of breath and wheezing. Cardiovascular: Negative for chest pain, palpitations and leg swelling. Gastrointestinal: Positive for abdominal pain (epigastric). Negative for constipation, diarrhea, nausea and vomiting. Genitourinary: Negative for dysuria, frequency and urgency. Musculoskeletal: Negative for back pain and myalgias. Skin: Negative for rash. Neurological: Positive for weakness (generalized). Negative for dizziness and headaches. Psychiatric/Behavioral: The patient is not nervous/anxious. PHYSICAL EXAM      /75   Pulse 103   Temp 98.1 °F (36.7 °C) (Oral)   Resp 18   Ht 6' 3\" (1.905 m)   Wt 193 lb 5.5 oz (87.7 kg)   SpO2 94%   BMI 24.17 kg/m²  Body mass index is 24.17 kg/m². Physical Exam   Constitutional: He is oriented to person, place, and time. He appears well-developed and well-nourished. No distress. HENT:   Head: Normocephalic and atraumatic. Mouth/Throat: Oropharynx is clear and moist.   Eyes: Pupils are equal, round, and reactive to light. Conjunctivae and EOM are normal.   Neck: Normal range of motion. Neck supple. No tracheal deviation present. Cardiovascular: Normal rate, regular rhythm, normal heart sounds and intact distal pulses. Exam reveals no gallop and no friction rub. No murmur heard. Pulmonary/Chest: Effort normal and breath sounds normal. No respiratory distress. He has no wheezes. He has no rales. He exhibits no tenderness. Abdominal: Soft. Bowel sounds are normal. He exhibits no distension. There is tenderness (epigastric ). There is no guarding. Musculoskeletal: Normal range of motion. He exhibits no edema or tenderness. Lymphadenopathy:     He has no cervical adenopathy. Neurological: He is alert and oriented to person, place, and time. Skin: Skin is warm and dry. No rash noted. He is not diaphoretic.  No erythema. No pallor. Psychiatric: He has a normal mood and affect. His behavior is normal. Thought content normal.     DIAGNOSTICS      EKG: (as documented in ED note):    Rhythm: sinus tachycardia  Rate: 108  Axis: normal  Ectopy: none  Conduction: normal  ST Segments: normal  T Waves: inversion in  III  Q Waves: none     Clinical Impression: non-specific EKG    Labs:  CBC:   Recent Labs     04/17/19 2102   WBC 12.1*   HGB 9.9*        BMP:    Recent Labs     04/17/19 2102   *   K 3.7   CL 82*   CO2 30   BUN 3*   CREATININE 0.46*   GLUCOSE 324*     S. Calcium:  Recent Labs     04/17/19 2102   CALCIUM 8.1*     S. Ionized Calcium:No results for input(s): IONCA in the last 72 hours. S. Magnesium:No results for input(s): MG in the last 72 hours. S. Phosphorus:No results for input(s): PHOS in the last 72 hours. S. Glucose:No results for input(s): POCGLU in the last 72 hours. Glycosylated hemoglobin A1C:   Lab Results   Component Value Date    LABA1C 8.0 11/27/2016     Hepatic:   Recent Labs     04/17/19 2102   AST 63*   ALT 19   ALKPHOS 417*     CARDIAC ENZY:   Recent Labs     04/17/19 2102   TROPHS <6     INR:   Recent Labs     04/17/19 2102   INR 1.2     BNP:   Recent Labs     04/17/19 2102   PROBNP 360*      ABGs: No results for input(s): PH, PCO2, PO2, HCO3, O2SAT in the last 72 hours. Lipids: No results for input(s): CHOL, TRIG, HDL, LDLCALC in the last 72 hours. Invalid input(s): LDL  Pancreatic functions:  Recent Labs     04/17/19 2102   LIPASE 15     S.  LacticAcid:   Recent Labs     04/17/19 2102 04/17/19  2343   LACTA 4.4* 2.7*     Thyroid functions:   Lab Results   Component Value Date    TSH 1.33 11/27/2016      U/A:  Recent Labs     04/17/19 2115   COLORU YELLOW   WBCUA 0 TO 2   RBCUA 2 TO 5   MUCUS NOT REPORTED   BACTERIA FEW*   SPECGRAV 1.011   LEUKOCYTESUR NEGATIVE   GLUCOSEU 2+*   AMORPHOUS NOT REPORTED       Imaging/Diagonstics:     Xr Chest Standard (2 Vw)    Result Date: 4/17/2019  EXAMINATION: TWO VIEWS OF THE CHEST 4/17/2019 9:18 pm COMPARISON: 04/07/2019 HISTORY: ORDERING SYSTEM PROVIDED HISTORY: chest pain, tachycardia TECHNOLOGIST PROVIDED HISTORY: chest pain, tachycardia Ordering Physician Provided Reason for Exam: Pt states chest pain and fatigue x 1 day Acuity: Acute Type of Exam: Initial Additional signs and symptoms: Pt states chest pain and fatigue x 1 day FINDINGS: Right internal jugular chest port with catheter tip at the cavoatrial junction. Limited due to low lung volumes. Normal heart size and pulmonary vasculature. Left lateral basilar opacity with corresponding finding on the lateral view may represent atelectasis or pneumonia. No pneumothorax. Limited due to low lung volumes. Left lateral basilar opacity may represent atelectasis or pneumonia. Consider repeating study with full inspiration. Xr Chest Standard (2 Vw)    Result Date: 4/8/2019  EXAMINATION: TWO VIEWS OF THE CHEST 4/8/2019 12:18 am COMPARISON: None. HISTORY: ORDERING SYSTEM PROVIDED HISTORY: Chest pain sob TECHNOLOGIST PROVIDED HISTORY: Chest pain sob Ordering Physician Provided Reason for Exam: chest pain, sob Acuity: Acute Type of Exam: Initial Additional signs and symptoms: Chest pain (tingling), shortness of breath, hx liver ca, neck ca, and colon ca Relevant Medical/Surgical History: Chest pain (tingling), shortness of breath, hx liver ca, neck ca, and colon ca FINDINGS: A right-sided jase catheter is present with its tip in the SVC. No infiltrate or consolidation or effusion is identified. The heart size is normal.     No acute abnormality detected.      Xr Knee Left (3 Views)    Result Date: 4/2/2019  EXAMINATION: 3 XRAY VIEWS OF THE RIGHT KNEE; 3 XRAY VIEWS OF THE LEFT KNEE 4/1/2019 9:10 pm COMPARISON: 10/06/2011 HISTORY: ORDERING SYSTEM PROVIDED HISTORY: fall TECHNOLOGIST PROVIDED HISTORY: fall Ordering Physician Provided Reason for Exam: pain after a fall Acuity: Unknown History: No relevant surgical hx to area. Hx- HBP, Diabetes (Type II), and MI FINDINGS: BRAIN/VENTRICLES: The ventricles and sulci are diffusely enlarged. Low attenuation is seen in the periventricular and subcortical white matter. No acute intracranial hemorrhage or acute infarct is identified. ORBITS: The visualized portion of the orbits demonstrate no acute abnormality. SINUSES: The visualized paranasal sinuses and mastoid air cells demonstrate no acute abnormality. SOFT TISSUES/SKULL:  No acute abnormality of the visualized skull or soft tissues. No acute intracranial abnormality. Ct Abdomen Pelvis W Iv Contrast Additional Contrast? None    Result Date: 4/17/2019  EXAMINATION: CT OF THE ABDOMEN AND PELVIS WITH CONTRAST 4/17/2019 10:45 pm TECHNIQUE: CT of the abdomen and pelvis was performed with the administration of intravenous contrast. Multiplanar reformatted images are provided for review. Dose modulation, iterative reconstruction, and/or weight based adjustment of the mA/kV was utilized to reduce the radiation dose to as low as reasonably achievable. COMPARISON: 10/03/2018 HISTORY: ORDERING SYSTEM PROVIDED HISTORY: sepsis, source unknown. history of metastatic colon cancer to liver TECHNOLOGIST PROVIDED HISTORY: Ordering Physician Provided Reason for Exam: sepsis, source unknown. Hx - metastatic colon cancer to liver. Patient states weakness x1 day prior. Acuity: Unknown Type of Exam: Unknown Relevant Medical/Surgical History: Surgical hx - Hernia repair. Hx - HBP, Diabetes, Carcinoma, Colorectal cancer, stage IV  Hepatic metastases . FINDINGS: Mild elevation of the left hemidiaphragm. Coronary calcifications versus coronary stents identified. Trace pericardial thickening/pericardial fluid. There are bibasilar opacities suggestive of bibasilar atelectasis. These findings most confluent within the left lower lobe.  There are innumerable hypodensities identified throughout the right and left hepatic lobes which have progressed from prior examination. A suggestion of a central ill-defined area of hypoattenuation which may represent areas of posterior related changes/fibrosis versus perfusion differences. Mild periportal edema involving the liver. There is moderate gallbladder wall thickening. No definite cholelithiasis. Spleen, adrenal glands, kidneys, pancreas unremarkable. Mild retained stool throughout the colon without evidence of obstruction. The appendix unremarkable. No suspicious mesenteric or retroperitoneal adenopathy. A few lymph nodes identified within the port appendix region noted of uncertain clinical significance borderline size. No free air or free fluid. Tiny fat containing right inguinal hernia. The bladder is mildly distended. Prostate is unremarkable. No loculated rim enhancing fluid collection to suggest abscess formation. Central disc protrusion T7-T8. Moderate changes L4-5 and L5-S1. No suspicious osseous lesions. Moderate gallbladder wall thickening nonspecific. Please correlate with exam findings. Innumerable hepatic lesions suggestive of metastatic disease as on prior imaging studies. Bibasilar consolidative changes favor bibasilar atelectasis. Ct Chest Pulmonary Embolism W Contrast    Result Date: 4/8/2019  EXAMINATION: CTA OF THE CHEST 4/8/2019 12:31 am TECHNIQUE: CTA of the chest was performed after the administration of intravenous contrast.  Multiplanar reformatted images are provided for review. MIP images are provided for review. Dose modulation, iterative reconstruction, and/or weight based adjustment of the mA/kV was utilized to reduce the radiation dose to as low as reasonably achievable. COMPARISON: CT chest done July 2, 2018.  HISTORY: ORDERING SYSTEM PROVIDED HISTORY: CP, elevated d-dimer TECHNOLOGIST PROVIDED HISTORY: Ordering Physician Provided Reason for Exam: chest pain, elevated d dimer Acuity: Acute Type of Exam: Initial Relevant Medical/Surgical History: h/o colorectal ca, liver ca, diabetes, htn FINDINGS: Pulmonary Arteries: Pulmonary arteries are adequately opacified for evaluation. No evidence of intraluminal filling defect to suggest pulmonary embolism. Main pulmonary artery is normal in caliber. Mediastinum: No evidence of mediastinal lymphadenopathy. The heart and pericardium demonstrate no acute abnormality. Coronary artery calcifications. There is no acute abnormality of the thoracic aorta. Lungs/pleura: The lungs are without acute process. No focal consolidation or pulmonary edema. Mild bilateral dependent and basilar subsegmental atelectasis. No evidence of pleural effusion or pneumothorax. Upper Abdomen: Partially visualized area of ill-defined hypoattenuation in the medial left hepatic lobe is not well evaluated. Soft Tissues/Bones: Mixed osseous lucency and sclerosis in the T2 and T4 vertebral bodies and posterior elements is increased compared to CT chest done July 2, 2018 and may represent osseous metastatic disease. Multilevel degenerative disc disease. Right IJ port with tip at the superior atrial caval junction. 1.  No acute pulmonary thromboembolic disease. No pulmonary hypertension or right ventricular strain. 2.  No pulmonary mass or consolidation. No intrathoracic lymphadenopathy. 3.  Partially visualized area of ill-defined hypoattenuation in the medial left hepatic lobe is not well evaluated. Underlying malignancy is not excluded. 4.  Mixed osseous lucency and sclerosis in the T2 and T4 vertebral bodies and posterior elements is increased compared to CT chest done July 2, 2018 and may represent osseous metastatic disease.      ASSESSMENT  and  PLAN     Principal Problem:    Septic shock ([address]  Active Problems:    Type 2 diabetes mellitus with hyperglycemia, without long-term current use of insulin (HCC)    Hepatic metastases (HCC)    Colorectal cancer, stage IV ([address]  Resolved Problems:    * No resolved hospital problems. *    Plan:    Septic Shock  -unknown sorurce  -Lactic Acid 4.4 on arrival, then 2.7  --Recheck @0200  -Fluid bolus administered in ED  -IV antibiotics initiated  --Vancomycin and Zosyn; continue on admission  -Blood culture x2  -urine culture pending; UA negative for UTI  -check procalcitonin    Stage IV colorectal cancer with liver mets  -States that he is seeking out new treatment option in [facility]  -AlkPhos 417; AST 63  -Gall bladder wall thickening on CT  --Check GBUS in am  -Continue home pain meds - Verified via [facility]    Diabetes Mellitus  -not currently taking any diabetic meds  --Reports that his home glucometer is broken  -POCT ac and hs with sliding scale coverage  -Check HgbA1c in am    Consultations:     PHARMACY TO DOSE VANCOMYCIN  IP CONSULT TO INTERNAL MEDICINE      ASIM Chou - CNP   4/18/2019  2:04 AM    [address], [address] [place] [address]. Phone [phone]     Attending Physician Statement  Patient seen and examined  I have discussed the care of the patient, including pertinent history and exam findings,  with the resident. I have reviewed the key elements of all parts of the encounter with the resident. I agree with the assessment, plan and orders as documented by the resident.     Source of sepsis most likely pneumonia  Await blood cultures  Add azithromycin, continue vanc and zosyn  Continue IVF  Was receiving chemotherapy at [facility] with poor response    Lexie Mahan

## 2019-04-18 NOTE — PROGRESS NOTES
of Steps: 2  Entrance Stairs - Rails: Right  Bathroom Shower/Tub: Tub/Shower unit  Bathroom Toilet: Handicap height  Bathroom Equipment: Grab bars in shower, Grab bars around toilet  Bathroom Accessibility: Accessible  Home Equipment: (no DME)  Receives Help From: Family  ADL Assistance: [address]: Independent  Homemaking Responsibilities: Yes  Ambulation Assistance: Independent  Transfer Assistance: Independent  Active : Yes  Mode of Transportation: [employer]  Occupation: Retired  Additional Comments: Pt reports he has strong family support and family can provide assist as needed including any necessary physical assist for transfers. Pain Assessment  Pain Assessment: 0-10  Pain Level: 5  Pain Type: Chronic pain  Pain Location: Generalized    Objective  Vision - Basic Assessment  Visual History: No significant visual history  Patient Visual Report: No visual complaint reported. Cognition  Overall Cognitive Status: Impaired  Memory: Appears intact  Following Commands: Follows one step commands without difficulty  Safety Judgement: Decreased awareness of need for safety  Awareness of Errors: Assistance required to correct errors made  Insights: Decreased awareness of deficits  Sequencing and Organization: Assistance required to generate solutions, Assistance required to identify errors made   Perception  Overall Perceptual Status: WFL  Sensation  Overall Sensation Status: Impaired(whole body)   ADL  Feeding: NPO  Grooming: Stand by assistance, Setup  UE Bathing: Stand by assistance, Setup  LE Bathing: Contact guard assistance, Setup  UE Dressing: Stand by assistance, Setup  LE Dressing: Contact guard assistance, Setup  Toileting: Contact guard assistance, Setup  Additional Comments: Pt demonstrated impulsivity with functional mobility to/from bathroom with RW and CGA. Poor safety awareness noted.  Pt engaged in mobility and transfers this date with CGA/SBA due to decreased safety awareness and impulsivity. UE Function           LUE Strength  Gross LUE Strength: WFL  L Hand Grasp: 4/5  LUE Strength Comment: Shoulder 4/5, elbow 4/5     LUE Tone: Normotonic     LUE AROM (degrees)  LUE AROM : WFL     Left Hand AROM (degrees)  Left Hand AROM: WFL  RUE Strength  Gross RUE Strength: WFL  R Hand Grasp: 4/5  RUE Strength Comment: Shoulder 4/5, elbow 4/5      RUE Tone: Normotonic     RUE AROM (degrees)  RUE AROM : WFL     Right Hand AROM (degrees)  Right Hand AROM: WFL    Fine Motor Skills  Coordination  Movements Are Fluid And Coordinated: No  Coordination and Movement description: Left UE, Right UE, Decreased speed                           Mobility  Supine to Sit: Stand by assistance  Sit to Supine: Stand by assistance       Balance  Sitting Balance: Supervision  Standing Balance: Contact guard assistance(CGA/SBA)  Standing Balance  Time: 1-2 minutes x 2, 2-3 minutes x 1  Activity: mobility & functional transfers  Comment: impulsive with Poor safety awareness. VCs for safety with Poor return     Bed mobility  Supine to Sit: Stand by assistance  Sit to Supine: Stand by assistance  Scooting: Stand by assistance     Transfers  Stand Pivot Transfers: Contact guard assistance  Sit to stand: Stand by assistance  Stand to sit: Stand by assistance  Toilet Transfers  Toilet - Technique: Ambulating  Equipment Used: Grab bars  Toilet Transfer: Contact guard assistance  Functional Activity Tolerance  Functional Activity Tolerance: Tolerates 10 - 20 min exercise with multiple rests   Assessment  Performance deficits / Impairments: Decreased functional mobility , Decreased ADL status, Decreased strength, Decreased safe awareness, Decreased cognition, Decreased endurance, Decreased sensation, Decreased balance, Decreased high-level IADLs  Treatment Diagnosis: Impaired self-care status.   Prognosis: Guarded  Decision Making: Medium Complexity  History: moderate chart review  Exam: 9 performance deficits  Assistance / Modification: min assist/modification  Patient Education: OT POC, safety  Barriers to Learning: impulsive  REQUIRES OT FOLLOW UP: Yes  Discharge Recommendations: 24 hour supervision or assist  Activity Tolerance: Patient limited by fatigue         Functional Outcome Measures  AM-PAC Daily Activity Inpatient   How much help for putting on and taking off regular lower body clothing?: A Little  How much help for Bathing?: A Little  How much help for Toileting?: A Little  How much help for putting on and taking off regular upper body clothing?: A Little  How much help for taking care of personal grooming?: A Little  How much help for eating meals?: A Little(NPO - would need S/U)  AM-[facility] Inpatient Daily Activity Raw Score: 18  AM-PAC Inpatient ADL T-Scale Score : 38.66  ADL Inpatient CMS 0-100% Score: 46.65  ADL Inpatient CMS G-Code Modifier : CK       Goals  Patient Goals   Patient goals : To get stronger and feel better  Short term goals  Time Frame for Short term goals: By discharge  Short term goal 1: Pt will perform BADLs with SUP and Fair safety awareness. Short term goal 2: Pt will perform functional mobility and transfers with SUP, least restrictive mobility device, and Fair safety awareness. Short term goal 3: Pt will V/D Fair understanding of AE/DME/modified techniques and safety for increased IND with self-care and mobility.     Plan  Safety Devices  Safety Devices in place: Yes  Type of devices: Call light within reach, Gait belt, Left in chair, Patient at risk for falls, Nurse notified(w/ transport to transport to [facility])     Plan  Times per week: 5  Times per day: Daily  Current Treatment Recommendations: Self-Care / ADL, Balance Training, Functional Mobility Training, Endurance Training, Safety Education & Training, Patient/Caregiver Education & Training, Equipment Evaluation, Education, & procurement    Equipment Recommendations  Equipment Needed: (TBD)  OT Individual Minutes  Time In: 3325  Time Out: 0900  Minutes: 18    Electronically signed by Henderson Snellen, OT on 4/18/19 at 9:42 AM

## 2019-04-19 NOTE — PROGRESS NOTES
[address]    PROGRESS NOTE             Date:   4/19/2019  Patient name:  Kely Wiggins  Date of admission:  4/17/2019  8:54 PM  MRN:   [id number]  YOB: 1960    CHIEF COMPLAINT     History Obtained From:  Patient and chart review. HISTORY OF PRESENT ILLNESS      The patient is a 66 y.o.  male who is admitted to the hospital forThe patient is a 66 y.o.  male with known history of Stage 4 Colorectal cancer and hepatic metastasis, who presents with fatigue and generalized weakness. According to patient,symptoms have been progressively worsening over the past several days, prompting him to come to the ED for evaluation. Symptoms are associated with epigastric abdominal tenderness. At time of exam, patient denies chest pain and urinary frequency, though those symptoms were present upon arrival.  Denies fever, chills, cough,  nausea, vomiting, diarrhea, and urinary symptoms. Denies sore throat and skin rashes/wounds. There are no aggravating or alleviating factors. Symptoms are reported as constant and progressively worsening        PAST MEDICAL HISTORY       has a past medical history of Back pain, CAD S/P percutaneous coronary angioplasty, Carcinoma ([address] Colon cancer ([address] Degeneration of lumbar or lumbosacral intervertebral disc, Depression, Diabetes mellitus ([address] H/O cardiac catheterization, Hepatic metastases ([address] Hyperlipidemia, Hypertension, Knee pain, and MI, old. PAST SURGICAL HISTORY       has a past surgical history that includes Tunneled venous port placement (Right); pr sigmoidoscopy flx w/rmvl foreign body (N/A, 5/25/2017); hernia repair; Coronary angioplasty with stent (11/2016); pr sigmoidoscopy flx w/rmvl foreign body (12/8/2017); Endoscopic ultrasonography, GI (N/A, 12/8/2017); and liver biopsy (Right, 08/23/2018).      HOME MEDICATIONS        Prior to Admission medications Medication Sig Start Date End Date Taking? Authorizing Provider   oxyCODONE HCl (OXY-IR) 10 MG immediate release tablet Take 20 mg by mouth 3 times daily as needed for Pain. Yes Historical Provider, MD   diphenhydrAMINE (BENADRYL) 25 MG capsule Take 1 capsule by mouth nightly as needed for Sleep 4/8/19  Yes Delphine Hernandez,    fentaNYL (DURAGESIC) 50 MCG/HR Place 1 patch onto the skin every 72 hours. 4/20/19  Yes Historical Provider, MD   potassium chloride (KLOR-CON M) 20 MEQ extended release tablet Take 1 tablet by mouth daily 10/9/18  Yes Gorge Jiménez MD   metoprolol tartrate (LOPRESSOR) 25 MG tablet Take 1 tablet by mouth daily  3/12/18  Yes Historical Provider, MD   Glucose Blood (BLOOD GLUCOSE TEST STRIPS) STRP 1 each by In Vitro route daily As needed. 8/24/17  Yes Gorge Jiménez MD   Insulin Pen Needle 29G X 12.7MM MISC 1 each by Does not apply route daily 5/4/17  Yes Benjamin Michel MD   naloxone [facility]) 4 MG/0.1ML LIQD nasal spray Take 4 mg by mouth    Historical Provider, MD   nitroGLYCERIN (NITROSTAT) 0.4 MG SL tablet Place 1 tablet under the tongue every 5 minutes as needed for Chest pain Dissolve 1 tablet under tongue for chest pain and repeat every 5 min up to max of 3 total doses. If no relief after 3 doses call 911 11/29/16   Yanci Cely, APRN - CNP       ALLERGIES      Morphine    SOCIAL HISTORY       reports that he has never smoked. He has never used smokeless tobacco. He reports that he drank alcohol. He reports that he does not use drugs. FAMILY HISTORY      family history includes Heart Disease in his mother; High Blood Pressure in his mother; High Cholesterol in his father; Stroke in his mother. REVIEW OF SYSTEMS        Review of Systems   Constitutional: Positive for fatigue. Negative for chills, diaphoresis and fever. HENT: Negative for congestion and sore throat. Respiratory: Negative for cough, shortness of breath and wheezing.     Cardiovascular: Negative for chest pain, palpitations and leg swelling. Gastrointestinal: Positive for abdominal pain (epigastric). Negative for constipation, diarrhea, nausea and vomiting. Genitourinary: Negative for dysuria, frequency and urgency. Musculoskeletal: Negative for back pain and myalgias. Skin: Negative for rash. Neurological: Positive for weakness (generalized). Negative for dizziness and headaches. Psychiatric/Behavioral: The patient is not nervous/anxious. ·     PHYSICAL EXAM      /74   Pulse 89   Temp 97.8 °F (36.6 °C) (Oral)   Resp 17   Ht 6' 3\" (1.905 m)   Wt 193 lb 5.5 oz (87.7 kg)   SpO2 96%   BMI 24.17 kg/m²    Exam   Constitutional: He is oriented to person, place, and time. He appears well-developed and well-nourished. No distress. HENT:   Head: Normocephalic and atraumatic. Mouth/Throat: Oropharynx is clear and moist.   Eyes: Pupils are equal, round, and reactive to light. Conjunctivae and EOM are normal.   Neck: Normal range of motion. Neck supple. No tracheal deviation present. Cardiovascular: Normal rate, regular rhythm, normal heart sounds and intact distal pulses. Exam reveals no gallop and no friction rub. No murmur heard. Pulmonary/Chest: Effort normal and breath sounds normal. No respiratory distress. He has no wheezes. He has no rales. He exhibits no tenderness. Abdominal: Soft. Bowel sounds are normal. He exhibits no distension. There is tenderness (epigastric ). There is no guarding. Musculoskeletal: Normal range of motion. He exhibits no edema or tenderness. Lymphadenopathy:     He has no cervical adenopathy. Neurological: He is alert and oriented to person, place, and time. Skin: Skin is warm and dry. No rash noted. He is not diaphoretic. No erythema. No pallor. Psychiatric: He has a normal mood and affect.  His behavior is normal. Thought content normal       DIAGNOSTICS      Laboratory Testing:  CBC:   Recent Labs     04/19/19  0709   WBC 9.2 HGB 8.0*        BMP:    Recent Labs     04/17/19 2102 04/18/19 0437 04/19/19  0709   * 131* 134*   K 3.7 3.6* 3.8   CL 82* 91* 94*   CO2 30 30 30   BUN 3* 3* 3*   CREATININE 0.46* <0.40* 0.44*   GLUCOSE 324* 251* 180*     S. Calcium:  Recent Labs     04/19/19  0709   CALCIUM 7.7*     S. Ionized Calcium:No results for input(s): IONCA in the last 72 hours. S. Magnesium:  Recent Labs     04/18/19  0437   MG 1.6     S. Phosphorus:  Recent Labs     04/18/19  0437   PHOS 2.4*     S. Glucose:  Recent Labs     04/18/19 1707 04/18/19 2011 04/19/19  0730   POCGLU 326* 311* 149*     Glycosylated hemoglobin A1C:   Recent Labs     04/18/19 0437   LABA1C 11.5*     INR:   Recent Labs     04/17/19 2102   INR 1.2     Hepatic functions:   Recent Labs     04/17/19 2102 04/19/19  0709   ALKPHOS 417*   < > 341*   ALT 19   < > 14   AST 63*   < > 46*   PROT 6.9   < > 5.6*   BILITOT 0.56   < > 0.56   BILIDIR 0.35*  --   --    LABALBU 2.7*   < > 2.0*    < > = values in this interval not displayed. Pancreatic functions:  Recent Labs     04/17/19  2343   LACTA 2.7*     S. Lactic Acid:   Recent Labs     04/17/19  2343   LACTA 2.7*     Cardiac enzymes:No results for input(s): CKTOTAL, CKMB, CKMBINDEX, TROPONINI in the last 72 hours. BNP:No results for input(s): BNP in the last 72 hours. Lipid profile: No results for input(s): CHOL, TRIG, HDL, LDLCALC in the last 72 hours.     Invalid input(s): LDL  Blood Gases: No results found for: PH, PCO2, PO2, HCO3, O2SAT  Thyroid functions:   Lab Results   Component Value Date    TSH 1.33 11/27/2016        Imaging/Diagonstics:  Xr Chest Standard (2 Vw)    Result Date: 4/17/2019  EXAMINATION: TWO VIEWS OF THE CHEST 4/17/2019 9:18 pm COMPARISON: 04/07/2019 HISTORY: ORDERING SYSTEM PROVIDED HISTORY: chest pain, tachycardia TECHNOLOGIST PROVIDED HISTORY: chest pain, tachycardia Ordering Physician Provided Reason for Exam: Pt states chest pain and fatigue x 1 day Acuity: Acute Type of Exam: Initial Additional signs and symptoms: Pt states chest pain and fatigue x 1 day FINDINGS: Right internal jugular chest port with catheter tip at the cavoatrial junction. Limited due to low lung volumes. Normal heart size and pulmonary vasculature. Left lateral basilar opacity with corresponding finding on the lateral view may represent atelectasis or pneumonia. No pneumothorax. Limited due to low lung volumes. Left lateral basilar opacity may represent atelectasis or pneumonia. Consider repeating study with full inspiration. Xr Chest Standard (2 Vw)    Result Date: 4/8/2019  EXAMINATION: TWO VIEWS OF THE CHEST 4/8/2019 12:18 am COMPARISON: None. HISTORY: ORDERING SYSTEM PROVIDED HISTORY: Chest pain sob TECHNOLOGIST PROVIDED HISTORY: Chest pain sob Ordering Physician Provided Reason for Exam: chest pain, sob Acuity: Acute Type of Exam: Initial Additional signs and symptoms: Chest pain (tingling), shortness of breath, hx liver ca, neck ca, and colon ca Relevant Medical/Surgical History: Chest pain (tingling), shortness of breath, hx liver ca, neck ca, and colon ca FINDINGS: A right-sided jase catheter is present with its tip in the SVC. No infiltrate or consolidation or effusion is identified. The heart size is normal.     No acute abnormality detected. Xr Knee Left (3 Views)    Result Date: 4/2/2019  EXAMINATION: 3 XRAY VIEWS OF THE RIGHT KNEE; 3 XRAY VIEWS OF THE LEFT KNEE 4/1/2019 9:10 pm COMPARISON: 10/06/2011 HISTORY: ORDERING SYSTEM PROVIDED HISTORY: fall TECHNOLOGIST PROVIDED HISTORY: fall Ordering Physician Provided Reason for Exam: pain after a fall Acuity: Unknown Type of Exam: Initial FINDINGS: Right knee: There is no evidence of acute fracture or dislocation. There is a small suprapatellar joint effusion. Chondrocalcinosis is noted. Small patellar osteophytes are noted. Left knee: There is no evidence of acute fracture or dislocation.   There is a small suprapatellar joint effusion. Chondrocalcinosis is noted. Small patellar osteophytes are present. No acute osseous abnormality. Small bilateral suprapatellar joint effusions. Xr Knee Right (3 Views)    Result Date: 4/2/2019  EXAMINATION: 3 XRAY VIEWS OF THE RIGHT KNEE; 3 XRAY VIEWS OF THE LEFT KNEE 4/1/2019 9:10 pm COMPARISON: 10/06/2011 HISTORY: ORDERING SYSTEM PROVIDED HISTORY: fall TECHNOLOGIST PROVIDED HISTORY: fall Ordering Physician Provided Reason for Exam: pain after a fall Acuity: Unknown Type of Exam: Initial FINDINGS: Right knee: There is no evidence of acute fracture or dislocation. There is a small suprapatellar joint effusion. Chondrocalcinosis is noted. Small patellar osteophytes are noted. Left knee: There is no evidence of acute fracture or dislocation. There is a small suprapatellar joint effusion. Chondrocalcinosis is noted. Small patellar osteophytes are present. No acute osseous abnormality. Small bilateral suprapatellar joint effusions. Ct Head Wo Contrast    Result Date: 4/1/2019  EXAMINATION: CT OF THE HEAD WITHOUT CONTRAST  4/1/2019 9:10 pm TECHNIQUE: CT of the head was performed without the administration of intravenous contrast. Dose modulation, iterative reconstruction, and/or weight based adjustment of the mA/kV was utilized to reduce the radiation dose to as low as reasonably achievable. COMPARISON: None. HISTORY: ORDERING SYSTEM PROVIDED HISTORY: fall TECHNOLOGIST PROVIDED HISTORY: Ordering Physician Provided Reason for Exam: S/P Fall - Patient fell and hit left side of face. Patient states slight headache. Acuity: Acute Type of Exam: Initial Relevant Medical/Surgical History: No relevant surgical hx to area. Hx- HBP, Diabetes (Type II), and MI FINDINGS: BRAIN/VENTRICLES: The ventricles and sulci are diffusely enlarged. Low attenuation is seen in the periventricular and subcortical white matter. No acute intracranial hemorrhage or acute infarct is identified.  ORBITS: The definite cholelithiasis. Spleen, adrenal glands, kidneys, pancreas unremarkable. Mild retained stool throughout the colon without evidence of obstruction. The appendix unremarkable. No suspicious mesenteric or retroperitoneal adenopathy. A few lymph nodes identified within the port appendix region noted of uncertain clinical significance borderline size. No free air or free fluid. Tiny fat containing right inguinal hernia. The bladder is mildly distended. Prostate is unremarkable. No loculated rim enhancing fluid collection to suggest abscess formation. Central disc protrusion T7-T8. Moderate changes L4-5 and L5-S1. No suspicious osseous lesions. Moderate gallbladder wall thickening nonspecific. Please correlate with exam findings. Innumerable hepatic lesions suggestive of metastatic disease as on prior imaging studies. Bibasilar consolidative changes favor bibasilar atelectasis. Us Gallbladder Ruq    Result Date: 4/18/2019  EXAMINATION: RIGHT UPPER QUADRANT ULTRASOUND 4/18/2019 9:13 am COMPARISON: CT abdomen and pelvis April 17, 2019. HISTORY: ORDERING SYSTEM PROVIDED HISTORY: wall thickening on CT and elevated alk phos FINDINGS: LIVER:  Cirrhotic liver with heterogeneous echotexture. No  intrahepatic bile duct dilatation noted. BILIARY SYSTEM:  Diffuse gallbladder wall thickening. Questionable adherent stone versus polyp measuring 8 mm. No internal color Doppler vascularity. Negative Iyer sign. No pericholecystic fluid. Common bile duct is within normal limits measuring 5.3 mm. PANCREAS:  Visualized portions of the pancreas are unremarkable. OTHER: Trace ascites is noted within the right upper quadrant. 1.  Diffuse gallbladder wall thickening. No pericholecystic fluid or biliary obstruction. The finding may related to the patient's underlying cirrhosis or possibly chronic cholecystitis. If acute cholecystitis is of clinical concern, further evaluation with HIDA scan is recommended. 2. Questionable adherent stone versus polyp measuring 8 mm. The former is favored. If no surgical intervention is performed, repeat ultrasound in 1 year is recommended. 3. Trace perihepatic ascites. Ct Chest Pulmonary Embolism W Contrast    Result Date: 4/8/2019  EXAMINATION: CTA OF THE CHEST 4/8/2019 12:31 am TECHNIQUE: CTA of the chest was performed after the administration of intravenous contrast.  Multiplanar reformatted images are provided for review. MIP images are provided for review. Dose modulation, iterative reconstruction, and/or weight based adjustment of the mA/kV was utilized to reduce the radiation dose to as low as reasonably achievable. COMPARISON: CT chest done July 2, 2018. HISTORY: ORDERING SYSTEM PROVIDED HISTORY: CP, elevated d-dimer TECHNOLOGIST PROVIDED HISTORY: Ordering Physician Provided Reason for Exam: chest pain, elevated d dimer Acuity: Acute Type of Exam: Initial Relevant Medical/Surgical History: h/o colorectal ca, liver ca, diabetes, htn FINDINGS: Pulmonary Arteries: Pulmonary arteries are adequately opacified for evaluation. No evidence of intraluminal filling defect to suggest pulmonary embolism. Main pulmonary artery is normal in caliber. Mediastinum: No evidence of mediastinal lymphadenopathy. The heart and pericardium demonstrate no acute abnormality. Coronary artery calcifications. There is no acute abnormality of the thoracic aorta. Lungs/pleura: The lungs are without acute process. No focal consolidation or pulmonary edema. Mild bilateral dependent and basilar subsegmental atelectasis. No evidence of pleural effusion or pneumothorax. Upper Abdomen: Partially visualized area of ill-defined hypoattenuation in the medial left hepatic lobe is not well evaluated.  Soft Tissues/Bones: Mixed osseous lucency and sclerosis in the T2 and T4 vertebral bodies and posterior elements is increased compared to CT chest done July 2, 2018 and may represent osseous metastatic disease. Multilevel degenerative disc disease. Right IJ port with tip at the superior atrial caval junction. 1.  No acute pulmonary thromboembolic disease. No pulmonary hypertension or right ventricular strain. 2.  No pulmonary mass or consolidation. No intrathoracic lymphadenopathy. 3.  Partially visualized area of ill-defined hypoattenuation in the medial left hepatic lobe is not well evaluated. Underlying malignancy is not excluded. 4.  Mixed osseous lucency and sclerosis in the T2 and T4 vertebral bodies and posterior elements is increased compared to CT chest done July 2, 2018 and may represent osseous metastatic disease. ASSESSMENT     Patient Active Problem List   Diagnosis    Hypertension, essential    Mixed hyperlipidemia    Knee pain    Type 2 diabetes mellitus with hyperglycemia, without long-term current use of insulin (HCC)    HTN (hypertension)    Hypercholesteremia    Insomnia    Knee pain, bilateral    Depression    Cervical sprain    Lumbar sprain    Back pain    Opioid dependence (HCC)    Degeneration of lumbar or lumbosacral intervertebral disc    Chronic midline low back pain without sciatica    ST elevation myocardial infarction (STEMI) ([address]    S/P PTCA  BMS to RCA - 11/26/2016 Dr. Ene Tinajero    CAD S/P percutaneous coronary angioplasty    Carcinoma [facility])    Type 2 myocardial infarction without ST elevation ([address]    Rectal bleed    Hepatic metastases ([address]    Colorectal cancer, stage IV ([address]    Blood loss anemia    Iron deficiency anemia due to chronic blood loss    Neuropathy due to chemotherapeutic drug ([address]    Right hip pain    Radiation cystitis    Septic shock (HCC)    Severe malnutrition ([address]       PLAN      1. Papo Byers MD  [address]    Phone [phone]   Fax: [phone]  Answering Service: [phone]      [place] [address]. Sam Mejia MD   Insulin Pen Needle 29G X 12.7MM MISC 1 each by Does not apply route daily 5/4/17  Yes Milford Fleischer, MD   naloxone [facility]) 4 MG/0.1ML LIQD nasal spray Take 4 mg by mouth    Historical Provider, MD   nitroGLYCERIN (NITROSTAT) 0.4 MG SL tablet Place 1 tablet under the tongue every 5 minutes as needed for Chest pain Dissolve 1 tablet under tongue for chest pain and repeat every 5 min up to max of 3 total doses. If no relief after 3 doses call 911 11/29/16   Bruno Mohs, APRN - CNP       ALLERGIES      Morphine    SOCIAL HISTORY       reports that he has never smoked. He has never used smokeless tobacco. He reports that he drank alcohol. He reports that he does not use drugs. FAMILY HISTORY      family history includes Heart Disease in his mother; High Blood Pressure in his mother; High Cholesterol in his father; Stroke in his mother. REVIEW OF SYSTEMS      · Constitutional: Negative for weight loss  · Eyes: Negative for visual changes, diplopia, scleral icterus. · ENT: Negative for Headaches, hearing loss, vertigo, mouth sores, sore throat. · Cardiovascular: Negative for lightheadedness/orthostatic symptoms ,chest pain, dyspnea on exertion, palpitations or loss of consciousness. · Respiratory: Negative for cough or wheezing, sputum production, hemoptysis, pleuritic pain. · Gastrointestinal: Negative for nausea/vomiting, change in bowel habits, abdominal pain, dysphagia/appetite loss, hematemesis, blood in stools. · Genitourinary:Negative for change in bladder habits, dysuria, trouble voiding, hematuria. · Musculoskeletal: Negative for gait disturbance, weakness, joint complaints. · Integumentary: Negative for rash, pruritis.   · Neurological: Negative for headache, dizziness, change in muscle strength, numbness/tingling, change in gait, balance, coordination,   · Endocrine: negative for temperature intolerance, excessive thirst, fluid intake, or urination, tremor. · Hematologic/Lymphatic: negative for abnormal bruising or bleeding, blood clots, swollen lymph nodes. PHYSICAL EXAM      /74   Pulse 89   Temp 97.8 °F (36.6 °C) (Oral)   Resp 17   Ht 6' 3\" (1.905 m)   Wt 193 lb 5.5 oz (87.7 kg)   SpO2 96%   BMI 24.17 kg/m²      · General appearance: well nourished  · HEENT: Head: Normocephalic, no lesions, without obvious abnormality. · Lungs: clear to auscultation bilaterally  · Heart: regular rate and rhythm, S1, S2 normal, no murmur, click, rub or gallop  · Abdomen: soft, non-tender; bowel sounds normal; no masses,  no organomegaly  · Extremities: extremities normal, atraumatic, no cyanosis or edema  · Neurological: Gait normal. Reflexes normal and symmetric. Sensation grossly normal  · Skin - no rash, no lump   · Eye no icterus no redness  · Psych-normal affect   · NEURO-no limb weakness  No facial droop  · Lymphatic system-no lymphadenopathy no splenomegaly     DIAGNOSTICS      Laboratory Testing:  CBC:   Recent Labs     04/19/19  0709   WBC 9.2   HGB 8.0*        BMP:    Recent Labs     04/17/19 2102 04/18/19 0437 04/19/19  0709   * 131* 134*   K 3.7 3.6* 3.8   CL 82* 91* 94*   CO2 30 30 30   BUN 3* 3* 3*   CREATININE 0.46* <0.40* 0.44*   GLUCOSE 324* 251* 180*     S. Calcium:  Recent Labs     04/19/19  0709   CALCIUM 7.7*     S. Ionized Calcium:No results for input(s): IONCA in the last 72 hours. S. Magnesium:  Recent Labs     04/18/19  0437   MG 1.6     S.  Phosphorus:  Recent Labs     04/18/19  0437   PHOS 2.4*     S. Glucose:  Recent Labs     04/18/19  1707 04/18/19 2011 04/19/19  0730   POCGLU 326* 311* 149*     Glycosylated hemoglobin A1C:   Recent Labs     04/18/19 0437   LABA1C 11.5*     INR:   Recent Labs     04/17/19 2102   INR 1.2     Hepatic functions:   Recent Labs     04/17/19 2102 04/19/19  0709   ALKPHOS 417*   < > 341*   ALT 19   < > 14   AST 63*   < > 46*   PROT 6.9   < > 5.6*   BILITOT 0.56   < > 0.56   BILIDIR 0.35*  --   --    LABALBU 2.7*   < > 2.0*    < > = values in this interval not displayed. Pancreatic functions:  Recent Labs     04/17/19  2343   LACTA 2.7*     S. Lactic Acid:   Recent Labs     04/17/19  2343   LACTA 2.7*     Cardiac enzymes:No results for input(s): CKTOTAL, CKMB, CKMBINDEX, TROPONINI in the last 72 hours. BNP:No results for input(s): BNP in the last 72 hours. Lipid profile: No results for input(s): CHOL, TRIG, HDL, LDLCALC in the last 72 hours. Invalid input(s): LDL  Blood Gases: No results found for: PH, PCO2, PO2, HCO3, O2SAT  Thyroid functions:   Lab Results   Component Value Date    TSH 1.33 11/27/2016        Imaging/Diagonstics:  Xr Chest Standard (2 Vw)    Result Date: 4/17/2019  EXAMINATION: TWO VIEWS OF THE CHEST 4/17/2019 9:18 pm COMPARISON: 04/07/2019 HISTORY: ORDERING SYSTEM PROVIDED HISTORY: chest pain, tachycardia TECHNOLOGIST PROVIDED HISTORY: chest pain, tachycardia Ordering Physician Provided Reason for Exam: Pt states chest pain and fatigue x 1 day Acuity: Acute Type of Exam: Initial Additional signs and symptoms: Pt states chest pain and fatigue x 1 day FINDINGS: Right internal jugular chest port with catheter tip at the cavoatrial junction. Limited due to low lung volumes. Normal heart size and pulmonary vasculature. Left lateral basilar opacity with corresponding finding on the lateral view may represent atelectasis or pneumonia. No pneumothorax. Limited due to low lung volumes. Left lateral basilar opacity may represent atelectasis or pneumonia. Consider repeating study with full inspiration. Xr Chest Standard (2 Vw)    Result Date: 4/8/2019  EXAMINATION: TWO VIEWS OF THE CHEST 4/8/2019 12:18 am COMPARISON: None.  HISTORY: ORDERING SYSTEM PROVIDED HISTORY: Chest pain sob TECHNOLOGIST PROVIDED HISTORY: Chest pain sob Ordering Physician Provided Reason for Exam: chest pain, sob Acuity: Acute Type of Exam: Initial Additional signs and symptoms: Chest pain (tingling), shortness of breath, hx liver ca, neck ca, and colon ca Relevant Medical/Surgical History: Chest pain (tingling), shortness of breath, hx liver ca, neck ca, and colon ca FINDINGS: A right-sided jase catheter is present with its tip in the SVC. No infiltrate or consolidation or effusion is identified. The heart size is normal.     No acute abnormality detected. Xr Knee Left (3 Views)    Result Date: 4/2/2019  EXAMINATION: 3 XRAY VIEWS OF THE RIGHT KNEE; 3 XRAY VIEWS OF THE LEFT KNEE 4/1/2019 9:10 pm COMPARISON: 10/06/2011 HISTORY: ORDERING SYSTEM PROVIDED HISTORY: fall TECHNOLOGIST PROVIDED HISTORY: fall Ordering Physician Provided Reason for Exam: pain after a fall Acuity: Unknown Type of Exam: Initial FINDINGS: Right knee: There is no evidence of acute fracture or dislocation. There is a small suprapatellar joint effusion. Chondrocalcinosis is noted. Small patellar osteophytes are noted. Left knee: There is no evidence of acute fracture or dislocation. There is a small suprapatellar joint effusion. Chondrocalcinosis is noted. Small patellar osteophytes are present. No acute osseous abnormality. Small bilateral suprapatellar joint effusions. Xr Knee Right (3 Views)    Result Date: 4/2/2019  EXAMINATION: 3 XRAY VIEWS OF THE RIGHT KNEE; 3 XRAY VIEWS OF THE LEFT KNEE 4/1/2019 9:10 pm COMPARISON: 10/06/2011 HISTORY: ORDERING SYSTEM PROVIDED HISTORY: fall TECHNOLOGIST PROVIDED HISTORY: fall Ordering Physician Provided Reason for Exam: pain after a fall Acuity: Unknown Type of Exam: Initial FINDINGS: Right knee: There is no evidence of acute fracture or dislocation. There is a small suprapatellar joint effusion. Chondrocalcinosis is noted. Small patellar osteophytes are noted. Left knee: There is no evidence of acute fracture or dislocation. There is a small suprapatellar joint effusion. Chondrocalcinosis is noted. Small patellar osteophytes are present.      No acute osseous PROVIDED HISTORY: Ordering Physician Provided Reason for Exam: sepsis, source unknown. Hx - metastatic colon cancer to liver. Patient states weakness x1 day prior. Acuity: Unknown Type of Exam: Unknown Relevant Medical/Surgical History: Surgical hx - Hernia repair. Hx - HBP, Diabetes, Carcinoma, Colorectal cancer, stage IV  Hepatic metastases . FINDINGS: Mild elevation of the left hemidiaphragm. Coronary calcifications versus coronary stents identified. Trace pericardial thickening/pericardial fluid. There are bibasilar opacities suggestive of bibasilar atelectasis. These findings most confluent within the left lower lobe. There are innumerable hypodensities identified throughout the right and left hepatic lobes which have progressed from prior examination. A suggestion of a central ill-defined area of hypoattenuation which may represent areas of posterior related changes/fibrosis versus perfusion differences. Mild periportal edema involving the liver. There is moderate gallbladder wall thickening. No definite cholelithiasis. Spleen, adrenal glands, kidneys, pancreas unremarkable. Mild retained stool throughout the colon without evidence of obstruction. The appendix unremarkable. No suspicious mesenteric or retroperitoneal adenopathy. A few lymph nodes identified within the port appendix region noted of uncertain clinical significance borderline size. No free air or free fluid. Tiny fat containing right inguinal hernia. The bladder is mildly distended. Prostate is unremarkable. No loculated rim enhancing fluid collection to suggest abscess formation. Central disc protrusion T7-T8. Moderate changes L4-5 and L5-S1. No suspicious osseous lesions. Moderate gallbladder wall thickening nonspecific. Please correlate with exam findings. Innumerable hepatic lesions suggestive of metastatic disease as on prior imaging studies. Bibasilar consolidative changes favor bibasilar atelectasis.      Us Relevant Medical/Surgical History: h/o colorectal ca, liver ca, diabetes, htn FINDINGS: Pulmonary Arteries: Pulmonary arteries are adequately opacified for evaluation. No evidence of intraluminal filling defect to suggest pulmonary embolism. Main pulmonary artery is normal in caliber. Mediastinum: No evidence of mediastinal lymphadenopathy. The heart and pericardium demonstrate no acute abnormality. Coronary artery calcifications. There is no acute abnormality of the thoracic aorta. Lungs/pleura: The lungs are without acute process. No focal consolidation or pulmonary edema. Mild bilateral dependent and basilar subsegmental atelectasis. No evidence of pleural effusion or pneumothorax. Upper Abdomen: Partially visualized area of ill-defined hypoattenuation in the medial left hepatic lobe is not well evaluated. Soft Tissues/Bones: Mixed osseous lucency and sclerosis in the T2 and T4 vertebral bodies and posterior elements is increased compared to CT chest done July 2, 2018 and may represent osseous metastatic disease. Multilevel degenerative disc disease. Right IJ port with tip at the superior atrial caval junction. 1.  No acute pulmonary thromboembolic disease. No pulmonary hypertension or right ventricular strain. 2.  No pulmonary mass or consolidation. No intrathoracic lymphadenopathy. 3.  Partially visualized area of ill-defined hypoattenuation in the medial left hepatic lobe is not well evaluated. Underlying malignancy is not excluded. 4.  Mixed osseous lucency and sclerosis in the T2 and T4 vertebral bodies and posterior elements is increased compared to CT chest done July 2, 2018 and may represent osseous metastatic disease.          ASSESSMENT     Patient Active Problem List   Diagnosis    Hypertension, essential    Mixed hyperlipidemia    Knee pain    Type 2 diabetes mellitus with hyperglycemia, without long-term current use of insulin (HCC)    HTN (hypertension)    Hypercholesteremia    Insomnia    Knee pain, bilateral    Depression    Cervical sprain    Lumbar sprain    Back pain    Opioid dependence (HCC)    Degeneration of lumbar or lumbosacral intervertebral disc    Chronic midline low back pain without sciatica    ST elevation myocardial infarction (STEMI) ([address]    S/P PTCA  BMS to RCA - 11/26/2016 Dr. Trenton Moreno    CAD S/P percutaneous coronary angioplasty    Carcinoma [facility])    Type 2 myocardial infarction without ST elevation ([address]    Rectal bleed    Hepatic metastases ([address]    Colorectal cancer, stage IV ([address]    Blood loss anemia    Iron deficiency anemia due to chronic blood loss    Neuropathy due to chemotherapeutic drug ([address]    Right hip pain    Radiation cystitis    Septic shock (HCC)    Severe malnutrition (HCC)      Principal Problem:    Septic shock (HCC)  Active Problems:    Type 2 diabetes mellitus with hyperglycemia, without long-term current use of insulin (HCC)    Hepatic metastases (HCC)    Colorectal cancer, stage IV ([address]  Resolved Problems:    * No resolved hospital problems.  *           PLAN        Septic Shock  -unknown sorurce  -Lactic Acid 4.4 on arrival, then 2.7  --Recheck @0200  -Fluid bolus administered in ED  -IV antibiotics initiated  --Vancomycin and Zosyn; continue on admission  -Blood culture x2  -urine culture pending; UA negative for UTI  -check procalcitonin     Stage IV colorectal cancer with liver mets  -States that he is seeking out new treatment option in [facility]  -AlkPhos 417; AST 63  -Gall bladder wall thickening on CT  --Check GBUS in am  -Continue home pain meds - Verified via [facility]     Diabetes Mellitus  -not currently taking any diabetic meds  --Reports that his home glucometer is broken  -POCT ac and hs with sliding scale coverage  -Check HgbA1c in am            4/19   improved   no fever   wbc nl    cont ivf and atb   cx neg    gb tolerating    needs leeroy lyon to see      MD MADDIE AlmanzaNortheast Missouri Rural Health Network  [id number]

## 2019-04-19 NOTE — PROGRESS NOTES
Physical Therapy  DATE: 2019    NAME: Mila Fleming  MRN: [id number]   : 1960    Patient not seen this date for Physical Therapy due to:  [] Blood transfusion in progress  [] Hemodialysis  []  Patient Declined  [] Spine Precautions   [] Strict Bedrest  [] Surgery/ Procedure  [] Testing      [x] Other Pt refusing therapy, \"I can walk fine, I don't need any therapy\". Pt went back to sleep, \" I am resting. \".        [] PT being discontinued at this time. Patient independent. No further needs. [] PT being discontinued at this time as the patient has been transferred to palliative care. No further needs.     Ella Allison, PT

## 2019-04-19 NOTE — PROGRESS NOTES
[facility]    Date: 2019  Patient Name: Tilden Oppenheim        : 1960       [x] Pt Refusal  \"MY family was here all morning, I just need to rest.\"           [] Pt Unavailable due to:            Jocy Shanks, OTR/L Date: 2019 12:25

## 2019-04-19 NOTE — PLAN OF CARE
Problem: Falls - Risk of:  Goal: Will remain free from falls  Description  Will remain free from falls  Outcome: Ongoing   Pt. Free of falls and injuries this shift. Problem: Pain:  Goal: Pain level will decrease  Description  Pain level will decrease  Outcome: Ongoing   Adequate pain control achieved this shift. See MAR.

## 2019-04-19 NOTE — CARE COORDINATION
ONGOING DISCHARGE PLAN:    Unable to speak to pt at this time, Currently sound asleep. No family at the bedside. Pt. To have Hida Scan, Surgery consult. Will attempt to speak to pt. Later today. Will continue to follow for additional discharge needs.     Electronically signed by Kandis Guillen RN on 4/19/2019 at 2:09 PM

## 2019-04-19 NOTE — FLOWSHEET NOTE
Night nurses Babara Duane reported that pt had requested to allow him to sleep at late as possible  In the morning; pt stated that breakfast tray was cold by the time he woke up; writer offered to warm present tray or call nutriian services for new tray; pt pleasantly declined

## 2019-04-20 NOTE — PROGRESS NOTES
Pharmacy Vancomycin Consult     Vancomycin Day: 2  Current Dosin mg q12h    Temp max:  99    Recent Labs     19  0437 19  0709   BUN 3* 3*       Recent Labs     19  0437 19  0709   CREATININE <0.40* 0.44*       Recent Labs     19  0437 19  0709   WBC 8.4 9.2         Intake/Output Summary (Last 24 hours) at 2019 2342  Last data filed at 2019 3233  Gross per 24 hour   Intake 3426 ml   Output --   Net 3426 ml       Culture Date      Source                       Results  See micro reports    Ht Readings from Last 1 Encounters:   19 6' 3\" (1.905 m)        Wt Readings from Last 1 Encounters:   19 193 lb 5.5 oz (87.7 kg)         Body mass index is 24.17 kg/m². Estimated Creatinine Clearance: 224 mL/min (A) (based on SCr of 0.44 mg/dL (L)). Trough: 6.0 mcg/ml    Assessment/Plan:  Trough level is sub-therapeutic. Will adjust regimen to 1250 mg every 8 hours and recheck level after 3 doses.

## 2019-04-20 NOTE — CARE COORDINATION
ONGOING DISCHARGE PLAN:    Spoke with patient regarding discharge plan and patient confirms that plan is still home with family and declined VNS. Active orders for IV zithromax, zosyn and vanco.    The patient had a HIDA today. Patient requesting rollator at discharge. Will continue to follow for additional discharge needs.     Electronically signed by Luz Bradford RN on 4/20/2019 at 3:25 PM

## 2019-04-20 NOTE — PLAN OF CARE
Problem: Falls - Risk of:  Goal: Will remain free from falls  Description  Will remain free from falls  4/20/2019 0421 by Evin Eddy RN  Outcome: Ongoing  Note:   Pt. Free of falls and injuries this shift. Problem: Pain:  Goal: Pain level will decrease  Description  Pain level will decrease  4/20/2019 0421 by Evin Eddy RN  Outcome: Ongoing  Note:   Adequate pain control achieved this shift. See MAR.

## 2019-04-20 NOTE — CONSULTS
General Surgery Consult      Pt Name: Zuri Shore  MRN: [id number]  YOB: 1960  Date of evaluation: 4/20/2019  Primary Care Physician: No primary care provider on file. Patient evaluated at the request of  Dr. Eladia Grey  Reason for evaluation: Possible cholecystitis    SUBJECTIVE:   History of Chief Complaint:    Zuri Shore is a 66 y.o. male with a history of metastatic rectal cancer, status post chemoradiation and liver embolization who presented to the emergency department with complaints of progressive fatigue and weakness for the past few days. The patient is also describing difficulty with his bowel movements, which she attributes to his narcotic pain medications. CT scan was done in the emergency department. The patient denies abdominal pain, nausea or vomiting. Denies jaundice. Past Medical History   has a past medical history of Back pain, CAD S/P percutaneous coronary angioplasty, Carcinoma ([address] Colon cancer ([address] Degeneration of lumbar or lumbosacral intervertebral disc, Depression, Diabetes mellitus ([address] H/O cardiac catheterization, Hepatic metastases ([address] Hyperlipidemia, Hypertension, Knee pain, and MI, old. Past Surgical History   has a past surgical history that includes Tunneled venous port placement (Right); pr sigmoidoscopy flx w/rmvl foreign body (N/A, 5/25/2017); hernia repair; Coronary angioplasty with stent (11/2016); pr sigmoidoscopy flx w/rmvl foreign body (12/8/2017); Endoscopic ultrasonography, GI (N/A, 12/8/2017); and liver biopsy (Right, 08/23/2018). Medications  Prior to Admission medications    Medication Sig Start Date End Date Taking? Authorizing Provider   oxyCODONE HCl (OXY-IR) 10 MG immediate release tablet Take 20 mg by mouth 3 times daily as needed for Pain.    Yes Historical Provider, MD   diphenhydrAMINE (BENADRYL) 25 MG capsule Take 1 capsule by mouth nightly as needed for Sleep 4/8/19  Yes Vernemilton Swazi, DO   fentaNYL (DURAGESIC) 50 MCG/HR Place 1 patch onto the skin every 72 hours. 4/20/19  Yes Historical Provider, MD   potassium chloride (KLOR-CON M) 20 MEQ extended release tablet Take 1 tablet by mouth daily 10/9/18  Yes Savanah Davison MD   metoprolol tartrate (LOPRESSOR) 25 MG tablet Take 1 tablet by mouth daily  3/12/18  Yes Historical Provider, MD   Glucose Blood (BLOOD GLUCOSE TEST STRIPS) STRP 1 each by In Vitro route daily As needed. 8/24/17  Yes Savanah Davison MD   Insulin Pen Needle 29G X 12.7MM MISC 1 each by Does not apply route daily 5/4/17  Yes Isidoro Barber MD   naloxone [facility]) 4 MG/0.1ML LIQD nasal spray Take 4 mg by mouth    Historical Provider, MD   nitroGLYCERIN (NITROSTAT) 0.4 MG SL tablet Place 1 tablet under the tongue every 5 minutes as needed for Chest pain Dissolve 1 tablet under tongue for chest pain and repeat every 5 min up to max of 3 total doses. If no relief after 3 doses call 911 11/29/16   ASIM Whitten CNP     Allergies  is allergic to morphine. Family History  family history includes Heart Disease in his mother; High Blood Pressure in his mother; High Cholesterol in his father; Stroke in his mother. Social History   reports that he has never smoked. He has never used smokeless tobacco. He reports that he drank alcohol. He reports that he does not use drugs. Review of Systems:  General Denies any fever or chills  HEENT Denies any diplopia, tinnitus or vertigo  Resp Denies any shortness of breath, cough or wheezing  Cardiac Denies any chest pain, palpitations, claudication or edema  GI Denies any melena, hematochezia, hematemesis or pyrosis   Denies any frequency, urgency, hesitancy or incontinence  Heme Denies bruising or bleeding easily  Endocrine positive for diabetes  Neuro Denies any focal motor or sensory deficits    OBJECTIVE:   VITALS:  height is 6' 3\" (1.905 m) and weight is 193 lb 5.5 oz (87.7 kg). His oral temperature is 97.4 °F (36.3 °C).  His blood pressure is 105/58 (abnormal) and his pulse is 76. His respiration is 16 and oxygen saturation is 100%. CONSTITUTIONAL: Alert and oriented times 3, no acute distress and cooperative to examination with proper mood and affect. SKIN: Skin color, texture, turgor normal. No rashes or lesions. LYMPH: no cervical nodes, no inguinal nodes  HEENT: Head is normocephalic, atraumatic. EOMI, PERRLA  NECK: Supple, symmetrical, trachea midline, no adenopathy, thyroid symmetric, not enlarged and no tenderness, skin normal  CHEST/LUNGS: chest symmetric with normal A/P diameter, normal respiratory rate and rhythm, lungs clear to auscultation without wheezes, rales or rhonchi. No accessory muscle use. CARDIOVASCULAR: Heart regular rate and rhythm   ABDOMEN: Normal shape. Normal bowel sounds. No bruits. Soft, nondistended, no masses or organomegaly. no evidence of hernia. Percussion: Normal without hepatosplenomegally. Tenderness: RUQ, mild, without rebound or guarding  RECTAL: Deferred at this time  NEUROLOGIC: There are no focalizing motor or sensory deficits. CN II-XII are grossly intact.   EXTREMITIES: no cyanosis, no clubbing and no edema    LABS:     CBC with Differential:    Lab Results   Component Value Date    WBC 8.1 04/20/2019    RBC 3.34 04/20/2019    HGB 7.9 04/20/2019    HCT 25.2 04/20/2019     04/20/2019    MCV 75.6 04/20/2019    MCH 23.6 04/20/2019    MCHC 31.3 04/20/2019    RDW 19.7 04/20/2019    LYMPHOPCT 4 04/17/2019    MONOPCT 9 04/17/2019    BASOPCT 0 04/17/2019    MONOSABS 1.09 04/17/2019    LYMPHSABS 0.48 04/17/2019    EOSABS 0.00 04/17/2019    BASOSABS 0.00 04/17/2019    DIFFTYPE NOT REPORTED 04/17/2019     CMP:    Lab Results   Component Value Date     04/20/2019    K 3.8 04/20/2019    CL 97 04/20/2019    CO2 30 04/20/2019    BUN 3 04/20/2019    CREATININE 0.48 04/20/2019    GFRAA >60 04/20/2019    LABGLOM >60 04/20/2019    GLUCOSE 231 04/20/2019    GLUCOSE 195 04/26/2012    PROT 5.3 04/20/2019    LABALBU 1.9 04/20/2019    CALCIUM 7.8 04/20/2019    BILITOT 0.49 04/20/2019    ALKPHOS 346 04/20/2019    AST 42 04/20/2019    ALT 12 04/20/2019     Magnesium:    Lab Results   Component Value Date    MG 1.6 04/18/2019     Phosphorus:    Lab Results   Component Value Date    PHOS 2.4 04/18/2019     PT/INR:    Lab Results   Component Value Date    PROTIME 15.1 04/17/2019    INR 1.2 04/17/2019     U/A:    Lab Results   Component Value Date    COLORU YELLOW 04/17/2019    PROTEINU TRACE 04/17/2019    PHUR 6.0 04/17/2019    WBCUA 0 TO 2 04/17/2019    RBCUA 2 TO 5 04/17/2019    MUCUS NOT REPORTED 04/17/2019    TRICHOMONAS NOT REPORTED 04/17/2019    YEAST NOT REPORTED 04/17/2019    BACTERIA FEW 04/17/2019    SPECGRAV 1.011 04/17/2019    LEUKOCYTESUR NEGATIVE 04/17/2019    UROBILINOGEN ELEVATED 04/17/2019    BILIRUBINUR NEGATIVE 04/17/2019    GLUCOSEU 2+ 04/17/2019    AMORPHOUS NOT REPORTED 04/17/2019     AMYLASE:    Lab Results   Component Value Date    AMYLASE 53 12/03/2016     LIPASE:    Lab Results   Component Value Date    LIPASE 15 04/17/2019         RADIOLOGY:   I have personally reviewed the following films:  CT scan abd/pelvis:   Mild elevation of the left hemidiaphragm. Coronary calcifications versus  coronary stents identified. Trace pericardial thickening/pericardial fluid. There are bibasilar opacities suggestive of bibasilar atelectasis. These  findings most confluent within the left lower lobe. There are innumerable hypodensities identified throughout the right and left  hepatic lobes which have progressed from prior examination. A suggestion of  a central ill-defined area of hypoattenuation which may represent areas of  posterior related changes/fibrosis versus perfusion differences. Mild  periportal edema involving the liver. There is moderate gallbladder wall  thickening. No definite cholelithiasis. Spleen, adrenal glands, kidneys, pancreas unremarkable.     Mild retained stool throughout the colon without evidence of obstruction. The appendix unremarkable. No suspicious mesenteric or retroperitoneal  adenopathy. A few lymph nodes identified within the port appendix region  noted of uncertain clinical significance borderline size. No free air or  free fluid. Tiny fat containing right inguinal hernia. The bladder is  mildly distended. Prostate is unremarkable. No loculated rim enhancing  fluid collection to suggest abscess formation. Central disc protrusion T7-T8. Moderate changes L4-5 and L5-S1. No  suspicious osseous lesions. Impression:      Moderate gallbladder wall thickening nonspecific. Please correlate with exam  findings. Innumerable hepatic lesions suggestive of metastatic disease as on prior  imaging studies. Bibasilar consolidative changes favor bibasilar atelectasis. Ultrasound abdomen:  LIVER:  Cirrhotic liver with heterogeneous echotexture. No  intrahepatic  bile duct dilatation noted. BILIARY SYSTEM:  Diffuse gallbladder wall thickening. Questionable adherent  stone versus polyp measuring 8 mm. No internal color Doppler vascularity. Negative Ieyr sign. No pericholecystic fluid. Common bile duct is within normal limits measuring 5.3 mm. PANCREAS:  Visualized portions of the pancreas are unremarkable. OTHER: Trace ascites is noted within the right upper quadrant. Impression:      1. Diffuse gallbladder wall thickening. No pericholecystic fluid or biliary  obstruction. The finding may related to the patient's underlying cirrhosis  or possibly chronic cholecystitis. If acute cholecystitis is of clinical  concern, further evaluation with HIDA scan is recommended. 2. Questionable adherent stone versus polyp measuring 8 mm. The former is  favored. If no surgical intervention is performed, repeat ultrasound in 1  year is recommended. 3. Trace perihepatic ascites. IMPRESSION:   1.  Chronic cholecystitis versus thickening related to liver disease    Principal Problem:    Septic shock ([facility]  Active Problems:    Type 2 diabetes mellitus with hyperglycemia, without long-term current use of insulin (HCC)    Hepatic metastases (HCC)    Colorectal cancer, stage IV (HCC)    Severe malnutrition (HCC)  Resolved Problems:    * No resolved hospital problems. *      PLAN:   1. Will review HIDA scan once completed  2. Given that the patient is fairly asymptomatic, would hold off on any surgical intervention at this time unless the patient shows symptoms such as pain or intolerance to food intake       Thank you for this interesting consult and for allowing us to participate in the care of your patient. Please feel free to contact me with any questions or concerns.   \

## 2019-04-20 NOTE — PROGRESS NOTES
Physical Therapy  DATE: 2019    NAME: Kathe Weber  MRN: [id number]   : 1960    Patient not seen this date for Physical Therapy due to:  [] Blood transfusion in progress  [] Hemodialysis  []  Patient Declined  [] Spine Precautions   [] Strict Bedrest  [] Surgery/ Procedure: Scan being performed at 0910, according to TATIANNA Garcia. [] Testing      [] Other        [] PT being discontinued at this time. Patient independent. No further needs. [] PT being discontinued at this time as the patient has been transferred to palliative care. No further needs.     Toya Casiano, PTA

## 2019-04-20 NOTE — PROGRESS NOTES
[address]    PROGRESS NOTE             Date:   4/20/2019  Patient name:  Alfreda Nolasco  Date of admission:  4/17/2019  8:54 PM  MRN:   [id number]  YOB: 1960    CHIEF COMPLAINT     History Obtained From:  Patient and chart review. HISTORY OF PRESENT ILLNESS      The patient is a 66 y.o.  male who is admitted to the hospital forThe patient is a 66 y.o.  male with known history of Stage 4 Colorectal cancer and hepatic metastasis, who presents with fatigue and generalized weakness. According to patient,symptoms have been progressively worsening over the past several days, prompting him to come to the ED for evaluation. Symptoms are associated with epigastric abdominal tenderness. At time of exam, patient denies chest pain and urinary frequency, though those symptoms were present upon arrival.  Denies fever, chills, cough,  nausea, vomiting, diarrhea, and urinary symptoms. Denies sore throat and skin rashes/wounds. There are no aggravating or alleviating factors. Symptoms are reported as constant and progressively worsening     4/20   stable   no fever   pain better      PAST MEDICAL HISTORY       has a past medical history of Back pain, CAD S/P percutaneous coronary angioplasty, Carcinoma ([address] Colon cancer ([address] Degeneration of lumbar or lumbosacral intervertebral disc, Depression, Diabetes mellitus ([address] H/O cardiac catheterization, Hepatic metastases ([address] Hyperlipidemia, Hypertension, Knee pain, and MI, old. PAST SURGICAL HISTORY       has a past surgical history that includes Tunneled venous port placement (Right); pr sigmoidoscopy flx w/rmvl foreign body (N/A, 5/25/2017); hernia repair; Coronary angioplasty with stent (11/2016); pr sigmoidoscopy flx w/rmvl foreign body (12/8/2017); Endoscopic ultrasonography, GI (N/A, 12/8/2017); and liver biopsy (Right, 08/23/2018).      HOME MEDICATIONS Prior to Admission medications    Medication Sig Start Date End Date Taking? Authorizing Provider   oxyCODONE HCl (OXY-IR) 10 MG immediate release tablet Take 20 mg by mouth 3 times daily as needed for Pain. Yes Historical Provider, MD   diphenhydrAMINE (BENADRYL) 25 MG capsule Take 1 capsule by mouth nightly as needed for Sleep 4/8/19  Yes Jennifer Fierro DO   fentaNYL (DURAGESIC) 50 MCG/HR Place 1 patch onto the skin every 72 hours. 4/20/19  Yes Historical Provider, MD   potassium chloride (KLOR-CON M) 20 MEQ extended release tablet Take 1 tablet by mouth daily 10/9/18  Yes Dulce Tafoya MD   metoprolol tartrate (LOPRESSOR) 25 MG tablet Take 1 tablet by mouth daily  3/12/18  Yes Historical Provider, MD   Glucose Blood (BLOOD GLUCOSE TEST STRIPS) STRP 1 each by In Vitro route daily As needed. 8/24/17  Yes Dulce Tafoya MD   Insulin Pen Needle 29G X 12.7MM MISC 1 each by Does not apply route daily 5/4/17  Yes Ana Jauregui MD   naloxone [facility]) 4 MG/0.1ML LIQD nasal spray Take 4 mg by mouth    Historical Provider, MD   nitroGLYCERIN (NITROSTAT) 0.4 MG SL tablet Place 1 tablet under the tongue every 5 minutes as needed for Chest pain Dissolve 1 tablet under tongue for chest pain and repeat every 5 min up to max of 3 total doses. If no relief after 3 doses call 911 11/29/16   ASIM Leyva - AIME       ALLERGIES      Morphine    SOCIAL HISTORY       reports that he has never smoked. He has never used smokeless tobacco. He reports that he drank alcohol. He reports that he does not use drugs. FAMILY HISTORY      family history includes Heart Disease in his mother; High Blood Pressure in his mother; High Cholesterol in his father; Stroke in his mother. REVIEW OF SYSTEMS        Review of Systems   Constitutional: Positive for fatigue. Negative for chills, diaphoresis and fever. HENT: Negative for congestion and sore throat.     Respiratory: Negative for cough, shortness of breath and wheezing. Cardiovascular: Negative for chest pain, palpitations and leg swelling. Gastrointestinal: Positive for abdominal pain (epigastric). Negative for constipation, diarrhea, nausea and vomiting. Genitourinary: Negative for dysuria, frequency and urgency. Musculoskeletal: Negative for back pain and myalgias. Skin: Negative for rash. Neurological: Positive for weakness (generalized). Negative for dizziness and headaches. Psychiatric/Behavioral: The patient is not nervous/anxious. ·     PHYSICAL EXAM      BP (!) 105/58   Pulse 76   Temp 97.4 °F (36.3 °C) (Oral)   Resp 16   Ht 6' 3\" (1.905 m)   Wt 193 lb 5.5 oz (87.7 kg)   SpO2 100%   BMI 24.17 kg/m²    Exam   Constitutional: He is oriented to person, place, and time. He appears well-developed and well-nourished. No distress. HENT:   Head: Normocephalic and atraumatic. Mouth/Throat: Oropharynx is clear and moist.   Eyes: Pupils are equal, round, and reactive to light. Conjunctivae and EOM are normal.   Neck: Normal range of motion. Neck supple. No tracheal deviation present. Cardiovascular: Normal rate, regular rhythm, normal heart sounds and intact distal pulses. Exam reveals no gallop and no friction rub. No murmur heard. Pulmonary/Chest: Effort normal and breath sounds normal. No respiratory distress. He has no wheezes. He has no rales. He exhibits no tenderness. Abdominal: Soft. Bowel sounds are normal. He exhibits no distension. There is tenderness (epigastric ). There is no guarding. Musculoskeletal: Normal range of motion. He exhibits no edema or tenderness. Lymphadenopathy:     He has no cervical adenopathy. Neurological: He is alert and oriented to person, place, and time. Skin: Skin is warm and dry. No rash noted. He is not diaphoretic. No erythema. No pallor. Psychiatric: He has a normal mood and affect.  His behavior is normal. Thought content normal       DIAGNOSTICS      Laboratory Testing:  CBC:   Recent Labs     04/20/19  0530   WBC 8.1   HGB 7.9*        BMP:    Recent Labs     04/18/19  0437 04/19/19  0709 04/20/19  0530   * 134* 135   K 3.6* 3.8 3.8   CL 91* 94* 97*   CO2 30 30 30   BUN 3* 3* 3*   CREATININE <0.40* 0.44* 0.48*   GLUCOSE 251* 180* 231*     S. Calcium:  Recent Labs     04/20/19  0530   CALCIUM 7.8*     S. Ionized Calcium:No results for input(s): IONCA in the last 72 hours. S. Magnesium:  Recent Labs     04/18/19 0437   MG 1.6     S. Phosphorus:  Recent Labs     04/18/19 0437   PHOS 2.4*     S. Glucose:  Recent Labs     04/19/19  1611 04/19/19  2038 04/20/19  0713   POCGLU 198* 160* 160*     Glycosylated hemoglobin A1C:   Recent Labs     04/18/19 0437   LABA1C 11.5*     INR:   Recent Labs     04/17/19  2102   INR 1.2     Hepatic functions:   Recent Labs     04/17/19  2102 04/20/19  0530   ALKPHOS 417*   < > 346*   ALT 19   < > 12   AST 63*   < > 42*   PROT 6.9   < > 5.3*   BILITOT 0.56   < > 0.49   BILIDIR 0.35*  --   --    LABALBU 2.7*   < > 1.9*    < > = values in this interval not displayed. Pancreatic functions:  Recent Labs     04/17/19  2343   LACTA 2.7*     S. Lactic Acid:   Recent Labs     04/17/19  2343   LACTA 2.7*     Cardiac enzymes:No results for input(s): CKTOTAL, CKMB, CKMBINDEX, TROPONINI in the last 72 hours. BNP:No results for input(s): BNP in the last 72 hours. Lipid profile: No results for input(s): CHOL, TRIG, HDL, LDLCALC in the last 72 hours.     Invalid input(s): LDL  Blood Gases: No results found for: PH, PCO2, PO2, HCO3, O2SAT  Thyroid functions:   Lab Results   Component Value Date    TSH 1.33 11/27/2016        Imaging/Diagonstics:  Xr Chest Standard (2 Vw)    Result Date: 4/17/2019  EXAMINATION: TWO VIEWS OF THE CHEST 4/17/2019 9:18 pm COMPARISON: 04/07/2019 HISTORY: ORDERING SYSTEM PROVIDED HISTORY: chest pain, tachycardia TECHNOLOGIST PROVIDED HISTORY: chest pain, tachycardia Ordering Physician Provided Reason for Exam: Pt or dislocation. There is a small suprapatellar joint effusion. Chondrocalcinosis is noted. Small patellar osteophytes are present. No acute osseous abnormality. Small bilateral suprapatellar joint effusions. Xr Knee Right (3 Views)    Result Date: 4/2/2019  EXAMINATION: 3 XRAY VIEWS OF THE RIGHT KNEE; 3 XRAY VIEWS OF THE LEFT KNEE 4/1/2019 9:10 pm COMPARISON: 10/06/2011 HISTORY: ORDERING SYSTEM PROVIDED HISTORY: fall TECHNOLOGIST PROVIDED HISTORY: fall Ordering Physician Provided Reason for Exam: pain after a fall Acuity: Unknown Type of Exam: Initial FINDINGS: Right knee: There is no evidence of acute fracture or dislocation. There is a small suprapatellar joint effusion. Chondrocalcinosis is noted. Small patellar osteophytes are noted. Left knee: There is no evidence of acute fracture or dislocation. There is a small suprapatellar joint effusion. Chondrocalcinosis is noted. Small patellar osteophytes are present. No acute osseous abnormality. Small bilateral suprapatellar joint effusions. Ct Head Wo Contrast    Result Date: 4/1/2019  EXAMINATION: CT OF THE HEAD WITHOUT CONTRAST  4/1/2019 9:10 pm TECHNIQUE: CT of the head was performed without the administration of intravenous contrast. Dose modulation, iterative reconstruction, and/or weight based adjustment of the mA/kV was utilized to reduce the radiation dose to as low as reasonably achievable. COMPARISON: None. HISTORY: ORDERING SYSTEM PROVIDED HISTORY: fall TECHNOLOGIST PROVIDED HISTORY: Ordering Physician Provided Reason for Exam: S/P Fall - Patient fell and hit left side of face. Patient states slight headache. Acuity: Acute Type of Exam: Initial Relevant Medical/Surgical History: No relevant surgical hx to area. Hx- HBP, Diabetes (Type II), and MI FINDINGS: BRAIN/VENTRICLES: The ventricles and sulci are diffusely enlarged. Low attenuation is seen in the periventricular and subcortical white matter.   No acute intracranial hemorrhage or acute infarct is identified. ORBITS: The visualized portion of the orbits demonstrate no acute abnormality. SINUSES: The visualized paranasal sinuses and mastoid air cells demonstrate no acute abnormality. SOFT TISSUES/SKULL:  No acute abnormality of the visualized skull or soft tissues. No acute intracranial abnormality. Ct Abdomen Pelvis W Iv Contrast Additional Contrast? None    Result Date: 4/17/2019  EXAMINATION: CT OF THE ABDOMEN AND PELVIS WITH CONTRAST 4/17/2019 10:45 pm TECHNIQUE: CT of the abdomen and pelvis was performed with the administration of intravenous contrast. Multiplanar reformatted images are provided for review. Dose modulation, iterative reconstruction, and/or weight based adjustment of the mA/kV was utilized to reduce the radiation dose to as low as reasonably achievable. COMPARISON: 10/03/2018 HISTORY: ORDERING SYSTEM PROVIDED HISTORY: sepsis, source unknown. history of metastatic colon cancer to liver TECHNOLOGIST PROVIDED HISTORY: Ordering Physician Provided Reason for Exam: sepsis, source unknown. Hx - metastatic colon cancer to liver. Patient states weakness x1 day prior. Acuity: Unknown Type of Exam: Unknown Relevant Medical/Surgical History: Surgical hx - Hernia repair. Hx - HBP, Diabetes, Carcinoma, Colorectal cancer, stage IV  Hepatic metastases . FINDINGS: Mild elevation of the left hemidiaphragm. Coronary calcifications versus coronary stents identified. Trace pericardial thickening/pericardial fluid. There are bibasilar opacities suggestive of bibasilar atelectasis. These findings most confluent within the left lower lobe. There are innumerable hypodensities identified throughout the right and left hepatic lobes which have progressed from prior examination. A suggestion of a central ill-defined area of hypoattenuation which may represent areas of posterior related changes/fibrosis versus perfusion differences. Mild periportal edema involving the liver. There is moderate gallbladder wall thickening. No definite cholelithiasis. Spleen, adrenal glands, kidneys, pancreas unremarkable. Mild retained stool throughout the colon without evidence of obstruction. The appendix unremarkable. No suspicious mesenteric or retroperitoneal adenopathy. A few lymph nodes identified within the port appendix region noted of uncertain clinical significance borderline size. No free air or free fluid. Tiny fat containing right inguinal hernia. The bladder is mildly distended. Prostate is unremarkable. No loculated rim enhancing fluid collection to suggest abscess formation. Central disc protrusion T7-T8. Moderate changes L4-5 and L5-S1. No suspicious osseous lesions. Moderate gallbladder wall thickening nonspecific. Please correlate with exam findings. Innumerable hepatic lesions suggestive of metastatic disease as on prior imaging studies. Bibasilar consolidative changes favor bibasilar atelectasis. Us Gallbladder Ruq    Result Date: 4/18/2019  EXAMINATION: RIGHT UPPER QUADRANT ULTRASOUND 4/18/2019 9:13 am COMPARISON: CT abdomen and pelvis April 17, 2019. HISTORY: ORDERING SYSTEM PROVIDED HISTORY: wall thickening on CT and elevated alk phos FINDINGS: LIVER:  Cirrhotic liver with heterogeneous echotexture. No  intrahepatic bile duct dilatation noted. BILIARY SYSTEM:  Diffuse gallbladder wall thickening. Questionable adherent stone versus polyp measuring 8 mm. No internal color Doppler vascularity. Negative Iyer sign. No pericholecystic fluid. Common bile duct is within normal limits measuring 5.3 mm. PANCREAS:  Visualized portions of the pancreas are unremarkable. OTHER: Trace ascites is noted within the right upper quadrant. 1.  Diffuse gallbladder wall thickening. No pericholecystic fluid or biliary obstruction. The finding may related to the patient's underlying cirrhosis or possibly chronic cholecystitis.   If acute cholecystitis is of clinical concern, 2, 2018 and may represent osseous metastatic disease. Multilevel degenerative disc disease. Right IJ port with tip at the superior atrial caval junction. 1.  No acute pulmonary thromboembolic disease. No pulmonary hypertension or right ventricular strain. 2.  No pulmonary mass or consolidation. No intrathoracic lymphadenopathy. 3.  Partially visualized area of ill-defined hypoattenuation in the medial left hepatic lobe is not well evaluated. Underlying malignancy is not excluded. 4.  Mixed osseous lucency and sclerosis in the T2 and T4 vertebral bodies and posterior elements is increased compared to CT chest done July 2, 2018 and may represent osseous metastatic disease. ASSESSMENT     Patient Active Problem List   Diagnosis    Hypertension, essential    Mixed hyperlipidemia    Knee pain    Type 2 diabetes mellitus with hyperglycemia, without long-term current use of insulin (HCC)    HTN (hypertension)    Hypercholesteremia    Insomnia    Knee pain, bilateral    Depression    Cervical sprain    Lumbar sprain    Back pain    Opioid dependence (HCC)    Degeneration of lumbar or lumbosacral intervertebral disc    Chronic midline low back pain without sciatica    ST elevation myocardial infarction (STEMI) ([address]    S/P PTCA  BMS to RCA - 11/26/2016 Dr. Natty Crawford    CAD S/P percutaneous coronary angioplasty    Carcinoma [facility])    Type 2 myocardial infarction without ST elevation ([address]    Rectal bleed    Hepatic metastases ([address]    Colorectal cancer, stage IV ([address]    Blood loss anemia    Iron deficiency anemia due to chronic blood loss    Neuropathy due to chemotherapeutic drug ([address]    Right hip pain    Radiation cystitis    Septic shock (HCC)    Severe malnutrition ([address]       PLAN      1. Marvis Osler, MD  [address], [address] [place] [address].    Phone [phone]   Fax: [phone]  Answering Service: [phone] each by In Vitro route daily As needed. 8/24/17  Yes Brenna Sicard, MD   Insulin Pen Needle 29G X 12.7MM MISC 1 each by Does not apply route daily 5/4/17  Yes Rebecca Pond MD   naloxone [facility]) 4 MG/0.1ML LIQD nasal spray Take 4 mg by mouth    Historical Provider, MD   nitroGLYCERIN (NITROSTAT) 0.4 MG SL tablet Place 1 tablet under the tongue every 5 minutes as needed for Chest pain Dissolve 1 tablet under tongue for chest pain and repeat every 5 min up to max of 3 total doses. If no relief after 3 doses call 911 11/29/16   ASIM Scherer - CNP       ALLERGIES      Morphine    SOCIAL HISTORY       reports that he has never smoked. He has never used smokeless tobacco. He reports that he drank alcohol. He reports that he does not use drugs. FAMILY HISTORY      family history includes Heart Disease in his mother; High Blood Pressure in his mother; High Cholesterol in his father; Stroke in his mother. REVIEW OF SYSTEMS      · Constitutional: Negative for weight loss  · Eyes: Negative for visual changes, diplopia, scleral icterus. · ENT: Negative for Headaches, hearing loss, vertigo, mouth sores, sore throat. · Cardiovascular: Negative for lightheadedness/orthostatic symptoms ,chest pain, dyspnea on exertion, palpitations or loss of consciousness. · Respiratory: Negative for cough or wheezing, sputum production, hemoptysis, pleuritic pain. · Gastrointestinal: Negative for nausea/vomiting, change in bowel habits, abdominal pain, dysphagia/appetite loss, hematemesis, blood in stools. · Genitourinary:Negative for change in bladder habits, dysuria, trouble voiding, hematuria. · Musculoskeletal: Negative for gait disturbance, weakness, joint complaints. · Integumentary: Negative for rash, pruritis.   · Neurological: Negative for headache, dizziness, change in muscle strength, numbness/tingling, change in gait, balance, coordination,   · Endocrine: negative for temperature intolerance, excessive thirst, fluid intake, or urination, tremor. · Hematologic/Lymphatic: negative for abnormal bruising or bleeding, blood clots, swollen lymph nodes. PHYSICAL EXAM      BP (!) 105/58   Pulse 76   Temp 97.4 °F (36.3 °C) (Oral)   Resp 16   Ht 6' 3\" (1.905 m)   Wt 193 lb 5.5 oz (87.7 kg)   SpO2 100%   BMI 24.17 kg/m²      · General appearance: well nourished  · HEENT: Head: Normocephalic, no lesions, without obvious abnormality. · Lungs: clear to auscultation bilaterally  · Heart: regular rate and rhythm, S1, S2 normal, no murmur, click, rub or gallop  · Abdomen: soft, non-tender; bowel sounds normal; no masses,  no organomegaly  · Extremities: extremities normal, atraumatic, no cyanosis or edema  · Neurological: Gait normal. Reflexes normal and symmetric. Sensation grossly normal  · Skin - no rash, no lump   · Eye no icterus no redness  · Psych-normal affect   · NEURO-no limb weakness  No facial droop  · Lymphatic system-no lymphadenopathy no splenomegaly     DIAGNOSTICS      Laboratory Testing:  CBC:   Recent Labs     04/20/19  0530   WBC 8.1   HGB 7.9*        BMP:    Recent Labs     04/18/19  0437 04/19/19  0709 04/20/19  0530   * 134* 135   K 3.6* 3.8 3.8   CL 91* 94* 97*   CO2 30 30 30   BUN 3* 3* 3*   CREATININE <0.40* 0.44* 0.48*   GLUCOSE 251* 180* 231*     S. Calcium:  Recent Labs     04/20/19  0530   CALCIUM 7.8*     S. Ionized Calcium:No results for input(s): IONCA in the last 72 hours. S. Magnesium:  Recent Labs     04/18/19  0437   MG 1.6     S.  Phosphorus:  Recent Labs     04/18/19  0437   PHOS 2.4*     S. Glucose:  Recent Labs     04/19/19  1611 04/19/19  2038 04/20/19  0713   POCGLU 198* 160* 160*     Glycosylated hemoglobin A1C:   Recent Labs     04/18/19  0437   LABA1C 11.5*     INR:   Recent Labs     04/17/19  2102   INR 1.2     Hepatic functions:   Recent Labs     04/17/19  2102  04/20/19  0530   ALKPHOS 417*   < > 346*   ALT 19   < > 12   AST 63*   < > 42*   PROT 6.9   < > 5.3*   BILITOT 0.56   < > 0.49   BILIDIR 0.35*  --   --    LABALBU 2.7*   < > 1.9*    < > = values in this interval not displayed. Pancreatic functions:  Recent Labs     04/17/19  2343   LACTA 2.7*     S. Lactic Acid:   Recent Labs     04/17/19  2343   LACTA 2.7*     Cardiac enzymes:No results for input(s): CKTOTAL, CKMB, CKMBINDEX, TROPONINI in the last 72 hours. BNP:No results for input(s): BNP in the last 72 hours. Lipid profile: No results for input(s): CHOL, TRIG, HDL, LDLCALC in the last 72 hours. Invalid input(s): LDL  Blood Gases: No results found for: PH, PCO2, PO2, HCO3, O2SAT  Thyroid functions:   Lab Results   Component Value Date    TSH 1.33 11/27/2016        Imaging/Diagonstics:  Xr Chest Standard (2 Vw)    Result Date: 4/17/2019  EXAMINATION: TWO VIEWS OF THE CHEST 4/17/2019 9:18 pm COMPARISON: 04/07/2019 HISTORY: ORDERING SYSTEM PROVIDED HISTORY: chest pain, tachycardia TECHNOLOGIST PROVIDED HISTORY: chest pain, tachycardia Ordering Physician Provided Reason for Exam: Pt states chest pain and fatigue x 1 day Acuity: Acute Type of Exam: Initial Additional signs and symptoms: Pt states chest pain and fatigue x 1 day FINDINGS: Right internal jugular chest port with catheter tip at the cavoatrial junction. Limited due to low lung volumes. Normal heart size and pulmonary vasculature. Left lateral basilar opacity with corresponding finding on the lateral view may represent atelectasis or pneumonia. No pneumothorax. Limited due to low lung volumes. Left lateral basilar opacity may represent atelectasis or pneumonia. Consider repeating study with full inspiration. Xr Chest Standard (2 Vw)    Result Date: 4/8/2019  EXAMINATION: TWO VIEWS OF THE CHEST 4/8/2019 12:18 am COMPARISON: None.  HISTORY: ORDERING SYSTEM PROVIDED HISTORY: Chest pain sob TECHNOLOGIST PROVIDED HISTORY: Chest pain sob Ordering Physician Provided Reason for Exam: chest pain, sob Acuity: Acute Type of Exam: Initial Additional signs and symptoms: Chest pain (tingling), shortness of breath, hx liver ca, neck ca, and colon ca Relevant Medical/Surgical History: Chest pain (tingling), shortness of breath, hx liver ca, neck ca, and colon ca FINDINGS: A right-sided jase catheter is present with its tip in the SVC. No infiltrate or consolidation or effusion is identified. The heart size is normal.     No acute abnormality detected. Xr Knee Left (3 Views)    Result Date: 4/2/2019  EXAMINATION: 3 XRAY VIEWS OF THE RIGHT KNEE; 3 XRAY VIEWS OF THE LEFT KNEE 4/1/2019 9:10 pm COMPARISON: 10/06/2011 HISTORY: ORDERING SYSTEM PROVIDED HISTORY: fall TECHNOLOGIST PROVIDED HISTORY: fall Ordering Physician Provided Reason for Exam: pain after a fall Acuity: Unknown Type of Exam: Initial FINDINGS: Right knee: There is no evidence of acute fracture or dislocation. There is a small suprapatellar joint effusion. Chondrocalcinosis is noted. Small patellar osteophytes are noted. Left knee: There is no evidence of acute fracture or dislocation. There is a small suprapatellar joint effusion. Chondrocalcinosis is noted. Small patellar osteophytes are present. No acute osseous abnormality. Small bilateral suprapatellar joint effusions. Xr Knee Right (3 Views)    Result Date: 4/2/2019  EXAMINATION: 3 XRAY VIEWS OF THE RIGHT KNEE; 3 XRAY VIEWS OF THE LEFT KNEE 4/1/2019 9:10 pm COMPARISON: 10/06/2011 HISTORY: ORDERING SYSTEM PROVIDED HISTORY: fall TECHNOLOGIST PROVIDED HISTORY: fall Ordering Physician Provided Reason for Exam: pain after a fall Acuity: Unknown Type of Exam: Initial FINDINGS: Right knee: There is no evidence of acute fracture or dislocation. There is a small suprapatellar joint effusion. Chondrocalcinosis is noted. Small patellar osteophytes are noted. Left knee: There is no evidence of acute fracture or dislocation. There is a small suprapatellar joint effusion. Chondrocalcinosis is noted.   Small patellar osteophytes are present. No acute osseous abnormality. Small bilateral suprapatellar joint effusions. Ct Head Wo Contrast    Result Date: 4/1/2019  EXAMINATION: CT OF THE HEAD WITHOUT CONTRAST  4/1/2019 9:10 pm TECHNIQUE: CT of the head was performed without the administration of intravenous contrast. Dose modulation, iterative reconstruction, and/or weight based adjustment of the mA/kV was utilized to reduce the radiation dose to as low as reasonably achievable. COMPARISON: None. HISTORY: ORDERING SYSTEM PROVIDED HISTORY: fall TECHNOLOGIST PROVIDED HISTORY: Ordering Physician Provided Reason for Exam: S/P Fall - Patient fell and hit left side of face. Patient states slight headache. Acuity: Acute Type of Exam: Initial Relevant Medical/Surgical History: No relevant surgical hx to area. Hx- HBP, Diabetes (Type II), and MI FINDINGS: BRAIN/VENTRICLES: The ventricles and sulci are diffusely enlarged. Low attenuation is seen in the periventricular and subcortical white matter. No acute intracranial hemorrhage or acute infarct is identified. ORBITS: The visualized portion of the orbits demonstrate no acute abnormality. SINUSES: The visualized paranasal sinuses and mastoid air cells demonstrate no acute abnormality. SOFT TISSUES/SKULL:  No acute abnormality of the visualized skull or soft tissues. No acute intracranial abnormality. Ct Abdomen Pelvis W Iv Contrast Additional Contrast? None    Result Date: 4/17/2019  EXAMINATION: CT OF THE ABDOMEN AND PELVIS WITH CONTRAST 4/17/2019 10:45 pm TECHNIQUE: CT of the abdomen and pelvis was performed with the administration of intravenous contrast. Multiplanar reformatted images are provided for review. Dose modulation, iterative reconstruction, and/or weight based adjustment of the mA/kV was utilized to reduce the radiation dose to as low as reasonably achievable.  COMPARISON: 10/03/2018 HISTORY: ORDERING SYSTEM PROVIDED HISTORY: sepsis, source unknown. history of metastatic colon cancer to liver TECHNOLOGIST PROVIDED HISTORY: Ordering Physician Provided Reason for Exam: sepsis, source unknown. Hx - metastatic colon cancer to liver. Patient states weakness x1 day prior. Acuity: Unknown Type of Exam: Unknown Relevant Medical/Surgical History: Surgical hx - Hernia repair. Hx - HBP, Diabetes, Carcinoma, Colorectal cancer, stage IV  Hepatic metastases . FINDINGS: Mild elevation of the left hemidiaphragm. Coronary calcifications versus coronary stents identified. Trace pericardial thickening/pericardial fluid. There are bibasilar opacities suggestive of bibasilar atelectasis. These findings most confluent within the left lower lobe. There are innumerable hypodensities identified throughout the right and left hepatic lobes which have progressed from prior examination. A suggestion of a central ill-defined area of hypoattenuation which may represent areas of posterior related changes/fibrosis versus perfusion differences. Mild periportal edema involving the liver. There is moderate gallbladder wall thickening. No definite cholelithiasis. Spleen, adrenal glands, kidneys, pancreas unremarkable. Mild retained stool throughout the colon without evidence of obstruction. The appendix unremarkable. No suspicious mesenteric or retroperitoneal adenopathy. A few lymph nodes identified within the port appendix region noted of uncertain clinical significance borderline size. No free air or free fluid. Tiny fat containing right inguinal hernia. The bladder is mildly distended. Prostate is unremarkable. No loculated rim enhancing fluid collection to suggest abscess formation. Central disc protrusion T7-T8. Moderate changes L4-5 and L5-S1. No suspicious osseous lesions. Moderate gallbladder wall thickening nonspecific. Please correlate with exam findings. Innumerable hepatic lesions suggestive of metastatic disease as on prior imaging studies.  Bibasilar consolidative changes Acuity: Acute Type of Exam: Initial Relevant Medical/Surgical History: h/o colorectal ca, liver ca, diabetes, htn FINDINGS: Pulmonary Arteries: Pulmonary arteries are adequately opacified for evaluation. No evidence of intraluminal filling defect to suggest pulmonary embolism. Main pulmonary artery is normal in caliber. Mediastinum: No evidence of mediastinal lymphadenopathy. The heart and pericardium demonstrate no acute abnormality. Coronary artery calcifications. There is no acute abnormality of the thoracic aorta. Lungs/pleura: The lungs are without acute process. No focal consolidation or pulmonary edema. Mild bilateral dependent and basilar subsegmental atelectasis. No evidence of pleural effusion or pneumothorax. Upper Abdomen: Partially visualized area of ill-defined hypoattenuation in the medial left hepatic lobe is not well evaluated. Soft Tissues/Bones: Mixed osseous lucency and sclerosis in the T2 and T4 vertebral bodies and posterior elements is increased compared to CT chest done July 2, 2018 and may represent osseous metastatic disease. Multilevel degenerative disc disease. Right IJ port with tip at the superior atrial caval junction. 1.  No acute pulmonary thromboembolic disease. No pulmonary hypertension or right ventricular strain. 2.  No pulmonary mass or consolidation. No intrathoracic lymphadenopathy. 3.  Partially visualized area of ill-defined hypoattenuation in the medial left hepatic lobe is not well evaluated. Underlying malignancy is not excluded. 4.  Mixed osseous lucency and sclerosis in the T2 and T4 vertebral bodies and posterior elements is increased compared to CT chest done July 2, 2018 and may represent osseous metastatic disease.          ASSESSMENT     Patient Active Problem List   Diagnosis    Hypertension, essential    Mixed hyperlipidemia    Knee pain    Type 2 diabetes mellitus with hyperglycemia, without long-term current use of insulin (HCC)    HTN abd pain better   hida today   ? Pain from liver mets     cont ivf     no fever    Cont atb   iv   wbc nkl    hb nl         MD MADDIE Porter[address], [address] [place] [address].    Phone [phone]   Fax: [phone]  Answering Service: [phone]

## 2019-04-21 NOTE — PROGRESS NOTES
Physical Therapy    DATE: 2019    NAME: Binat Tyson  MRN: [id number]   : 1960    Patient not seen this date for Physical Therapy due to:  [] Blood transfusion in progress  [] Hemodialysis  []  Patient Declined  [] Spine Precautions   [] Strict Bedrest  [] Surgery/ Procedure  [] Testing      [x] Other Patient stated wants to sleep not be bothered added \"I can get up on my own\"      [] PT being discontinued at this time. Patient independent. No further needs. [] PT being discontinued at this time as the patient has been transferred to palliative care. No further needs.     Ted Singh, PTA

## 2019-04-21 NOTE — PROGRESS NOTES
Pharmacy Vancomycin Consult     Vancomycin Day: 5  Current Dosin mg every 8 hours    Temp max:  38.1 C    Recent Labs     19  0530 19  0553   BUN 3* 3*       Recent Labs     19  0530 19  0553   CREATININE 0.48* <0.40*       Recent Labs     19  0530 19  0553   WBC 8.1 14.4*         Intake/Output Summary (Last 24 hours) at 2019 1424  Last data filed at 2019 1739  Gross per 24 hour   Intake 2450 ml   Output --   Net 2450 ml       Culture Date      Source                       Results  See micro, pending  NG x 4 days    Ht Readings from Last 1 Encounters:   19 6' 3\" (1.905 m)        Wt Readings from Last 1 Encounters:   19 193 lb 5.5 oz (87.7 kg)         Body mass index is 24.17 kg/m². CrCl cannot be calculated (This lab value cannot be used to calculate CrCl because it is not a number: <0.40). Trough: 7.4     Assessment/Plan:  Trough subtherapeutic  Increase vancomycin 1500 mg every 6 hours to better target trough closer to [address].  [address] 2019 2:34 PM

## 2019-04-21 NOTE — CARE COORDINATION
ONGOING DISCHARGE PLAN:    Spoke with patient regarding discharge plan and patient confirms that plan is still to discharge to home with no needs   Per PCP   hida pos acute cholecytitis      will plan lap yahaira this admission    metaststic colon ca    Clinically better   cont  Iv atb/ ivf   na 133        Will continue to follow for additional discharge needs.     Electronically signed by Carlos Galindo RN on 4/21/2019 at 1:02 PM

## 2019-04-21 NOTE — PROGRESS NOTES
[address] [facility].    PROGRESS NOTE             Date:   4/21/2019  Patient name:  Kely Wiggins  Date of admission:  4/17/2019  8:54 PM  MRN:   [id number]  YOB: 1960    CHIEF COMPLAINT     History Obtained From:  Patient and chart review. HISTORY OF PRESENT ILLNESS      The patient is a 66 y.o.  male who is [facility] patient is a 66 y.o.  male with known history of Stage 4 Colorectal cancer and hepatic metastasis, who presents with fatigue and generalized weakness. According to patient,symptoms have been progressively worsening over the past several days, prompting him to come to the ED for evaluation. Symptoms are associated with epigastric abdominal tenderness. At time of exam, patient denies chest pain and urinary frequency, though those symptoms were present upon arrival.  Denies fever, chills, cough,  nausea, vomiting, diarrhea, and urinary symptoms. Denies sore throat and skin rashes/wounds. There are no aggravating or alleviating factors. Symptoms are reported as constant and progressively worsening     4/20   stable   no fever   pain better       4/20     HIDA NOTED   RUQ PAIN  BETTER    PAST MEDICAL HISTORY       has a past medical history of Back pain, CAD S/P percutaneous coronary angioplasty, Carcinoma ([address] Colon cancer ([address] Degeneration of lumbar or lumbosacral intervertebral disc, Depression, Diabetes mellitus ([address] H/O cardiac catheterization, Hepatic metastases ([address] Hyperlipidemia, Hypertension, Knee pain, and MI, old. PAST SURGICAL HISTORY       has a past surgical history that includes Tunneled venous port placement (Right); pr sigmoidoscopy flx w/rmvl foreign body (N/A, 5/25/2017); hernia repair; Coronary angioplasty with stent (11/2016); pr sigmoidoscopy flx w/rmvl foreign body (12/8/2017);  Endoscopic ultrasonography, GI (N/A, 12/8/2017); and liver biopsy (Right, 08/23/2018). HOME MEDICATIONS        Prior to Admission medications    Medication Sig Start Date End Date Taking? Authorizing Provider   oxyCODONE HCl (OXY-IR) 10 MG immediate release tablet Take 20 mg by mouth 3 times daily as needed for Pain. Yes Historical Provider, MD   diphenhydrAMINE (BENADRYL) 25 MG capsule Take 1 capsule by mouth nightly as needed for Sleep 4/8/19  Yes Michael Holcomb DO   fentaNYL (DURAGESIC) 50 MCG/HR Place 1 patch onto the skin every 72 hours. 4/20/19  Yes Historical Provider, MD   potassium chloride (KLOR-CON M) 20 MEQ extended release tablet Take 1 tablet by mouth daily 10/9/18  Yes Sam Mejia MD   metoprolol tartrate (LOPRESSOR) 25 MG tablet Take 1 tablet by mouth daily  3/12/18  Yes Historical Provider, MD   Glucose Blood (BLOOD GLUCOSE TEST STRIPS) STRP 1 each by In Vitro route daily As needed. 8/24/17  Yes Sam Mejia MD   Insulin Pen Needle 29G X 12.7MM MISC 1 each by Does not apply route daily 5/4/17  Yes [facility], MD   naloxone [facility]) 4 MG/0.1ML LIQD nasal spray Take 4 mg by mouth    Historical Provider, MD   nitroGLYCERIN (NITROSTAT) 0.4 MG SL tablet Place 1 tablet under the tongue every 5 minutes as needed for Chest pain Dissolve 1 tablet under tongue for chest pain and repeat every 5 min up to max of 3 total doses. If no relief after 3 doses call 911 11/29/16   Bruno Mohs, APRN - CNP       ALLERGIES      Morphine    SOCIAL HISTORY       reports that he has never smoked. He has never used smokeless tobacco. He reports that he drank alcohol. He reports that he does not use drugs. FAMILY HISTORY      family history includes Heart Disease in his mother; High Blood Pressure in his mother; High Cholesterol in his father; Stroke in his mother. REVIEW OF SYSTEMS        Review of Systems   Constitutional: Positive for fatigue. Negative for chills, diaphoresis and fever. HENT: Negative for congestion and sore throat. Respiratory: Negative for cough, shortness of breath and wheezing. Cardiovascular: Negative for chest pain, palpitations and leg swelling. Gastrointestinal: Positive for abdominal pain (epigastric). Negative for constipation, diarrhea, nausea and vomiting. Genitourinary: Negative for dysuria, frequency and urgency. Musculoskeletal: Negative for back pain and myalgias. Skin: Negative for rash. Neurological: Positive for weakness (generalized). Negative for dizziness and headaches. Psychiatric/Behavioral: The patient is not nervous/anxious. ·     PHYSICAL EXAM      BP (!) 108/48   Pulse 98   Temp 98.5 °F (36.9 °C) (Oral)   Resp 17   Ht 6' 3\" (1.905 m)   Wt 193 lb 5.5 oz (87.7 kg)   SpO2 95%   BMI 24.17 kg/m²    Exam   Constitutional: He is oriented to person, place, and time. He appears well-developed and well-nourished. No distress. HENT:   Head: Normocephalic and atraumatic. Mouth/Throat: Oropharynx is clear and moist.   Eyes: Pupils are equal, round, and reactive to light. Conjunctivae and EOM are normal.   Neck: Normal range of motion. Neck supple. No tracheal deviation present. Cardiovascular: Normal rate, regular rhythm, normal heart sounds and intact distal pulses. Exam reveals no gallop and no friction rub. No murmur heard. Pulmonary/Chest: Effort normal and breath sounds normal. No respiratory distress. He has no wheezes. He has no rales. He exhibits no tenderness. Abdominal: Soft. Bowel sounds are normal. He exhibits no distension. There is tenderness (epigastric ). There is no guarding. Musculoskeletal: Normal range of motion. He exhibits no edema or tenderness. Lymphadenopathy:     He has no cervical adenopathy. Neurological: He is alert and oriented to person, place, and time. Skin: Skin is warm and dry. No rash noted. He is not diaphoretic. No erythema. No pallor. Psychiatric: He has a normal mood and affect.  His behavior is normal. Thought content normal       DIAGNOSTICS      Laboratory Testing:  CBC:   Recent Labs     04/21/19  0553   WBC 14.4*   HGB 8.4*        BMP:    Recent Labs     04/19/19  0709 04/20/19  0530 04/21/19  0553   * 135 133*   K 3.8 3.8 3.7   CL 94* 97* 94*   CO2 30 30 29   BUN 3* 3* 3*   CREATININE 0.44* 0.48* <0.40*   GLUCOSE 180* 231* 140*     S. Calcium:  Recent Labs     04/21/19  0553   CALCIUM 7.3*     S. Ionized Calcium:No results for input(s): IONCA in the last 72 hours. S. Magnesium:  No results for input(s): MG in the last 72 hours. S. Phosphorus:  No results for input(s): PHOS in the last 72 hours. S. Glucose:  Recent Labs     04/20/19  2057 04/21/19  0712 04/21/19  1152   POCGLU 228* 146* 204*     Glycosylated hemoglobin A1C:   No results for input(s): LABA1C in the last 72 hours. INR:   No results for input(s): INR in the last 72 hours. Hepatic functions:   Recent Labs     04/21/19  0553   ALKPHOS 463*   ALT 15   AST 74*   PROT 5.5*   BILITOT 0.61   LABALBU 2.1*     Pancreatic functions:  No results for input(s): LACTA, AMYLASE in the last 72 hours. S. Lactic Acid:   No results for input(s): LACTA in the last 72 hours. Cardiac enzymes:No results for input(s): CKTOTAL, CKMB, CKMBINDEX, TROPONINI in the last 72 hours. BNP:No results for input(s): BNP in the last 72 hours. Lipid profile: No results for input(s): CHOL, TRIG, HDL, LDLCALC in the last 72 hours.     Invalid input(s): LDL  Blood Gases: No results found for: PH, PCO2, PO2, HCO3, O2SAT  Thyroid functions:   Lab Results   Component Value Date    TSH 1.33 11/27/2016        Imaging/Diagonstics:  Xr Chest Standard (2 Vw)    Result Date: 4/17/2019  EXAMINATION: TWO VIEWS OF THE CHEST 4/17/2019 9:18 pm COMPARISON: 04/07/2019 HISTORY: ORDERING SYSTEM PROVIDED HISTORY: chest pain, tachycardia TECHNOLOGIST PROVIDED HISTORY: chest pain, tachycardia Ordering Physician Provided Reason for Exam: Pt states chest pain and fatigue x 1 day Acuity: Acute Type of effusion. Chondrocalcinosis is noted. Small patellar osteophytes are present. No acute osseous abnormality. Small bilateral suprapatellar joint effusions. Xr Knee Right (3 Views)    Result Date: 4/2/2019  EXAMINATION: 3 XRAY VIEWS OF THE RIGHT KNEE; 3 XRAY VIEWS OF THE LEFT KNEE 4/1/2019 9:10 pm COMPARISON: 10/06/2011 HISTORY: ORDERING SYSTEM PROVIDED HISTORY: fall TECHNOLOGIST PROVIDED HISTORY: fall Ordering Physician Provided Reason for Exam: pain after a fall Acuity: Unknown Type of Exam: Initial FINDINGS: Right knee: There is no evidence of acute fracture or dislocation. There is a small suprapatellar joint effusion. Chondrocalcinosis is noted. Small patellar osteophytes are noted. Left knee: There is no evidence of acute fracture or dislocation. There is a small suprapatellar joint effusion. Chondrocalcinosis is noted. Small patellar osteophytes are present. No acute osseous abnormality. Small bilateral suprapatellar joint effusions. Ct Head Wo Contrast    Result Date: 4/1/2019  EXAMINATION: CT OF THE HEAD WITHOUT CONTRAST  4/1/2019 9:10 pm TECHNIQUE: CT of the head was performed without the administration of intravenous contrast. Dose modulation, iterative reconstruction, and/or weight based adjustment of the mA/kV was utilized to reduce the radiation dose to as low as reasonably achievable. COMPARISON: None. HISTORY: ORDERING SYSTEM PROVIDED HISTORY: fall TECHNOLOGIST PROVIDED HISTORY: Ordering Physician Provided Reason for Exam: S/P Fall - Patient fell and hit left side of face. Patient states slight headache. Acuity: Acute Type of Exam: Initial Relevant Medical/Surgical History: No relevant surgical hx to area. Hx- HBP, Diabetes (Type II), and MI FINDINGS: BRAIN/VENTRICLES: The ventricles and sulci are diffusely enlarged. Low attenuation is seen in the periventricular and subcortical white matter. No acute intracranial hemorrhage or acute infarct is identified.  ORBITS: The definite cholelithiasis. Spleen, adrenal glands, kidneys, pancreas unremarkable. Mild retained stool throughout the colon without evidence of obstruction. The appendix unremarkable. No suspicious mesenteric or retroperitoneal adenopathy. A few lymph nodes identified within the port appendix region noted of uncertain clinical significance borderline size. No free air or free fluid. Tiny fat containing right inguinal hernia. The bladder is mildly distended. Prostate is unremarkable. No loculated rim enhancing fluid collection to suggest abscess formation. Central disc protrusion T7-T8. Moderate changes L4-5 and L5-S1. No suspicious osseous lesions. Moderate gallbladder wall thickening nonspecific. Please correlate with exam findings. Innumerable hepatic lesions suggestive of metastatic disease as on prior imaging studies. Bibasilar consolidative changes favor bibasilar atelectasis. Us Gallbladder Ruq    Result Date: 4/18/2019  EXAMINATION: RIGHT UPPER QUADRANT ULTRASOUND 4/18/2019 9:13 am COMPARISON: CT abdomen and pelvis April 17, 2019. HISTORY: ORDERING SYSTEM PROVIDED HISTORY: wall thickening on CT and elevated alk phos FINDINGS: LIVER:  Cirrhotic liver with heterogeneous echotexture. No  intrahepatic bile duct dilatation noted. BILIARY SYSTEM:  Diffuse gallbladder wall thickening. Questionable adherent stone versus polyp measuring 8 mm. No internal color Doppler vascularity. Negative Iyer sign. No pericholecystic fluid. Common bile duct is within normal limits measuring 5.3 mm. PANCREAS:  Visualized portions of the pancreas are unremarkable. OTHER: Trace ascites is noted within the right upper quadrant. 1.  Diffuse gallbladder wall thickening. No pericholecystic fluid or biliary obstruction. The finding may related to the patient's underlying cirrhosis or possibly chronic cholecystitis. If acute cholecystitis is of clinical concern, further evaluation with HIDA scan is recommended. disease. Multilevel degenerative disc disease. Right IJ port with tip at the superior atrial caval junction. 1.  No acute pulmonary thromboembolic disease. No pulmonary hypertension or right ventricular strain. 2.  No pulmonary mass or consolidation. No intrathoracic lymphadenopathy. 3.  Partially visualized area of ill-defined hypoattenuation in the medial left hepatic lobe is not well evaluated. Underlying malignancy is not excluded. 4.  Mixed osseous lucency and sclerosis in the T2 and T4 vertebral bodies and posterior elements is increased compared to CT chest done July 2, 2018 and may represent osseous metastatic disease. ASSESSMENT     Patient Active Problem List   Diagnosis    Hypertension, essential    Mixed hyperlipidemia    Knee pain    Type 2 diabetes mellitus with hyperglycemia, without long-term current use of insulin (HCC)    HTN (hypertension)    Hypercholesteremia    Insomnia    Knee pain, bilateral    Depression    Cervical sprain    Lumbar sprain    Back pain    Opioid dependence (HCC)    Degeneration of lumbar or lumbosacral intervertebral disc    Chronic midline low back pain without sciatica    ST elevation myocardial infarction (STEMI) ([address]    S/P PTCA  BMS to RCA - 11/26/2016 Dr. Christina Jones    CAD S/P percutaneous coronary angioplasty    Carcinoma [facility])    Type 2 myocardial infarction without ST elevation ([address]    Rectal bleed    Hepatic metastases ([address]    Colorectal cancer, stage IV ([address]    Blood loss anemia    Iron deficiency anemia due to chronic blood loss    Neuropathy due to chemotherapeutic drug ([address]    Right hip pain    Radiation cystitis    Septic shock (HCC)    Severe malnutrition ([address]       PLAN      1. Yeison Leon MD  [address]    Phone [phone]   Fax: [phone]  Answering Service: [phone]      [place] [address]. [address]    PROGRESS NOTE             Date:   4/21/2019  Patient name:  Kathe Weber  Date of admission:  4/17/2019  8:54 PM  MRN:   [id number]  YOB: 1960    CHIEF COMPLAINT     History Obtained From:  Patient and chart review. HISTORY OF PRESENT ILLNESS      The patient is a 66 y.o.  male who is admitted to the hospital for    [facility] [address]       has a past medical history of Back pain, CAD S/P percutaneous coronary angioplasty, [address] ([facility] [address] Colon cancer ([facility] Degeneration of lumbar or lumbosacral intervertebral disc, Depression, Diabetes mellitus ([facility] [facility] H/O cardiac catheterization, Hepatic metastases ([facility] [address] [facility] Hyperlipidemia, Hypertension, Knee pain, and MI, old. PAST SURGICAL HISTORY       has a past surgical history that includes Tunneled venous port placement (Right); pr sigmoidoscopy flx w/rmvl foreign body (N/A, 5/25/2017); hernia repair; Coronary angioplasty with stent (11/2016); pr sigmoidoscopy flx w/rmvl foreign body (12/8/2017); Endoscopic ultrasonography, GI (N/A, 12/8/2017); and liver biopsy (Right, 08/23/2018). HOME MEDICATIONS        Prior to Admission medications    Medication Sig Start Date End Date Taking? Authorizing Provider   oxyCODONE HCl (OXY-IR) 10 MG immediate release tablet Take 20 mg by mouth 3 times daily as needed for Pain. Yes Historical Provider, MD   diphenhydrAMINE (BENADRYL) 25 MG capsule Take 1 capsule by mouth nightly as needed for Sleep 4/8/19  Yes Gunjan eMhta DO   fentaNYL (DURAGESIC) 50 MCG/HR Place 1 patch onto the skin every 72 hours. 4/20/19  Yes Historical Provider, MD   potassium chloride (KLOR-CON M) 20 MEQ extended release tablet Take 1 tablet by mouth daily 10/9/18  Yes Octavia Lee MD   metoprolol tartrate (LOPRESSOR) 25 MG tablet Take 1 tablet by mouth daily  3/12/18  Yes Historical Provider, MD   Glucose Blood (BLOOD GLUCOSE TEST STRIPS) STRP 1 each by In Vitro route daily As needed.  8/24/17  Yes Teja Asher MD   Insulin Pen Needle 29G X 12.7MM MISC 1 each by Does not apply route daily 5/4/17  Yes Quintin Waters MD   naloxone [facility]) 4 MG/0.1ML LIQD nasal spray Take 4 mg by mouth    Historical Provider, MD   nitroGLYCERIN (NITROSTAT) 0.4 MG SL tablet Place 1 tablet under the tongue every 5 minutes as needed for Chest pain Dissolve 1 tablet under tongue for chest pain and repeat every 5 min up to max of 3 total doses. If no relief after 3 doses call 911 11/29/16   ASIM Estrada CNP       ALLERGIES      Morphine    SOCIAL HISTORY       reports that he has never smoked. He has never used smokeless tobacco. He reports that he drank alcohol. He reports that he does not use drugs. FAMILY HISTORY      family history includes Heart Disease in his mother; High Blood Pressure in his mother; High Cholesterol in his father; Stroke in his mother. REVIEW OF SYSTEMS      · Constitutional: Negative for weight loss  · Eyes: Negative for visual changes, diplopia, scleral icterus. · ENT: Negative for Headaches, hearing loss, vertigo, mouth sores, sore throat. · Cardiovascular: Negative for lightheadedness/orthostatic symptoms ,chest pain, dyspnea on exertion, palpitations or loss of consciousness. · Respiratory: Negative for cough or wheezing, sputum production, hemoptysis, pleuritic pain. · Gastrointestinal: Negative for nausea/vomiting, change in bowel habits, abdominal pain, dysphagia/appetite loss, hematemesis, blood in stools. · Genitourinary:Negative for change in bladder habits, dysuria, trouble voiding, hematuria. · Musculoskeletal: Negative for gait disturbance, weakness, joint complaints. · Integumentary: Negative for rash, pruritis.   · Neurological: Negative for headache, dizziness, change in muscle strength, numbness/tingling, change in gait, balance, coordination,   · Endocrine: negative for temperature intolerance, excessive thirst, fluid intake, or urination, tremor. · Hematologic/Lymphatic: negative for abnormal bruising or bleeding, blood clots, swollen lymph nodes. PHYSICAL EXAM      BP (!) 108/48   Pulse 98   Temp 98.5 °F (36.9 °C) (Oral)   Resp 17   Ht 6' 3\" (1.905 m)   Wt 193 lb 5.5 oz (87.7 kg)   SpO2 95%   BMI 24.17 kg/m²      · General appearance: well nourished  · HEENT: Head: Normocephalic, no lesions, without obvious abnormality. · Lungs: clear to auscultation bilaterally  · Heart: regular rate and rhythm, S1, S2 normal, no murmur, click, rub or gallop  · Abdomen: soft, non-tender; bowel sounds normal; no masses,  no organomegaly  · Extremities: extremities normal, atraumatic, no cyanosis or edema  · Neurological: Gait normal. Reflexes normal and symmetric. Sensation grossly normal  · Skin - no rash, no lump   · Eye no icterus no redness  · Psych-normal affect   · NEURO-no limb weakness  No facial droop  · Lymphatic system-no lymphadenopathy no splenomegaly     DIAGNOSTICS      Laboratory Testing:  CBC:   Recent Labs     04/21/19  0553   WBC 14.4*   HGB 8.4*        BMP:    Recent Labs     04/19/19  0709 04/20/19  0530 04/21/19  0553   * 135 133*   K 3.8 3.8 3.7   CL 94* 97* 94*   CO2 30 30 29   BUN 3* 3* 3*   CREATININE 0.44* 0.48* <0.40*   GLUCOSE 180* 231* 140*     S. Calcium:  Recent Labs     04/21/19  0553   CALCIUM 7.3*     S. Ionized Calcium:No results for input(s): IONCA in the last 72 hours. S. Magnesium:  No results for input(s): MG in the last 72 hours. S. Phosphorus:  No results for input(s): PHOS in the last 72 hours. S. Glucose:  Recent Labs     04/20/19 2057 04/21/19  0712 04/21/19  1152   POCGLU 228* 146* 204*     Glycosylated hemoglobin A1C:   No results for input(s): LABA1C in the last 72 hours. INR:   No results for input(s): INR in the last 72 hours.   Hepatic functions:   Recent Labs     04/21/19  0553   ALKPHOS 463*   ALT 15   AST 74*   PROT 5.5*   BILITOT 0.61   LABALBU 2.1*     Pancreatic functions:  No Chest pain (tingling), shortness of breath, hx liver ca, neck ca, and colon ca FINDINGS: A right-sided jase catheter is present with its tip in the SVC. No infiltrate or consolidation or effusion is identified. The heart size is normal.     No acute abnormality detected. Xr Knee Left (3 Views)    Result Date: 4/2/2019  EXAMINATION: 3 XRAY VIEWS OF THE RIGHT KNEE; 3 XRAY VIEWS OF THE LEFT KNEE 4/1/2019 9:10 pm COMPARISON: 10/06/2011 HISTORY: ORDERING SYSTEM PROVIDED HISTORY: fall TECHNOLOGIST PROVIDED HISTORY: fall Ordering Physician Provided Reason for Exam: pain after a fall Acuity: Unknown Type of Exam: Initial FINDINGS: Right knee: There is no evidence of acute fracture or dislocation. There is a small suprapatellar joint effusion. Chondrocalcinosis is noted. Small patellar osteophytes are noted. Left knee: There is no evidence of acute fracture or dislocation. There is a small suprapatellar joint effusion. Chondrocalcinosis is noted. Small patellar osteophytes are present. No acute osseous abnormality. Small bilateral suprapatellar joint effusions. Xr Knee Right (3 Views)    Result Date: 4/2/2019  EXAMINATION: 3 XRAY VIEWS OF THE RIGHT KNEE; 3 XRAY VIEWS OF THE LEFT KNEE 4/1/2019 9:10 pm COMPARISON: 10/06/2011 HISTORY: ORDERING SYSTEM PROVIDED HISTORY: fall TECHNOLOGIST PROVIDED HISTORY: fall Ordering Physician Provided Reason for Exam: pain after a fall Acuity: Unknown Type of Exam: Initial FINDINGS: Right knee: There is no evidence of acute fracture or dislocation. There is a small suprapatellar joint effusion. Chondrocalcinosis is noted. Small patellar osteophytes are noted. Left knee: There is no evidence of acute fracture or dislocation. There is a small suprapatellar joint effusion. Chondrocalcinosis is noted. Small patellar osteophytes are present. No acute osseous abnormality. Small bilateral suprapatellar joint effusions.      Ct Head Wo Contrast    Result Date: 4/1/2019  EXAMINATION: CT OF THE HEAD WITHOUT CONTRAST  4/1/2019 9:10 pm TECHNIQUE: CT of the head was performed without the administration of intravenous contrast. Dose modulation, iterative reconstruction, and/or weight based adjustment of the mA/kV was utilized to reduce the radiation dose to as low as reasonably achievable. COMPARISON: None. HISTORY: ORDERING SYSTEM PROVIDED HISTORY: fall TECHNOLOGIST PROVIDED HISTORY: Ordering Physician Provided Reason for Exam: S/P Fall - Patient fell and hit left side of face. Patient states slight headache. Acuity: Acute Type of Exam: Initial Relevant Medical/Surgical History: No relevant surgical hx to area. Hx- HBP, Diabetes (Type II), and MI FINDINGS: BRAIN/VENTRICLES: The ventricles and sulci are diffusely enlarged. Low attenuation is seen in the periventricular and subcortical white matter. No acute intracranial hemorrhage or acute infarct is identified. ORBITS: The visualized portion of the orbits demonstrate no acute abnormality. SINUSES: The visualized paranasal sinuses and mastoid air cells demonstrate no acute abnormality. SOFT TISSUES/SKULL:  No acute abnormality of the visualized skull or soft tissues. No acute intracranial abnormality. Ct Abdomen Pelvis W Iv Contrast Additional Contrast? None    Result Date: 4/17/2019  EXAMINATION: CT OF THE ABDOMEN AND PELVIS WITH CONTRAST 4/17/2019 10:45 pm TECHNIQUE: CT of the abdomen and pelvis was performed with the administration of intravenous contrast. Multiplanar reformatted images are provided for review. Dose modulation, iterative reconstruction, and/or weight based adjustment of the mA/kV was utilized to reduce the radiation dose to as low as reasonably achievable. COMPARISON: 10/03/2018 HISTORY: ORDERING SYSTEM PROVIDED HISTORY: sepsis, source unknown. history of metastatic colon cancer to liver TECHNOLOGIST PROVIDED HISTORY: Ordering Physician Provided Reason for Exam: sepsis, source unknown.  Hx - metastatic colon cancer to liver. Patient states weakness x1 day prior. Acuity: Unknown Type of Exam: Unknown Relevant Medical/Surgical History: Surgical hx - Hernia repair. Hx - HBP, Diabetes, Carcinoma, Colorectal cancer, stage IV  Hepatic metastases . FINDINGS: Mild elevation of the left hemidiaphragm. Coronary calcifications versus coronary stents identified. Trace pericardial thickening/pericardial fluid. There are bibasilar opacities suggestive of bibasilar atelectasis. These findings most confluent within the left lower lobe. There are innumerable hypodensities identified throughout the right and left hepatic lobes which have progressed from prior examination. A suggestion of a central ill-defined area of hypoattenuation which may represent areas of posterior related changes/fibrosis versus perfusion differences. Mild periportal edema involving the liver. There is moderate gallbladder wall thickening. No definite cholelithiasis. Spleen, adrenal glands, kidneys, pancreas unremarkable. Mild retained stool throughout the colon without evidence of obstruction. The appendix unremarkable. No suspicious mesenteric or retroperitoneal adenopathy. A few lymph nodes identified within the port appendix region noted of uncertain clinical significance borderline size. No free air or free fluid. Tiny fat containing right inguinal hernia. The bladder is mildly distended. Prostate is unremarkable. No loculated rim enhancing fluid collection to suggest abscess formation. Central disc protrusion T7-T8. Moderate changes L4-5 and L5-S1. No suspicious osseous lesions. Moderate gallbladder wall thickening nonspecific. Please correlate with exam findings. Innumerable hepatic lesions suggestive of metastatic disease as on prior imaging studies. Bibasilar consolidative changes favor bibasilar atelectasis.      Us Gallbladder Ruq    Result Date: 4/18/2019  EXAMINATION: RIGHT UPPER QUADRANT ULTRASOUND 4/18/2019 9:13 am COMPARISON: CT abdomen and pelvis April 17, 2019. HISTORY: ORDERING SYSTEM PROVIDED HISTORY: wall thickening on CT and elevated alk phos FINDINGS: LIVER:  Cirrhotic liver with heterogeneous echotexture. No  intrahepatic bile duct dilatation noted. BILIARY SYSTEM:  Diffuse gallbladder wall thickening. Questionable adherent stone versus polyp measuring 8 mm. No internal color Doppler vascularity. Negative Iyer sign. No pericholecystic fluid. Common bile duct is within normal limits measuring 5.3 mm. PANCREAS:  Visualized portions of the pancreas are unremarkable. OTHER: Trace ascites is noted within the right upper quadrant. 1.  Diffuse gallbladder wall thickening. No pericholecystic fluid or biliary obstruction. The finding may related to the patient's underlying cirrhosis or possibly chronic cholecystitis. If acute cholecystitis is of clinical concern, further evaluation with HIDA scan is recommended. 2. Questionable adherent stone versus polyp measuring 8 mm. The former is favored. If no surgical intervention is performed, repeat ultrasound in 1 year is recommended. 3. Trace perihepatic ascites. Ct Chest Pulmonary Embolism W Contrast    Result Date: 4/8/2019  EXAMINATION: CTA OF THE CHEST 4/8/2019 12:31 am TECHNIQUE: CTA of the chest was performed after the administration of intravenous contrast.  Multiplanar reformatted images are provided for review. MIP images are provided for review. Dose modulation, iterative reconstruction, and/or weight based adjustment of the mA/kV was utilized to reduce the radiation dose to as low as reasonably achievable. COMPARISON: CT chest done July 2, 2018.  HISTORY: ORDERING SYSTEM PROVIDED HISTORY: CP, elevated d-dimer TECHNOLOGIST PROVIDED HISTORY: Ordering Physician Provided Reason for Exam: chest pain, elevated d dimer Acuity: Acute Type of Exam: Initial Relevant Medical/Surgical History: h/o colorectal ca, liver ca, diabetes, htn FINDINGS: Pulmonary pain    [facility]io[facility] dependence [facility] [address]    Degeneration of lumbar or lumbosacral intervertebral disc    Chronic midline low back pain without sciatica    ST elevation myocardial infarction (STEMI) ([address]    S/P PTCA  BMS to RCA - 11/26/2016 Dr. Lan Gonzalez    CAD S/P percutaneous coronary angioplasty    [facility])    Type 2 myocardial infarction without ST elevation ([address]    Rectal bleed    Hepatic metastases ([facility] [address]    Colorectal cancer, stage IV ([facility] [address]    Blood loss anemia    Iron deficiency anemia due to chronic blood loss    Neuropathy due to chemotherapeutic drug ([address]    Right hip pain    Radiation cystitis    Septic shock (HCC)    Severe malnutrition (HCC)      Principal Problem:    Septic shock (HCC)  Active Problems:    Type 2 diabetes mellitus with hyperglycemia, without long-term current use of insulin (HCC)    Hepatic metastases (HCC)    Colorectal cancer, stage IV ([address]  Resolved Problems:    * No resolved hospital problems. *           PLAN        Septic Shock  -unknown sorurce  -Lactic Acid 4.4 on arrival, then 2.7  --Recheck @0200  -Fluid bolus administered in ED  -IV antibiotics initiated  --Vancomycin and Zosyn; continue on admission  -Blood culture x2  -urine culture pending; UA negative for UTI  -check procalcitonin     Stage IV colorectal cancer with liver mets  -States that he is seeking out new treatment option in [facility]  -AlkPhos 417; AST 63  -Gall bladder wall thickening on CT  --Check GBUS in am  -Continue home pain meds - Verified via [facility]     Diabetes Mellitus  -not currently taking any diabetic meds  --Reports that his home glucometer is broken  -POCT ac and hs with sliding scale coverage  -Check HgbA1c in am            4/19   improved   no fever   wbc nl    cont ivf and atb   cx neg    gb tolerating    needs hida    suergery to see     4/20     abd pain better   hida today   ?  Pain from liver mets     cont ivf     no fever    Cont atb   iv   wbc nkl    hb nl     4/21   hida pos

## 2019-04-21 NOTE — PLAN OF CARE
Problem: Falls - Risk of:  Goal: Will remain free from falls  Description  Will remain free from falls  4/21/2019 0405 by Raul Arriaga RN  Outcome: Ongoing     Problem: Pain:  Description  Pain management should include both nonpharmacologic and pharmacologic interventions.   Goal: Pain level will decrease  Description  Pain level will decrease  4/21/2019 0405 by Raul Arriaga RN  Outcome: Ongoing     Problem: Musculor/Skeletal Functional Status  Goal: Absence of falls  4/21/2019 0405 by Raul Arriaga RN  Outcome: Ongoing

## 2019-04-21 NOTE — PROGRESS NOTES
[facility])    Hepatic metastases ([facility] [address]    Colorectal cancer, stage IV ([facility] [address]    Severe malnutrition ([facility] [address]  Resolved Problems:    * No resolved hospital problems. *    1. Metastatic colon cancer with liver metastasis  2. Acute versus chronic cholecystitis with minimal symptoms  3. Discussed surgical intervention with the patient. He is unsure if he wants surgery. He is concerned regarding finding cancer in the liver or the gallbladder as opposed to cholecystitis  4.  I will discuss with the primary team and with the patient and his spouse tomorrow

## 2019-04-21 NOTE — PROGRESS NOTES
Therapies:  · Oral Diet Orders: Carb Control 4 Carbs/Meal   · Oral Diet intake: %, 51-75%  · Anthropometric Measures:  · Ht: 6' 3\" (190.5 cm)   · Current Body Wt: 193 lb (87.5 kg)  · Admission Body Wt: 193 lb (87.5 kg)  · Usual Body Wt: 214 lb (97.1 kg)  · % Weight Change:  ,  9.8% loss in 2 weeks  · Ideal Body Wt: 196 lb (88.9 kg), % Ideal Body 98%  · BMI Classification: BMI 18.5 - 24.9 Normal Weight    Nutrition Interventions:   Continue current diet  Continued Inpatient Monitoring, Education Completed    Nutrition Evaluation:   · Evaluation: Progressing toward goals   · Goals: PO intakes are greater than 75% at meals    · Monitoring: Nutrition Progression, Meal Intake, Weight, Pertinent Labs, Supplement Intake, Monitor Bowel Function, Chewing/Swallowing, Skin Integrity, I&O      Ami Jarad ESQUIVEL RPRABHAKAR, L.D,  Clinical Dietitian  Cell # [phone]  Office # [phone]

## 2019-04-22 NOTE — CARE COORDINATION
Sarah Sarabia was evaluated today and a DME order was entered for a wheeled walker with seat because he requires this to successfully complete daily living tasks of personal cares. A wheeled walker with seat is necessary due to the patient's unsteady gait, upper body weakness, inability to  and ambulation device, ambulating only short distances by pushing a walker, and the need to sit for a short time before resuming ambulation. These tasks cannot be completed with a lesser ambulation device such as a cane, crutch, or standard walker. The need for this equipment was discussed with the patient and he understands and is in agreement.

## 2019-04-22 NOTE — CARE COORDINATION
SPOKE WITH DR. Sigifredo Parada. PT. OK FOR DC. NO ABX. PT.  A1C 11.5. PT. WAS ON METFORMIN AND LANTUS HOWEVER HE SAID HE WASN'T TAKING ALL THE TIME. REITERATED THE NEED FOR MED COMPLIANCE. SPOKE WITH [facility] AND THEY DO HAVE REFILLS ON HIS MEDS.  Electronically signed by Nick Marie RN on 4/22/2019 at 5:02 PM

## 2019-04-22 NOTE — CARE COORDINATION
DISCHARGE PLANNING NOTE:    Prescription for rollator and facesheet were faxed to [facility] [place] at [phone]. Awaiting completed face to face and will fax that once obtained.      Electronically signed by Jesus Sánchez RN on 4/22/2019 at 1:32 PM

## 2019-04-22 NOTE — PROGRESS NOTES
Pt not interested in surg  No fever  earing ok  Minimal tnederness in gall bladder area  Pt wants to contulst with his hem onc before surg  Repeat wbc  Discuss  With dr Den Spangler  4/22/2019

## 2019-04-22 NOTE — PROGRESS NOTES
Pharmacy Vancomycin Consult     Vancomycin Day: 6  Current Dosin    Temp max:  36.2C    Recent Labs     19  0553 19  0753   BUN 3* 4*       Recent Labs     19  0553 19  0753   CREATININE <0.40* 0.46*       Recent Labs     19  0553 19  0753   WBC 14.4* 12.7*         Intake/Output Summary (Last 24 hours) at 2019 1008  Last data filed at 2019 1732  Gross per 24 hour   Intake 1500 ml   Output --   Net 1500 ml       Culture Date      Source                       Results   Blood x 2 pending   urine no growth    Ht Readings from Last 1 Encounters:   19 6' 3\" (1.905 m)        Wt Readings from Last 1 Encounters:   19 193 lb 5.5 oz (87.7 kg)         Body mass index is 24.17 kg/m². Estimated Creatinine Clearance: 214 mL/min (A) (based on SCr of 0.46 mg/dL (L)). Trough: 20.1 on  at 0753    Assessment/Plan:  Trough level 4 hours after last dose. True trough likely a little lower. Target trough level around 15. Will lengthen interval to every 8 hours. Will continue to follow.    Teto Aivna, Pharm D, Jose 12  2019 10:13 AM

## 2019-04-22 NOTE — PROGRESS NOTES
D/W pt input per Dr Rashid Mitchell re: surgical intervention; pt says he and his family have discussed gong to [facility] or [facility] for a second opinion

## 2019-04-22 NOTE — CARE COORDINATION
DISCHARGE PLANNING NOTE:    Patient declines need for VNS services. I stated that I needed to get him scheduled for a follow-up appt within 1 week with his PCP. He states he has a PCP at [facility], a lady, but cannot think of her name right now. He also follows at [facility] with his cancer doctor and he states he will call and schedule his own follow-up appointment within 1 week as he does not know his schedule at this time. We are awaiting scripts for rollator. ORANGE HEADER - 33%. Patient refuses follow-up appt be scheduled. Will continue to follow along.      Electronically signed by Shahzad Ruffin RN on 4/22/2019 at 11:10 AM

## 2019-04-22 NOTE — PROGRESS NOTES
General Surgery Progress Note            PATIENT NAME: Michelle Presley     TODAY'S DATE: 4/22/2019, 8:42 AM    SUBJECTIVE/OBJECTIVE:      VITALS:  /79   Pulse 83   Temp 97.4 °F (36.3 °C) (Oral)   Resp 17   Ht 6' 3\" (1.905 m)   Wt 193 lb 5.5 oz (87.7 kg)   SpO2 99%   BMI 24.17 kg/m²      Patient seen and examined  A&O X 3. Non-toxic  RRR  LCTAB  Abdomen soft, ND, NT other than \"ache\" per patient. Tolerating diet. +BS. +BM  No peripheral edema    INTAKE/OUTPUT:      Intake/Output Summary (Last 24 hours) at 4/22/2019 0842  Last data filed at 4/21/2019 1732  Gross per 24 hour   Intake 1500 ml   Output --   Net 1500 ml       CBC with Differential:    Lab Results   Component Value Date    WBC 12.7 04/22/2019    RBC 3.60 04/22/2019    HGB 8.5 04/22/2019    HCT 27.9 04/22/2019     04/22/2019    MCV 77.4 04/22/2019    MCH 23.6 04/22/2019    MCHC 30.4 04/22/2019    RDW 20.7 04/22/2019    LYMPHOPCT 4 04/17/2019    MONOPCT 9 04/17/2019    BASOPCT 0 04/17/2019    MONOSABS 1.09 04/17/2019    LYMPHSABS 0.48 04/17/2019    EOSABS 0.00 04/17/2019    BASOSABS 0.00 04/17/2019    DIFFTYPE NOT REPORTED 04/17/2019     CMP:    Lab Results   Component Value Date     04/22/2019    K 3.7 04/22/2019    CL 95 04/22/2019    CO2 30 04/22/2019    BUN 4 04/22/2019    CREATININE 0.46 04/22/2019    GFRAA >60 04/22/2019    LABGLOM >60 04/22/2019    GLUCOSE 237 04/22/2019    GLUCOSE 195 04/26/2012    PROT 5.9 04/22/2019    LABALBU 2.0 04/22/2019    CALCIUM 7.9 04/22/2019    BILITOT 0.77 04/22/2019    ALKPHOS 521 04/22/2019    AST 46 04/22/2019    ALT 14 04/22/2019           ASSESSMENT/PLAN:    Principal Problem:    Septic shock (HCC)  Active Problems:    Type 2 diabetes mellitus with hyperglycemia, without long-term current use of insulin (HCC)    Hepatic metastases (HCC)    Colorectal cancer, stage IV (HCC)    Severe malnutrition (HCC)  Resolved Problems:    * No resolved hospital problems.  *    Chronic cholecystitis

## 2019-04-22 NOTE — PROGRESS NOTES
Physical Therapy  DATE: 2019    NAME: Dunia Thurman  MRN: [id number]   : 1960    Patient not seen this date for Physical Therapy due to:  [] Blood transfusion in progress  [] Hemodialysis  []  Patient Declined  [] Spine Precautions   [] Strict Bedrest  [] Surgery/ Procedure  [] Testing      [x] Other: Pt in shower at 1400; RN Luann Leblanc aware, reports pt may be D/Cing today. [] PT being discontinued at this time. Patient independent. No further needs. [] PT being discontinued at this time as the patient has been transferred to palliative care. No further needs.     Dani Oliver, PTA

## 2019-04-22 NOTE — PROGRESS NOTES
[address] [facility]    INPATIENT OCCUPATIONAL THERAPY  PROGRESS NOTE  Date: 2019  Patient Name: Michelle Presley      Room:   MRN: [id number]    : 1960  (66 y.o.) Gender: male     Discharge Recommendations:  Home with assist PRN  Equipment Needed: Yes  Other: 4 WW    Referring Practitioner: Dr. Tu Guillory  Diagnosis: Septic shock  General  Chart Reviewed: Yes  Patient assessed for rehabilitation services?: Yes  Additional Pertinent Hx: Metastatic colon cancer  Family / Caregiver Present: No  Referring Practitioner: Dr. Tu Guillory  Diagnosis: Septic shock    Restrictions  Restrictions/Precautions: Fall Risk, Up as Tolerated(NPO)  Required Braces or Orthoses?: No      Subjective  Subjective: \"I'll have to sit on the sidewalk. \"  unless provided with 4 WW, nsg was notified of pt's request and she will notify d/c planner             Objective  Cognition  Overall Cognitive Status: WFL  Bed mobility  Supine to Sit: Modified independent(HOB up, using handrail and momentum from leg movement)  Sit to Supine: Modified independent  Scooting: Modified independent  Balance  Sitting Balance: Independent  Standing Balance: Independent  Standing Balance  Time: 12 min, 3 min x 2  Activity: static stand while assisted to remove tape marks on abdomen, amb to bathroom and toileting, back to bed  Comment: good balance, manages IV pole indep      ADL  Feeding: Independent  Grooming: Independent  UE Bathing: Independent  LE Bathing: Independent  UE Dressing: Independent  LE Dressing: Independent  Toileting: Independent     Transfers  Stand Pivot Transfers: Independent  Sit to stand: Independent  Stand to sit:  Independent                             Assessment     Activity Tolerance: Patient Tolerated treatment well                Patient Education:  Patient Education: Trina Mcduffie  Method: explanation       Outcome: acknowledged understanding     Plan     Continue POC    Goals  Short term goals  Time Frame for Short term goals: By discharge  Short term goal 1: Pt will perform BADLs with SUP and Fair safety awareness. Short term goal 2: Pt will perform functional mobility and transfers with SUP, least restrictive mobility device, and Fair safety awareness. Short term goal 3: Pt will V/D Fair understanding of AE/DME/modified techniques and safety for increased IND with self-care and mobility.     OT Individual Minutes  Time In: 1240  Time Out: 9066  Minutes: 29      Electronically signed by Nancy Arreola OT on 4/22/19 at 4:30 PM

## 2019-04-22 NOTE — PLAN OF CARE
Problem: Falls - Risk of:  Goal: Will remain free from falls  Description  Will remain free from falls  Outcome: Met This Shift  Goal: Absence of physical injury  Description  Absence of physical injury  Outcome: Met This Shift     Problem: Pain:  Goal: Pain level will decrease  Description  Pain level will decrease  Outcome: Met This Shift  Goal: Control of acute pain  Description  Control of acute pain  Outcome: Met This Shift  Goal: Control of chronic pain  Description  Control of chronic pain  Outcome: Met This Shift     Problem: Musculor/Skeletal Functional Status  Goal: Highest potential functional level  Outcome: Met This Shift  Goal: Absence of falls  Outcome: Met This Shift     Problem: Nutrition  Goal: Optimal nutrition therapy  4/21/2019 2128 by Kemal Orozco RN  Outcome: Met This Shift  4/21/2019 1527 by Manoj Lakhani RD, LD  Outcome: Ongoing

## 2019-04-22 NOTE — PLAN OF CARE
Problem: Falls - Risk of:  Goal: Will remain free from falls  Description  Will remain free from falls  4/22/2019 0351 by Tiff Nurse, RN  Outcome: Ongoing  Note:   Patient remains free of falls during this shift. Proper fall precautions remain in place. Will continue to monitor. Problem: Pain:  Goal: Pain level will decrease  Description  Pain level will decrease  4/22/2019 0351 by Tiff Nurse, RN  Outcome: Ongoing  Note:   Patient's pain has been well controlled throughout the entire shift, please see MAR.

## 2019-04-23 NOTE — DISCHARGE SUMMARY
patellar osteophytes are present. No acute osseous abnormality. Small bilateral suprapatellar joint effusions. Xr Knee Right (3 Views)    Result Date: 4/2/2019  EXAMINATION: 3 XRAY VIEWS OF THE RIGHT KNEE; 3 XRAY VIEWS OF THE LEFT KNEE 4/1/2019 9:10 pm COMPARISON: 10/06/2011 HISTORY: ORDERING SYSTEM PROVIDED HISTORY: fall TECHNOLOGIST PROVIDED HISTORY: fall Ordering Physician Provided Reason for Exam: pain after a fall Acuity: Unknown Type of Exam: Initial FINDINGS: Right knee: There is no evidence of acute fracture or dislocation. There is a small suprapatellar joint effusion. Chondrocalcinosis is noted. Small patellar osteophytes are noted. Left knee: There is no evidence of acute fracture or dislocation. There is a small suprapatellar joint effusion. Chondrocalcinosis is noted. Small patellar osteophytes are present. No acute osseous abnormality. Small bilateral suprapatellar joint effusions. Ct Head Wo Contrast    Result Date: 4/1/2019  EXAMINATION: CT OF THE HEAD WITHOUT CONTRAST  4/1/2019 9:10 pm TECHNIQUE: CT of the head was performed without the administration of intravenous contrast. Dose modulation, iterative reconstruction, and/or weight based adjustment of the mA/kV was utilized to reduce the radiation dose to as low as reasonably achievable. COMPARISON: None. HISTORY: ORDERING SYSTEM PROVIDED HISTORY: fall TECHNOLOGIST PROVIDED HISTORY: Ordering Physician Provided Reason for Exam: S/P Fall - Patient fell and hit left side of face. Patient states slight headache. Acuity: Acute Type of Exam: Initial Relevant Medical/Surgical History: No relevant surgical hx to area. Hx- HBP, Diabetes (Type II), and MI FINDINGS: BRAIN/VENTRICLES: The ventricles and sulci are diffusely enlarged. Low attenuation is seen in the periventricular and subcortical white matter. No acute intracranial hemorrhage or acute infarct is identified.  ORBITS: The visualized portion of the orbits demonstrate no acute abnormality. SINUSES: The visualized paranasal sinuses and mastoid air cells demonstrate no acute abnormality. SOFT TISSUES/SKULL:  No acute abnormality of the visualized skull or soft tissues. No acute intracranial abnormality. Ct Abdomen Pelvis W Iv Contrast Additional Contrast? None    Result Date: 4/17/2019  EXAMINATION: CT OF THE ABDOMEN AND PELVIS WITH CONTRAST 4/17/2019 10:45 pm TECHNIQUE: CT of the abdomen and pelvis was performed with the administration of intravenous contrast. Multiplanar reformatted images are provided for review. Dose modulation, iterative reconstruction, and/or weight based adjustment of the mA/kV was utilized to reduce the radiation dose to as low as reasonably achievable. COMPARISON: 10/03/2018 HISTORY: ORDERING SYSTEM PROVIDED HISTORY: sepsis, source unknown. history of metastatic colon cancer to liver TECHNOLOGIST PROVIDED HISTORY: Ordering Physician Provided Reason for Exam: sepsis, source unknown. Hx - metastatic colon cancer to liver. Patient states weakness x1 day prior. Acuity: Unknown Type of Exam: Unknown Relevant Medical/Surgical History: Surgical hx - Hernia repair. Hx - HBP, Diabetes, Carcinoma, Colorectal cancer, stage IV  Hepatic metastases . FINDINGS: Mild elevation of the left hemidiaphragm. Coronary calcifications versus coronary stents identified. Trace pericardial thickening/pericardial fluid. There are bibasilar opacities suggestive of bibasilar atelectasis. These findings most confluent within the left lower lobe. There are innumerable hypodensities identified throughout the right and left hepatic lobes which have progressed from prior examination. A suggestion of a central ill-defined area of hypoattenuation which may represent areas of posterior related changes/fibrosis versus perfusion differences. Mild periportal edema involving the liver. There is moderate gallbladder wall thickening. No definite cholelithiasis.  Spleen, adrenal glands, kidneys, pancreas unremarkable. Mild retained stool throughout the colon without evidence of obstruction. The appendix unremarkable. No suspicious mesenteric or retroperitoneal adenopathy. A few lymph nodes identified within the port appendix region noted of uncertain clinical significance borderline size. No free air or free fluid. Tiny fat containing right inguinal hernia. The bladder is mildly distended. Prostate is unremarkable. No loculated rim enhancing fluid collection to suggest abscess formation. Central disc protrusion T7-T8. Moderate changes L4-5 and L5-S1. No suspicious osseous lesions. Moderate gallbladder wall thickening nonspecific. Please correlate with exam findings. Innumerable hepatic lesions suggestive of metastatic disease as on prior imaging studies. Bibasilar consolidative changes favor bibasilar atelectasis. Us Gallbladder Ruq    Result Date: 4/18/2019  EXAMINATION: RIGHT UPPER QUADRANT ULTRASOUND 4/18/2019 9:13 am COMPARISON: CT abdomen and pelvis April 17, 2019. HISTORY: ORDERING SYSTEM PROVIDED HISTORY: wall thickening on CT and elevated alk phos FINDINGS: LIVER:  Cirrhotic liver with heterogeneous echotexture. No  intrahepatic bile duct dilatation noted. BILIARY SYSTEM:  Diffuse gallbladder wall thickening. Questionable adherent stone versus polyp measuring 8 mm. No internal color Doppler vascularity. Negative Iyer sign. No pericholecystic fluid. Common bile duct is within normal limits measuring 5.3 mm. PANCREAS:  Visualized portions of the pancreas are unremarkable. OTHER: Trace ascites is noted within the right upper quadrant. 1.  Diffuse gallbladder wall thickening. No pericholecystic fluid or biliary obstruction. The finding may related to the patient's underlying cirrhosis or possibly chronic cholecystitis. If acute cholecystitis is of clinical concern, further evaluation with HIDA scan is recommended.  2. Questionable adherent stone versus polyp Disposition: Home    Physician Follow Up:     DO Kwabena Gerardo [address]  [phone]             Requiring Further Evaluation/Follow Up POST HOSPITALIZATION/Incidental Findings:    Diet: cardiac diet    Activity: As tolerated    Instructions to Patient:     Discharge Medications:      Medication List      START taking these medications    glimepiride 4 MG tablet  Commonly known as:  AMARYL  Take 1 tablet by mouth every morning     glucose monitoring kit monitoring kit  1 kit by Does not apply route daily     Lancets Misc  1 each by Does not apply route daily        CONTINUE taking these medications    blood glucose test strips  1 each by In Vitro route daily As needed. diphenhydrAMINE 25 MG capsule  Commonly known as:  BENADRYL  Take 1 capsule by mouth nightly as needed for Sleep     fentaNYL 50 MCG/HR  Commonly known as:  DURAGESIC     insulin glargine 100 UNIT/ML injection vial  Commonly known as:  LANTUS     Insulin Pen Needle 29G X 12.7MM Misc  1 each by Does not apply route daily     NARCAN 4 MG/0.1ML Liqd nasal spray  Generic drug:  naloxone     nitroGLYCERIN 0.4 MG SL tablet  Commonly known as:  NITROSTAT  Place 1 tablet under the tongue every 5 minutes as needed for Chest pain Dissolve 1 tablet under tongue for chest pain and repeat every 5 min up to max of 3 total doses.   If no relief after 3 doses call 911     oxyCODONE HCl 10 MG immediate release tablet  Commonly known as:  OXY-IR     potassium chloride 20 MEQ extended release tablet  Commonly known as:  KLOR-CON M  Take 1 tablet by mouth daily        STOP taking these medications    metFORMIN 500 MG tablet  Commonly known as:  GLUCOPHAGE     oxyCODONE-acetaminophen  MG per tablet  Commonly known as:  PERCOCET        ASK your doctor about these medications    metoprolol tartrate 25 MG tablet  Commonly known as:  LOPRESSOR           Where to Get Your Medications      These medications were sent to [facility], [address], [address]  [address] [facility], [address] [place] [address]    Phone:  [phone]   · glimepiride 4 MG tablet  · glucose monitoring kit monitoring kit  · Lancets Misc         Time Spent on discharge is  35 mins in patient examination, evaluation, counseling as well as medication reconciliation, prescriptions for required medications, discharge plan and follow up. Electronically signed by   Ashley Don MD  4/23/2019  4:25 PM      Thank you Dr. Carmelina Fontana primary care provider on file. for the opportunity to be involved in this patient's care.

## 2019-05-03 PROBLEM — A41.9 SEPTICEMIA (HCC): Status: ACTIVE | Noted: 2019-01-01

## 2019-05-03 NOTE — H&P
[address]      [address] [facility]     HISTORY AND PHYSICAL EXAMINATION            Date:   5/3/2019  Patient name:  Jitendra Moyer  Date of admission:  5/3/2019 11:01 AM  MRN:   [id number]  Account:  [de-identified]  YOB: 1960  PCP:    No primary care provider on file. Room:   2010/2010-01  Code Status:    Prior    Chief Complaint:     Chief Complaint   Patient presents with    Fatigue   Septic shock     History Obtained From:     patient, electronic medical record    History of Present Illness: The patient is a 66 y.o. 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 Patient reports his chronic pain (abdomen, back, extremities for which he has chronic transdermal pain mgmt) is stable without acute exacerbation. Patient is having some epigastric pain that is grossly unchanged since the previous admission (HIDA scan on 4/20 suggesting acute cholecystitis, possible chronic cholecystitis on U/S). Patient denies chest pain, headache, N/V, denies diarrhea, SOB, productive cough, urinary symptoms, denies melena or hematochezia, denies any new rash or extremity swelling. Reports his abdominal swelling/ascites has not increased from baseline.       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      Past SurgicalHistory:     Past Surgical History:   Procedure Laterality Date    CORONARY ANGIOPLASTY WITH STENT PLACEMENT  11/2016    x1 stent    ENDOSCOPIC ULTRASOUND (LOWER) N/A 12/8/2017    RECTAL ENDOSCOPIC ULTRASOUND WITH PATHOLOGY performed by Juanita Cerna [facility] at [facility] LIVER BIOPSY Right 08/23/2018    CT guided Visceral Tissue Ablation    [place] SIGMOIDOSCOPY FLX W/RMVL FOREIGN BODY N/A 5/25/2017    SIGMOIDOSCOPY BIOPSY FLEXIBLE performed by Juanita Cerna MD at [facility]    [place] SIGMOIDOSCOPY FLX W/RMVL FOREIGN BODY  12/8/2017    SIGMOIDOSCOPY BIOPSY FLEXIBLE performed by Juanita Cerna MD at [facility]    TUNNELED VENOUS PORT PLACEMENT Right     right upper chest for cancer treatment     Medications Prior to Admission:        Prior to Admission medications    Medication Sig Start Date End Date Taking? Authorizing Provider   glucose monitoring kit (FREESTYLE) monitoring kit 1 kit by Does not apply route daily 4/22/19   Yessy Connor MD   Lancets MISC 1 each by Does not apply route daily 4/22/19   Yessy Connor MD   insulin glargine (LANTUS) 100 UNIT/ML injection vial Inject 20 Units into the skin nightly    Historical Provider, MD   glimepiride (AMARYL) 4 MG tablet Take 1 tablet by mouth every morning 4/22/19   Yessy Connor MD   oxyCODONE HCl (OXY-IR) 10 MG immediate release tablet Take 20 mg by mouth 3 times daily as needed for Pain. Historical Provider, MD   diphenhydrAMINE (BENADRYL) 25 MG capsule Take 1 capsule by mouth nightly as needed for Sleep 4/8/19   Yudi Mcneal DO   fentaNYL (DURAGESIC) 50 MCG/HR Place 1 patch onto the skin every 72 hours.   4/20/19   Historical Provider, MD   naloxone [facility]) 4 MG/0.1ML LIQD nasal spray Take 4 mg by mouth    Historical Provider, MD   potassium chloride (KLOR-CON M) 20 MEQ extended release tablet Take 1 tablet by mouth daily 10/9/18   Tucker Tello MD   metoprolol tartrate (LOPRESSOR) 25 MG tablet Take 1 tablet by mouth daily  3/12/18   Historical Provider, MD   Glucose Blood (BLOOD GLUCOSE TEST STRIPS) STRP 1 each by In Vitro route daily As needed. 17   Dionisio Fitzpatrick MD   Insulin Pen Needle 29G X 12.7MM MISC 1 each by Does not apply route daily 17   Sherly Downs MD   nitroGLYCERIN (NITROSTAT) 0.4 MG SL tablet Place 1 tablet under the tongue every 5 minutes as needed for Chest pain Dissolve 1 tablet under tongue for chest pain and repeat every 5 min up to max of 3 total doses. If no relief after 3 doses call 911 16   Claudette Fitz, APRN - CNP      Allergies:     Morphine    Social History:     Tobacco:    reports that he has never smoked. He has never used smokeless tobacco.  Alcohol:      reports that he drank alcohol. Drug Use:  reports that he does not use drugs. Family History:     Family History   Problem Relation Age of Onset    Heart Disease Mother     Stroke Mother     High Blood Pressure Mother     High Cholesterol Father      Review of Systems:     Positive and Negative as described in HPI. Physical Exam:   BP (!) 109/55   Pulse 107   Temp 98.2 °F (36.8 °C) (Oral)   Resp 17   Ht 6' 1\" (1.854 m)   Wt 190 lb (86.2 kg)   SpO2 93%   BMI 25.07 kg/m²   Temp (24hrs), Av.1 °F (36.7 °C), Min:98 °F (36.7 °C), Max:98.2 °F (36.8 °C)    No results for input(s): POCGLU in the last 72 hours. No intake or output data in the 24 hours ending 19 1555    Physical Exam   Constitutional: He is oriented to person, place, and time. No distress.    Patient not in distress, but appears very fatigued, +pallor, lying on R side on ED bed, has fentanyl patch on LUE   HENT:   Very poor dentition Hematocrit 25.8 (L) 41 - 53 %    MCV 76.1 (L) 80 - 100 fL    MCH 23.7 (L) 26 - 34 pg    MCHC 31.1 31 - 37 g/dL    RDW 21.8 (H) 11.5 - 14.9 %    Platelets 054 053 - 440 k/uL    MPV 7.3 6.0 - 12.0 fL    NRBC Automated NOT REPORTED per 100 WBC    Differential Type NOT REPORTED     Immature Granulocytes NOT REPORTED 0 %    Absolute Immature Granulocyte NOT REPORTED 0.00 - 0.30 k/uL    WBC Morphology NOT REPORTED     RBC Morphology NOT REPORTED     Platelet Estimate NOT REPORTED     Seg Neutrophils 84 (H) 36 - 66 %    Lymphocytes 2 (L) 24 - 44 %    Monocytes 8 (H) 1 - 7 %    Eosinophils % 0 0 - 4 %    Basophils 0 0 - 2 %    Bands 6 0 - 10 %    Segs Absolute 14.36 (H) 1.3 - 9.1 k/uL    Absolute Lymph # 0.34 (L) 1.0 - 4.8 k/uL    Absolute Mono # 1.37 (H) 0.1 - 1.3 k/uL    Absolute Eos # 0.00 0.0 - 0.4 k/uL    Basophils # 0.00 0.0 - 0.2 k/uL    Absolute Bands # 1.03 (H) 0.0 - 1.0 k/uL    Morphology ANISOCYTOSIS PRESENT     Morphology HYPOCHROMIA PRESENT     Morphology MICROCYTOSIS PRESENT     Morphology SLT POLYCHROMASIA    Comprehensive Metabolic Panel    Collection Time: 05/03/19 11:32 AM   Result Value Ref Range    Glucose 352 (H) 70 - 99 mg/dL    BUN 6 6 - 20 mg/dL    CREATININE 0.53 (L) 0.70 - 1.20 mg/dL    Bun/Cre Ratio NOT REPORTED 9 - 20    Calcium 7.6 (L) 8.6 - 10.4 mg/dL    Sodium 127 (L) 135 - 144 mmol/L    Potassium 4.4 3.7 - 5.3 mmol/L    Chloride 84 (L) 98 - 107 mmol/L    CO2 27 20 - 31 mmol/L    Anion Gap 16 9 - 17 mmol/L    Alkaline Phosphatase 589 (H) 40 - 129 U/L    ALT 22 5 - 41 U/L     (H) <40 U/L    Total Bilirubin 1.38 (H) 0.3 - 1.2 mg/dL    Total Protein 6.6 6.4 - 8.3 g/dL    Alb 2.4 (L) 3.5 - 5.2 g/dL    Albumin/Globulin Ratio NOT REPORTED 1.0 - 2.5    GFR Non-African American >60 >60 mL/min    GFR African American >60 >60 mL/min    GFR Comment          GFR Staging NOT REPORTED    Lipase    Collection Time: 05/03/19 11:32 AM   Result Value Ref Range    Lipase 13 13 - 60 U/L   Lactic Acid Collection Time: 05/03/19 11:32 AM   Result Value Ref Range    Lactic Acid 6.4 (H) 0.5 - 2.2 mmol/L   Troponin    Collection Time: 05/03/19 11:32 AM   Result Value Ref Range    Troponin, High Sensitivity 9 0 - 22 ng/L    Troponin T NOT REPORTED <0.03 ng/mL    Troponin Interp NOT REPORTED    Urinalysis Reflex to Culture    Collection Time: 05/03/19 11:43 AM   Result Value Ref Range    Color, UA ORANGE (A) YELLOW    Turbidity UA CLOUDY (A) CLEAR    Glucose, Ur 1+ (A) NEGATIVE    Bilirubin Urine (A) NEGATIVE     Presumptive positive. Unable to confirm due to unavailability of reagent. Ketones, Urine NEGATIVE NEGATIVE    Specific Gravity, UA 1.019 1.000 - 1.030    Urine Hgb TRACE (A) NEGATIVE    pH, UA 5.0 5.0 - 8.0    Protein, UA 1+ (A) NEGATIVE    Urobilinogen, Urine ELEVATED (A) Normal    Nitrite, Urine NEGATIVE NEGATIVE    Leukocyte Esterase, Urine TRACE (A) NEGATIVE    Urinalysis Comments NOT REPORTED    Microscopic Urinalysis    Collection Time: 05/03/19 11:43 AM   Result Value Ref Range    -          WBC, UA 5 TO 10 /HPF    RBC, UA 0 TO 2 /HPF    Casts UA NOT REPORTED /LPF    Crystals UA NOT REPORTED None /HPF    Epithelial Cells UA 2 TO 5 /HPF    Renal Epithelial, Urine NOT REPORTED 0 /HPF    Bacteria, UA FEW (A) None    Mucus, UA 1+ (A) None    Trichomonas, UA NOT REPORTED None    Amorphous, UA 1+ (A) None    Other Observations UA NOT REPORTED NOT REQ.     Yeast, UA NOT REPORTED None   Lactic Acid    Collection Time: 05/03/19  2:15 PM   Result Value Ref Range    Lactic Acid 4.4 (H) 0.5 - 2.2 mmol/L   Troponin    Collection Time: 05/03/19  2:15 PM   Result Value Ref Range    Troponin, High Sensitivity 7 0 - 22 ng/L    Troponin T NOT REPORTED <0.03 ng/mL    Troponin Interp NOT REPORTED        Imaging/Diagnostics:  Xr Chest Standard (2 Vw)    Result Date: 4/17/2019  EXAMINATION: TWO VIEWS OF THE CHEST 4/17/2019 9:18 pm COMPARISON: 04/07/2019 HISTORY: ORDERING SYSTEM PROVIDED HISTORY: chest pain, tachycardia COMPARISON: 10/03/2018 HISTORY: ORDERING SYSTEM PROVIDED HISTORY: sepsis, source unknown. history of metastatic colon cancer to liver TECHNOLOGIST PROVIDED HISTORY: Ordering Physician Provided Reason for Exam: sepsis, source unknown. Hx - metastatic colon cancer to liver. Patient states weakness x1 day prior. Acuity: Unknown Type of Exam: Unknown Relevant Medical/Surgical History: Surgical hx - Hernia repair. Hx - HBP, Diabetes, Carcinoma, Colorectal cancer, stage IV  Hepatic metastases . FINDINGS: Mild elevation of the left hemidiaphragm. Coronary calcifications versus coronary stents identified. Trace pericardial thickening/pericardial fluid. There are bibasilar opacities suggestive of bibasilar atelectasis. These findings most confluent within the left lower lobe. There are innumerable hypodensities identified throughout the right and left hepatic lobes which have progressed from prior examination. A suggestion of a central ill-defined area of hypoattenuation which may represent areas of posterior related changes/fibrosis versus perfusion differences. Mild periportal edema involving the liver. There is moderate gallbladder wall thickening. No definite cholelithiasis. Spleen, adrenal glands, kidneys, pancreas unremarkable. Mild retained stool throughout the colon without evidence of obstruction. The appendix unremarkable. No suspicious mesenteric or retroperitoneal adenopathy. A few lymph nodes identified within the port appendix region noted of uncertain clinical significance borderline size. No free air or free fluid. Tiny fat containing right inguinal hernia. The bladder is mildly distended. Prostate is unremarkable. No loculated rim enhancing fluid collection to suggest abscess formation. Central disc protrusion T7-T8. Moderate changes L4-5 and L5-S1. No suspicious osseous lesions. Moderate gallbladder wall thickening nonspecific. Please correlate with exam findings.  Innumerable hepatic lesions suggestive of metastatic disease as on prior imaging studies. Bibasilar consolidative changes favor bibasilar atelectasis. Us Gallbladder Ruq    Result Date: 4/18/2019  EXAMINATION: RIGHT UPPER QUADRANT ULTRASOUND 4/18/2019 9:13 am COMPARISON: CT abdomen and pelvis April 17, 2019. HISTORY: ORDERING SYSTEM PROVIDED HISTORY: wall thickening on CT and elevated alk phos FINDINGS: LIVER:  Cirrhotic liver with heterogeneous echotexture. No  intrahepatic bile duct dilatation noted. BILIARY SYSTEM:  Diffuse gallbladder wall thickening. Questionable adherent stone versus polyp measuring 8 mm. No internal color Doppler vascularity. Negative Iyer sign. No pericholecystic fluid. Common bile duct is within normal limits measuring 5.3 mm. PANCREAS:  Visualized portions of the pancreas are unremarkable. OTHER: Trace ascites is noted within the right upper quadrant. 1.  Diffuse gallbladder wall thickening. No pericholecystic fluid or biliary obstruction. The finding may related to the patient's underlying cirrhosis or possibly chronic cholecystitis. If acute cholecystitis is of clinical concern, further evaluation with HIDA scan is recommended. 2. Questionable adherent stone versus polyp measuring 8 mm. The former is favored. If no surgical intervention is performed, repeat ultrasound in 1 year is recommended. 3. Trace perihepatic ascites. Xr Chest Portable    Result Date: 5/3/2019  EXAMINATION: SINGLE XRAY VIEW OF THE CHEST 5/3/2019 11:12 am COMPARISON: April 17, 2019 HISTORY: ORDERING SYSTEM PROVIDED HISTORY: Chest Pain TECHNOLOGIST PROVIDED HISTORY: Chest Pain Ordering Physician Provided Reason for Exam: Pt states today chest pain. History of colon cancer and lung cancer Acuity: Unknown Type of Exam: Unknown FINDINGS: Right chest Port in good position. No kink. Non enlarged heart. Bibasilar atelectasis. Right basilar pleuroparenchymal scarring. No effusion. No pneumothorax.   No airspace consolidation. No focal airspace consolidation. Ct Chest Pulmonary Embolism W Contrast    Result Date: 4/8/2019  EXAMINATION: CTA OF THE CHEST 4/8/2019 12:31 am TECHNIQUE: CTA of the chest was performed after the administration of intravenous contrast.  Multiplanar reformatted images are provided for review. MIP images are provided for review. Dose modulation, iterative reconstruction, and/or weight based adjustment of the mA/kV was utilized to reduce the radiation dose to as low as reasonably achievable. COMPARISON: CT chest done July 2, 2018. HISTORY: ORDERING SYSTEM PROVIDED HISTORY: CP, elevated d-dimer TECHNOLOGIST PROVIDED HISTORY: Ordering Physician Provided Reason for Exam: chest pain, elevated d dimer Acuity: Acute Type of Exam: Initial Relevant Medical/Surgical History: h/o colorectal ca, liver ca, diabetes, htn FINDINGS: Pulmonary Arteries: Pulmonary arteries are adequately opacified for evaluation. No evidence of intraluminal filling defect to suggest pulmonary embolism. Main pulmonary artery is normal in caliber. Mediastinum: No evidence of mediastinal lymphadenopathy. The heart and pericardium demonstrate no acute abnormality. Coronary artery calcifications. There is no acute abnormality of the thoracic aorta. Lungs/pleura: The lungs are without acute process. No focal consolidation or pulmonary edema. Mild bilateral dependent and basilar subsegmental atelectasis. No evidence of pleural effusion or pneumothorax. Upper Abdomen: Partially visualized area of ill-defined hypoattenuation in the medial left hepatic lobe is not well evaluated. Soft Tissues/Bones: Mixed osseous lucency and sclerosis in the T2 and T4 vertebral bodies and posterior elements is increased compared to CT chest done July 2, 2018 and may represent osseous metastatic disease. Multilevel degenerative disc disease. Right IJ port with tip at the superior atrial caval junction.      1.  No acute pulmonary thromboembolic CT abdo   - CBC, CMP daily    - repeat lactic acid q3hrs x 2 more occurrences    2. Stage IV colorectal Ca w/ extensive hepatic mets with chronic anemia   - patient not on chemo/rads since 10/2018, not surgical at this time   - denies bleeding per rectum or dark stools, if Hgb drop, will check for FOBT   - Palliative consult     3. Hyponatremia, asymptomatic    - Same level as previous visit (4/17) - Na 127   - Monitor Na    - Nephrology consult, appreciate recs for fluid management in setting of liver mets w/ third spacing      4. DM2   - reduced dose Lantus (15U)   - ISS low dose, POC glucose     5. Malnutrition   - f/u Albumin   - Monitor and encourage PO intake    DVT ppx - Lovenox     Consultations:   PHARMACY TO DOSE VANCOMYCIN  IP CONSULT TO INTERNAL MEDICINE  IP CONSULT TO PALLIATIVE CARE  IP CONSULT TO NEPHROLOGY  Attending Physician Statement  I have discussed the care of Macy Ronquillo and I have examined the patient myselft and taken ros and hpi , including pertinent history and exam findings,  with the resident. I have reviewed the key elements of all parts of the encounter with the resident. I agree with the assessment, plan and orders as documented by the resident.       Electronically signed by Kailee Palomino MD

## 2019-05-03 NOTE — PLAN OF CARE
Problem: Falls - Risk of:  Goal: Will remain free from falls  Description  Will remain free from falls  Outcome: Ongoing  Goal: Absence of physical injury  Description  Absence of physical injury  Outcome: Ongoing   Pt admitted to ICU. C/o generalized pain. Lactic acid elevated and mildly tachycardic. Call light in reach, bed in lowest position and bed alarm on. Pt provided with warm blanket and water.

## 2019-05-03 NOTE — ED NOTES
Report given to Aletha Harden from [facility]. Report method in person   The following was reviewed with receiving RN:   Current vital signs:  BP (!) 109/55   Pulse 107   Temp 98.2 °F (36.8 °C) (Oral)   Resp 17   Ht 6' 1\" (1.854 m)   Wt 190 lb (86.2 kg)   SpO2 93%   BMI 25.07 kg/m²                MEWS Score: 2     Any medication or safety alerts were reviewed. Any pending diagnostics and notifications were also reviewed, as well as any safety concerns or issues, abnormal labs, abnormal imaging, and abnormal assessment findings. Questions were answered.           Chris Ferris RN  05/03/19 0062

## 2019-05-03 NOTE — ED PROVIDER NOTES
EMERGENCY DEPARTMENT ENCOUNTER    Pt Name: Lucian Tate  MRN: [id number]  [place] 1960  Date of evaluation: 5/3/19  CHIEF COMPLAINT       Chief Complaint   Patient presents with    Fatigue     HISTORY OF PRESENT ILLNESS   The pt presents for evaluation of generalized weakness, fatigue, difficulty with ambulation. He denies any focal weakness, numbness, facial droop, problems with speech or swallowing. No fever. Pt was recently admitted secondary to sepsis likely from cholecystitis. Pt denies cough, shortness of breath, difficulty urinating or other changes. The history is provided by the patient. Fatigue   Severity:  Severe  Onset quality:  Gradual  Duration:  1 week  Timing:  Constant  Progression:  Worsening  Chronicity:  Recurrent  Relieved by:  Nothing  Worsened by:  Nothing  Ineffective treatments:  None tried  Associated symptoms: abdominal pain (ruq)    Associated symptoms: no chest pain, no cough, no diarrhea, no fever, no headaches, no nausea, no shortness of breath and no vomiting        REVIEW OF SYSTEMS     Review of Systems   Constitutional: Positive for fatigue. Negative for chills and fever. HENT: Negative. Negative for congestion. Eyes: Negative. Respiratory: Negative. Negative for cough and shortness of breath. Cardiovascular: Negative. Negative for chest pain. Gastrointestinal: Positive for abdominal pain (ruq). Negative for diarrhea, nausea and vomiting. Genitourinary: Negative. Musculoskeletal: Negative. Negative for back pain. Skin: Negative. Negative for rash. Neurological: Negative. Negative for headaches. All other systems reviewed and are negative.     PASTMEDICAL HISTORY     Past Medical History:   Diagnosis Date    Back pain 1/30/2013    CAD S/P percutaneous coronary angioplasty 11/27/2016    cardiac stent    Carcinoma ([facility] [address] 12/04/2016    colon, liver mets    Colon cancer ([facility] [address]     Degeneration of lumbar or lumbosacral intervertebral disc 4/15/2014    Depression 5/10/2012    Diabetes mellitus ([facility] [address]     H/O cardiac catheterization     with cardiac stent    Hepatic metastases (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    with cardiac arrest at [address] [place] [address]       Past Surgical History:   Procedure Laterality Date    CORONARY ANGIOPLASTY WITH STENT PLACEMENT  11/2016    x1 stent    ENDOSCOPIC ULTRASOUND (LOWER) N/A 12/8/2017    RECTAL ENDOSCOPIC ULTRASOUND WITH PATHOLOGY performed by Israel [facility] at [facility] [address] LIVER BIOPSY Right 08/23/2018    CT guided Visceral Tissue Ablation    ID SIGMOIDOSCOPY FLX W/RMVL FOREIGN BODY N/A 5/25/2017    SIGMOIDOSCOPY BIOPSY FLEXIBLE performed by Israel Hernandez MD at [facility]    ID SIGMOIDOSCOPY FLX W/RMVL FOREIGN BODY  12/8/2017    SIGMOIDOSCOPY BIOPSY FLEXIBLE performed by Israel Hernandez MD at [facility]    TUNNELED VENOUS PORT PLACEMENT Right     right upper chest for cancer treatment     CURRENT MEDICATIONS       Previous Medications    DIPHENHYDRAMINE (BENADRYL) 25 MG CAPSULE    Take 1 capsule by mouth nightly as needed for Sleep    FENTANYL (DURAGESIC) 50 MCG/HR    Place 1 patch onto the skin every 72 hours. GLIMEPIRIDE (AMARYL) 4 MG TABLET    Take 1 tablet by mouth every morning    GLUCOSE BLOOD (BLOOD GLUCOSE TEST STRIPS) STRP    1 each by In Vitro route daily As needed.     GLUCOSE MONITORING KIT (FREESTYLE) MONITORING KIT    1 kit by Does not apply route daily    INSULIN GLARGINE (LANTUS) 100 UNIT/ML INJECTION VIAL    Inject 20 Units into the skin nightly    INSULIN PEN NEEDLE 29G X 12.7MM MISC    1 each by Does not apply route daily    LANCETS MISC    1 each by Does not apply route daily    METOPROLOL TARTRATE (LOPRESSOR) 25 MG TABLET    Take 1 tablet by mouth daily     NALOXONE (NARCAN) 4 MG/0.1ML LIQD NASAL SPRAY    Take 4 mg by mouth    NITROGLYCERIN (NITROSTAT) 0.4 MG SL TABLET    Place 1 tablet under the tongue every 5 minutes as needed for Chest pain Dissolve 1 tablet under tongue for chest pain and repeat every 5 min up to max of 3 total doses. If no relief after 3 doses call 911    OXYCODONE HCL (OXY-IR) 10 MG IMMEDIATE RELEASE TABLET    Take 20 mg by mouth 3 times daily as needed for Pain. POTASSIUM CHLORIDE (KLOR-CON M) 20 MEQ EXTENDED RELEASE TABLET    Take 1 tablet by mouth daily     ALLERGIES     is allergic to morphine. FAMILY HISTORY     indicated that his mother is . He indicated that his father is alive. He indicated that all of his three sisters are alive. He indicated that his brother is alive. SOCIAL HISTORY       Social History     Tobacco Use    Smoking status: Never Smoker    Smokeless tobacco: Never Used   Substance Use Topics    Alcohol use: Not Currently    Drug use: No     PHYSICAL EXAM     INITIAL VITALS: BP (!) 102/54   Pulse 100   Temp 98 °F (36.7 °C) (Oral)   Resp 16   Ht 6' 1\" (1.854 m)   Wt 190 lb (86.2 kg)   SpO2 94%   BMI 25.07 kg/m²    Physical Exam   Constitutional: He is oriented to person, place, and time. Non-toxic appearance. He has a sickly appearance. He does not appear ill. No distress. HENT:   Head: Normocephalic and atraumatic. Eyes: Conjunctivae are normal.   Neck: Normal range of motion. Neck supple. Cardiovascular: Normal rate, regular rhythm and normal heart sounds. No murmur heard. Pulmonary/Chest: Effort normal and breath sounds normal.   Abdominal: Soft. There is tenderness (ruq). There is no rebound and no guarding. Musculoskeletal: He exhibits no deformity. Neurological: He is alert and oriented to person, place, and time. He has normal strength. No cranial nerve deficit or sensory deficit. Skin: Skin is warm and dry. Capillary refill takes less than 2 seconds. No rash noted. He is not diaphoretic. Nursing note and vitals reviewed.       MEDICAL DECISION MAKING: Reviewed results. Pt has leukocytosis and elevated lactate. Activated sepsis protocol. On reeval, exam unchanged, pt appears well, no distress, nontoxic. Abx and iv fluid resuscitation ordered. Discussed with medicine, will admit for further therapy and evaluation. Suspected source gi/cholecystitis. Pt is ready for admission at this time. Provider Repeat Focused Exam   Vitals:    05/03/19 1151   BP: (!) 102/54   Pulse: 100   Resp: 16   Temp:    SpO2: 94%      Focused assessment of respiratory, cardiovascular, and skin was performed as documented below:   Lungs: Normal expansion. Clear to auscultation. No rales, rhonchi, or wheezing. Heart: Heart sounds are normal.  Regular rate and rhythm without murmur, gallop or rub. Peripheral Pulses: Normal   Skin: Skin color, texture, turgor normal. No rashes or lesions. Capillary Refill Time: normal   Electronically signed by Reta Lucio MD on 5/3/2019 at 12:21 PM         CRITICAL CARE:       PROCEDURES:    Procedures    DIAGNOSTIC RESULTS   EKG:All EKG's are interpreted by the Emergency Department Physician who either signs or Co-signs this chart in the absence of a cardiologist.    Ekg, rate of 103, sinus tach, no st seg changes, normal qrs, no acute ischemic changes. RADIOLOGY:All plain film, CT, MRI, and formal ultrasound images (except ED bedside ultrasound) are read by the radiologist, see reports below, unless otherwisenoted in MDM or here. XR CHEST PORTABLE   Final Result   No focal airspace consolidation. LABS: All lab results were reviewed by myself, and all abnormals are listed below.   Labs Reviewed   CBC WITH AUTO DIFFERENTIAL - Abnormal; Notable for the following components:       Result Value    WBC 17.1 (*)     RBC 3.39 (*)     Hemoglobin 8.0 (*)     Hematocrit 25.8 (*)     MCV 76.1 (*)     MCH 23.7 (*)     RDW 21.8 (*)     All other components within normal limits   COMPREHENSIVE METABOLIC PANEL - Abnormal; Notable for the following components:    Glucose 352 (*)     CREATININE 0.53 (*)     Calcium 7.6 (*)     Sodium 127 (*)     Chloride 84 (*)     Alkaline Phosphatase 589 (*)      (*)     Total Bilirubin 1.38 (*)     Alb 2.4 (*)     All other components within normal limits   LACTIC ACID - Abnormal; Notable for the following components:    Lactic Acid 6.4 (*)     All other components within normal limits   URINE RT REFLEX TO CULTURE - Abnormal; Notable for the following components:    Color, UA ORANGE (*)     Turbidity UA CLOUDY (*)     Glucose, Ur 1+ (*)     Bilirubin Urine   (*)     Value: Presumptive positive. Unable to confirm due to unavailability of reagent. Urine Hgb TRACE (*)     Protein, UA 1+ (*)     Urobilinogen, Urine ELEVATED (*)     Leukocyte Esterase, Urine TRACE (*)     All other components within normal limits   MICROSCOPIC URINALYSIS - Abnormal; Notable for the following components:    Bacteria, UA FEW (*)     Mucus, UA 1+ (*)     Amorphous, UA 1+ (*)     All other components within normal limits   URINE CULTURE CLEAN CATCH   CULTURE BLOOD #1   CULTURE BLOOD #1   LIPASE   TROPONIN   LACTIC ACID   TROPONIN   ICTOTEST, URINE       EMERGENCY DEPARTMENTCOURSE:         Vitals:    Vitals:    05/03/19 1104 05/03/19 1151   BP: (!) 113/58 (!) 102/54   Pulse: 99 100   Resp: 14 16   Temp: 98 °F (36.7 °C)    TempSrc: Oral    SpO2: 96% 94%   Weight: 190 lb (86.2 kg)    Height: 6' 1\" (1.854 m)        The patient was given the following medications while in the emergency department:  Orders Placed This Encounter   Medications    0.9 % sodium chloride bolus    0.9 % sodium chloride bolus    piperacillin-tazobactam (ZOSYN) 3.375 g in dextrose 5 % 50 mL IVPB (mini-bag)     CONSULTS:  PHARMACY TO DOSE VANCOMYCIN  IP CONSULT TO INTERNAL MEDICINE    FINAL IMPRESSION      1.  Septicemia [facility])          DISPOSITION/PLAN   DISPOSITION Decision To Admit 05/03/2019 12:13:04 PM      PATIENT REFERRED TO:  No follow-up provider specified.   DISCHARGE MEDICATIONS:  New Prescriptions    No medications on file     Adrian Fleming MD  Attending Emergency Physician                    Shannan Montelongo MD  05/03/19 [address] [place] [address] MD Ki  05/03/19 0095

## 2019-05-04 PROBLEM — E43 SEVERE MALNUTRITION (HCC): Status: ACTIVE | Noted: 2019-01-01

## 2019-05-04 NOTE — PLAN OF CARE
Problem: Falls - Risk of:  Goal: Will remain free from falls  Description  Will remain free from falls  5/4/2019 1812 by Neal Verde RN  Outcome: Met This Shift  5/4/2019 1711 by Neal Verde RN  Outcome: Met This Shift  Goal: Absence of physical injury  Description  Absence of physical injury  Outcome: Met This Shift     Problem: Pain:  Goal: Pain level will decrease  Description  Pain level will decrease  5/4/2019 1812 by Neal Verde RN  Outcome: Ongoing  5/4/2019 1711 by Neal Verde RN  Outcome: Ongoing  Goal: Control of acute pain  Description  Control of acute pain  Outcome: Ongoing  Goal: Control of chronic pain  Description  Control of chronic pain  Outcome: Ongoing     Problem: Nutrition  Goal: Optimal nutrition therapy  5/4/2019 1812 by Neal Verde RN  Outcome: Ongoing  5/4/2019 1711 by Neal Verde RN  Outcome: Ongoing  5/4/2019 1411 by Neli Kitchen RD, LD  Outcome: Ongoing

## 2019-05-04 NOTE — PROGRESS NOTES
Nutrition Assessment    Type and Reason for Visit: Positive Nutrition Screen    Nutrition Recommendations: Continue General diet. Start Ensure Enlive 2x/day. Nutrition Assessment: Patient is severely malnourished as evidence by severe muscle loss and weight loss. Patient is at risk for further compromise due to increased nutrient needs related to stage 4 cancer. Will start Ensure Enlive twice a day. Will monitor p.o intakes and labs. Malnutrition Assessment:  · Malnutrition Status: Meets the criteria for severe malnutrition  · Context: Chronic illness  · Findings of the 6 clinical characteristics of malnutrition (Minimum of 2 out of 6 clinical characteristics is required to make the diagnosis of moderate or severe Protein Calorie Malnutrition based on AND/ASPEN Guidelines):  1. Energy Intake-Less than or equal to 75% of estimated energy requirement, Greater than or equal to 7 days    2. Weight Loss-10% loss or greater, in 1 month  3. Fat Loss-Severe subcutaneous fat loss, Triceps  4. Muscle Loss-Severe muscle mass loss, Thigh (quadriceps), Clavicles (pectoralis and deltoids)  5. Fluid Accumulation-No significant fluid accumulation, Extremities  6.   Strength-Not measured    Nutrition Risk Level: High    Nutrient Needs:  · Estimated Daily Total Kcal: 4522-1725 kcals based on 28-30 kcals/ kg of admission weight  · Estimated Daily Protein (g): 112-121 gm of protein based on 1.3-1.4 gm/ kg of admission weight     Nutrition Diagnosis:   · Problem: Increased nutrient needs  · Etiology: related to Catabolic illness(stage IV cancer)     Signs and symptoms:  as evidenced by Moderate muscle loss, Weight loss greater than or equal to 5% in 1 month, Weight loss, Diet history of poor intake, Intake 50-75%    Objective Information:  · Nutrition-Focused Physical Findings: +2 LUE edema  · Current Nutrition Therapies:  · Oral Diet Orders: General   · Oral Diet intake: 51-75%  · Anthropometric Measures:  · Ht: 6' 3\" (190.5 cm)   · Current Body Wt: 190 lb (86.2 kg)  · Admission Body Wt: 190 lb (86.2 kg)  · Usual Body Wt: 214 lb (97.1 kg)  · % Weight Change:  ,  11.2% loss in 1 month  · Ideal Body Wt: 196 lb (88.9 kg), % Ideal Body 97%  · BMI Classification: BMI 18.5 - 24.9 Normal Weight    Nutrition Interventions:   Continue current diet, Start ONS  Continued Inpatient Monitoring, Education Not Indicated    Nutrition Evaluation:   · Evaluation: Goals set   · Goals: PO intakes are greater than 75% at meals    · Monitoring: Meal Intake, Supplement Intake, Weight, Pertinent Labs, Monitor Bowel Function, I&O, Skin Integrity, Diet Tolerance      Shadia ESQUIVEL, R.D, L.D,  Clinical Dietitian  Cell # [phone]  Office # [phone]

## 2019-05-04 NOTE — PROGRESS NOTES
Writer clarified home medications with patient. Patient previously stated that he took 2 percoset every 6 hours. At this time patient states he was inaccurate. That he takes two Oxycodone. \"The one without tylenol\" for a total of 20 mg , every 6 hours. Writer educates patient on the difference between the two medications. Pt verbalized understanding. He states that he doubts the given Percocet will eliminate pain but it will take \"the edge off\".  Quang Chow will continue to evaluate pain management and will clarify orders in the AM.

## 2019-05-04 NOTE — PLAN OF CARE
Nutrition Problem: Increased nutrient needs  Intervention: Food and/or Nutrient Delivery: Continue current diet, Start ONS  Nutritional Goals: PO intakes are greater than 75% at meals

## 2019-05-04 NOTE — PROGRESS NOTES
[address] [facility].    PROGRESS NOTE             5/4/2019    8:57 AM    Name:   Jitendra Moyer  MRN:     [id number]     Acct:      [de-identified]   Room:   2010/2010-01   Day:  1  Admit Date:  5/3/2019 11:01 AM    PCP:  No primary care provider on file. Code Status:  Full Code    Subjective:     C/C:   Chief Complaint   Patient presents with    Fatigue   Septic workup    Interval History Status: not changed. Patient seen and examined in room, discussed with RN. Patient denies acute complaints overnight, denies change in size of his abdomen, denies SOB/cough, denies N/V/D. Patient denies confusion or psych symptoms. Report from RN received that patient had 2 episodic outbursts yesterday including yelling/cursing as well as fecal incontinence. Also RN reports last night patient smelled of alcohol when changing into gown, patient denies repeatedly to writer that he drinks alcohol (reports last drink 10/2018), will monitor. Brief History:     From HPI:  The patient is a 66 y.o. 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     Patient reports his chronic pain (abdomen, back, extremities for which he has chronic transdermal pain mgmt) is stable without acute exacerbation. Patient is having some epigastric pain that is grossly unchanged since the previous admission (HIDA scan on 4/20 suggesting acute cholecystitis, possible chronic cholecystitis on U/S). Patient denies chest pain, headache, N/V, denies diarrhea, SOB, productive cough, urinary symptoms, denies melena or hematochezia, denies any new rash or extremity swelling.  Reports his abdominal Hyperlipidemia, Hypertension, Knee pain, and MI, old. Social History:   reports that he has never smoked. He has never used smokeless tobacco. He reports that he drank alcohol. He reports that he does not use drugs. Family History:   Family History   Problem Relation Age of Onset    Heart Disease Mother     Stroke Mother     High Blood Pressure Mother     High Cholesterol Father        Vitals:  /64   Pulse 82   Temp 97.9 °F (36.6 °C) (Axillary)   Resp 20   Ht 6' 1\" (1.854 m)   Wt 190 lb (86.2 kg)   SpO2 97%   BMI 25.07 kg/m²   Temp (24hrs), Av.8 °F (37.1 °C), Min:97.8 °F (36.6 °C), Max:101.7 °F (38.7 °C)    Recent Labs     19  1731 19  0739   POCGLU 209* 166*     I/O(24Hr): Intake/Output Summary (Last 24 hours) at 2019 0857  Last data filed at 2019 0600  Gross per 24 hour   Intake 1265 ml   Output 450 ml   Net 815 ml       Labs:    Lab Results   Component Value Date/Time    SPECIAL 6ML PURPLE/1 ML RIGHT WRIST 2019 12:35 PM     Lab Results   Component Value Date/Time    CULTURE NO GROWTH 5 HOURS 2019 12:35 PM     Radiology:    Xr Chest Standard (2 Vw)    Result Date: 2019  EXAMINATION: TWO VIEWS OF THE CHEST 2019 9:18 pm COMPARISON: 2019 HISTORY: ORDERING SYSTEM PROVIDED HISTORY: chest pain, tachycardia TECHNOLOGIST PROVIDED HISTORY: chest pain, tachycardia Ordering Physician Provided Reason for Exam: Pt states chest pain and fatigue x 1 day Acuity: Acute Type of Exam: Initial Additional signs and symptoms: Pt states chest pain and fatigue x 1 day FINDINGS: Right internal jugular chest port with catheter tip at the cavoatrial junction. Limited due to low lung volumes. Normal heart size and pulmonary vasculature. Left lateral basilar opacity with corresponding finding on the lateral view may represent atelectasis or pneumonia. No pneumothorax. Limited due to low lung volumes.   Left lateral basilar opacity may represent atelectasis or pneumonia. Consider repeating study with full inspiration. Xr Chest Standard (2 Vw)    Result Date: 4/8/2019  EXAMINATION: TWO VIEWS OF THE CHEST 4/8/2019 12:18 am COMPARISON: None. HISTORY: ORDERING SYSTEM PROVIDED HISTORY: Chest pain sob TECHNOLOGIST PROVIDED HISTORY: Chest pain sob Ordering Physician Provided Reason for Exam: chest pain, sob Acuity: Acute Type of Exam: Initial Additional signs and symptoms: Chest pain (tingling), shortness of breath, hx liver ca, neck ca, and colon ca Relevant Medical/Surgical History: Chest pain (tingling), shortness of breath, hx liver ca, neck ca, and colon ca FINDINGS: A right-sided jase catheter is present with its tip in the SVC. No infiltrate or consolidation or effusion is identified. The heart size is normal.     No acute abnormality detected. Ct Abdomen Pelvis W Iv Contrast Additional Contrast? None    Result Date: 4/17/2019  EXAMINATION: CT OF THE ABDOMEN AND PELVIS WITH CONTRAST 4/17/2019 10:45 pm TECHNIQUE: CT of the abdomen and pelvis was performed with the administration of intravenous contrast. Multiplanar reformatted images are provided for review. Dose modulation, iterative reconstruction, and/or weight based adjustment of the mA/kV was utilized to reduce the radiation dose to as low as reasonably achievable. COMPARISON: 10/03/2018 HISTORY: ORDERING SYSTEM PROVIDED HISTORY: sepsis, source unknown. history of metastatic colon cancer to liver TECHNOLOGIST PROVIDED HISTORY: Ordering Physician Provided Reason for Exam: sepsis, source unknown. Hx - metastatic colon cancer to liver. Patient states weakness x1 day prior. Acuity: Unknown Type of Exam: Unknown Relevant Medical/Surgical History: Surgical hx - Hernia repair. Hx - HBP, Diabetes, Carcinoma, Colorectal cancer, stage IV  Hepatic metastases . FINDINGS: Mild elevation of the left hemidiaphragm. Coronary calcifications versus coronary stents identified.   Trace pericardial thickening/pericardial Negative Iyer sign. No pericholecystic fluid. Common bile duct is within normal limits measuring 5.3 mm. PANCREAS:  Visualized portions of the pancreas are unremarkable. OTHER: Trace ascites is noted within the right upper quadrant. 1.  Diffuse gallbladder wall thickening. No pericholecystic fluid or biliary obstruction. The finding may related to the patient's underlying cirrhosis or possibly chronic cholecystitis. If acute cholecystitis is of clinical concern, further evaluation with HIDA scan is recommended. 2. Questionable adherent stone versus polyp measuring 8 mm. The former is favored. If no surgical intervention is performed, repeat ultrasound in 1 year is recommended. 3. Trace perihepatic ascites. Xr Chest Portable    Result Date: 5/3/2019  EXAMINATION: SINGLE XRAY VIEW OF THE CHEST 5/3/2019 11:12 am COMPARISON: April 17, 2019 HISTORY: ORDERING SYSTEM PROVIDED HISTORY: Chest Pain TECHNOLOGIST PROVIDED HISTORY: Chest Pain Ordering Physician Provided Reason for Exam: Pt states today chest pain. History of colon cancer and lung cancer Acuity: Unknown Type of Exam: Unknown FINDINGS: Right chest Port in good position. No kink. Non enlarged heart. Bibasilar atelectasis. Right basilar pleuroparenchymal scarring. No effusion. No pneumothorax. No airspace consolidation. No focal airspace consolidation. Ct Chest Pulmonary Embolism W Contrast    Result Date: 4/8/2019  EXAMINATION: CTA OF THE CHEST 4/8/2019 12:31 am TECHNIQUE: CTA of the chest was performed after the administration of intravenous contrast.  Multiplanar reformatted images are provided for review. MIP images are provided for review. Dose modulation, iterative reconstruction, and/or weight based adjustment of the mA/kV was utilized to reduce the radiation dose to as low as reasonably achievable. COMPARISON: CT chest done July 2, 2018.  HISTORY: ORDERING SYSTEM PROVIDED HISTORY: CP, elevated d-dimer TECHNOLOGIST PROVIDED HISTORY: Ordering Physician Provided Reason for Exam: chest pain, elevated d dimer Acuity: Acute Type of Exam: Initial Relevant Medical/Surgical History: h/o colorectal ca, liver ca, diabetes, htn FINDINGS: Pulmonary Arteries: Pulmonary arteries are adequately opacified for evaluation. No evidence of intraluminal filling defect to suggest pulmonary embolism. Main pulmonary artery is normal in caliber. Mediastinum: No evidence of mediastinal lymphadenopathy. The heart and pericardium demonstrate no acute abnormality. Coronary artery calcifications. There is no acute abnormality of the thoracic aorta. Lungs/pleura: The lungs are without acute process. No focal consolidation or pulmonary edema. Mild bilateral dependent and basilar subsegmental atelectasis. No evidence of pleural effusion or pneumothorax. Upper Abdomen: Partially visualized area of ill-defined hypoattenuation in the medial left hepatic lobe is not well evaluated. Soft Tissues/Bones: Mixed osseous lucency and sclerosis in the T2 and T4 vertebral bodies and posterior elements is increased compared to CT chest done July 2, 2018 and may represent osseous metastatic disease. Multilevel degenerative disc disease. Right IJ port with tip at the superior atrial caval junction. 1.  No acute pulmonary thromboembolic disease. No pulmonary hypertension or right ventricular strain. 2.  No pulmonary mass or consolidation. No intrathoracic lymphadenopathy. 3.  Partially visualized area of ill-defined hypoattenuation in the medial left hepatic lobe is not well evaluated. Underlying malignancy is not excluded. 4.  Mixed osseous lucency and sclerosis in the T2 and T4 vertebral bodies and posterior elements is increased compared to CT chest done July 2, 2018 and may represent osseous metastatic disease. Nm Hepatobiliary Scan W Ejection Fraction    Result Date: 4/20/2019  EXAMINATION: NUCLEAR MEDICINE HEPATOBILIARY SCINTIGRAPHY (HIDA SCAN).  4/20/2019 10:13 am TECHNIQUE: Approximately 3.0 [id number] Mebrofenin (Choletec) was administered IV. Then, dynamic images of the abdomen were obtained in the anterior projection for 60 mins. A right lateral view was also obtained at 60 mins. COMPARISON: Ultrasound 04/18/2019 HISTORY: ORDERING SYSTEM PROVIDED HISTORY: pain TECHNOLOGIST PROVIDED HISTORY: Ordering Physician Provided Reason for Exam: Pain Acuity: Unknown Type of Exam: Unknown Additional signs and symptoms: pt states no real abdominal pain, came in for general fatigue, pt states he has liver an dcolon cancer FINDINGS: Gallbladder is not visualized. Activity is seen within the small bowel. Delayed excretion of radiotracer by the liver. Nonvisualization of the gallbladder suggesting acute cholecystitis. Delayed excretion by the liver suggesting hepatic dysfunction. Physical Examination:        Physical Exam   Constitutional: He is oriented to person, place, and time. No distress. Patient lying in bed, appears very fatigued, appears malnourished   HENT:   Head: Normocephalic and atraumatic.   +conjunctival pallor   Eyes: No scleral icterus. Cardiovascular: Normal rate, regular rhythm and intact distal pulses. Pulmonary/Chest: Effort normal and breath sounds normal. No respiratory distress. He has no wheezes. He has no rales. Abdominal: He exhibits distension and mass. There is tenderness. There is no rebound and no guarding. Abdomen appears more distended than on admission, dull to percussion, +fluid, BS heard x 4 (limited). Tenderness to palpation (mild) in epigastric area. Hepatomegaly +   Musculoskeletal: He exhibits no edema. Extremity muscle wasting   Neurological: He is alert and oriented to person, place, and time. Negative for tremors   Skin: Skin is warm and dry. Capillary refill takes 2 to 3 seconds. He is not diaphoretic. Vitals reviewed.     Vitals:    05/04/19 0630 05/04/19 0700 05/04/19 0730 05/04/19 0747   BP:  113/66 the resident. I agree with the assessment, plan and orders as documented by the resident.   Patient with a stage IV colon cancer with multiple lung metastases in the lower ascites and admitted with the sepsis thought to be from gallbladder he had acute cholecystitis diagnosed but refused surgery on the last visit plan was to follow-up with surgery outpatient  Given patient's stage IV disease or metastatic options are upper scopic cholecystectomy versus a percutaneous drain  Consult surgery discussed with patient    Electronically signed by Twan Cagle MD

## 2019-05-04 NOTE — FLOWSHEET NOTE
05/03/19 2300   Provider Notification   Reason for Communication Evaluate   Provider Name dr Angelo Crawford   Provider Notification Physician   Method of Communication Call   Response See orders     Patient is A&Ox4 but very drowsy and quick to nod off to sleep. Pt states he suffers from chronic pain and takes 2 Percoset (unknown dosage) q6 hours while at home. Previous discharge note states Percoset 10mg x 1 four times daily. Dr. Luther Robert updated. See new orders.

## 2019-05-04 NOTE — PLAN OF CARE
Problem: Falls - Risk of:  Goal: Will remain free from falls  Description  Will remain free from falls  Outcome: Met This Shift  Goal: Absence of physical injury  Description  Absence of physical injury  Outcome: Met This Shift     Problem: Pain:  Goal: Pain level will decrease  Description  Pain level will decrease  Outcome: Ongoing  Goal: Control of acute pain  Description  Control of acute pain  Outcome: Ongoing  Goal: Control of chronic pain  Description  Control of chronic pain  Outcome: Ongoing     Problem: Nutrition  Goal: Optimal nutrition therapy  5/4/2019 1711 by Bernabe Costa RN  Outcome: Ongoing  5/4/2019 1411 by Manoj Lakhani RD, LD  Outcome: Ongoing

## 2019-05-04 NOTE — PROGRESS NOTES
Pt bed alarm sounding, found to be standing at the side of bed attempting to ambulate to the commode, urinal and call light within reach. Patient previously educated on notifying staff if need to use restroom and encouraged to use urinal. At that time patient verbalized understanding. Standby assist x 1 while patient dangles at bed and voids in urinal. Bed alarm activated, invasive lines intact, bed in low position, bedside table and personal belongings within reach. Fall precaution education reiterated. Patient denies any additional needs. Will closely monitor patient.

## 2019-05-04 NOTE — PROGRESS NOTES
Received bedside report in ICU before patient is to transfer from [facility].

## 2019-05-04 NOTE — FLOWSHEET NOTE
05/03/19 2013   Provider Notification   Reason for Communication Evaluate   Provider Name dr Angelo Crawford   Provider Notification Physician   Method of Communication Page     Patient febrile 101.7 orally. Dr. Luther Robert notified and updated to patient condition, informed of patient Stg 4 CA diagnosis with mets to the liver, as well as elevated liver enzymes. Orders received for PRN tylenol. Will administer and assess effectiveness.

## 2019-05-04 NOTE — FLOWSHEET NOTE
05/04/19 0607   Provider Notification   Reason for Communication Review case   Provider Name dr Chad Allen   Provider Notification Physician   Method of Communication Call     Pt c/o pain. Dr. Sukumar Anaya notified of the discrepancy in medication. (oxycodone IR vs Percoset) MD would like medication verified with Pharmacy prior ordering. Pt fills prescriptions at [facility] which does not open until 10 AM. Will update patient, and inform oncoming RN.

## 2019-05-04 NOTE — CONSULTS
Consults       Department of Internal Medicine  Nephrology Deacon Hoover MD   Consult Note      SUBJECTIVE: This is a 66 y.o. male with a significant past medical history of Stage IV colon cancer with liver mets, Type II DM, systemic hypertension and coronary artery disease status post PTCA/stent, who presented with complaints of generalized weakness, fatigue and failure to thrive. Laboratory studies were remarkable for hyponatremia with serum sodium 127 mmol/L and hence nephrology consultation. At presentation, systolic blood pressure was in the 120s but he subsequently developed hypotension between 10 PM and 3 AM with systolic blood pressure in the 90s. Right upper quadrant ultrasound showed:1. Moderate ascites. 2. Enlarged inhomogeneous nodular liver as previously noted on the CT  evaluation.  Neoplasm should be considered. 3. Thickened gallbladder wall as well as gallbladder polyps.  The gallbladder  does not appear distended however.  No evidence of gallbladder stones.  Acute  cholecystitis is unlikely.     Morphine    Past Medical History:   Diagnosis Date    Back pain 1/30/2013    CAD S/P percutaneous coronary angioplasty 11/27/2016    cardiac stent    Carcinoma ([address] 12/04/2016    colon, liver mets    Colon cancer ([address]     Degeneration of lumbar or lumbosacral intervertebral disc 4/15/2014    Depression 5/10/2012    Diabetes mellitus ([address]     H/O cardiac catheterization     with cardiac stent    Hepatic metastases ([address] 12/4/2016    Hyperlipidemia     Hypertension     Knee pain     MI, 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    with cardiac arrest at [address] [place] [address]       Scheduled Meds:   vancomycin (VANCOCIN) intermittent dosing (placeholder)   Other RX Placeholder    vancomycin  1,250 mg Intravenous Q12H    piperacillin-tazobactam  3.375 g Intravenous Q8H    sodium chloride flush  10 mL Intravenous 2 times per day    enoxaparin  40 mg Subcutaneous Daily    insulin glargine  15 Units Subcutaneous Nightly  insulin lispro  0-6 Units Subcutaneous TID     insulin lispro  0-3 Units Subcutaneous Nightly    fentanNYL  50 mcg Intravenous Once    [START ON 5/6/2019] fentaNYL  1 patch Transdermal Q72H     Continuous Infusions:   sodium chloride 100 mL/hr at 05/04/19 0323    dextrose       PRN Meds:.oxyCODONE-acetaminophen, sodium chloride flush, magnesium hydroxide, ondansetron, glucose, dextrose, glucagon (rDNA), dextrose, albuterol sulfate HFA, acetaminophen    Family History   Problem Relation Age of Onset    Heart Disease Mother     Stroke Mother     High Blood Pressure Mother     High Cholesterol Father         Social History     Socioeconomic History    Marital status:      Spouse name: None    Number of children: None    Years of education: None    Highest education level: None   Occupational History    None   Social Needs    Financial resource strain: None    Food insecurity:     Worry: None     Inability: None    Transportation needs:     Medical: None     Non-medical: None   Tobacco Use    Smoking status: Never Smoker    Smokeless tobacco: Never Used   Substance and Sexual Activity    Alcohol use: Not Currently    Drug use: No    Sexual activity: None   Lifestyle    Physical activity:     Days per week: None     Minutes per session: None    Stress: None   Relationships    Social connections:     Talks on phone: None     Gets together: None     Attends Sikh service: None     Active member of club or organization: None     Attends meetings of clubs or organizations: None     Relationship status: None    Intimate partner violence:     Fear of current or ex partner: None     Emotionally abused: None     Physically abused: None     Forced sexual activity: None   Other Topics Concern    None   Social History Narrative    None       Review of systems: CNS - no headache or dizziness; Cardiac - no chest pain; Respiratory - no shortness of breath; Gastrointestinal - Anorexia, nausea but no vomiting or diarrhea; Musculoskeletal - general body aches; Skin/Integument - no rashes. Physical Exam:    VITALS:  /64   Pulse 82   Temp 97.9 °F (36.6 °C) (Axillary)   Resp 20   Ht 6' 1\" (1.854 m)   Wt 190 lb (86.2 kg)   SpO2 97%   BMI 25.07 kg/m²   24HR INTAKE/OUTPUT:    Intake/Output Summary (Last 24 hours) at 5/4/2019 1034  Last data filed at 5/4/2019 0600  Gross per 24 hour   Intake 1265 ml   Output 450 ml   Net 815 ml       Constitutional: alert, appears stated age and cooperative    Skin: Skin color, texture, turgor normal. No rashes or lesions    Head: Normocephalic, without obvious abnormality, atraumatic     Cardiovascular/Edema: regular rate and rhythm, S1, S2 normal, no murmur, click, rub or gallop    Respiratory: Lungs: clear to auscultation bilaterally    Abdomen: soft, non-tender; bowel sounds normal; no masses,  no organomegaly    Back: symmetric, no curvature. ROM normal. No CVA tenderness. Extremities: extremities normal, atraumatic, no cyanosis or edema    Neuro:  Grossly normal      CBC:   Recent Labs     05/03/19  1132 05/04/19  0359   WBC 17.1* 11.7*   HGB 8.0* 7.3*    166     BMP:    Recent Labs     05/03/19  1132 05/04/19  0359   * 129*   K 4.4 3.7   CL 84* 89*   CO2 27 29   BUN 6 5*   CREATININE 0.53* 0.46*   GLUCOSE 352* 185*       Lab Results   Component Value Date    NITRU NEGATIVE 05/03/2019    COLORU ORANGE 05/03/2019    PHUR 5.0 05/03/2019    WBCUA 5 TO 10 05/03/2019    RBCUA 0 TO 2 05/03/2019    MUCUS 1+ 05/03/2019    TRICHOMONAS NOT REPORTED 05/03/2019    YEAST NOT REPORTED 05/03/2019    BACTERIA FEW 05/03/2019    SPECGRAV 1.019 05/03/2019    LEUKOCYTESUR TRACE 05/03/2019    UROBILINOGEN ELEVATED 05/03/2019    BILIRUBINUR  05/03/2019     Presumptive positive. Unable to confirm due to unavailability of reagent.     GLUCOSEU 1+ 05/03/2019    KETUA NEGATIVE 05/03/2019    AMORPHOUS 1+ 05/03/2019     Urine Sodium:   No results found for: MUNIR  Urine Potassium:  No results found for: KUR  Urine Chloride:  No results found for: CLUR  Urine Osmolarity: No results found for: OSMOU  Urine Protein:   No results found for: TPU  Urine Creatinine:     Lab Results   Component Value Date    LABCREA 453.0 01/30/2013     Urine Eosinophils:  No components found for: UEOS      IMPRESSION/RECOMMENDATIONS:      1. Hyponatremia - hypovolemic. Plan: Check serum and urine osmolality. Serum uric acid and TSH. IV fluids 0.9 normal saline at 100 mL/h. Check serum sodium every 6 hours. Target rate of increase of serum sodium 0.5 mmol/L/h in this patient. Overall prognosis is guarded.       Scotty Ibrahim MD FACP  Attending Nephrologist  5/4/2019 10:30 AM

## 2019-05-04 NOTE — CARE COORDINATION
CASE MANAGEMENT NOTE:    Admission Date:  5/3/2019 Lily Tavarez is a 66 y.o.  male    Admitted for : [facility] [A41.9]    Met with:  Patient    PCP:  Elliot Burgess at [facility] [place]:  [employer]      Current Residence/ Living Arrangements:  independently at home             Current Services PTA:  No    Is patient agreeable to VNS: No    Freedom of choice provided: Yes    List of [address] provided: Yes    VNS chosen:  No    DME:  None - States he never received a rollator    Home Oxygen: No    Nebulizer: No    CPAP/BIPAP: No    Supplier: N/A    Potential Assistance Needed: No    SNF needed: No    Pharmacy:  [facility]       Is the Patient an Dataslide with Readmission Risk Score greater than 14%? No  If yes, pt needs a follow up appointment made within 7 days. Does Patient want to use MEDS to BEDS? No    Family Members/Caregivers that pt would like involved in their care:    Yes    If yes, list name here: Wife Hermann Leak    Transportation Provider:  Family - Wife Hermann Leak             Is patient in Isolation/One on One/Altered Mental Status? No  If yes, skip next question. If no, would they like an I-Pad to  use? No  If yes, call [phone]. Discharge Plan:  5/4: [facility] [place] - Patient is from 1-story home with wife. Current with [facility] for chemo/colon/liver CA. DME - None. States he never got rollator - Need to check with [facility] in regards to this. Would like to get 2nd opinion from [facility] in regards to chemo options. On IV zosyn, IV vanco. Nephro and palliative consults. Will continue to follow.  //PALLAVI                  Electronically signed by: Jesus Sánchez RN on 5/4/2019 at 4:35 PM

## 2019-05-04 NOTE — FLOWSHEET NOTE
05/04/19 1100   Encounter Summary   Services provided to: Patient   Referral/Consult From: Vick   Continue Visiting   (5/4/19)   Volunteer Visit Yes   Complexity of Encounter Low   Length of Encounter 15 minutes   Spiritual/Gnosticism   Type Spiritual support   Intervention Prayer

## 2019-05-05 NOTE — PLAN OF CARE
Problem: Falls - Risk of:  Goal: Will remain free from falls  Description  Will remain free from falls  Outcome: Met This Shift  Goal: Absence of physical injury  Description  Absence of physical injury  Outcome: Met This Shift  Note:   Unsteady when ambulating; encouraged to call for assistance     Problem: Pain:  Goal: Pain level will decrease  Description  Pain level will decrease  Outcome: Met This Shift  Goal: Control of acute pain  Description  Control of acute pain  Outcome: Met This Shift  Goal: Control of chronic pain  Description  Control of chronic pain  Outcome: Met This Shift  Note:   Hart snot required any pain medication today

## 2019-05-05 NOTE — PROGRESS NOTES
Department of Internal Medicine  Nephrology Gricel Sutherland MD    Progress Note      Interval history: Patient was seen and examined today still has abdominal pain rated 5/10. He does not have constipation, diarrhea, nausea or vomiting but complains of fatigue. History of present illness: This is a 66 y.o. male with a significant past medical history of Stage IV colon cancer with liver mets, Type 2 DM, systemic hypertension and coronary artery disease status post PTCA/stent, who presented with complaints of abdominal pain, generalized weakness, fatigue and failure to thrive. Laboratory studies were remarkable for hyponatremia with serum sodium 127 mmol/L and hence nephrology consultation. At presentation, systolic blood pressure was in the 120s but he subsequently developed hypotension between 10 PM and 3 AM with systolic blood pressure in the 90s. He had been recently with admitted with similar complaints 2 weeks ago. Physical Exam:    VITALS:  /60   Pulse 89   Temp 99 °F (37.2 °C) (Oral)   Resp 20   Ht 6' 3\" (1.905 m)   Wt 190 lb (86.2 kg)   SpO2 94%   BMI 23.75 kg/m²   24HR INTAKE/OUTPUT:    Intake/Output Summary (Last 24 hours) at 5/5/2019 1533  Last data filed at 5/5/2019 1509  Gross per 24 hour   Intake 750 ml   Output 800 ml   Net -50 ml       Constitutional:  Awake and alert; not in distress. Cardiovascular/Edema:  S1, S2 with regular rate and rhythm. Respiratory: Clinically clear. Gastrointestinal:  Moderately distended but nontender.       CBC:   Recent Labs     05/03/19  1132 05/04/19  0359 05/05/19  0444 05/05/19  1035   WBC 17.1* 11.7* 12.5*  --    HGB 8.0* 7.3* 7.0* 6.9*    166 181  --      BMP:    Recent Labs     05/03/19  1132 05/04/19  0359  05/04/19  2359 05/05/19  0444 05/05/19  1035   * 129*   < > 129* 129* 130*   K 4.4 3.7   < > 3.7 3.9 3.6*   CL 84* 89*   < > 90* 91* 91*   CO2 27 29   < > 26 28 28   BUN 6 5*  --   --   --   --    CREATININE 0.53* 0.46*  --   --  0.44*  --    GLUCOSE 352* 185*  --   --   --   --     < > = values in this interval not displayed. Lab Results   Component Value Date    NITRU NEGATIVE 05/03/2019    COLORU ORANGE 05/03/2019    PHUR 5.0 05/03/2019    WBCUA 5 TO 10 05/03/2019    RBCUA 0 TO 2 05/03/2019    MUCUS 1+ 05/03/2019    TRICHOMONAS NOT REPORTED 05/03/2019    YEAST NOT REPORTED 05/03/2019    BACTERIA FEW 05/03/2019    SPECGRAV 1.019 05/03/2019    LEUKOCYTESUR TRACE 05/03/2019    UROBILINOGEN ELEVATED 05/03/2019    BILIRUBINUR  05/03/2019     Presumptive positive. Unable to confirm due to unavailability of reagent. GLUCOSEU 1+ 05/03/2019    KETUA NEGATIVE 05/03/2019    AMORPHOUS 1+ 05/03/2019     Urine Sodium:     Lab Results   Component Value Date    MUNIR <20 05/04/2019     Urine Potassium:  No results found for: KUR  Urine Chloride:    Lab Results   Component Value Date    CLUR 36 05/04/2019     Urine Osmolarity:   Lab Results   Component Value Date    OSMOU 420 05/04/2019     Urine Protein:   No results found for: TPU  Urine Creatinine:     Lab Results   Component Value Date    LABCREA 453.0 01/30/2013     Urine Eosinophils:  No components found for: UEOS      IMPRESSION/RECOMMENDATIONS:      1. Hyponatremia - Hypovolemic as suggested by random urine sodium less than 20. Serum sodium is correcting at an acceptable rate. Plan: Continue IV fluids 0.9 normal saline at 100 mL/h. Check serum sodium every 8 hours. 2.  Anemia [hemoglobin down to 6.9 g/dL today] - will suggest PRBC transfusion.     TROY MichaelCP  Attending Nephrologist  5/5/2019 3:27 PM

## 2019-05-05 NOTE — PROGRESS NOTES
Physical Therapy  DATE: 2019    NAME: Mehdi Walker  MRN: [id number]   : 1960    Patient not seen this date for Physical Therapy due to:  [] Blood transfusion in progress  [] Hemodialysis  []  Patient Declined  [] Spine Precautions   [] Strict Bedrest  [] Surgery/ Procedure  [] Testing      [x] Other 19 at 802- pt up in the bathroom. Pt reports that he needs at least 1 hour. Pt requested therapist to return later today. [] PT being discontinued at this time. Patient independent. No further needs. [] PT being discontinued at this time as the patient has been transferred to palliative care. No further needs.     Misha Naranjo, PT

## 2019-05-05 NOTE — FLOWSHEET NOTE
05/05/19 1135   Encounter Summary   Services provided to: Patient   Referral/Consult From: Vick   Continue Visiting   (5/5/19)   Complexity of Encounter Low   Length of Encounter 15 minutes   Routine   Type Follow up   Spiritual/Zoroastrianism   Type Ritual   Intervention Anointing   Sacraments   Sacrament of Sick-Anointing Anointed  (Latesha Nolasco 5/5/19)

## 2019-05-05 NOTE — CARE COORDINATION
DISCHARGE PLANNING NOTE:    Plan is for this patient to return to home. Declines any discharge needs. Would like to know why he never received a rollator - I faxed order for rollator to [facility] [place] last admission - Need to call on Monday to see what happened. Patient would also like phone call made to [facility] for second opinion. Will continue to follow along.      Electronically signed by Ekaterina Sahu RN on 5/5/2019 at 1:17 PM

## 2019-05-05 NOTE — CONSULTS
General Surgery Consult      Pt Name: Naveed Cameron  MRN: [id number]  YOB: 1960  Date of evaluation: 5/5/2019  Primary Care Physician: No primary care provider on file. Patient evaluated at the request of  Dr. Miranda Garcia  Reason for evaluation: sepsis    SUBJECTIVE:   History of Chief Complaint:    Naveed Camerno is a 66 y.o. male who presents with generalized weakness. He was hospitalized a couple of weeks ago with similar complaint. He has a history of metastatic CRC. Was on chemotherapy. He was thought to have cholecystitis, but was asymptomatic. He returns with complaint of fatigue and feeling week. He denies abdominal pain. Denies N/V. Tolerating diet. US was done    Past Medical History   has a past medical history of Back pain, CAD S/P percutaneous coronary angioplasty, Carcinoma ([address] Colon cancer ([address] Degeneration of lumbar or lumbosacral intervertebral disc, Depression, Diabetes mellitus ([address] H/O cardiac catheterization, Hepatic metastases ([address] Hyperlipidemia, Hypertension, Knee pain, and MI, old. Past Surgical History   has a past surgical history that includes Tunneled venous port placement (Right); pr sigmoidoscopy flx w/rmvl foreign body (N/A, 5/25/2017); hernia repair; Coronary angioplasty with stent (11/2016); pr sigmoidoscopy flx w/rmvl foreign body (12/8/2017); Endoscopic ultrasonography, GI (N/A, 12/8/2017); and liver biopsy (Right, 08/23/2018). Medications  Prior to Admission medications    Medication Sig Start Date End Date Taking?  Authorizing Provider   glucose monitoring kit (FREESTYLE) monitoring kit 1 kit by Does not apply route daily 4/22/19   Marisol Galdamez MD   Lancets MISC 1 each by Does not apply route daily 4/22/19   Marisol Galdamez MD   insulin glargine (LANTUS) 100 UNIT/ML injection vial Inject 20 Units into the skin nightly    Historical Provider, MD   glimepiride (AMARYL) 4 MG tablet Take 1 tablet by mouth every morning 4/22/19   Alex S MD Norma   oxyCODONE HCl (OXY-IR) 10 MG immediate release tablet Take 20 mg by mouth 3 times daily as needed for Pain. Historical Provider, MD   diphenhydrAMINE (BENADRYL) 25 MG capsule Take 1 capsule by mouth nightly as needed for Sleep 4/8/19   Caroline Sanders DO   fentaNYL (DURAGESIC) 50 MCG/HR Place 1 patch onto the skin every 72 hours. 4/20/19   Historical Provider, MD   naloxone (NARCAN) 4 MG/0.1ML LIQD nasal spray Take 4 mg by mouth    Historical Provider, MD   potassium chloride (KLOR-CON M) 20 MEQ extended release tablet Take 1 tablet by mouth daily 10/9/18   Kate Nj MD   metoprolol tartrate (LOPRESSOR) 25 MG tablet Take 1 tablet by mouth daily  3/12/18   Historical Provider, MD   Glucose Blood (BLOOD GLUCOSE TEST STRIPS) STRP 1 each by In Vitro route daily As needed. 8/24/17   Dionisio Fitzpatrick MD   Insulin Pen Needle 29G X 12.7MM MISC 1 each by Does not apply route daily 5/4/17   Pearl Timmons MD   nitroGLYCERIN (NITROSTAT) 0.4 MG SL tablet Place 1 tablet under the tongue every 5 minutes as needed for Chest pain Dissolve 1 tablet under tongue for chest pain and repeat every 5 min up to max of 3 total doses. If no relief after 3 doses call 911 11/29/16   ASIM Abdi CNP     Allergies  is allergic to morphine. Family History  family history includes Heart Disease in his mother; High Blood Pressure in his mother; High Cholesterol in his father; Stroke in his mother. Social History   reports that he has never smoked. He has never used smokeless tobacco. He reports that he drank alcohol. He reports that he does not use drugs.     Review of Systems:  General Denies any fever or chills  HEENT Denies any diplopia, tinnitus or vertigo  Resp Denies any shortness of breath, cough or wheezing  Cardiac Denies any chest pain, palpitations, claudication or edema  GI Denies any melena, hematochezia, hematemesis or pyrosis   Denies any frequency, urgency, hesitancy or incontinence  Heme Denies bruising or bleeding easily  Endocrine Positive for DM  Neuro Denies any focal motor or sensory deficits    OBJECTIVE:   VITALS:  height is 6' 3\" (1.905 m) and weight is 190 lb (86.2 kg). His oral temperature is 98 °F (36.7 °C). His blood pressure is 103/56 (abnormal) and his pulse is 88. His respiration is 24 and oxygen saturation is 92%. CONSTITUTIONAL: Alert and oriented times 3, no acute distress and cooperative to examination with proper mood and affect. SKIN: Skin color, texture, turgor normal. No rashes or lesions. LYMPH: no cervical nodes, no inguinal nodes  HEENT: Head is normocephalic, atraumatic. EOMI, PERRLA  NECK: Supple, symmetrical, trachea midline, no adenopathy, thyroid symmetric, not enlarged and no tenderness, skin normal  CHEST/LUNGS: chest symmetric with normal A/P diameter, normal respiratory rate and rhythm, lungs clear to auscultation without wheezes, rales or rhonchi. No accessory muscle use. CARDIOVASCULAR: Heart regular rate and rhythm   ABDOMEN: Normal shape. Normal bowel sounds. No bruits. Soft, nondistended, no masses or organomegaly. no evidence of hernia. Percussion: Abnormal percussion: shifting dullness noted due to ascites. Tenderness: absent  RECTAL: Deferred at this time  NEUROLOGIC: There are no focalizing motor or sensory deficits. CN II-XII are grossly intact.   EXTREMITIES: no cyanosis, no clubbing and no edema    LABS:     CBC with Differential:    Lab Results   Component Value Date    WBC 12.5 05/05/2019    RBC 2.90 05/05/2019    HGB 6.9 05/05/2019    HCT 22.2 05/05/2019     05/05/2019    MCV 77.2 05/05/2019    MCH 24.2 05/05/2019    MCHC 31.4 05/05/2019    RDW 21.5 05/05/2019    LYMPHOPCT 12 05/05/2019    MONOPCT 10 05/05/2019    BASOPCT 0 05/05/2019    MONOSABS 1.30 05/05/2019    LYMPHSABS 1.50 05/05/2019    EOSABS 0.00 05/05/2019    BASOSABS 0.00 05/05/2019    DIFFTYPE NOT REPORTED 05/05/2019     CMP:    Lab Results   Component Value Date     05/05/2019    K 3.6 05/05/2019    CL 91 05/05/2019    CO2 28 05/05/2019    BUN 5 05/04/2019    CREATININE 0.44 05/05/2019    GFRAA >60 05/05/2019    LABGLOM >60 05/05/2019    GLUCOSE 185 05/04/2019    GLUCOSE 195 04/26/2012    PROT 5.8 05/04/2019    LABALBU 2.0 05/04/2019    CALCIUM 7.2 05/04/2019    BILITOT 1.51 05/04/2019    ALKPHOS 472 05/04/2019    AST 95 05/04/2019    ALT 19 05/04/2019     PT/INR:    Lab Results   Component Value Date    PROTIME 17.6 05/05/2019    INR 1.4 05/05/2019     U/A:    Lab Results   Component Value Date    COLORU ORANGE 05/03/2019    PROTEINU 1+ 05/03/2019    PHUR 5.0 05/03/2019    WBCUA 5 TO 10 05/03/2019    RBCUA 0 TO 2 05/03/2019    MUCUS 1+ 05/03/2019    TRICHOMONAS NOT REPORTED 05/03/2019    YEAST NOT REPORTED 05/03/2019    BACTERIA FEW 05/03/2019    SPECGRAV 1.019 05/03/2019    LEUKOCYTESUR TRACE 05/03/2019    UROBILINOGEN ELEVATED 05/03/2019    BILIRUBINUR  05/03/2019     Presumptive positive. Unable to confirm due to unavailability of reagent. GLUCOSEU 1+ 05/03/2019    AMORPHOUS 1+ 05/03/2019     AMYLASE:    Lab Results   Component Value Date    AMYLASE 53 12/03/2016     LIPASE:    Lab Results   Component Value Date    LIPASE 13 05/03/2019         RADIOLOGY:   I have personally reviewed the following films:  US:    LIVER:  The liver demonstrates some abnormal echotexture as well as  hepatomegaly with the liver measuring 27 cm. Liver nodularity noted which  was also seen on the previous CT evaluation. BILIARY SYSTEM:  Gallbladder wall is severely thickened at 9.2 mm. There is  evidence of gallbladder polyps. No evidence of gallbladder stones. Negative  Iyer sign. Common bile duct is within normal limits measuring 0.61 cm. RIGHT KIDNEY: The right kidney is grossly unremarkable without evidence of  hydronephrosis. OTHER: Moderate ascites noted in the abdomen and pelvis. Impression:      1. Moderate ascites.   2. Enlarged inhomogeneous nodular liver as previously noted on the CT  evaluation. Neoplasm should be considered. 3. Thickened gallbladder wall as well as gallbladder polyps. The gallbladder  does not appear distended however. No evidence of gallbladder stones. Acute  cholecystitis is unlikely. IMPRESSION/PLAN:   1. Fatigue, weakness, likely due to anemia  2. Doubt biliary issues  3. Recommend GI evaluation  4. Will follow    Principal Problem:    Septic shock ([address]  Active Problems:    Severe malnutrition ([address]  Resolved Problems:    * No resolved hospital problems. *      Thank you for this interesting consult and for allowing us to participate in the care of your patient. Please feel free to contact me with any questions or concerns.

## 2019-05-05 NOTE — PROGRESS NOTES
[id number]    PROGRESS NOTE             5/5/2019    8:38 AM    Name:   Sofia Boudreaux  MRN:     [id number]     Acct:      [de-identified]   Room:   [address] [place] [address] Day:  2  Admit Date:  5/3/2019 11:01 AM    PCP:  No primary care provider on file. Code Status:  Full Code    Subjective:     C/C:   Chief Complaint   Patient presents with    Fatigue     Interval History Status: not changed. Patient seen and examined this morning  No acute overnight events  Patient continues to have diffuse abdominal pain that is unchanged he rates the pain currently as a 6-7 out of 10 although his baseline is just slightly below that  Patient is tolerating by mouth intake well, denies any nausea vomiting diarrhea  His last bowel movement was yesterday, non-bloody  Denies any fevers chills or night sweats  Denies any chest pain, shortness of breath    Brief History:     Per Epic EMR H&P:    \"The patient is M 49 y.o.  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     Patient reports his chronic pain (abdomen, back, extremities for which he has chronic transdermal pain mgmt) is stable without acute exacerbation. Patient is having some epigastric pain that is grossly unchanged since the previous admission (HIDA scan on 4/20 suggesting acute cholecystitis, possible chronic cholecystitis on U/S). Patient denies chest pain, headache, N/V, denies diarrhea, SOB, productive cough, urinary symptoms, denies melena or hematochezia, denies any new rash or extremity swelling.  Reports his abdominal swelling/ascites has not increased from baseline. \"    Review of Systems: History:   has a past medical history of Back pain, CAD S/P percutaneous coronary angioplasty, Carcinoma ([facility] Colon cancer ([facility] Degeneration of lumbar or lumbosacral intervertebral disc, Depression, Diabetes mellitus ([facility] H/O cardiac catheterization, Hepatic metastases ([facility] Hyperlipidemia, Hypertension, Knee pain, and MI, old. Social History:   reports that he has never smoked. He has never used smokeless tobacco. He reports that he drank alcohol. He reports that he does not use drugs. Family History:   Family History   Problem Relation Age of Onset    Heart Disease Mother     Stroke Mother     High Blood Pressure Mother     High Cholesterol Father        Vitals:  BP (!) 99/56   Pulse 91   Temp 97 °F (36.1 °C) (Oral)   Resp 18   Ht 6' 3\" (1.905 m)   Wt 190 lb (86.2 kg)   SpO2 92%   BMI 23.75 kg/m²   Temp (24hrs), Av.2 °F (36.8 °C), Min:97 °F (36.1 °C), Max:98.8 °F (37.1 °C)    Recent Labs     19  0739 19  1205 19  0802   POCGLU 166* 272* 256* 136*       I/O(24Hr):     Intake/Output Summary (Last 24 hours) at 2019 0838  Last data filed at 2019 0406  Gross per 24 hour   Intake 300 ml   Output 800 ml   Net -500 ml       Labs:    CBC with Differential:    Lab Results   Component Value Date    WBC 12.5 2019    RBC 2.90 2019    HGB 7.0 2019    HCT 22.4 2019     2019    MCV 77.2 2019    MCH 24.2 2019    MCHC 31.4 2019    RDW 21.5 2019    LYMPHOPCT 12 2019    MONOPCT 10 2019    BASOPCT 0 2019    MONOSABS 1.30 2019    LYMPHSABS 1.50 2019    EOSABS 0.00 2019    BASOSABS 0.00 2019    DIFFTYPE NOT REPORTED 2019     Hemoglobin/Hematocrit:    Lab Results   Component Value Date    HGB 7.0 2019    HCT 22.4 2019     CMP:    Lab Results   Component Value Date     2019    K 3.9 2019    CL 91 2019    CO2 28 2019    BUN shortness of breath, hx liver ca, neck ca, and colon ca FINDINGS: A right-sided jase catheter is present with its tip in the SVC. No infiltrate or consolidation or effusion is identified. The heart size is normal.     No acute abnormality detected. Ct Abdomen Pelvis W Iv Contrast Additional Contrast? None    Result Date: 4/17/2019  EXAMINATION: CT OF THE ABDOMEN AND PELVIS WITH CONTRAST 4/17/2019 10:45 pm TECHNIQUE: CT of the abdomen and pelvis was performed with the administration of intravenous contrast. Multiplanar reformatted images are provided for review. Dose modulation, iterative reconstruction, and/or weight based adjustment of the mA/kV was utilized to reduce the radiation dose to as low as reasonably achievable. COMPARISON: 10/03/2018 HISTORY: ORDERING SYSTEM PROVIDED HISTORY: sepsis, source unknown. history of metastatic colon cancer to liver TECHNOLOGIST PROVIDED HISTORY: Ordering Physician Provided Reason for Exam: sepsis, source unknown. Hx - metastatic colon cancer to liver. Patient states weakness x1 day prior. Acuity: Unknown Type of Exam: Unknown Relevant Medical/Surgical History: Surgical hx - Hernia repair. Hx - HBP, Diabetes, Carcinoma, Colorectal cancer, stage IV  Hepatic metastases . FINDINGS: Mild elevation of the left hemidiaphragm. Coronary calcifications versus coronary stents identified. Trace pericardial thickening/pericardial fluid. There are bibasilar opacities suggestive of bibasilar atelectasis. These findings most confluent within the left lower lobe. There are innumerable hypodensities identified throughout the right and left hepatic lobes which have progressed from prior examination. A suggestion of a central ill-defined area of hypoattenuation which may represent areas of posterior related changes/fibrosis versus perfusion differences. Mild periportal edema involving the liver. There is moderate gallbladder wall thickening. No definite cholelithiasis.  Spleen, adrenal glands, kidneys, pancreas unremarkable. Mild retained stool throughout the colon without evidence of obstruction. The appendix unremarkable. No suspicious mesenteric or retroperitoneal adenopathy. A few lymph nodes identified within the port appendix region noted of uncertain clinical significance borderline size. No free air or free fluid. Tiny fat containing right inguinal hernia. The bladder is mildly distended. Prostate is unremarkable. No loculated rim enhancing fluid collection to suggest abscess formation. Central disc protrusion T7-T8. Moderate changes L4-5 and L5-S1. No suspicious osseous lesions. Moderate gallbladder wall thickening nonspecific. Please correlate with exam findings. Innumerable hepatic lesions suggestive of metastatic disease as on prior imaging studies. Bibasilar consolidative changes favor bibasilar atelectasis. Us Gallbladder Ruq    Result Date: 4/18/2019  EXAMINATION: RIGHT UPPER QUADRANT ULTRASOUND 4/18/2019 9:13 am COMPARISON: CT abdomen and pelvis April 17, 2019. HISTORY: ORDERING SYSTEM PROVIDED HISTORY: wall thickening on CT and elevated alk phos FINDINGS: LIVER:  Cirrhotic liver with heterogeneous echotexture. No  intrahepatic bile duct dilatation noted. BILIARY SYSTEM:  Diffuse gallbladder wall thickening. Questionable adherent stone versus polyp measuring 8 mm. No internal color Doppler vascularity. Negative Iyer sign. No pericholecystic fluid. Common bile duct is within normal limits measuring 5.3 mm. PANCREAS:  Visualized portions of the pancreas are unremarkable. OTHER: Trace ascites is noted within the right upper quadrant. 1.  Diffuse gallbladder wall thickening. No pericholecystic fluid or biliary obstruction. The finding may related to the patient's underlying cirrhosis or possibly chronic cholecystitis. If acute cholecystitis is of clinical concern, further evaluation with HIDA scan is recommended.  2. Questionable adherent stone versus polyp measuring 8 mm. The former is favored. If no surgical intervention is performed, repeat ultrasound in 1 year is recommended. 3. Trace perihepatic ascites. Xr Chest Portable    Result Date: 5/3/2019  EXAMINATION: SINGLE XRAY VIEW OF THE CHEST 5/3/2019 11:12 am COMPARISON: April 17, 2019 HISTORY: ORDERING SYSTEM PROVIDED HISTORY: Chest Pain TECHNOLOGIST PROVIDED HISTORY: Chest Pain Ordering Physician Provided Reason for Exam: Pt states today chest pain. History of colon cancer and lung cancer Acuity: Unknown Type of Exam: Unknown FINDINGS: Right chest Port in good position. No kink. Non enlarged heart. Bibasilar atelectasis. Right basilar pleuroparenchymal scarring. No effusion. No pneumothorax. No airspace consolidation. No focal airspace consolidation. Ct Chest Pulmonary Embolism W Contrast    Result Date: 4/8/2019  EXAMINATION: CTA OF THE CHEST 4/8/2019 12:31 am TECHNIQUE: CTA of the chest was performed after the administration of intravenous contrast.  Multiplanar reformatted images are provided for review. MIP images are provided for review. Dose modulation, iterative reconstruction, and/or weight based adjustment of the mA/kV was utilized to reduce the radiation dose to as low as reasonably achievable. COMPARISON: CT chest done July 2, 2018. HISTORY: ORDERING SYSTEM PROVIDED HISTORY: CP, elevated d-dimer TECHNOLOGIST PROVIDED HISTORY: Ordering Physician Provided Reason for Exam: chest pain, elevated d dimer Acuity: Acute Type of Exam: Initial Relevant Medical/Surgical History: h/o colorectal ca, liver ca, diabetes, htn FINDINGS: Pulmonary Arteries: Pulmonary arteries are adequately opacified for evaluation. No evidence of intraluminal filling defect to suggest pulmonary embolism. Main pulmonary artery is normal in caliber. Mediastinum: No evidence of mediastinal lymphadenopathy. The heart and pericardium demonstrate no acute abnormality. Coronary artery calcifications. There is no acute abnormality of the thoracic aorta. Lungs/pleura: The lungs are without acute process. No focal consolidation or pulmonary edema. Mild bilateral dependent and basilar subsegmental atelectasis. No evidence of pleural effusion or pneumothorax. Upper Abdomen: Partially visualized area of ill-defined hypoattenuation in the medial left hepatic lobe is not well evaluated. Soft Tissues/Bones: Mixed osseous lucency and sclerosis in the T2 and T4 vertebral bodies and posterior elements is increased compared to CT chest done July 2, 2018 and may represent osseous metastatic disease. Multilevel degenerative disc disease. Right IJ port with tip at the superior atrial caval junction. 1.  No acute pulmonary thromboembolic disease. No pulmonary hypertension or right ventricular strain. 2.  No pulmonary mass or consolidation. No intrathoracic lymphadenopathy. 3.  Partially visualized area of ill-defined hypoattenuation in the medial left hepatic lobe is not well evaluated. Underlying malignancy is not excluded. 4.  Mixed osseous lucency and sclerosis in the T2 and T4 vertebral bodies and posterior elements is increased compared to CT chest done July 2, 2018 and may represent osseous metastatic disease. Us Abdomen Limited    Result Date: 5/4/2019  EXAMINATION: RIGHT UPPER QUADRANT ULTRASOUND, 5/4/2019 9:39 am COMPARISON: 04/18/2019 HISTORY: ORDERING SYSTEM PROVIDED HISTORY: Check for ascites TECHNOLOGIST PROVIDED HISTORY: Check for ascites Abd distention, hx liver mets r/o cholecystitis Acuity: Acute Type of Exam: Initial FINDINGS: LIVER:  The liver demonstrates some abnormal echotexture as well as hepatomegaly with the liver measuring 27 cm. Liver nodularity noted which was also seen on the previous CT evaluation. BILIARY SYSTEM:  Gallbladder wall is severely thickened at 9.2 mm. There is evidence of gallbladder polyps. No evidence of gallbladder stones. Negative Iyer sign.   Common bile duct is within normal limits measuring 0.61 cm. RIGHT KIDNEY: The right kidney is grossly unremarkable without evidence of hydronephrosis. OTHER: Moderate ascites noted in the abdomen and pelvis. 1. Moderate ascites. 2. Enlarged inhomogeneous nodular liver as previously noted on the CT evaluation. Neoplasm should be considered. 3. Thickened gallbladder wall as well as gallbladder polyps. The gallbladder does not appear distended however. No evidence of gallbladder stones. Acute cholecystitis is unlikely. Nm Hepatobiliary Scan W Ejection Fraction    Result Date: 4/20/2019  EXAMINATION: NUCLEAR MEDICINE HEPATOBILIARY SCINTIGRAPHY (HIDA SCAN). 4/20/2019 10:13 am TECHNIQUE: Approximately 3.0 [id number] Mebrofenin (Choletec) was administered IV. Then, dynamic images of the abdomen were obtained in the anterior projection for 60 mins. A right lateral view was also obtained at 60 mins. COMPARISON: Ultrasound 04/18/2019 HISTORY: ORDERING SYSTEM PROVIDED HISTORY: pain TECHNOLOGIST PROVIDED HISTORY: Ordering Physician Provided Reason for Exam: Pain Acuity: Unknown Type of Exam: Unknown Additional signs and symptoms: pt states no real abdominal pain, came in for general fatigue, pt states he has liver an dcolon cancer FINDINGS: Gallbladder is not visualized. Activity is seen within the small bowel. Delayed excretion of radiotracer by the liver. Nonvisualization of the gallbladder suggesting acute cholecystitis. Delayed excretion by the liver suggesting hepatic dysfunction. Physical Examination:        Physical Exam   Constitutional: He is oriented to person, place, and time. He appears well-developed and well-nourished. No distress. HENT:   Head: Normocephalic and atraumatic. Mouth/Throat: No oropharyngeal exudate. Eyes: Pupils are equal, round, and reactive to light. Conjunctivae are normal. Right eye exhibits no discharge. Left eye exhibits no discharge.    Cardiovascular: Normal rate, regular rhythm and normal heart sounds. Pulmonary/Chest: Effort normal and breath sounds normal. No respiratory distress. He has no wheezes. Abdominal: Soft. Bowel sounds are normal. He exhibits mass. He exhibits no distension. There is tenderness (diffuse tenderness with deep palpation). Neurological: He is alert and oriented to person, place, and time. Skin: Skin is warm and dry. Capillary refill takes less than 2 seconds. He is not diaphoretic. There is pallor. Psychiatric: His speech is normal and behavior is normal. Thought content normal. His mood appears anxious. Vitals reviewed. Assessment:        Primary Problem  Septic shock [facility])    Active Hospital Problems    Diagnosis Date Noted    Severe malnutrition ([facility] [E43] 05/04/2019    Septic shock ([facility] [A41.9, R65.21] 04/17/2019       Plan:        Hypotension, with etiology possibly due to SBP, possible lower GI bleed 2/2 Stage IV Colorectal Ca  -f/u daily CBC, CMP  -IVF NS @100cc/hr  -f/u blood and urine cx (negative thus far)  -Continue with Vanc/Zosyn for now  -5/4 - US - moderate ascites  -f.u FOBT, hold anticoags for now, protonix PO for now, repeat H/H in 6hrs   -Patient will be transfused 1U pRBC's this AM, due to repeat H/H 6.9/22.2  -Palliative care consult   -Albumin 2.0     Hyponatremia, asymptomatic, stable  -Nephrology following  - IVF  ml/hr for now     DM2  - Lantus (15U)  - ISS low dose, POC glucose     DVT ppx - Lovenox   Disposition - Eval and Treat      Arsh Payne MD  5/5/2019  8:38 AM     Attending Physician Statement  I have discussed the care of Sofia Boudreaux and I have examined the patient myselft and taken ros and hpi , including pertinent history and exam findings,  with the resident. I have reviewed the key elements of all parts of the encounter with the resident. I agree with the assessment, plan and orders as documented by the resident.     Surgery input pending Levaquin IV and a possible paratonia LT[facility]

## 2019-05-05 NOTE — PROGRESS NOTES
Pt ,wife have been updated on all new orders including IR guided paracentesis for tomorrow and GI consult and antibx changes

## 2019-05-05 NOTE — PLAN OF CARE
Problem: Falls - Risk of:  Goal: Will remain free from falls  Description  Will remain free from falls  5/4/2019 2240 by Recardo Lefort, RN  Outcome: Met This Shift  5/4/2019 1812 by Monique Hairston RN  Outcome: Met This Shift  5/4/2019 1711 by Monique Hairston RN  Outcome: Met This Shift  Goal: Absence of physical injury  Description  Absence of physical injury  5/4/2019 2240 by Recardo Lefort, RN  Outcome: Met This Shift  5/4/2019 1711 by Monique Hairston RN  Outcome: Met This Shift     Problem: Pain:  Goal: Pain level will decrease  Description  Pain level will decrease  5/4/2019 2240 by Recardo Lefort, RN  Outcome: Met This Shift  5/4/2019 1812 by Monique Hairston RN  Outcome: Ongoing  5/4/2019 1711 by Monique Hairston RN  Outcome: Ongoing  Goal: Control of acute pain  Description  Control of acute pain  5/4/2019 2240 by Recardo Lefort, RN  Outcome: Met This Shift  5/4/2019 1711 by Monique Hairston RN  Outcome: Ongoing  Goal: Control of chronic pain  Description  Control of chronic pain  5/4/2019 2240 by Recardo Lefort, RN  Outcome: Met This Shift  5/4/2019 1711 by Monique Hairston RN  Outcome: Ongoing     Problem: Nutrition  Goal: Optimal nutrition therapy  5/4/2019 2240 by Recardo Lefort, RN  Outcome: Met This Shift  5/4/2019 1812 by Monique Hairston RN  Outcome: Ongoing  5/4/2019 1711 by Monique Hairston RN  Outcome: Ongoing  5/4/2019 1411 by Keren Gao RD, LD  Outcome: Ongoing

## 2019-05-05 NOTE — PROGRESS NOTES
Pharmacy Vancomycin Consult     Vancomycin Day: 3  Current Dosin mg IVPB Q12H    Temp max:  98.6    Recent Labs     19  1132 19  0359   BUN 6 5*       Recent Labs     19  0359 19  0444   CREATININE 0.46* 0.44*       Recent Labs     19  0359 19  0444   WBC 11.7* 12.5*         Intake/Output Summary (Last 24 hours) at 2019 1335  Last data filed at 2019 1235  Gross per 24 hour   Intake 400 ml   Output 800 ml   Net -400 ml       Culture Date      Source                       Results  5/3                       Blood x 2                    Pending  5/3                       Urine                          Pending    Ht Readings from Last 1 Encounters:   19 6' 3\" (1.905 m)        Wt Readings from Last 1 Encounters:   19 190 lb (86.2 kg)         Body mass index is 23.75 kg/m². Estimated Creatinine Clearance: 224 mL/min (A) (based on SCr of 0.44 mg/dL (L)).     Trough: 98.6    Assessment/Plan:  Increase Vancomycin to 1500 mg IVPB Q12H  Lauren Shane MS   2019  1:36 PM

## 2019-05-06 PROBLEM — R79.89 ELEVATED LFTS: Status: ACTIVE | Noted: 2019-01-01

## 2019-05-06 NOTE — PROGRESS NOTES
[address]   OCCUPATIONAL THERAPY MISSED TREATMENT NOTE   INPATIENT   Date: 19  Patient Name: Lily Tavarez       Room:   MRN: [id number]   Account #: [de-identified]    : 1960  (66 y.o.)  Gender: male     REASON FOR MISSED TREATMENT:  Patient at testing and/or off the floor   -   Paracentesis 0908  Second attempt  - Pt refusal, agreeable to be seen tomorrow morning.      Kait Galdamez

## 2019-05-06 NOTE — CARE COORDINATION
ONGOING DISCHARGE PLAN:    Spoke with patient regarding discharge plan and patient advised plan is to return home with no VNS. Patient asked about his Rolator that was never delivered to home. Writer Called [facility] and was advised that the address that was on file was [facility] which was boarded up and abandoned. Asked Patient about this and he states he is now living at [address] [facility]. Called [facility] back and advised of this. Asked Nadya if The Rolator can just be delivered to the hospital room. Nadya will call Writer back and advise of this. Patient had a Paracentesis in IR  today. 850 ML yellow colored fluid removed. Pt has Hx of Liver Metastasis, Malignant Neoplasm of Colon, Carcinoma, Colorectal Cancer Stage IV    Remains on IV fluids, IV Levaquin,    GI consult    Will continue to follow for additional discharge needs.     Electronically signed by Alice Martinez RN on 5/6/2019 at 11:13 AM

## 2019-05-06 NOTE — PROGRESS NOTES
General Surgery Progress Note            PATIENT NAME: Naveed Cameron     TODAY'S DATE: 5/6/2019, 2:43 PM      SUBJECTIVE / OBJECTIVE:      VITALS:  /66   Pulse 88   Temp 98.3 °F (36.8 °C) (Oral)   Resp 20   Ht 6' 3\" (1.905 m)   Wt 190 lb (86.2 kg)   SpO2 97%   BMI 23.75 kg/m²     Patient seen and examined  Paracentesis earlier today  Denies abdominal pain  Denies N/V  Abdomen soft, ND, NT. +BS. Tolerating diet  No peripheral edema    INTAKE/OUTPUT:      Intake/Output Summary (Last 24 hours) at 5/6/2019 1443  Last data filed at 5/6/2019 0930  Gross per 24 hour   Intake 1014 ml   Output 850 ml   Net 164 ml       CBC with Differential:    Lab Results   Component Value Date    WBC 10.9 05/06/2019    RBC 3.22 05/06/2019    HGB 8.0 05/06/2019    HCT 24.9 05/06/2019     05/06/2019    MCV 77.5 05/06/2019    MCH 24.7 05/06/2019    MCHC 31.9 05/06/2019    RDW 21.5 05/06/2019    LYMPHOPCT 11 05/06/2019    MONOPCT 11 05/06/2019    BASOPCT 1 05/06/2019    MONOSABS 1.20 05/06/2019    LYMPHSABS 1.20 05/06/2019    EOSABS 0.00 05/06/2019    BASOSABS 0.11 05/06/2019    DIFFTYPE NOT REPORTED 05/06/2019     CMP:    Lab Results   Component Value Date     05/06/2019    K 3.4 05/06/2019    CL 89 05/06/2019    CO2 27 05/06/2019    BUN 5 05/04/2019    CREATININE 0.41 05/06/2019    GFRAA >60 05/06/2019    LABGLOM >60 05/06/2019    GLUCOSE 185 05/04/2019    GLUCOSE 195 04/26/2012    PROT 5.7 05/06/2019    LABALBU 1.9 05/06/2019    CALCIUM 7.2 05/04/2019    BILITOT 1.51 05/04/2019    ALKPHOS 472 05/04/2019    AST 95 05/04/2019    ALT 19 05/04/2019     Paracentesis fluid analysis pending    ASSESSMENT / PLAN:     Principal Problem:    Septic shock ([address]  Active Problems:    Liver metastasis (HCC)    Anemia    Severe malnutrition (HCC)    Elevated LFTs    Malignant neoplasm of colon ([address]    Other ascites  Resolved Problems:    * No resolved hospital problems. *    Sepsis, ?  Etiology  Hyponatremia  Liver mets  Doubt biliary etiology of pain  No surgical intervention at this time

## 2019-05-06 NOTE — CONSULTS
adenocarcinoma, and was treated with cryoablation to the liver in August 2018            Past Medical History:   Diagnosis Date    Back pain 1/30/2013    CAD S/P percutaneous coronary angioplasty 11/27/2016    cardiac stent    [facility]cin[facility] ([facility] [address] 12/04/2016    colon, liver mets    Colon cancer ([facility] [address]     Degeneration of lumbar or lumbosacral intervertebral disc 4/15/2014    Depression 5/10/2012    Diabetes mellitus ([address]     H/O cardiac catheterization     with cardiac stent    Hepatic metastases ([facility] [address] 12/4/2016    Hyperlipidemia     Hypertension     Knee pain     MI, 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    with cardiac arrest at [facility]      Past Surgical History:   Procedure Laterality Date    CORONARY ANGIOPLASTY WITH STENT PLACEMENT  11/2016    x1 stent    ENDOSCOPIC ULTRASOUND (LOWER) N/A 12/8/2017    RECTAL ENDOSCOPIC ULTRASOUND WITH PATHOLOGY [facility] LIVER BIOPSY Right 08/23/2018    CT guided Visceral Tissue Ablation    [facility] SIGMOIDOSCOPY FLX W/RMVL FOREIGN BODY N/A 5/25/2017    SIGMOIDOSCOPY BIOPSY FLEXIBLE performed by Elizabeth Martin MD at [facility]    VA SIGMOIDOSCOPY FLX W/RMVL FOREIGN BODY  12/8/2017    SIGMOIDOSCOPY BIOPSY FLEXIBLE performed by Elizabeth Martin MD at [facility] End[facility]    TUNNELED VENOUS PORT PLACEMENT Right     right upper chest for cancer treatment      Past Endoscopic History as above     Admission Meds  No current facility-administered medications on file prior to encounter.       Current Outpatient Medications on File Prior to Encounter   Medication Sig Dispense Refill    glucose monitoring kit (FREESTYLE) monitoring kit 1 kit by Does not apply route daily 1 kit 0    Lancets MISC 1 each by Does not apply route daily 100 each 3    insulin glargine (LANTUS) 100 UNIT/ML injection vial Inject 20 Units into the skin nightly      glimepiride (AMARYL) 4 MG tablet Take 1 tablet by mouth every morning 30 tablet 3    oxyCODONE HCl (OXY-IR) 10 MG immediate release tablet Take 20 mg by mouth 3 times daily as needed for Pain.  diphenhydrAMINE (BENADRYL) 25 MG capsule Take 1 capsule by mouth nightly as needed for Sleep 12 capsule 0    fentaNYL (DURAGESIC) 50 MCG/HR Place 1 patch onto the skin every 72 hours.  naloxone (NARCAN) 4 MG/0.1ML LIQD nasal spray Take 4 mg by mouth      potassium chloride (KLOR-CON M) 20 MEQ extended release tablet Take 1 tablet by mouth daily 30 tablet 3    metoprolol tartrate (LOPRESSOR) 25 MG tablet Take 1 tablet by mouth daily       Glucose Blood (BLOOD GLUCOSE TEST STRIPS) STRP 1 each by In Vitro route daily As needed. 100 strip 5    Insulin Pen Needle 29G X 12.7MM MISC 1 each by Does not apply route daily 100 each 0    nitroGLYCERIN (NITROSTAT) 0.4 MG SL tablet Place 1 tablet under the tongue every 5 minutes as needed for Chest pain Dissolve 1 tablet under tongue for chest pain and repeat every 5 min up to max of 3 total doses. If no relief after 3 doses call 911 25 tablet 3       Patient   Does Use ASA, NSAID No  Allergies  Allergies   Allergen Reactions    Morphine Itching and Rash        Social   Social History     Tobacco Use    Smoking status: Never Smoker    Smokeless tobacco: Never Used   Substance Use Topics    Alcohol use: Not Currently        PSYCH HISTORY:  Depression No  Anxiety No  Suicide No       Family History   Problem Relation Age of Onset    Heart Disease Mother     Stroke Mother     High Blood Pressure Mother     High Cholesterol Father       No family history of colon cancer, Crohn's disease, or ulcerative colitis.      Review of Systems  Constitutional: negative  Eyes: negative  Ears, nose, mouth, throat, and face: negative  Respiratory: negative  Cardiovascular: negative  Gastrointestinal: negative  Genitourinary:negative  Integument/breast: negative  Hematologic/lymphatic: negative  Musculoskeletal:negative  Endocrine: negative           Physical Exam  Blood pressure (!) 106/57, pulse 87, temperature 97.5 °F (36.4 °C), temperature source Oral, resp. rate 20, height 6' 3\" (1.905 m), weight 190 lb (86.2 kg), SpO2 96 %. General Appearance: alert and oriented to person, place and time, well-developed and well-nourished, in no acute distress mild jaundice, and looks to have some muscle wasting  Skin: warm and dry, no rash or erythema, jaundice  Head: normocephalic and atraumatic  Eyes: pupils equal, round, and reactive to light, extraocular eye movements intact, conjunctivae mind jaundiced  ENT: hearing grossly normal bilaterally  Neck: neck supple and non tender without mass, no thyromegaly or thyroid nodules, no cervical lymphadenopathy   Pulmonary/Chest: clear to auscultation bilaterally- no wheezes, rales or rhonchi, normal air movement, no respiratory distress  Cardiovascular: normal rate, regular rhythm, normal S1 and S2, no murmurs, rubs, clicks or gallops, distal pulses intact, no carotid bruits  Abdomen: Soft nontender but distended with some ascites, normal bowel sounds, no masses or organomegaly  Extremities: no cyanosis, clubbing or edema  Musculoskeletal: normal range of motion, no joint swelling, deformity or tenderness  Neurologic: no cranial nerve deficit and muscle strength normal    Data Review:    Recent Labs     05/04/19 0359 05/05/19 0444 05/05/19 1643 05/05/19 2348 05/06/19  0455   WBC 11.7* 12.5*  --   --   --  10.9   HGB 7.3* 7.0*   < > 8.0* 8.0* 8.0*   HCT 24.3* 22.4*   < > 25.9* 25.9* 24.9*   MCV 77.2* 77.2*  --   --   --  77.5*    181  --   --   --  151    < > = values in this interval not displayed.      Recent Labs     05/04/19 0359 05/05/19 0444 05/05/19 1643 05/05/19 2348 05/06/19  0455   *   < > 129*   < > 129* 129* 128*   K 3.7   < > 3.9   < > 3.6* 3.4* 3.4*   CL 89*   < > 91*   < > 90* 88* 89*   CO2 29   < > 28   < > 27 27 27   BUN 5*  --   --   --   --   --   --    CREATININE 0.46*  --  0.44*  --

## 2019-05-06 NOTE — PROGRESS NOTES
Department of Internal Medicine  Nephrology [facility], MD    Progress Note      Interval history:   Patient was seen and examined today, s/p Paracentesis 850 ml fluid drained. He does not have constipation, diarrhea, nausea or vomiting but complains of fatigue. SNa 128. History of present illness: This is a 66 y.o. male with a significant past medical history of Stage IV colon cancer with liver mets, Type 2 DM, systemic hypertension and coronary artery disease status post PTCA/stent, who presented with complaints of abdominal pain, generalized weakness, fatigue and failure to thrive. Laboratory studies were remarkable for hyponatremia with serum sodium 127 mmol/L and hence nephrology consultation. At presentation, systolic blood pressure was in the 120s but he subsequently developed hypotension between 10 PM and 3 AM with systolic blood pressure in the 90s. He had been recently with admitted with similar complaints 2 weeks ago. Physical Exam:    VITALS:  BP (!) 106/57   Pulse 87   Temp 97.5 °F (36.4 °C) (Oral)   Resp 20   Ht 6' 3\" (1.905 m)   Wt 190 lb (86.2 kg)   SpO2 96%   BMI 23.75 kg/m²   24HR INTAKE/OUTPUT:      Intake/Output Summary (Last 24 hours) at 5/6/2019 1341  Last data filed at 5/6/2019 0930  Gross per 24 hour   Intake 1014 ml   Output 850 ml   Net 164 ml       Constitutional:  Awake and alert; not in distress. Cardiovascular/Edema:  S1, S2 with regular rate and rhythm. Respiratory: Clinically clear. Gastrointestinal:  Moderately distended but nontender. CBC:   Recent Labs     05/04/19 0359 05/05/19 0444 05/05/19 1643 05/05/19 2348 05/06/19  0455   WBC 11.7* 12.5*  --   --   --  10.9   HGB 7.3* 7.0*   < > 8.0* 8.0* 8.0*    181  --   --   --  151    < > = values in this interval not displayed.      BMP:    Recent Labs     05/04/19 0359 05/05/19 0444 05/05/19 1643 05/05/19 2348 05/06/19  0455   *   < > 129*   < > 129* 129* 128*   K 3.7 Shane Damico MD  Attending Nephrologist  5/6/2019 1:41 PM

## 2019-05-06 NOTE — PLAN OF CARE
Problem: Falls - Risk of:  Goal: Will remain free from falls  Description  Will remain free from falls  5/6/2019 1713 by Fco Santana RN  Outcome: Ongoing     Problem: Falls - Risk of:  Goal: Absence of physical injury  Description  Absence of physical injury  Outcome: Ongoing     Problem: Pain:  Goal: Pain level will decrease  Description  Pain level will decrease  5/6/2019 1713 by Fco Santana RN  Outcome: Ongoing     Problem: Pain:  Goal: Control of acute pain  Description  Control of acute pain  Outcome: Ongoing     Problem: Pain:  Goal: Control of chronic pain  Description  Control of chronic pain  Outcome: Ongoing     Problem: Nutrition  Goal: Optimal nutrition therapy  5/6/2019 1713 by Richardine Boxer, RN  Outcome: Ongoing

## 2019-05-06 NOTE — CONSULTS
.. PALLIATIVE CARE NURSING ASSESSMENT    Patient: Mila Fleming  Room: 2121/2121-01    Reason For Consult   Goals of care evaluation  Distress management  Guidance and support  Facilitate communications  Assistance in coordinating care      Impression: Mila Fleming is a 66y.o. year old male  has a past medical history of Back pain, CAD S/P percutaneous coronary angioplasty, Carcinoma ([address] Colon cancer ([address] Degeneration of lumbar or lumbosacral intervertebral disc, Depression, Diabetes mellitus ([address] H/O cardiac catheterization, Hepatic metastases ([address] Hyperlipidemia, Hypertension, Knee pain, and MI, old. .  Currently hospitalized for the management of Septicemia. The Palliative Care Team is following to assist with goals of care and support. Vital Signs  Blood pressure (!) 106/57, pulse 87, temperature 97.5 °F (36.4 °C), temperature source Oral, resp. rate 20, height 6' 3\" (1.905 m), weight 190 lb (86.2 kg), SpO2 96 %.     Patient Active Problem List   Diagnosis    Hypertension, essential    Mixed hyperlipidemia    Knee pain    Type 2 diabetes mellitus with hyperglycemia, without long-term current use of insulin (HCC)    HTN (hypertension)    Hypercholesteremia    Insomnia    Knee pain, bilateral    Depression    Cervical sprain    Lumbar sprain    Back pain    Opioid dependence (HCC)    Degeneration of lumbar or lumbosacral intervertebral disc    Chronic midline low back pain without sciatica    ST elevation myocardial infarction (STEMI) ([address]    S/P PTCA  BMS to RCA - 11/26/2016 Dr. Tamika Barillas    CAD S/P percutaneous coronary angioplasty    Carcinoma [facility])    Type 2 myocardial infarction without ST elevation ([address]    Rectal bleed    Liver metastasis ([address]    Colorectal cancer, stage IV ([address]    Anemia    Iron deficiency anemia due to chronic blood loss    Neuropathy due to chemotherapeutic drug ([address]    Right hip pain    Radiation cystitis    Septic shock ([address]  Severe malnutrition (HCC)    Septicemia [facility] [address]    Elevated LFTs    Malignant neoplasm of colon ([facility] [address]    Other ascites       Palliative Interaction:The patient is sitting up to the side of the bed to eat. I introduced my palliative care role. The patient and I discussed his cancer journey. He stated that he was getting treatment at the [facility] , and then told me about an unfortunate event, and now he stated he is at [facility]. He does want to continue treatment , and he even mentioned [facility]. We talked about his goals and he wants to work on some houses for his son and grandson. He talked about having quality of life , and that if he could not have that he would opt to stop treatment and live his life doing what he loved. The patient states that his wife is aware of all of his wishes for medical treatment. He wanted to finish lunch and I told him we would talk again tomorrow. I offered emotional support to the patient . Will follow for goals of care and support. Goals/Plan of care  Education/support to staff  Education/support to patient  Discharge planning/helping to coordinate care  Providing support for coping/adaptation/distress of patient  Continue with current plan of care  Code status clarified: Full Code  Palliative care orders introduced  Continued communication updates  Patient and I discussed his cancer treatment plan, and he says \" I want to live and continue treatment . \" Emotional support offered. Will follow for goals of care and support. Patient states\" I have goals to fix up 2 houses for my son and grandson. \" He expressed \" I want to have a quality of life, and if I can't I won't continue treatment . \" He states\" my wife knows my wishes for medical treatment.  \"       ..[address], RN  [facility]: [phone]  [facility]: [phone]  Work Cell: [phone]

## 2019-05-06 NOTE — PLAN OF CARE
PRE CONSULT ROUNDING NOTE  HPI  66year old male admitted for generalized weakness. He was recently hospitalized for cholecystitis; he refused surgery at the time. Our service was consulted for anemia with a hx of stage 4 colon cancer with mets to the liver. He was initially diagnosed with cancer in 2016 and received radiation and chemotherapy. He stopped treatment 3 months ago because it \"wasn't working\"\" and he is now interested in going to [facility] for experimental treatment. He was previously seeing Dr Elizabeth Quintana but was dismissed from the practice for inappropriate behavior; he was most recently going to [facility]. He has a decreased appetite with weight loss and fatigue but no abdominal pain, nausea, melena or hematemesis. He intermittently sees bright red blood per rectum, but states this is \"from the cancer mass\". He is not on NSAIDS or anticoagulation meds. In the ED hgb was found to be 7 then decreased to 6.9 and now is 8 after blood transfusions. He has also had iron deficiency in the past requiring medication. His abdominal CT from 4/17/19 showed innumerable hepatic lesions and moderate gallbladder wall thickening. ABD US 5/4/19 showed moderate ascites with a nodular liver and a thickened gallbladder wall with gallbladder polyps. Labs are significant for , alk phos of 472, AST 95, bili 1.51, INR 1.4. Initial WBC was 12.5 and he was started on Levaquin. He underwent a paracentesis this morning that removed 850ml of fluid; the labs are still pending; he has had fevers. Endoscopy 12/8/17 flex sig (dr Orlando Juarez) ulcerated friable partially circumferential mass in rectum, internal hemorrhoids. 5/25/17 flex sig (dr Orlando Juarez) for follow up for rectal mass post chemo showed large circumferential fungating friable and erythematous mass 5cm from anus. 12/6/16 colonoscopy (dr Orlando Juarez) polyp in cecum removed, polyp in descending colon removed, rectal mass present.  No record of EGD in epic    Colon mass bx 12/5/16

## 2019-05-06 NOTE — PROGRESS NOTES
sinus pressure, sinus pain, sore throat and trouble swallowing. Respiratory: Negative. Negative for cough, chest tightness, shortness of breath and stridor. Cardiovascular: Negative. Negative for chest pain, palpitations and leg swelling. Gastrointestinal: Positive for abdominal pain. Negative for anal bleeding, blood in stool, constipation, diarrhea, nausea, rectal pain and vomiting. Genitourinary: Negative. Negative for difficulty urinating, enuresis, flank pain, frequency and hematuria. Musculoskeletal: Positive for back pain (chronic, unchanged). Negative for gait problem. Skin: Negative for pallor, rash and wound. Neurological: Negative. Negative for weakness, light-headedness, numbness and headaches. Psychiatric/Behavioral: Negative for confusion, hallucinations and sleep disturbance. The patient is not nervous/anxious. Medications: Allergies:     Allergies   Allergen Reactions    Morphine Itching and Rash       Current Meds:   Scheduled Meds:    famotidine (PEPCID) injection  20 mg Intravenous BID    levofloxacin  500 mg Intravenous Q24H    sodium chloride flush  10 mL Intravenous 2 times per day    enoxaparin  40 mg Subcutaneous Daily    insulin glargine  15 Units Subcutaneous Nightly    insulin lispro  0-6 Units Subcutaneous TID WC    insulin lispro  0-3 Units Subcutaneous Nightly    fentanNYL  50 mcg Intravenous Once    fentaNYL  1 patch Transdermal Q72H     Continuous Infusions:    sodium chloride 100 mL/hr at 05/04/19 2303    dextrose       PRN Meds: oxyCODONE, sodium chloride flush, magnesium hydroxide, ondansetron, glucose, dextrose, glucagon (rDNA), dextrose, albuterol sulfate HFA, acetaminophen    Data:     Past Medical History:   has a past medical history of Back pain, CAD S/P percutaneous coronary angioplasty, Carcinoma ([facility] [address] Colon cancer ([facility] [address] Degeneration of lumbar or lumbosacral intervertebral disc, Depression, Diabetes mellitus ([facility] [address] H/O cardiac 05/04/2019    ALKPHOS 472 05/04/2019    AST 95 05/04/2019    ALT 19 05/04/2019         Lab Results   Component Value Date/Time    SPECIAL 6ML PURPLE/1 ML RIGHT WRIST 05/03/2019 12:35 PM     Lab Results   Component Value Date/Time    CULTURE NO GROWTH 3 DAYS 05/03/2019 12:35 PM       [unfilled]    Radiology:    Xr Chest Standard (2 Vw)    Result Date: 4/17/2019  EXAMINATION: TWO VIEWS OF THE CHEST 4/17/2019 9:18 pm COMPARISON: 04/07/2019 HISTORY: ORDERING SYSTEM PROVIDED HISTORY: chest pain, tachycardia TECHNOLOGIST PROVIDED HISTORY: chest pain, tachycardia Ordering Physician Provided Reason for Exam: Pt states chest pain and fatigue x 1 day Acuity: Acute Type of Exam: Initial Additional signs and symptoms: Pt states chest pain and fatigue x 1 day FINDINGS: Right internal jugular chest port with catheter tip at the cavoatrial junction. Limited due to low lung volumes. Normal heart size and pulmonary vasculature. Left lateral basilar opacity with corresponding finding on the lateral view may represent atelectasis or pneumonia. No pneumothorax. Limited due to low lung volumes. Left lateral basilar opacity may represent atelectasis or pneumonia. Consider repeating study with full inspiration. Xr Chest Standard (2 Vw)    Result Date: 4/8/2019  EXAMINATION: TWO VIEWS OF THE CHEST 4/8/2019 12:18 am COMPARISON: None. HISTORY: ORDERING SYSTEM PROVIDED HISTORY: Chest pain sob TECHNOLOGIST PROVIDED HISTORY: Chest pain sob Ordering Physician Provided Reason for Exam: chest pain, sob Acuity: Acute Type of Exam: Initial Additional signs and symptoms: Chest pain (tingling), shortness of breath, hx liver ca, neck ca, and colon ca Relevant Medical/Surgical History: Chest pain (tingling), shortness of breath, hx liver ca, neck ca, and colon ca FINDINGS: A right-sided jase catheter is present with its tip in the SVC. No infiltrate or consolidation or effusion is identified.   The heart size is normal.     No acute abnormality detected. Ct Abdomen Pelvis W Iv Contrast Additional Contrast? None    Result Date: 4/17/2019  EXAMINATION: CT OF THE ABDOMEN AND PELVIS WITH CONTRAST 4/17/2019 10:45 pm TECHNIQUE: CT of the abdomen and pelvis was performed with the administration of intravenous contrast. Multiplanar reformatted images are provided for review. Dose modulation, iterative reconstruction, and/or weight based adjustment of the mA/kV was utilized to reduce the radiation dose to as low as reasonably achievable. COMPARISON: 10/03/2018 HISTORY: ORDERING SYSTEM PROVIDED HISTORY: sepsis, source unknown. history of metastatic colon cancer to liver TECHNOLOGIST PROVIDED HISTORY: Ordering Physician Provided Reason for Exam: sepsis, source unknown. Hx - metastatic colon cancer to liver. Patient states weakness x1 day prior. Acuity: Unknown Type of Exam: Unknown Relevant Medical/Surgical History: Surgical hx - Hernia repair. Hx - HBP, Diabetes, Carcinoma, Colorectal cancer, stage IV  Hepatic metastases . FINDINGS: Mild elevation of the left hemidiaphragm. Coronary calcifications versus coronary stents identified. Trace pericardial thickening/pericardial fluid. There are bibasilar opacities suggestive of bibasilar atelectasis. These findings most confluent within the left lower lobe. There are innumerable hypodensities identified throughout the right and left hepatic lobes which have progressed from prior examination. A suggestion of a central ill-defined area of hypoattenuation which may represent areas of posterior related changes/fibrosis versus perfusion differences. Mild periportal edema involving the liver. There is moderate gallbladder wall thickening. No definite cholelithiasis. Spleen, adrenal glands, kidneys, pancreas unremarkable. Mild retained stool throughout the colon without evidence of obstruction. The appendix unremarkable. No suspicious mesenteric or retroperitoneal adenopathy.   A few lymph nodes 5/3/2019  EXAMINATION: SINGLE XRAY VIEW OF THE CHEST 5/3/2019 11:12 am COMPARISON: April 17, 2019 HISTORY: ORDERING SYSTEM PROVIDED HISTORY: Chest Pain TECHNOLOGIST PROVIDED HISTORY: Chest Pain Ordering Physician Provided Reason for Exam: Pt states today chest pain. History of colon cancer and lung cancer Acuity: Unknown Type of Exam: Unknown FINDINGS: Right chest Port in good position. No kink. Non enlarged heart. Bibasilar atelectasis. Right basilar pleuroparenchymal scarring. No effusion. No pneumothorax. No airspace consolidation. No focal airspace consolidation. Ct Chest Pulmonary Embolism W Contrast    Result Date: 4/8/2019  EXAMINATION: CTA OF THE CHEST 4/8/2019 12:31 am TECHNIQUE: CTA of the chest was performed after the administration of intravenous contrast.  Multiplanar reformatted images are provided for review. MIP images are provided for review. Dose modulation, iterative reconstruction, and/or weight based adjustment of the mA/kV was utilized to reduce the radiation dose to as low as reasonably achievable. COMPARISON: CT chest done July 2, 2018. HISTORY: ORDERING SYSTEM PROVIDED HISTORY: CP, elevated d-dimer TECHNOLOGIST PROVIDED HISTORY: Ordering Physician Provided Reason for Exam: chest pain, elevated d dimer Acuity: Acute Type of Exam: Initial Relevant Medical/Surgical History: h/o colorectal ca, liver ca, diabetes, htn FINDINGS: Pulmonary Arteries: Pulmonary arteries are adequately opacified for evaluation. No evidence of intraluminal filling defect to suggest pulmonary embolism. Main pulmonary artery is normal in caliber. Mediastinum: No evidence of mediastinal lymphadenopathy. The heart and pericardium demonstrate no acute abnormality. Coronary artery calcifications. There is no acute abnormality of the thoracic aorta. Lungs/pleura: The lungs are without acute process. No focal consolidation or pulmonary edema. Mild bilateral dependent and basilar subsegmental atelectasis. ascites. 2. Enlarged inhomogeneous nodular liver as previously noted on the CT evaluation. Neoplasm should be considered. 3. Thickened gallbladder wall as well as gallbladder polyps. The gallbladder does not appear distended however. No evidence of gallbladder stones. Acute cholecystitis is unlikely. Nm Hepatobiliary Scan W Ejection Fraction    Result Date: 4/20/2019  EXAMINATION: NUCLEAR MEDICINE HEPATOBILIARY SCINTIGRAPHY (HIDA SCAN). 4/20/2019 10:13 am TECHNIQUE: Approximately 3.0 [id number] Mebrofenin (Choletec) was administered IV. Then, dynamic images of the abdomen were obtained in the anterior projection for 60 mins. A right lateral view was also obtained at 60 mins. COMPARISON: Ultrasound 04/18/2019 HISTORY: ORDERING SYSTEM PROVIDED HISTORY: pain TECHNOLOGIST PROVIDED HISTORY: Ordering Physician Provided Reason for Exam: Pain Acuity: Unknown Type of Exam: Unknown Additional signs and symptoms: pt states no real abdominal pain, came in for general fatigue, pt states he has liver an dcolon cancer FINDINGS: Gallbladder is not visualized. Activity is seen within the small bowel. Delayed excretion of radiotracer by the liver. Nonvisualization of the gallbladder suggesting acute cholecystitis. Delayed excretion by the liver suggesting hepatic dysfunction. Physical Examination:        Physical Exam   Constitutional: He is oriented to person, place, and time. No distress. Malnourished   HENT:   Head: Normocephalic and atraumatic. Mouth/Throat: No oropharyngeal exudate. Eyes: Pupils are equal, round, and reactive to light. Conjunctivae are normal. Right eye exhibits no discharge. Left eye exhibits no discharge. Cardiovascular: Normal rate, regular rhythm and normal heart sounds. Pulmonary/Chest: Effort normal and breath sounds normal. No respiratory distress. He has no wheezes. Abdominal: Soft. Bowel sounds are normal. He exhibits distension and mass.  There is tenderness (diffuse tenderness with deep palpation). Ascites+, fluid wave, dullness to percussion, appears to have increased in size compared to prior   Musculoskeletal: He exhibits no edema. Neurological: He is alert and oriented to person, place, and time. Skin: Skin is warm and dry. Capillary refill takes less than 2 seconds. He is not diaphoretic. There is pallor. Psychiatric: His speech is normal and behavior is normal. Thought content normal.   Mood/affect appropriate   Vitals reviewed.     Vitals:    05/05/19 1438 05/05/19 1509 05/05/19 1953 05/06/19 0145   BP: 114/63 111/60 (!) 124/58 137/73   Pulse: 88 89 98 97   Resp: 16 20 18 18   Temp: 98.8 °F (37.1 °C) 99 °F (37.2 °C) 98.7 °F (37.1 °C) 98.4 °F (36.9 °C)   TempSrc: Oral Oral Oral Oral   SpO2: 95% 94% 98% 97%   Weight:       Height:         Assessment:        Primary Problem  Septic shock [facility])    Active Hospital Problems    Diagnosis Date Noted    Severe malnutrition ([facility] [address] [E43] 05/04/2019    Septic shock ([facility] [address] [A41.9, R65.21] 04/17/2019       Plan:        Hypotension, with etiology possibly due to SBP, possible lower GI bleed 2/2 Stage IV Colorectal Ca  -IVF NS @100cc/hr  - Changed to Levaquin IV  -5/4 - US - moderate ascites  - IR for paracentesis - labs ordered for ascitic fluid, reordered serum albumin, protein, LDH to be drawn after patient returns to floor   - s/p 1U pRBC on 5/6, stable overnight at 8.0   - Surgery following - doubt biliary, rec GI consult  - f/u GI consult   - considering vit K for increasing INR/nutritional status  - f/u Palliative care consult     Hyponatremia, hypovolemic  -Nephrology following, rec continue w/ fluids  - IVF  ml/hr     DM2  - Lantus (15U)  - ISS low dose, POC glucose     DVT ppx - Lovenox   Disposition - Eval and Treat      Jacek Villeda MD  5/6/2019  8:46 AM         IM Attending    Pt seen and examined today   I have discussed the care of pt , including pertinent history and exam findings,  with the resident. I have reviewed the key elements of all parts of the encounter with the resident. I agree with the assessment, plan and orders as documented by the resident.     Electronically signed by Sophy White MD on 5/6/2019 at 11:07 AM

## 2019-05-07 NOTE — DISCHARGE SUMMARY
[address]    Discharge Summary     Patient ID: Lily Tavarez  :  1960   MRN: [id number]     ACCOUNT:  [de-identified]   Patient's PCP: No primary care provider on file. Admit Date: 5/3/2019   Discharge Date: 2019     Length of Stay: 4  Code Status:  Full Code  Admitting Physician: Neeru Tinoco MD  Discharge Physician: Miranda Xiong MD     Active Discharge Diagnoses:       Primary Problem  Septic shock [facility])      [place] Problems    Diagnosis Date Noted    Elevated LFTs [R94.5] 2019    Malignant neoplasm of colon ([facility] [C18.9]     Other ascites [R18.8]     Severe malnutrition ([facility] [E43] 2019    Septic shock ([facility] [A41.9, R65.21] 2019    Anemia [D64.9]     Liver metastasis ([facility] [C78.7] 2016       Admission Condition:  poor     Discharged Condition: stable    Hospital Stay:       Hospital Course:  Lily Tavarez is a 66 y.o. male who was admitted for the management of  suspected Septic shock ([facility] patient was recently admitted to [address] [place] [address] for similar presentation with suspected cholecystitis in the setting of stage IV cancer w/ extensive liver mets. Patient was on broad-spectrum antibiotics Vanc/Zosyn then changed to IV Levaquin, had septic work-up that was negative, U/S showed moderate ascites, had paracentesis negative for SBP, clinical presentation presumed to be caused by his liver metastases and not by infection. Patient was followed by Nephrology for hyponatremia, treated with IVF, fluid restriction, and albumin replacement. Patient was previously following with Dr. Kenya De Los Santos for oncology but was dismissed from practice in 10/2018 due to inappropriate behavior toward staff. Patient does have an oncologist he follows with at [facility].  Patient was not prepared to discuss code status changes and hospice care, he will follow-up as outpatient with his oncologist and suggestive of bibasilar atelectasis. These findings most confluent within the left lower lobe. There are innumerable hypodensities identified throughout the right and left hepatic lobes which have progressed from prior examination. A suggestion of a central ill-defined area of hypoattenuation which may represent areas of posterior related changes/fibrosis versus perfusion differences. Mild periportal edema involving the liver. There is moderate gallbladder wall thickening. No definite cholelithiasis. Spleen, adrenal glands, kidneys, pancreas unremarkable. Mild retained stool throughout the colon without evidence of obstruction. The appendix unremarkable. No suspicious mesenteric or retroperitoneal adenopathy. A few lymph nodes identified within the port appendix region noted of uncertain clinical significance borderline size. No free air or free fluid. Tiny fat containing right inguinal hernia. The bladder is mildly distended. Prostate is unremarkable. No loculated rim enhancing fluid collection to suggest abscess formation. Central disc protrusion T7-T8. Moderate changes L4-5 and L5-S1. No suspicious osseous lesions. Moderate gallbladder wall thickening nonspecific. Please correlate with exam findings. Innumerable hepatic lesions suggestive of metastatic disease as on prior imaging studies. Bibasilar consolidative changes favor bibasilar atelectasis. Us Gallbladder Ruq    Result Date: 4/18/2019  EXAMINATION: RIGHT UPPER QUADRANT ULTRASOUND 4/18/2019 9:13 am COMPARISON: CT abdomen and pelvis April 17, 2019. HISTORY: ORDERING SYSTEM PROVIDED HISTORY: wall thickening on CT and elevated alk phos FINDINGS: LIVER:  Cirrhotic liver with heterogeneous echotexture. No  intrahepatic bile duct dilatation noted. BILIARY SYSTEM:  Diffuse gallbladder wall thickening. Questionable adherent stone versus polyp measuring 8 mm. No internal color Doppler vascularity. Negative Iyer sign.   No pericholecystic fluid. Common bile duct is within normal limits measuring 5.3 mm. PANCREAS:  Visualized portions of the pancreas are unremarkable. OTHER: Trace ascites is noted within the right upper quadrant. 1.  Diffuse gallbladder wall thickening. No pericholecystic fluid or biliary obstruction. The finding may related to the patient's underlying cirrhosis or possibly chronic cholecystitis. If acute cholecystitis is of clinical concern, further evaluation with HIDA scan is recommended. 2. Questionable adherent stone versus polyp measuring 8 mm. The former is favored. If no surgical intervention is performed, repeat ultrasound in 1 year is recommended. 3. Trace perihepatic ascites. Xr Chest Portable    Result Date: 5/3/2019  EXAMINATION: SINGLE XRAY VIEW OF THE CHEST 5/3/2019 11:12 am COMPARISON: April 17, 2019 HISTORY: ORDERING SYSTEM PROVIDED HISTORY: Chest Pain TECHNOLOGIST PROVIDED HISTORY: Chest Pain Ordering Physician Provided Reason for Exam: Pt states today chest pain. History of colon cancer and lung cancer Acuity: Unknown Type of Exam: Unknown FINDINGS: Right chest Port in good position. No kink. Non enlarged heart. Bibasilar atelectasis. Right basilar pleuroparenchymal scarring. No effusion. No pneumothorax. No airspace consolidation. No focal airspace consolidation. Ct Chest Pulmonary Embolism W Contrast    Result Date: 4/8/2019  EXAMINATION: CTA OF THE CHEST 4/8/2019 12:31 am TECHNIQUE: CTA of the chest was performed after the administration of intravenous contrast.  Multiplanar reformatted images are provided for review. MIP images are provided for review. Dose modulation, iterative reconstruction, and/or weight based adjustment of the mA/kV was utilized to reduce the radiation dose to as low as reasonably achievable. COMPARISON: CT chest done July 2, 2018.  HISTORY: ORDERING SYSTEM PROVIDED HISTORY: CP, elevated d-dimer TECHNOLOGIST PROVIDED HISTORY: Ordering Physician Provided Reason for Exam: chest pain, elevated d dimer Acuity: Acute Type of Exam: Initial Relevant Medical/Surgical History: h/o colorectal ca, liver ca, diabetes, htn FINDINGS: Pulmonary Arteries: Pulmonary arteries are adequately opacified for evaluation. No evidence of intraluminal filling defect to suggest pulmonary embolism. Main pulmonary artery is normal in caliber. Mediastinum: No evidence of mediastinal lymphadenopathy. The heart and pericardium demonstrate no acute abnormality. Coronary artery calcifications. There is no acute abnormality of the thoracic aorta. Lungs/pleura: The lungs are without acute process. No focal consolidation or pulmonary edema. Mild bilateral dependent and basilar subsegmental atelectasis. No evidence of pleural effusion or pneumothorax. Upper Abdomen: Partially visualized area of ill-defined hypoattenuation in the medial left hepatic lobe is not well evaluated. Soft Tissues/Bones: Mixed osseous lucency and sclerosis in the T2 and T4 vertebral bodies and posterior elements is increased compared to CT chest done July 2, 2018 and may represent osseous metastatic disease. Multilevel degenerative disc disease. Right IJ port with tip at the superior atrial caval junction. 1.  No acute pulmonary thromboembolic disease. No pulmonary hypertension or right ventricular strain. 2.  No pulmonary mass or consolidation. No intrathoracic lymphadenopathy. 3.  Partially visualized area of ill-defined hypoattenuation in the medial left hepatic lobe is not well evaluated. Underlying malignancy is not excluded. 4.  Mixed osseous lucency and sclerosis in the T2 and T4 vertebral bodies and posterior elements is increased compared to CT chest done July 2, 2018 and may represent osseous metastatic disease.      Us Abdomen Limited    Result Date: 5/4/2019  EXAMINATION: RIGHT UPPER QUADRANT ULTRASOUND, 5/4/2019 9:39 am COMPARISON: 04/18/2019 HISTORY: ORDERING SYSTEM PROVIDED HISTORY: Check for ascites TECHNOLOGIST PROVIDED HISTORY: Check for ascites Abd distention, hx liver mets r/o cholecystitis Acuity: Acute Type of Exam: Initial FINDINGS: LIVER:  The liver demonstrates some abnormal echotexture as well as hepatomegaly with the liver measuring 27 cm. Liver nodularity noted which was also seen on the previous CT evaluation. BILIARY SYSTEM:  Gallbladder wall is severely thickened at 9.2 mm. There is evidence of gallbladder polyps. No evidence of gallbladder stones. Negative Iyer sign. Common bile duct is within normal limits measuring 0.61 cm. RIGHT KIDNEY: The right kidney is grossly unremarkable without evidence of hydronephrosis. OTHER: Moderate ascites noted in the abdomen and pelvis. 1. Moderate ascites. 2. Enlarged inhomogeneous nodular liver as previously noted on the CT evaluation. Neoplasm should be considered. 3. Thickened gallbladder wall as well as gallbladder polyps. The gallbladder does not appear distended however. No evidence of gallbladder stones. Acute cholecystitis is unlikely. Ir Us Guided Paracentesis    Result Date: 5/6/2019  PROCEDURE: ULTRASOUND GUIDED PARACENTESIS 5/6/2019 HISTORY: ORDERING SYSTEM PROVIDED HISTORY: Suspected SBP TECHNOLOGIST PROVIDED HISTORY: Suspected SBP TECHNIQUE: This procedure was performed by Dr. Dayana Sandhu. Informed consent was obtained after a detailed explanation of the procedure including risks, benefits, and alternatives. Universal protocol was followed. Ultrasound shows a small to moderate amount of ascites. The right abdomen was prepped and draped in sterile fashion and local anesthesia was achieved with lidocaine. Using realtime ultrasound guidance a 6 [place] pigtail catheter was advanced into the peritoneal fluid. Ultrasound images show a safe tract and access into the fluid. Fluid was collected in vacuum bottles. Little to no fluid remains on completion. The catheter was removed and a sterile dressing applied.  There are no immediate with oncologist.    Diet: regular diet restrict fluids    Activity: As tolerated    Instructions to Patient: follow up with oncologist, avoid sick contacts, restrict fluid intake    Discharge Medications:      Medication List      CONTINUE taking these medications    blood glucose test strips  1 each by In Vitro route daily As needed. diphenhydrAMINE 25 MG capsule  Commonly known as:  BENADRYL  Take 1 capsule by mouth nightly as needed for Sleep     fentaNYL 50 MCG/HR  Commonly known as:  DURAGESIC     glimepiride 4 MG tablet  Commonly known as:  AMARYL  Take 1 tablet by mouth every morning     glucose monitoring kit monitoring kit  1 kit by Does not apply route daily     insulin glargine 100 UNIT/ML injection vial  Commonly known as:  LANTUS     Insulin Pen Needle 29G X 12.7MM Misc  1 each by Does not apply route daily     Lancets Misc  1 each by Does not apply route daily     metoprolol tartrate 25 MG tablet  Commonly known as:  LOPRESSOR     NARCAN 4 MG/0.1ML Liqd nasal spray  Generic drug:  naloxone     nitroGLYCERIN 0.4 MG SL tablet  Commonly known as:  NITROSTAT  Place 1 tablet under the tongue every 5 minutes as needed for Chest pain Dissolve 1 tablet under tongue for chest pain and repeat every 5 min up to max of 3 total doses. If no relief after 3 doses call 911     oxyCODONE HCl 10 MG immediate release tablet  Commonly known as:  OXY-IR     potassium chloride 20 MEQ extended release tablet  Commonly known as:  KLOR-CON M  Take 1 tablet by mouth daily          Time Spent on discharge is  31 mins in patient examination, evaluation, counseling as well as medication reconciliation, prescriptions for required medications, discharge plan and follow up. Electronically signed by   Juliana Dietrich MD  5/7/2019  1:30 PM      Thank you Dr. Patel primary care provider on file. for the opportunity to be involved in this patient's care.     IM Attending     Pt seen and examined before discharge   Discharge plan and medications were reviewed and agreed as documented by resident.   Time spent for discharge planning more than 30 minutes     Electronically signed by Eulogio Barrientos MD on 6/11/2019 at 11:41 PM

## 2019-05-07 NOTE — FLOWSHEET NOTE
05/07/19 1210   Encounter Summary   Services provided to: Patient   Referral/Consult From: [facility]   (5/7/19V)   Volunteer Visit Yes   Complexity of Encounter Low   Length of Encounter 15 minutes   Routine   Type Follow up   Spiritual/Protestant   Type Spiritual support   Intervention Prayer

## 2019-05-07 NOTE — FLOWSHEET NOTE
05/06/19 2038   Encounter Summary   Services provided to: Patient   Referral/Consult From: Vick   Spiritual/Voodoo   Type Spiritual support   Assessment Sleeping   Intervention Prayer   Outcome Did not respond

## 2019-05-07 NOTE — PROGRESS NOTES
Physical Therapy    Facility/Department: [facility] PROGRESSIVE CARE  Initial Assessment    NAME: Aicha Lee  : 1960  MRN: [id number]    Date of Service: 2019    Discharge Recommendations:  Home with assist PRN, 24 hour supervision or assist, Home with Home health PT   PT Equipment Recommendations  Equipment Needed: No    Assessment   Body structures, Functions, Activity limitations: Decreased functional mobility ; Decreased strength;Decreased endurance;Decreased safe awareness;Decreased balance  Assessment: continue per POC to maxmize potential for safe D/C  Treatment Diagnosis: impaired mobility, weakness  Specific instructions for Next Treatment: 19 HOME W/ 24 HOUR ASSIST AND HOME PT; gait w/ rollator 80' x 2 w/ min x 1, FALL RISK, needs cuing for safety  Prognosis: Fair  Decision Making: Medium Complexity  History: admitted due to septicemia  Exam: ROM, MMT, balance and mobility assessments  Clinical Presentation: ;gait w/ rollator 80' x 2 w/ min x 1, FALL RISK, needs cuing for safety  Patient Education: POC  Barriers to Learning: none  REQUIRES PT FOLLOW UP: Yes  Activity Tolerance  Activity Tolerance: Patient limited by fatigue;Patient limited by pain; Patient limited by endurance       Patient Diagnosis(es): The encounter diagnosis was Septicemia ([address] has a past medical history of Back pain, CAD S/P percutaneous coronary angioplasty, Carcinoma ([address] Colon cancer ([address] Degeneration of lumbar or lumbosacral intervertebral disc, Depression, Diabetes mellitus ([address] H/O cardiac catheterization, Hepatic metastases ([address] Hyperlipidemia, Hypertension, Knee pain, and MI, old.   has a past surgical history that includes Tunneled venous port placement (Right); pr sigmoidoscopy flx w/rmvl foreign body (N/A, 2017); hernia repair; Coronary angioplasty with stent (2016); pr sigmoidoscopy flx w/rmvl foreign body (2017);  Endoscopic ultrasonography, GI (N/A, 2017); and liver biopsy (Right, 08/23/2018). Restrictions  Restrictions/Precautions  Restrictions/Precautions: Fall Risk(port right chest wall)  Required Braces or Orthoses?: No  Implants present? : Metal implants(cardiac stent)  Position Activity Restriction  Other position/activity restrictions: Stage IV colon cancer with liver mets. Paracentesis 5/6/19  Vision/Hearing  Vision: Within Functional Limits  Hearing: Within functional limits     Subjective  General  Patient assessed for rehabilitation services?: Yes  Response To Previous Treatment: Not applicable  Family / Caregiver Present: No  Referring Practitioner: Dr. Albina Barr  Referral Date : 05/04/19  Diagnosis: septicemia  Follows Commands: Within Functional Limits  General Comment  Comments: per chart, pt had C/O generalized weakness, fatigue and difficulty walking. Subjective  Subjective: pt had C/O fatigue. Pt reports that he has an infection. Pain Screening  Patient Currently in Pain: Yes  Pain Assessment  Pain Assessment: 0-10  Pain Level: 7  Pain Type: Chronic pain  Pain Location: Back  Pain Orientation: Lower  Pain Descriptors: Aching; Throbbing  Pain Frequency: Continuous  Pain Onset: On-going  Clinical Progression: Gradually worsening  Non-Pharmaceutical Pain Intervention(s): Repositioned; Ambulation/Increased Activity  Response to Pain Intervention: Drowsy  Multiple Pain Sites: No  Vital Signs  Patient Currently in Pain: Yes       Orientation  Orientation  Overall Orientation Status: Within Normal Limits  Social/Functional History  Social/Functional History  Lives With: (spouse & father)  Type of Home: House  Home Layout: One level  Home Access: Stairs to enter with rails  Entrance Stairs - Number of Steps: 2(reports difficult time ascending/ descending steps)  Entrance Stairs - Rails: Right  Bathroom Shower/Tub: Tub/Shower unit  Bathroom Toilet: Handicap height  Bathroom Equipment: Grab bars in shower, Grab bars around toilet  Bathroom Accessibility: balance  Distance: 80' x 2  Stairs/Curb  Stairs?: No  Wheelchair Activities  Propulsion: No     Balance  Sitting - Static: Good  Sitting - Dynamic: Good;-  Standing - Static: Good;-(used rollator)  Standing - Dynamic: Fair(used rollator)        Plan   Plan  Times per week: 5-7 treatemnts/ week  Times per day: (5-7 treatemnts/ week)  Specific instructions for Next Treatment: 5-7-19 HOME W/ 24 HOUR ASSIST AND HOME PT; gait w/ rollator 80' x 2 w/ min x 1, FALL RISK, needs cuing for safety  Current Treatment Recommendations: Strengthening, Transfer Training, Endurance Training, Patient/Caregiver Education & Training, ROM, Balance Training, Gait Training, Functional Mobility Training, Safety Education & Training, Stair training  Safety Devices  Type of devices: All fall risk precautions in place, Gait belt, Patient at risk for falls, Call light within reach, Bed alarm in place, Left in bed    G-Code       OutComes Score                                                  AM-PAC Score  AM-PAC Inpatient Mobility Raw Score : 16  AM-PAC Inpatient T-Scale Score : 40.78  Mobility Inpatient CMS 0-100% Score: 54.16  Mobility Inpatient CMS G-Code Modifier : CK          Goals  Short term goals  Time Frame for Short term goals: 5-7 treatemnts/ week  Short term goal 1: pt to demonstrate safe technique for sit to stand and bed to chair transfers using rollator MOD I.   Short term goal 2: pt to demonstrate safe technique for ambulation using rollator 150' for community ambulation MOD I  Short term goal 3: pt to demonstrate good technique for LE strengthening exercises and balance activities  Short term goal 4: pt to ascend/ descend 2 steps w/ rail w/ AD w/ min x 1  Short term goal 5: pt to demonstrate good ambulatory balance using rollator  Patient Goals   Patient goals : go to [facility] to chill       Therapy Time   Individual Concurrent Group Co-treatment   Time In 0841         Time Out 0907         Minutes 26         Timed Code Treatment Minutes: [facility], PT

## 2019-05-07 NOTE — PROGRESS NOTES
[address]    PROGRESS NOTE             5/7/2019    10:21 AM    Name:   Dunia Thurman  MRN:     [id number]     Acct:      [de-identified]   Room:   [facility] Day:  4  Admit Date:  5/3/2019 11:01 AM    PCP:  No primary care provider on file. Code Status:  Full Code    Subjective:     C/C:   Chief Complaint   Patient presents with    Fatigue     Interval History Status: not changed. Patient seen and examined this morning. Denies new complaints overnight, reports feeling improved abdo pain since paracentesis. Continues to have nonspecific fatigue/malaise. Tolerating PO intake, good appetite. Case discussed with Palliative care, Consuelo - will likely have discussion later today about options for stage IV palliative, possibly home with hospice, especially in context of recurrent admissions for unclear sepsis-like symptoms. Brief History:     Per Epic EMR H&P:    \"The patient is a 64 y.o.  Non-/non  male with history of stage IV CRC w/ extensive hepatic mets, recently discharged from [facility] on 4/23 for septic shock and cholecystitis, who presents primarily with increased fatigue and malaise along with hemodynamic instability in the ED, and he is admitted to the hospital for the management of septic shock.     Patient reports his chronic pain (abdomen, back, extremities for which he has chronic transdermal pain mgmt) is stable without acute exacerbation. Patient is having some epigastric pain that is grossly unchanged since the previous admission (HIDA scan on 4/20 suggesting acute cholecystitis, possible chronic cholecystitis on U/S). Patient denies chest pain, headache, N/V, denies diarrhea, SOB, productive cough, urinary symptoms, denies melena or hematochezia, denies any new rash or extremity swelling.  Reports his abdominal swelling/ascites has not increased from baseline. \"    Review of Systems: Review of Systems   Constitutional: Negative for chills, diaphoresis, fatigue and fever. HENT: Negative. Negative for congestion, sinus pressure, sinus pain, sore throat and trouble swallowing. Respiratory: Negative. Negative for cough, chest tightness, shortness of breath and stridor. Cardiovascular: Negative. Negative for chest pain, palpitations and leg swelling. Gastrointestinal: Positive for abdominal pain. Negative for anal bleeding, blood in stool, constipation, diarrhea, nausea, rectal pain and vomiting. Genitourinary: Negative. Negative for difficulty urinating, enuresis, flank pain, frequency and hematuria. Musculoskeletal: Positive for back pain (chronic, unchanged). Negative for gait problem. Skin: Negative for pallor, rash and wound. Neurological: Negative. Negative for weakness, light-headedness, numbness and headaches. Psychiatric/Behavioral: Negative for confusion, hallucinations and sleep disturbance. The patient is not nervous/anxious. Medications: Allergies:     Allergies   Allergen Reactions    Morphine Itching and Rash       Current Meds:   Scheduled Meds:    albumin human  25 g Intravenous BID    famotidine (PEPCID) injection  20 mg Intravenous BID    levofloxacin  500 mg Intravenous Q24H    sodium chloride flush  10 mL Intravenous 2 times per day    enoxaparin  40 mg Subcutaneous Daily    insulin glargine  15 Units Subcutaneous Nightly    insulin lispro  0-6 Units Subcutaneous TID WC    insulin lispro  0-3 Units Subcutaneous Nightly    fentanNYL  50 mcg Intravenous Once    fentaNYL  1 patch Transdermal Q72H     Continuous Infusions:    sodium chloride 50 mL/hr at 05/06/19 1229    dextrose       PRN Meds: oxyCODONE, sodium chloride flush, magnesium hydroxide, ondansetron, glucose, dextrose, glucagon (rDNA), dextrose, albuterol sulfate HFA, acetaminophen    Data:     Past Medical History:   has a past medical history of Back pain, CAD S/P percutaneous coronary angioplasty, Carcinoma ([facility] [address] Colon cancer ([facility] [address] Degeneration of lumbar or lumbosacral intervertebral disc, Depression, Diabetes mellitus ([facility] [address] H/O cardiac catheterization, [facility] Hyperlipidemia, Hypertension, Knee pain, and MI, old. Social History:   reports that he has never smoked. He has never used smokeless tobacco. He reports that he drank alcohol. He reports that he does not use drugs. Family History:   Family History   Problem Relation Age of Onset    Heart Disease Mother     Stroke Mother     High Blood Pressure Mother     High Cholesterol Father        Vitals:  BP (!) 103/53   Pulse 94   Temp 98.7 °F (37.1 °C) (Oral)   Resp 20   Ht 6' 3\" (1.905 m)   Wt 190 lb (86.2 kg)   SpO2 95%   BMI 23.75 kg/m²   Temp (24hrs), Av.7 °F (37.1 °C), Min:98.3 °F (36.8 °C), Max:99.5 °F (37.5 °C)    Recent Labs     19  1146 19  1619 19  2044 19  0722   POCGLU 153* 192* 185* 124*       I/O(24Hr):     Intake/Output Summary (Last 24 hours) at 2019 1021  Last data filed at 2019 0635  Gross per 24 hour   Intake 1914 ml   Output --   Net 1914 ml       Labs:    CBC with Differential:    Lab Results   Component Value Date    WBC 12.6 2019    RBC 3.91 2019    HGB 9.5 2019    HCT 30.6 2019     2019    MCV 78.3 2019    MCH 24.2 2019    MCHC 30.9 2019    RDW 21.8 2019    LYMPHOPCT 6 2019    MONOPCT 11 2019    BASOPCT 1 2019    MONOSABS 1.39 2019    LYMPHSABS 0.76 2019    EOSABS 0.00 2019    BASOSABS 0.13 2019    DIFFTYPE NOT REPORTED 2019     Hemoglobin/Hematocrit:    Lab Results   Component Value Date    HGB 9.5 2019    HCT 30.6 2019     CMP:    Lab Results   Component Value Date     2019    K 3.9 2019    CL 95 2019    CO2 30 2019    BUN 5 2019    CREATININE 0.46 2019    GFRAA >60 right-sided jase catheter is present with its tip in the SVC. No infiltrate or consolidation or effusion is identified. The heart size is normal.     No acute abnormality detected. Ct Abdomen Pelvis W Iv Contrast Additional Contrast? None    Result Date: 4/17/2019  EXAMINATION: CT OF THE ABDOMEN AND PELVIS WITH CONTRAST 4/17/2019 10:45 pm TECHNIQUE: CT of the abdomen and pelvis was performed with the administration of intravenous contrast. Multiplanar reformatted images are provided for review. Dose modulation, iterative reconstruction, and/or weight based adjustment of the mA/kV was utilized to reduce the radiation dose to as low as reasonably achievable. COMPARISON: 10/03/2018 HISTORY: ORDERING SYSTEM PROVIDED HISTORY: sepsis, source unknown. history of metastatic colon cancer to liver TECHNOLOGIST PROVIDED HISTORY: Ordering Physician Provided Reason for Exam: sepsis, source unknown. Hx - metastatic colon cancer to liver. Patient states weakness x1 day prior. Acuity: Unknown Type of Exam: Unknown Relevant Medical/Surgical History: Surgical hx - Hernia repair. Hx - HBP, Diabetes, Carcinoma, Colorectal cancer, stage IV  Hepatic metastases . FINDINGS: Mild elevation of the left hemidiaphragm. Coronary calcifications versus coronary stents identified. Trace pericardial thickening/pericardial fluid. There are bibasilar opacities suggestive of bibasilar atelectasis. These findings most confluent within the left lower lobe. There are innumerable hypodensities identified throughout the right and left hepatic lobes which have progressed from prior examination. A suggestion of a central ill-defined area of hypoattenuation which may represent areas of posterior related changes/fibrosis versus perfusion differences. Mild periportal edema involving the liver. There is moderate gallbladder wall thickening. No definite cholelithiasis. Spleen, adrenal glands, kidneys, pancreas unremarkable.  Mild retained stool throughout the colon without evidence of obstruction. The appendix unremarkable. No suspicious mesenteric or retroperitoneal adenopathy. A few lymph nodes identified within the port appendix region noted of uncertain clinical significance borderline size. No free air or free fluid. Tiny fat containing right inguinal hernia. The bladder is mildly distended. Prostate is unremarkable. No loculated rim enhancing fluid collection to suggest abscess formation. Central disc protrusion T7-T8. Moderate changes L4-5 and L5-S1. No suspicious osseous lesions. Moderate gallbladder wall thickening nonspecific. Please correlate with exam findings. Innumerable hepatic lesions suggestive of metastatic disease as on prior imaging studies. Bibasilar consolidative changes favor bibasilar atelectasis. Us Gallbladder Ruq    Result Date: 4/18/2019  EXAMINATION: RIGHT UPPER QUADRANT ULTRASOUND 4/18/2019 9:13 am COMPARISON: CT abdomen and pelvis April 17, 2019. HISTORY: ORDERING SYSTEM PROVIDED HISTORY: wall thickening on CT and elevated alk phos FINDINGS: LIVER:  Cirrhotic liver with heterogeneous echotexture. No  intrahepatic bile duct dilatation noted. BILIARY SYSTEM:  Diffuse gallbladder wall thickening. Questionable adherent stone versus polyp measuring 8 mm. No internal color Doppler vascularity. Negative Iyer sign. No pericholecystic fluid. Common bile duct is within normal limits measuring 5.3 mm. PANCREAS:  Visualized portions of the pancreas are unremarkable. OTHER: Trace ascites is noted within the right upper quadrant. 1.  Diffuse gallbladder wall thickening. No pericholecystic fluid or biliary obstruction. The finding may related to the patient's underlying cirrhosis or possibly chronic cholecystitis. If acute cholecystitis is of clinical concern, further evaluation with HIDA scan is recommended. 2. Questionable adherent stone versus polyp measuring 8 mm. The former is favored.   If no surgical intervention is performed, repeat ultrasound in 1 year is recommended. 3. Trace perihepatic ascites. Xr Chest Portable    Result Date: 5/3/2019  EXAMINATION: SINGLE XRAY VIEW OF THE CHEST 5/3/2019 11:12 am COMPARISON: April 17, 2019 HISTORY: ORDERING SYSTEM PROVIDED HISTORY: Chest Pain TECHNOLOGIST PROVIDED HISTORY: Chest Pain Ordering Physician Provided Reason for Exam: Pt states today chest pain. History of colon cancer and lung cancer Acuity: Unknown Type of Exam: Unknown FINDINGS: Right chest Port in good position. No kink. Non enlarged heart. Bibasilar atelectasis. Right basilar pleuroparenchymal scarring. No effusion. No pneumothorax. No airspace consolidation. No focal airspace consolidation. Ct Chest Pulmonary Embolism W Contrast    Result Date: 4/8/2019  EXAMINATION: CTA OF THE CHEST 4/8/2019 12:31 am TECHNIQUE: CTA of the chest was performed after the administration of intravenous contrast.  Multiplanar reformatted images are provided for review. MIP images are provided for review. Dose modulation, iterative reconstruction, and/or weight based adjustment of the mA/kV was utilized to reduce the radiation dose to as low as reasonably achievable. COMPARISON: CT chest done July 2, 2018. HISTORY: ORDERING SYSTEM PROVIDED HISTORY: CP, elevated d-dimer TECHNOLOGIST PROVIDED HISTORY: Ordering Physician Provided Reason for Exam: chest pain, elevated d dimer Acuity: Acute Type of Exam: Initial Relevant Medical/Surgical History: h/o colorectal ca, liver ca, diabetes, htn FINDINGS: Pulmonary Arteries: Pulmonary arteries are adequately opacified for evaluation. No evidence of intraluminal filling defect to suggest pulmonary embolism. Main pulmonary artery is normal in caliber. Mediastinum: No evidence of mediastinal lymphadenopathy. The heart and pericardium demonstrate no acute abnormality. Coronary artery calcifications. There is no acute abnormality of the thoracic aorta. Lungs/pleura:  The is grossly unremarkable without evidence of hydronephrosis. OTHER: Moderate ascites noted in the abdomen and pelvis. 1. Moderate ascites. 2. Enlarged inhomogeneous nodular liver as previously noted on the CT evaluation. Neoplasm should be considered. 3. Thickened gallbladder wall as well as gallbladder polyps. The gallbladder does not appear distended however. No evidence of gallbladder stones. Acute cholecystitis is unlikely. Nm Hepatobiliary Scan W Ejection Fraction    Result Date: 4/20/2019  EXAMINATION: NUCLEAR MEDICINE HEPATOBILIARY SCINTIGRAPHY (HIDA SCAN). 4/20/2019 10:13 am TECHNIQUE: Approximately 3.0 [id number] Mebrofenin (Choletec) was administered IV. Then, dynamic images of the abdomen were obtained in the anterior projection for 60 mins. A right lateral view was also obtained at 60 mins. COMPARISON: Ultrasound 04/18/2019 HISTORY: ORDERING SYSTEM PROVIDED HISTORY: pain TECHNOLOGIST PROVIDED HISTORY: Ordering Physician Provided Reason for Exam: Pain Acuity: Unknown Type of Exam: Unknown Additional signs and symptoms: pt states no real abdominal pain, came in for general fatigue, pt states he has liver an dcolon cancer FINDINGS: Gallbladder is not visualized. Activity is seen within the small bowel. Delayed excretion of radiotracer by the liver. Nonvisualization of the gallbladder suggesting acute cholecystitis. Delayed excretion by the liver suggesting hepatic dysfunction. Physical Examination:        Physical Exam   Constitutional: He is oriented to person, place, and time. No distress. Malnourished   HENT:   Head: Normocephalic and atraumatic. Mouth/Throat: No oropharyngeal exudate. Eyes: Pupils are equal, round, and reactive to light. Conjunctivae are normal. Right eye exhibits no discharge. Left eye exhibits no discharge. Cardiovascular: Normal rate, regular rhythm and normal heart sounds.    Pulmonary/Chest: Effort normal and breath sounds normal. No from Dr. Mendez File practice in 10/2018 due to inappropriate behavior, otherwise patient most recently follows at [facility]     Hyponatremia, hypovolemic  -Nephrology following, rec decrease fluids, Albumin 25g IV BID, fluid restrict 1500 ml/day, dietary supplements BID  - IVF NS decreased to 50 ml/hr     DM2  - Lantus (15U)  - ISS low dose, POC glucose     DVT ppx - Lovenox   Dispo - PT eval and treat    Brigid Levy MD  5/7/2019  10:21 AM       IM Attending    Pt seen and examined today   I have discussed the care of pt , including pertinent history and exam findings,  with the resident. I have reviewed the key elements of all parts of the encounter with the resident. I agree with the assessment, plan and orders as documented by the resident.     Electronically signed by Franko Navarro MD on 5/7/2019 at 12:05 PM

## 2019-05-07 NOTE — CARE COORDINATION
ONGOING DISCHARGE PLAN:    Spoke with patient regarding discharge plan and patient states the plan is to go home with no needs. Patient denies need for VNS    Remains on IV Levaquin    Had paracentesis yesterday removed 850 ml of fluid. Pt has Hx of Liver Metastasis, Malignant Neoplasm of Colon, Carcinoma, Colorectal Cancer Stage IV    Probable discharge home today    Will continue to follow for additional discharge needs.     Electronically signed by Cuco Pleitez, RN on 5/7/2019 at 2:38 PM

## 2019-05-07 NOTE — PROGRESS NOTES
[address]   Occupational Therapy Evaluation  Date: 19  Patient Name: Sofia Boudreaux       Room:   MRN: [id number]  Account: [de-identified]   : 1960  (66 y.o.) Gender: male     Discharge Recommendations:  24 hour supervision or assist, Home with Home health OT       Referring Practitioner: Dr. Yan Lee  Diagnosis: Septicemia       Treatment Diagnosis: Impaired self-care status. Past Medical History:  has a past medical history of Back pain, CAD S/P percutaneous coronary angioplasty, Carcinoma ([facility] [address] Colon cancer ([facility] [address] Degeneration of lumbar or lumbosacral intervertebral disc, Depression, Diabetes mellitus ([facility] [address] H/O cardiac catheterization, Hepatic metastases ([facility] [address] Hyperlipidemia, Hypertension, Knee pain, and MI, old. Past Surgical History:   has a past surgical history that includes Tunneled venous port placement (Right); pr sigmoidoscopy flx w/rmvl foreign body (N/A, 2017); hernia repair; Coronary angioplasty with stent (2016); pr sigmoidoscopy flx w/rmvl foreign body (2017); Endoscopic ultrasonography, GI (N/A, 2017); and liver biopsy (Right, 2018). Restrictions  Restrictions/Precautions: Fall Risk(port right chest wall)  Implants present? : Metal implants(cardiac stent)  Other position/activity restrictions: Stage IV colon cancer with liver mets. Paracentesis 19  Required Braces or Orthoses?: No     Vitals  Temp: 98.7 °F (37.1 °C)  Pulse: 94  Resp: 20  BP: (!) 103/53  Height: 6' 3\" (190.5 cm)  Weight: 190 lb (86.2 kg)  BMI (Calculated): 23.8  Oxygen Therapy  SpO2: 95 %  Pulse Oximeter Device Mode: Intermittent  Pulse Oximeter Device Location: Finger  O2 Device: None (Room air)  Level of Consciousness: Alert    Subjective  Subjective: \"He's got too many whores and crack. Ander Easley I won't need help at the [facility]\" Pt reports that he will be moving out of his friend Yonathan's house and will be living at the [facility] with his spouse short term memory, Decreased recall of recent events  Following Commands: Follows one step commands with repetition  Safety Judgement: Decreased awareness of need for safety  Awareness of Errors: Decreased awareness of errors  Insights: Decreased awareness of deficits  Sequencing and Organization: Assistance required to implement solutions, Assistance required to generate solutions, Assistance required to identify errors made   Perception  Overall Perceptual Status: [facility]  Sensation  Overall Sensation Status: Impaired(C/O numbness and tingling bilateral hands and feet)   ADL  Feeding: Setup, Increased time to complete  Grooming: Supervision, Setup, Increased time to complete  UE Bathing: Stand by assistance, Setup, Increased time to complete  LE Bathing: Minimal assistance, Setup, Increased time to complete  UE Dressing: Stand by assistance, Setup, Increased time to complete  LE Dressing: Minimal assistance, Setup, Increased time to complete  Toileting: Minimal assistance, Setup, Increased time to complete  Additional Comments: Pt doffed and donned B socks this date with SBA while seated EOB. Pt to require Min A for all LB ADLs for Pt's standing balance.     UE Function           LUE Strength  Gross LUE Strength: [facility]  L Hand Grasp: 4-/5  LUE Strength Comment: Shoulder 4-/5, elbow 4-/5     LUE Tone: Normotonic     LUE AROM (degrees)  LUE AROM : WFL     Left Hand AROM (degrees)  Left Hand AROM: WFL  RUE Strength  Gross RUE Strength: [facility]  R Hand Grasp: 4-/5  RUE Strength Comment: Shoulder 4-/5, elbow 4-/5      RUE Tone: Normotonic     RUE AROM (degrees)  RUE AROM : WFL     Right Hand AROM (degrees)  Right Hand AROM: WFL    Fine Motor Skills  Coordination  Movements Are Fluid And Coordinated: No  Coordination and Movement description: Left UE, Right UE, Decreased accuracy, Decreased speed                           Mobility  Supine to Sit: Modified independent(use of bed rail)  Sit to Supine: Independent Balance  Sitting Balance: Stand by assistance  Standing Balance: Minimal assistance  Standing Balance  Time: 1-2 minutes  Activity: mobility in room and hallway with 4WW  Functional Mobility  Functional - Mobility Device: 4-Wheeled Walker  Activity: Other(in room and hallway)  Assist Level: Minimal assistance  Functional Mobility Comments: no LOB noted. pt is impulsive with Poor safety awareness  Bed mobility  Rolling to Left: Modified independent(use of bed rail)  Supine to Sit: Modified independent(use of bed rail)  Sit to Supine: Independent  Comment: dangled at EOB with SBA d/t impulsivity     Transfers  Stand Pivot Transfers: Minimal assistance  Sit to stand: Minimal assistance  Stand to sit: Minimal assistance  Transfer Comments: VCs for safety with brakes with 4WW  Functional Activity Tolerance  Functional Activity Tolerance: Tolerates 10 - 20 min exercise with multiple rests  Additional Comments: pt reports he is fatigued and wants a nap   Assessment  Performance deficits / Impairments: Decreased functional mobility , Decreased ADL status, Decreased strength, Decreased safe awareness, Decreased cognition, Decreased endurance, Decreased balance, Decreased sensation, Decreased high-level IADLs, Decreased fine motor control  Treatment Diagnosis: Impaired self-care status.   Prognosis: Guarded  Decision Making: Medium Complexity  History: moderate chart review  Exam: 10 performance deficits  Assistance / Modification: min assist/modification  Patient Education: OT POC, safety, d/c rec of HH OT   REQUIRES OT FOLLOW UP: Yes  Discharge Recommendations: 24 hour supervision or assist, Home with Home health OT  Activity Tolerance: Patient limited by fatigue         Functional Outcome Measures  AM-PAC Daily Activity Inpatient   How much help for putting on and taking off regular lower body clothing?: A Little  How much help for Bathing?: A Little  How much help for Toileting?: A Little  How much help for putting on and taking off regular upper body clothing?: A Little  How much help for taking care of personal grooming?: A Little  How much help for eating meals?: A Little  AM-PAC Inpatient Daily Activity Raw Score: 18  AM-PAC Inpatient ADL T-Scale Score : 38.66  ADL Inpatient CMS 0-100% Score: 46.65  ADL Inpatient CMS G-Code Modifier : CK       Goals  Patient Goals   Patient goals : To discharge with spouse  Short term goals  Time Frame for Short term goals: By discharge  Short term goal 1: Pt will V/D Good understanding of AE/DME/modified techniques for increased IND with self-care and mobility. Short term goal 2: Pt will perform BADLs with SUP and Fair safety awareness. Short term goal 3: Pt will perform functional mobility and transfers during self-care with SUP, 4WW, and Fair safety awareness. Short term goal 4: Pt will actively participate in 15+ minutes of self-care with SUP and 1-2 VCs for safety as needed.     Plan  Safety Devices  Safety Devices in place: Yes  Type of devices: Bed alarm in place, Call light within reach, Gait belt, Left in bed, Patient at risk for falls     Plan  Times per week: 5  Times per day: Daily  Current Treatment Recommendations: Balance Training, Functional Mobility Training, Endurance Training, Strengthening, Pain Management, Safety Education & Training, Patient/Caregiver Education & Training, Equipment Evaluation, Education, & procurement, Positioning, Self-Care / ADL    Equipment Recommendations  Equipment Needed: (TBD)  OT Individual Minutes  Time In: 9608  Time Out: 3322  Minutes: 26    Electronically signed by Salvador Guy OT on 5/7/19 at 10:35 AM

## 2019-05-07 NOTE — PROGRESS NOTES
206    < > = values in this interval not displayed. BMP  Recent Labs     05/05/19  0444  05/06/19  0455 05/06/19  1615 05/07/19  0019 05/07/19  0829   *   < > 128* 133* 134* 137   K 3.9   < > 3.4* 3.9 4.0 3.9   CL 91*   < > 89* 94* 95* 95*   CO2 28   < > 27 27 29 30   CREATININE 0.44*  --  0.41*  --   --  0.46*    < > = values in this interval not displayed. LFTS  Recent Labs     05/05/19  1643 05/06/19  1005 05/07/19  0829   ALKPHOS  --   --  712*   ALT  --   --  16   AST  --   --  80*   PROT  --  5.7* 6.8   BILITOT  --   --  2.09*   BILIDIR  --   --  1.29*   LABALBU 1.8* 1.9* 2.6*       AMYLASE/LIPASE/AMMONIA  Recent Labs     05/05/19  1643 05/07/19  0950   AMYLASE 14*  --    AMMONIA  --  27       PT/INR  Recent Labs     05/05/19  1035   PROTIME 17.6*   INR 1.4         ANEMIA STUDIES  Recent Labs     05/05/19  0444   LABIRON 27   TIBC 89*   FERRITIN 3,254*   Results for Malathi Teresa (MRN [id number]) as of 5/7/2019 11:24 paracentesis    Ref.  Range 5/6/2019 09:35   Appearance, Fluid Unknown CLEAR   Color, Fluid Unknown YELLOW   Glucose, Fluid Latest Units: mg/dL 149   LD, Fluid Latest Units: U/L 284   RBC, Fluid Latest Units: /mm3 351   Lymphocytes, Body Fluid Latest Ref Range: 0 % 15 (H)   Monocyte Count, Fluid Latest Ref Range: 0 % 30 (H)   Neutrophil Count, Fluid Latest Ref Range: 0 % 55 (H)   Total Protein, Body Fluid Latest Units: g/dL 2.2   WBC, Fluid Latest Units: /mm3 327   Amylase, Fluid Latest Units: U/L 6   Albumin, Fluid Latest Units: g/dL 1.0     FINDINGS:abd us 5/4/19   LIVER:  The liver demonstrates some abnormal echotexture as well as   hepatomegaly with the liver measuring 27 cm.  Liver nodularity noted which   was also seen on the previous CT evaluation.       BILIARY SYSTEM:  Gallbladder wall is severely thickened at 9.2 mm.  There is   evidence of gallbladder polyps.  No evidence of gallbladder stones.  Negative   Iyer sign.  Common bile duct is within normal limits measuring 0.61 cm.       RIGHT KIDNEY: The right kidney is grossly unremarkable without evidence of   hydronephrosis.       OTHER: Moderate ascites noted in the abdomen and pelvis.           Impression   1. Moderate ascites. 2. Enlarged inhomogeneous nodular liver as previously noted on the CT   evaluation.  Neoplasm should be considered. 3. Thickened gallbladder wall as well as gallbladder polyps.  The gallbladder   does not appear distended however.  No evidence of gallbladder stones.  Acute   cholecystitis is unlikely.               ASSESSMENT/PLAN:  1. Anemia; no overt bleeding and hgb is improved from yesterday, has iron deficiency  -continue the IV iron and trend the hgb; transfuse for hgb <7    2. Ascites with colon cancer stage 4 with liver mets, ascites negative for SBP, most likely due to liver dysfunction  Elevated lft's likely due to liver mets, continue to trend  -follow up on the cytology to rule out malignancy  -nephrology following and managing albumin/fluid restriction and hyponatremia  -oncology consult pending  -ammonia level is 27  -case discussed with Dr Avinash Pacheco will order mri mrcp due to further elevation of lft's and recheck tomorrow    This plan was formulated in collaboration with Dr. Avinash Pacheco . Electronically signed by: ASIM Castano CNP on 5/7/2019 at 2:30 PM   Attending Physician Statement  I have discussed the care of Mila Fleming and   I have examined the patient myselft and taken ros and hpi , including pertinent history and exam findings,  with the author of this note . I have reviewed the key elements of all parts of the encounter with the nurse practitioner/resident. I agree with the assessment, plan and orders as documented by the above health care provider   Addition/summary:  Worsening liver enzymes, will get an MRI MRCP to rule out any stent appropriate ductal lesion compression.   Await the cytology on the fluid to rule out malignancy in the

## 2019-05-07 NOTE — PROGRESS NOTES
Department of Internal Medicine  Nephrology Marbella Prater MD    Progress Note      Interval history:   Patient was seen and examined today, denies any complaints today  s/p Paracentesis yesterday 5/6/2019, 850 ml fluid drained. He does not have constipation, diarrhea, nausea or vomiting but complains of fatigue. SNa improved  Blood pressure stable      History of present illness: This is a 66 y.o. male with a significant past medical history of Stage IV colon cancer with liver mets, Type 2 DM, systemic hypertension and coronary artery disease status post PTCA/stent, who presented with complaints of abdominal pain, generalized weakness, fatigue and failure to thrive. Laboratory studies were remarkable for hyponatremia with serum sodium 127 mmol/L and hence nephrology consultation. At presentation, systolic blood pressure was in the 120s but he subsequently developed hypotension between 10 PM and 3 AM with systolic blood pressure in the 90s. He had been recently with admitted with similar complaints 2 weeks ago. Physical Exam:    VITALS:  BP (!) 103/53   Pulse 94   Temp 98.7 °F (37.1 °C) (Oral)   Resp 20   Ht 6' 3\" (1.905 m)   Wt 190 lb (86.2 kg)   SpO2 95%   BMI 23.75 kg/m²   24HR INTAKE/OUTPUT:      Intake/Output Summary (Last 24 hours) at 5/7/2019 1252  Last data filed at 5/7/2019 4295  Gross per 24 hour   Intake 1914 ml   Output --   Net 1914 ml       Constitutional:  Awake and alert; not in distress. Cardiovascular/Edema:  S1, S2 with regular rate and rhythm. Respiratory: Clinically clear. Gastrointestinal:  Moderately distended but nontender. CBC:   Recent Labs     05/05/19  0444  05/06/19  0455 05/06/19  1557 05/07/19  0019 05/07/19  0829   WBC 12.5*  --  10.9  --   --  12.6*   HGB 7.0*   < > 8.0* 7.6* 8.1* 9.5*     --  151  --   --  206    < > = values in this interval not displayed.      BMP:    Recent Labs     05/05/19  0444  05/06/19  0455 05/06/19  1615 Nephrologist  5/7/2019 12:52 PM

## 2019-05-07 NOTE — PROGRESS NOTES
Patient discharged with personal belongings and discharge instructions. IV's removed. Patient is waiting for ride to come.

## 2019-05-10 NOTE — ED PROVIDER NOTES
[address] ED  eMERGENCY dEPARTMENT eNCOUnter      Pt Name: Jitendra Moyer  MRN: [id number]  [place] 1960  Date of evaluation: 5/10/19      CHIEF COMPLAINT       Chief Complaint   Patient presents with    Shortness of Breath    Fatigue    Cough     HISTORY OF PRESENT ILLNESS   HPI 66 y.o. male presents with complaints of cough shortness of breath. Patient also reports fatigue. The patient has a history of stage IV colon cancer. He was recently admitted to the hospital between the third and 7 May where he was evaluated for sepsis. It was determined that his hypotension and laboratory abnormalities were likely secondary to his cancer and malnourished state. He was discharged 3 days ago. He says that for the last day he's been having a mild nonproductive cough associated with shortness of breath. He denies any chest pain fevers or chills. The patient denies any abdominal pain, though he does have some abdominal swelling, he had a paracentesis on 6 May. The patient's oncologist is Dr. Nicky Sarabia, at [facility]. REVIEW OF SYSTEMS     Review of Systems   Constitutional: Positive for fatigue. Negative for chills and fever. HENT: Negative for congestion. Eyes: Negative for visual disturbance. Respiratory: Positive for cough and shortness of breath. Cardiovascular: Negative for chest pain. Gastrointestinal: Positive for abdominal distention. Negative for abdominal pain, diarrhea, nausea and vomiting. Genitourinary: Negative for dysuria. Neurological: Negative for headaches. Hematological: Negative for adenopathy. Psychiatric/Behavioral: Negative for confusion.      PAST MEDICAL HISTORY     Past Medical History:   Diagnosis Date    Back pain 1/30/2013    CAD S/P percutaneous coronary angioplasty 11/27/2016    cardiac stent    Carcinoma ([facility] [address] 12/04/2016    colon, liver mets    Colon cancer ([facility] [address]     Degeneration of lumbar or lumbosacral intervertebral disc 4/15/2014    Depression 5/10/2012    Diabetes mellitus ([facility] [address]     H/O cardiac catheterization     with cardiac stent    Hepatic metastases ([facility] [address] 12/4/2016    Hyperlipidemia     Hypertension     Knee pain     MI, 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    with cardiac arrest at [address]       Past Surgical History:   Procedure Laterality Date    CORONARY ANGIOPLASTY WITH STENT PLACEMENT  11/2016    x1 stent    ENDOSCOPIC ULTRASOUND (LOWER) N/A 12/8/2017    RECTAL ENDOSCOPIC ULTRASOUND WITH PATHOLOGY performed by Nigel Purdy MD at [address] LIVER BIOPSY Right 08/23/2018    CT guided Visceral Tissue Ablation    [place] SIGMOIDOSCOPY FLX W/RMVL FOREIGN BODY N/A 5/25/2017    SIGMOIDOSCOPY BIOPSY FLEXIBLE performed by Nigel Purdy MD at [facility]    [place] SIGMOIDOSCOPY FLX W/RMVL FOREIGN BODY  12/8/2017    SIGMOIDOSCOPY BIOPSY FLEXIBLE performed by Nigel Purdy MD at [facility]    TUNNELED VENOUS PORT PLACEMENT Right     right upper chest for cancer treatment       CURRENT MEDICATIONS       Discharge Medication List as of 5/10/2019  2:46 PM      CONTINUE these medications which have NOT CHANGED    Details   glucose monitoring kit (FREESTYLE) monitoring kit DAILY Starting Mon 4/22/2019, Disp-1 kit, R-0, Normal      Lancets MISC DAILY Starting Mon 4/22/2019, Disp-100 each, R-3, Normal      insulin glargine (LANTUS) 100 UNIT/ML injection vial Inject 20 Units into the skin nightlyHistorical Med      glimepiride (AMARYL) 4 MG tablet Take 1 tablet by mouth every morning, Disp-30 tablet, R-3Normal      oxyCODONE HCl (OXY-IR) 10 MG immediate release tablet Take 20 mg by mouth 3 times daily as needed for Pain. Historical Med      diphenhydrAMINE (BENADRYL) 25 MG capsule Take 1 capsule by mouth nightly as needed for Sleep, Disp-12 capsule, R-0Print      fentaNYL (DURAGESIC) 50 MCG/HR Place 1 patch onto the skin every 72 hours.  Historical Med      naloxone (NARCAN) 4 MG/0.1ML LIQD nasal presenting with shortness of breath cough, oxygen saturations in the low 90s and tachycardia, we'll rule out pulmonary embolism. We'll check his hemoglobin, liver function tests, renal function and reassess. ED Course as of May 10 1611   Fri May 10, 2019   1134 Laboratory studies reviewed, WBC count slightly higher, hemoglobin stable. Na+ 128 from     [KW]      ED Course User Index  [KW] Shereen Acevedo MD     1:30 PM  CT scan of the chest shows no pulmonary embolism. There are some small bilateral pleural effusions. There are his known liver metastasis. There is ascites consistent with his malignancy. 2:30 PM  Discussed with the patient and his oncologist.  The patient states that he would like to follow up as an outpatient for his hyponatremia. He was recently admitted to the hospital was determined that this was a hypovolemic hyponatremia likely secondary to his poor nutritional status. Etiology of his cough unclear, no sign of pna. May be related to his small pleural effusions which are likely malignant related and not of any size that could be drained. His oncologist will see him on Tuesday at 1 PM.  This was discussed with the patient. D/w pt results, treatment plan, warning precautions for prompt ED return and importance of close OP FU, he verbalizes understanding and agrees with the treatment plan. DIAGNOSTIC RESULTS     RADIOLOGY:All plain film, CT, MRI, and formal ultrasound images (except ED bedside ultrasound) are read by the radiologist and the images and interpretations are directly viewed by the emergency physician. CT Chest Pulmonary Embolism W Contrast   Preliminary Result   1. Limited assessment of distal segmental and subsegmental pulmonary   arteries due to respiratory motion and suboptimal bolus timing, despite   repeat imaging. No central or proximal segmental pulmonary embolus.       2.  Tiny right pleural effusion and trace left pleural fluid with mild   bibasilar atelectasis. 3.  Indistinct hypodense lesions in the liver, likely metastases. 4.  Moderate volume upper abdominal ascites, new from the previous study. 5.  Splenomegaly. 6.  Redemonstration of metastatic lesions at T2 and T4, similar to   04/08/2019. Subtle areas of sclerosis with suspected subacute nondisplaced   fractures are noted at the anterior left 5th, 7th, and 8th ribs. XR CHEST STANDARD (2 VW)   Final Result   No acute cardiopulmonary disease or interval change. Bibasilar atelectasis   or scarring             LABS: All lab results were reviewed by myself, and all abnormals are listed below.   Labs Reviewed   CBC WITH AUTO DIFFERENTIAL - Abnormal; Notable for the following components:       Result Value    WBC 14.7 (*)     RBC 3.08 (*)     Hemoglobin 7.6 (*)     Hematocrit 24.9 (*)     MCH 24.6 (*)     MCHC 30.4 (*)     RDW 22.7 (*)     Platelets 629 (*)     Seg Neutrophils 73 (*)     Lymphocytes 3 (*)     Monocytes 12 (*)     Bands 11 (*)     Metamyelocytes 1 (*)     Segs Absolute 10.73 (*)     Absolute Lymph # 0.44 (*)     Absolute Mono # 1.76 (*)     Absolute Bands # 1.62 (*)     Metamyelocytes Absolute 0.15 (*)     All other components within normal limits   COMPREHENSIVE METABOLIC PANEL - Abnormal; Notable for the following components:    Glucose 264 (*)     CREATININE 0.49 (*)     Calcium 7.9 (*)     Sodium 128 (*)     Chloride 87 (*)     Anion Gap 18 (*)     Alkaline Phosphatase 471 (*)      (*)     Total Bilirubin 3.46 (*)     Total Protein 6.1 (*)     Alb 2.2 (*)     All other components within normal limits   PROTIME-INR - Abnormal; Notable for the following components:    Protime 17.4 (*)     All other components within normal limits       EMERGENCY DEPARTMENT COURSE:   Vitals:    Vitals:    05/10/19 1300 05/10/19 1315 05/10/19 1330 05/10/19 1415   BP: (!) 103/54 (!) 108/55 (!) 110/53 120/80   Pulse:       Resp:       Temp:       TempSrc: SpO2: 95% 95%     Weight:       Height:           The patient was given the following medications while in the emergency department:  Orders Placed This Encounter   Medications    sodium chloride flush 0.9 % injection 10 mL    0.9 % sodium chloride bolus    ioversol (OPTIRAY) 74 % injection 75 mL     -------------------------  CRITICAL CARE:   CONSULTS: IP CONSULT TO ONCOLOGY  PROCEDURES: Procedures     FINAL IMPRESSION      1. Cough    2. Hyponatremia    3. Malignant ascites    4.  H/O colon cancer, stage IV          DISPOSITION/PLAN   DISPOSITION Decision To Discharge 05/10/2019 02:43:02 PM      PATIENT REFERRED TO:  Valentino Aver, MD  [address] [place] [address]  [phone]    On 5/14/2019  1PM    [facility] ED  [address]  [phone]    If symptoms worsen      DISCHARGE MEDICATIONS:  Discharge Medication List as of 5/10/2019  2:46 PM            Rachele Reyes MD  Attending Emergency Physician                      Rachele Reyes MD  05/10/19 8270

## 2019-05-16 PROBLEM — E43 SEVERE MALNUTRITION (HCC): Status: ACTIVE | Noted: 2019-01-01

## 2019-05-16 PROBLEM — A41.9 SEPSIS (HCC): Status: ACTIVE | Noted: 2019-01-01

## 2019-05-16 NOTE — ED NOTES
Report given to Lena Jonas from [facility]. Report method in person   The following was reviewed with receiving RN:   Current vital signs:  /65   Pulse 89   Temp 97.7 °F (36.5 °C) (Oral)   Resp 13   Ht 5' 10\" (1.778 m)   Wt 190 lb (86.2 kg)   SpO2 95%   BMI 27.26 kg/m²                MEWS Score: 0     Any medication or safety alerts were reviewed. Any pending diagnostics and notifications were also reviewed, as well as any safety concerns or issues, abnormal labs, abnormal imaging, and abnormal assessment findings. Questions were answered.             Irineo Jones RN  05/16/19 7755

## 2019-05-16 NOTE — PLAN OF CARE
Problem: Falls - Risk of:  Goal: Will remain free from falls  Description  Will remain free from falls  Outcome: Met This Shift  Note:   The patient remained free from falls this shift, call light within reach, bed in locked and lowest position. Side rails up x2. Continue to monitor closely. Problem: Pain:  Goal: Pain level will decrease  Description  Pain level will decrease  Outcome: Met This Shift  Note:   Patient's pain has been well controlled throughout the entire shift, please see MAR.

## 2019-05-16 NOTE — H&P
[address]      [address] [facility]     HISTORY AND PHYSICAL EXAMINATION            Date:   5/16/2019  Patient name:  Bonnell Rubinstein  Date of admission:  5/15/2019 10:43 PM  MRN:   [id number]  Account:  [de-identified]  YOB: 1960  PCP:    No primary care provider on file. Room:   2014/2014-01  Code Status:    Full Code    Chief Complaint:     Chief Complaint   Patient presents with    Altered Mental Status    Abdominal Pain    Bloated       History Obtained From:     patient, electronic medical record    History of Present Illness:     Mr. Sarah Vera a 67 yo  male with medial history of active colon cancer with liver mets. Recent discharge for similar episode. Presents with altered mental status and feeling unwell. States his abdomen felt full and he has been feeling tired and unlike himself. Denies fever, chills, CP, SOB. Reports abdo tenderness, no difficulty urinating. Last bowel movement 2 days ago. Has not ate very much due to not feeling hungry but states he feels hungry now. In ED abdomen was protuberant. Jaundiced appearence with elevated WBC and Lactic acid. Started on Abx, fluids, paracentesis preformed. Admitted to the hospital for the management of ascites with jaundice possible SBP. Follows with [facility] for oncology. Dismissed from [address] [place] [address] for behavior. Currently want full code, does not want to discuss other options.       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    with cardiac arrest at [address]        Past SurgicalHistory:     Past Surgical History:   Procedure Laterality Date    CORONARY ANGIOPLASTY WITH STENT PLACEMENT  11/2016    x1 stent    ENDOSCOPIC ULTRASOUND (LOWER) N/A 12/8/2017    RECTAL ENDOSCOPIC ULTRASOUND WITH PATHOLOGY performed by Charli Hinson MD at [address] LIVER BIOPSY Right 08/23/2018    CT guided Visceral Tissue Ablation    MI SIGMOIDOSCOPY FLX W/RMVL FOREIGN BODY N/A 5/25/2017    SIGMOIDOSCOPY BIOPSY FLEXIBLE performed by Charli Hinson MD at [facility]    MI SIGMOIDOSCOPY FLX W/RMVL FOREIGN BODY  12/8/2017    SIGMOIDOSCOPY BIOPSY FLEXIBLE performed by Charli Hinson MD at [facility]    TUNNELED VENOUS PORT PLACEMENT Right     right upper chest for cancer treatment        Medications Prior to Admission:        Prior to Admission medications    Medication Sig Start Date End Date Taking? Authorizing Provider   insulin glargine (LANTUS) 100 UNIT/ML injection vial Inject 20 Units into the skin nightly   Yes Historical Provider, MD   glimepiride (AMARYL) 4 MG tablet Take 1 tablet by mouth every morning 4/22/19  Yes Marisol Galdamez MD   oxyCODONE HCl (OXY-IR) 10 MG immediate release tablet Take 20 mg by mouth 3 times daily as needed for Pain. Yes Historical Provider, MD   diphenhydrAMINE (BENADRYL) 25 MG capsule Take 1 capsule by mouth nightly as needed for Sleep 4/8/19  Yes Cindy Grade, DO   fentaNYL (DURAGESIC) 50 MCG/HR Place 1 patch onto the skin every 72 hours.   4/20/19  Yes Historical Provider, MD   potassium chloride (KLOR-CON M) 20 MEQ extended release tablet Take 1 tablet by mouth daily 10/9/18  Yes Hardik Ruby MD   metoprolol tartrate (LOPRESSOR) 25 MG tablet Take 1 tablet by mouth daily  3/12/18  Yes Historical Provider, MD   glucose g/dL    Hematocrit 31.4 (L) 41 - 53 %    MCV 79.9 (L) 80 - 100 fL    MCH 24.5 (L) 26 - 34 pg    MCHC 30.7 (L) 31 - 37 g/dL    RDW 22.6 (H) 11.5 - 14.9 %    Platelets 553 317 - 661 k/uL    MPV 7.6 6.0 - 12.0 fL    NRBC Automated NOT REPORTED per 100 WBC    Differential Type NOT REPORTED     Immature Granulocytes NOT REPORTED 0 %    Absolute Immature Granulocyte NOT REPORTED 0.00 - 0.30 k/uL    WBC Morphology NOT REPORTED     RBC Morphology NOT REPORTED     Platelet Estimate NOT REPORTED     Seg Neutrophils 85 (H) 36 - 66 %    Lymphocytes 2 (L) 24 - 44 %    Monocytes 7 1 - 7 %    Eosinophils % 0 0 - 4 %    Basophils 1 0 - 2 %    Bands 3 0 - 10 %    Metamyelocytes 2 (H) 0 %    Segs Absolute 21.59 (H) 1.3 - 9.1 k/uL    Absolute Lymph # 0.51 (L) 1.0 - 4.8 k/uL    Absolute Mono # 1.78 (H) 0.1 - 1.3 k/uL    Absolute Eos # 0.00 0.0 - 0.4 k/uL    Basophils # 0.25 (H) 0.0 - 0.2 k/uL    Absolute Bands # 0.76 0.0 - 1.0 k/uL    Metamyelocytes Absolute 0.51 (H) 0 k/uL    Morphology MICROCYTOSIS PRESENT     Morphology HYPOCHROMIA PRESENT     Morphology ANISOCYTOSIS PRESENT    Comprehensive Metabolic Panel w/ Reflex to MG    Collection Time: 05/15/19 10:50 PM   Result Value Ref Range    Glucose 166 (H) 70 - 99 mg/dL    BUN 5 (L) 6 - 20 mg/dL    CREATININE 0.49 (L) 0.70 - 1.20 mg/dL    Bun/Cre Ratio NOT REPORTED 9 - 20    Calcium 8.5 (L) 8.6 - 10.4 mg/dL    Sodium 128 (L) 135 - 144 mmol/L    Potassium 3.7 3.7 - 5.3 mmol/L    Chloride 82 (L) 98 - 107 mmol/L    CO2 28 20 - 31 mmol/L    Anion Gap 18 (H) 9 - 17 mmol/L    Alkaline Phosphatase 864 (H) 40 - 129 U/L    ALT 23 5 - 41 U/L     (H) <40 U/L    Total Bilirubin 5.58 (H) 0.3 - 1.2 mg/dL    Total Protein 7.0 6.4 - 8.3 g/dL    Alb 2.5 (L) 3.5 - 5.2 g/dL    Albumin/Globulin Ratio NOT REPORTED 1.0 - 2.5    GFR Non-African American >60 >60 mL/min    GFR African American >60 >60 mL/min    GFR Comment          GFR Staging NOT REPORTED    Lipase    Collection Time: 05/15/19 10:50 PM Result Value Ref Range    Lipase 15 13 - 60 U/L   Brain Natriuretic Peptide    Collection Time: 05/15/19 10:50 PM   Result Value Ref Range    Pro- (H) <300 pg/mL    BNP Interpretation Pro-BNP Reference Range:    Protime-INR    Collection Time: 05/15/19 10:50 PM   Result Value Ref Range    Protime 16.9 (H) 11.8 - 14.6 sec    INR 1.4    APTT    Collection Time: 05/15/19 10:50 PM   Result Value Ref Range    PTT 33.2 24.0 - 36.0 sec   Troponin    Collection Time: 05/15/19 10:50 PM   Result Value Ref Range    Troponin, High Sensitivity 6 0 - 22 ng/L    Troponin T NOT REPORTED <0.03 ng/mL    Troponin Interp NOT REPORTED    Ammonia    Collection Time: 05/15/19 10:50 PM   Result Value Ref Range    Ammonia 36 16 - 60 umol/L   Cult,Blood #1    Collection Time: 05/15/19 10:50 PM   Result Value Ref Range    Specimen Description . BLOOD 10ML RED 10ML PURP LEFT AC     Special Requests NOT REPORTED     Culture NO GROWTH 6 HOURS    Lactate, Sepsis    Collection Time: 05/15/19 10:50 PM   Result Value Ref Range    Lactic Acid, Sepsis 6.9 (H) 0.5 - 1.9 mmol/L    Lactic Acid, Sepsis, Whole Blood NOT REPORTED 0.5 - 1.9 mmol/L   Cult,Blood #1    Collection Time: 05/15/19 11:05 PM   Result Value Ref Range    Specimen Description . BLOOD 10ML RED 10ML PURP RIGHT UPPER ARM     Special Requests NOT REPORTED     Culture NO GROWTH 6 HOURS    EKG 12 Lead    Collection Time: 05/15/19 11:21 PM   Result Value Ref Range    Ventricular Rate 135 BPM    Atrial Rate 135 BPM    P-R Interval 140 ms    QRS Duration 96 ms    Q-T Interval 288 ms    QTc Calculation (Bazett) 432 ms    P Axis 38 degrees    R Axis 10 degrees    T Axis 57 degrees   Troponin    Collection Time: 05/16/19  1:00 AM   Result Value Ref Range    Troponin, High Sensitivity 8 0 - 22 ng/L    Troponin T NOT REPORTED <0.03 ng/mL    Troponin Interp NOT REPORTED    Lactate, Sepsis    Collection Time: 05/16/19  1:00 AM   Result Value Ref Range    Lactic Acid, Sepsis 6.1 (H) 0.5 - 1.9 mmol/L    Lactic Acid, Sepsis, Whole Blood NOT REPORTED 0.5 - 1.9 mmol/L   Body fluid cell count    Collection Time: 05/16/19  2:28 AM   Result Value Ref Range    Color, Fluid YELLOW     Appearance, Fluid CLOUDY     WBC, Fluid 231 /mm3    RBC, Fluid 350 /mm3    Specimen Type . PARACENTESIS FLUID    LACTATE DEHYDROGENASE, BODY FLUID    Collection Time: 05/16/19  2:28 AM   Result Value Ref Range    Specimen Type . PARACENTESIS FLUID     LD, Fluid 253 U/L   Differential, Body Fluid    Collection Time: 05/16/19  2:28 AM   Result Value Ref Range    Fluid Diff Comment TO BE REVIEWED BY PATHOLOGIST     Neutrophil Count, Fluid 21 (H) 0 %    Lymphocytes, Body Fluid 9 (H) 0 %    Monocyte Count, Fluid 65 (H) 0 %    Eos, Fluid      0 %    Basos, Fluid      0 %    Other Cells, Fluid 5 (H) 0 %   Body Fluid Culture    Collection Time: 05/16/19  2:30 AM   Result Value Ref Range    Specimen Description . PARACENTESIS FLUID     Special Requests NOT REPORTED     Direct Exam MANY NEUTROPHILS (A)     Direct Exam MANY MONONUCLEAR WHITE BLOOD CELLS SEEN (A)     Direct Exam NO BACTERIA SEEN     Direct Exam       Gram stain made from cytocentrifuged specimen. Organisms and cells will be concentrated. Culture PENDING    Urine culture clean catch    Collection Time: 05/16/19  2:40 AM   Result Value Ref Range    Specimen Description . CLEAN CATCH URINE     Special Requests NOT REPORTED     Culture SPECIMEN RECEIVED    POC Glucose Fingerstick    Collection Time: 05/16/19  7:07 AM   Result Value Ref Range    POC Glucose 189 (H) 75 - 110 mg/dL   POC Glucose Fingerstick    Collection Time: 05/16/19 11:04 AM   Result Value Ref Range    POC Glucose 149 (H) 75 - 110 mg/dL   Lactic Acid    Collection Time: 05/16/19 11:40 AM   Result Value Ref Range    Lactic Acid 4.4 (H) 0.5 - 2.2 mmol/L   Comprehensive Metabolic Panel w/ Reflex to MG    Collection Time: 05/16/19 11:40 AM   Result Value Ref Range    Glucose 154 (H) 70 - 99 mg/dL    BUN 5 (L) 6 - 20 mg/dL    CREATININE <0.40 (L) 0.70 - 1.20 mg/dL    Bun/Cre Ratio NOT REPORTED 9 - 20    Calcium 7.6 (L) 8.6 - 10.4 mg/dL    Sodium 134 (L) 135 - 144 mmol/L    Potassium 3.5 (L) 3.7 - 5.3 mmol/L    Chloride 92 (L) 98 - 107 mmol/L    CO2 30 20 - 31 mmol/L    Anion Gap 12 9 - 17 mmol/L    Alkaline Phosphatase 603 (H) 40 - 129 U/L    ALT 19 5 - 41 U/L     (H) <40 U/L    Total Bilirubin 4.36 (H) 0.3 - 1.2 mg/dL    Total Protein 5.7 (L) 6.4 - 8.3 g/dL    Alb 2.0 (L) 3.5 - 5.2 g/dL    Albumin/Globulin Ratio NOT REPORTED 1.0 - 2.5    GFR Non- CANNOT BE CALCULATED >60 mL/min    GFR  CANNOT BE CALCULATED >60 mL/min    GFR Comment          GFR Staging NOT REPORTED    CBC    Collection Time: 05/16/19 11:40 AM   Result Value Ref Range    WBC 12.3 (H) 3.5 - 11.0 k/uL    RBC 3.17 (L) 4.5 - 5.9 m/uL    Hemoglobin 7.6 (L) 13.5 - 17.5 g/dL    Hematocrit 25.3 (L) 41 - 53 %    MCV 79.8 (L) 80 - 100 fL    MCH 24.1 (L) 26 - 34 pg    MCHC 30.2 (L) 31 - 37 g/dL    RDW 23.1 (H) 11.5 - 14.9 %    Platelets 98 (L) 448 - 450 k/uL    MPV 7.2 6.0 - 12.0 fL    NRBC Automated NOT REPORTED per 100 WBC   Magnesium    Collection Time: 05/16/19 11:40 AM   Result Value Ref Range    Magnesium 1.5 (L) 1.6 - 2.6 mg/dL       Imaging/Diagnostics:  Xr Chest Standard (2 Vw)    Result Date: 5/10/2019  EXAMINATION: TWO XRAY VIEWS OF THE CHEST 5/10/2019 10:02 am COMPARISON: AP chest from 05/03/2019 HISTORY: ORDERING SYSTEM PROVIDED HISTORY: cough TECHNOLOGIST PROVIDED HISTORY: cough Ordering Physician Provided Reason for Exam: Cough congestion weakness today h/o cancer Acuity: Acute Type of Exam: Initial Additional signs and symptoms: Cough congestion weakness today h/o cancer Additional history includes anemia, colon carcinoma, hypertension, and diabetes. FINDINGS: Right-sided IJ port with unchanged tip position in the lower SVC. Overlying ECG monitor leads. Cardiomediastinal shadow stable.  Low lung volumes with bibasilar atelectasis or perhaps scarring. No localized pulmonary opacity suspicious for infiltrate. No sizable pleural effusion. Bones and soft tissues intact. Gas-filled small bowel loops, best seen on the lateral projection, upper limits of normal.     No acute cardiopulmonary disease or interval change. Bibasilar atelectasis or scarring     Xr Chest Standard (2 Vw)    Result Date: 4/17/2019  EXAMINATION: TWO VIEWS OF THE CHEST 4/17/2019 9:18 pm COMPARISON: 04/07/2019 HISTORY: ORDERING SYSTEM PROVIDED HISTORY: chest pain, tachycardia TECHNOLOGIST PROVIDED HISTORY: chest pain, tachycardia Ordering Physician Provided Reason for Exam: Pt states chest pain and fatigue x 1 day Acuity: Acute Type of Exam: Initial Additional signs and symptoms: Pt states chest pain and fatigue x 1 day FINDINGS: Right internal jugular chest port with catheter tip at the cavoatrial junction. Limited due to low lung volumes. Normal heart size and pulmonary vasculature. Left lateral basilar opacity with corresponding finding on the lateral view may represent atelectasis or pneumonia. No pneumothorax. Limited due to low lung volumes. Left lateral basilar opacity may represent atelectasis or pneumonia. Consider repeating study with full inspiration. Ct Head Wo Contrast    Result Date: 5/16/2019  EXAMINATION: CT OF THE HEAD WITHOUT CONTRAST  5/16/2019 12:39 am TECHNIQUE: CT of the head was performed without the administration of intravenous contrast. Dose modulation, iterative reconstruction, and/or weight based adjustment of the mA/kV was utilized to reduce the radiation dose to as low as reasonably achievable.  COMPARISON: 04/01/2019 HISTORY: ORDERING SYSTEM PROVIDED HISTORY: ams TECHNOLOGIST PROVIDED HISTORY: Ordering Physician Provided Reason for Exam: AMS, patient denies any head complaints at this time Acuity: Acute Relevant Medical/Surgical History: patient denies any surgeries to brain FINDINGS: BRAIN/VENTRICLES: There is no acute intracranial hemorrhage, mass effect or midline shift. No abnormal extra-axial fluid collection. The gray-white differentiation is maintained without evidence of an acute infarct. There is no evidence of hydrocephalus. ORBITS: The visualized portion of the orbits demonstrate no acute abnormality. SINUSES: The visualized paranasal sinuses and mastoid air cells demonstrate no acute abnormality. Small mucous retention cyst left maxillary antrum. SOFT TISSUES/SKULL:  No acute abnormality of the visualized skull or soft tissues. No acute intracranial abnormality. Ct Abdomen Pelvis W Iv Contrast Additional Contrast? None    Result Date: 5/16/2019  EXAMINATION: CT OF THE ABDOMEN AND PELVIS WITH CONTRAST; CTA OF THE CHEST 5/16/2019 12:42 am TECHNIQUE: CT of the abdomen and pelvis was performed with the administration of intravenous contrast. Multiplanar reformatted images are provided for review. Dose modulation, iterative reconstruction, and/or weight based adjustment of the mA/kV was utilized to reduce the radiation dose to as low as reasonably achievable.; CTA of the chest was performed after the administration of intravenous contrast.  Multiplanar reformatted images are provided for review. MIP images are provided for review. Dose modulation, iterative reconstruction, and/or weight based adjustment of the mA/kV was utilized to reduce the radiation dose to as low as reasonably achievable. COMPARISON: None HISTORY: ORDERING SYSTEM PROVIDED HISTORY: abd pain TECHNOLOGIST PROVIDED HISTORY: Ordering Physician Provided Reason for Exam: Abdominal pain, bloating Acuity: Acute Relevant Medical/Surgical History: surgeries to area of interest include hernia repair; ORDERING SYSTEM PROVIDED HISTORY: PE TECHNOLOGIST PROVIDED HISTORY: Ordering Physician Provided Reason for Exam: Patient denies any SOB or chest pain at this time Acuity: Acute FINDINGS: Chest: Mediastinum: Heart is mildly enlarged in size.   Trace pericardial fluid versus pericardial thickening. Main pulmonary artery mildly enlarged measuring 3.1 cm in transverse dimension. No convincing evidence of central or lobar pulmonary emboli. Segmental subsegmental branches are less optimally evaluated due to motion artifacts and timing of contrast bolus. No suspicious mediastinal, hilar, or axillary not identified per CT criteria. Lungs/pleura: Small right-sided and trace left-sided pleural effusions. Pleuroparenchymal bands versus subsegmental atelectasis identified involving both lower lobes. Soft Tissues/Bones: There is redemonstration of the mixed sclerotic and lytic lesion involving the T2 vertebral body with surrounding pair spinal soft tissue attenuation. This extends into the pedicles and facets on the left. Additional mix additional sclerotic and lytic lesion identified involving T4 similar prior examination. Subtle areas sclerosis is suspected subacute nondisplaced fractures anterior left 5th, 7th and 8th ribs similar prior exam. Abdomen/Pelvis: Organs: There are innumerable hypodensities identified throughout the right and left hepatic lobes which have progressed from prior examination. A suggestion of a central ill-defined area of hypoattenuation which may represent areas of posterior related changes/fibrosis versus perfusion differences. There is mild gallbladder wall thickening. No definite cholelithiasis. The spleen, adrenal glands, kidneys, pancreas unremarkable. GI/Bowel: Mild-to-moderate retained stool throughout the colon. Mild-to-moderate scattered colonic diverticulosis. Appendix not identified. Pelvis: Mild bladder wall thickening related to underdistention. Tiny fat containing inguinal hernias. Prostate unremarkable. Peritoneum/Retroperitoneum: Moderate volume of ascites. Similar prior examination. Bones/Soft Tissues: Multilevel degenerate changes of the lumbar spine.      CTA Chest: No convincing evidence of central or lobar pulmonary emboli. Distal segmental subsegmental branches not well evaluated. Moderate size right-sided effusion and trace left-sided effusion. Stable osseous metastatic disease with peers spinal soft tissue attenuation involving the T2 vertebral body. CT of the abdomen pelvis: No significant change in the innumerable hepatic lesions worrisome for metastatic disease as well as moderate burden of ascites. Bladder wall thickening could be infectious inflammatory or could be related to underdistention. .  Please correlate with urinalysis findings. Ct Abdomen Pelvis W Iv Contrast Additional Contrast? None    Result Date: 4/17/2019  EXAMINATION: CT OF THE ABDOMEN AND PELVIS WITH CONTRAST 4/17/2019 10:45 pm TECHNIQUE: CT of the abdomen and pelvis was performed with the administration of intravenous contrast. Multiplanar reformatted images are provided for review. Dose modulation, iterative reconstruction, and/or weight based adjustment of the mA/kV was utilized to reduce the radiation dose to as low as reasonably achievable. COMPARISON: 10/03/2018 HISTORY: ORDERING SYSTEM PROVIDED HISTORY: sepsis, source unknown. history of metastatic colon cancer to liver TECHNOLOGIST PROVIDED HISTORY: Ordering Physician Provided Reason for Exam: sepsis, source unknown. Hx - metastatic colon cancer to liver. Patient states weakness x1 day prior. Acuity: Unknown Type of Exam: Unknown Relevant Medical/Surgical History: Surgical hx - Hernia repair. Hx - HBP, Diabetes, Carcinoma, Colorectal cancer, stage IV  Hepatic metastases . FINDINGS: Mild elevation of the left hemidiaphragm. Coronary calcifications versus coronary stents identified. Trace pericardial thickening/pericardial fluid. There are bibasilar opacities suggestive of bibasilar atelectasis. These findings most confluent within the left lower lobe.  There are innumerable hypodensities identified throughout the right and left hepatic lobes which have progressed from prior examination. A suggestion of a central ill-defined area of hypoattenuation which may represent areas of posterior related changes/fibrosis versus perfusion differences. Mild periportal edema involving the liver. There is moderate gallbladder wall thickening. No definite cholelithiasis. Spleen, adrenal glands, kidneys, pancreas unremarkable. Mild retained stool throughout the colon without evidence of obstruction. The appendix unremarkable. No suspicious mesenteric or retroperitoneal adenopathy. A few lymph nodes identified within the port appendix region noted of uncertain clinical significance borderline size. No free air or free fluid. Tiny fat containing right inguinal hernia. The bladder is mildly distended. Prostate is unremarkable. No loculated rim enhancing fluid collection to suggest abscess formation. Central disc protrusion T7-T8. Moderate changes L4-5 and L5-S1. No suspicious osseous lesions. Moderate gallbladder wall thickening nonspecific. Please correlate with exam findings. Innumerable hepatic lesions suggestive of metastatic disease as on prior imaging studies. Bibasilar consolidative changes favor bibasilar atelectasis. Us Gallbladder Ruq    Result Date: 4/18/2019  EXAMINATION: RIGHT UPPER QUADRANT ULTRASOUND 4/18/2019 9:13 am COMPARISON: CT abdomen and pelvis April 17, 2019. HISTORY: ORDERING SYSTEM PROVIDED HISTORY: wall thickening on CT and elevated alk phos FINDINGS: LIVER:  Cirrhotic liver with heterogeneous echotexture. No  intrahepatic bile duct dilatation noted. BILIARY SYSTEM:  Diffuse gallbladder wall thickening. Questionable adherent stone versus polyp measuring 8 mm. No internal color Doppler vascularity. Negative Iyer sign. No pericholecystic fluid. Common bile duct is within normal limits measuring 5.3 mm. PANCREAS:  Visualized portions of the pancreas are unremarkable. OTHER: Trace ascites is noted within the right upper quadrant.      1.  Diffuse gallbladder wall thickening. No pericholecystic fluid or biliary obstruction. The finding may related to the patient's underlying cirrhosis or possibly chronic cholecystitis. If acute cholecystitis is of clinical concern, further evaluation with HIDA scan is recommended. 2. Questionable adherent stone versus polyp measuring 8 mm. The former is favored. If no surgical intervention is performed, repeat ultrasound in 1 year is recommended. 3. Trace perihepatic ascites. Xr Chest Portable    Result Date: 5/3/2019  EXAMINATION: SINGLE XRAY VIEW OF THE CHEST 5/3/2019 11:12 am COMPARISON: April 17, 2019 HISTORY: ORDERING SYSTEM PROVIDED HISTORY: Chest Pain TECHNOLOGIST PROVIDED HISTORY: Chest Pain Ordering Physician Provided Reason for Exam: Pt states today chest pain. History of colon cancer and lung cancer Acuity: Unknown Type of Exam: Unknown FINDINGS: Right chest Port in good position. No kink. Non enlarged heart. Bibasilar atelectasis. Right basilar pleuroparenchymal scarring. No effusion. No pneumothorax. No airspace consolidation. No focal airspace consolidation. Ct Chest Pulmonary Embolism W Contrast    Result Date: 5/16/2019  EXAMINATION: CT OF THE ABDOMEN AND PELVIS WITH CONTRAST; CTA OF THE CHEST 5/16/2019 12:42 am TECHNIQUE: CT of the abdomen and pelvis was performed with the administration of intravenous contrast. Multiplanar reformatted images are provided for review. Dose modulation, iterative reconstruction, and/or weight based adjustment of the mA/kV was utilized to reduce the radiation dose to as low as reasonably achievable.; CTA of the chest was performed after the administration of intravenous contrast.  Multiplanar reformatted images are provided for review. MIP images are provided for review. Dose modulation, iterative reconstruction, and/or weight based adjustment of the mA/kV was utilized to reduce the radiation dose to as low as reasonably achievable.  COMPARISON: None HISTORY: ORDERING SYSTEM PROVIDED HISTORY: abd pain TECHNOLOGIST PROVIDED HISTORY: Ordering Physician Provided Reason for Exam: Abdominal pain, bloating Acuity: Acute Relevant Medical/Surgical History: surgeries to area of interest include hernia repair; ORDERING SYSTEM PROVIDED HISTORY: PE TECHNOLOGIST PROVIDED HISTORY: Ordering Physician Provided Reason for Exam: Patient denies any SOB or chest pain at this time Acuity: Acute FINDINGS: Chest: Mediastinum: Heart is mildly enlarged in size. Trace pericardial fluid versus pericardial thickening. Main pulmonary artery mildly enlarged measuring 3.1 cm in transverse dimension. No convincing evidence of central or lobar pulmonary emboli. Segmental subsegmental branches are less optimally evaluated due to motion artifacts and timing of contrast bolus. No suspicious mediastinal, hilar, or axillary not identified per CT criteria. Lungs/pleura: Small right-sided and trace left-sided pleural effusions. Pleuroparenchymal bands versus subsegmental atelectasis identified involving both lower lobes. Soft Tissues/Bones: There is redemonstration of the mixed sclerotic and lytic lesion involving the T2 vertebral body with surrounding pair spinal soft tissue attenuation. This extends into the pedicles and facets on the left. Additional mix additional sclerotic and lytic lesion identified involving T4 similar prior examination. Subtle areas sclerosis is suspected subacute nondisplaced fractures anterior left 5th, 7th and 8th ribs similar prior exam. Abdomen/Pelvis: Organs: There are innumerable hypodensities identified throughout the right and left hepatic lobes which have progressed from prior examination. A suggestion of a central ill-defined area of hypoattenuation which may represent areas of posterior related changes/fibrosis versus perfusion differences. There is mild gallbladder wall thickening. No definite cholelithiasis.   The spleen, adrenal glands, kidneys, pancreas unremarkable. GI/Bowel: Mild-to-moderate retained stool throughout the colon. Mild-to-moderate scattered colonic diverticulosis. Appendix not identified. Pelvis: Mild bladder wall thickening related to underdistention. Tiny fat containing inguinal hernias. Prostate unremarkable. Peritoneum/Retroperitoneum: Moderate volume of ascites. Similar prior examination. Bones/Soft Tissues: Multilevel degenerate changes of the lumbar spine. CTA Chest: No convincing evidence of central or lobar pulmonary emboli. Distal segmental subsegmental branches not well evaluated. Moderate size right-sided effusion and trace left-sided effusion. Stable osseous metastatic disease with peers spinal soft tissue attenuation involving the T2 vertebral body. CT of the abdomen pelvis: No significant change in the innumerable hepatic lesions worrisome for metastatic disease as well as moderate burden of ascites. Bladder wall thickening could be infectious inflammatory or could be related to underdistention. .  Please correlate with urinalysis findings. Ct Chest Pulmonary Embolism W Contrast    Result Date: 5/10/2019  EXAMINATION: CTA OF THE CHEST 5/10/2019 12:31 pm TECHNIQUE: CTA of the chest was performed after the administration of intravenous contrast.  Multiplanar reformatted images are provided for review. MIP images are provided for review. Dose modulation, iterative reconstruction, and/or weight based adjustment of the mA/kV was utilized to reduce the radiation dose to as low as reasonably achievable. COMPARISON: CT PE dated 04/08/2019 HISTORY: ORDERING SYSTEM PROVIDED HISTORY: cough, sob, tachycardia, cancer TECHNOLOGIST PROVIDED HISTORY: Ordering Physician Provided Reason for Exam: cough, sob, tachycardia, cancer. denies previous chest surgeries. FINDINGS: Pulmonary Arteries: Distal segmental and subsegmental pulmonary arteries are limited by respiratory motion and suboptimal bolus timing, despite repeat imaging.   No large central or proximal segmental pulmonary embolus. Mediastinum: The heart is normal in size. There is no pericardial effusion. Thoracic aorta is normal in caliber without obvious intimal dissection. Coronary artery calcifications and stents are noted. No mediastinal or hilar lymphadenopathy. No axillary lymphadenopathy. Right chest port is in place with distal tip at the cavoatrial junction. Lungs/pleura: There is a tiny right pleural effusion and trace left pleural fluid. There is mild bibasilar atelectasis. No focal airspace consolidation or pneumothorax. No evidence of pulmonary nodule or mass. Upper Abdomen: There is focal heterogeneous attenuation in the right hepatic dome. Irregular hypodense lesion is also noted along the anterior falciform ligament. There are several tiny hypodensities throughout the right liver, and overall attenuation is coarsened. There is a moderate volume of ascites in the upper abdomen. Spleen is enlarged, measuring 16.3 cm. Soft Tissues/Bones: There are mixed lytic and sclerotic lesions at T2 and T4. Both have soft tissue components which are not significantly changed from 04/08/2019. Subtle sclerosis is noted at the anterior left 5th, 7th, and 8th ribs. Underlying nondisplaced subacute fractures are suspected. 1.  Limited assessment of distal segmental and subsegmental pulmonary arteries due to respiratory motion and suboptimal bolus timing, despite repeat imaging. No central or proximal segmental pulmonary embolus. 2.  Tiny right pleural effusion and trace left pleural fluid with mild bibasilar atelectasis. 3.  Indistinct hypodense lesions in the liver, likely metastases. 4.  Moderate volume upper abdominal ascites, new from the previous study. 5.  Splenomegaly. 6.  Redemonstration of metastatic lesions at T2 and T4, similar to 04/08/2019. Subtle areas of sclerosis with suspected subacute nondisplaced fractures are noted at the anterior left 5th, 7th, and 8th ribs. Us Abdomen Limited    Result Date: 5/4/2019  EXAMINATION: RIGHT UPPER QUADRANT ULTRASOUND, 5/4/2019 9:39 am COMPARISON: 04/18/2019 HISTORY: ORDERING SYSTEM PROVIDED HISTORY: Check for ascites TECHNOLOGIST PROVIDED HISTORY: Check for ascites Abd distention, hx liver mets r/o cholecystitis Acuity: Acute Type of Exam: Initial FINDINGS: LIVER:  The liver demonstrates some abnormal echotexture as well as hepatomegaly with the liver measuring 27 cm. Liver nodularity noted which was also seen on the previous CT evaluation. BILIARY SYSTEM:  Gallbladder wall is severely thickened at 9.2 mm. There is evidence of gallbladder polyps. No evidence of gallbladder stones. Negative Iyer sign. Common bile duct is within normal limits measuring 0.61 cm. RIGHT KIDNEY: The right kidney is grossly unremarkable without evidence of hydronephrosis. OTHER: Moderate ascites noted in the abdomen and pelvis. 1. Moderate ascites. 2. Enlarged inhomogeneous nodular liver as previously noted on the CT evaluation. Neoplasm should be considered. 3. Thickened gallbladder wall as well as gallbladder polyps. The gallbladder does not appear distended however. No evidence of gallbladder stones. Acute cholecystitis is unlikely. Ir Us Guided Paracentesis    Result Date: 5/6/2019  PROCEDURE: ULTRASOUND GUIDED PARACENTESIS 5/6/2019 HISTORY: ORDERING SYSTEM PROVIDED HISTORY: Suspected SBP TECHNOLOGIST PROVIDED HISTORY: Suspected SBP TECHNIQUE: This procedure was performed by Dr. Kajal Quijano. Informed consent was obtained after a detailed explanation of the procedure including risks, benefits, and alternatives. Universal protocol was followed. Ultrasound shows a small to moderate amount of ascites. The right abdomen was prepped and draped in sterile fashion and local anesthesia was achieved with lidocaine. Using realtime ultrasound guidance a 6 Salv[place]orean pigtail catheter was advanced into the peritoneal fluid.   Ultrasound images show a safe tract and access into the fluid. Fluid was collected in vacuum bottles. Little to no fluid remains on completion. The catheter was removed and a sterile dressing applied. There are no immediate complications. The patient left the department in stable condition. FINDINGS: A total of 850 mL of clear yellow fluid was removed. Fluid was sent for the requested studies. Successful ultrasound guided paracentesis. Nm Hepatobiliary Scan W Ejection Fraction    Result Date: 4/20/2019  EXAMINATION: NUCLEAR MEDICINE HEPATOBILIARY SCINTIGRAPHY (HIDA SCAN). 4/20/2019 10:13 am TECHNIQUE: Approximately 3.0 [id number] Mebrofenin (Choletec) was administered IV. Then, dynamic images of the abdomen were obtained in the anterior projection for 60 mins. A right lateral view was also obtained at 60 mins. COMPARISON: Ultrasound 04/18/2019 HISTORY: ORDERING SYSTEM PROVIDED HISTORY: pain TECHNOLOGIST PROVIDED HISTORY: Ordering Physician Provided Reason for Exam: Pain Acuity: Unknown Type of Exam: Unknown Additional signs and symptoms: pt states no real abdominal pain, came in for general fatigue, pt states he has liver an dcolon cancer FINDINGS: Gallbladder is not visualized. Activity is seen within the small bowel. Delayed excretion of radiotracer by the liver. Nonvisualization of the gallbladder suggesting acute cholecystitis. Delayed excretion by the liver suggesting hepatic dysfunction.        Assessment :      Primary Problem  <principal problem not specified>    Active Hospital Problems    Diagnosis Date Noted    Sepsis [facility]) [A41.9] 05/16/2019       Plan:     Patient status Admit as inpatient in the  Progressive Unit/Step down    Ascites, with possible SBP likely 2/2 Stage IV colorectal Ca w/ extensive Liver mets   - WBC 12.3, afebrile   - Lactic acid 4.4   - Vancomycin - pharmacy to dose (day 1)   - Zosyn 3.375 q8h (day 1)  - IVF  mL/h, s/p 4.4L of bolus in ED    - Paracentesis preformed with fluid for analysis - pending   - GI consulted  - CBC, CMP dialy     Hyponatremia  - IVF   - BMP daily     Diabetes Mellitus Type 2  - Lantus 15u nightly  - ISS low  - Hypoglycemia protocol ordered  - POCT glucose     Other   - Lovenox 30mg q12h - DVT proph  - K SS  PRN - hypoK  - Mg PRN - hypoMg  - Faye or Fentanyl PRN pain   - PT/OT and Social work   - Gen Diet     Plan to be discussed with attending Dr. Mariaelena Nugent     Consultations:   Asa White    Patient is admitted as inpatient status because of co-morbiditieslisted above, severity of signs and symptoms as outlined, requirement for current medical therapies and most importantly because of direct risk to patient if care not provided in a hospital setting. Geno Reed MD  5/16/2019  2:03 PM    Copy sent to Dr. Ezequiel Gregg primary care provider on file. Attending Physician Statement  Patient seen and examined  I have discussed the care of the patient, including pertinent history and exam findings,  with the resident. I have reviewed the key elements of all parts of the encounter with the resident. I agree with the assessment, plan and orders as documented by the resident.     Paul Mahan

## 2019-05-16 NOTE — ED NOTES
Dr Karen Jasso and Dr Karyn Rojo at bedside for   procedure     Cait Ortega, TATIANNA  05/16/19 [id number]

## 2019-05-16 NOTE — PROGRESS NOTES
Pt continues to attempt to get up with no assistance, bed alarm remains on, call light and bedside table within reach, bed locked and in lowest position,  in pt's room while pt is in restroom, gait is unsteady and weak, will continue to attempt to educate, no evidence of learning a this time

## 2019-05-16 NOTE — PROGRESS NOTES
Patient resting comfortably in bed, no signs or symptoms of distress. Hemodynamically stable. Bed alarm in place due to impulsivity, however patient has utilized his call light appropriately since arriving to the intermediate unit. Will continue to monitor closely.

## 2019-05-16 NOTE — PLAN OF CARE
Nutrition Problem: Increased nutrient needs  Intervention: Food and/or Nutrient Delivery: Continue current diet, Start ONS  Nutritional Goals: Nutritional intake or provision is greater than 75% of estimated needs

## 2019-05-16 NOTE — PROGRESS NOTES
Nutrition Assessment    Type and Reason for Visit: Positive Nutrition Screen(Unplanned weight loss, decreased appetite. Dietitian consult needed: poor intake, poor appetite)    Nutrition Recommendations: Continue General diet. Start Ensure Enlive 2x/day. Nutrition Assessment: Patient is severely malnourished as evidence by severe fat and muscle mass loss. Patient is at risk for further compromise due to metastatic cancer and liver dysfunction. Gross ascites noted, therfore it is difficult to determine weight loss. Previous nutrition assessement from 5/4/19 indicated a 10% weight loss in 1 month. Will start Ensure Enlive twice a day. Will monitor p.o intakes labs and paracentesis results. Malnutrition Assessment:  · Malnutrition Status: Meets the criteria for severe malnutrition  · Context: Chronic illness  · Findings of the 6 clinical characteristics of malnutrition (Minimum of 2 out of 6 clinical characteristics is required to make the diagnosis of moderate or severe Protein Calorie Malnutrition based on AND/ASPEN Guidelines):  1. Energy Intake-Unable to assess,      2. Weight Loss-Unable to assess, unable to assess  3. Fat Loss-Severe subcutaneous fat loss, Triceps  4. Muscle Loss-Severe muscle mass loss, Clavicles (pectoralis and deltoids), Thigh (quadriceps)  5. Fluid Accumulation-Severe fluid accumulation, Ascites  6.   Strength-Not measured    Nutrition Risk Level: High    Nutrient Needs:  · Estimated Daily Total Kcal: 0966-0687 kcals based on 30-32 kcal/ kg of ideal   · Estimated Daily Protein (g): 113-128 gm of protein based on 1.5-1.7 gm/ kg of ideal weight     Nutrition Diagnosis:   · Problem: Increased nutrient needs  · Etiology: related to Catabolic illness(cancer with mets)     Signs and symptoms:  as evidenced by Diet history of poor intake, Severe muscle loss, Severe loss of subcutaneous fat    Objective Information:  · Nutrition-Focused Physical Findings: +1 BLE, Gross ascites,

## 2019-05-16 NOTE — PROGRESS NOTES
Patient received from PCU room 2110. Vitals taken. Assessment completed. No distress noted. See doc flowsheet for details. POC and education reviewed with patient. Call light within reach, and pt educated on its use. Bed in lowest position, and locked, bed alarm in place due to patient's previous altered mental status. Side rails up x 2. Denied further questions or needs at this time. Will continue to monitor.

## 2019-05-16 NOTE — PROGRESS NOTES
Dr Robel Combs paged re consult at 11:43am 5/16 per tm  Dr Robel Combs notified by phone of consult at 12:25pm

## 2019-05-16 NOTE — ED PROVIDER NOTES
eMERGENCY dEPARTMENT eNCOUnter    Pt Name: Lily Tavarez  MRN: [id number]  [place] 1960  Date of evaluation: 5/15/19  CHIEF COMPLAINT       Chief Complaint   Patient presents with    Altered Mental Status    Abdominal Pain    Bloated     HISTORY OF PRESENT ILLNESS   HPI  HISTORY OF PRESENT ILLNESS:  Past medical history of cancer with metastases to the liver presents for chief complaint of altered mental status. Patient is unable to give complaints and white stepped out on initial evaluation. However per the nurse patient has been more altered for the past couple days. Has been complaining of abdominal distention. Patient is not forthcoming with any complaints at this time and is sleeping on exam.  However arousable. Severity is moderate. No aggravating or relieving factors. Timing is couple days. Course is constant.   Context is cancer with metastases to liver  -----------------------  -----------------------  REVIEW OF SYSTEMS  ED Caveat: [clinical condition  -----------------------  -----------------------  ALLERGIES  -per nursing records, reviewed    PAST MEDICAL HISTORY  -See HPI    SOCIAL HISTORY  -No daily drinking, no IV drugs  -----------------------  -----------------------  PHYSICAL EXAM  Gen: Alert, no acute distress  Skin: Warm, no rashes  Head: Normocephalic, atraumatic  Neck: No midline tenderness, no nuchal rigidity  Eye: EOMI, PERRLA, normal conjunctiva  ENT: Mucous membranes moist, no pharyngeal erythema  CV: Tachycardic, no rubs  Resp: Respirations unlabored, lungs clear to auscultation  GI: Soft, + distended, no large abdominal masses, non tender  MSK: No midline back pain, no large joint effusions  Neuro: Alert, no focal neurological deficits observed  Psych: uncooperative, appropriate mood and affect  -----------------------  -----------------------  MEDICAL DECISION MAKING  Differential Diagnosis:  - Consideration is given for   appendicitis, cholecystitis, diverticulitis, SBO, urinary tract infection, nephrolithiasis, pancreatitis, dissection, ischemia to reproductive organs, STD, ischemic colitis, perforation, intra abdominal bleeding, GI bleed, DKA, ACS,, cellulitis, pneumonia, cellulitis, meningitis, spinal abscess, intracranial abscess, URI, SBP, bacteremia, myocarditis, endocarditis,    -  #Impression/Plan:  - Clinically patient's presentation is most consistent with  sepsis. Code sepsis was called on patient's arrival.  Given patient's presentation will evaluate for possible etiologies including SBP. Patient will require at least diagnostic paracentesis. We'll also get a CT of his chest to evaluate for pulmonary embolism given his tachycardia. Clinically most consistent with infection of unknown etiology at this time.  -  ##Reevaluation/Conversations on care:  -   - Procedure: Paracentesis  Consent: Patient, discussed complications including but not limited to need for another   procedure, bowel perforation, infection, bleeding, worsening condition, and death. Indication: Ascites   Location: LLQ , ultrasound identified the area of largest fluid pocket, care was taken to avoid hypogastric arteries and bowel. Preparation: Sterile field, chlorhexidine x 2  Procedure: using sterile technique, with care to avoid the hypogastric arteries and bowel, 1% lido was used to numb. Then using the kit provided needle, carefully inserted into the largest pocket of fluid using the Z technique. Post-Procedure: 20 cc[] amount of yellow clear fluid. Pt re-evaluated and feeling better, no abdominal pain, rebound or guarding No peritoneal signs or symptoms. Pressure was maintained until no further drainage occurred   Complications: [none]  -  - spoke with matilde resendiz for admit to progressive. ##Diagnosis:  -Sepsis, fever, abdominal distention  -  -----------------------  -----------------------  Citlaly Bustamante MD, [facility]  Emergency Medicine Attending  Questions?  Please contact my cell phone anytime. [phone]  *This charting supersedes any ED resident or staff charting and was written using speech recognition software  PASTMEDICAL HISTORY     Past Medical History:   Diagnosis Date    Back pain 1/30/2013    CAD S/P percutaneous coronary angioplasty 11/27/2016    cardiac stent    Carcinoma ([facility] [address] [address] 12/04/2016    colon, liver mets    Colon cancer ([address]     Degeneration of lumbar or lumbosacral intervertebral disc 4/15/2014    Depression 5/10/2012    Diabetes mellitus ([address]     H/O cardiac catheterization     with cardiac stent    Hepatic metastases ([address] 12/4/2016    Hyperlipidemia     Hypertension     Knee pain     MI, 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    with cardiac arrest at [address] [place] [address]       Past Surgical History:   Procedure Laterality Date    CORONARY ANGIOPLASTY WITH STENT PLACEMENT  11/2016    x1 stent    ENDOSCOPIC ULTRASOUND (LOWER) N/A 12/8/2017    RECTAL ENDOSCOPIC ULTRASOUND WITH PATHOLOGY performed by Hawa Rincon [facility] at [facility] [address] LIVER BIOPSY Right 08/23/2018    CT guided Visceral Tissue Ablation    [place] SIGMOIDOSCOPY FLX W/RMVL FOREIGN BODY N/A 5/25/2017    SIGMOIDOSCOPY BIOPSY FLEXIBLE performed by Hawa Rincon MD at [facility]    [place] SIGMOIDOSCOPY FLX W/RMVL FOREIGN BODY  12/8/2017    SIGMOIDOSCOPY BIOPSY FLEXIBLE performed by Hawa Rincon MD at [facility]    TUNNELED VENOUS PORT PLACEMENT Right     right upper chest for cancer treatment     CURRENT MEDICATIONS       Previous Medications    DIPHENHYDRAMINE (BENADRYL) 25 MG CAPSULE    Take 1 capsule by mouth nightly as needed for Sleep    FENTANYL (DURAGESIC) 50 MCG/HR    Place 1 patch onto the skin every 72 hours. GLIMEPIRIDE (AMARYL) 4 MG TABLET    Take 1 tablet by mouth every morning    GLUCOSE BLOOD (BLOOD GLUCOSE TEST STRIPS) STRP    1 each by In Vitro route daily As needed.     GLUCOSE MONITORING KIT (Hitsbook) MONITORING KIT    1 kit by Does not apply route daily    INSULIN GLARGINE (LANTUS) 100 UNIT/ML INJECTION VIAL    Inject 20 Units into the skin nightly    INSULIN PEN NEEDLE 29G X 12.7MM MISC    1 each by Does not apply route daily    LANCETS MISC    1 each by Does not apply route daily    METOPROLOL TARTRATE (LOPRESSOR) 25 MG TABLET    Take 1 tablet by mouth daily     NALOXONE (NARCAN) 4 MG/0.1ML LIQD NASAL SPRAY    Take 4 mg by mouth    NITROGLYCERIN (NITROSTAT) 0.4 MG SL TABLET    Place 1 tablet under the tongue every 5 minutes as needed for Chest pain Dissolve 1 tablet under tongue for chest pain and repeat every 5 min up to max of 3 total doses. If no relief after 3 doses call 911    OXYCODONE HCL (OXY-IR) 10 MG IMMEDIATE RELEASE TABLET    Take 20 mg by mouth 3 times daily as needed for Pain. POTASSIUM CHLORIDE (KLOR-CON M) 20 MEQ EXTENDED RELEASE TABLET    Take 1 tablet by mouth daily     ALLERGIES     is allergic to morphine. FAMILY HISTORY     indicated that his mother is . He indicated that his father is alive. He indicated that all of his three sisters are alive. He indicated that his brother is alive.      SOCIAL HISTORY       Social History     Tobacco Use    Smoking status: Never Smoker    Smokeless tobacco: Never Used   Substance Use Topics    Alcohol use: Not Currently    Drug use: No     PHYSICAL EXAM     INITIAL VITALS: /66   Pulse 98   Temp 100 °F (37.8 °C) (Oral)   Resp 15   Ht 5' 10\" (1.778 m)   Wt 190 lb (86.2 kg)   SpO2 92%   BMI 27.26 kg/m²    Physical Exam    MEDICAL DECISION MAKING:            Labs Reviewed   CBC WITH AUTO DIFFERENTIAL - Abnormal; Notable for the following components:       Result Value    WBC 25.4 (*)     RBC 3.93 (*)     Hemoglobin 9.7 (*)     Hematocrit 31.4 (*)     MCV 79.9 (*)     MCH 24.5 (*)     MCHC 30.7 (*)     RDW 22.6 (*)     Seg Neutrophils 85 (*)     Lymphocytes 2 (*)     Metamyelocytes 2 (*)     Segs Absolute 21.59 (*)     Absolute Lymph # 0.51 (*)     Absolute Mono # 1.78 (*)     Basophils # 0.25 (*)     Metamyelocytes Absolute 0.51 (*)     All other components within normal limits   COMPREHENSIVE METABOLIC PANEL W/ REFLEX TO MG FOR LOW K - Abnormal; Notable for the following components:    Glucose 166 (*)     BUN 5 (*)     CREATININE 0.49 (*)     Calcium 8.5 (*)     Sodium 128 (*)     Chloride 82 (*)     Anion Gap 18 (*)     Alkaline Phosphatase 864 (*)      (*)     Total Bilirubin 5.58 (*)     Alb 2.5 (*)     All other components within normal limits   BRAIN NATRIURETIC PEPTIDE - Abnormal; Notable for the following components:    Pro- (*)     All other components within normal limits   PROTIME-INR - Abnormal; Notable for the following components:    Protime 16.9 (*)     All other components within normal limits   URINE RT REFLEX TO CULTURE - Abnormal; Notable for the following components:    Color, UA RED (*)     Turbidity UA TURBID (*)     Bilirubin Urine   (*)     Value: Presumptive positive. Unable to confirm due to unavailability of reagent.     Ketones, Urine TRACE (*)     Protein, UA 1+ (*)     Urobilinogen, Urine ELEVATED (*)     Nitrite, Urine POSITIVE (*)     Leukocyte Esterase, Urine SMALL (*)     All other components within normal limits   LACTATE, SEPSIS - Abnormal; Notable for the following components:    Lactic Acid, Sepsis 6.9 (*)     All other components within normal limits   LACTATE, SEPSIS - Abnormal; Notable for the following components:    Lactic Acid, Sepsis 6.1 (*)     All other components within normal limits   MICROSCOPIC URINALYSIS - Abnormal; Notable for the following components:    Bacteria, UA MANY (*)     All other components within normal limits   CULTURE BLOOD #1   CULTURE BLOOD #1   BODY FLUID CULTURE   URINE CULTURE CLEAN CATCH   LIPASE   APTT   TROPONIN   TROPONIN   AMMONIA   BODY FLUID CELL COUNT   LACTATE DEHYDROGENASE, BODY FLUID   ICTOTEST, URINE     EMERGENCY DEPARTMENTCOURSE:         Vitals:

## 2019-05-16 NOTE — PROGRESS NOTES
Pharmacy Note  Vancomycin Consult    Bonnell Rubinstein is a 66 y.o. male started on Vancomycin for sepsis; consult received from Dr. Richard Kam to manage therapy. Also receiving the following antibiotics: zosyn. Patient Active Problem List   Diagnosis    Hypertension, essential    Mixed hyperlipidemia    Knee pain    Type 2 diabetes mellitus with hyperglycemia, without long-term current use of insulin (HCC)    HTN (hypertension)    Hypercholesteremia    Insomnia    Knee pain, bilateral    Depression    Cervical sprain    Lumbar sprain    Back pain    Opioid dependence (HCC)    Degeneration of lumbar or lumbosacral intervertebral disc    Chronic midline low back pain without sciatica    ST elevation myocardial infarction (STEMI) ([facility] [address]    S/P PTCA  BMS to RCA - 11/26/2016 Dr. Kevin Arellano    CAD S/P percutaneous coronary angioplasty    Carcinoma [facility])    Type 2 myocardial infarction without ST elevation ([facility] [address]    Rectal bleed    Liver metastasis (HCC)    Colorectal cancer, stage IV ([facility] [address]    Anemia    Iron deficiency anemia due to chronic blood loss    Neuropathy due to chemotherapeutic drug (HCC)    Right hip pain    Radiation cystitis    Septic shock (HCC)    Severe malnutrition (HCC)    Septicemia (HCC)    Elevated LFTs    Malignant neoplasm of colon ([facility] [address]    Other ascites    Sepsis (HCC)       Allergies:  Morphine     Temp max: 97.7    Recent Labs     05/15/19  2250   BUN 5*       Recent Labs     05/15/19  2250   CREATININE 0.49*       Recent Labs     05/15/19  2250   WBC 25.4*       No intake or output data in the 24 hours ending 05/16/19 0441    Culture Date      Source                       Results  See micro orders    Ht Readings from Last 1 Encounters:   05/16/19 5' 10\" (1.778 m)        Wt Readings from Last 1 Encounters:   05/16/19 232 lb 5.8 oz (105.4 kg)         Body mass index is 33.34 kg/m². Estimated Creatinine Clearance: 205 mL/min (A) (based on SCr of 0.49 mg/dL (L)).     Goal Trough Level: 15-20 mcg/mL    Assessment/Plan:  Will initiate vancomycin 1500 mg IV every 12 hours. Timing of trough level will be determined based on culture results, renal function, and clinical response. Thank you for the consult. Will continue to follow.

## 2019-05-17 NOTE — FLOWSHEET NOTE
Patient was out of room for test  Wife said they will be going to [facility] on Monday  She said \"I'm fine\" and expressed no spiritual care needs     05/17/19 9980   Encounter Summary   Services provided to: Family  (wife)   Referral/Consult From: [facility]   (5/17/19)   Complexity of Encounter Low   Routine   Type Initial   Assessment Calm; Approachable  (Wife eating lunch in room)   Outcome Refused/declined

## 2019-05-17 NOTE — PROGRESS NOTES
[address] [facility].    PROGRESS NOTE             5/17/2019    8:56 AM    Name:   Harvinder Florentino  MRN:     [id number]     Acct:      [de-identified]   Room:   2015/2015-01  IP Day:  1  Admit Date:  5/15/2019 10:43 PM    PCP:  No primary care provider on file. Code Status:  Full Code    Subjective:     C/C:   Chief Complaint   Patient presents with    Altered Mental Status    Abdominal Pain    Bloated     Interval History Status: improved. Patient seen and chart reviewed, on acute events overnight. Pain controlled, hemodynamically remained stable. Ascitic fluid results no sbp. Review of Systems:     Review of Systems   Constitutional: Positive for activity change and appetite change. Negative for fever. HENT: Negative for congestion, facial swelling, postnasal drip and sinus pain. Eyes: Negative for discharge, redness and itching. Respiratory: Negative for cough, chest tightness and shortness of breath. Cardiovascular: Positive for leg swelling. Negative for chest pain and palpitations. Gastrointestinal: Positive for abdominal distention, abdominal pain and nausea. Negative for blood in stool and vomiting. Genitourinary: Negative for difficulty urinating and dysuria. Neurological: Negative for dizziness, weakness, numbness and headaches. Medications: Allergies:     Allergies   Allergen Reactions    Morphine Itching and Rash       Current Meds:   Scheduled Meds:    sodium chloride flush  10 mL Intravenous 2 times per day    enoxaparin  30 mg Subcutaneous BID    insulin glargine  15 Units Subcutaneous Nightly    insulin lispro  0-6 Units Subcutaneous TID WC    insulin lispro  0-3 Units Subcutaneous Nightly    cefTRIAXone (ROCEPHIN) IV  2 g Intravenous Q12H    metroNIDAZOLE  500 mg Intravenous Q8H    famotidine  20 mg Oral BID     Continuous Infusions:    dextrose       PRN Meds: sodium chloride flush, sodium chloride flush, potassium chloride **OR** potassium alternative oral replacement **OR** potassium chloride, ondansetron, diphenhydrAMINE, magnesium sulfate, glucose, dextrose, glucagon (rDNA), dextrose, [DISCONTINUED] fentanNYL **OR** oxyCODONE, calcium carbonate    Data:     Past Medical History:   has a past medical history of Back pain, CAD S/P percutaneous coronary angioplasty, Carcinoma ([facility] Colon cancer ([facility] Degeneration of lumbar or lumbosacral intervertebral disc, Depression, Diabetes mellitus ([facility] H/O cardiac catheterization, Hepatic metastases ([facility] Hyperlipidemia, Hypertension, Knee pain, and MI, old. Social History:   reports that he has never smoked. He has never used smokeless tobacco. He reports that he drank alcohol. He reports that he does not use drugs. Family History:   Family History   Problem Relation Age of Onset    Heart Disease Mother     Stroke Mother     High Blood Pressure Mother     High Cholesterol Father        Vitals:  BP (!) 110/56   Pulse 91   Temp 98.6 °F (37 °C)   Resp 18   Ht 5' 10\" (1.778 m)   Wt 230 lb 14 oz (104.7 kg)   SpO2 94%   BMI 33.13 kg/m²   Temp (24hrs), Av.4 °F (36.9 °C), Min:97.5 °F (36.4 °C), Max:98.8 °F (37.1 °C)    Recent Labs     19  1104 19  1739 19  2036 19  0838   POCGLU 149* 144* 140* 105       I/O(24Hr):     Intake/Output Summary (Last 24 hours) at 2019 0856  Last data filed at 2019 0503  Gross per 24 hour   Intake 350 ml   Output 3875 ml   Net -3525 ml       Labs:    [unfilled]    Lab Results   Component Value Date/Time    SPECIAL NOT REPORTED 2019 02:40 AM     Lab Results   Component Value Date/Time    CULTURE NO SIGNIFICANT GROWTH 2019 02:40 AM       [unfilled]    Radiology:    Xr Chest Standard (2 Vw)    Result Date: 5/10/2019  EXAMINATION: TWO XRAY VIEWS OF THE CHEST 5/10/2019 10:02 am COMPARISON: AP chest from 2019 HISTORY: ORDERING SYSTEM PROVIDED HISTORY: cough TECHNOLOGIST PROVIDED HISTORY: cough Ordering Physician Provided Reason for Exam: Cough congestion weakness today h/o cancer Acuity: Acute Type of Exam: Initial Additional signs and symptoms: Cough congestion weakness today h/o cancer Additional history includes anemia, colon carcinoma, hypertension, and diabetes. FINDINGS: Right-sided IJ port with unchanged tip position in the lower SVC. Overlying ECG monitor leads. Cardiomediastinal shadow stable. Low lung volumes with bibasilar atelectasis or perhaps scarring. No localized pulmonary opacity suspicious for infiltrate. No sizable pleural effusion. Bones and soft tissues intact. Gas-filled small bowel loops, best seen on the lateral projection, upper limits of normal.     No acute cardiopulmonary disease or interval change. Bibasilar atelectasis or scarring     Xr Chest Standard (2 Vw)    Result Date: 4/17/2019  EXAMINATION: TWO VIEWS OF THE CHEST 4/17/2019 9:18 pm COMPARISON: 04/07/2019 HISTORY: ORDERING SYSTEM PROVIDED HISTORY: chest pain, tachycardia TECHNOLOGIST PROVIDED HISTORY: chest pain, tachycardia Ordering Physician Provided Reason for Exam: Pt states chest pain and fatigue x 1 day Acuity: Acute Type of Exam: Initial Additional signs and symptoms: Pt states chest pain and fatigue x 1 day FINDINGS: Right internal jugular chest port with catheter tip at the cavoatrial junction. Limited due to low lung volumes. Normal heart size and pulmonary vasculature. Left lateral basilar opacity with corresponding finding on the lateral view may represent atelectasis or pneumonia. No pneumothorax. Limited due to low lung volumes. Left lateral basilar opacity may represent atelectasis or pneumonia. Consider repeating study with full inspiration.      Ct Head Wo Contrast    Result Date: 5/16/2019  EXAMINATION: CT OF THE HEAD WITHOUT CONTRAST  5/16/2019 12:39 am TECHNIQUE: CT of the head was performed without the administration of intravenous contrast. Dose modulation, iterative reconstruction, and/or weight based adjustment of the mA/kV was utilized to reduce the radiation dose to as low as reasonably achievable. COMPARISON: 04/01/2019 HISTORY: ORDERING SYSTEM PROVIDED HISTORY: ams TECHNOLOGIST PROVIDED HISTORY: Ordering Physician Provided Reason for Exam: AMS, patient denies any head complaints at this time Acuity: Acute Relevant Medical/Surgical History: patient denies any surgeries to brain FINDINGS: BRAIN/VENTRICLES: There is no acute intracranial hemorrhage, mass effect or midline shift. No abnormal extra-axial fluid collection. The gray-white differentiation is maintained without evidence of an acute infarct. There is no evidence of hydrocephalus. ORBITS: The visualized portion of the orbits demonstrate no acute abnormality. SINUSES: The visualized paranasal sinuses and mastoid air cells demonstrate no acute abnormality. Small mucous retention cyst left maxillary antrum. SOFT TISSUES/SKULL:  No acute abnormality of the visualized skull or soft tissues. No acute intracranial abnormality. Ct Abdomen Pelvis W Iv Contrast Additional Contrast? None    Result Date: 5/16/2019  EXAMINATION: CT OF THE ABDOMEN AND PELVIS WITH CONTRAST; CTA OF THE CHEST 5/16/2019 12:42 am TECHNIQUE: CT of the abdomen and pelvis was performed with the administration of intravenous contrast. Multiplanar reformatted images are provided for review. Dose modulation, iterative reconstruction, and/or weight based adjustment of the mA/kV was utilized to reduce the radiation dose to as low as reasonably achievable.; CTA of the chest was performed after the administration of intravenous contrast.  Multiplanar reformatted images are provided for review. MIP images are provided for review. Dose modulation, iterative reconstruction, and/or weight based adjustment of the mA/kV was utilized to reduce the radiation dose to as low as reasonably achievable.  COMPARISON: None HISTORY: ORDERING SYSTEM PROVIDED HISTORY: abd pain TECHNOLOGIST PROVIDED HISTORY: Ordering Physician Provided Reason for Exam: Abdominal pain, bloating Acuity: Acute Relevant Medical/Surgical History: surgeries to area of interest include hernia repair; ORDERING SYSTEM PROVIDED HISTORY: PE TECHNOLOGIST PROVIDED HISTORY: Ordering Physician Provided Reason for Exam: Patient denies any SOB or chest pain at this time Acuity: Acute FINDINGS: Chest: Mediastinum: Heart is mildly enlarged in size. Trace pericardial fluid versus pericardial thickening. Main pulmonary artery mildly enlarged measuring 3.1 cm in transverse dimension. No convincing evidence of central or lobar pulmonary emboli. Segmental subsegmental branches are less optimally evaluated due to motion artifacts and timing of contrast bolus. No suspicious mediastinal, hilar, or axillary not identified per CT criteria. Lungs/pleura: Small right-sided and trace left-sided pleural effusions. Pleuroparenchymal bands versus subsegmental atelectasis identified involving both lower lobes. Soft Tissues/Bones: There is redemonstration of the mixed sclerotic and lytic lesion involving the T2 vertebral body with surrounding pair spinal soft tissue attenuation. This extends into the pedicles and facets on the left. Additional mix additional sclerotic and lytic lesion identified involving T4 similar prior examination. Subtle areas sclerosis is suspected subacute nondisplaced fractures anterior left 5th, 7th and 8th ribs similar prior exam. Abdomen/Pelvis: Organs: There are innumerable hypodensities identified throughout the right and left hepatic lobes which have progressed from prior examination. A suggestion of a central ill-defined area of hypoattenuation which may represent areas of posterior related changes/fibrosis versus perfusion differences. There is mild gallbladder wall thickening. No definite cholelithiasis.   The spleen, adrenal glands, kidneys, pancreas unremarkable. GI/Bowel: Mild-to-moderate retained stool throughout the colon. Mild-to-moderate scattered colonic diverticulosis. Appendix not identified. Pelvis: Mild bladder wall thickening related to underdistention. Tiny fat containing inguinal hernias. Prostate unremarkable. Peritoneum/Retroperitoneum: Moderate volume of ascites. Similar prior examination. Bones/Soft Tissues: Multilevel degenerate changes of the lumbar spine. CTA Chest: No convincing evidence of central or lobar pulmonary emboli. Distal segmental subsegmental branches not well evaluated. Moderate size right-sided effusion and trace left-sided effusion. Stable osseous metastatic disease with peers spinal soft tissue attenuation involving the T2 vertebral body. CT of the abdomen pelvis: No significant change in the innumerable hepatic lesions worrisome for metastatic disease as well as moderate burden of ascites. Bladder wall thickening could be infectious inflammatory or could be related to underdistention. .  Please correlate with urinalysis findings. Ct Abdomen Pelvis W Iv Contrast Additional Contrast? None    Result Date: 4/17/2019  EXAMINATION: CT OF THE ABDOMEN AND PELVIS WITH CONTRAST 4/17/2019 10:45 pm TECHNIQUE: CT of the abdomen and pelvis was performed with the administration of intravenous contrast. Multiplanar reformatted images are provided for review. Dose modulation, iterative reconstruction, and/or weight based adjustment of the mA/kV was utilized to reduce the radiation dose to as low as reasonably achievable. COMPARISON: 10/03/2018 HISTORY: ORDERING SYSTEM PROVIDED HISTORY: sepsis, source unknown. history of metastatic colon cancer to liver TECHNOLOGIST PROVIDED HISTORY: Ordering Physician Provided Reason for Exam: sepsis, source unknown. Hx - metastatic colon cancer to liver. Patient states weakness x1 day prior. Acuity: Unknown Type of Exam: Unknown Relevant Medical/Surgical History: Surgical hx - Hernia repair. Hx - HBP, Diabetes, Carcinoma, Colorectal cancer, stage IV  Hepatic metastases . FINDINGS: Mild elevation of the left hemidiaphragm. Coronary calcifications versus coronary stents identified. Trace pericardial thickening/pericardial fluid. There are bibasilar opacities suggestive of bibasilar atelectasis. These findings most confluent within the left lower lobe. There are innumerable hypodensities identified throughout the right and left hepatic lobes which have progressed from prior examination. A suggestion of a central ill-defined area of hypoattenuation which may represent areas of posterior related changes/fibrosis versus perfusion differences. Mild periportal edema involving the liver. There is moderate gallbladder wall thickening. No definite cholelithiasis. Spleen, adrenal glands, kidneys, pancreas unremarkable. Mild retained stool throughout the colon without evidence of obstruction. The appendix unremarkable. No suspicious mesenteric or retroperitoneal adenopathy. A few lymph nodes identified within the port appendix region noted of uncertain clinical significance borderline size. No free air or free fluid. Tiny fat containing right inguinal hernia. The bladder is mildly distended. Prostate is unremarkable. No loculated rim enhancing fluid collection to suggest abscess formation. Central disc protrusion T7-T8. Moderate changes L4-5 and L5-S1. No suspicious osseous lesions. Moderate gallbladder wall thickening nonspecific. Please correlate with exam findings. Innumerable hepatic lesions suggestive of metastatic disease as on prior imaging studies. Bibasilar consolidative changes favor bibasilar atelectasis. Us Gallbladder Ruq    Result Date: 4/18/2019  EXAMINATION: RIGHT UPPER QUADRANT ULTRASOUND 4/18/2019 9:13 am COMPARISON: CT abdomen and pelvis April 17, 2019.  HISTORY: ORDERING SYSTEM PROVIDED HISTORY: wall thickening on CT and elevated alk phos FINDINGS: LIVER:  Cirrhotic liver with heterogeneous echotexture. No  intrahepatic bile duct dilatation noted. BILIARY SYSTEM:  Diffuse gallbladder wall thickening. Questionable adherent stone versus polyp measuring 8 mm. No internal color Doppler vascularity. Negative Iyer sign. No pericholecystic fluid. Common bile duct is within normal limits measuring 5.3 mm. PANCREAS:  Visualized portions of the pancreas are unremarkable. OTHER: Trace ascites is noted within the right upper quadrant. 1.  Diffuse gallbladder wall thickening. No pericholecystic fluid or biliary obstruction. The finding may related to the patient's underlying cirrhosis or possibly chronic cholecystitis. If acute cholecystitis is of clinical concern, further evaluation with HIDA scan is recommended. 2. Questionable adherent stone versus polyp measuring 8 mm. The former is favored. If no surgical intervention is performed, repeat ultrasound in 1 year is recommended. 3. Trace perihepatic ascites. Xr Chest Portable    Result Date: 5/3/2019  EXAMINATION: SINGLE XRAY VIEW OF THE CHEST 5/3/2019 11:12 am COMPARISON: April 17, 2019 HISTORY: ORDERING SYSTEM PROVIDED HISTORY: Chest Pain TECHNOLOGIST PROVIDED HISTORY: Chest Pain Ordering Physician Provided Reason for Exam: Pt states today chest pain. History of colon cancer and lung cancer Acuity: Unknown Type of Exam: Unknown FINDINGS: Right chest Port in good position. No kink. Non enlarged heart. Bibasilar atelectasis. Right basilar pleuroparenchymal scarring. No effusion. No pneumothorax. No airspace consolidation. No focal airspace consolidation. Ct Chest Pulmonary Embolism W Contrast    Result Date: 5/16/2019  EXAMINATION: CT OF THE ABDOMEN AND PELVIS WITH CONTRAST; CTA OF THE CHEST 5/16/2019 12:42 am TECHNIQUE: CT of the abdomen and pelvis was performed with the administration of intravenous contrast. Multiplanar reformatted images are provided for review.  Dose modulation, iterative reconstruction, and/or weight based adjustment of the mA/kV was utilized to reduce the radiation dose to as low as reasonably achievable.; CTA of the chest was performed after the administration of intravenous contrast.  Multiplanar reformatted images are provided for review. MIP images are provided for review. Dose modulation, iterative reconstruction, and/or weight based adjustment of the mA/kV was utilized to reduce the radiation dose to as low as reasonably achievable. COMPARISON: None HISTORY: ORDERING SYSTEM PROVIDED HISTORY: abd pain TECHNOLOGIST PROVIDED HISTORY: Ordering Physician Provided Reason for Exam: Abdominal pain, bloating Acuity: Acute Relevant Medical/Surgical History: surgeries to area of interest include hernia repair; ORDERING SYSTEM PROVIDED HISTORY: PE TECHNOLOGIST PROVIDED HISTORY: Ordering Physician Provided Reason for Exam: Patient denies any SOB or chest pain at this time Acuity: Acute FINDINGS: Chest: Mediastinum: Heart is mildly enlarged in size. Trace pericardial fluid versus pericardial thickening. Main pulmonary artery mildly enlarged measuring 3.1 cm in transverse dimension. No convincing evidence of central or lobar pulmonary emboli. Segmental subsegmental branches are less optimally evaluated due to motion artifacts and timing of contrast bolus. No suspicious mediastinal, hilar, or axillary not identified per CT criteria. Lungs/pleura: Small right-sided and trace left-sided pleural effusions. Pleuroparenchymal bands versus subsegmental atelectasis identified involving both lower lobes. Soft Tissues/Bones: There is redemonstration of the mixed sclerotic and lytic lesion involving the T2 vertebral body with surrounding pair spinal soft tissue attenuation. This extends into the pedicles and facets on the left. Additional mix additional sclerotic and lytic lesion identified involving T4 similar prior examination.   Subtle areas sclerosis is suspected subacute nondisplaced fractures anterior left 5th, 7th and 8th ribs similar prior exam. Abdomen/Pelvis: Organs: There are innumerable hypodensities identified throughout the right and left hepatic lobes which have progressed from prior examination. A suggestion of a central ill-defined area of hypoattenuation which may represent areas of posterior related changes/fibrosis versus perfusion differences. There is mild gallbladder wall thickening. No definite cholelithiasis. The spleen, adrenal glands, kidneys, pancreas unremarkable. GI/Bowel: Mild-to-moderate retained stool throughout the colon. Mild-to-moderate scattered colonic diverticulosis. Appendix not identified. Pelvis: Mild bladder wall thickening related to underdistention. Tiny fat containing inguinal hernias. Prostate unremarkable. Peritoneum/Retroperitoneum: Moderate volume of ascites. Similar prior examination. Bones/Soft Tissues: Multilevel degenerate changes of the lumbar spine. CTA Chest: No convincing evidence of central or lobar pulmonary emboli. Distal segmental subsegmental branches not well evaluated. Moderate size right-sided effusion and trace left-sided effusion. Stable osseous metastatic disease with peers spinal soft tissue attenuation involving the T2 vertebral body. CT of the abdomen pelvis: No significant change in the innumerable hepatic lesions worrisome for metastatic disease as well as moderate burden of ascites. Bladder wall thickening could be infectious inflammatory or could be related to underdistention. .  Please correlate with urinalysis findings. Ct Chest Pulmonary Embolism W Contrast    Result Date: 5/10/2019  EXAMINATION: CTA OF THE CHEST 5/10/2019 12:31 pm TECHNIQUE: CTA of the chest was performed after the administration of intravenous contrast.  Multiplanar reformatted images are provided for review. MIP images are provided for review.  Dose modulation, iterative reconstruction, and/or weight based adjustment of the mA/kV was utilized to reduce the segmental pulmonary embolus. 2.  Tiny right pleural effusion and trace left pleural fluid with mild bibasilar atelectasis. 3.  Indistinct hypodense lesions in the liver, likely metastases. 4.  Moderate volume upper abdominal ascites, new from the previous study. 5.  Splenomegaly. 6.  Redemonstration of metastatic lesions at T2 and T4, similar to 04/08/2019. Subtle areas of sclerosis with suspected subacute nondisplaced fractures are noted at the anterior left 5th, 7th, and 8th ribs. Us Abdomen Limited    Result Date: 5/4/2019  EXAMINATION: RIGHT UPPER QUADRANT ULTRASOUND, 5/4/2019 9:39 am COMPARISON: 04/18/2019 HISTORY: ORDERING SYSTEM PROVIDED HISTORY: Check for ascites TECHNOLOGIST PROVIDED HISTORY: Check for ascites Abd distention, hx liver mets r/o cholecystitis Acuity: Acute Type of Exam: Initial FINDINGS: LIVER:  The liver demonstrates some abnormal echotexture as well as hepatomegaly with the liver measuring 27 cm. Liver nodularity noted which was also seen on the previous CT evaluation. BILIARY SYSTEM:  Gallbladder wall is severely thickened at 9.2 mm. There is evidence of gallbladder polyps. No evidence of gallbladder stones. Negative Iyer sign. Common bile duct is within normal limits measuring 0.61 cm. RIGHT KIDNEY: The right kidney is grossly unremarkable without evidence of hydronephrosis. OTHER: Moderate ascites noted in the abdomen and pelvis. 1. Moderate ascites. 2. Enlarged inhomogeneous nodular liver as previously noted on the CT evaluation. Neoplasm should be considered. 3. Thickened gallbladder wall as well as gallbladder polyps. The gallbladder does not appear distended however. No evidence of gallbladder stones. Acute cholecystitis is unlikely.      Ir Us Guided Paracentesis    Result Date: 5/6/2019  PROCEDURE: ULTRASOUND GUIDED PARACENTESIS 5/6/2019 HISTORY: ORDERING SYSTEM PROVIDED HISTORY: Suspected SBP TECHNOLOGIST PROVIDED HISTORY: Suspected SBP TECHNIQUE: This procedure was performed by Dr. Pham Garcia. Informed consent was obtained after a detailed explanation of the procedure including risks, benefits, and alternatives. Universal protocol was followed. Ultrasound shows a small to moderate amount of ascites. The right abdomen was prepped and draped in sterile fashion and local anesthesia was achieved with lidocaine. Using realtime ultrasound guidance a 6 Citizen of [place] pigtail catheter was advanced into the peritoneal fluid. Ultrasound images show a safe tract and access into the fluid. Fluid was collected in vacuum bottles. Little to no fluid remains on completion. The catheter was removed and a sterile dressing applied. There are no immediate complications. The patient left the department in stable condition. FINDINGS: A total of 850 mL of clear yellow fluid was removed. Fluid was sent for the requested studies. Successful ultrasound guided paracentesis. Nm Hepatobiliary Scan W Ejection Fraction    Result Date: 4/20/2019  EXAMINATION: NUCLEAR MEDICINE HEPATOBILIARY SCINTIGRAPHY (HIDA SCAN). 4/20/2019 10:13 am TECHNIQUE: Approximately 3.0 [id number] Mebrofenin (Choletec) was administered IV. Then, dynamic images of the abdomen were obtained in the anterior projection for 60 mins. A right lateral view was also obtained at 60 mins. COMPARISON: Ultrasound 04/18/2019 HISTORY: ORDERING SYSTEM PROVIDED HISTORY: pain TECHNOLOGIST PROVIDED HISTORY: Ordering Physician Provided Reason for Exam: Pain Acuity: Unknown Type of Exam: Unknown Additional signs and symptoms: pt states no real abdominal pain, came in for general fatigue, pt states he has liver an dcolon cancer FINDINGS: Gallbladder is not visualized. Activity is seen within the small bowel. Delayed excretion of radiotracer by the liver. Nonvisualization of the gallbladder suggesting acute cholecystitis. Delayed excretion by the liver suggesting hepatic dysfunction.          Physical Examination:        Physical Exam Constitutional: He is oriented to person, place, and time. He appears well-developed and well-nourished. He appears distressed. HENT:   Head: Normocephalic and atraumatic. Right Ear: External ear normal.   Left Ear: External ear normal.   Mouth/Throat: No oropharyngeal exudate. Eyes: Pupils are equal, round, and reactive to light. EOM are normal. Scleral icterus is present. Cardiovascular: Normal rate and regular rhythm. Pulmonary/Chest: Effort normal and breath sounds normal. No respiratory distress. Abdominal: He exhibits distension. There is tenderness. Musculoskeletal: He exhibits edema. Neurological: He is alert and oriented to person, place, and time. No cranial nerve deficit. Assessment:        Primary Problem  <principal problem not specified>    Active Hospital Problems    Diagnosis Date Noted    Sepsis ([facility] [A41.9] 05/16/2019    Severe malnutrition ([facility] [E43] 05/16/2019       Plan:          Stage IV colorectal Ca w/ extensive Liver mets. Previous GI notes reviewed. Concern for sbp/sepsis on admission with WBC 12.3, afebrile, Lactic acid 4.4, started on rocephin, flagyl. Ascitic fluid analysis - pmn < 250, no bacteria seen, SAAG 1.1. Received fluid boluses initially, s/p 4.4L of bolus in ED    Stopped fluids yesterday as patient hypervolemic with third spacing. Gave one dose of 25g albumin yesterday. Plan for LVP today. Remained hemodynamically stable. GI consulted.       Diabetes Mellitus Type 2, Lantus 15u nightly, ISS low    lovenox for dvt proph, pepcid for gi proph. PT/OT, SW.   Plan will be discussed with the attending, Dr Brennon Timmons MD  PGY I Transitional Year Resident  5/17/2019 8:56 AM   Attending Physician Statement  Patient seen and examined  I have discussed the care of the patient, including pertinent history and exam findings,  with the resident. I have reviewed the key elements of all parts of the encounter with the resident.   I agree with

## 2019-05-17 NOTE — CONSULTS
GI Consult Note:    Name: Naveed Cameron  MRN: [id number]     [place]: [de-identified]  Room: 2015/2015-01    Admit Date: 5/15/2019  PCP: No primary care provider on file. Physician Requesting Consult: Marisol Galdamez MD     Reason for Consult:  Ascites, liver metastatic disease, stage IV rectal cancer    Chief Complaint:     Chief Complaint   Patient presents with    Altered Mental Status    Abdominal Pain    Bloated       History Obtained From:     patient, electronic medical record, nursing staff. History of Present Illness:      Naveed Cameron is a  66 y.o.  male who presents with Altered Mental Status; Abdominal Pain; and Bloated      Symptoms:  Patient seen at [facility] ICU. Patient was admitted to the hospital with altered mental status. He was taking pain medications at home. Not clear whether he took excessive medication that causes this state. During my evaluation he was awake, alert and oriented to place, person and date. On further discussion he states that he has abdominal distention, bloating. Has decreased appetite. He wanted to have abdominal paracentesis done. Looks like he was seen by his oncologist and advised him to go to the hospital for abdominal paracentesis. In fact patient was in the hospital 10 days ago and at that time he had abdominal paracentesis done. I did review the fluid and was negative for malignant cells. Fluid appears to be transudate. This patient has extensive past medical history including diagnosis of rectal cancer in 2016. Chemoradiation therapy. He was a found to have extensive metastasis to the liver and osseous structures. In fact he had cryoablation of liver lesions in August of 2018. He is also found to have pleural effusions. There was a question whether this patient has gallbladder disease an month ago. He was evaluated by general surgery at that time.     At present, he denies abdominal pain, nausea vomiting. He requests to have abdominal paracentesis. Denies rectal bleeding. States that he has satisfactory bowel movements. No fever or chills. Also discussed with nursing staff. Past Medical History:     Past Medical History:   Diagnosis Date    Back pain 1/30/2013    CAD S/P percutaneous coronary angioplasty 11/27/2016    cardiac stent    Carcinoma ([facility] 12/04/2016    colon, liver mets    Colon cancer ([facility]     Degeneration of lumbar or lumbosacral intervertebral disc 4/15/2014    Depression 5/10/2012    Diabetes mellitus ([facility]     H/O cardiac catheterization     with cardiac stent    Hepatic metastases ([facility] 12/4/2016    Hyperlipidemia     Hypertension     Knee pain     MI, 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    with cardiac arrest at [address]        Past Surgical History:     Past Surgical History:   Procedure Laterality Date    CORONARY ANGIOPLASTY WITH STENT PLACEMENT  11/2016    x1 stent    ENDOSCOPIC ULTRASOUND (LOWER) N/A 12/8/2017    RECTAL ENDOSCOPIC ULTRASOUND WITH PATHOLOGY performed by Skye Hoover MD at [address] LIVER BIOPSY Right 08/23/2018    CT guided Visceral Tissue Ablation    [place] SIGMOIDOSCOPY FLX W/RMVL FOREIGN BODY N/A 5/25/2017    SIGMOIDOSCOPY BIOPSY FLEXIBLE performed by Skye Hoover MD at [facility]    [place] SIGMOIDOSCOPY FLX W/RMVL FOREIGN BODY  12/8/2017    SIGMOIDOSCOPY BIOPSY FLEXIBLE performed by Skye Hoover MD at [facility]    TUNNELED VENOUS PORT PLACEMENT Right     right upper chest for cancer treatment        Medications Prior to Admission:       Prior to Admission medications    Medication Sig Start Date End Date Taking?  Authorizing Provider   insulin glargine (LANTUS) 100 UNIT/ML injection vial Inject 20 Units into the skin nightly   Yes Historical Provider, MD   glimepiride (AMARYL) 4 MG tablet Take 1 tablet by mouth every morning 4/22/19  Yes Troy Lal MD   oxyCODONE HCl (OXY-IR) 10 MG immediate release tablet Take 20 mg by mouth 3 times daily as needed for Pain. Yes Historical Provider, MD   diphenhydrAMINE (BENADRYL) 25 MG capsule Take 1 capsule by mouth nightly as needed for Sleep 4/8/19  Yes Cindy Pavon,    fentaNYL (DURAGESIC) 50 MCG/HR Place 1 patch onto the skin every 72 hours. 4/20/19  Yes Historical Provider, MD   potassium chloride (KLOR-CON M) 20 MEQ extended release tablet Take 1 tablet by mouth daily 10/9/18  Yes Hardik Ruby MD   metoprolol tartrate (LOPRESSOR) 25 MG tablet Take 1 tablet by mouth daily  3/12/18  Yes Historical Provider, MD   glucose monitoring kit (FREESTYLE) monitoring kit 1 kit by Does not apply route daily 4/22/19   Marisol Galdamez MD   Lancets MISC 1 each by Does not apply route daily 4/22/19   Marisol Galdamez MD   naloxone (NARCAN) 4 MG/0.1ML LIQD nasal spray Take 4 mg by mouth    Historical Provider, MD   Glucose Blood (BLOOD GLUCOSE TEST STRIPS) STRP 1 each by In Vitro route daily As needed. 8/24/17   Dionisio Fitzpatrick MD   Insulin Pen Needle 29G X 12.7MM MISC 1 each by Does not apply route daily 5/4/17   [facility], MD   nitroGLYCERIN (NITROSTAT) 0.4 MG SL tablet Place 1 tablet under the tongue every 5 minutes as needed for Chest pain Dissolve 1 tablet under tongue for chest pain and repeat every 5 min up to max of 3 total doses. If no relief after 3 doses call 911 11/29/16   ASIM Burgess - CNP        Allergies:       Morphine    Social History:     Tobacco:    reports that he has never smoked. He has never used smokeless tobacco.  Alcohol:      reports that he drank alcohol. Drug Use: reports that he does not use drugs.     Family History:     Family History   Problem Relation Age of Onset    Heart Disease Mother     Stroke Mother     High Blood Pressure Mother     High Cholesterol Father        Review of Systems:     Review of Systems   Constitutional: Positive for activity change, appetite change, fatigue and unexpected weight change. Negative for fever. HENT: Negative for mouth sores, sore throat, trouble swallowing and voice change. Eyes:        No ictirus   Respiratory: Positive for cough and shortness of breath. Negative for apnea, choking and wheezing. Cardiovascular: Positive for leg swelling. Negative for chest pain and palpitations. Gastrointestinal: Positive for abdominal distention and constipation. Negative for nausea and rectal pain. Endocrine: Negative for polydipsia, polyphagia and polyuria. Genitourinary: Negative for frequency, hematuria and urgency. Musculoskeletal: Positive for back pain. Negative for arthralgias, gait problem and joint swelling. Skin: Negative for pallor and rash. Allergic/Immunologic: Negative for food allergies. Neurological: Positive for dizziness and weakness. Negative for seizures and headaches. Hematological: Negative for adenopathy. Does not bruise/bleed easily. Psychiatric/Behavioral: Negative for sleep disturbance. The patient is nervous/anxious. Code Status:  Full Code    Physical Exam:     Vitals:  BP (!) 110/56   Pulse 91   Temp 98.6 °F (37 °C)   Resp 18   Ht 5' 10\" (1.778 m)   Wt 230 lb 14 oz (104.7 kg)   SpO2 94%   BMI 33.13 kg/m²   Temp (24hrs), Av.4 °F (36.9 °C), Min:97.5 °F (36.4 °C), Max:98.8 °F (37.1 °C)      Physical Exam   Constitutional: He is oriented to person, place, and time. He appears well-nourished. No distress. HENT:   Head: Normocephalic and atraumatic. Mouth/Throat: No oropharyngeal exudate. Malnutrition signs present. Eyes: Pupils are equal, round, and reactive to light. Conjunctivae are normal. No scleral icterus. Patient has icterus. Neck: Normal range of motion. Neck supple. No tracheal deviation present. No thyromegaly present. Cardiovascular: Normal rate, regular rhythm, normal heart sounds and intact distal pulses. No murmur heard.   Pulmonary/Chest: Effort normal and breath sounds normal. No respiratory distress. He has no wheezes. He has no rales. Abdominal: Soft. Bowel sounds are normal. He exhibits distension. He exhibits no ascites and no mass. There is no hepatomegaly. There is no tenderness. There is no guarding. No hernia. No peripheral signs of ch. Liver disease. Has bandage on the abdominal wall from recent abdominal paracentesis. Small area of bruising right flank. Genitourinary: Rectum normal.   Musculoskeletal: He exhibits no edema. Lymphadenopathy:     He has no cervical adenopathy. Neurological: He is alert and oriented to person, place, and time. No cranial nerve deficit. Skin: Skin is warm and dry. No rash noted. No erythema. Psychiatric: Thought content normal.   Nursing note and vitals reviewed.     Data:   CBC:   Lab Results   Component Value Date    WBC 12.4 05/17/2019    RBC 2.96 05/17/2019    HGB 7.3 05/17/2019    HCT 24.0 05/17/2019    MCV 80.9 05/17/2019    MCH 24.7 05/17/2019    MCHC 30.5 05/17/2019    RDW 23.7 05/17/2019     05/17/2019    MPV 7.3 05/17/2019     CBC with Differential:  Lab Results   Component Value Date    WBC 12.4 05/17/2019    RBC 2.96 05/17/2019    HGB 7.3 05/17/2019    HCT 24.0 05/17/2019     05/17/2019    MCV 80.9 05/17/2019    MCH 24.7 05/17/2019    MCHC 30.5 05/17/2019    RDW 23.7 05/17/2019    METASPCT 2 05/15/2019    LYMPHOPCT 2 05/15/2019    MONOPCT 7 05/15/2019    BASOPCT 1 05/15/2019    MONOSABS 1.78 05/15/2019    LYMPHSABS 0.51 05/15/2019    EOSABS 0.00 05/15/2019    BASOSABS 0.25 05/15/2019    DIFFTYPE NOT REPORTED 05/15/2019     Hemoglobin/Hematocrit:  Lab Results   Component Value Date    HGB 7.3 05/17/2019    HCT 24.0 05/17/2019     CMP:    Lab Results   Component Value Date     05/17/2019    K 3.7 05/17/2019    CL 90 05/17/2019    CO2 30 05/17/2019    BUN 4 05/17/2019    CREATININE 0.41 05/17/2019    GFRAA >60 05/17/2019    LABGLOM >60 05/17/2019    GLUCOSE 110 05/17/2019    GLUCOSE 195 04/26/2012    PROT 5.8 05/17/2019    LABALBU 2.2 05/17/2019    CALCIUM 7.3 05/17/2019    BILITOT 3.83 05/17/2019    ALKPHOS 587 05/17/2019     05/17/2019    ALT 18 05/17/2019     BMP:  Lab Results   Component Value Date     05/17/2019    K 3.7 05/17/2019    CL 90 05/17/2019    CO2 30 05/17/2019    BUN 4 05/17/2019    LABALBU 2.2 05/17/2019    CREATININE 0.41 05/17/2019    CALCIUM 7.3 05/17/2019    GFRAA >60 05/17/2019    LABGLOM >60 05/17/2019    GLUCOSE 110 05/17/2019    GLUCOSE 195 04/26/2012     PT/INR:    Lab Results   Component Value Date    PROTIME 18.6 05/17/2019    INR 1.6 05/17/2019     PTT:    Lab Results   Component Value Date    APTT 37.0 05/17/2019   [APTT}    Assesment:     Primary Problem  <principal problem not specified>    Active Hospital Problems    Diagnosis Date Noted    Sepsis ([facility] [A41.9] 05/16/2019    Severe malnutrition ([facility] [E43] 05/16/2019       Plan: This patient has stage IV rectal cancer metastasis to the liver and possibly osseous structures. Ascites may be related to peritoneal metastasis. It is reasonable to proceed with paracentesis for comfort. At present no signs of acute gallbladder disease. However this needs to be followed. Ascites fluid analysis ordered. To check the labs available. 2 g sodium diet. Discussed the care with the nursing staff. Thank you for allowing me to participate in the care of your patient. Please feel free to contact me with anyquestions or concerns. Electronically signed by Suzanne Eastmna MD on 5/17/2019 at 10:51 AM     Copy sent to Dr. Patel primary care provider on file. Note is dictated utilizing voice recognition software. Unfortunately this leads to occasional typographical errors. Please contact our office if you have any questions.

## 2019-05-17 NOTE — PROGRESS NOTES
Pt. Talking on the phone, I want a cell phone. Writer assisted pt. With dialing his wife. Pt. I want a cell phone. Why doesn't the hospital have cell phone for us.

## 2019-05-17 NOTE — PROGRESS NOTES
Physical Therapy  DATE: 2019    NAME: Catracho Salcido  MRN: [id number]   : 1960    Patient not seen this date for Physical Therapy due to:  [] Blood transfusion in progress  [] Hemodialysis  [x]  Patient Declined  [] Spine Precautions   [] Strict Bedrest  [] Surgery/ Procedure  [] Testing      [] Other        [] PT being discontinued at this time. Patient independent. No further needs. [] PT being discontinued at this time as the patient has been transferred to palliative care. No further needs.     Lianne Arizmendi, PT

## 2019-05-18 NOTE — CARE COORDINATION
Writer faxed kinga to [facility] and spoke with Alysa Webster at [facility] regarding discharge home today.      Electronically signed by Malena Kothari RN on 5/18/2019 at 4:58 PM

## 2019-05-18 NOTE — DISCHARGE INSTR - COC
Continuity of Care Form    Patient Name: Sarah Sarabia   :  1960  MRN:  [id number]    Admit date:  5/15/2019  Discharge date:  2019    Code Status Order: Full Code   Advance Directives:   [facility] Documentation     Date/Time Healthcare Directive Type of Healthcare Directive Copy in [facility] [address] Agent's Name Healthcare Agent's Phone Number    [phone]  No, patient does not have an advance directive for healthcare treatment -- -- -- -- --          Admitting Physician:  Flory Fox MD  PCP: No primary care provider on file. Discharging Nurse: [address] [facility] Unit/Room#: 2101/2101-01  Discharging Unit Phone Number: [phone]    Emergency Contact:   Extended Emergency Contact Information  Primary Emergency Contact: Dhaval Kuhn  Address: [address], [address] [place] [address]  Phone: [phone]  Work Phone: [phone]  Mobile Phone: [phone]  Relation: Spouse  Hearing or visual needs: None  Other needs: None  Preferred language: English   needed?  No    Past Surgical History:  Past Surgical History:   Procedure Laterality Date    CORONARY ANGIOPLASTY WITH STENT PLACEMENT  11/2016    x1 stent    ENDOSCOPIC ULTRASOUND (LOWER) N/A 2017    RECTAL ENDOSCOPIC ULTRASOUND WITH PATHOLOGY performed by Monserrat Daugherty MD at [address] LIVER BIOPSY Right 2018    CT guided Visceral Tissue Ablation    [place] SIGMOIDOSCOPY FLX W/RMVL FOREIGN BODY N/A 2017    SIGMOIDOSCOPY BIOPSY FLEXIBLE performed by Monserrat Daugherty MD at [facility]    NE SIGMOIDOSCOPY FLX W/RMVL FOREIGN BODY  2017    SIGMOIDOSCOPY BIOPSY FLEXIBLE performed by Monserrat Daugherty MD at [facility]    TUNNELED VENOUS PORT PLACEMENT Right     right upper chest for cancer treatment       Immunization History:   Immunization History Administered Date(s) Administered    Influenza, Quadv, 3 yrs and older, IM, PF (Fluzone 3 yrs and older or Afluria 5 yrs and older) 12/09/2016    Influenza, Annamarie Rothmanon, 6 mo and older, IM, PF (Flulaval, Fluarix) 09/25/2018       Active Problems:  Patient Active Problem List   Diagnosis Code    Hypertension, essential I10    Mixed hyperlipidemia E78.2    Knee pain M25.569    Type 2 diabetes mellitus with hyperglycemia, without long-term current use of insulin (HCC) E11.65    HTN (hypertension) I10    Hypercholesteremia E78.00    Insomnia G47.00    Knee pain, bilateral M25.561, M25.562    Depression F32.9    Cervical sprain S13. 9XXA    Lumbar sprain S33. 5XXA    Back pain M54.9    Opioid dependence ([facility] F11.20    Degeneration of lumbar or lumbosacral intervertebral disc M51.37    Chronic midline low back pain without sciatica M54.5, G89.29    ST elevation myocardial infarction (STEMI) (HCC) I21.3    S/P PTCA  BMS to RCA - 11/26/2016 Dr. David Collins Z98.61    CAD S/P percutaneous coronary angioplasty I25.10, Z98.61    Carcinoma ([facility] C80.1    Type 2 myocardial infarction without ST elevation ([address] I21. A1    Rectal bleed K62.5    Liver metastasis (HCC) C78.7    Colorectal cancer, stage IV (HCC) C19    Anemia D64.9    Iron deficiency anemia due to chronic blood loss D50.0    Neuropathy due to chemotherapeutic drug ([address] G62.0, T45.1X5A    Right hip pain M25.551    Radiation cystitis N30.40    Septic shock (HCC) A41.9, R65.21    Severe malnutrition ([address] E43    Septicemia ([address] A41.9    Elevated LFTs R94.5    Malignant neoplasm of colon (HCC) C18.9    Other ascites R18.8    Sepsis (HCC) A41.9       Isolation/Infection:   Isolation          No Isolation            Nurse Assessment:  Last Vital Signs: /68   Pulse 98   Temp 98.4 °F (36.9 °C) (Oral)   Resp 16   Ht 5' 10\" (1.778 m)   Wt 230 lb 14 oz (104.7 kg)   SpO2 96%   BMI 33.13 kg/m²     Last documented pain score (0-10 scale): Pain Level: 8  Last Weight:   Wt Readings from Last 1 Encounters:   05/17/19 230 lb 14 oz (104.7 kg)     Mental Status:  oriented and alert    IV Access:  - None    Nursing Mobility/ADLs:  Walking   Independent  Transfer  Independent  Bathing  Assisted  Dressing  Assisted  Toileting  Assisted  Feeding  [address] Delivery   whole    Wound Care Documentation and Therapy:        Elimination:  Continence:   · Bowel: No  · Bladder: Yes  Urinary Catheter: None   Colostomy/Ileostomy/Ileal Conduit: No       Date of Last BM: 5/18  No intake or output data in the 24 hours ending 05/18/19 1644  No intake/output data recorded. Safety Concerns: At Risk for Falls    Impairments/Disabilities:      None    Nutrition Therapy:  Current Nutrition Therapy:   - Oral Diet:  General    Routes of Feeding: Oral  Liquids: No Restrictions  Daily Fluid Restriction: no  Last Modified Barium Swallow with Video (Video Swallowing Test): not done    Treatments at the Time of Hospital Discharge:   Respiratory Treatments: NA  Oxygen Therapy:  is not on home oxygen therapy.   Ventilator:    - No ventilator support    Rehab Therapies: Physical therapy and occupational therapy  Weight Bearing Status/Restrictions: No weight bearing restirctions  Other Medical Equipment (for information only, NOT a DME order):  walker  Other Treatments: Skilled nursing Assessment and monitoring, medication Educartion    Patient's personal belongings (please select all that are sent with patient):  None, clothing    RN SIGNATURE:  Electronically signed by Kenneth Ma RN on 5/18/19 at 4:50 PM    CASE MANAGEMENT/SOCIAL WORK SECTION    Inpatient Status Date: 5/16    Readmission Risk Assessment Score:  Readmission Risk              Risk of Unplanned Readmission:        46           Discharging to Facility/ . [address]  [address] [address]  Phone [phone]  Fax  [phone]    [place] [facility] care agency's  to evaluate patient two weeks prior to discharge from home health to determine post-discharge services. / signature: Electronically signed by Cande Jiménez RN on 5/18/19 at 4:52 PM    PHYSICIAN SECTION    Prognosis: Fair    Condition at Discharge: Stable    Rehab Potential (if transferring to Rehab): Fair    Recommended Labs or Other Treatments After Discharge:     Physician Certification: I certify the above information and transfer of Tilden Oppenheim  is necessary for the continuing treatment of the diagnosis listed and that he requires [address] for greater 30 days.      Update Admission H&P: No change in H&P    PHYSICIAN SIGNATURE:  Telephone order from Dr Lucian Christianson, from Electronically signed by Cande Jiménez RN on 5/18/2019 at 4:54 PM

## 2019-05-18 NOTE — PROGRESS NOTES
Pt's wife is on phone screaming at RN about pt's pain medication regimen. Pt's wife will not allow RN to answer questions. Pt's wife continues to yell and demand RN to give pt his home prescribed pain medications. Pt's wife states that she is on her way to the hospital.  Pt has remained calm and cooperative throughout shift.

## 2019-05-18 NOTE — FLOWSHEET NOTE
05/17/19 5164   Provider Notification   Reason for Communication New orders   Provider Name Dr Pato Pruitt   Provider Notification Physician   Method of Communication Secure Message  (perfect serve)   Response Waiting for response   Notification Time [id number]     RN perfect omar MD to notify about pt's request to have PRN pain medication changed to home prescribed dose. Waiting for a response.

## 2019-05-18 NOTE — PROGRESS NOTES
cefTRIAXone (ROCEPHIN) IV  2 g Intravenous Q12H    famotidine  20 mg Oral BID     Continuous Infusions:    dextrose       PRN Meds: oxyCODONE, sodium chloride flush, sodium chloride flush, potassium chloride **OR** potassium alternative oral replacement **OR** potassium chloride, ondansetron, diphenhydrAMINE, magnesium sulfate, glucose, dextrose, glucagon (rDNA), dextrose, calcium carbonate    Data:     Past Medical History:   has a past medical history of Back pain, CAD S/P percutaneous coronary angioplasty, Carcinoma ([facility] [address] Colon cancer ([facility] [address] Degeneration of lumbar or lumbosacral intervertebral disc, Depression, Diabetes mellitus ([facility] [address] H/O cardiac catheterization, Hepatic metastases ([facility] [address] Hyperlipidemia, Hypertension, Knee pain, and MI, old. Social History:   reports that he has never smoked. He has never used smokeless tobacco. He reports that he drank alcohol. He reports that he does not use drugs. Family History:   Family History   Problem Relation Age of Onset    Heart Disease Mother     Stroke Mother     High Blood Pressure Mother     High Cholesterol Father        Vitals:  /68   Pulse 98   Temp 98.4 °F (36.9 °C) (Oral)   Resp 16   Ht 5' 10\" (1.778 m)   Wt 230 lb 14 oz (104.7 kg)   SpO2 96%   BMI 33.13 kg/m²   Temp (24hrs), Av.1 °F (36.7 °C), Min:97.5 °F (36.4 °C), Max:98.6 °F (37 °C)    Recent Labs     19  1126 19  1638 197 19  0709   POCGLU 118* 106 125* 137*       I/O(24Hr):     Intake/Output Summary (Last 24 hours) at 2019 0934  Last data filed at 2019 1407  Gross per 24 hour   Intake --   Output 2500 ml   Net -2500 ml       Labs:    [unfilled]    Lab Results   Component Value Date/Time    SPECIAL NOT REPORTED 2019 02:00 PM     Lab Results   Component Value Date/Time    CULTURE PENDING 2019 02:00 PM       [facility]    Radiology:    Xr Chest Standard (2 Vw)    Result Date: 5/10/2019  EXAMINATION: TWO XRAY VIEWS OF THE CHEST 5/10/2019 10:02 am COMPARISON: AP chest from 05/03/2019 HISTORY: ORDERING SYSTEM PROVIDED HISTORY: cough TECHNOLOGIST PROVIDED HISTORY: cough Ordering Physician Provided Reason for Exam: Cough congestion weakness today h/o cancer Acuity: Acute Type of Exam: Initial Additional signs and symptoms: Cough congestion weakness today h/o cancer Additional history includes anemia, colon carcinoma, hypertension, and diabetes. FINDINGS: Right-sided IJ port with unchanged tip position in the lower SVC. Overlying ECG monitor leads. Cardiomediastinal shadow stable. Low lung volumes with bibasilar atelectasis or perhaps scarring. No localized pulmonary opacity suspicious for infiltrate. No sizable pleural effusion. Bones and soft tissues intact. Gas-filled small bowel loops, best seen on the lateral projection, upper limits of normal.     No acute cardiopulmonary disease or interval change. Bibasilar atelectasis or scarring     Xr Chest Standard (2 Vw)    Result Date: 4/17/2019  EXAMINATION: TWO VIEWS OF THE CHEST 4/17/2019 9:18 pm COMPARISON: 04/07/2019 HISTORY: ORDERING SYSTEM PROVIDED HISTORY: chest pain, tachycardia TECHNOLOGIST PROVIDED HISTORY: chest pain, tachycardia Ordering Physician Provided Reason for Exam: Pt states chest pain and fatigue x 1 day Acuity: Acute Type of Exam: Initial Additional signs and symptoms: Pt states chest pain and fatigue x 1 day FINDINGS: Right internal jugular chest port with catheter tip at the cavoatrial junction. Limited due to low lung volumes. Normal heart size and pulmonary vasculature. Left lateral basilar opacity with corresponding finding on the lateral view may represent atelectasis or pneumonia. No pneumothorax. Limited due to low lung volumes. Left lateral basilar opacity may represent atelectasis or pneumonia. Consider repeating study with full inspiration.      Ct Head Wo Contrast    Result Date: 5/16/2019  EXAMINATION: CT OF THE HEAD WITHOUT CONTRAST was utilized to reduce the radiation dose to as low as reasonably achievable. COMPARISON: None HISTORY: ORDERING SYSTEM PROVIDED HISTORY: abd pain TECHNOLOGIST PROVIDED HISTORY: Ordering Physician Provided Reason for Exam: Abdominal pain, bloating Acuity: Acute Relevant Medical/Surgical History: surgeries to area of interest include hernia repair; ORDERING SYSTEM PROVIDED HISTORY: PE TECHNOLOGIST PROVIDED HISTORY: Ordering Physician Provided Reason for Exam: Patient denies any SOB or chest pain at this time Acuity: Acute FINDINGS: Chest: Mediastinum: Heart is mildly enlarged in size. Trace pericardial fluid versus pericardial thickening. Main pulmonary artery mildly enlarged measuring 3.1 cm in transverse dimension. No convincing evidence of central or lobar pulmonary emboli. Segmental subsegmental branches are less optimally evaluated due to motion artifacts and timing of contrast bolus. No suspicious mediastinal, hilar, or axillary not identified per CT criteria. Lungs/pleura: Small right-sided and trace left-sided pleural effusions. Pleuroparenchymal bands versus subsegmental atelectasis identified involving both lower lobes. Soft Tissues/Bones: There is redemonstration of the mixed sclerotic and lytic lesion involving the T2 vertebral body with surrounding pair spinal soft tissue attenuation. This extends into the pedicles and facets on the left. Additional mix additional sclerotic and lytic lesion identified involving T4 similar prior examination. Subtle areas sclerosis is suspected subacute nondisplaced fractures anterior left 5th, 7th and 8th ribs similar prior exam. Abdomen/Pelvis: Organs: There are innumerable hypodensities identified throughout the right and left hepatic lobes which have progressed from prior examination. A suggestion of a central ill-defined area of hypoattenuation which may represent areas of posterior related changes/fibrosis versus perfusion differences.  There is mild gallbladder wall thickening. No definite cholelithiasis. The spleen, adrenal glands, kidneys, pancreas unremarkable. GI/Bowel: Mild-to-moderate retained stool throughout the colon. Mild-to-moderate scattered colonic diverticulosis. Appendix not identified. Pelvis: Mild bladder wall thickening related to underdistention. Tiny fat containing inguinal hernias. Prostate unremarkable. Peritoneum/Retroperitoneum: Moderate volume of ascites. Similar prior examination. Bones/Soft Tissues: Multilevel degenerate changes of the lumbar spine. CTA Chest: No convincing evidence of central or lobar pulmonary emboli. Distal segmental subsegmental branches not well evaluated. Moderate size right-sided effusion and trace left-sided effusion. Stable osseous metastatic disease with peers spinal soft tissue attenuation involving the T2 vertebral body. CT of the abdomen pelvis: No significant change in the innumerable hepatic lesions worrisome for metastatic disease as well as moderate burden of ascites. Bladder wall thickening could be infectious inflammatory or could be related to underdistention. .  Please correlate with urinalysis findings. Ct Abdomen Pelvis W Iv Contrast Additional Contrast? None    Result Date: 4/17/2019  EXAMINATION: CT OF THE ABDOMEN AND PELVIS WITH CONTRAST 4/17/2019 10:45 pm TECHNIQUE: CT of the abdomen and pelvis was performed with the administration of intravenous contrast. Multiplanar reformatted images are provided for review. Dose modulation, iterative reconstruction, and/or weight based adjustment of the mA/kV was utilized to reduce the radiation dose to as low as reasonably achievable. COMPARISON: 10/03/2018 HISTORY: ORDERING SYSTEM PROVIDED HISTORY: sepsis, source unknown. history of metastatic colon cancer to liver TECHNOLOGIST PROVIDED HISTORY: Ordering Physician Provided Reason for Exam: sepsis, source unknown. Hx - metastatic colon cancer to liver. Patient states weakness x1 day prior. SYSTEM PROVIDED HISTORY: wall thickening on CT and elevated alk phos FINDINGS: LIVER:  Cirrhotic liver with heterogeneous echotexture. No  intrahepatic bile duct dilatation noted. BILIARY SYSTEM:  Diffuse gallbladder wall thickening. Questionable adherent stone versus polyp measuring 8 mm. No internal color Doppler vascularity. Negative Iyer sign. No pericholecystic fluid. Common bile duct is within normal limits measuring 5.3 mm. PANCREAS:  Visualized portions of the pancreas are unremarkable. OTHER: Trace ascites is noted within the right upper quadrant. 1.  Diffuse gallbladder wall thickening. No pericholecystic fluid or biliary obstruction. The finding may related to the patient's underlying cirrhosis or possibly chronic cholecystitis. If acute cholecystitis is of clinical concern, further evaluation with HIDA scan is recommended. 2. Questionable adherent stone versus polyp measuring 8 mm. The former is favored. If no surgical intervention is performed, repeat ultrasound in 1 year is recommended. 3. Trace perihepatic ascites. Xr Chest Portable    Result Date: 5/3/2019  EXAMINATION: SINGLE XRAY VIEW OF THE CHEST 5/3/2019 11:12 am COMPARISON: April 17, 2019 HISTORY: ORDERING SYSTEM PROVIDED HISTORY: Chest Pain TECHNOLOGIST PROVIDED HISTORY: Chest Pain Ordering Physician Provided Reason for Exam: Pt states today chest pain. History of colon cancer and lung cancer Acuity: Unknown Type of Exam: Unknown FINDINGS: Right chest Port in good position. No kink. Non enlarged heart. Bibasilar atelectasis. Right basilar pleuroparenchymal scarring. No effusion. No pneumothorax. No airspace consolidation. No focal airspace consolidation.      Ct Chest Pulmonary Embolism W Contrast    Result Date: 5/16/2019  EXAMINATION: CT OF THE ABDOMEN AND PELVIS WITH CONTRAST; CTA OF THE CHEST 5/16/2019 12:42 am TECHNIQUE: CT of the abdomen and pelvis was performed with the administration of intravenous contrast. prior examination. Subtle areas sclerosis is suspected subacute nondisplaced fractures anterior left 5th, 7th and 8th ribs similar prior exam. Abdomen/Pelvis: Organs: There are innumerable hypodensities identified throughout the right and left hepatic lobes which have progressed from prior examination. A suggestion of a central ill-defined area of hypoattenuation which may represent areas of posterior related changes/fibrosis versus perfusion differences. There is mild gallbladder wall thickening. No definite cholelithiasis. The spleen, adrenal glands, kidneys, pancreas unremarkable. GI/Bowel: Mild-to-moderate retained stool throughout the colon. Mild-to-moderate scattered colonic diverticulosis. Appendix not identified. Pelvis: Mild bladder wall thickening related to underdistention. Tiny fat containing inguinal hernias. Prostate unremarkable. Peritoneum/Retroperitoneum: Moderate volume of ascites. Similar prior examination. Bones/Soft Tissues: Multilevel degenerate changes of the lumbar spine. CTA Chest: No convincing evidence of central or lobar pulmonary emboli. Distal segmental subsegmental branches not well evaluated. Moderate size right-sided effusion and trace left-sided effusion. Stable osseous metastatic disease with peers spinal soft tissue attenuation involving the T2 vertebral body. CT of the abdomen pelvis: No significant change in the innumerable hepatic lesions worrisome for metastatic disease as well as moderate burden of ascites. Bladder wall thickening could be infectious inflammatory or could be related to underdistention. .  Please correlate with urinalysis findings. Ct Chest Pulmonary Embolism W Contrast    Result Date: 5/10/2019  EXAMINATION: CTA OF THE CHEST 5/10/2019 12:31 pm TECHNIQUE: CTA of the chest was performed after the administration of intravenous contrast.  Multiplanar reformatted images are provided for review. MIP images are provided for review.  Dose modulation, iterative reconstruction, and/or weight based adjustment of the mA/kV was utilized to reduce the radiation dose to as low as reasonably achievable. COMPARISON: CT PE dated 04/08/2019 HISTORY: ORDERING SYSTEM PROVIDED HISTORY: cough, sob, tachycardia, cancer TECHNOLOGIST PROVIDED HISTORY: Ordering Physician Provided Reason for Exam: cough, sob, tachycardia, cancer. denies previous chest surgeries. FINDINGS: Pulmonary Arteries: Distal segmental and subsegmental pulmonary arteries are limited by respiratory motion and suboptimal bolus timing, despite repeat imaging. No large central or proximal segmental pulmonary embolus. Mediastinum: The heart is normal in size. There is no pericardial effusion. Thoracic aorta is normal in caliber without obvious intimal dissection. Coronary artery calcifications and stents are noted. No mediastinal or hilar lymphadenopathy. No axillary lymphadenopathy. Right chest port is in place with distal tip at the cavoatrial junction. Lungs/pleura: There is a tiny right pleural effusion and trace left pleural fluid. There is mild bibasilar atelectasis. No focal airspace consolidation or pneumothorax. No evidence of pulmonary nodule or mass. Upper Abdomen: There is focal heterogeneous attenuation in the right hepatic dome. Irregular hypodense lesion is also noted along the anterior falciform ligament. There are several tiny hypodensities throughout the right liver, and overall attenuation is coarsened. There is a moderate volume of ascites in the upper abdomen. Spleen is enlarged, measuring 16.3 cm. Soft Tissues/Bones: There are mixed lytic and sclerotic lesions at T2 and T4. Both have soft tissue components which are not significantly changed from 04/08/2019. Subtle sclerosis is noted at the anterior left 5th, 7th, and 8th ribs. Underlying nondisplaced subacute fractures are suspected.      1.  Limited assessment of distal segmental and subsegmental pulmonary arteries due to respiratory motion and suboptimal bolus timing, despite repeat imaging. No central or proximal segmental pulmonary embolus. 2.  Tiny right pleural effusion and trace left pleural fluid with mild bibasilar atelectasis. 3.  Indistinct hypodense lesions in the liver, likely metastases. 4.  Moderate volume upper abdominal ascites, new from the previous study. 5.  Splenomegaly. 6.  Redemonstration of metastatic lesions at T2 and T4, similar to 04/08/2019. Subtle areas of sclerosis with suspected subacute nondisplaced fractures are noted at the anterior left 5th, 7th, and 8th ribs. Us Abdomen Limited    Result Date: 5/4/2019  EXAMINATION: RIGHT UPPER QUADRANT ULTRASOUND, 5/4/2019 9:39 am COMPARISON: 04/18/2019 HISTORY: ORDERING SYSTEM PROVIDED HISTORY: Check for ascites TECHNOLOGIST PROVIDED HISTORY: Check for ascites Abd distention, hx liver mets r/o cholecystitis Acuity: Acute Type of Exam: Initial FINDINGS: LIVER:  The liver demonstrates some abnormal echotexture as well as hepatomegaly with the liver measuring 27 cm. Liver nodularity noted which was also seen on the previous CT evaluation. BILIARY SYSTEM:  Gallbladder wall is severely thickened at 9.2 mm. There is evidence of gallbladder polyps. No evidence of gallbladder stones. Negative Iyer sign. Common bile duct is within normal limits measuring 0.61 cm. RIGHT KIDNEY: The right kidney is grossly unremarkable without evidence of hydronephrosis. OTHER: Moderate ascites noted in the abdomen and pelvis. 1. Moderate ascites. 2. Enlarged inhomogeneous nodular liver as previously noted on the CT evaluation. Neoplasm should be considered. 3. Thickened gallbladder wall as well as gallbladder polyps. The gallbladder does not appear distended however. No evidence of gallbladder stones. Acute cholecystitis is unlikely.      Ir Us Guided Paracentesis    Result Date: 5/6/2019  PROCEDURE: ULTRASOUND GUIDED PARACENTESIS 5/6/2019 HISTORY: ORDERING SYSTEM PROVIDED HISTORY: Suspected SBP TECHNOLOGIST PROVIDED HISTORY: Suspected SBP TECHNIQUE: This procedure was performed by Dr. Maria Del Carmen Martinez. Informed consent was obtained after a detailed explanation of the procedure including risks, benefits, and alternatives. Universal protocol was followed. Ultrasound shows a small to moderate amount of ascites. The right abdomen was prepped and draped in sterile fashion and local anesthesia was achieved with lidocaine. Using realtime ultrasound guidance a 6 Spanish pigtail catheter was advanced into the peritoneal fluid. Ultrasound images show a safe tract and access into the fluid. Fluid was collected in vacuum bottles. Little to no fluid remains on completion. The catheter was removed and a sterile dressing applied. There are no immediate complications. The patient left the department in stable condition. FINDINGS: A total of 850 mL of clear yellow fluid was removed. Fluid was sent for the requested studies. Successful ultrasound guided paracentesis. Nm Hepatobiliary Scan W Ejection Fraction    Result Date: 4/20/2019  EXAMINATION: NUCLEAR MEDICINE HEPATOBILIARY SCINTIGRAPHY (HIDA SCAN). 4/20/2019 10:13 am TECHNIQUE: Approximately 3.0 [id number] Mebrofenin (Choletec) was administered IV. Then, dynamic images of the abdomen were obtained in the anterior projection for 60 mins. A right lateral view was also obtained at 60 mins. COMPARISON: Ultrasound 04/18/2019 HISTORY: ORDERING SYSTEM PROVIDED HISTORY: pain TECHNOLOGIST PROVIDED HISTORY: Ordering Physician Provided Reason for Exam: Pain Acuity: Unknown Type of Exam: Unknown Additional signs and symptoms: pt states no real abdominal pain, came in for general fatigue, pt states he has liver an dcolon cancer FINDINGS: Gallbladder is not visualized. Activity is seen within the small bowel. Delayed excretion of radiotracer by the liver. Nonvisualization of the gallbladder suggesting acute cholecystitis.  Delayed excretion by the liver suggesting hepatic dysfunction. Physical Examination:        Physical Exam   Constitutional: He is oriented to person, place, and time. He appears well-developed and well-nourished. He appears distressed. HENT:   Head: Normocephalic and atraumatic. Right Ear: External ear normal.   Left Ear: External ear normal.   Mouth/Throat: No oropharyngeal exudate. Eyes: Pupils are equal, round, and reactive to light. EOM are normal. Scleral icterus is present. Cardiovascular: Normal rate and regular rhythm. Pulmonary/Chest: Effort normal and breath sounds normal. No respiratory distress. Abdominal: He exhibits distension. There is tenderness. Musculoskeletal: He exhibits edema. Neurological: He is alert and oriented to person, place, and time. No cranial nerve deficit. Assessment:        Primary Problem  Other ascites    Active Hospital Problems    Diagnosis Date Noted    Sepsis ([address] [A41.9] 05/16/2019    Severe malnutrition ([address] [E43] 05/16/2019    Malignant neoplasm of colon ([address] [C18.9]     Other ascites [R18.8]     Anemia [D64.9]     Colorectal cancer, stage IV ([address] [C19]     Liver metastasis ([address] [C78.7] 12/04/2016    Type 2 diabetes mellitus with hyperglycemia, without long-term current use of insulin ([address] [E11.65] 05/10/2012       Plan:          Stage IV colorectal Ca w/ extensive Liver mets. Previous GI notes reviewed. Concern for sbp/sepsis on admission with WBC 12.3, afebrile, Lactic acid 4.4, started on rocephin, flagyl. Ascitic fluid analysis - pmn < 250, no bacteria seen, SAAG 1.1. Received fluid boluses initially, s/p 4.4L of bolus in ED    Stopped fluids on admission as patient hypervolemic with third spacing. Gave one dose of 25g albumin 5/16. LVP yesterday, 2.5l out. Remained hemodynamically stable. GI recs appreciated.        Diabetes Mellitus Type 2, Lantus 15u nightly, ISS low    lovenox for dvt proph, pepcid for gi proph.    PT/OT, SW.   Plan will be discussed with the attending, Dr Leon Webster MD  PGY I Transitional Year Resident  5/18/2019 9:34 AM       IM Attending    Pt seen and examined today   I have discussed the care of pt , including pertinent history and exam findings,  with the resident. I have reviewed the key elements of all parts of the encounter with the resident. I agree with the assessment, plan and orders as documented by the resident.     Electronically signed by Eulogio Barrientos MD on 5/18/2019 at 1:15 PM

## 2019-05-18 NOTE — PROGRESS NOTES
BUN 5* 4* 4*   CREATININE <0.40* 0.41* 0.45*   GLUCOSE 154* 110* 142*   CALCIUM 7.6* 7.3* 7.4*       LFTS  Recent Labs     05/16/19  1140 05/17/19  0449 05/18/19  0535   ALKPHOS 603* 587* 717*   ALT 19 18 17   * 108* 106*   PROT 5.7* 5.8* 5.9*   BILITOT 4.36* 3.83* 4.23*   LABALBU 2.0* 2.2* 2.0*       AMYLASE/LIPASE/AMMONIA  Recent Labs     05/15/19  2250   LIPASE 15   AMMONIA 36       PT/INR  Recent Labs     05/15/19  2250 05/17/19  0801   PROTIME 16.9* 18.6*   INR 1.4 1.6     Results for Juancarlos Pac (MRN [id number]) as of 5/18/2019 15:33 paracentesis 5/17/19   Ref. Range 5/17/2019 14:00   Appearance, Fluid Unknown SLIGHTLY CLOUDY   Color, Fluid Unknown YELLOW   RBC, Fluid Latest Units: /mm3 490   Lymphocytes, Body Fluid Latest Ref Range: 0 % 6 (H)   Monocyte Count, Fluid Latest Ref Range: 0 % 73 (H)   Neutrophil Count, Fluid Latest Ref Range: 0 % 21 (H)   Total Protein, Body Fluid Latest Units: g/dL 2.0   WBC, Fluid Latest Units: /mm3 283   Amylase, Fluid Latest Units: U/L 6   Albumin, Fluid Latest Units: g/dL 0.9     Juancarlos Pac   MR#: [id number]   Specimen #[id number]         Final Diagnosis   SPECIMEN \"A\" & \"B\":  PARACENTESIS FLUID: 5/6/19         SATISFACTORY FOR EVALUATION     MESOTHELIAL CELLS, MACROPHAGES AND MIXED INFLAMMATORY CELLS     NEGATIVE FOR MALIGNANCY     FINDINGS: CTA 5/16/19       Chest:       Mediastinum: Heart is mildly enlarged in size.  Trace pericardial fluid   versus pericardial thickening.  Main pulmonary artery mildly enlarged   measuring 3.1 cm in transverse dimension.  No convincing evidence of central   or lobar pulmonary emboli.  Segmental subsegmental branches are less   optimally evaluated due to motion artifacts and timing of contrast bolus.  No   suspicious mediastinal, hilar, or axillary not identified per CT criteria.       Lungs/pleura: Small right-sided and trace left-sided pleural effusions.    Pleuroparenchymal bands versus subsegmental atelectasis as well as moderate burden of ascites.       Bladder wall thickening could be infectious inflammatory or could be related   to underdistention. Greta Salcedo correlate with urinalysis findings.             FINDINGS:5/4/19 abd us   LIVER:  The liver demonstrates some abnormal echotexture as well as   hepatomegaly with the liver measuring 27 cm.  Liver nodularity noted which   was also seen on the previous CT evaluation.       BILIARY SYSTEM:  Gallbladder wall is severely thickened at 9.2 mm.  There is   evidence of gallbladder polyps.  No evidence of gallbladder stones.  Negative   Iyer sign.  Common bile duct is within normal limits measuring 0.61 cm.       RIGHT KIDNEY: The right kidney is grossly unremarkable without evidence of   hydronephrosis.       OTHER: Moderate ascites noted in the abdomen and pelvis.           Impression   1. Moderate ascites. 2. Enlarged inhomogeneous nodular liver as previously noted on the CT   evaluation.  Neoplasm should be considered. 3. Thickened gallbladder wall as well as gallbladder polyps.  The gallbladder   does not appear distended however.  No evidence of gallbladder stones.  Acute   cholecystitis is unlikely.               ASSESSMENT/PLAN:  1. Ascites with liver mets, stage 4 rectal cancer  -paracentesis negative for SBP and malignancy, consistent with liver disease  -2 gm low salt diet  -continue the lactulose and titrate for 3 bowel movements per day  -elevated lft's likely related to mets will recheck lft in am and if higher will likely need mri mrcp to further eval the biliary tree  -needs follow up with oncology    2.anemia; no overt bleeding likely related to malignancy, no sign of obstructive symptoms at this time  -trend the h/h and transfuse for hgb <7        This plan was formulated in collaboration with  [facility] .     Electronically signed by: ASIM Godwin - CNP on 5/18/2019 at 3:31 PM

## 2019-05-18 NOTE — PROGRESS NOTES
[address]   OCCUPATIONAL THERAPY MISSED TREATMENT NOTE   INPATIENT   Date: 19  Patient Name: Binta Tyson       Room:   MRN: [id number]   Account #: [de-identified]    : 1960  (66 y.o.)  Gender: male                 REASON FOR MISSED TREATMENT:  Patient unable to participate   -   Other - Patient currently sitting in shower, reports having an accident and is getting washed up with nursing care. Will check back today as able.        Ronit Espinal, OT

## 2019-05-18 NOTE — FLOWSHEET NOTE
05/18/19 0030   Provider Notification   Reason for Communication New orders   Provider Name Dr Floresita Rob   Provider Notification Physician   Method of Communication Call   Response See orders   Notification Time 0030     MD notified that pt did agree to take 5mg oxycodone and is requesting another 5mg if possible. MD gave telephone order to resume pt's home dose of oxycodone and give pt an additional 5mg oxycodone one time order.

## 2019-05-18 NOTE — CARE COORDINATION
Administered Date(s) Administered    Influenza, Quadv, 3 yrs and older, IM, PF (Fluzone 3 yrs and older or Afluria 5 yrs and older) 12/09/2016    Influenza, Evelyn Hole, 6 mo and older, IM, PF (Flulaval, Fluarix) 09/25/2018       Active Problems:  Patient Active Problem List   Diagnosis Code    Hypertension, essential I10    Mixed hyperlipidemia E78.2    Knee pain M25.569    Type 2 diabetes mellitus with hyperglycemia, without long-term current use of insulin (HCC) E11.65    HTN (hypertension) I10    Hypercholesteremia E78.00    Insomnia G47.00    Knee pain, bilateral M25.561, M25.562    Depression F32.9    Cervical sprain S13. 9XXA    Lumbar sprain S33. 5XXA    Back pain M54.9    Opioid dependence ([address] F11.20    Degeneration of lumbar or lumbosacral intervertebral disc M51.37    Chronic midline low back pain without sciatica M54.5, G89.29    ST elevation myocardial infarction (STEMI) (HCC) I21.3    S/P PTCA  BMS to RCA - 11/26/2016 Dr. Mora Dural Z98.61    CAD S/P percutaneous coronary angioplasty I25.10, Z98.61    Carcinoma ([address] C80.1    Type 2 myocardial infarction without ST elevation ([address] I21. A1    Rectal bleed K62.5    Liver metastasis (HCC) C78.7    Colorectal cancer, stage IV (HCC) C19    Anemia D64.9    Iron deficiency anemia due to chronic blood loss D50.0    Neuropathy due to chemotherapeutic drug ([address] G62.0, T45.1X5A    Right hip pain M25.551    Radiation cystitis N30.40    Septic shock (HCC) A41.9, R65.21    Severe malnutrition ([address] E43    Septicemia ([address] A41.9    Elevated LFTs R94.5    Malignant neoplasm of colon (HCC) C18.9    Other ascites R18.8    Sepsis (HCC) A41.9       Isolation/Infection:   Isolation          No Isolation            Nurse Assessment:  Last Vital Signs: /68   Pulse 98   Temp 98.4 °F (36.9 °C) (Oral)   Resp 16   Ht 5' 10\" (1.778 m)   Wt 230 lb 14 oz (104.7 kg)   SpO2 96%   BMI 33.13 kg/m²     Last documented pain score (0-10 scale): Pain Level: 8  Last Weight:   Wt Readings from Last 1 Encounters:   05/17/19 230 lb 14 oz (104.7 kg)     Mental Status:  oriented and alert    IV Access:  - None    Nursing Mobility/ADLs:  Walking   Independent  Transfer  Independent  Bathing  Assisted  Dressing  Assisted  Toileting  Assisted  Feeding  [address] Delivery   whole    Wound Care Documentation and Therapy:        Elimination:  Continence:   · Bowel: No  · Bladder: Yes  Urinary Catheter: None   Colostomy/Ileostomy/Ileal Conduit: No       Date of Last BM: 5/18  No intake or output data in the 24 hours ending 05/18/19 1644  No intake/output data recorded. Safety Concerns: At Risk for Falls    Impairments/Disabilities:      None    Nutrition Therapy:  Current Nutrition Therapy:   - Oral Diet:  General    Routes of Feeding: Oral  Liquids: No Restrictions  Daily Fluid Restriction: no  Last Modified Barium Swallow with Video (Video Swallowing Test): not done    Treatments at the Time of Hospital Discharge:   Respiratory Treatments: NA  Oxygen Therapy:  is not on home oxygen therapy.   Ventilator:    - No ventilator support    Rehab Therapies: Physical therapy and occupational therapy  Weight Bearing Status/Restrictions: No weight bearing restirctions  Other Medical Equipment (for information only, NOT a DME order):  walker  Other Treatments: Skilled nursing Assessment and monitoring, medication Educartion    Patient's personal belongings (please select all that are sent with patient):  None, clothing    RN SIGNATURE:  Electronically signed by Orlando Rogel RN on 5/18/19 at 4:50 PM    CASE MANAGEMENT/SOCIAL WORK SECTION    Inpatient Status Date: 5/16    Readmission Risk Assessment Score:  Readmission Risk              Risk of Unplanned Readmission:        46           Discharging to Facility/ . [facility] [address]  [address] [address]  Phone [phone]  Fax  [phone]    [place] [facility]'s  to evaluate patient two weeks prior to discharge from home health to determine post-discharge services. / signature: Electronically signed by Augustus Lyons RN on 5/18/19 at 4:52 PM    PHYSICIAN SECTION    Prognosis: Fair    Condition at Discharge: Stable    Rehab Potential (if transferring to Rehab): Fair    Recommended Labs or Other Treatments After Discharge:     Physician Certification: I certify the above information and transfer of Kathe Weber  is necessary for the continuing treatment of the diagnosis listed and that he requires [address] for greater 30 days. Update Admission H&P: No change in H&P    PHYSICIAN SIGNATURE:  Telephone order from Dr Cristina Austin, from Electronically signed by Augustus Lyons RN on 5/18/2019 at 4:54 PM  Current Discharge Medication List     CONTINUE these medications which have NOT CHANGED     Medication Dose   insulin glargine (LANTUS) 100 UNIT/ML injection vial 20 Units   Inject 20 Units into the skin nightly       glimepiride (AMARYL) 4 MG tablet 4 mg   Take 1 tablet by mouth every morning   Quantity: 30 tablet Refills: 3       oxyCODONE HCl (OXY-IR) 10 MG immediate release tablet 20 mg   Take 20 mg by mouth 3 times daily as needed for Pain. diphenhydrAMINE (BENADRYL) 25 MG capsule 25 mg   Take 1 capsule by mouth nightly as needed for Sleep   Quantity: 12 capsule Refills: 0       fentaNYL (DURAGESIC) 50 MCG/HR 1 patch   Place 1 patch onto the skin every 72 hours.         potassium chloride (KLOR-CON M) 20 MEQ extended release tablet 20 mEq   Take 1 tablet by mouth daily   Quantity: 30 tablet Refills: 3       metoprolol tartrate (LOPRESSOR) 25 MG tablet 1 tablet   Take 1 tablet by mouth daily        glucose monitoring kit (FREESTYLE) monitoring kit 1 kit   1 kit by Does not apply route daily   Quantity: 1 kit Refills: 0       Lancets MISC 1 each   1 each by Does not apply route daily Quantity: 100 each Refills: 3       naloxone (NARCAN) 4 MG/0.1ML LIQD nasal spray 4 mg   Take 4 mg by mouth       Glucose Blood (BLOOD GLUCOSE TEST STRIPS) STRP    1 each by In Vitro route daily As needed.    Quantity: 100 strip Refills: 5       Insulin Pen Needle 29G X 12.7MM MISC 1 each   1 each by Does not apply route daily   Quantity: 100 each Refills: 0       nitroGLYCERIN (NITROSTAT) 0.4 MG SL tablet 0.4 mg   Place 1 tablet under the tongue every 5 minutes as needed for Chest pain Dissolve 1 tablet under tongue for chest pain and repeat every 5 min up to max of 3 total doses.  If no relief after 3 doses call 911   Quantity: 25 tablet Refills: 3

## 2019-05-19 NOTE — ED NOTES
Report given to [facility]. Endorsed plan of care, IV site, Pt status, MEWS and follow up.      Bessy Lainez RN  05/19/19 4773

## 2019-05-19 NOTE — PROGRESS NOTES
[address] [facility]    PROGRESS NOTE             5/19/2019    10:30 AM    Name:   Kory Gonzales  MRN:     [id number]     Acct:      [de-identified]   Room:   2091/2091-01  IP Day:  0  Admit Date:  5/19/2019  2:40 AM    PCP:  No primary care provider on file. Code Status:  Prior    Subjective:     C/C:   Chief Complaint   Patient presents with    Altered Mental Status    Tachycardia     Interval History Status: follow up progress note, discharged 5/18, readmitted today      Patient seen bedside on progressive. Pt readmitted for AMS likely secondary to stage IV colon ca vs sepsis. Lactic acid, wbc, anion gap elevated. Pt is alert and oriented x3 but waxing and waning. Continue to monitor. Will consult GI. Brief History:     65 yo male, pmhx stage IV colon ca with mets to liver, htn, dm II, presented with altered mental status secondary to sepsis vs chronic malignant state. Review of Systems:     Review of Systems   Constitutional: Positive for fatigue. Negative for chills and fever. Eyes: Negative for visual disturbance. Respiratory: Negative for shortness of breath. Cardiovascular: Negative for chest pain, palpitations and leg swelling. Gastrointestinal: Negative for abdominal pain, constipation, diarrhea, nausea and vomiting. Genitourinary: Negative for dysuria. Musculoskeletal: Negative for myalgias. Neurological: Negative for weakness, light-headedness and headaches. Psychiatric/Behavioral: Negative for confusion. Medications: Allergies:     Allergies   Allergen Reactions    Morphine Itching and Rash       Current Meds:   Scheduled Meds:    vancomycin (VANCOCIN) intermittent dosing (placeholder)   Other RX Placeholder     Continuous Infusions:   PRN Meds: sodium chloride flush    Data:     Past Medical History:   has a past medical history of Back pain, CAD S/P percutaneous coronary angioplasty, Carcinoma ([facility] [address] Colon cancer ([facility] [address] Degeneration of lumbar or lumbosacral intervertebral disc, Depression, Diabetes mellitus ([address] H/O cardiac catheterization, Hepatic metastases ([facility] [address] Hyperlipidemia, Hypertension, Knee pain, and MI, old. Social History:   reports that he has never smoked. He has never used smokeless tobacco. He reports that he drank alcohol. He reports that he does not use drugs. Family History:   Family History   Problem Relation Age of Onset    Heart Disease Mother     Stroke Mother     High Blood Pressure Mother     High Cholesterol Father        Vitals:  BP (!) 95/50   Pulse 86   Temp 97.5 °F (36.4 °C) (Oral)   Resp 18   Ht 5' 10\" (1.778 m)   Wt 230 lb (104.3 kg)   SpO2 93%   BMI 33.00 kg/m²   Temp (24hrs), Av °F (37.2 °C), Min:97.5 °F (36.4 °C), Max:101.6 °F (38.7 °C)    Recent Labs     19  1638 19  2027 19  0709 19  1107   POCGLU 106 125* 137* 202*       I/O(24Hr): Intake/Output Summary (Last 24 hours) at 2019 1030  Last data filed at 2019 0826  Gross per 24 hour   Intake 3300 ml   Output --   Net 3300 ml       Labs:    [unfilled]    Lab Results   Component Value Date/Time    SPECIAL NOT REPORTED 2019 02:00 PM     Lab Results   Component Value Date/Time    CULTURE NO GROWTH 2 DAYS 2019 02:00 PM       [facility]    Radiology:    Xr Chest Standard (2 Vw)    Result Date: 5/10/2019  EXAMINATION: TWO XRAY VIEWS OF THE CHEST 5/10/2019 10:02 am COMPARISON: AP chest from 2019 HISTORY: ORDERING SYSTEM PROVIDED HISTORY: cough TECHNOLOGIST PROVIDED HISTORY: cough Ordering Physician Provided Reason for Exam: Cough congestion weakness today h/o cancer Acuity: Acute Type of Exam: Initial Additional signs and symptoms: Cough congestion weakness today h/o cancer Additional history includes anemia, colon carcinoma, hypertension, and diabetes.  FINDINGS: Right-sided IJ port with unchanged tip position in the lower SVC.  Overlying ECG monitor leads. Cardiomediastinal shadow stable. Low lung volumes with bibasilar atelectasis or perhaps scarring. No localized pulmonary opacity suspicious for infiltrate. No sizable pleural effusion. Bones and soft tissues intact. Gas-filled small bowel loops, best seen on the lateral projection, upper limits of normal.     No acute cardiopulmonary disease or interval change. Bibasilar atelectasis or scarring     Ct Head Wo Contrast    Result Date: 5/19/2019  EXAMINATION: CT OF THE HEAD WITHOUT CONTRAST  5/19/2019 3:50 am TECHNIQUE: CT of the head was performed without the administration of intravenous contrast. Dose modulation, iterative reconstruction, and/or weight based adjustment of the mA/kV was utilized to reduce the radiation dose to as low as reasonably achievable. COMPARISON: 05/16/2019 HISTORY: ORDERING SYSTEM PROVIDED HISTORY: ams TECHNOLOGIST PROVIDED HISTORY: Ordering Physician Provided Reason for Exam: AMS Acuity: Acute FINDINGS: BRAIN/VENTRICLES: The cerebral and cerebellar parenchyma demonstrate normal volume. There are no areas of acute hemorrhage, mass, or midline shift. No abnormal extra-axial fluid collections. The ventricles are normal in size. Vascular calcifications are noted in the carotid siphons. Gray-white differentiation is maintained. ORBITS: The visualized portion of the orbits demonstrate no acute abnormality. SINUSES: There is a left maxillary mucous retention cyst.  The mastoid air cells are clear. Cerumen is noted in the external auditory canals. SOFT TISSUES/SKULL:  The calvarium is intact. No appreciable scalp soft tissue swelling. 1. No acute intracranial abnormality, stable.      Ct Head Wo Contrast    Result Date: 5/16/2019  EXAMINATION: CT OF THE HEAD WITHOUT CONTRAST  5/16/2019 12:39 am TECHNIQUE: CT of the head was performed without the administration of intravenous contrast. Dose modulation, iterative reconstruction, and/or weight based adjustment of the mA/kV was utilized to reduce the radiation dose to as low as reasonably achievable. COMPARISON: 04/01/2019 HISTORY: ORDERING SYSTEM PROVIDED HISTORY: ams TECHNOLOGIST PROVIDED HISTORY: Ordering Physician Provided Reason for Exam: AMS, patient denies any head complaints at this time Acuity: Acute Relevant Medical/Surgical History: patient denies any surgeries to brain FINDINGS: BRAIN/VENTRICLES: There is no acute intracranial hemorrhage, mass effect or midline shift. No abnormal extra-axial fluid collection. The gray-white differentiation is maintained without evidence of an acute infarct. There is no evidence of hydrocephalus. ORBITS: The visualized portion of the orbits demonstrate no acute abnormality. SINUSES: The visualized paranasal sinuses and mastoid air cells demonstrate no acute abnormality. Small mucous retention cyst left maxillary antrum. SOFT TISSUES/SKULL:  No acute abnormality of the visualized skull or soft tissues. No acute intracranial abnormality. Ct Abdomen Pelvis W Iv Contrast Additional Contrast? None    Result Date: 5/19/2019  EXAMINATION: CT OF THE ABDOMEN AND PELVIS WITH CONTRAST 5/19/2019 3:46 am TECHNIQUE: CT of the abdomen and pelvis was performed with the administration of intravenous contrast. Multiplanar reformatted images are provided for review. Dose modulation, iterative reconstruction, and/or weight based adjustment of the mA/kV was utilized to reduce the radiation dose to as low as reasonably achievable. COMPARISON: 05/16/2019 HISTORY: ORDERING SYSTEM PROVIDED HISTORY: abd pain TECHNOLOGIST PROVIDED HISTORY: Ordering Physician Provided Reason for Exam: SOB, chills Acuity: Acute Relevant Medical/Surgical History: patient has a hx of diabetes, surgeries to area of interest include liver biopsy, hernia repair FINDINGS: Lower Chest: The heart size is normal.  Coronary artery vascular calcifications are noted.   There are small bilateral pleural effusions, slightly increased on the left side. There is respiratory motion artifact in the lung bases with associated areas of atelectasis. Organs: There is cirrhosis with multifocal diffuse infiltrative lesions noted throughout the liver, compatible with the patient's clinical history of metastatic disease. One of the largest lesions is noted in the right lobe centrally measuring approximately 8.2 x 5.7 cm. The gallbladder is contracted. The adrenal glands are unremarkable. The pancreas has several calcifications which may represent sequela of chronic pancreatitis. There is splenomegaly measuring 17.7 cm. No splenic lesion is identified. The kidneys are unremarkable. No hydronephrosis. GI/Bowel: The stomach and duodenal sweep are unremarkable. Stool is noted in the colon. There is respiratory motion artifact limiting evaluation of the hollow visceral organs. Pelvis: The bladder is partially distended with urine. The prostate and seminal vesicles are unremarkable. There is a right inguinal hernia containing fat. No inguinal or pelvic sidewall lymphadenopathy. Peritoneum/Retroperitoneum: Atherosclerotic plaque is noted in the aorta and its branch vessels. No retroperitoneal adenopathy. There is marked intra-abdominal ascites, similar to the previous study. No mesenteric adenopathy. No anterior abdominal wall defect. Bones/Soft Tissues: There is anasarca. Degenerative changes are noted along the spine and sacroiliac joints. 1. Cirrhosis with portal hypertension and marked intra-ascites, not appreciably changed. 2. Infiltrative low-attenuation lesions are noted diffusely throughout the liver, similar to the previous study, compatible with diffuse metastatic disease. 3. Small bilateral pleural effusions, slightly increased in size on the left.      Ct Abdomen Pelvis W Iv Contrast Additional Contrast? None    Result Date: 5/16/2019  EXAMINATION: CT OF THE ABDOMEN AND PELVIS WITH CONTRAST; CTA OF THE CHEST 5/16/2019 12:42 am TECHNIQUE: CT of the abdomen and pelvis was performed with the administration of intravenous contrast. Multiplanar reformatted images are provided for review. Dose modulation, iterative reconstruction, and/or weight based adjustment of the mA/kV was utilized to reduce the radiation dose to as low as reasonably achievable.; CTA of the chest was performed after the administration of intravenous contrast.  Multiplanar reformatted images are provided for review. MIP images are provided for review. Dose modulation, iterative reconstruction, and/or weight based adjustment of the mA/kV was utilized to reduce the radiation dose to as low as reasonably achievable. COMPARISON: None HISTORY: ORDERING SYSTEM PROVIDED HISTORY: abd pain TECHNOLOGIST PROVIDED HISTORY: Ordering Physician Provided Reason for Exam: Abdominal pain, bloating Acuity: Acute Relevant Medical/Surgical History: surgeries to area of interest include hernia repair; ORDERING SYSTEM PROVIDED HISTORY: PE TECHNOLOGIST PROVIDED HISTORY: Ordering Physician Provided Reason for Exam: Patient denies any SOB or chest pain at this time Acuity: Acute FINDINGS: Chest: Mediastinum: Heart is mildly enlarged in size. Trace pericardial fluid versus pericardial thickening. Main pulmonary artery mildly enlarged measuring 3.1 cm in transverse dimension. No convincing evidence of central or lobar pulmonary emboli. Segmental subsegmental branches are less optimally evaluated due to motion artifacts and timing of contrast bolus. No suspicious mediastinal, hilar, or axillary not identified per CT criteria. Lungs/pleura: Small right-sided and trace left-sided pleural effusions. Pleuroparenchymal bands versus subsegmental atelectasis identified involving both lower lobes. Soft Tissues/Bones: There is redemonstration of the mixed sclerotic and lytic lesion involving the T2 vertebral body with surrounding pair spinal soft tissue attenuation.   This extends into the pedicles and facets on the left. Additional mix additional sclerotic and lytic lesion identified involving T4 similar prior examination. Subtle areas sclerosis is suspected subacute nondisplaced fractures anterior left 5th, 7th and 8th ribs similar prior exam. Abdomen/Pelvis: Organs: There are innumerable hypodensities identified throughout the right and left hepatic lobes which have progressed from prior examination. A suggestion of a central ill-defined area of hypoattenuation which may represent areas of posterior related changes/fibrosis versus perfusion differences. There is mild gallbladder wall thickening. No definite cholelithiasis. The spleen, adrenal glands, kidneys, pancreas unremarkable. GI/Bowel: Mild-to-moderate retained stool throughout the colon. Mild-to-moderate scattered colonic diverticulosis. Appendix not identified. Pelvis: Mild bladder wall thickening related to underdistention. Tiny fat containing inguinal hernias. Prostate unremarkable. Peritoneum/Retroperitoneum: Moderate volume of ascites. Similar prior examination. Bones/Soft Tissues: Multilevel degenerate changes of the lumbar spine. CTA Chest: No convincing evidence of central or lobar pulmonary emboli. Distal segmental subsegmental branches not well evaluated. Moderate size right-sided effusion and trace left-sided effusion. Stable osseous metastatic disease with peers spinal soft tissue attenuation involving the T2 vertebral body. CT of the abdomen pelvis: No significant change in the innumerable hepatic lesions worrisome for metastatic disease as well as moderate burden of ascites. Bladder wall thickening could be infectious inflammatory or could be related to underdistention. .  Please correlate with urinalysis findings.      Xr Chest Portable    Result Date: 5/3/2019  EXAMINATION: SINGLE XRAY VIEW OF THE CHEST 5/3/2019 11:12 am COMPARISON: April 17, 2019 HISTORY: ORDERING SYSTEM PROVIDED HISTORY: Chest Pain reconstruction, and/or weight based adjustment of the mA/kV was utilized to reduce the radiation dose to as low as reasonably achievable.; CTA of the chest was performed after the administration of intravenous contrast.  Multiplanar reformatted images are provided for review. MIP images are provided for review. Dose modulation, iterative reconstruction, and/or weight based adjustment of the mA/kV was utilized to reduce the radiation dose to as low as reasonably achievable. COMPARISON: None HISTORY: ORDERING SYSTEM PROVIDED HISTORY: abd pain TECHNOLOGIST PROVIDED HISTORY: Ordering Physician Provided Reason for Exam: Abdominal pain, bloating Acuity: Acute Relevant Medical/Surgical History: surgeries to area of interest include hernia repair; ORDERING SYSTEM PROVIDED HISTORY: PE TECHNOLOGIST PROVIDED HISTORY: Ordering Physician Provided Reason for Exam: Patient denies any SOB or chest pain at this time Acuity: Acute FINDINGS: Chest: Mediastinum: Heart is mildly enlarged in size. Trace pericardial fluid versus pericardial thickening. Main pulmonary artery mildly enlarged measuring 3.1 cm in transverse dimension. No convincing evidence of central or lobar pulmonary emboli. Segmental subsegmental branches are less optimally evaluated due to motion artifacts and timing of contrast bolus. No suspicious mediastinal, hilar, or axillary not identified per CT criteria. Lungs/pleura: Small right-sided and trace left-sided pleural effusions. Pleuroparenchymal bands versus subsegmental atelectasis identified involving both lower lobes. Soft Tissues/Bones: There is redemonstration of the mixed sclerotic and lytic lesion involving the T2 vertebral body with surrounding pair spinal soft tissue attenuation. This extends into the pedicles and facets on the left. Additional mix additional sclerotic and lytic lesion identified involving T4 similar prior examination.   Subtle areas sclerosis is suspected subacute nondisplaced fractures anterior left 5th, 7th and 8th ribs similar prior exam. Abdomen/Pelvis: Organs: There are innumerable hypodensities identified throughout the right and left hepatic lobes which have progressed from prior examination. A suggestion of a central ill-defined area of hypoattenuation which may represent areas of posterior related changes/fibrosis versus perfusion differences. There is mild gallbladder wall thickening. No definite cholelithiasis. The spleen, adrenal glands, kidneys, pancreas unremarkable. GI/Bowel: Mild-to-moderate retained stool throughout the colon. Mild-to-moderate scattered colonic diverticulosis. Appendix not identified. Pelvis: Mild bladder wall thickening related to underdistention. Tiny fat containing inguinal hernias. Prostate unremarkable. Peritoneum/Retroperitoneum: Moderate volume of ascites. Similar prior examination. Bones/Soft Tissues: Multilevel degenerate changes of the lumbar spine. CTA Chest: No convincing evidence of central or lobar pulmonary emboli. Distal segmental subsegmental branches not well evaluated. Moderate size right-sided effusion and trace left-sided effusion. Stable osseous metastatic disease with peers spinal soft tissue attenuation involving the T2 vertebral body. CT of the abdomen pelvis: No significant change in the innumerable hepatic lesions worrisome for metastatic disease as well as moderate burden of ascites. Bladder wall thickening could be infectious inflammatory or could be related to underdistention. .  Please correlate with urinalysis findings. Ct Chest Pulmonary Embolism W Contrast    Result Date: 5/10/2019  EXAMINATION: CTA OF THE CHEST 5/10/2019 12:31 pm TECHNIQUE: CTA of the chest was performed after the administration of intravenous contrast.  Multiplanar reformatted images are provided for review. MIP images are provided for review.  Dose modulation, iterative reconstruction, and/or weight based adjustment of the mA/kV was utilized to reduce the radiation dose to as low as reasonably achievable. COMPARISON: CT PE dated 04/08/2019 HISTORY: ORDERING SYSTEM PROVIDED HISTORY: cough, sob, tachycardia, cancer TECHNOLOGIST PROVIDED HISTORY: Ordering Physician Provided Reason for Exam: cough, sob, tachycardia, cancer. denies previous chest surgeries. FINDINGS: Pulmonary Arteries: Distal segmental and subsegmental pulmonary arteries are limited by respiratory motion and suboptimal bolus timing, despite repeat imaging. No large central or proximal segmental pulmonary embolus. Mediastinum: The heart is normal in size. There is no pericardial effusion. Thoracic aorta is normal in caliber without obvious intimal dissection. Coronary artery calcifications and stents are noted. No mediastinal or hilar lymphadenopathy. No axillary lymphadenopathy. Right chest port is in place with distal tip at the cavoatrial junction. Lungs/pleura: There is a tiny right pleural effusion and trace left pleural fluid. There is mild bibasilar atelectasis. No focal airspace consolidation or pneumothorax. No evidence of pulmonary nodule or mass. Upper Abdomen: There is focal heterogeneous attenuation in the right hepatic dome. Irregular hypodense lesion is also noted along the anterior falciform ligament. There are several tiny hypodensities throughout the right liver, and overall attenuation is coarsened. There is a moderate volume of ascites in the upper abdomen. Spleen is enlarged, measuring 16.3 cm. Soft Tissues/Bones: There are mixed lytic and sclerotic lesions at T2 and T4. Both have soft tissue components which are not significantly changed from 04/08/2019. Subtle sclerosis is noted at the anterior left 5th, 7th, and 8th ribs. Underlying nondisplaced subacute fractures are suspected. 1.  Limited assessment of distal segmental and subsegmental pulmonary arteries due to respiratory motion and suboptimal bolus timing, despite repeat imaging.   No central or proximal segmental pulmonary embolus. 2.  Tiny right pleural effusion and trace left pleural fluid with mild bibasilar atelectasis. 3.  Indistinct hypodense lesions in the liver, likely metastases. 4.  Moderate volume upper abdominal ascites, new from the previous study. 5.  Splenomegaly. 6.  Redemonstration of metastatic lesions at T2 and T4, similar to 04/08/2019. Subtle areas of sclerosis with suspected subacute nondisplaced fractures are noted at the anterior left 5th, 7th, and 8th ribs. Us Abdomen Limited    Result Date: 5/4/2019  EXAMINATION: RIGHT UPPER QUADRANT ULTRASOUND, 5/4/2019 9:39 am COMPARISON: 04/18/2019 HISTORY: ORDERING SYSTEM PROVIDED HISTORY: Check for ascites TECHNOLOGIST PROVIDED HISTORY: Check for ascites Abd distention, hx liver mets r/o cholecystitis Acuity: Acute Type of Exam: Initial FINDINGS: LIVER:  The liver demonstrates some abnormal echotexture as well as hepatomegaly with the liver measuring 27 cm. Liver nodularity noted which was also seen on the previous CT evaluation. BILIARY SYSTEM:  Gallbladder wall is severely thickened at 9.2 mm. There is evidence of gallbladder polyps. No evidence of gallbladder stones. Negative Iyer sign. Common bile duct is within normal limits measuring 0.61 cm. RIGHT KIDNEY: The right kidney is grossly unremarkable without evidence of hydronephrosis. OTHER: Moderate ascites noted in the abdomen and pelvis. 1. Moderate ascites. 2. Enlarged inhomogeneous nodular liver as previously noted on the CT evaluation. Neoplasm should be considered. 3. Thickened gallbladder wall as well as gallbladder polyps. The gallbladder does not appear distended however. No evidence of gallbladder stones. Acute cholecystitis is unlikely.      Ir Us Guided Paracentesis    Result Date: 5/17/2019  PROCEDURE: ULTRASOUND GUIDED PARACENTESIS 5/17/2019 HISTORY: ORDERING SYSTEM PROVIDED HISTORY: ascites TECHNOLOGIST PROVIDED HISTORY: ascites TECHNIQUE: This procedure was performed by Dr. Sergio Collins. Informed consent was obtained after a detailed explanation of the procedure including risks, benefits, and alternatives. Universal protocol was followed. Ultrasound shows a moderate amount of ascites. The right abdomen was prepped and draped in sterile fashion and local anesthesia was achieved with lidocaine. Using realtime ultrasound guidance a 5 Occitan centesis catheter/needle was advanced into the peritoneal fluid. Ultrasound images show a safe tract and access into the fluid. Fluid was collected in vacuum bottles. Little to no fluid remains on completion. The catheter was removed and a sterile dressing applied. There are no immediate complications. The patient left the department in stable condition. FINDINGS: A total of 2.5 L of clear dark yellow fluid was removed. A sample was sent for the requested studies. Successful ultrasound guided paracentesis. Ir Us Guided Paracentesis    Result Date: 5/6/2019  PROCEDURE: ULTRASOUND GUIDED PARACENTESIS 5/6/2019 HISTORY: ORDERING SYSTEM PROVIDED HISTORY: Suspected SBP TECHNOLOGIST PROVIDED HISTORY: Suspected SBP TECHNIQUE: This procedure was performed by Dr. Sergio Collins. Informed consent was obtained after a detailed explanation of the procedure including risks, benefits, and alternatives. Universal protocol was followed. Ultrasound shows a small to moderate amount of ascites. The right abdomen was prepped and draped in sterile fashion and local anesthesia was achieved with lidocaine. Using realtime ultrasound guidance a 6 Occitan pigtail catheter was advanced into the peritoneal fluid. Ultrasound images show a safe tract and access into the fluid. Fluid was collected in vacuum bottles. Little to no fluid remains on completion. The catheter was removed and a sterile dressing applied. There are no immediate complications. The patient left the department in stable condition.  FINDINGS: A total of 850 mL of clear yellow fluid was removed. Fluid was sent for the requested studies. Successful ultrasound guided paracentesis. Nm Hepatobiliary Scan W Ejection Fraction    Result Date: 4/20/2019  EXAMINATION: NUCLEAR MEDICINE HEPATOBILIARY SCINTIGRAPHY (HIDA SCAN). 4/20/2019 10:13 am TECHNIQUE: Approximately 3.0 [id number] Mebrofenin (Choletec) was administered IV. Then, dynamic images of the abdomen were obtained in the anterior projection for 60 mins. A right lateral view was also obtained at 60 mins. COMPARISON: Ultrasound 04/18/2019 HISTORY: ORDERING SYSTEM PROVIDED HISTORY: pain TECHNOLOGIST PROVIDED HISTORY: Ordering Physician Provided Reason for Exam: Pain Acuity: Unknown Type of Exam: Unknown Additional signs and symptoms: pt states no real abdominal pain, came in for general fatigue, pt states he has liver an dcolon cancer FINDINGS: Gallbladder is not visualized. Activity is seen within the small bowel. Delayed excretion of radiotracer by the liver. Nonvisualization of the gallbladder suggesting acute cholecystitis. Delayed excretion by the liver suggesting hepatic dysfunction. Physical Examination:        Physical Exam   Constitutional: He is oriented to person, place, and time. No distress. HENT:   Mouth/Throat: Oropharynx is clear and moist.   Neck: Neck supple. Cardiovascular: Normal rate, regular rhythm and normal heart sounds. Pulmonary/Chest: Effort normal and breath sounds normal. No respiratory distress. Abdominal: Soft. Bowel sounds are normal. He exhibits distension. There is no tenderness. There is no guarding. Musculoskeletal: He exhibits edema (right foot, pitting 2+). He exhibits no tenderness. Neurological: He is oriented to person, place, and time. Waxing and waning mental status    Skin: Skin is warm and dry. Nursing note and vitals reviewed.         Assessment:        Primary Problem  Sepsis [facility])    Active Hospital Problems    Diagnosis Date Noted    Sepsis ([facility] [address] [A41.9] 05/16/2019    Malignant neoplasm of colon ([facility] [address] [C18.9]     Colorectal cancer, stage IV ([facility] [address] [C19]     Liver metastasis ([facility] [address] [C78.7] 12/04/2016    Type 2 diabetes mellitus with hyperglycemia, without long-term current use of insulin ([facility] [address] [E11.65] 05/10/2012    HTN (hypertension) [I10] 05/10/2012       Plan:        Sepsis   WBC 17 (12.8)   Lactic acid 7.3 (6.1)   Anion gap 23 (13)  Vanc, rocephin   Ammonia wnl   Blood cx x2  GI consulted - appreciate recs   CT chest: small bilateral pleural effusions L>R   Received Paracentesis 5/17, 2.5 L removed, SAAG 1.1    Colon cancer stage IV w/ liver mets   GI consulted - appreciate recs   Heme-onc consulted - appreciate recs   Pt wants experimental tx at [address] [place] [address]     DM II   HbA1c: 11.5 (4/18)   Insulin sliding scale   Hypoglycemic protocol     HTN   Resume homemeds     Maintenance   Potassium replacement   Lovenox   Pepcid     Audra Rader MD  5/19/2019  10:30 AM         IM Attending    Pt seen and examined today   I have discussed the care of pt , including pertinent history and exam findings,  with the resident. I have reviewed the key elements of all parts of the encounter with the resident. I agree with the assessment, plan and orders as documented by the resident.     Readmission   Lactic acidosis due to hypovolemia   Very difficult situation pt has no understanding of his terminal disease  Electronically signed by Mauro Bowers MD on 5/19/2019 at 6:50 PM

## 2019-05-19 NOTE — ED NOTES
Pt notified he would be going to progressive unit soon. Pt is resting comfortably in bed at this time.       Nalini Issa RN  05/19/19 0899

## 2019-05-19 NOTE — CARE COORDINATION
CASE MANAGEMENT NOTE:    Admission Date:  5/19/2019 Cong Marshall is a 66 y.o.  male    Admitted for : Sepsis ([facility] [address] [A41.9]    Met with:  Patient    PCP:  [place] [facility] [place] and liver Cancer                           Insurance:  BCBS Medicare      Current Residence/ Living Arrangements:  independently at home             Current Services PTA:  No    Is patient agreeable to VNS: Yes    Freedom of choice provided: No    List of [address] provided: No    VNS chosen:  Yes - [facility] home [facility]    DME:  walker    Home Oxygen: No    Nebulizer: No    CPAP/BIPAP: No    Supplier: N/A    Potential Assistance Needed: No    SNF needed: Yes,Patient states he needs to go somewhere , but will not give writer his choice, will give list of facilities to patient and wife to decide. Pharmacy:  [facility] on [address]       Is the Patient an [facility]NET[facility] S[facility] with Readmission Risk Score greater than 14%? No  If yes, pt needs a follow up appointment made within 7 days. Does Patient want to use MEDS to BEDS? No    Family Members/Caregivers that pt would like involved in their care:    Yes    If yes, list name here:  [id number]    Transportation Provider:  Family             Is patient in Isolation/One on One/Altered Mental Status? No  If yes, skip next question. If no, would they like an I-Pad to  use? No  If yes, call [phone]. Discharge Plan:  5/19 - [facility] discharge home on saturday and came back a few hours later. Been to ER 8 times since 4/1/19. with 4 admissions since 4/17/19. Patient is from home with spouse, has a Rolator, Current with VNS - [facility] home [facility]. Follows with [facility]. AS an ppt at [facility] clinic on Tuesday for second opinion. Remains on Iv rocephin, Iv Vancomycin, Iv fluids, VI Zosyn, LSW to follow patient for possible ECF.  Will continue  to follow patient. //pf          Electronically signed by: Avani Harry RN on 5/19/2019 at 12:24 PM

## 2019-05-19 NOTE — ED NOTES
Report given to Saritha Gonzalez RN from [facility] . Report method in person   The following was reviewed with receiving RN:   Current vital signs:  BP (!) 104/59   Pulse 85   Temp 97.9 °F (36.6 °C) (Axillary)   Resp 20   Ht 5' 10\" (1.778 m)   Wt 230 lb (104.3 kg)   SpO2 92%   BMI 33.00 kg/m²                MEWS Score: 1     Any medication or safety alerts were reviewed. Any pending diagnostics and notifications were also reviewed, as well as any safety concerns or issues, abnormal labs, abnormal imaging, and abnormal assessment findings. Questions were answered.           Jf Wayne RN  05/19/19 [id number]

## 2019-05-19 NOTE — PLAN OF CARE
PRE CONSULT ROUNDING NOTE  HPI  66year old male with stage 4 rectal cancer with mets to his liver who was discharged yesterday presents with shortness of breath and AMS. No family is present, HPI from the electronic record and partially from the pt. The pt states he was \"at a hotel last night\" and became short of breath and \"laid on the floor\". Per the ED notes his wife stated that he was disoriented. He currently denies abdominal pain and nausea. His recent paracentesis did not show SBP. He has not had overt bleeding or chills. CTA negative for PE, CT of the abdomen shows ascites with cirrhosis and liver mets, bilateral pleural effusions. Initial lactic level was 4.6, WBC is 17, hgb 8.5, platelet 887, INR 1.6, LFT's are increased from yesterday. Blood and urine cultures are pending. He is currently alert and oriented. Endoscopy 12/8/17 flex sig (dr Bobby Damico) ulcerated friable partially circumferential mass in rectum, internal hemorrhoids. 5/25/17 flex sig (dr Bobby Damico) for follow up for rectal mass post chemo showed large circumferential fungating friable and erythematous mass 5cm from anus. 12/6/16 colonoscopy (dr Bobby Damico) polyp in cecum removed, polyp in descending colon removed, rectal mass present.  No record of EGD in epic     Colon mass bx 12/5/16 ulcerated low grade adenocarcinoma with KRAS mutation     Liver bx 12/8/16 metastatic well differentiated adenocarcinoma compatible with mets from colon s/p cryoablation to the liver 8/23/18     Family no hx of colon cancer or liver disease  Social no smoking, drinks etoh \"on holidays\" no illicit drug use  BP (!) 95/50   Pulse 86   Temp 97.5 °F (36.4 °C) (Oral)   Resp 18   Ht 5' 10\" (1.778 m)   Wt 230 lb (104.3 kg)   SpO2 93%   BMI 33.00 kg/m²     ROS meds labs imaging and past medical records were reviewed    Exam  Alert oriented to person and place, intermittent confusion- states he was in a hotel last night and fell??  [id number]  Lungs clear but diminished bilaterally  BSX4 active non tender distended RUQ ecchymosis from previous paracentesis  Jaundiced no extremity edema    Assessment  Stage 4 colon cancer with mets to the liver  Elevated lft's with ascites  AMS  Sepsis  Anemia    Plan  Paracentesis in am to rule out SBP  Albumin with the paracentesis is ordered  Trend the lft's and inr  Continue the antibiotics and follow up on culture results  Trend the h/h  Will restart the lactulose bid  May need ID consult    Formal gi consult to follow  . Alec Zuniga, APRN - CNP

## 2019-05-19 NOTE — DISCHARGE SUMMARY
[address]    Discharge Summary     Patient ID: Tilden Oppenheim  :  1960   MRN: [id number]     ACCOUNT:  [de-identified]   Patient's PCP: No primary care provider on file. Admit Date: 5/15/2019   Discharge Date: 2019     Length of Stay: 2  Code Status:  Full Code  Admitting Physician: Paulina Teran MD  Discharge Physician: Gabino Lobo MD     Active Discharge Diagnoses:  Stage 4 colorectal cancer with liver metastasis. Ascites       Primary Problem  Other ascites      Hospital Problems  Active Hospital Problems    Diagnosis Date Noted    Sepsis ([address] [A41.9] 2019    Severe malnutrition ([address] [E43] 2019    Malignant neoplasm of colon ([address] [C18.9]     Other ascites [R18.8]     Anemia [D64.9]     Colorectal cancer, stage IV ([address] [C19]     Liver metastasis ([address] [C78.7] 2016    Type 2 diabetes mellitus with hyperglycemia, without long-term current use of insulin ([address] [E11.65] 05/10/2012       Admission Condition:  poor     Discharged Condition: stable    Hospital Stay:       Hospital Course:  Tilden Oppenheim is a 66 y.o. male who was admitted for the management of  ascites in the setting of Stage IV colorectal Ca w/ extensive Liver mets, presented to ER with Altered Mental Status; Abdominal Pain; and Bloated,   Patient said his abdomen felt full and he has been feeling tired and unlike himself. Denied fever, chills, CP, SOB. Reports abdo tenderness, no difficulty urinating. Lord Leos Has not ate very much due to not feeling hungry    In ED abdomen was protuberant. Jaundiced appearence with elevated WBC and Lactic acid. Started on Abx, fluids, paracentesis preformed. Admitted to the hospital for the management of ascites with jaundice possible SBP    started on rocephin, flagyl. Ascitic fluid analysis - pmn < 250, no bacteria seen, SAAG 1.1.    Received fluid boluses initially, s/p 4.4L of bolus in ED Stopped fluids on admission as patient hypervolemic with third spacing. Gave one dose of 25g albumin initially. LVP on day two with 2.5 l out. Remained hemodynamically stable. GI consulted, who were okay with LVP and started lactulose. Patient felt weak and lethargic but close to baseline, no sign of infection. No urinary complaints. Agreeable to low-sodium diet  Discussed code status, at this point wants to remain full code and Planning on going to [address] [place] [address] on 5/20 for experimental therapy. Significant therapeutic interventions: Large volume paracentesis. Iv antibiotics. Significant Diagnostic Studies:   Labs / Micro:  CBC:   Lab Results   Component Value Date    WBC 17.0 05/19/2019    RBC 3.34 05/19/2019    HGB 8.5 05/19/2019    HCT 27.8 05/19/2019    MCV 83.2 05/19/2019    MCH 25.5 05/19/2019    MCHC 30.7 05/19/2019    RDW 24.4 05/19/2019     05/19/2019     CMP:    Lab Results   Component Value Date    GLUCOSE 194 05/19/2019    GLUCOSE 195 04/26/2012     05/19/2019    K 3.6 05/19/2019    CL 86 05/19/2019    CO2 20 05/19/2019    BUN 5 05/19/2019    CREATININE 0.45 05/19/2019    ANIONGAP 23 05/19/2019    ALKPHOS 766 05/19/2019    ALT 19 05/19/2019     05/19/2019    BILITOT 5.54 05/19/2019    LABALBU 2.4 05/19/2019    ALBUMIN NOT REPORTED 05/19/2019    LABGLOM >60 05/19/2019    GFRAA >60 05/19/2019    GFR      05/19/2019    GFR NOT REPORTED 05/19/2019    PROT 6.4 05/19/2019    CALCIUM 8.3 05/19/2019     PT/INR:  No results found for: PTINR  PTT:   Lab Results   Component Value Date    APTT 34.2 05/19/2019     U/A:    Lab Results   Component Value Date    COLORU RED 05/15/2019    TURBIDITY TURBID 05/15/2019    SPECGRAV 1.017 05/15/2019    HGBUR NEGATIVE 05/15/2019    PHUR 5.0 05/15/2019    PROTEINU 1+ 05/15/2019    GLUCOSEU NEGATIVE 05/15/2019    KETUA TRACE 05/15/2019    BILIRUBINUR  05/15/2019     Presumptive positive. Unable to confirm due to unavailability of reagent. UROBILINOGEN ELEVATED 05/15/2019    NITRU POSITIVE 05/15/2019    LEUKOCYTESUR SMALL 05/15/2019         blood culture: negative except for coag neg staph likely contaminant    Radiology:    Xr Chest Standard (2 Vw)    Result Date: 5/10/2019  EXAMINATION: TWO XRAY VIEWS OF THE CHEST 5/10/2019 10:02 am COMPARISON: AP chest from 05/03/2019 HISTORY: ORDERING SYSTEM PROVIDED HISTORY: cough TECHNOLOGIST PROVIDED HISTORY: cough Ordering Physician Provided Reason for Exam: Cough congestion weakness today h/o cancer Acuity: Acute Type of Exam: Initial Additional signs and symptoms: Cough congestion weakness today h/o cancer Additional history includes anemia, colon carcinoma, hypertension, and diabetes. FINDINGS: Right-sided IJ port with unchanged tip position in the lower SVC. Overlying ECG monitor leads. Cardiomediastinal shadow stable. Low lung volumes with bibasilar atelectasis or perhaps scarring. No localized pulmonary opacity suspicious for infiltrate. No sizable pleural effusion. Bones and soft tissues intact. Gas-filled small bowel loops, best seen on the lateral projection, upper limits of normal.     No acute cardiopulmonary disease or interval change. Bibasilar atelectasis or scarring     Ct Head Wo Contrast    Result Date: 5/19/2019  EXAMINATION: CT OF THE HEAD WITHOUT CONTRAST  5/19/2019 3:50 am TECHNIQUE: CT of the head was performed without the administration of intravenous contrast. Dose modulation, iterative reconstruction, and/or weight based adjustment of the mA/kV was utilized to reduce the radiation dose to as low as reasonably achievable. COMPARISON: 05/16/2019 HISTORY: ORDERING SYSTEM PROVIDED HISTORY: ams TECHNOLOGIST PROVIDED HISTORY: Ordering Physician Provided Reason for Exam: AMS Acuity: Acute FINDINGS: BRAIN/VENTRICLES: The cerebral and cerebellar parenchyma demonstrate normal volume. There are no areas of acute hemorrhage, mass, or midline shift.   No abnormal extra-axial fluid collections. The ventricles are normal in size. Vascular calcifications are noted in the carotid siphons. Gray-white differentiation is maintained. ORBITS: The visualized portion of the orbits demonstrate no acute abnormality. SINUSES: There is a left maxillary mucous retention cyst.  The mastoid air cells are clear. Cerumen is noted in the external auditory canals. SOFT TISSUES/SKULL:  The calvarium is intact. No appreciable scalp soft tissue swelling. 1. No acute intracranial abnormality, stable. Ct Head Wo Contrast    Result Date: 5/16/2019  EXAMINATION: CT OF THE HEAD WITHOUT CONTRAST  5/16/2019 12:39 am TECHNIQUE: CT of the head was performed without the administration of intravenous contrast. Dose modulation, iterative reconstruction, and/or weight based adjustment of the mA/kV was utilized to reduce the radiation dose to as low as reasonably achievable. COMPARISON: 04/01/2019 HISTORY: ORDERING SYSTEM PROVIDED HISTORY: ams TECHNOLOGIST PROVIDED HISTORY: Ordering Physician Provided Reason for Exam: AMS, patient denies any head complaints at this time Acuity: Acute Relevant Medical/Surgical History: patient denies any surgeries to brain FINDINGS: BRAIN/VENTRICLES: There is no acute intracranial hemorrhage, mass effect or midline shift. No abnormal extra-axial fluid collection. The gray-white differentiation is maintained without evidence of an acute infarct. There is no evidence of hydrocephalus. ORBITS: The visualized portion of the orbits demonstrate no acute abnormality. SINUSES: The visualized paranasal sinuses and mastoid air cells demonstrate no acute abnormality. Small mucous retention cyst left maxillary antrum. SOFT TISSUES/SKULL:  No acute abnormality of the visualized skull or soft tissues. No acute intracranial abnormality.      Ct Abdomen Pelvis W Iv Contrast Additional Contrast? None    Result Date: 5/19/2019  EXAMINATION: CT OF THE ABDOMEN AND PELVIS WITH CONTRAST 5/19/2019 3:46 vessels. No retroperitoneal adenopathy. There is marked intra-abdominal ascites, similar to the previous study. No mesenteric adenopathy. No anterior abdominal wall defect. Bones/Soft Tissues: There is anasarca. Degenerative changes are noted along the spine and sacroiliac joints. 1. Cirrhosis with portal hypertension and marked ascites, not appreciably changed. 2. Infiltrative low-attenuation lesions are noted diffusely throughout the liver, similar to the previous study, compatible with diffuse metastatic disease. 3. Small bilateral pleural effusions, slightly increased in size on the left. Ct Abdomen Pelvis W Iv Contrast Additional Contrast? None    Result Date: 5/16/2019  EXAMINATION: CT OF THE ABDOMEN AND PELVIS WITH CONTRAST; CTA OF THE CHEST 5/16/2019 12:42 am TECHNIQUE: CT of the abdomen and pelvis was performed with the administration of intravenous contrast. Multiplanar reformatted images are provided for review. Dose modulation, iterative reconstruction, and/or weight based adjustment of the mA/kV was utilized to reduce the radiation dose to as low as reasonably achievable.; CTA of the chest was performed after the administration of intravenous contrast.  Multiplanar reformatted images are provided for review. MIP images are provided for review. Dose modulation, iterative reconstruction, and/or weight based adjustment of the mA/kV was utilized to reduce the radiation dose to as low as reasonably achievable.  COMPARISON: None HISTORY: ORDERING SYSTEM PROVIDED HISTORY: abd pain TECHNOLOGIST PROVIDED HISTORY: Ordering Physician Provided Reason for Exam: Abdominal pain, bloating Acuity: Acute Relevant Medical/Surgical History: surgeries to area of interest include hernia repair; ORDERING SYSTEM PROVIDED HISTORY: PE TECHNOLOGIST PROVIDED HISTORY: Ordering Physician Provided Reason for Exam: Patient denies any SOB or chest pain at this time Acuity: Acute FINDINGS: Chest: Mediastinum: Heart is mildly enlarged in size. Trace pericardial fluid versus pericardial thickening. Main pulmonary artery mildly enlarged measuring 3.1 cm in transverse dimension. No convincing evidence of central or lobar pulmonary emboli. Segmental subsegmental branches are less optimally evaluated due to motion artifacts and timing of contrast bolus. No suspicious mediastinal, hilar, or axillary not identified per CT criteria. Lungs/pleura: Small right-sided and trace left-sided pleural effusions. Pleuroparenchymal bands versus subsegmental atelectasis identified involving both lower lobes. Soft Tissues/Bones: There is redemonstration of the mixed sclerotic and lytic lesion involving the T2 vertebral body with surrounding pair spinal soft tissue attenuation. This extends into the pedicles and facets on the left. Additional mix additional sclerotic and lytic lesion identified involving T4 similar prior examination. Subtle areas sclerosis is suspected subacute nondisplaced fractures anterior left 5th, 7th and 8th ribs similar prior exam. Abdomen/Pelvis: Organs: There are innumerable hypodensities identified throughout the right and left hepatic lobes which have progressed from prior examination. A suggestion of a central ill-defined area of hypoattenuation which may represent areas of posterior related changes/fibrosis versus perfusion differences. There is mild gallbladder wall thickening. No definite cholelithiasis. The spleen, adrenal glands, kidneys, pancreas unremarkable. GI/Bowel: Mild-to-moderate retained stool throughout the colon. Mild-to-moderate scattered colonic diverticulosis. Appendix not identified. Pelvis: Mild bladder wall thickening related to underdistention. Tiny fat containing inguinal hernias. Prostate unremarkable. Peritoneum/Retroperitoneum: Moderate volume of ascites. Similar prior examination. Bones/Soft Tissues: Multilevel degenerate changes of the lumbar spine.      CTA Chest: No convincing the 2017 and 2018 exams. While these could be related to an infectious etiology, metastatic disease cannot be excluded. 3. Permeative destructive lesions involving the T4 and T2 vertebral bodies are stable. Sclerotic lesions along the anterior left 5th and 7th ribs are also unchanged. 4. Cirrhosis with portal hypertension and marked intra-abdominal ascites, not appreciably changed. 5. Atherosclerotic disease. 6. Small bilateral pleural effusions, slightly increased on the left. Ct Chest Pulmonary Embolism W Contrast    Result Date: 5/16/2019  EXAMINATION: CT OF THE ABDOMEN AND PELVIS WITH CONTRAST; CTA OF THE CHEST 5/16/2019 12:42 am TECHNIQUE: CT of the abdomen and pelvis was performed with the administration of intravenous contrast. Multiplanar reformatted images are provided for review. Dose modulation, iterative reconstruction, and/or weight based adjustment of the mA/kV was utilized to reduce the radiation dose to as low as reasonably achievable.; CTA of the chest was performed after the administration of intravenous contrast.  Multiplanar reformatted images are provided for review. MIP images are provided for review. Dose modulation, iterative reconstruction, and/or weight based adjustment of the mA/kV was utilized to reduce the radiation dose to as low as reasonably achievable. COMPARISON: None HISTORY: ORDERING SYSTEM PROVIDED HISTORY: abd pain TECHNOLOGIST PROVIDED HISTORY: Ordering Physician Provided Reason for Exam: Abdominal pain, bloating Acuity: Acute Relevant Medical/Surgical History: surgeries to area of interest include hernia repair; ORDERING SYSTEM PROVIDED HISTORY: PE TECHNOLOGIST PROVIDED HISTORY: Ordering Physician Provided Reason for Exam: Patient denies any SOB or chest pain at this time Acuity: Acute FINDINGS: Chest: Mediastinum: Heart is mildly enlarged in size. Trace pericardial fluid versus pericardial thickening.   Main pulmonary artery mildly enlarged measuring 3.1 cm in transverse dimension. No convincing evidence of central or lobar pulmonary emboli. Segmental subsegmental branches are less optimally evaluated due to motion artifacts and timing of contrast bolus. No suspicious mediastinal, hilar, or axillary not identified per CT criteria. Lungs/pleura: Small right-sided and trace left-sided pleural effusions. Pleuroparenchymal bands versus subsegmental atelectasis identified involving both lower lobes. Soft Tissues/Bones: There is redemonstration of the mixed sclerotic and lytic lesion involving the T2 vertebral body with surrounding pair spinal soft tissue attenuation. This extends into the pedicles and facets on the left. Additional mix additional sclerotic and lytic lesion identified involving T4 similar prior examination. Subtle areas sclerosis is suspected subacute nondisplaced fractures anterior left 5th, 7th and 8th ribs similar prior exam. Abdomen/Pelvis: Organs: There are innumerable hypodensities identified throughout the right and left hepatic lobes which have progressed from prior examination. A suggestion of a central ill-defined area of hypoattenuation which may represent areas of posterior related changes/fibrosis versus perfusion differences. There is mild gallbladder wall thickening. No definite cholelithiasis. The spleen, adrenal glands, kidneys, pancreas unremarkable. GI/Bowel: Mild-to-moderate retained stool throughout the colon. Mild-to-moderate scattered colonic diverticulosis. Appendix not identified. Pelvis: Mild bladder wall thickening related to underdistention. Tiny fat containing inguinal hernias. Prostate unremarkable. Peritoneum/Retroperitoneum: Moderate volume of ascites. Similar prior examination. Bones/Soft Tissues: Multilevel degenerate changes of the lumbar spine. CTA Chest: No convincing evidence of central or lobar pulmonary emboli. Distal segmental subsegmental branches not well evaluated.  Moderate size right-sided effusion and trace left-sided effusion. Stable osseous metastatic disease with peers spinal soft tissue attenuation involving the T2 vertebral body. CT of the abdomen pelvis: No significant change in the innumerable hepatic lesions worrisome for metastatic disease as well as moderate burden of ascites. Bladder wall thickening could be infectious inflammatory or could be related to underdistention. .  Please correlate with urinalysis findings. Ct Chest Pulmonary Embolism W Contrast    Result Date: 5/10/2019  EXAMINATION: CTA OF THE CHEST 5/10/2019 12:31 pm TECHNIQUE: CTA of the chest was performed after the administration of intravenous contrast.  Multiplanar reformatted images are provided for review. MIP images are provided for review. Dose modulation, iterative reconstruction, and/or weight based adjustment of the mA/kV was utilized to reduce the radiation dose to as low as reasonably achievable. COMPARISON: CT PE dated 04/08/2019 HISTORY: ORDERING SYSTEM PROVIDED HISTORY: cough, sob, tachycardia, cancer TECHNOLOGIST PROVIDED HISTORY: Ordering Physician Provided Reason for Exam: cough, sob, tachycardia, cancer. denies previous chest surgeries. FINDINGS: Pulmonary Arteries: Distal segmental and subsegmental pulmonary arteries are limited by respiratory motion and suboptimal bolus timing, despite repeat imaging. No large central or proximal segmental pulmonary embolus. Mediastinum: The heart is normal in size. There is no pericardial effusion. Thoracic aorta is normal in caliber without obvious intimal dissection. Coronary artery calcifications and stents are noted. No mediastinal or hilar lymphadenopathy. No axillary lymphadenopathy. Right chest port is in place with distal tip at the cavoatrial junction. Lungs/pleura: There is a tiny right pleural effusion and trace left pleural fluid. There is mild bibasilar atelectasis. No focal airspace consolidation or pneumothorax. No evidence of pulmonary nodule or mass. Upper Abdomen: There is focal heterogeneous attenuation in the right hepatic dome. Irregular hypodense lesion is also noted along the anterior falciform ligament. There are several tiny hypodensities throughout the right liver, and overall attenuation is coarsened. There is a moderate volume of ascites in the upper abdomen. Spleen is enlarged, measuring 16.3 cm. Soft Tissues/Bones: There are mixed lytic and sclerotic lesions at T2 and T4. Both have soft tissue components which are not significantly changed from 04/08/2019. Subtle sclerosis is noted at the anterior left 5th, 7th, and 8th ribs. Underlying nondisplaced subacute fractures are suspected. 1.  Limited assessment of distal segmental and subsegmental pulmonary arteries due to respiratory motion and suboptimal bolus timing, despite repeat imaging. No central or proximal segmental pulmonary embolus. 2.  Tiny right pleural effusion and trace left pleural fluid with mild bibasilar atelectasis. 3.  Indistinct hypodense lesions in the liver, likely metastases. 4.  Moderate volume upper abdominal ascites, new from the previous study. 5.  Splenomegaly. 6.  Redemonstration of metastatic lesions at T2 and T4, similar to 04/08/2019. Subtle areas of sclerosis with suspected subacute nondisplaced fractures are noted at the anterior left 5th, 7th, and 8th ribs. Us Abdomen Limited    Result Date: 5/4/2019  EXAMINATION: RIGHT UPPER QUADRANT ULTRASOUND, 5/4/2019 9:39 am COMPARISON: 04/18/2019 HISTORY: ORDERING SYSTEM PROVIDED HISTORY: Check for ascites TECHNOLOGIST PROVIDED HISTORY: Check for ascites Abd distention, hx liver mets r/o cholecystitis Acuity: Acute Type of Exam: Initial FINDINGS: LIVER:  The liver demonstrates some abnormal echotexture as well as hepatomegaly with the liver measuring 27 cm. Liver nodularity noted which was also seen on the previous CT evaluation. BILIARY SYSTEM:  Gallbladder wall is severely thickened at 9.2 mm.   There is evidence of gallbladder polyps. No evidence of gallbladder stones. Negative Iyer sign. Common bile duct is within normal limits measuring 0.61 cm. RIGHT KIDNEY: The right kidney is grossly unremarkable without evidence of hydronephrosis. OTHER: Moderate ascites noted in the abdomen and pelvis. 1. Moderate ascites. 2. Enlarged inhomogeneous nodular liver as previously noted on the CT evaluation. Neoplasm should be considered. 3. Thickened gallbladder wall as well as gallbladder polyps. The gallbladder does not appear distended however. No evidence of gallbladder stones. Acute cholecystitis is unlikely. Ir Us Guided Paracentesis    Result Date: 5/17/2019  PROCEDURE: ULTRASOUND GUIDED PARACENTESIS 5/17/2019 HISTORY: ORDERING SYSTEM PROVIDED HISTORY: ascites TECHNOLOGIST PROVIDED HISTORY: ascites TECHNIQUE: This procedure was performed by Dr. Jada Tello. Informed consent was obtained after a detailed explanation of the procedure including risks, benefits, and alternatives. Universal protocol was followed. Ultrasound shows a moderate amount of ascites. The right abdomen was prepped and draped in sterile fashion and local anesthesia was achieved with lidocaine. Using realtime ultrasound guidance a 5 [place] centesis catheter/needle was advanced into the peritoneal fluid. Ultrasound images show a safe tract and access into the fluid. Fluid was collected in vacuum bottles. Little to no fluid remains on completion. The catheter was removed and a sterile dressing applied. There are no immediate complications. The patient left the department in stable condition. FINDINGS: A total of 2.5 L of clear dark yellow fluid was removed. A sample was sent for the requested studies. Successful ultrasound guided paracentesis.      Ir Us Guided Paracentesis    Result Date: 5/6/2019  PROCEDURE: ULTRASOUND GUIDED PARACENTESIS 5/6/2019 HISTORY: ORDERING SYSTEM PROVIDED HISTORY: Suspected SBP TECHNOLOGIST PROVIDED HISTORY: Suspected SBP TECHNIQUE: This procedure was performed by Dr. Dayana Sandhu. Informed consent was obtained after a detailed explanation of the procedure including risks, benefits, and alternatives. Universal protocol was followed. Ultrasound shows a small to moderate amount of ascites. The right abdomen was prepped and draped in sterile fashion and local anesthesia was achieved with lidocaine. Using realtime ultrasound guidance a 6 [place] pigtail catheter was advanced into the peritoneal fluid. Ultrasound images show a safe tract and access into the fluid. Fluid was collected in vacuum bottles. Little to no fluid remains on completion. The catheter was removed and a sterile dressing applied. There are no immediate complications. The patient left the department in stable condition. FINDINGS: A total of 850 mL of clear yellow fluid was removed. Fluid was sent for the requested studies. Successful ultrasound guided paracentesis. Nm Hepatobiliary Scan W Ejection Fraction    Result Date: 4/20/2019  EXAMINATION: NUCLEAR MEDICINE HEPATOBILIARY SCINTIGRAPHY (HIDA SCAN). 4/20/2019 10:13 am TECHNIQUE: Approximately 3.0 [id number] Mebrofenin (Choletec) was administered IV. Then, dynamic images of the abdomen were obtained in the anterior projection for 60 mins. A right lateral view was also obtained at 60 mins. COMPARISON: Ultrasound 04/18/2019 HISTORY: ORDERING SYSTEM PROVIDED HISTORY: pain TECHNOLOGIST PROVIDED HISTORY: Ordering Physician Provided Reason for Exam: Pain Acuity: Unknown Type of Exam: Unknown Additional signs and symptoms: pt states no real abdominal pain, came in for general fatigue, pt states he has liver an dcolon cancer FINDINGS: Gallbladder is not visualized. Activity is seen within the small bowel. Delayed excretion of radiotracer by the liver. Nonvisualization of the gallbladder suggesting acute cholecystitis. Delayed excretion by the liver suggesting hepatic dysfunction. MG/0.1ML Liqd nasal spray  Generic drug:  naloxone     nitroGLYCERIN 0.4 MG SL tablet  Commonly known as:  NITROSTAT  Place 1 tablet under the tongue every 5 minutes as needed for Chest pain Dissolve 1 tablet under tongue for chest pain and repeat every 5 min up to max of 3 total doses. If no relief after 3 doses call 911     oxyCODONE HCl 10 MG immediate release tablet  Commonly known as:  OXY-IR     potassium chloride 20 MEQ extended release tablet  Commonly known as:  KLOR-CON M  Take 1 tablet by mouth daily            Time Spent on discharge is  31 mins in patient examination, evaluation, counseling as well as medication reconciliation, prescriptions for required medications, discharge plan and follow up. Electronically signed by   Leobardo Humphreys MD  5/19/2019  5:38 PM      Thank you Dr. Óscar Nguyen primary care provider on file. for the opportunity to be involved in this patient's care. IM Attending     Pt seen and examined before discharge   Discharge plan and medications were reviewed and agreed as documented by resident.   Time spent for discharge planning more than 30 minutes     Electronically signed by Radha Yu MD on 5/19/2019 at 8:27 PM

## 2019-05-19 NOTE — H&P
Elisa Ferreira MD   Physician   Internal Medicine   Progress Notes   Signed   Date of Service:  5/19/2019 10:27 AM               Signed        Expand All Collapse All            Show:Clear all  [x]Manual[x]Template[]Copied    Added by:  [x]Alverto Giordano MD      []Dragan for details         [address]     PROGRESS NOTE             5/19/2019        10:30 AM     Name:             Catracho Salcido  MRN:               [id number]                                      Acct:                [de-identified]   Room:             2091/2091-01  IP Day:            0  Admit Date:     5/19/2019  2:40 AM                  PCP:                No primary care provider on file. Code Status:   Prior     Subjective:      C/C:       Chief Complaint   Patient presents with    Altered Mental Status    Tachycardia      Interval History Status: follow up progress note, discharged 5/18, readmitted today        Patient seen bedside on progressive. Pt readmitted for AMS likely secondary to stage IV colon ca vs sepsis. Lactic acid, wbc, anion gap elevated. Pt is alert and oriented x3 but waxing and waning. Continue to monitor. Will consult GI.      Brief History:      67 yo male, pmhx stage IV colon ca with mets to liver, htn, dm II, presented with altered mental status secondary to sepsis vs chronic malignant state.      Review of Systems:      Review of Systems   Constitutional: Positive for fatigue. Negative for chills and fever. Eyes: Negative for visual disturbance. Respiratory: Negative for shortness of breath. Cardiovascular: Negative for chest pain, palpitations and leg swelling. Gastrointestinal: Negative for abdominal pain, constipation, diarrhea, nausea and vomiting. Genitourinary: Negative for dysuria. Musculoskeletal: Negative for myalgias.    Neurological: Negative for weakness, light-headedness and headaches. Psychiatric/Behavioral: Negative for confusion.            Medications:      Allergies: Allergies   Allergen Reactions    Morphine Itching and Rash         Current Meds:   Scheduled Meds:   Scheduled Medications    vancomycin (VANCOCIN) intermittent dosing (placeholder)   Other RX Placeholder         Continuous Infusions:   Infusions Meds        PRN Meds:   PRN Medications   sodium chloride flush        Data:      Past Medical History:   has a past medical history of Back pain, CAD S/P percutaneous coronary angioplasty, Carcinoma ([facility] [address] Colon cancer ([facility] [address] Degeneration of lumbar or lumbosacral intervertebral disc, Depression, Diabetes mellitus ([facility] [address] H/O cardiac catheterization, Hepatic metastases ([facility] [address] Hyperlipidemia, Hypertension, Knee pain, and MI, old.     Social History:   reports that he has never smoked. He has never used smokeless tobacco. He reports that he drank alcohol.  He reports that he does not use drugs.      Family History:   Family History         Family History   Problem Relation Age of Onset    Heart Disease Mother      Stroke Mother      High Blood Pressure Mother      High Cholesterol Father              Vitals:  BP (!) 95/50   Pulse 86   Temp 97.5 °F (36.4 °C) (Oral)   Resp 18   Ht 5' 10\" (1.778 m)   Wt 230 lb (104.3 kg)   SpO2 93%   BMI 33.00 kg/m²   Temp (24hrs), Av °F (37.2 °C), Min:97.5 °F (36.4 °C), Max:101.6 °F (38.7 °C)            Recent Labs     19  1638 19  2027 19  0709 19  1107   POCGLU 106 125* 137* 202*         I/O(24Hr):     Intake/Output Summary (Last 24 hours) at 2019 1030  Last data filed at 2019 0826      Gross per 24 hour   Intake 3300 ml   Output --   Net 3300 ml         Labs:     [unfilled]           Lab Results   Component Value Date/Time     SPECIAL NOT REPORTED 2019 02:00 PM      Lab Results   Component Value Date/Time     CULTURE NO GROWTH 2 DAYS 2019 02:00 PM       [unfilled]     Radiology:     Xr Chest Standard (2 Vw)     Result Date: 5/10/2019  EXAMINATION: TWO XRAY VIEWS OF THE CHEST 5/10/2019 10:02 am COMPARISON: AP chest from 05/03/2019 HISTORY: ORDERING SYSTEM PROVIDED HISTORY: cough TECHNOLOGIST PROVIDED HISTORY: cough Ordering Physician Provided Reason for Exam: Cough congestion weakness today h/o cancer Acuity: Acute Type of Exam: Initial Additional signs and symptoms: Cough congestion weakness today h/o cancer Additional history includes anemia, colon carcinoma, hypertension, and diabetes. FINDINGS: Right-sided IJ port with unchanged tip position in the lower SVC. Overlying ECG monitor leads. Cardiomediastinal shadow stable. Low lung volumes with bibasilar atelectasis or perhaps scarring. No localized pulmonary opacity suspicious for infiltrate. No sizable pleural effusion. Bones and soft tissues intact. Gas-filled small bowel loops, best seen on the lateral projection, upper limits of normal.      No acute cardiopulmonary disease or interval change. Bibasilar atelectasis or scarring      Ct Head Wo Contrast     Result Date: 5/19/2019  EXAMINATION: CT OF THE HEAD WITHOUT CONTRAST  5/19/2019 3:50 am TECHNIQUE: CT of the head was performed without the administration of intravenous contrast. Dose modulation, iterative reconstruction, and/or weight based adjustment of the mA/kV was utilized to reduce the radiation dose to as low as reasonably achievable. COMPARISON: 05/16/2019 HISTORY: ORDERING SYSTEM PROVIDED HISTORY: ams TECHNOLOGIST PROVIDED HISTORY: Ordering Physician Provided Reason for Exam: AMS Acuity: Acute FINDINGS: BRAIN/VENTRICLES: The cerebral and cerebellar parenchyma demonstrate normal volume. There are no areas of acute hemorrhage, mass, or midline shift. No abnormal extra-axial fluid collections. The ventricles are normal in size. Vascular calcifications are noted in the carotid siphons. Gray-white differentiation is maintained.  ORBITS: for review. Dose modulation, iterative reconstruction, and/or weight based adjustment of the mA/kV was utilized to reduce the radiation dose to as low as reasonably achievable. COMPARISON: 05/16/2019 HISTORY: ORDERING SYSTEM PROVIDED HISTORY: abd pain TECHNOLOGIST PROVIDED HISTORY: Ordering Physician Provided Reason for Exam: SOB, chills Acuity: Acute Relevant Medical/Surgical History: patient has a hx of diabetes, surgeries to area of interest include liver biopsy, hernia repair FINDINGS: Lower Chest: The heart size is normal.  Coronary artery vascular calcifications are noted. There are small bilateral pleural effusions, slightly increased on the left side. There is respiratory motion artifact in the lung bases with associated areas of atelectasis. Organs: There is cirrhosis with multifocal diffuse infiltrative lesions noted throughout the liver, compatible with the patient's clinical history of metastatic disease. One of the largest lesions is noted in the right lobe centrally measuring approximately 8.2 x 5.7 cm. The gallbladder is contracted. The adrenal glands are unremarkable. The pancreas has several calcifications which may represent sequela of chronic pancreatitis. There is splenomegaly measuring 17.7 cm. No splenic lesion is identified. The kidneys are unremarkable. No hydronephrosis. GI/Bowel: The stomach and duodenal sweep are unremarkable. Stool is noted in the colon. There is respiratory motion artifact limiting evaluation of the hollow visceral organs. Pelvis: The bladder is partially distended with urine. The prostate and seminal vesicles are unremarkable. There is a right inguinal hernia containing fat. No inguinal or pelvic sidewall lymphadenopathy. Peritoneum/Retroperitoneum: Atherosclerotic plaque is noted in the aorta and its branch vessels. No retroperitoneal adenopathy. There is marked intra-abdominal ascites, similar to the previous study. No mesenteric adenopathy.   No anterior abdominal wall defect. Bones/Soft Tissues: There is anasarca. Degenerative changes are noted along the spine and sacroiliac joints.      1. Cirrhosis with portal hypertension and marked intra-ascites, not appreciably changed. 2. Infiltrative low-attenuation lesions are noted diffusely throughout the liver, similar to the previous study, compatible with diffuse metastatic disease. 3. Small bilateral pleural effusions, slightly increased in size on the left.      Ct Abdomen Pelvis W Iv Contrast Additional Contrast? None     Result Date: 5/16/2019  EXAMINATION: CT OF THE ABDOMEN AND PELVIS WITH CONTRAST; CTA OF THE CHEST 5/16/2019 12:42 am TECHNIQUE: CT of the abdomen and pelvis was performed with the administration of intravenous contrast. Multiplanar reformatted images are provided for review. Dose modulation, iterative reconstruction, and/or weight based adjustment of the mA/kV was utilized to reduce the radiation dose to as low as reasonably achievable.; CTA of the chest was performed after the administration of intravenous contrast.  Multiplanar reformatted images are provided for review. MIP images are provided for review. Dose modulation, iterative reconstruction, and/or weight based adjustment of the mA/kV was utilized to reduce the radiation dose to as low as reasonably achievable. COMPARISON: None HISTORY: ORDERING SYSTEM PROVIDED HISTORY: abd pain TECHNOLOGIST PROVIDED HISTORY: Ordering Physician Provided Reason for Exam: Abdominal pain, bloating Acuity: Acute Relevant Medical/Surgical History: surgeries to area of interest include hernia repair; ORDERING SYSTEM PROVIDED HISTORY: PE TECHNOLOGIST PROVIDED HISTORY: Ordering Physician Provided Reason for Exam: Patient denies any SOB or chest pain at this time Acuity: Acute FINDINGS: Chest: Mediastinum: Heart is mildly enlarged in size. Trace pericardial fluid versus pericardial thickening.   Main pulmonary artery mildly enlarged measuring 3.1 cm in transverse dimension. No convincing evidence of central or lobar pulmonary emboli. Segmental subsegmental branches are less optimally evaluated due to motion artifacts and timing of contrast bolus. No suspicious mediastinal, hilar, or axillary not identified per CT criteria. Lungs/pleura: Small right-sided and trace left-sided pleural effusions. Pleuroparenchymal bands versus subsegmental atelectasis identified involving both lower lobes. Soft Tissues/Bones: There is redemonstration of the mixed sclerotic and lytic lesion involving the T2 vertebral body with surrounding pair spinal soft tissue attenuation. This extends into the pedicles and facets on the left. Additional mix additional sclerotic and lytic lesion identified involving T4 similar prior examination. Subtle areas sclerosis is suspected subacute nondisplaced fractures anterior left 5th, 7th and 8th ribs similar prior exam. Abdomen/Pelvis: Organs: There are innumerable hypodensities identified throughout the right and left hepatic lobes which have progressed from prior examination. A suggestion of a central ill-defined area of hypoattenuation which may represent areas of posterior related changes/fibrosis versus perfusion differences. There is mild gallbladder wall thickening. No definite cholelithiasis. The spleen, adrenal glands, kidneys, pancreas unremarkable. GI/Bowel: Mild-to-moderate retained stool throughout the colon. Mild-to-moderate scattered colonic diverticulosis. Appendix not identified. Pelvis: Mild bladder wall thickening related to underdistention. Tiny fat containing inguinal hernias. Prostate unremarkable. Peritoneum/Retroperitoneum: Moderate volume of ascites. Similar prior examination. Bones/Soft Tissues: Multilevel degenerate changes of the lumbar spine.      CTA Chest: No convincing evidence of central or lobar pulmonary emboli. Distal segmental subsegmental branches not well evaluated.  Moderate size right-sided effusion and trace left-sided effusion. Stable osseous metastatic disease with peers spinal soft tissue attenuation involving the T2 vertebral body. CT of the abdomen pelvis: No significant change in the innumerable hepatic lesions worrisome for metastatic disease as well as moderate burden of ascites. Bladder wall thickening could be infectious inflammatory or could be related to underdistention. .  Please correlate with urinalysis findings.      Xr Chest Portable     Result Date: 5/3/2019  EXAMINATION: SINGLE XRAY VIEW OF THE CHEST 5/3/2019 11:12 am COMPARISON: April 17, 2019 HISTORY: ORDERING SYSTEM PROVIDED HISTORY: Chest Pain TECHNOLOGIST PROVIDED HISTORY: Chest Pain Ordering Physician Provided Reason for Exam: Pt states today chest pain. History of colon cancer and lung cancer Acuity: Unknown Type of Exam: Unknown FINDINGS: Right chest Port in good position. No kink. Non enlarged heart. Bibasilar atelectasis. Right basilar pleuroparenchymal scarring. No effusion. No pneumothorax. No airspace consolidation.      No focal airspace consolidation.      Ct Chest Pulmonary Embolism W Contrast     Result Date: 5/19/2019  EXAMINATION: CTA OF THE CHEST 5/19/2019 3:46 am TECHNIQUE: CTA of the chest was performed after the administration of intravenous contrast.  Multiplanar reformatted images are provided for review. MIP images are provided for review. Dose modulation, iterative reconstruction, and/or weight based adjustment of the mA/kV was utilized to reduce the radiation dose to as low as reasonably achievable. COMPARISON: 05/16/2019 HISTORY: ORDERING SYSTEM PROVIDED HISTORY: PE TECHNOLOGIST PROVIDED HISTORY: Ordering Physician Provided Reason for Exam: SOB, elevated d-dimer Acuity: Acute Relevant Medical/Surgical History: Patient has a hx of diabetes, surgeries to area of interest include Coronary angioplasty with stent placement, port placement FINDINGS: Pulmonary Arteries:  There is adequate opacification of the destructive lesions involving the T4 and T2 vertebral bodies are stable. Sclerotic lesions along the anterior left 5th and 7th ribs are also unchanged. 4. Cirrhosis with portal hypertension and marked intra-abdominal ascites, not appreciably changed. 5. Atherosclerotic disease.      Ct Chest Pulmonary Embolism W Contrast     Result Date: 5/16/2019  EXAMINATION: CT OF THE ABDOMEN AND PELVIS WITH CONTRAST; CTA OF THE CHEST 5/16/2019 12:42 am TECHNIQUE: CT of the abdomen and pelvis was performed with the administration of intravenous contrast. Multiplanar reformatted images are provided for review. Dose modulation, iterative reconstruction, and/or weight based adjustment of the mA/kV was utilized to reduce the radiation dose to as low as reasonably achievable.; CTA of the chest was performed after the administration of intravenous contrast.  Multiplanar reformatted images are provided for review. MIP images are provided for review. Dose modulation, iterative reconstruction, and/or weight based adjustment of the mA/kV was utilized to reduce the radiation dose to as low as reasonably achievable. COMPARISON: None HISTORY: ORDERING SYSTEM PROVIDED HISTORY: abd pain TECHNOLOGIST PROVIDED HISTORY: Ordering Physician Provided Reason for Exam: Abdominal pain, bloating Acuity: Acute Relevant Medical/Surgical History: surgeries to area of interest include hernia repair; ORDERING SYSTEM PROVIDED HISTORY: PE TECHNOLOGIST PROVIDED HISTORY: Ordering Physician Provided Reason for Exam: Patient denies any SOB or chest pain at this time Acuity: Acute FINDINGS: Chest: Mediastinum: Heart is mildly enlarged in size. Trace pericardial fluid versus pericardial thickening. Main pulmonary artery mildly enlarged measuring 3.1 cm in transverse dimension. No convincing evidence of central or lobar pulmonary emboli. Segmental subsegmental branches are less optimally evaluated due to motion artifacts and timing of contrast bolus.   No suspicious mediastinal, hilar, or axillary not identified per CT criteria. Lungs/pleura: Small right-sided and trace left-sided pleural effusions. Pleuroparenchymal bands versus subsegmental atelectasis identified involving both lower lobes. Soft Tissues/Bones: There is redemonstration of the mixed sclerotic and lytic lesion involving the T2 vertebral body with surrounding pair spinal soft tissue attenuation. This extends into the pedicles and facets on the left. Additional mix additional sclerotic and lytic lesion identified involving T4 similar prior examination. Subtle areas sclerosis is suspected subacute nondisplaced fractures anterior left 5th, 7th and 8th ribs similar prior exam. Abdomen/Pelvis: Organs: There are innumerable hypodensities identified throughout the right and left hepatic lobes which have progressed from prior examination. A suggestion of a central ill-defined area of hypoattenuation which may represent areas of posterior related changes/fibrosis versus perfusion differences. There is mild gallbladder wall thickening. No definite cholelithiasis. The spleen, adrenal glands, kidneys, pancreas unremarkable. GI/Bowel: Mild-to-moderate retained stool throughout the colon. Mild-to-moderate scattered colonic diverticulosis. Appendix not identified. Pelvis: Mild bladder wall thickening related to underdistention. Tiny fat containing inguinal hernias. Prostate unremarkable. Peritoneum/Retroperitoneum: Moderate volume of ascites. Similar prior examination. Bones/Soft Tissues: Multilevel degenerate changes of the lumbar spine.      CTA Chest: No convincing evidence of central or lobar pulmonary emboli. Distal segmental subsegmental branches not well evaluated. Moderate size right-sided effusion and trace left-sided effusion. Stable osseous metastatic disease with peers spinal soft tissue attenuation involving the T2 vertebral body.  CT of the abdomen pelvis: No significant change in the hypodensities throughout the right liver, and overall attenuation is coarsened. There is a moderate volume of ascites in the upper abdomen. Spleen is enlarged, measuring 16.3 cm. Soft Tissues/Bones: There are mixed lytic and sclerotic lesions at T2 and T4. Both have soft tissue components which are not significantly changed from 04/08/2019. Subtle sclerosis is noted at the anterior left 5th, 7th, and 8th ribs. Underlying nondisplaced subacute fractures are suspected.      1. Limited assessment of distal segmental and subsegmental pulmonary arteries due to respiratory motion and suboptimal bolus timing, despite repeat imaging. No central or proximal segmental pulmonary embolus. 2.  Tiny right pleural effusion and trace left pleural fluid with mild bibasilar atelectasis. 3.  Indistinct hypodense lesions in the liver, likely metastases. 4.  Moderate volume upper abdominal ascites, new from the previous study. 5.  Splenomegaly. 6.  Redemonstration of metastatic lesions at T2 and T4, similar to 04/08/2019. Subtle areas of sclerosis with suspected subacute nondisplaced fractures are noted at the anterior left 5th, 7th, and 8th ribs.      Us Abdomen Limited     Result Date: 5/4/2019  EXAMINATION: RIGHT UPPER QUADRANT ULTRASOUND, 5/4/2019 9:39 am COMPARISON: 04/18/2019 HISTORY: ORDERING SYSTEM PROVIDED HISTORY: Check for ascites TECHNOLOGIST PROVIDED HISTORY: Check for ascites Abd distention, hx liver mets r/o cholecystitis Acuity: Acute Type of Exam: Initial FINDINGS: LIVER:  The liver demonstrates some abnormal echotexture as well as hepatomegaly with the liver measuring 27 cm. Liver nodularity noted which was also seen on the previous CT evaluation. BILIARY SYSTEM:  Gallbladder wall is severely thickened at 9.2 mm. There is evidence of gallbladder polyps. No evidence of gallbladder stones. Negative Iyer sign. Common bile duct is within normal limits measuring 0.61 cm.  RIGHT KIDNEY: The right kidney is grossly unremarkable without evidence of hydronephrosis. OTHER: Moderate ascites noted in the abdomen and pelvis.      1. Moderate ascites. 2. Enlarged inhomogeneous nodular liver as previously noted on the CT evaluation. Neoplasm should be considered. 3. Thickened gallbladder wall as well as gallbladder polyps. The gallbladder does not appear distended however. No evidence of gallbladder stones. Acute cholecystitis is unlikely.      Ir Us Guided Paracentesis     Result Date: 5/17/2019  PROCEDURE: ULTRASOUND GUIDED PARACENTESIS 5/17/2019 HISTORY: ORDERING SYSTEM PROVIDED HISTORY: ascites TECHNOLOGIST PROVIDED HISTORY: ascites TECHNIQUE: This procedure was performed by Dr. Francisco Garzon. Informed consent was obtained after a detailed explanation of the procedure including risks, benefits, and alternatives. Universal protocol was followed. Ultrasound shows a moderate amount of ascites. The right abdomen was prepped and draped in sterile fashion and local anesthesia was achieved with lidocaine. Using realtime ultrasound guidance a 5 [place] centesis catheter/needle was advanced into the peritoneal fluid. Ultrasound images show a safe tract and access into the fluid. Fluid was collected in vacuum bottles. Little to no fluid remains on completion. The catheter was removed and a sterile dressing applied. There are no immediate complications. The patient left the department in stable condition. FINDINGS: A total of 2.5 L of clear dark yellow fluid was removed. A sample was sent for the requested studies.      Successful ultrasound guided paracentesis.      Ir Us Guided Paracentesis     Result Date: 5/6/2019  PROCEDURE: ULTRASOUND GUIDED PARACENTESIS 5/6/2019 HISTORY: ORDERING SYSTEM PROVIDED HISTORY: Suspected SBP TECHNOLOGIST PROVIDED HISTORY: Suspected SBP TECHNIQUE: This procedure was performed by Dr. Francisco Garzon.   Informed consent was obtained after a detailed explanation of the procedure including risks, benefits, and alternatives. Universal protocol was followed. Ultrasound shows a small to moderate amount of ascites. The right abdomen was prepped and draped in sterile fashion and local anesthesia was achieved with lidocaine. Using realtime ultrasound guidance a 6 German pigtail catheter was advanced into the peritoneal fluid. Ultrasound images show a safe tract and access into the fluid. Fluid was collected in vacuum bottles. Little to no fluid remains on completion. The catheter was removed and a sterile dressing applied. There are no immediate complications. The patient left the department in stable condition. FINDINGS: A total of 850 mL of clear yellow fluid was removed. Fluid was sent for the requested studies.      Successful ultrasound guided paracentesis.      Nm Hepatobiliary Scan W Ejection Fraction     Result Date: 4/20/2019  EXAMINATION: NUCLEAR MEDICINE HEPATOBILIARY SCINTIGRAPHY (HIDA SCAN). 4/20/2019 10:13 am TECHNIQUE: Approximately 3.0 [id number] Mebrofenin (Choletec) was administered IV. Then, dynamic images of the abdomen were obtained in the anterior projection for 60 mins. A right lateral view was also obtained at 60 mins. COMPARISON: Ultrasound 04/18/2019 HISTORY: ORDERING SYSTEM PROVIDED HISTORY: pain TECHNOLOGIST PROVIDED HISTORY: Ordering Physician Provided Reason for Exam: Pain Acuity: Unknown Type of Exam: Unknown Additional signs and symptoms: pt states no real abdominal pain, came in for general fatigue, pt states he has liver an dcolon cancer FINDINGS: Gallbladder is not visualized. Activity is seen within the small bowel. Delayed excretion of radiotracer by the liver.      Nonvisualization of the gallbladder suggesting acute cholecystitis. Delayed excretion by the liver suggesting hepatic dysfunction.            Physical Examination:         Physical Exam   Constitutional: He is oriented to person, place, and time. No distress.    HENT:   Mouth/Throat: Oropharynx is clear and resident.     Readmission   Lactic acidosis due to hypovolemia   Very difficult situation pt has no understanding of his terminal disease  Electronically signed by Sophy White MD on 5/19/2019 at 6:50 PM                   Revision History

## 2019-05-19 NOTE — CONSULTS
INITIAL CONSULTATION     HISTORY OF PRESENT ILLNESS: Mila Fleming is a 66 y.o. male admitted to the hospital on 5/19/2019 for confusion. He was just discharged from the hospital.  He has metastatic colon cancer. He has ascites. Paracentesis 2 days ago did not show evidence of infection. He was found to have high white count. He reports diffuse abdominal discomfort. No nausea or vomiting. No blood in the stool melena. He has known metastases to the liver. He has appointment with oncology at [facility] tomorrow.      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    [facility]        Past Surgical History:   Procedure Laterality Date    CORONARY ANGIOPLASTY WITH STENT PLACEMENT  11/2016    x1 stent    ENDOSCOPIC ULTRASOUND (LOWER) N/A 12/8/2017    RECTAL ENDOSCOPIC ULTRASOUND WITH PATHOLOGY performed by Clementine Hicks MD at [address] LIVER BIOPSY Right 08/23/2018    CT guided Visceral Tissue Ablation    MS SIGMOIDOSCOPY FLX W/RMVL FOREIGN BODY N/A 5/25/2017    SIGMOIDOSCOPY BIOPSY FLEXIBLE performed by Clementine Hicks MD at [facility]    MS SIGMOIDOSCOPY FLX W/RMVL FOREIGN BODY  12/8/2017    SIGMOIDOSCOPY BIOPSY FLEXIBLE performed by Clementine Hicks MD at [facility]    TUNNELED VENOUS PORT PLACEMENT Right     right upper chest for cancer treatment          CURRENT MEDICATIONS:       Current Facility-Administered Medications:    vancomycin (VANCOCIN) intermittent dosing (placeholder), , Other, RX Placeholder, Binta Erwin MD    sodium chloride flush 0.9 % injection 10 mL, 10 mL, Intravenous, PRN, Binta Erwin MD, 10 mL at 05/19/19 0413    vancomycin (VANCOCIN) 1,500 mg in dextrose 5 % 250 mL IVPB, 1,500 mg, Intravenous, Q12H, Binta Erwin MD    sodium chloride flush 0.9 % injection 10 mL, 10 mL, Intravenous, 2 times per day, Xavier Navarro MD, 10 mL at 05/19/19 1357    sodium chloride flush 0.9 % injection 10 mL, 10 mL, Intravenous, PRN, Xavier Navarro MD    magnesium hydroxide (MILK OF MAGNESIA) 400 MG/5ML suspension 30 mL, 30 mL, Oral, Daily PRN, Xavier Navarro MD    ondansetron (ZOFRAN) injection 4 mg, 4 mg, Intravenous, Q6H PRN, Xavier Navarro MD    enoxaparin (LOVENOX) injection 40 mg, 40 mg, Subcutaneous, Daily, Xavier Navarro MD    0.9 % sodium chloride infusion, , Intravenous, Continuous, Xavier Navarro MD, Last Rate: 75 mL/hr at 05/19/19 1356    acetaminophen (TYLENOL) tablet 650 mg, 650 mg, Oral, Q4H PRN, Xavier Navarro MD    cefTRIAXone (ROCEPHIN) 1 g IVPB in 50 mL D5W minibag, 1 g, Intravenous, Q24H, Xavier Navarro MD, Stopped at 05/19/19 1426    [START ON 5/20/2019] albumin human 25 % solution 50 g, 50 g, Intravenous, Once, Fidel Meth, APRN - CNP    fentaNYL (DURAGESIC) 50 MCG/HR 1 patch, 1 patch, Transdermal, Q72H, Xavier Navarro MD    oxyCODONE HCl (OXY-IR) immediate release tablet 20 mg, 20 mg, Oral, TID PRN, Xavier Navarro MD    lactulose (CHRONULAC) 10 GM/15ML solution 20 g, 20 g, Oral, BID, Fidel Meth, APRN - CNP       ALLERGIES:      Allergies   Allergen Reactions    Morphine Itching and Rash          FAMILY HISTORY: The patient's family history was reviewed. No known GI malignancy.        SOCIAL HISTORY:   Social History     Socioeconomic History    Marital status:      Spouse name: Not on file    Number of children: Not on file    Years of education: Not on file    Highest education level: Not on file   Occupational History    Not on file   Social Needs    Financial resource strain: Not on file    Food insecurity:     Worry: Not on file     Inability: Not on file    Transportation needs:     Medical: Not on file     Non-medical: Not on file   Tobacco Use    Smoking status: Never Smoker    Smokeless tobacco: Never Used   Substance and Sexual Activity    Alcohol use: Not Currently    Drug use: No    Sexual activity: Not on file   Lifestyle    Physical activity:     Days per week: Not on file     Minutes per session: Not on file    Stress: Not on file   Relationships    Social connections:     Talks on phone: Not on file     Gets together: Not on file     Attends Nondenominational service: Not on file     Active member of club or organization: Not on file     Attends meetings of clubs or organizations: Not on file     Relationship status: Not on file    Intimate partner violence:     Fear of current or ex partner: Not on file     Emotionally abused: Not on file     Physically abused: Not on file     Forced sexual activity: Not on file   Other Topics Concern    Not on file   Social History Narrative    Not on file         REVIEW OF SYSTEMS: A 12-point review of systems was obtained and pertinent positives andnegatives were enumerated above in the history of present illness. All other reviewed systems / symptoms were negative. Review of Systems      PHYSICAL EXAMINATION: Vital signs reviewed per the nursing documentation. BP (!) 104/55   Pulse 78   Temp 98 °F (36.7 °C) (Axillary)   Resp 18   Ht 5' 10\" (1.778 m)   Wt 230 lb (104.3 kg)   SpO2 100%   BMI 33.00 kg/m²    [unfilled]   Body mass index is 33 kg/m². General:  A O x 3 in NAD   Psych: . Normal affect. Mentation normal   HEENT: PERRLA. Clear conjunctivae and sclerae. Moist oral mucosae, no lesions orulcers. The neck is supple, without lymphadenopathy or jugular venous distension.  No masses. Normal thyroid. Cardiovascular: S1 S2 RRR no rubs or murmurs. Pulmonary: clear BL. No accessory muscle usage. Abdominal Exam: Soft, NT ND, no hepato or spleno megaly, +BS, ++ ascites. No groin masses or lymphadenopathy. Extremities: No edema. Skin: Warm skin. No skin rash. No spider nevi palmar erythema naildystrophy. Joint: No joint swelling or deformity. Neurological: intact sensory. DTR+. No asterixis     LABORATORY DATA: Reviewed   Lab Results   Component Value Date    WBC 17.0 (H) 05/19/2019    HGB 8.5 (L) 05/19/2019    HCT 27.8 (L) 05/19/2019    MCV 83.2 05/19/2019     (L) 05/19/2019     (L) 05/19/2019    K 3.6 (L) 05/19/2019    CL 86 (L) 05/19/2019    CO2 20 05/19/2019    BUN 5 (L) 05/19/2019    CREATININE 0.45 (L) 05/19/2019    LABALBU 2.4 (L) 05/19/2019    BILITOT 5.54 (H) 05/19/2019    ALKPHOS 766 (H) 05/19/2019     (H) 05/19/2019    ALT 19 05/19/2019    INR 1.6 05/19/2019      Lab Results   Component Value Date    RBC 3.34 (L) 05/19/2019    HGB 8.5 (L) 05/19/2019    MCV 83.2 05/19/2019    MCH 25.5 (L) 05/19/2019    MCHC 30.7 (L) 05/19/2019    RDW 24.4 (H) 05/19/2019    MPV 7.4 05/19/2019    BASOPCT 0 05/19/2019    LYMPHSABS 0.51 (L) 05/19/2019    MONOSABS 1.87 (H) 05/19/2019    NEUTROABS 12.07 (H) 05/19/2019    EOSABS 0.00 05/19/2019    BASOSABS 0.00 05/19/2019          DIAGNOSTIC TESTING:   Xr Chest Standard (2 Vw)    Result Date: 5/10/2019  EXAMINATION: TWO XRAY VIEWS OF THE CHEST 5/10/2019 10:02 am COMPARISON: AP chest from 05/03/2019 HISTORY: ORDERING SYSTEM PROVIDED HISTORY: cough TECHNOLOGIST PROVIDED HISTORY: cough Ordering Physician Provided Reason for Exam: Cough congestion weakness today h/o cancer Acuity: Acute Type of Exam: Initial Additional signs and symptoms: Cough congestion weakness today h/o cancer Additional history includes anemia, colon carcinoma, hypertension, and diabetes.  FINDINGS: Right-sided IJ port with unchanged tip position in the lower SVC. Overlying ECG monitor leads. Cardiomediastinal shadow stable. Low lung volumes with bibasilar atelectasis or perhaps scarring. No localized pulmonary opacity suspicious for infiltrate. No sizable pleural effusion. Bones and soft tissues intact. Gas-filled small bowel loops, best seen on the lateral projection, upper limits of normal.     No acute cardiopulmonary disease or interval change. Bibasilar atelectasis or scarring     Ct Head Wo Contrast    Result Date: 5/19/2019  EXAMINATION: CT OF THE HEAD WITHOUT CONTRAST  5/19/2019 3:50 am TECHNIQUE: CT of the head was performed without the administration of intravenous contrast. Dose modulation, iterative reconstruction, and/or weight based adjustment of the mA/kV was utilized to reduce the radiation dose to as low as reasonably achievable. COMPARISON: 05/16/2019 HISTORY: ORDERING SYSTEM PROVIDED HISTORY: ams TECHNOLOGIST PROVIDED HISTORY: Ordering Physician Provided Reason for Exam: AMS Acuity: Acute FINDINGS: BRAIN/VENTRICLES: The cerebral and cerebellar parenchyma demonstrate normal volume. There are no areas of acute hemorrhage, mass, or midline shift. No abnormal extra-axial fluid collections. The ventricles are normal in size. Vascular calcifications are noted in the carotid siphons. Gray-white differentiation is maintained. ORBITS: The visualized portion of the orbits demonstrate no acute abnormality. SINUSES: There is a left maxillary mucous retention cyst.  The mastoid air cells are clear. Cerumen is noted in the external auditory canals. SOFT TISSUES/SKULL:  The calvarium is intact. No appreciable scalp soft tissue swelling. 1. No acute intracranial abnormality, stable.      Ct Head Wo Contrast    Result Date: 5/16/2019  EXAMINATION: CT OF THE HEAD WITHOUT CONTRAST  5/16/2019 12:39 am TECHNIQUE: CT of the head was performed without the administration of intravenous contrast. Dose modulation, iterative reconstruction, and/or weight based adjustment of the mA/kV was utilized to reduce the radiation dose to as low as reasonably achievable. COMPARISON: 04/01/2019 HISTORY: ORDERING SYSTEM PROVIDED HISTORY: ams TECHNOLOGIST PROVIDED HISTORY: Ordering Physician Provided Reason for Exam: AMS, patient denies any head complaints at this time Acuity: Acute Relevant Medical/Surgical History: patient denies any surgeries to brain FINDINGS: BRAIN/VENTRICLES: There is no acute intracranial hemorrhage, mass effect or midline shift. No abnormal extra-axial fluid collection. The gray-white differentiation is maintained without evidence of an acute infarct. There is no evidence of hydrocephalus. ORBITS: The visualized portion of the orbits demonstrate no acute abnormality. SINUSES: The visualized paranasal sinuses and mastoid air cells demonstrate no acute abnormality. Small mucous retention cyst left maxillary antrum. SOFT TISSUES/SKULL:  No acute abnormality of the visualized skull or soft tissues. No acute intracranial abnormality. Ct Abdomen Pelvis W Iv Contrast Additional Contrast? None    Result Date: 5/19/2019  EXAMINATION: CT OF THE ABDOMEN AND PELVIS WITH CONTRAST 5/19/2019 3:46 am TECHNIQUE: CT of the abdomen and pelvis was performed with the administration of intravenous contrast. Multiplanar reformatted images are provided for review. Dose modulation, iterative reconstruction, and/or weight based adjustment of the mA/kV was utilized to reduce the radiation dose to as low as reasonably achievable. COMPARISON: 05/16/2019 HISTORY: ORDERING SYSTEM PROVIDED HISTORY: abd pain TECHNOLOGIST PROVIDED HISTORY: Ordering Physician Provided Reason for Exam: SOB, chills Acuity: Acute Relevant Medical/Surgical History: patient has a hx of diabetes, surgeries to area of interest include liver biopsy, hernia repair FINDINGS: Lower Chest: The heart size is normal.  Coronary artery vascular calcifications are noted.   There are small bilateral pleural effusions, slightly increased on the left side. There is respiratory motion artifact in the lung bases with associated areas of atelectasis. Organs: There is cirrhosis with multifocal diffuse infiltrative lesions noted throughout the liver, compatible with the patient's clinical history of metastatic disease. One of the largest lesions is noted in the right lobe centrally measuring approximately 8.2 x 5.7 cm. The gallbladder is contracted. The adrenal glands are unremarkable. The pancreas has several calcifications which may represent sequela of chronic pancreatitis. There is splenomegaly measuring 17.7 cm. No splenic lesion is identified. The kidneys are unremarkable. No hydronephrosis. GI/Bowel: The stomach and duodenal sweep are unremarkable. Stool is noted in the colon. There is respiratory motion artifact limiting evaluation of the hollow visceral organs. Pelvis: The bladder is partially distended with urine. The prostate and seminal vesicles are unremarkable. There is a right inguinal hernia containing fat. No inguinal or pelvic sidewall lymphadenopathy. Peritoneum/Retroperitoneum: Atherosclerotic plaque is noted in the aorta and its branch vessels. No retroperitoneal adenopathy. There is marked intra-abdominal ascites, similar to the previous study. No mesenteric adenopathy. No anterior abdominal wall defect. Bones/Soft Tissues: There is anasarca. Degenerative changes are noted along the spine and sacroiliac joints. 1. Cirrhosis with portal hypertension and marked intra-ascites, not appreciably changed. 2. Infiltrative low-attenuation lesions are noted diffusely throughout the liver, similar to the previous study, compatible with diffuse metastatic disease. 3. Small bilateral pleural effusions, slightly increased in size on the left.      Ct Abdomen Pelvis W Iv Contrast Additional Contrast? None    Result Date: 5/16/2019  EXAMINATION: CT OF THE ABDOMEN AND PELVIS WITH CONTRAST; CTA OF THE CHEST 5/16/2019 12:42 am TECHNIQUE: CT of the abdomen and pelvis was performed with the administration of intravenous contrast. Multiplanar reformatted images are provided for review. Dose modulation, iterative reconstruction, and/or weight based adjustment of the mA/kV was utilized to reduce the radiation dose to as low as reasonably achievable.; CTA of the chest was performed after the administration of intravenous contrast.  Multiplanar reformatted images are provided for review. MIP images are provided for review. Dose modulation, iterative reconstruction, and/or weight based adjustment of the mA/kV was utilized to reduce the radiation dose to as low as reasonably achievable. COMPARISON: None HISTORY: ORDERING SYSTEM PROVIDED HISTORY: abd pain TECHNOLOGIST PROVIDED HISTORY: Ordering Physician Provided Reason for Exam: Abdominal pain, bloating Acuity: Acute Relevant Medical/Surgical History: surgeries to area of interest include hernia repair; ORDERING SYSTEM PROVIDED HISTORY: PE TECHNOLOGIST PROVIDED HISTORY: Ordering Physician Provided Reason for Exam: Patient denies any SOB or chest pain at this time Acuity: Acute FINDINGS: Chest: Mediastinum: Heart is mildly enlarged in size. Trace pericardial fluid versus pericardial thickening. Main pulmonary artery mildly enlarged measuring 3.1 cm in transverse dimension. No convincing evidence of central or lobar pulmonary emboli. Segmental subsegmental branches are less optimally evaluated due to motion artifacts and timing of contrast bolus. No suspicious mediastinal, hilar, or axillary not identified per CT criteria. Lungs/pleura: Small right-sided and trace left-sided pleural effusions. Pleuroparenchymal bands versus subsegmental atelectasis identified involving both lower lobes. Soft Tissues/Bones: There is redemonstration of the mixed sclerotic and lytic lesion involving the T2 vertebral body with surrounding pair spinal soft tissue attenuation.   This extends into the pedicles and facets on the left. Additional mix additional sclerotic and lytic lesion identified involving T4 similar prior examination. Subtle areas sclerosis is suspected subacute nondisplaced fractures anterior left 5th, 7th and 8th ribs similar prior exam. Abdomen/Pelvis: Organs: There are innumerable hypodensities identified throughout the right and left hepatic lobes which have progressed from prior examination. A suggestion of a central ill-defined area of hypoattenuation which may represent areas of posterior related changes/fibrosis versus perfusion differences. There is mild gallbladder wall thickening. No definite cholelithiasis. The spleen, adrenal glands, kidneys, pancreas unremarkable. GI/Bowel: Mild-to-moderate retained stool throughout the colon. Mild-to-moderate scattered colonic diverticulosis. Appendix not identified. Pelvis: Mild bladder wall thickening related to underdistention. Tiny fat containing inguinal hernias. Prostate unremarkable. Peritoneum/Retroperitoneum: Moderate volume of ascites. Similar prior examination. Bones/Soft Tissues: Multilevel degenerate changes of the lumbar spine. CTA Chest: No convincing evidence of central or lobar pulmonary emboli. Distal segmental subsegmental branches not well evaluated. Moderate size right-sided effusion and trace left-sided effusion. Stable osseous metastatic disease with peers spinal soft tissue attenuation involving the T2 vertebral body. CT of the abdomen pelvis: No significant change in the innumerable hepatic lesions worrisome for metastatic disease as well as moderate burden of ascites. Bladder wall thickening could be infectious inflammatory or could be related to underdistention. .  Please correlate with urinalysis findings.      Xr Chest Portable    Result Date: 5/3/2019  EXAMINATION: SINGLE XRAY VIEW OF THE CHEST 5/3/2019 11:12 am COMPARISON: April 17, 2019 HISTORY: ORDERING SYSTEM PROVIDED HISTORY: Chest Pain TECHNOLOGIST PROVIDED HISTORY: Chest Pain Ordering Physician Provided Reason for Exam: Pt states today chest pain. History of colon cancer and lung cancer Acuity: Unknown Type of Exam: Unknown FINDINGS: Right chest Port in good position. No kink. Non enlarged heart. Bibasilar atelectasis. Right basilar pleuroparenchymal scarring. No effusion. No pneumothorax. No airspace consolidation. No focal airspace consolidation. Ct Chest Pulmonary Embolism W Contrast    Result Date: 5/19/2019  EXAMINATION: CTA OF THE CHEST 5/19/2019 3:46 am TECHNIQUE: CTA of the chest was performed after the administration of intravenous contrast.  Multiplanar reformatted images are provided for review. MIP images are provided for review. Dose modulation, iterative reconstruction, and/or weight based adjustment of the mA/kV was utilized to reduce the radiation dose to as low as reasonably achievable. COMPARISON: 05/16/2019 HISTORY: ORDERING SYSTEM PROVIDED HISTORY: PE TECHNOLOGIST PROVIDED HISTORY: Ordering Physician Provided Reason for Exam: SOB, elevated d-dimer Acuity: Acute Relevant Medical/Surgical History: Patient has a hx of diabetes, surgeries to area of interest include Coronary angioplasty with stent placement, port placement FINDINGS: Pulmonary Arteries: There is adequate opacification of the pulmonary arteries with intravenous contrast.  There is no evidence of a central pulmonary embolism. Segmental and subsegmental emboli cannot entirely be excluded given the motion artifact on this exam. Mediastinum: The heart size is normal.  Coronary artery vascular calcifications are noted. No pericardial effusion. The thyroid gland is unremarkable. No mediastinal or hilar adenopathy. Lungs/pleura: There are small bilateral pleural effusions which have slightly increased in size, particularly on the left. There is associated atelectasis in the lung bases.  There is a noncalcified pulmonary nodule in the right lower lobe measuring 3 mm. This was not seen on the 04/07/2017 exam or the 07/02/2018 exam.  There is also a 4 x 4 mm nodule in the right lower lobe medially (series 2, image 171). There is a noncalcified pulmonary nodule in the left upper lobe measuring 5 x 5 mm (series 2, image 108). There is no focal lung infiltrate. No pneumothorax. Upper Abdomen: There is marked intra-abdominal ascites. There is cirrhosis with sequela of portal hypertension. Please see the dedicated CT of the abdomen and pelvis for further details. Soft Tissues/Bones: There is anasarca. Intramuscular lipoma is noted along the right pectoralis muscle, benign. No axillary adenopathy. Permeative destructive lesions are noted in the T2 and T4 vertebral bodies. Degenerative changes are noted along the spine. Right chest MediPort is noted. Sclerotic lesions are noted along the anterior left 5th and 7th ribs. 1. Suboptimal examination secondary to respiratory motion artifact. No evidence of a central pulmonary embolism, however, segmental and subsegmental emboli cannot be excluded. 2. Noncalcified pulmonary nodules are noted in the right lower lobe and left upper lobe which are new compared to the 2017 and 2018 exams. While these could be related to an infectious etiology, metastatic disease cannot be excluded. 3. Permeative destructive lesions involving the T4 and T2 vertebral bodies are stable. Sclerotic lesions along the anterior left 5th and 7th ribs are also unchanged. 4. Cirrhosis with portal hypertension and marked intra-abdominal ascites, not appreciably changed. 5. Atherosclerotic disease. Ct Chest Pulmonary Embolism W Contrast    Result Date: 5/16/2019  EXAMINATION: CT OF THE ABDOMEN AND PELVIS WITH CONTRAST; CTA OF THE CHEST 5/16/2019 12:42 am TECHNIQUE: CT of the abdomen and pelvis was performed with the administration of intravenous contrast. Multiplanar reformatted images are provided for review.  Dose modulation, iterative fractures anterior left 5th, 7th and 8th ribs similar prior exam. Abdomen/Pelvis: Organs: There are innumerable hypodensities identified throughout the right and left hepatic lobes which have progressed from prior examination. A suggestion of a central ill-defined area of hypoattenuation which may represent areas of posterior related changes/fibrosis versus perfusion differences. There is mild gallbladder wall thickening. No definite cholelithiasis. The spleen, adrenal glands, kidneys, pancreas unremarkable. GI/Bowel: Mild-to-moderate retained stool throughout the colon. Mild-to-moderate scattered colonic diverticulosis. Appendix not identified. Pelvis: Mild bladder wall thickening related to underdistention. Tiny fat containing inguinal hernias. Prostate unremarkable. Peritoneum/Retroperitoneum: Moderate volume of ascites. Similar prior examination. Bones/Soft Tissues: Multilevel degenerate changes of the lumbar spine. CTA Chest: No convincing evidence of central or lobar pulmonary emboli. Distal segmental subsegmental branches not well evaluated. Moderate size right-sided effusion and trace left-sided effusion. Stable osseous metastatic disease with peers spinal soft tissue attenuation involving the T2 vertebral body. CT of the abdomen pelvis: No significant change in the innumerable hepatic lesions worrisome for metastatic disease as well as moderate burden of ascites. Bladder wall thickening could be infectious inflammatory or could be related to underdistention. .  Please correlate with urinalysis findings. Ct Chest Pulmonary Embolism W Contrast    Result Date: 5/10/2019  EXAMINATION: CTA OF THE CHEST 5/10/2019 12:31 pm TECHNIQUE: CTA of the chest was performed after the administration of intravenous contrast.  Multiplanar reformatted images are provided for review. MIP images are provided for review.  Dose modulation, iterative reconstruction, and/or weight based adjustment of the mA/kV was central or proximal segmental pulmonary embolus. 2.  Tiny right pleural effusion and trace left pleural fluid with mild bibasilar atelectasis. 3.  Indistinct hypodense lesions in the liver, likely metastases. 4.  Moderate volume upper abdominal ascites, new from the previous study. 5.  Splenomegaly. 6.  Redemonstration of metastatic lesions at T2 and T4, similar to 04/08/2019. Subtle areas of sclerosis with suspected subacute nondisplaced fractures are noted at the anterior left 5th, 7th, and 8th ribs. Us Abdomen Limited    Result Date: 5/4/2019  EXAMINATION: RIGHT UPPER QUADRANT ULTRASOUND, 5/4/2019 9:39 am COMPARISON: 04/18/2019 HISTORY: ORDERING SYSTEM PROVIDED HISTORY: Check for ascites TECHNOLOGIST PROVIDED HISTORY: Check for ascites Abd distention, hx liver mets r/o cholecystitis Acuity: Acute Type of Exam: Initial FINDINGS: LIVER:  The liver demonstrates some abnormal echotexture as well as hepatomegaly with the liver measuring 27 cm. Liver nodularity noted which was also seen on the previous CT evaluation. BILIARY SYSTEM:  Gallbladder wall is severely thickened at 9.2 mm. There is evidence of gallbladder polyps. No evidence of gallbladder stones. Negative Iyer sign. Common bile duct is within normal limits measuring 0.61 cm. RIGHT KIDNEY: The right kidney is grossly unremarkable without evidence of hydronephrosis. OTHER: Moderate ascites noted in the abdomen and pelvis. 1. Moderate ascites. 2. Enlarged inhomogeneous nodular liver as previously noted on the CT evaluation. Neoplasm should be considered. 3. Thickened gallbladder wall as well as gallbladder polyps. The gallbladder does not appear distended however. No evidence of gallbladder stones. Acute cholecystitis is unlikely.      Ir Us Guided Paracentesis    Result Date: 5/17/2019  PROCEDURE: ULTRASOUND GUIDED PARACENTESIS 5/17/2019 HISTORY: ORDERING SYSTEM PROVIDED HISTORY: ascites TECHNOLOGIST PROVIDED HISTORY: ascites TECHNIQUE: This procedure was performed by Dr. Francisco Garzon. Informed consent was obtained after a detailed explanation of the procedure including risks, benefits, and alternatives. Universal protocol was followed. Ultrasound shows a moderate amount of ascites. The right abdomen was prepped and draped in sterile fashion and local anesthesia was achieved with lidocaine. Using realtime ultrasound guidance a 5 Divehi centesis catheter/needle was advanced into the peritoneal fluid. Ultrasound images show a safe tract and access into the fluid. Fluid was collected in vacuum bottles. Little to no fluid remains on completion. The catheter was removed and a sterile dressing applied. There are no immediate complications. The patient left the department in stable condition. FINDINGS: A total of 2.5 L of clear dark yellow fluid was removed. A sample was sent for the requested studies. Successful ultrasound guided paracentesis. Ir Us Guided Paracentesis    Result Date: 5/6/2019  PROCEDURE: ULTRASOUND GUIDED PARACENTESIS 5/6/2019 HISTORY: ORDERING SYSTEM PROVIDED HISTORY: Suspected SBP TECHNOLOGIST PROVIDED HISTORY: Suspected SBP TECHNIQUE: This procedure was performed by Dr. Francisco Garzon. Informed consent was obtained after a detailed explanation of the procedure including risks, benefits, and alternatives. Universal protocol was followed. Ultrasound shows a small to moderate amount of ascites. The right abdomen was prepped and draped in sterile fashion and local anesthesia was achieved with lidocaine. Using realtime ultrasound guidance a 6 Divehi pigtail catheter was advanced into the peritoneal fluid. Ultrasound images show a safe tract and access into the fluid. Fluid was collected in vacuum bottles. Little to no fluid remains on completion. The catheter was removed and a sterile dressing applied. There are no immediate complications. The patient left the department in stable condition.  FINDINGS: A total of 850 mL of clear yellow fluid was removed. Fluid was sent for the requested studies. Successful ultrasound guided paracentesis. Nm Hepatobiliary Scan W Ejection Fraction    Result Date: 4/20/2019  EXAMINATION: NUCLEAR MEDICINE HEPATOBILIARY SCINTIGRAPHY (HIDA SCAN). 4/20/2019 10:13 am TECHNIQUE: Approximately 3.0 [id number] Mebrofenin (Choletec) was administered IV. Then, dynamic images of the abdomen were obtained in the anterior projection for 60 mins. A right lateral view was also obtained at 60 mins. COMPARISON: Ultrasound 04/18/2019 HISTORY: ORDERING SYSTEM PROVIDED HISTORY: pain TECHNOLOGIST PROVIDED HISTORY: Ordering Physician Provided Reason for Exam: Pain Acuity: Unknown Type of Exam: Unknown Additional signs and symptoms: pt states no real abdominal pain, came in for general fatigue, pt states he has liver an dcolon cancer FINDINGS: Gallbladder is not visualized. Activity is seen within the small bowel. Delayed excretion of radiotracer by the liver. Nonvisualization of the gallbladder suggesting acute cholecystitis. Delayed excretion by the liver suggesting hepatic dysfunction. IMPRESSION: Mr. Pete Medley is a 66 y.o. male with cirrhosis and ascites. Metastatic colon cancer. We will repeat paracentesis to exclude SBP. Lactulose. Titrate dose to achieve 2-3 bowel movements per day. Thank you for allowing me to participate in the care of Mr. Pete Medley. For any further questions please do not hesitate to contact me.        Patti Cardoza MD

## 2019-05-19 NOTE — ED PROVIDER NOTES
eMERGENCY dEPARTMENT eNCOUnter    Pt Name: Eugene Boland  MRN: [id number]  [place] 1960  Date of evaluation: 5/19/19  CHIEF COMPLAINT       Chief Complaint   Patient presents with    Altered Mental Status    Tachycardia     HISTORY OF PRESENT ILLNESS   HPI  HISTORY OF PRESENT ILLNESS:  Past medical history of stage IV cancer with liver metastases presents for chief complaint of disorientation and shortness breath. EMS was originally called out for shortness of breath. However wife said that patient was initially short of breath and this resolved and now he is more altered. Patient initially complains of mild abdominal pain. And then says he short of breath and then says he needs physical therapy. Patient denies any specific complaints at this time. Severity is moderate. No aggravating or relieving factors. Timing is one day. Course is constant.   Context is cancer and recent admission  -----------------------  -----------------------  REVIEW OF SYSTEMS  ED Caveat: [none]  Gen:  No fever, no chills  CV: No CP, + palpitations  Resp: + SOB, no respiratory distress  GI: No V/D, + abd pain  : No dysuria, no increased frequency  Skin: No rash, no purulent lesions  Eyes: No blurry vision, No double vision  MSK: No back pain, no joint pain  Neuro: No HA, no sensation changes  Psych: No SI/HI  -----------------------  -----------------------  ALLERGIES  -per nursing records, reviewed    PAST MEDICAL HISTORY  -See HPI    SOCIAL HISTORY  -No daily drinking, no IV drugs  -----------------------  -----------------------  PHYSICAL EXAM  Gen: Alert, no acute distress  Skin: Warm, no rashes  Head: Normocephalic, atraumatic  Neck: No midline tenderness, no nuchal rigidity  Eye: EOMI, PERRLA, jaundice conjunctiva  ENT: Mucous membranes moist, no pharyngeal erythema  CV: tachy, no rubs  Resp: Respirations unlabored, lungs clear to auscultation  GI: Soft, non distended, no large abdominal masses, non tender  MSK: No midline back pain, no large joint effusions  Neuro: Alert and oriented, no focal neurological deficits observed  Psych: Cooperative, appropriate mood and affect  -----------------------  -----------------------  MEDICAL DECISION MAKING  Differential Diagnosis:  - Consideration is given for   uti, pneumonia, meningitis, cellulitis, sepsis, bacteremia, thyroid abnormality, neuroleptic malignant syndrome, intracranial hemorhage, intraabdominal infection, cva, acs, cardiac arrhythmia, encephalitis, encephalopathy, medication overdose, drug overdose, seizure, elevated ammonia, bronchospasm, pulmonary edema, pneumothorax, pericardial effusion, PE, ACS,   -  #Impression/Plan:  - Clinically patient's presentation is most consistent with  atrophic relation with rapid ventricular response and weakness. Given patient's recent evaluation recent admission we'll evaluate for pulmonary embolism. Given patient's abdominal pain wasn't a CT of his abdomen. Code sepsis was: Patient's arrival given his heart rate and vague symptoms and altered mental status with a concern that he has an infection. Clinically patient is in no acute distress and very similar presentation to a couple days ago. -  ##Reevaluation/Conversations on care:  - EKG shows atrial fibrillation with rapid ventricular response. Patient does not appear to have a history of atrial fibrillation. Therefore because patient is still mentating okay currently and blood pressure is okay we'll start with metoprolol and start with rate control. We'll also give flecinide  intially pt was not found to be febrile, so we felt the arrythmia might be causing his symptoms.  Therefore we treated those without looking further for cause of fafib  - [id number] approached by   RN, now pt is febrile, tylenol given  - 0500 no source found, broad spectrum abx givne, not consistent with menigntits, no new abd pain, pt was just recently tapped for SBP  ##Diagnosis:  -Encephalopathy, tachycardia  -  -----------------------  -----------------------  Adolfo Sotomayor MD, [facility]  Emergency Medicine Attending  Questions? Please contact my cell phone anytime. [phone]  *This charting supersedes any ED resident or staff charting and was written using speech recognition software  PASTMEDICAL HISTORY     Past Medical History:   Diagnosis Date    Back pain 1/30/2013    CAD S/P percutaneous coronary angioplasty 11/27/2016    cardiac stent    [address] [address] 12/04/2016    colon, liver mets    Colon cancer ([address]     Degeneration of lumbar or lumbosacral intervertebral disc 4/15/2014    Depression 5/10/2012    Diabetes mellitus ([address]     H/O cardiac catheterization     with cardiac stent    Hepatic metastases ([facility] [address] 12/4/2016    Hyperlipidemia     Hypertension     Knee pain     MI, 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    with cardiac arrest at [address] [place] [address]       Past Surgical History:   Procedure Laterality Date    CORONARY ANGIOPLASTY WITH STENT PLACEMENT  11/2016    x1 stent    ENDOSCOPIC ULTRASOUND (LOWER) N/A 12/8/2017    RECTAL ENDOSCOPIC ULTRASOUND WITH PATHOLOGY performed by Gadiel Munoz MD at [facility] [address] LIVER BIOPSY Right 08/23/2018    CT guided Visceral Tissue Ablation    IA SIGMOIDOSCOPY FLX W/RMVL FOREIGN BODY N/A 5/25/2017    SIGMOIDOSCOPY BIOPSY FLEXIBLE performed by Gadiel Munoz MD at [facility]    IA SIGMOIDOSCOPY FLX W/RMVL FOREIGN BODY  12/8/2017    SIGMOIDOSCOPY BIOPSY FLEXIBLE performed by Gadiel Munoz MD at [facility]    TUNNELED VENOUS PORT PLACEMENT Right     right upper chest for cancer treatment     CURRENT MEDICATIONS       Previous Medications    DIPHENHYDRAMINE (BENADRYL) 25 MG CAPSULE    Take 1 capsule by mouth nightly as needed for Sleep    FENTANYL (DURAGESIC) 50 MCG/HR    Place 1 patch onto the skin every 72 hours.      GLIMEPIRIDE (AMARYL) 4 MG TABLET    Take 1 tablet by mouth every morning    GLUCOSE BLOOD (BLOOD GLUCOSE TEST STRIPS) STRP    1 each by In Vitro route daily As needed. GLUCOSE MONITORING KIT (FREESTYLE) MONITORING KIT    1 kit by Does not apply route daily    INSULIN GLARGINE (LANTUS) 100 UNIT/ML INJECTION VIAL    Inject 20 Units into the skin nightly    INSULIN PEN NEEDLE 29G X 12.7MM MISC    1 each by Does not apply route daily    LANCETS MISC    1 each by Does not apply route daily    METOPROLOL TARTRATE (LOPRESSOR) 25 MG TABLET    Take 1 tablet by mouth daily     NALOXONE (NARCAN) 4 MG/0.1ML LIQD NASAL SPRAY    Take 4 mg by mouth    NITROGLYCERIN (NITROSTAT) 0.4 MG SL TABLET    Place 1 tablet under the tongue every 5 minutes as needed for Chest pain Dissolve 1 tablet under tongue for chest pain and repeat every 5 min up to max of 3 total doses. If no relief after 3 doses call 911    OXYCODONE HCL (OXY-IR) 10 MG IMMEDIATE RELEASE TABLET    Take 20 mg by mouth 3 times daily as needed for Pain. POTASSIUM CHLORIDE (KLOR-CON M) 20 MEQ EXTENDED RELEASE TABLET    Take 1 tablet by mouth daily     ALLERGIES     is allergic to morphine. FAMILY HISTORY     indicated that his mother is . He indicated that his father is alive. He indicated that all of his three sisters are alive. He indicated that his brother is alive.      SOCIAL HISTORY       Social History     Tobacco Use    Smoking status: Never Smoker    Smokeless tobacco: Never Used   Substance Use Topics    Alcohol use: Not Currently    Drug use: No     PHYSICAL EXAM     INITIAL VITALS: /61   Pulse 114   Temp 100.4 °F (38 °C) (Temporal)   Resp 19   Ht 5' 10\" (1.778 m)   Wt 230 lb (104.3 kg)   SpO2 93%   BMI 33.00 kg/m²    Physical Exam    MEDICAL DECISION MAKING:            Labs Reviewed   CBC WITH AUTO DIFFERENTIAL - Abnormal; Notable for the following components:       Result Value    WBC 17.0 (*)     RBC 3.34 (*)     Hemoglobin 8.5 (*)     Hematocrit 27.8 (*)     MCH 25.5 (*)     MCHC 30.7 (*)     RDW 24.4 (*) Platelets 217 (*)     Seg Neutrophils 71 (*)     Lymphocytes 3 (*)     Monocytes 11 (*)     Bands 15 (*)     Segs Absolute 12.07 (*)     Absolute Lymph # 0.51 (*)     Absolute Mono # 1.87 (*)     Absolute Bands # 2.55 (*)     All other components within normal limits   COMPREHENSIVE METABOLIC PANEL W/ REFLEX TO MG FOR LOW K - Abnormal; Notable for the following components:    Glucose 194 (*)     BUN 5 (*)     CREATININE 0.45 (*)     Calcium 8.3 (*)     Sodium 129 (*)     Potassium 3.6 (*)     Chloride 86 (*)     Anion Gap 23 (*)     Alkaline Phosphatase 766 (*)      (*)     Total Bilirubin 5.54 (*)     Alb 2.4 (*)     All other components within normal limits   BRAIN NATRIURETIC PEPTIDE - Abnormal; Notable for the following components:    Pro- (*)     All other components within normal limits   PROTIME-INR - Abnormal; Notable for the following components:    Protime 18.7 (*)     All other components within normal limits   LACTATE, SEPSIS - Abnormal; Notable for the following components:    Lactic Acid, Sepsis 9.8 (*)     All other components within normal limits   CULTURE BLOOD #1   CULTURE BLOOD #1   LIPASE   APTT   TROPONIN   AMMONIA   TSH WITH REFLEX   TROPONIN   URINE RT REFLEX TO CULTURE   LACTATE, SEPSIS     EMERGENCY DEPARTMENTCOURSE:         Vitals:    Vitals:    05/19/19 0330 05/19/19 0345 05/19/19 0430 05/19/19 0440   BP: 115/71 121/78 110/61    Pulse: 133 127 120 114   Resp: 16 28 21 19   Temp:   100.4 °F (38 °C)    TempSrc:   Temporal    SpO2: 96% 98% 93% 93%   Weight:       Height:           The patient was given the following medications while in the emergency department:  Orders Placed This Encounter   Medications    0.9 % sodium chloride bolus    piperacillin-tazobactam (ZOSYN) 3.375 g in dextrose 5 % 50 mL IVPB (mini-bag)    metoprolol (LOPRESSOR) injection 5 mg    flecainide (TAMBOCOR) tablet 50 mg    vancomycin (VANCOCIN) 1,500 mg in dextrose 5 % 250 mL IVPB    vancomycin

## 2019-05-20 PROBLEM — E43 SEVERE MALNUTRITION (HCC): Status: ACTIVE | Noted: 2019-01-01

## 2019-05-20 NOTE — PROGRESS NOTES
Nutrition Assessment    Type and Reason for Visit: Positive Nutrition Screen    Nutrition Recommendations: Continue General diet. Start Ensure Enlive 2x/day. Nutrition Assessment: Patient is severely malnourished as evidence by severe fat and muscle mass loss. Patient is at risk for further compromise due to metastatic cancer, liver dysfunction and ascites. It is difficult to determine weight loss due to ascites. Paracentesis done today (3.7 liters removed). Will re-evaluate weight loss when weight is updated in chart. Will start Ensure Enlive twice a day and monitor p.o intakes and labs. Malnutrition Assessment:  · Malnutrition Status: Meets the criteria for severe malnutrition  · Context: Chronic illness  · Findings of the 6 clinical characteristics of malnutrition (Minimum of 2 out of 6 clinical characteristics is required to make the diagnosis of moderate or severe Protein Calorie Malnutrition based on AND/ASPEN Guidelines):  1. Energy Intake-Unable to assess, Unable to assess    2. Weight Loss- ,   Unable to assess   3. Fat Loss-Severe subcutaneous fat loss, Triceps  4. Muscle Loss-Severe muscle mass loss, Clavicles (pectoralis and deltoids), Thigh (quadriceps)  5. Fluid Accumulation-Mild fluid accumulation, Extremities, Ascites  6.  Strength-Not measured    Nutrition Risk Level: High    Nutrient Needs:  · Estimated Daily Total Kcal: 0358-3316 kcals based on 28-30 kcal/ kg of usual weight   · Estimated Daily Protein (g): 105-123 gm of protein based on 1.2-1.4 gm/ kg of usual weight     Nutrition Diagnosis:   · Problem: Increased nutrient needs  · Etiology: related to Catabolic illness(cancer with mets)     Signs and symptoms:  as evidenced by Severe loss of subcutaneous fat, Severe muscle loss, Lab values, Diet history of poor intake, Intake 0-25%, Weight loss    Objective Information:  · Nutrition-Focused Physical Findings: Trace BUE, +2 pitting BLE edema.  Jaundice, ascites  · Current Nutrition Therapies:  · Oral Diet Orders: General   · Oral Diet intake: 26-50%  · Anthropometric Measures:  · Ht: 5' 10\" (177.8 cm)   · Current Body Wt: 245 lb (111.1 kg)  · Admission Body Wt: 245 lb (111.1 kg)(significant ascites)  · Usual Body Wt: 193 lb (87.5 kg)(4/17/19)  · % Weight Change:  ,  N/A due to ascites  · Ideal Body Wt: 166 lb (75.3 kg), % Ideal Body N/A due to ascites  · BMI Classification: BMI 35.0 - 39.9 Obese Class II    Nutrition Interventions:   Start ONS, Continue current diet  Continued Inpatient Monitoring, Education Not Indicated    Nutrition Evaluation:   · Evaluation: Goals set   · Goals: Nutritional intake is greater than 75% of estimated needs    · Monitoring: Meal Intake, Supplement Intake, Pertinent Labs, Weight, Monitor Bowel Function, Diet Tolerance, Skin Integrity, I&O      Juliana Deena ESQUIVEL, R.D, L.D,  Clinical Dietitian  Cell # [phone]  Office # [phone]

## 2019-05-20 NOTE — FLOWSHEET NOTE
05/20/19 1230   Encounter Summary   Services provided to: Patient   Referral/Consult From: Vick John Visiting   (5/20/19V)   Volunteer Visit Yes   Complexity of Encounter Low   Length of Encounter 15 minutes   Spiritual/Mandaen   Type Spiritual support   Intervention Communion   Sacraments   Communion Patient received communion

## 2019-05-20 NOTE — FLOWSHEET NOTE
05/20/19 1224   Encounter Summary   Services provided to: Patient   Referral/Consult From: Vick   Continue Visiting   (5/20/19)   Complexity of Encounter Low   Length of Encounter 15 minutes   Spiritual/Islam   Type Spiritual support   Sacraments   Sacrament of Sick-Anointing Anointed  (Bruna Russ 5/20/19)

## 2019-05-20 NOTE — CARE COORDINATION
ONGOING DISCHARGE PLAN:    Spoke with patient regarding discharge plan and patient confirms that plan is to go to [facility] at discharge. Pt is recommending  ECF    Patient has hx of metastatic Colon Cancer and ascites , will have   Paracentesis today. Remains on Iv rocephin, Iv Vancomycin, IVF    Will continue to follow for additional discharge needs.     Electronically signed by Sandra Ceja RN on 5/20/2019 at 1:13 PM

## 2019-05-20 NOTE — PROGRESS NOTES
Site prepped draped and numbed, Site accessed and drained 3.7 L of dark yellow fluid. Access removed and 2x2 guaze and tegaderm applied. Tolerated well Report called to Sarah Beth Gonzales rn Specimen to lab

## 2019-05-20 NOTE — PROGRESS NOTES
[address] [facility]    Occupational Therapy Evaluation  Date: 19  Patient Name: Rain Lam       Room:   MRN: [id number]  Account: [de-identified]   : 1960  (66 y.o.) Gender: male     Discharge Recommendations:  [address]       Referring Practitioner: Victor Hugo Marshall MD  Diagnosis: Sepsis   Additional Pertinent Hx: history of stage IV cancer with liver metastases presents for chief complaint of disorientation and shortness breath, was discharged from hospital on Saturday and back within a few hours     Treatment Diagnosis: impaired self care status   Past Medical History:  has a past medical history of Back pain, CAD S/P percutaneous coronary angioplasty, Carcinoma ([address] Colon cancer ([address] Degeneration of lumbar or lumbosacral intervertebral disc, Depression, Diabetes mellitus ([address] H/O cardiac catheterization, Hepatic metastases ([address] Hyperlipidemia, Hypertension, Knee pain, and MI, old. Past Surgical History:   has a past surgical history that includes Tunneled venous port placement (Right); pr sigmoidoscopy flx w/rmvl foreign body (N/A, 2017); hernia repair; Coronary angioplasty with stent (2016); pr sigmoidoscopy flx w/rmvl foreign body (2017); Endoscopic ultrasonography, GI (N/A, 2017); and liver biopsy (Right, 2018).     Restrictions  Restrictions/Precautions: (port right chest wall)  Implants present? : Metal implants(cardiac stent)  Other position/activity restrictions: Stage IV colon cancer with liver mets, up with assistance      Vitals  Temp: 98.6 °F (37 °C)  Pulse: 108  Resp: 16  BP: (!) 107/50  Height: 5' 10\" (177.8 cm)  Weight: 245 lb 2.4 oz (111.2 kg)  BMI (Calculated): 35.2  Oxygen Therapy  SpO2: 93 %  Pulse Oximeter Device Mode: Intermittent  Pulse Oximeter Device Location: Finger  O2 Device: None (Room air)  Level of Consciousness: Alert    Subjective  Subjective: \"I need to go somewhere for therapy this time\"  Comments: RN ok'd therapy evaluation. Patient pleasant and cooperative. Reports will be having Paracentesis today   Overall Orientation Status: Within Functional Limits     Social/Functional History  Lives With: Significant other(and Goldie Koyanagi (friend))  Type of Home: House  Home Layout: One level  Home Access: Stairs to enter without rails  Entrance Stairs - Number of Steps: 2 small steps  Bathroom Shower/Tub: Tub/Shower unit(has not been using shower, reports washing up at sink)  Bathroom Toilet: Standard  Bathroom Equipment: Grab bars in shower, Grab bars around toilet  Bathroom Accessibility: Accessible  Home Equipment: Rolling walker(rollator)  Receives Help From: Family  ADL Assistance: Needs assistance(spouse assists with dressing/bathing as needed)  Homemaking Assistance: Needs assistance(spouse performs)  Homemaking Responsibilities: No  Ambulation Assistance: Independent(with RW, reports has not been ambulating much lately)  Transfer Assistance: Independent       Objective      Cognition  Overall Cognitive Status: WFL   Sensation  Overall Sensation Status: Impaired(tingling/numbness in feet)   ADL  Feeding: Setup, Increased time to complete  Grooming: Supervision, Setup, Increased time to complete  UE Bathing: Stand by assistance, Setup, Increased time to complete  LE Bathing: Moderate assistance, Increased time to complete  UE Dressing: Stand by assistance, Setup, Increased time to complete  LE Dressing:  Moderate assistance, Increased time to complete(pt required assistance to thread LEs through shorts, CGA to pull up over hips)  Toileting: Minimal assistance, Increased time to complete    UE Function           LUE Strength  LUE Strength Comment: grossly 3-/5     LUE Tone: Normotonic     LUE AROM (degrees)  LUE AROM : WFL     Left Hand AROM (degrees)  Left Hand AROM: WFL  RUE Strength  RUE Strength Comment: grossly 3-/5      RUE Tone: Normotonic     RUE AROM (degrees)  RUE AROM : WFL     Right Hand

## 2019-05-20 NOTE — PROGRESS NOTES
Physical Therapy    Facility/Department: [address] [place] [address] CARE  Initial Assessment    NAME: Eugene Boland  : 1960  MRN: [id number]    Date of Service: 2019    Discharge Recommendations:  Subacute/Skilled Nursing Facility   PT Equipment Recommendations  Equipment Needed: No    Assessment   Body structures, Functions, Activity limitations: Decreased functional mobility ; Decreased endurance;Decreased strength;Decreased balance  Assessment: Impaired mobility due to safety issues from balance and strength deficites and decreased tolerance to activity due to endurance deficites  Decision Making: Medium Complexity  History: Cancer with mets  Exam: decreased balance, strength, endurance, gait  Clinical Presentation: evolving  REQUIRES PT FOLLOW UP: Yes  Activity Tolerance  Activity Tolerance: Patient limited by endurance       Patient Diagnosis(es): The primary encounter diagnosis was Septicemia ([facility] [address] A diagnosis of Paroxysmal atrial fibrillation (HCC) was also pertinent to this visit. has a past medical history of Back pain, CAD S/P percutaneous coronary angioplasty, Carcinoma ([facility] [address] Colon cancer ([facility] [address] Degeneration of lumbar or lumbosacral intervertebral disc, Depression, Diabetes mellitus ([facility] [address] H/O cardiac catheterization, Hepatic metastases ([address] Hyperlipidemia, Hypertension, Knee pain, and MI, old.   has a past surgical history that includes Tunneled venous port placement (Right); pr sigmoidoscopy flx w/rmvl foreign body (N/A, 2017); hernia repair; Coronary angioplasty with stent (2016); pr sigmoidoscopy flx w/rmvl foreign body (2017); Endoscopic ultrasonography, GI (N/A, 2017); and liver biopsy (Right, 2018).     Restrictions  Restrictions/Precautions  Restrictions/Precautions: (port right chest wall)  Implants present? : Metal implants(cardiac stent)  Position Activity Restriction  Other position/activity restrictions: Stage IV colon cancer with liver mets, up with assistance         Subjective  General  Patient assessed for rehabilitation services?: Yes  Family / Caregiver Present: No  Follows Commands: Within Functional Limits  General Comment  Comments: pt has c/c of being weak  Subjective  Subjective: pt pleasant and cooperative  Pain Screening  Patient Currently in Pain: Denies  Vital Signs  Patient Currently in Pain: Denies       Orientation  Orientation  Overall Orientation Status: Within Functional Limits  Social/Functional History  Social/Functional History  Lives With: Significant other(and Nidhi Montgomery (friend))  Type of Home: House  Home Layout: One level  Home Access: Stairs to enter without rails  Entrance Stairs - Number of Steps: 2 small steps  Bathroom Shower/Tub: Tub/Shower unit(has not been using shower, reports washing up at sink)  Bathroom Toilet: Standard  Bathroom Equipment: Grab bars in shower, Grab bars around toilet  Bathroom Accessibility: [facility]: Rolling walker(rollator)  Receives Help From: Family  ADL Assistance: Needs assistance(spouse assists with dressing/bathing as needed)  Homemaking Assistance: Needs assistance(spouse performs)  Homemaking Responsibilities: No  Ambulation Assistance: Independent(with RW, reports has not been ambulating much lately)  Transfer Assistance: Independent       Objective          AROM RLE (degrees)  RLE AROM: WNL  AROM LLE (degrees)  LLE AROM : WNL  Strength RLE  Comment: grossly 3/3-/5  Strength LLE  Comment: grossly 3/3-/5        Bed mobility  Rolling to Left: Modified independent  Rolling to Right: Modified independent  Supine to Sit: Modified independent  Sit to Supine: Modified independent  Scooting: Modified independent  Comment: use of bed rail for all bed mobility  Transfers  Sit to Stand: Contact guard assistance  Stand to sit: Contact guard assistance;Minimal Assistance(knees slightly buckled while sitting)  Comment: pt able to stand with RW x 1min.  B knees in slight hyperextension while standing bedside  Ambulation  Ambulation?: No(few side-steps with RW/CGA at bedside)     Balance  Sitting - Static: Fair;+(SBS- use of rail)  Sitting - Dynamic: Fair(CGA)  Standing - Static: Fair(CGA without device)        Plan   Plan  Times per week: 5-7x/wk  Times per day: Daily  Current Treatment Recommendations: Strengthening, Balance Training, Functional Mobility Training, Endurance Training, Transfer Training, Gait Training  Safety Devices  Type of devices: Left in bed, Patient at risk for falls, Call light within reach      Goals  Short term goals  Time Frame for Short term goals: 2-3 days  Short term goal 1: transfers with SBA/CGA  Short term goal 2: gait with RW and [place] x 20-40ft  Short term goal 3:  Increase standing tolerance to 2-3min  Patient Goals   Patient goals : get stronger       Therapy Time   Individual Concurrent Group Co-treatment   Time In 0912         Time Out 0927         Minutes [facility],  [address] Beverly Castellano

## 2019-05-20 NOTE — DISCHARGE INSTR - COC
Continuity of Care Form    Patient Name: Mila Fleming   :  1960  MRN:  [id number]    Admit date:  2019  Discharge date:  ***    Code Status Order: Full Code   Advance Directives:   Advance Care Flowsheet Documentation     Date/Time Healthcare Directive Type of Healthcare Directive Copy in [address] Agent's Name Healthcare Agent's Phone Number    19  No, patient does not have an advance directive for healthcare treatment -- -- -- -- --          Admitting Physician:  Radha Yu MD  PCP: No primary care provider on file. Discharging Nurse: [facility] Unit/Room#: 2091/2091-01  Discharging Unit Phone Number: ***    Emergency Contact:   Extended Emergency Contact Information  Primary Emergency Contact: Nataliia Adams  Address: . [address], [address] [place], [place] [address] [address] Phone: [phone]  Work Phone: [phone]  Mobile Phone: [phone]  Relation: Spouse  Hearing or visual needs: None  Other needs: None  Preferred language: English   needed?  No    Past Surgical History:  Past Surgical History:   Procedure Laterality Date    CORONARY ANGIOPLASTY WITH STENT PLACEMENT  11/2016    x1 stent    ENDOSCOPIC ULTRASOUND (LOWER) N/A 2017    RECTAL ENDOSCOPIC ULTRASOUND WITH PATHOLOGY performed by Clementine Hicks MD at [address] LIVER BIOPSY Right 2018    CT guided Visceral Tissue Ablation    NV SIGMOIDOSCOPY FLX W/RMVL FOREIGN BODY N/A 2017    SIGMOIDOSCOPY BIOPSY FLEXIBLE performed by Clementine Hicks MD at [facility]    NV SIGMOIDOSCOPY FLX W/RMVL FOREIGN BODY  2017    SIGMOIDOSCOPY BIOPSY FLEXIBLE performed by Clementine Hicks MD at [facility]    TUNNELED VENOUS PORT PLACEMENT Right     right upper chest for cancer treatment       Immunization History:   Immunization History   Administered Date(s) Administered    Influenza, Quadv, 3 yrs and older, IM, PF (Fluzone 3 yrs and older or Afluria 5 yrs and older) 12/09/2016    Influenza, Chalmer Majors, 6 mo and older, IM, PF (Flulaval, Fluarix) 09/25/2018       Active Problems:  Patient Active Problem List   Diagnosis Code    Hypertension, essential I10    Mixed hyperlipidemia E78.2    Knee pain M25.569    Type 2 diabetes mellitus with hyperglycemia, without long-term current use of insulin (HCC) E11.65    HTN (hypertension) I10    Hypercholesteremia E78.00    Insomnia G47.00    Knee pain, bilateral M25.561, M25.562    Depression F32.9    Cervical sprain S13. 9XXA    Lumbar sprain S33. 5XXA    Back pain M54.9    Opioid dependence ([address] F11.20    Degeneration of lumbar or lumbosacral intervertebral disc M51.37    Chronic midline low back pain without sciatica M54.5, G89.29    ST elevation myocardial infarction (STEMI) (HCC) I21.3    S/P PTCA  BMS to RCA - 11/26/2016 Dr. Aakash Babcock Z98.61    CAD S/P percutaneous coronary angioplasty I25.10, Z98.61    Carcinoma ([address] C80.1    Type 2 myocardial infarction without ST elevation ([address] I21. A1    Rectal bleed K62.5    Liver metastasis (HCC) C78.7    Colorectal cancer, stage IV (HCC) C19    Anemia D64.9    Iron deficiency anemia due to chronic blood loss D50.0    Neuropathy due to chemotherapeutic drug ([address] G62.0, T45.1X5A    Right hip pain M25.551    Radiation cystitis N30.40    Septic shock (HCC) A41.9, R65.21    Severe malnutrition ([address] E43    Septicemia ([address] A41.9    Elevated LFTs R94.5    Malignant neoplasm of colon (HCC) C18.9    Other ascites R18.8    Sepsis (HCC) A41.9       Isolation/Infection:   Isolation          No Isolation            Nurse Assessment:  Last Vital Signs: BP (!) 107/50   Pulse 108   Temp 98.6 °F (37 °C) (Oral)   Resp 16   Ht 5' 10\" (1.778 m)   Wt 245 lb 2.4 oz (111.2 kg)   SpO2 93%   BMI 35.18 kg/m²     Last documented pain score (0-10 scale): Pain Level: 8  Last Weight:    Wt Readings from Last 1 Encounters:   19 245 lb 2.4 oz (111.2 kg)     Mental Status:  {IP PT MENTAL STATUS:}    IV Access:  { ALIZA IV ACCESS:149740494}    Nursing Mobility/ADLs:  Walking   {CHP DME JEPE:553926898}  Transfer  {CHP DME RHQF:782414809}  Bathing  {CHP DME HYME:630770065}  Dressing  {CHP DME JQFD:719464920}  Toileting  {CHP DME XOR}  Feeding  {CHP DME GLOV:429585542}  Med Admin  {CHP DME USVE:042250034}  Med Delivery   { ALIZA MED Delivery:429280292}    Wound Care Documentation and Therapy:        Elimination:  Continence:   · Bowel: {YES / ZV:49641}  · Bladder: {YES / LK:99456}  Urinary Catheter: {Urinary Catheter:593683612}   Colostomy/Ileostomy/Ileal Conduit: {YES / CW:25219}       Date of Last BM: ***    Intake/Output Summary (Last 24 hours) at 2019 1219  Last data filed at 2019 0447  Gross per 24 hour   Intake --   Output 150 ml   Net -150 ml     I/O last 3 completed shifts:   In: 1000 [IV Piggyback:1000]  Out: 150 [Urine:150]    Safety Concerns:     [address] [employer] Safety Concerns:956041716}    Impairments/Disabilities:      [address] [employer] Impairments/Disabilities:782610992}    Nutrition Therapy:  Current Nutrition Therapy:   [employer] Diet List:300049392}    Routes of Feeding: {CHP DME Other Feedings:570052601}  Liquids: {Slp liquid thickness:15791}  Daily Fluid Restriction: {CHP DME Yes amt example:050036828}  Last Modified Barium Swallow with Video (Video Swallowing Test): {Done Not Done ATNW:554211813}    Treatments at the Time of Hospital Discharge:   Respiratory Treatments: ***  Oxygen Therapy:  {Therapy; copd oxygen:10235}  Ventilator:    { CC Vent ITJS:461244824}    Rehab Therapies: Physical Therapy and Occupational Therapy  Weight Bearing Status/Restrictions: No weight bearing restirctions  Other Medical Equipment (for information only, NOT a DME order):  walker  Other Treatments: Skilled Nursing assessment and monitoring, Medication education    Patient's personal belongings (please select all that are sent with patient):  {CHP DME Belongings:614738514}    RN SIGNATURE:  {Esignature:645626929}    CASE MANAGEMENT/SOCIAL WORK SECTION    Inpatient Status Date: 5/19/2019    Readmission Risk Assessment Score:  Readmission Risk              Risk of Unplanned Readmission:        49           Discharging to Facility/ Agency   [facility]a [facility] [address]  Phone [phone]  Fax [phone]  Evening fax [phone]     Please make patient an appointment with their PCP within 7 days of discharge from your facility. Please refer patient to [facility] care when discharged from your facility for home health services. / signature: Electronically signed by Annalisa Ochoa RN on 5/20/19 at 1:18 PM    PHYSICIAN SECTION    Prognosis: {Prognosis:4893343441}    Condition at Discharge: Cayetano Hummel Patient Condition:933952852}    Rehab Potential (if transferring to Rehab): {Prognosis:5086605174}    Recommended Labs or Other Treatments After Discharge: ***    Physician Certification: I certify the above information and transfer of Binta Tyson  is necessary for the continuing treatment of the diagnosis listed and that he requires {Admit to Appropriate Level of Care:94986} for {GREATER/LESS:351499633} 30 days.      Update Admission H&P: {CHP DME Changes in ADRXQ:271519521}    PHYSICIAN SIGNATURE:  Electronically signed by Sourav Desai MD on 5/23/19 at 1:13 PM

## 2019-05-20 NOTE — PLAN OF CARE
Nutrition Problem: Increased nutrient needs  Intervention: Food and/or Nutrient Delivery: Start ONS, Continue current diet  Nutritional Goals: Nutritional intake is greater than 75% of estimated needs

## 2019-05-20 NOTE — PROGRESS NOTES
Pharmacy Vancomycin Consult     Vancomycin Day: 2  Current Dosin mg every 12 hours    Temp max:  37 C    Recent Labs     19  0243 19  0555   BUN 5* 4*       Recent Labs     19  0243 19  0555   CREATININE 0.45* <0.40*       Recent Labs     19  0243 19  0555   WBC 17.0* 15.9*         Intake/Output Summary (Last 24 hours) at 2019 1651  Last data filed at 2019 0447  Gross per 24 hour   Intake --   Output 150 ml   Net -150 ml       Culture Date      Source                       Results  NA    Ht Readings from Last 1 Encounters:   19 5' 10\" (1.778 m)        Wt Readings from Last 1 Encounters:   19 245 lb 2.4 oz (111.2 kg)         Body mass index is 35.18 kg/m². CrCl cannot be calculated (This lab value cannot be used to calculate CrCl because it is not a number: <0.40). Trough: 11.4     Assessment/Plan:  Continue current orders as trough therapeutic    Ashley Song [facility].  [address] 2019 4:55 PM

## 2019-05-20 NOTE — PLAN OF CARE
Problem: Risk for Impaired Skin Integrity  Goal: Tissue integrity - skin and mucous membranes  Description  Structural intactness and normal physiological function of skin and  mucous membranes.   5/20/2019 1556 by Lexx Ramírez RN  Outcome: Ongoing     Problem: Falls - Risk of:  Goal: Will remain free from falls  Description  Will remain free from falls  5/20/2019 1556 by Lexx Ramírez RN  Outcome: Ongoing     Problem: Mobility - Impaired:  Goal: Mobility will improve  Description  Mobility will improve  Outcome: Ongoing

## 2019-05-20 NOTE — PROGRESS NOTES
[facility]    Gastroenterology Daily Progress Note      Patient:   Sofia Boudreaux   :    1960   Facility:   [facility]  Date:     2019  Consultant:   Dhruv Rodriguez CNP      SUBJECTIVE  66 y.o. male admitted 2019 with Sepsis ([facility] [address] [A41.9]  Sepsis ([facility] [address] [A41.9] and seen for sepsis with metastatic colon cancer. The pt was seen and examined. He is alert and oriented and denies nausea and abdominal pain. He is going for a paracentesis today to rule out SBP. He has had several non bloody bowel movements with the lactulose. His hgb is 7.5, WBC 15.9. He has been afebrile overnight.         OBJECTIVE  Scheduled Meds:   sodium chloride flush  10 mL Intravenous 2 times per day    enoxaparin  30 mg Subcutaneous BID    albumin human  25 g Intravenous Once    vancomycin (VANCOCIN) intermittent dosing (placeholder)   Other RX Placeholder    vancomycin  1,500 mg Intravenous Q12H    sodium chloride flush  10 mL Intravenous 2 times per day    enoxaparin  40 mg Subcutaneous Daily    cefTRIAXone (ROCEPHIN) IV  1 g Intravenous Q24H    fentaNYL  1 patch Transdermal Q72H    lactulose  20 g Oral BID       Vital Signs:  BP (!) 107/50   Pulse 108   Temp 98.6 °F (37 °C) (Oral)   Resp 16   Ht 5' 10\" (1.778 m)   Wt 245 lb 2.4 oz (111.2 kg)   SpO2 93%   BMI 35.18 kg/m²      Physical Exam:   General appearance: Alert & oriented, NAD  Lungs: CTA bilaterally    Heart: S1S2 RRR  Abdomen: Soft, Nontender,  distended, BS WNL  Skin: No jaundice, No clubbing, No cyanosis    Lab and Imaging Review     CBC  Recent Labs     19  0535 19  0243 19  0555   WBC 12.8* 17.0* 15.9*   HGB 7.7* 8.5* 7.5*   HCT 25.2* 27.8* 24.1*   MCV 81.4 83.2 83.0   * 113* 92*       BMP  Recent Labs     19  0535 19  0243 19  0555   * 129* 129*   K 3.9 3.6* 3.7   CL 90* 86* 91*   CO2 28 20 25   BUN 4* 5* 4*   CREATININE 0.45* 0.45* <0.40*   GLUCOSE 142* 194* 134*

## 2019-05-20 NOTE — PROGRESS NOTES
[facility]    PROGRESS NOTE             5/20/2019    10:12 AM    Name:   Kory Gonzales  MRN:     [id number]     Acct:      [de-identified]   Room:   2091/2091-01   Day:  1  Admit Date:  5/19/2019  2:40 AM    PCP:  No primary care provider on file. Code Status:  Full Code    Subjective:     C/C:   Chief Complaint   Patient presents with    Altered Mental Status    Tachycardia     Interval History Status: not changed. Stable. Patient seen bedside on progressive. No acute events overnight. VSS. Patient has no complaints besides for fatigue. Will get paracentesis today. Follow up GI recs. Brief History:     65 yo male, pmhx stage IV colon ca with mets to liver, htn, dm II, presented with altered mental status secondary to sepsis vs chronic malignant state.     Review of Systems:     Review of Systems   Constitutional: Positive for fatigue. Negative for chills and fever. Respiratory: Negative for cough and shortness of breath. Cardiovascular: Negative for chest pain, palpitations and leg swelling. Gastrointestinal: Negative for abdominal pain, constipation, diarrhea, nausea and vomiting. Genitourinary: Negative for dysuria and frequency. Musculoskeletal: Negative for myalgias. Neurological: Negative for weakness and headaches. Medications: Allergies:     Allergies   Allergen Reactions    Morphine Itching and Rash       Current Meds:   Scheduled Meds:    sodium chloride flush  10 mL Intravenous 2 times per day    enoxaparin  30 mg Subcutaneous BID    albumin human  25 g Intravenous Once    calcium gluconate IVPB  3 g Intravenous Once    vancomycin (VANCOCIN) intermittent dosing (placeholder)   Other RX Placeholder    vancomycin  1,500 mg Intravenous Q12H    sodium chloride flush  10 mL Intravenous 2 times per day    enoxaparin  40 mg Subcutaneous Daily    cefTRIAXone (ROCEPHIN) IV  1 g Intravenous Q24H    fentaNYL  1 patch Transdermal Q72H    lactulose  20 g Oral BID     Continuous Infusions:    sodium chloride 75 mL/hr at 19 1356     PRN Meds: sodium chloride flush, acetaminophen, sodium chloride flush, sodium chloride flush, magnesium hydroxide, ondansetron, acetaminophen, oxyCODONE HCl    Data:     Past Medical History:   has a past medical history of Back pain, CAD S/P percutaneous coronary angioplasty, Carcinoma ([facility] Colon cancer ([facility] Degeneration of lumbar or lumbosacral intervertebral disc, Depression, Diabetes mellitus ([facility] H/O cardiac catheterization, Hepatic metastases ([facility] Hyperlipidemia, Hypertension, Knee pain, and MI, old. Social History:   reports that he has never smoked. He has never used smokeless tobacco. He reports that he drank alcohol. He reports that he does not use drugs. Family History:   Family History   Problem Relation Age of Onset    Heart Disease Mother     Stroke Mother     High Blood Pressure Mother     High Cholesterol Father        Vitals:  BP (!) 107/50   Pulse 108   Temp 98.6 °F (37 °C) (Oral)   Resp 16   Ht 5' 10\" (1.778 m)   Wt 245 lb 2.4 oz (111.2 kg)   SpO2 93%   BMI 35.18 kg/m²   Temp (24hrs), Av °F (36.7 °C), Min:97.7 °F (36.5 °C), Max:98.6 °F (37 °C)    Recent Labs     19  0709 19  1107 19  2037 19  0749   POCGLU 137* 202* 174* 178*       I/O(24Hr):     Intake/Output Summary (Last 24 hours) at 2019 1012  Last data filed at 2019 0447  Gross per 24 hour   Intake --   Output 150 ml   Net -150 ml       Labs:    Lab Results   Component Value Date    WBC 15.9 (H) 2019    HGB 7.5 (L) 2019    HCT 24.1 (L) 2019    MCV 83.0 2019    PLT 92 (L) 2019     Lab Results   Component Value Date     (L) 2019    K 3.7 2019    CL 91 (L) 2019    CO2 25 2019    BUN 4 (L) 2019    CREATININE <0.40 (L) 2019    GLUCOSE 134 (H) 2019 CALCIUM 7.5 (L) 05/20/2019    PROT 6.4 05/19/2019    LABALBU 2.4 (L) 05/19/2019    BILITOT 5.54 (H) 05/19/2019    ALKPHOS 766 (H) 05/19/2019     (H) 05/19/2019    ALT 19 05/19/2019    LABGLOM CANNOT BE CALCULATED 05/20/2019    GFRAA CANNOT BE CALCULATED 05/20/2019    GLOB NOT REPORTED 05/07/2019           Lab Results   Component Value Date/Time    SPECIAL NOT REPORTED 05/19/2019 02:50 AM     Lab Results   Component Value Date/Time    CULTURE NO GROWTH 23 HOURS 05/19/2019 02:50 AM       [unfilled]    Radiology:    Xr Chest Standard (2 Vw)    Result Date: 5/10/2019  EXAMINATION: TWO XRAY VIEWS OF THE CHEST 5/10/2019 10:02 am COMPARISON: AP chest from 05/03/2019 HISTORY: ORDERING SYSTEM PROVIDED HISTORY: cough TECHNOLOGIST PROVIDED HISTORY: cough Ordering Physician Provided Reason for Exam: Cough congestion weakness today h/o cancer Acuity: Acute Type of Exam: Initial Additional signs and symptoms: Cough congestion weakness today h/o cancer Additional history includes anemia, colon carcinoma, hypertension, and diabetes. FINDINGS: Right-sided IJ port with unchanged tip position in the lower SVC. Overlying ECG monitor leads. Cardiomediastinal shadow stable. Low lung volumes with bibasilar atelectasis or perhaps scarring. No localized pulmonary opacity suspicious for infiltrate. No sizable pleural effusion. Bones and soft tissues intact. Gas-filled small bowel loops, best seen on the lateral projection, upper limits of normal.     No acute cardiopulmonary disease or interval change. Bibasilar atelectasis or scarring     Ct Head Wo Contrast    Result Date: 5/19/2019  EXAMINATION: CT OF THE HEAD WITHOUT CONTRAST  5/19/2019 3:50 am TECHNIQUE: CT of the head was performed without the administration of intravenous contrast. Dose modulation, iterative reconstruction, and/or weight based adjustment of the mA/kV was utilized to reduce the radiation dose to as low as reasonably achievable.  COMPARISON: 05/16/2019 Small mucous retention cyst left maxillary antrum. SOFT TISSUES/SKULL:  No acute abnormality of the visualized skull or soft tissues. No acute intracranial abnormality. Ct Abdomen Pelvis W Iv Contrast Additional Contrast? None    Result Date: 5/19/2019  EXAMINATION: CT OF THE ABDOMEN AND PELVIS WITH CONTRAST 5/19/2019 3:46 am TECHNIQUE: CT of the abdomen and pelvis was performed with the administration of intravenous contrast. Multiplanar reformatted images are provided for review. Dose modulation, iterative reconstruction, and/or weight based adjustment of the mA/kV was utilized to reduce the radiation dose to as low as reasonably achievable. COMPARISON: 05/16/2019 HISTORY: ORDERING SYSTEM PROVIDED HISTORY: abd pain TECHNOLOGIST PROVIDED HISTORY: Ordering Physician Provided Reason for Exam: SOB, chills Acuity: Acute Relevant Medical/Surgical History: patient has a hx of diabetes, surgeries to area of interest include liver biopsy, hernia repair FINDINGS: Lower Chest: The heart size is normal.  Coronary artery vascular calcifications are noted. There are small bilateral pleural effusions, slightly increased on the left side. There is respiratory motion artifact in the lung bases with associated areas of atelectasis. Organs: There is cirrhosis with multifocal diffuse infiltrative lesions noted throughout the liver, compatible with the patient's clinical history of metastatic disease. One of the largest lesions is noted in the right lobe centrally measuring approximately 8.2 x 5.7 cm. The gallbladder is contracted. The adrenal glands are unremarkable. The pancreas has several calcifications which may represent sequela of chronic pancreatitis. There is splenomegaly measuring 17.7 cm. No splenic lesion is identified. The kidneys are unremarkable. No hydronephrosis. GI/Bowel: The stomach and duodenal sweep are unremarkable. Stool is noted in the colon.   There is respiratory motion artifact limiting evaluation of the hollow visceral organs. Pelvis: The bladder is partially distended with urine. The prostate and seminal vesicles are unremarkable. There is a right inguinal hernia containing fat. No inguinal or pelvic sidewall lymphadenopathy. Peritoneum/Retroperitoneum: Atherosclerotic plaque is noted in the aorta and its branch vessels. No retroperitoneal adenopathy. There is marked intra-abdominal ascites, similar to the previous study. No mesenteric adenopathy. No anterior abdominal wall defect. Bones/Soft Tissues: There is anasarca. Degenerative changes are noted along the spine and sacroiliac joints. 1. Cirrhosis with portal hypertension and marked ascites, not appreciably changed. 2. Infiltrative low-attenuation lesions are noted diffusely throughout the liver, similar to the previous study, compatible with diffuse metastatic disease. 3. Small bilateral pleural effusions, slightly increased in size on the left. Ct Abdomen Pelvis W Iv Contrast Additional Contrast? None    Result Date: 5/16/2019  EXAMINATION: CT OF THE ABDOMEN AND PELVIS WITH CONTRAST; CTA OF THE CHEST 5/16/2019 12:42 am TECHNIQUE: CT of the abdomen and pelvis was performed with the administration of intravenous contrast. Multiplanar reformatted images are provided for review. Dose modulation, iterative reconstruction, and/or weight based adjustment of the mA/kV was utilized to reduce the radiation dose to as low as reasonably achievable.; CTA of the chest was performed after the administration of intravenous contrast.  Multiplanar reformatted images are provided for review. MIP images are provided for review. Dose modulation, iterative reconstruction, and/or weight based adjustment of the mA/kV was utilized to reduce the radiation dose to as low as reasonably achievable.  COMPARISON: None HISTORY: ORDERING SYSTEM PROVIDED HISTORY: abd pain TECHNOLOGIST PROVIDED HISTORY: Ordering Physician Provided Reason for Exam: Abdominal pain, bloating Acuity: Acute Relevant Medical/Surgical History: surgeries to area of interest include hernia repair; ORDERING SYSTEM PROVIDED HISTORY: PE TECHNOLOGIST PROVIDED HISTORY: Ordering Physician Provided Reason for Exam: Patient denies any SOB or chest pain at this time Acuity: Acute FINDINGS: Chest: Mediastinum: Heart is mildly enlarged in size. Trace pericardial fluid versus pericardial thickening. Main pulmonary artery mildly enlarged measuring 3.1 cm in transverse dimension. No convincing evidence of central or lobar pulmonary emboli. Segmental subsegmental branches are less optimally evaluated due to motion artifacts and timing of contrast bolus. No suspicious mediastinal, hilar, or axillary not identified per CT criteria. Lungs/pleura: Small right-sided and trace left-sided pleural effusions. Pleuroparenchymal bands versus subsegmental atelectasis identified involving both lower lobes. Soft Tissues/Bones: There is redemonstration of the mixed sclerotic and lytic lesion involving the T2 vertebral body with surrounding pair spinal soft tissue attenuation. This extends into the pedicles and facets on the left. Additional mix additional sclerotic and lytic lesion identified involving T4 similar prior examination. Subtle areas sclerosis is suspected subacute nondisplaced fractures anterior left 5th, 7th and 8th ribs similar prior exam. Abdomen/Pelvis: Organs: There are innumerable hypodensities identified throughout the right and left hepatic lobes which have progressed from prior examination. A suggestion of a central ill-defined area of hypoattenuation which may represent areas of posterior related changes/fibrosis versus perfusion differences. There is mild gallbladder wall thickening. No definite cholelithiasis. The spleen, adrenal glands, kidneys, pancreas unremarkable. GI/Bowel: Mild-to-moderate retained stool throughout the colon. Mild-to-moderate scattered colonic diverticulosis. Appendix not identified. Pelvis: Mild bladder wall thickening related to underdistention. Tiny fat containing inguinal hernias. Prostate unremarkable. Peritoneum/Retroperitoneum: Moderate volume of ascites. Similar prior examination. Bones/Soft Tissues: Multilevel degenerate changes of the lumbar spine. CTA Chest: No convincing evidence of central or lobar pulmonary emboli. Distal segmental subsegmental branches not well evaluated. Moderate size right-sided effusion and trace left-sided effusion. Stable osseous metastatic disease with peers spinal soft tissue attenuation involving the T2 vertebral body. CT of the abdomen pelvis: No significant change in the innumerable hepatic lesions worrisome for metastatic disease as well as moderate burden of ascites. Bladder wall thickening could be infectious inflammatory or could be related to underdistention. .  Please correlate with urinalysis findings. Xr Chest Portable    Result Date: 5/3/2019  EXAMINATION: SINGLE XRAY VIEW OF THE CHEST 5/3/2019 11:12 am COMPARISON: April 17, 2019 HISTORY: ORDERING SYSTEM PROVIDED HISTORY: Chest Pain TECHNOLOGIST PROVIDED HISTORY: Chest Pain Ordering Physician Provided Reason for Exam: Pt states today chest pain. History of colon cancer and lung cancer Acuity: Unknown Type of Exam: Unknown FINDINGS: Right chest Port in good position. No kink. Non enlarged heart. Bibasilar atelectasis. Right basilar pleuroparenchymal scarring. No effusion. No pneumothorax. No airspace consolidation. No focal airspace consolidation. Ct Chest Pulmonary Embolism W Contrast    Result Date: 5/19/2019  EXAMINATION: CTA OF THE CHEST 5/19/2019 3:46 am TECHNIQUE: CTA of the chest was performed after the administration of intravenous contrast.  Multiplanar reformatted images are provided for review. MIP images are provided for review.  Dose modulation, iterative reconstruction, and/or weight based adjustment of the mA/kV was utilized to reduce the radiation dose to as low as reasonably achievable. COMPARISON: 05/16/2019 HISTORY: ORDERING SYSTEM PROVIDED HISTORY: PE TECHNOLOGIST PROVIDED HISTORY: Ordering Physician Provided Reason for Exam: SOB, elevated d-dimer Acuity: Acute Relevant Medical/Surgical History: Patient has a hx of diabetes, surgeries to area of interest include Coronary angioplasty with stent placement, port placement FINDINGS: Pulmonary Arteries: There is adequate opacification of the pulmonary arteries with intravenous contrast.  There is no evidence of a central pulmonary embolism. Segmental and subsegmental emboli cannot entirely be excluded given the motion artifact on this exam. Mediastinum: The heart size is normal.  Coronary artery vascular calcifications are noted. No pericardial effusion. The thyroid gland is unremarkable. No mediastinal or hilar adenopathy. Lungs/pleura: There are small bilateral pleural effusions which have slightly increased in size, particularly on the left. There is associated atelectasis in the lung bases. There is a noncalcified pulmonary nodule in the right lower lobe measuring 3 mm. This was not seen on the 04/07/2017 exam or the 07/02/2018 exam.  There is also a 4 x 4 mm nodule in the right lower lobe medially (series 2, image 171). There is a noncalcified pulmonary nodule in the left upper lobe measuring 5 x 5 mm (series 2, image 108). There is no focal lung infiltrate. No pneumothorax. Upper Abdomen: There is marked intra-abdominal ascites. There is cirrhosis with sequela of portal hypertension. Please see the dedicated CT of the abdomen and pelvis for further details. Soft Tissues/Bones: There is anasarca. Intramuscular lipoma is noted along the right pectoralis muscle, benign. No axillary adenopathy. Permeative destructive lesions are noted in the T2 and T4 vertebral bodies. Degenerative changes are noted along the spine. Right chest MediPort is noted.   Sclerotic lesions are noted along the anterior left 5th and 7th ribs. 1. Suboptimal examination secondary to respiratory motion artifact. No evidence of a central pulmonary embolism, however, segmental and subsegmental emboli cannot be excluded. 2. Noncalcified pulmonary nodules are noted in the right lower lobe and left upper lobe which are new compared to the 2017 and 2018 exams. While these could be related to an infectious etiology, metastatic disease cannot be excluded. 3. Permeative destructive lesions involving the T4 and T2 vertebral bodies are stable. Sclerotic lesions along the anterior left 5th and 7th ribs are also unchanged. 4. Cirrhosis with portal hypertension and marked intra-abdominal ascites, not appreciably changed. 5. Atherosclerotic disease. 6. Small bilateral pleural effusions, slightly increased on the left. Ct Chest Pulmonary Embolism W Contrast    Result Date: 5/16/2019  EXAMINATION: CT OF THE ABDOMEN AND PELVIS WITH CONTRAST; CTA OF THE CHEST 5/16/2019 12:42 am TECHNIQUE: CT of the abdomen and pelvis was performed with the administration of intravenous contrast. Multiplanar reformatted images are provided for review. Dose modulation, iterative reconstruction, and/or weight based adjustment of the mA/kV was utilized to reduce the radiation dose to as low as reasonably achievable.; CTA of the chest was performed after the administration of intravenous contrast.  Multiplanar reformatted images are provided for review. MIP images are provided for review. Dose modulation, iterative reconstruction, and/or weight based adjustment of the mA/kV was utilized to reduce the radiation dose to as low as reasonably achievable.  COMPARISON: None HISTORY: ORDERING SYSTEM PROVIDED HISTORY: abd pain TECHNOLOGIST PROVIDED HISTORY: Ordering Physician Provided Reason for Exam: Abdominal pain, bloating Acuity: Acute Relevant Medical/Surgical History: surgeries to area of interest include hernia repair; [facility] HISTORY: PE TECHNOLOGIST PROVIDED HISTORY: Ordering Physician Provided Reason for Exam: Patient denies any SOB or chest pain at this time Acuity: Acute FINDINGS: Chest: Mediastinum: Heart is mildly enlarged in size. Trace pericardial fluid versus pericardial thickening. Main pulmonary artery mildly enlarged measuring 3.1 cm in transverse dimension. No convincing evidence of central or lobar pulmonary emboli. Segmental subsegmental branches are less optimally evaluated due to motion artifacts and timing of contrast bolus. No suspicious mediastinal, hilar, or axillary not identified per CT criteria. Lungs/pleura: Small right-sided and trace left-sided pleural effusions. Pleuroparenchymal bands versus subsegmental atelectasis identified involving both lower lobes. Soft Tissues/Bones: There is redemonstration of the mixed sclerotic and lytic lesion involving the T2 vertebral body with surrounding pair spinal soft tissue attenuation. This extends into the pedicles and facets on the left. Additional mix additional sclerotic and lytic lesion identified involving T4 similar prior examination. Subtle areas sclerosis is suspected subacute nondisplaced fractures anterior left 5th, 7th and 8th ribs similar prior exam. Abdomen/Pelvis: Organs: There are innumerable hypodensities identified throughout the right and left hepatic lobes which have progressed from prior examination. A suggestion of a central ill-defined area of hypoattenuation which may represent areas of posterior related changes/fibrosis versus perfusion differences. There is mild gallbladder wall thickening. No definite cholelithiasis. The spleen, adrenal glands, kidneys, pancreas unremarkable. GI/Bowel: Mild-to-moderate retained stool throughout the colon. Mild-to-moderate scattered colonic diverticulosis. Appendix not identified. Pelvis: Mild bladder wall thickening related to underdistention. Tiny fat containing inguinal hernias.   Prostate unremarkable. Peritoneum/Retroperitoneum: Moderate volume of ascites. Similar prior examination. Bones/Soft Tissues: Multilevel degenerate changes of the lumbar spine. CTA Chest: No convincing evidence of central or lobar pulmonary emboli. Distal segmental subsegmental branches not well evaluated. Moderate size right-sided effusion and trace left-sided effusion. Stable osseous metastatic disease with peers spinal soft tissue attenuation involving the T2 vertebral body. CT of the abdomen pelvis: No significant change in the innumerable hepatic lesions worrisome for metastatic disease as well as moderate burden of ascites. Bladder wall thickening could be infectious inflammatory or could be related to underdistention. .  Please correlate with urinalysis findings. Ct Chest Pulmonary Embolism W Contrast    Result Date: 5/10/2019  EXAMINATION: CTA OF THE CHEST 5/10/2019 12:31 pm TECHNIQUE: CTA of the chest was performed after the administration of intravenous contrast.  Multiplanar reformatted images are provided for review. MIP images are provided for review. Dose modulation, iterative reconstruction, and/or weight based adjustment of the mA/kV was utilized to reduce the radiation dose to as low as reasonably achievable. COMPARISON: CT PE dated 04/08/2019 HISTORY: ORDERING SYSTEM PROVIDED HISTORY: cough, sob, tachycardia, cancer TECHNOLOGIST PROVIDED HISTORY: Ordering Physician Provided Reason for Exam: cough, sob, tachycardia, cancer. denies previous chest surgeries. FINDINGS: Pulmonary Arteries: Distal segmental and subsegmental pulmonary arteries are limited by respiratory motion and suboptimal bolus timing, despite repeat imaging. No large central or proximal segmental pulmonary embolus. Mediastinum: The heart is normal in size. There is no pericardial effusion. Thoracic aorta is normal in caliber without obvious intimal dissection. Coronary artery calcifications and stents are noted.   No mediastinal or hilar lymphadenopathy. No axillary lymphadenopathy. Right chest port is in place with distal tip at the cavoatrial junction. Lungs/pleura: There is a tiny right pleural effusion and trace left pleural fluid. There is mild bibasilar atelectasis. No focal airspace consolidation or pneumothorax. No evidence of pulmonary nodule or mass. Upper Abdomen: There is focal heterogeneous attenuation in the right hepatic dome. Irregular hypodense lesion is also noted along the anterior falciform ligament. There are several tiny hypodensities throughout the right liver, and overall attenuation is coarsened. There is a moderate volume of ascites in the upper abdomen. Spleen is enlarged, measuring 16.3 cm. Soft Tissues/Bones: There are mixed lytic and sclerotic lesions at T2 and T4. Both have soft tissue components which are not significantly changed from 04/08/2019. Subtle sclerosis is noted at the anterior left 5th, 7th, and 8th ribs. Underlying nondisplaced subacute fractures are suspected. 1.  Limited assessment of distal segmental and subsegmental pulmonary arteries due to respiratory motion and suboptimal bolus timing, despite repeat imaging. No central or proximal segmental pulmonary embolus. 2.  Tiny right pleural effusion and trace left pleural fluid with mild bibasilar atelectasis. 3.  Indistinct hypodense lesions in the liver, likely metastases. 4.  Moderate volume upper abdominal ascites, new from the previous study. 5.  Splenomegaly. 6.  Redemonstration of metastatic lesions at T2 and T4, similar to 04/08/2019. Subtle areas of sclerosis with suspected subacute nondisplaced fractures are noted at the anterior left 5th, 7th, and 8th ribs.      Us Abdomen Limited    Result Date: 5/4/2019  EXAMINATION: RIGHT UPPER QUADRANT ULTRASOUND, 5/4/2019 9:39 am COMPARISON: 04/18/2019 HISTORY: ORDERING SYSTEM PROVIDED HISTORY: Check for ascites TECHNOLOGIST PROVIDED HISTORY: Check for ascites Abd distention, hx liver mets r/o cholecystitis Acuity: Acute Type of Exam: Initial FINDINGS: LIVER:  The liver demonstrates some abnormal echotexture as well as hepatomegaly with the liver measuring 27 cm. Liver nodularity noted which was also seen on the previous CT evaluation. BILIARY SYSTEM:  Gallbladder wall is severely thickened at 9.2 mm. There is evidence of gallbladder polyps. No evidence of gallbladder stones. Negative Iyer sign. Common bile duct is within normal limits measuring 0.61 cm. RIGHT KIDNEY: The right kidney is grossly unremarkable without evidence of hydronephrosis. OTHER: Moderate ascites noted in the abdomen and pelvis. 1. Moderate ascites. 2. Enlarged inhomogeneous nodular liver as previously noted on the CT evaluation. Neoplasm should be considered. 3. Thickened gallbladder wall as well as gallbladder polyps. The gallbladder does not appear distended however. No evidence of gallbladder stones. Acute cholecystitis is unlikely. Ir Us Guided Paracentesis    Result Date: 5/17/2019  PROCEDURE: ULTRASOUND GUIDED PARACENTESIS 5/17/2019 HISTORY: ORDERING SYSTEM PROVIDED HISTORY: ascites TECHNOLOGIST PROVIDED HISTORY: ascites TECHNIQUE: This procedure was performed by Dr. Nita Ohara. Informed consent was obtained after a detailed explanation of the procedure including risks, benefits, and alternatives. Universal protocol was followed. Ultrasound shows a moderate amount of ascites. The right abdomen was prepped and draped in sterile fashion and local anesthesia was achieved with lidocaine. Using realtime ultrasound guidance a 5 Brazilian centesis catheter/needle was advanced into the peritoneal fluid. Ultrasound images show a safe tract and access into the fluid. Fluid was collected in vacuum bottles. Little to no fluid remains on completion. The catheter was removed and a sterile dressing applied. There are no immediate complications. The patient left the department in stable condition.  FINDINGS: A total of 2.5 L of clear dark yellow fluid was removed. A sample was sent for the requested studies. Successful ultrasound guided paracentesis. Ir Us Guided Paracentesis    Result Date: 5/6/2019  PROCEDURE: ULTRASOUND GUIDED PARACENTESIS 5/6/2019 HISTORY: ORDERING SYSTEM PROVIDED HISTORY: Suspected SBP TECHNOLOGIST PROVIDED HISTORY: Suspected SBP TECHNIQUE: This procedure was performed by Dr. Maxx Jeffery. Informed consent was obtained after a detailed explanation of the procedure including risks, benefits, and alternatives. Universal protocol was followed. Ultrasound shows a small to moderate amount of ascites. The right abdomen was prepped and draped in sterile fashion and local anesthesia was achieved with lidocaine. Using realtime ultrasound guidance a 6 Nigerien pigtail catheter was advanced into the peritoneal fluid. Ultrasound images show a safe tract and access into the fluid. Fluid was collected in vacuum bottles. Little to no fluid remains on completion. The catheter was removed and a sterile dressing applied. There are no immediate complications. The patient left the department in stable condition. FINDINGS: A total of 850 mL of clear yellow fluid was removed. Fluid was sent for the requested studies. Successful ultrasound guided paracentesis. Nm Hepatobiliary Scan W Ejection Fraction    Result Date: 4/20/2019  EXAMINATION: NUCLEAR MEDICINE HEPATOBILIARY SCINTIGRAPHY (HIDA SCAN). 4/20/2019 10:13 am TECHNIQUE: Approximately 3.0 [id number] Mebrofenin (Choletec) was administered IV. Then, dynamic images of the abdomen were obtained in the anterior projection for 60 mins. A right lateral view was also obtained at 60 mins.  COMPARISON: Ultrasound 04/18/2019 HISTORY: ORDERING SYSTEM PROVIDED HISTORY: pain TECHNOLOGIST PROVIDED HISTORY: Ordering Physician Provided Reason for Exam: Pain Acuity: Unknown Type of Exam: Unknown Additional signs and symptoms: pt states no real abdominal pain, came in for general fatigue, pt states he has liver an dcolon cancer FINDINGS: Gallbladder is not visualized. Activity is seen within the small bowel. Delayed excretion of radiotracer by the liver. Nonvisualization of the gallbladder suggesting acute cholecystitis. Delayed excretion by the liver suggesting hepatic dysfunction. Physical Examination:        Physical Exam   Constitutional: He is oriented to person, place, and time. No distress. HENT:   Mouth/Throat: Oropharynx is clear and moist.   Eyes: Scleral icterus (mild) is present. Neck: Neck supple. Cardiovascular: Normal rate, regular rhythm and normal heart sounds. Pulmonary/Chest: Effort normal and breath sounds normal.   Abdominal: Soft. Bowel sounds are normal. He exhibits distension. There is no tenderness. There is no rebound. Musculoskeletal: He exhibits edema (+2 pitting bilteral LE). He exhibits no tenderness. Neurological: He is alert and oriented to person, place, and time. Skin: Skin is warm and dry. Nursing note and vitals reviewed.         Assessment:        Primary Problem  Sepsis [facility])    Active Hospital Problems    Diagnosis Date Noted    Sepsis [facility]) [A41.9] 05/16/2019    Malignant neoplasm of colon ([facility] [address] [C18.9]     Colorectal cancer, stage IV ([facility] [address] [C19]     Liver metastasis ([facility] [address] [C78.7] 12/04/2016    Type 2 diabetes mellitus with hyperglycemia, without long-term current use of insulin ([facility] [address] [E11.65] 05/10/2012    HTN (hypertension) [I10] 05/10/2012       Plan:        Rule out Sepsis   WBC 15.9 (17)   Lactic acid trending down   Anion gap wnl   Vanc, rocephin   Blood cx x2  LD: 1126  GI consulted - appreciate recs    -paracentesis today to r/o SBP   -albumin and lactulose    Received Paracentesis 5/17, 2.5 L removed, SAAG 1.1     Colon cancer stage IV w/ liver mets   GI consulted - appreciate recs   Pt wants experimental tx at [address] [place] [address]     Hypocalcemia  Ionized calcium 1.09   Replace      DM II

## 2019-05-20 NOTE — PROGRESS NOTES
Patient refused to leave the restroom until he received his pain medication. RN attempted to assist patient to the bed. Patient unable to ambulate by himself. It was a 2 person assist to bring the patient back to bed. Will continue to monitor closely.

## 2019-05-20 NOTE — CONSULTS
.. PALLIATIVE CARE NURSING ASSESSMENT    Patient: Catracho Salcido  Room: 2091/2091-01    Reason For Consult   Goals of care evaluation  Distress management  Guidance and support  Facilitate communications  Assistance in coordinating care      Impression: Catracho Salcido is a 66y.o. year old male  has a past medical history of Back pain, CAD S/P percutaneous coronary angioplasty, Carcinoma ([address] Colon cancer ([address] Degeneration of lumbar or lumbosacral intervertebral disc, Depression, Diabetes mellitus ([address] H/O cardiac catheterization, Hepatic metastases ([address] Hyperlipidemia, Hypertension, Knee pain, and MI, old. .  Currently hospitalized for the management of . The Palliative Care Team is following to assist with goals of care and support. Vital Signs  Blood pressure (!) 107/50, pulse 108, temperature 98.6 °F (37 °C), temperature source Oral, resp. rate 16, height 5' 10\" (1.778 m), weight 245 lb 2.4 oz (111.2 kg), SpO2 93 %.     Patient Active Problem List   Diagnosis    Hypertension, essential    Mixed hyperlipidemia    Knee pain    Type 2 diabetes mellitus with hyperglycemia, without long-term current use of insulin (HCC)    HTN (hypertension)    Hypercholesteremia    Insomnia    Knee pain, bilateral    Depression    Cervical sprain    Lumbar sprain    Back pain    Opioid dependence (HCC)    Degeneration of lumbar or lumbosacral intervertebral disc    Chronic midline low back pain without sciatica    ST elevation myocardial infarction (STEMI) ([address]    S/P PTCA  BMS to RCA - 11/26/2016 Dr. Jovana Leonard    CAD S/P percutaneous coronary angioplasty    Carcinoma [facility])    Type 2 myocardial infarction without ST elevation ([address]    Rectal bleed    Liver metastasis ([address]    Colorectal cancer, stage IV ([address]    Anemia    Iron deficiency anemia due to chronic blood loss    Neuropathy due to chemotherapeutic drug ([address]    Right hip pain    Radiation cystitis    Septic shock ([address]    Severe malnutrition (HCC)    [facility]    Elevated LFTs    Malignant neoplasm of colon ([facility]    Other ascites    Sepsis ([facility]       Palliative Interaction:Patient is in bed and he is weak though alert and oriented. His abdomen is round and firm, and they are planning a paracentesis today. His wife Radha Cuba is at the bedside. We talked about the plan per oncology, and the patient stated that he is taking an oral drug,and it made him sick and wiped him out. This was his option of treatment since traditional chemotherapy was not working. I asked him if he still wanted to continue with treatment, and he replied\" yes as long as I'm not in pain, and I can do some things. \"    I encouraged the patient and his wife to keep the communication about his wishes, so that Radha Cuba can honor his wishes for him. The patient stated \" she can do what she wants. \" I explained that it's his life ,and that his family and Radha Cuba would want to do what he would like, not what they would like. Radha Cuba nodded her head yes and was very tearful and emotional when we talked. I offered them both emotional support. I told the patient and wife Radha Cuba that I was here to support them through this journey, and would be available for any support that they needed. Radha Cuba is very appreciative for the visit and the support. Will follow for goals of care and cancer support. Goals/Plan of care  Education/support to staff  Education/support to family  Education/support to patient  Discharge planning/helping to coordinate care  Providing support for coping/adaptation/distress of family  Providing support for coping/adaptation/distress of patient  Discussing meaning/purpose   Continue with current plan of care  Code status clarified: Full Code  Palliative care orders introduced  Validating patient/family distress  Continued communication updates  The patient is agreeable to rehab for strengthening.  He does want to continue treatment for his cancer. Much emotional support offered. The patient is lethargic and oriented. His wife Azalee Primrose is at the bedside. The patient wants to continue to try cancer treatment is he is not in pain and he can still do some things. Will follow for goals of care and cancer support. Marianela Barragan [address] [facility] [place]  [facility]: [phone]  [address]: [phone]  Work Cell: [phone]

## 2019-05-21 NOTE — FLOWSHEET NOTE
Patient was asleep.     05/21/19 1359   Encounter Summary   Services provided to: Patient   Referral/Consult From: Palliative Care   Continue Visiting   (5/21/19)   Complexity of Encounter Low   Length of Encounter 15 minutes   Routine   Type Initial   Assessment Sleeping   Intervention Prayer;Sustaining presence/ Ministry of presence   Outcome Did not respond

## 2019-05-21 NOTE — FLOWSHEET NOTE
05/21/19 8756   Encounter Summary   Services provided to: Patient not available  (Sleeping)   Referral/Consult From: Palliative Care

## 2019-05-21 NOTE — PROGRESS NOTES
[address] [facility].    PROGRESS NOTE             5/21/2019    7:57 AM    Name:   Harvinder Florentino  MRN:     [id number]     Acct:      [de-identified]   Room:   2091/2091-01   Day:  2  Admit Date:  5/19/2019  2:40 AM    PCP:  No primary care provider on file. Code Status:  Full Code    Subjective:     C/C:   Chief Complaint   Patient presents with    Altered Mental Status    Tachycardia     Interval History Status: improved. Patient seen and chart reviewed, continues to be tired, mild abdominal discomfort. 2 bowel movements yesterday, loose. No urinary complaints. Motivated to go to skilled nursing facility for rehab. Review of Systems:     Review of Systems  Constitutional: Positive for fatigue. Negative for chills and fever. Respiratory: Negative for cough and shortness of breath. Cardiovascular: Negative for chest pain, palpitations and leg swelling. Gastrointestinal: Negative for constipation, diarrhea, nausea and vomiting. Genitourinary: Negative for dysuria and frequency. Musculoskeletal: Negative for myalgias. Neurological: Negative for weakness and headaches. Medications: Allergies:     Allergies   Allergen Reactions    Morphine Itching and Rash       Current Meds:   Scheduled Meds:    sodium chloride flush  10 mL Intravenous 2 times per day    enoxaparin  30 mg Subcutaneous BID    vancomycin (VANCOCIN) intermittent dosing (placeholder)   Other RX Placeholder    vancomycin  1,500 mg Intravenous Q12H    sodium chloride flush  10 mL Intravenous 2 times per day    enoxaparin  40 mg Subcutaneous Daily    cefTRIAXone (ROCEPHIN) IV  1 g Intravenous Q24H    fentaNYL  1 patch Transdermal Q72H    lactulose  20 g Oral BID     Continuous Infusions:    sodium chloride 75 mL/hr at 05/21/19 0442     PRN Meds: sodium chloride flush, acetaminophen, sodium chloride flush, sodium chloride flush, magnesium hydroxide, ondansetron, acetaminophen, oxyCODONE HCl    Data:     Past Medical History:   has a past medical history of Back pain, CAD S/P percutaneous coronary angioplasty, Carcinoma ([facility] Colon cancer ([facility] Degeneration of lumbar or lumbosacral intervertebral disc, Depression, Diabetes mellitus ([facility] H/O cardiac catheterization, Hepatic metastases ([facility] Hyperlipidemia, Hypertension, Knee pain, and MI, old. Social History:   reports that he has never smoked. He has never used smokeless tobacco. He reports that he drank alcohol. He reports that he does not use drugs. Family History:   Family History   Problem Relation Age of Onset    Heart Disease Mother     Stroke Mother     High Blood Pressure Mother     High Cholesterol Father        Vitals:  /74   Pulse 113   Temp 98.4 °F (36.9 °C) (Oral)   Resp 16   Ht 5' 10\" (1.778 m)   Wt 237 lb 3.4 oz (107.6 kg)   SpO2 97%   BMI 34.04 kg/m²   Temp (24hrs), Av.9 °F (36.6 °C), Min:97.3 °F (36.3 °C), Max:98.4 °F (36.9 °C)    Recent Labs     19  1150 19  1634 19  2017 19  0714   POCGLU 150* 216* 150* 143*       I/O(24Hr):     Intake/Output Summary (Last 24 hours) at 2019 0757  Last data filed at 2019 2306  Gross per 24 hour   Intake 1190 ml   Output 2250 ml   Net -1060 ml       Labs:    [unfilled]    Lab Results   Component Value Date/Time    SPECIAL NOT REPORTED 2019 02:00 PM     Lab Results   Component Value Date/Time    CULTURE NO GROWTH 9 HOURS 2019 02:00 PM       @DAMIÁN@    Radiology:    Xr Chest Standard (2 Vw)    Result Date: 5/10/2019  EXAMINATION: TWO XRAY VIEWS OF THE CHEST 5/10/2019 10:02 am COMPARISON: AP chest from 2019 HISTORY: ORDERING SYSTEM PROVIDED HISTORY: cough TECHNOLOGIST PROVIDED HISTORY: cough Ordering Physician Provided Reason for Exam: Cough congestion weakness today h/o cancer Acuity: Acute Type of Exam: Initial Additional signs and symptoms: Cough congestion weakness today h/o cancer Additional history includes anemia, colon carcinoma, hypertension, and diabetes. FINDINGS: Right-sided IJ port with unchanged tip position in the lower SVC. Overlying ECG monitor leads. Cardiomediastinal shadow stable. Low lung volumes with bibasilar atelectasis or perhaps scarring. No localized pulmonary opacity suspicious for infiltrate. No sizable pleural effusion. Bones and soft tissues intact. Gas-filled small bowel loops, best seen on the lateral projection, upper limits of normal.     No acute cardiopulmonary disease or interval change. Bibasilar atelectasis or scarring     Ct Head Wo Contrast    Result Date: 5/19/2019  EXAMINATION: CT OF THE HEAD WITHOUT CONTRAST  5/19/2019 3:50 am TECHNIQUE: CT of the head was performed without the administration of intravenous contrast. Dose modulation, iterative reconstruction, and/or weight based adjustment of the mA/kV was utilized to reduce the radiation dose to as low as reasonably achievable. COMPARISON: 05/16/2019 HISTORY: ORDERING SYSTEM PROVIDED HISTORY: ams TECHNOLOGIST PROVIDED HISTORY: Ordering Physician Provided Reason for Exam: AMS Acuity: Acute FINDINGS: BRAIN/VENTRICLES: The cerebral and cerebellar parenchyma demonstrate normal volume. There are no areas of acute hemorrhage, mass, or midline shift. No abnormal extra-axial fluid collections. The ventricles are normal in size. Vascular calcifications are noted in the carotid siphons. Gray-white differentiation is maintained. ORBITS: The visualized portion of the orbits demonstrate no acute abnormality. SINUSES: There is a left maxillary mucous retention cyst.  The mastoid air cells are clear. Cerumen is noted in the external auditory canals. SOFT TISSUES/SKULL:  The calvarium is intact. No appreciable scalp soft tissue swelling. 1. No acute intracranial abnormality, stable.      Ct Head Wo Contrast    Result Date: 5/16/2019  EXAMINATION: CT OF THE HEAD WITHOUT CONTRAST 5/16/2019 12:39 am TECHNIQUE: CT of the head was performed without the administration of intravenous contrast. Dose modulation, iterative reconstruction, and/or weight based adjustment of the mA/kV was utilized to reduce the radiation dose to as low as reasonably achievable. COMPARISON: 04/01/2019 HISTORY: ORDERING SYSTEM PROVIDED HISTORY: ams TECHNOLOGIST PROVIDED HISTORY: Ordering Physician Provided Reason for Exam: AMS, patient denies any head complaints at this time Acuity: Acute Relevant Medical/Surgical History: patient denies any surgeries to brain FINDINGS: BRAIN/VENTRICLES: There is no acute intracranial hemorrhage, mass effect or midline shift. No abnormal extra-axial fluid collection. The gray-white differentiation is maintained without evidence of an acute infarct. There is no evidence of hydrocephalus. ORBITS: The visualized portion of the orbits demonstrate no acute abnormality. SINUSES: The visualized paranasal sinuses and mastoid air cells demonstrate no acute abnormality. Small mucous retention cyst left maxillary antrum. SOFT TISSUES/SKULL:  No acute abnormality of the visualized skull or soft tissues. No acute intracranial abnormality. Ct Abdomen Pelvis W Iv Contrast Additional Contrast? None    Result Date: 5/19/2019  EXAMINATION: CT OF THE ABDOMEN AND PELVIS WITH CONTRAST 5/19/2019 3:46 am TECHNIQUE: CT of the abdomen and pelvis was performed with the administration of intravenous contrast. Multiplanar reformatted images are provided for review. Dose modulation, iterative reconstruction, and/or weight based adjustment of the mA/kV was utilized to reduce the radiation dose to as low as reasonably achievable.  COMPARISON: 05/16/2019 HISTORY: ORDERING SYSTEM PROVIDED HISTORY: abd pain TECHNOLOGIST PROVIDED HISTORY: Ordering Physician Provided Reason for Exam: SOB, chills Acuity: Acute Relevant Medical/Surgical History: patient has a hx of diabetes, surgeries to area of interest include liver biopsy, hernia repair FINDINGS: Lower Chest: The heart size is normal.  Coronary artery vascular calcifications are noted. There are small bilateral pleural effusions, slightly increased on the left side. There is respiratory motion artifact in the lung bases with associated areas of atelectasis. Organs: There is cirrhosis with multifocal diffuse infiltrative lesions noted throughout the liver, compatible with the patient's clinical history of metastatic disease. One of the largest lesions is noted in the right lobe centrally measuring approximately 8.2 x 5.7 cm. The gallbladder is contracted. The adrenal glands are unremarkable. The pancreas has several calcifications which may represent sequela of chronic pancreatitis. There is splenomegaly measuring 17.7 cm. No splenic lesion is identified. The kidneys are unremarkable. No hydronephrosis. GI/Bowel: The stomach and duodenal sweep are unremarkable. Stool is noted in the colon. There is respiratory motion artifact limiting evaluation of the hollow visceral organs. Pelvis: The bladder is partially distended with urine. The prostate and seminal vesicles are unremarkable. There is a right inguinal hernia containing fat. No inguinal or pelvic sidewall lymphadenopathy. Peritoneum/Retroperitoneum: Atherosclerotic plaque is noted in the aorta and its branch vessels. No retroperitoneal adenopathy. There is marked intra-abdominal ascites, similar to the previous study. No mesenteric adenopathy. No anterior abdominal wall defect. Bones/Soft Tissues: There is anasarca. Degenerative changes are noted along the spine and sacroiliac joints. 1. Cirrhosis with portal hypertension and marked ascites, not appreciably changed. 2. Infiltrative low-attenuation lesions are noted diffusely throughout the liver, similar to the previous study, compatible with diffuse metastatic disease. 3. Small bilateral pleural effusions, slightly increased in size on the left. Ct Abdomen Pelvis W Iv Contrast Additional Contrast? None    Result Date: 5/16/2019  EXAMINATION: CT OF THE ABDOMEN AND PELVIS WITH CONTRAST; CTA OF THE CHEST 5/16/2019 12:42 am TECHNIQUE: CT of the abdomen and pelvis was performed with the administration of intravenous contrast. Multiplanar reformatted images are provided for review. Dose modulation, iterative reconstruction, and/or weight based adjustment of the mA/kV was utilized to reduce the radiation dose to as low as reasonably achievable.; CTA of the chest was performed after the administration of intravenous contrast.  Multiplanar reformatted images are provided for review. MIP images are provided for review. Dose modulation, iterative reconstruction, and/or weight based adjustment of the mA/kV was utilized to reduce the radiation dose to as low as reasonably achievable. COMPARISON: None HISTORY: ORDERING SYSTEM PROVIDED HISTORY: abd pain TECHNOLOGIST PROVIDED HISTORY: Ordering Physician Provided Reason for Exam: Abdominal pain, bloating Acuity: Acute Relevant Medical/Surgical History: surgeries to area of interest include hernia repair; ORDERING SYSTEM PROVIDED HISTORY: PE TECHNOLOGIST PROVIDED HISTORY: Ordering Physician Provided Reason for Exam: Patient denies any SOB or chest pain at this time Acuity: Acute FINDINGS: Chest: Mediastinum: Heart is mildly enlarged in size. Trace pericardial fluid versus pericardial thickening. Main pulmonary artery mildly enlarged measuring 3.1 cm in transverse dimension. No convincing evidence of central or lobar pulmonary emboli. Segmental subsegmental branches are less optimally evaluated due to motion artifacts and timing of contrast bolus. No suspicious mediastinal, hilar, or axillary not identified per CT criteria. Lungs/pleura: Small right-sided and trace left-sided pleural effusions. Pleuroparenchymal bands versus subsegmental atelectasis identified involving both lower lobes. Soft Tissues/Bones:  There is redemonstration of the mixed sclerotic and lytic lesion involving the T2 vertebral body with surrounding pair spinal soft tissue attenuation. This extends into the pedicles and facets on the left. Additional mix additional sclerotic and lytic lesion identified involving T4 similar prior examination. Subtle areas sclerosis is suspected subacute nondisplaced fractures anterior left 5th, 7th and 8th ribs similar prior exam. Abdomen/Pelvis: Organs: There are innumerable hypodensities identified throughout the right and left hepatic lobes which have progressed from prior examination. A suggestion of a central ill-defined area of hypoattenuation which may represent areas of posterior related changes/fibrosis versus perfusion differences. There is mild gallbladder wall thickening. No definite cholelithiasis. The spleen, adrenal glands, kidneys, pancreas unremarkable. GI/Bowel: Mild-to-moderate retained stool throughout the colon. Mild-to-moderate scattered colonic diverticulosis. Appendix not identified. Pelvis: Mild bladder wall thickening related to underdistention. Tiny fat containing inguinal hernias. Prostate unremarkable. Peritoneum/Retroperitoneum: Moderate volume of ascites. Similar prior examination. Bones/Soft Tissues: Multilevel degenerate changes of the lumbar spine. CTA Chest: No convincing evidence of central or lobar pulmonary emboli. Distal segmental subsegmental branches not well evaluated. Moderate size right-sided effusion and trace left-sided effusion. Stable osseous metastatic disease with peers spinal soft tissue attenuation involving the T2 vertebral body. CT of the abdomen pelvis: No significant change in the innumerable hepatic lesions worrisome for metastatic disease as well as moderate burden of ascites. Bladder wall thickening could be infectious inflammatory or could be related to underdistention. .  Please correlate with urinalysis findings.      Xr Chest Portable    Result Date: size, particularly on the left. There is associated atelectasis in the lung bases. There is a noncalcified pulmonary nodule in the right lower lobe measuring 3 mm. This was not seen on the 04/07/2017 exam or the 07/02/2018 exam.  There is also a 4 x 4 mm nodule in the right lower lobe medially (series 2, image 171). There is a noncalcified pulmonary nodule in the left upper lobe measuring 5 x 5 mm (series 2, image 108). There is no focal lung infiltrate. No pneumothorax. Upper Abdomen: There is marked intra-abdominal ascites. There is cirrhosis with sequela of portal hypertension. Please see the dedicated CT of the abdomen and pelvis for further details. Soft Tissues/Bones: There is anasarca. Intramuscular lipoma is noted along the right pectoralis muscle, benign. No axillary adenopathy. Permeative destructive lesions are noted in the T2 and T4 vertebral bodies. Degenerative changes are noted along the spine. Right chest MediPort is noted. Sclerotic lesions are noted along the anterior left 5th and 7th ribs. 1. Suboptimal examination secondary to respiratory motion artifact. No evidence of a central pulmonary embolism, however, segmental and subsegmental emboli cannot be excluded. 2. Noncalcified pulmonary nodules are noted in the right lower lobe and left upper lobe which are new compared to the 2017 and 2018 exams. While these could be related to an infectious etiology, metastatic disease cannot be excluded. 3. Permeative destructive lesions involving the T4 and T2 vertebral bodies are stable. Sclerotic lesions along the anterior left 5th and 7th ribs are also unchanged. 4. Cirrhosis with portal hypertension and marked intra-abdominal ascites, not appreciably changed. 5. Atherosclerotic disease. 6. Small bilateral pleural effusions, slightly increased on the left.      Ct Chest Pulmonary Embolism W Contrast    Result Date: 5/16/2019  EXAMINATION: CT OF THE ABDOMEN AND PELVIS WITH CONTRAST; CTA OF THE CHEST 5/16/2019 12:42 am TECHNIQUE: CT of the abdomen and pelvis was performed with the administration of intravenous contrast. Multiplanar reformatted images are provided for review. Dose modulation, iterative reconstruction, and/or weight based adjustment of the mA/kV was utilized to reduce the radiation dose to as low as reasonably achievable.; CTA of the chest was performed after the administration of intravenous contrast.  Multiplanar reformatted images are provided for review. MIP images are provided for review. Dose modulation, iterative reconstruction, and/or weight based adjustment of the mA/kV was utilized to reduce the radiation dose to as low as reasonably achievable. COMPARISON: None HISTORY: ORDERING SYSTEM PROVIDED HISTORY: abd pain TECHNOLOGIST PROVIDED HISTORY: Ordering Physician Provided Reason for Exam: Abdominal pain, bloating Acuity: Acute Relevant Medical/Surgical History: surgeries to area of interest include hernia repair; ORDERING SYSTEM PROVIDED HISTORY: PE TECHNOLOGIST PROVIDED HISTORY: Ordering Physician Provided Reason for Exam: Patient denies any SOB or chest pain at this time Acuity: Acute FINDINGS: Chest: Mediastinum: Heart is mildly enlarged in size. Trace pericardial fluid versus pericardial thickening. Main pulmonary artery mildly enlarged measuring 3.1 cm in transverse dimension. No convincing evidence of central or lobar pulmonary emboli. Segmental subsegmental branches are less optimally evaluated due to motion artifacts and timing of contrast bolus. No suspicious mediastinal, hilar, or axillary not identified per CT criteria. Lungs/pleura: Small right-sided and trace left-sided pleural effusions. Pleuroparenchymal bands versus subsegmental atelectasis identified involving both lower lobes. Soft Tissues/Bones: There is redemonstration of the mixed sclerotic and lytic lesion involving the T2 vertebral body with surrounding pair spinal soft tissue attenuation.   This extends into the pedicles and facets on the left. Additional mix additional sclerotic and lytic lesion identified involving T4 similar prior examination. Subtle areas sclerosis is suspected subacute nondisplaced fractures anterior left 5th, 7th and 8th ribs similar prior exam. Abdomen/Pelvis: Organs: There are innumerable hypodensities identified throughout the right and left hepatic lobes which have progressed from prior examination. A suggestion of a central ill-defined area of hypoattenuation which may represent areas of posterior related changes/fibrosis versus perfusion differences. There is mild gallbladder wall thickening. No definite cholelithiasis. The spleen, adrenal glands, kidneys, pancreas unremarkable. GI/Bowel: Mild-to-moderate retained stool throughout the colon. Mild-to-moderate scattered colonic diverticulosis. Appendix not identified. Pelvis: Mild bladder wall thickening related to underdistention. Tiny fat containing inguinal hernias. Prostate unremarkable. Peritoneum/Retroperitoneum: Moderate volume of ascites. Similar prior examination. Bones/Soft Tissues: Multilevel degenerate changes of the lumbar spine. CTA Chest: No convincing evidence of central or lobar pulmonary emboli. Distal segmental subsegmental branches not well evaluated. Moderate size right-sided effusion and trace left-sided effusion. Stable osseous metastatic disease with peers spinal soft tissue attenuation involving the T2 vertebral body. CT of the abdomen pelvis: No significant change in the innumerable hepatic lesions worrisome for metastatic disease as well as moderate burden of ascites. Bladder wall thickening could be infectious inflammatory or could be related to underdistention. .  Please correlate with urinalysis findings.      Ct Chest Pulmonary Embolism W Contrast    Result Date: 5/10/2019  EXAMINATION: CTA OF THE CHEST 5/10/2019 12:31 pm TECHNIQUE: CTA of the chest was performed after the administration nondisplaced subacute fractures are suspected. 1.  Limited assessment of distal segmental and subsegmental pulmonary arteries due to respiratory motion and suboptimal bolus timing, despite repeat imaging. No central or proximal segmental pulmonary embolus. 2.  Tiny right pleural effusion and trace left pleural fluid with mild bibasilar atelectasis. 3.  Indistinct hypodense lesions in the liver, likely metastases. 4.  Moderate volume upper abdominal ascites, new from the previous study. 5.  Splenomegaly. 6.  Redemonstration of metastatic lesions at T2 and T4, similar to 04/08/2019. Subtle areas of sclerosis with suspected subacute nondisplaced fractures are noted at the anterior left 5th, 7th, and 8th ribs. Us Abdomen Limited    Result Date: 5/4/2019  EXAMINATION: RIGHT UPPER QUADRANT ULTRASOUND, 5/4/2019 9:39 am COMPARISON: 04/18/2019 HISTORY: ORDERING SYSTEM PROVIDED HISTORY: Check for ascites TECHNOLOGIST PROVIDED HISTORY: Check for ascites Abd distention, hx liver mets r/o cholecystitis Acuity: Acute Type of Exam: Initial FINDINGS: LIVER:  The liver demonstrates some abnormal echotexture as well as hepatomegaly with the liver measuring 27 cm. Liver nodularity noted which was also seen on the previous CT evaluation. BILIARY SYSTEM:  Gallbladder wall is severely thickened at 9.2 mm. There is evidence of gallbladder polyps. No evidence of gallbladder stones. Negative Iyer sign. Common bile duct is within normal limits measuring 0.61 cm. RIGHT KIDNEY: The right kidney is grossly unremarkable without evidence of hydronephrosis. OTHER: Moderate ascites noted in the abdomen and pelvis. 1. Moderate ascites. 2. Enlarged inhomogeneous nodular liver as previously noted on the CT evaluation. Neoplasm should be considered. 3. Thickened gallbladder wall as well as gallbladder polyps. The gallbladder does not appear distended however. No evidence of gallbladder stones. Acute cholecystitis is unlikely. exhibits distension. There is no tenderness. There is no rebound. Musculoskeletal: He exhibits edema (+2 pitting bilteral LE). He exhibits no tenderness. Neurological: He is alert and oriented to person, place, and time. Skin: Skin is warm and dry. Nursing note and vitals reviewed. Assessment:        Primary Problem  Sepsis [facility])    Active Hospital Problems    Diagnosis Date Noted    [facility] [E43] 05/20/2019    Sepsis ([facility] [A41.9] 05/16/2019    Malignant neoplasm of colon ([facility] [C18.9]     Colorectal cancer, stage IV ([facility] [C19]     Liver metastasis ([facility] [C78.7] 12/04/2016    Type 2 diabetes mellitus with hyperglycemia, without long-term current use of insulin ([facility] [E11.65] 05/10/2012    HTN (hypertension) [I10] 05/10/2012       Plan:        Rectal cancer stage IV w/ liver mets. Received Paracentesis 5/17, 2.5 L removed, SAAG 1.1, same evening readmission post discharge, 5/18   Concern for sbp/sepsis. Elevated AG acidosis and lactic acidosis on admission, along with lethargy elevated wbc, LD.  afib rvr. Possibly hypovolemic shock 2/2 lactulose. Will hold. GI consulted - appreciate recs. cx negative to date, peritoneal fluid analysis pmn < 250 SAAG > 1.1 consistent with no sbp again. 3.7 l out in LVP 5/20. On vanc rocephin currently, empiric coverage for sepsis. F/up cultures. Discussed code status. Pt wants experimental tx at [address] [place] [address]   Palliative care following, appreciate recs. SW following, coordinating discharge to [facility] for rehab.       Anemia, iron deficiency, likely 2/2 bleeding from the tumor. And anemia of chronic disease. Iron 24, tibc, 89, uibc 65.      DM II   HbA1c: 11.5 (4/18)   Insulin sliding scale   Hypoglycemic protocol        DVT prophylaxis: Lovenox 30 mg SC bid  GI prophylaxis: Famotidine 20 mg BID      Plan will be discussed with the attending, Dr Geovanna Centeno MD  PGY I Transitional Year Resident  5/21/2019 7:57 AM     Attestation and add on       I have discussed the care of Rain Lam , including pertinent history and exam findings,    today with the resident. I have seen and examined the patient and the key elements of all parts of the encounter have been performed by me . I agree with the assessment, plan and orders as documented by the resident. Principal Problem:    Sepsis ([facility]  Active Problems:    Type 2 diabetes mellitus with hyperglycemia, without long-term current use of insulin (HCC)    HTN (hypertension)    Liver metastasis (HCC)    Colorectal cancer, stage IV (HCC)    Severe malnutrition (HCC)    Malignant neoplasm of colon ([facility] [address]  Resolved Problems:    * No resolved hospital problems. *        advanced end stage metastatic colon cancer , ascites , liver mets . Consultation  Reviewed ,  .    [] Cardiology           [] Nephrology  []. Hemo onco    [x] GI    [] ID      [] Pulmonary      [] Neuro                                  [] ---         Following input noted ; 1. Sepsis with hx metastatic colon cancer, ascites; improved  -paracentesis negative for SBP  -continue the lactulose and xifaxan  -monitor for bleeding  -antibiotics per primary service  -2 gm low salt diet  -will need follow up at the [facility] on discharge                          Social service input;  LSW following for Placement to [facility]. Waiting in Pre cert     Patient had Paracentesis yesterday and IR removed 3.7 liters. Patient has metastatic Colon cancer and sepsis with Ascites        ---poor prognosis . Condition poor . - ;     [address]  [address] [facility], [address] [place] [address].    Phone [phone]   Fax: [phone]  Answering Service: [phone]

## 2019-05-21 NOTE — PROGRESS NOTES
[facility]    Gastroenterology Daily Progress Note      Patient:   Octavio Ruiz   :    1960   Facility:   [facility]  Date:     2019  Consultant:   Johanny Neal, CNP      SUBJECTIVE  66 y.o. male admitted 2019 with Sepsis ([facility] [address] [A41.9]  Sepsis ([facility] [address] [A41.9] and seen for sepsis with ascites, metastatic colon cancer. The pt was seen and examined. His paracentesis yesterday showed no SBP; IR removed 3.7 liters. He denies abdominal pain and nausea, no overt bleeding. He has been afebrile. He had two loose non bloody stools overnight. OBJECTIVE  Scheduled Meds:   sodium chloride flush  10 mL Intravenous 2 times per day    enoxaparin  30 mg Subcutaneous BID    vancomycin (VANCOCIN) intermittent dosing (placeholder)   Other RX Placeholder    vancomycin  1,500 mg Intravenous Q12H    sodium chloride flush  10 mL Intravenous 2 times per day    cefTRIAXone (ROCEPHIN) IV  1 g Intravenous Q24H    fentaNYL  1 patch Transdermal Q72H       Vital Signs:  /74   Pulse 113   Temp 98.4 °F (36.9 °C) (Oral)   Resp 16   Ht 5' 10\" (1.778 m)   Wt 237 lb 3.4 oz (107.6 kg)   SpO2 97%   BMI 34.04 kg/m²      Physical Exam:   General appearance: Alert & oriented, NAD  Lungs: CTA bilaterally    Heart: S1S2 RRR  Abdomen: Soft, Nontender,  distended, BS WNL  Skin: No jaundice, No clubbing, No cyanosis    Lab and Imaging Review     CBC  Recent Labs     19  0243 19  0555 19  0536   WBC 17.0* 15.9* 14.3*   HGB 8.5* 7.5* 7.3*   HCT 27.8* 24.1* 23.7*   MCV 83.2 83.0 83.7   * 92* 80*       BMP  Recent Labs     19  0243 19  0555 19  0536   * 129* 130*   K 3.6* 3.7 3.9   CL 86* 91* 92*   CO2 20 25 24   BUN 5* 4* 5*   CREATININE 0.45* <0.40* 0.67*   GLUCOSE 194* 134* 157*   CALCIUM 8.3* 7.5* 7.9*   Results for Albino Bull (MRN [id number]) as of 2019 10:51 paracentesis   Ref.  Range 2019 14:00   Appearance, inguinal or pelvic sidewall lymphadenopathy.       Peritoneum/Retroperitoneum: Atherosclerotic plaque is noted in the aorta and   its branch vessels.  No retroperitoneal adenopathy. Paty Factor is marked   intra-abdominal ascites, similar to the previous study.  No mesenteric   adenopathy.  No anterior abdominal wall defect.       Bones/Soft Tissues: There is anasarca.  Degenerative changes are noted along   the spine and sacroiliac joints.           Impression   1. Cirrhosis with portal hypertension and marked ascites, not appreciably   changed. 2. Infiltrative low-attenuation lesions are noted diffusely throughout the   liver, similar to the previous study, compatible with diffuse metastatic   disease. 3. Small bilateral pleural effusions, slightly increased in size on the left.              ASSESSMENT/PLAN:  1. Sepsis with hx metastatic colon cancer, ascites; improved  -paracentesis negative for SBP  -continue the lactulose and xifaxan  -monitor for bleeding  -antibiotics per primary service  -2 gm low salt diet  -will need follow up at the [facility] on discharge      GI signing off    This plan was formulated in collaboration with Dr. Gerri Bassett .     Electronically signed by: ASIM East - CNP on 5/21/2019 at 10:48 AM

## 2019-05-21 NOTE — CARE COORDINATION
ONGOING DISCHARGE PLAN:    LSW following for Placement to [facility]. Waiting in Pre cert    Patient had Paracentesis yesterday and IR removed 3.7 liters. Patient has metastatic Colon cancer and sepsis with Ascites     Remains on IV Rocephin, IV Vancomycin, IVF    Will continue to follow for additional discharge needs.     Electronically signed by Valerie Giang RN on 5/21/2019 at 12:01 PM

## 2019-05-21 NOTE — PLAN OF CARE
Problem: Risk for Impaired Skin Integrity  Goal: Tissue integrity - skin and mucous membranes  Description  Structural intactness and normal physiological function of skin and  mucous membranes. 5/21/2019 1647 by Beata Johnson RN  Outcome: Ongoing  Note:   No new areas of skin breakdown noted. Pt is able to reposition self in bed. Problem: Falls - Risk of:  Goal: Will remain free from falls  Description  Will remain free from falls  5/21/2019 1647 by Beata Johnson RN  Outcome: Ongoing  Note:   Pt remains free from falls this shift. Bed is locked, in lowest position, and call light within reach. Problem: Mobility - Impaired:  Goal: Mobility will improve  Description  Mobility will improve  5/21/2019 1647 by Beata Johnson RN  Outcome: Ongoing  Note:   Pt is able to ambulate in room with standby.

## 2019-05-22 NOTE — PLAN OF CARE
Problem: Risk for Impaired Skin Integrity  Goal: Tissue integrity - skin and mucous membranes  Description  Structural intactness and normal physiological function of skin and  mucous membranes. 5/22/2019 0347 by Tyesha Steele RN  Outcome: Ongoing   Skin integrity improved/maintained this shift. See head to toe assessment. Problem: Falls - Risk of:  Goal: Will remain free from falls  Description  Will remain free from falls  5/22/2019 0347 by Tyesha Steele RN  Outcome: Ongoing   Pt. Free of falls and injuries this shift. Bed low and locked. Siderails up x2. Personal items and call light in reach. Bed alarm on. Pt calls out appropriately for assistance. Problem: Mobility - Impaired:  Goal: Mobility will improve  Description  Mobility will improve  5/22/2019 0347 by Tyesha Steele RN  Outcome: Ongoing   Pt ambulates well with standby assist, he knows his limits to his mobility.

## 2019-05-22 NOTE — FLOWSHEET NOTE
05/22/19 1119   Encounter Summary   Services provided to: Patient   Referral/Consult From: Palliative Care   Complexity of Encounter Low   Length of Encounter 15 minutes   Spiritual/Evangelical   Type Spiritual support   Assessment Sleeping   Intervention Prayer   Outcome Did not respond

## 2019-05-22 NOTE — PROGRESS NOTES
Physical Therapy  [facility]     Date: 19  Patient Name: Cong Marshall       Room: 8993/0391-44  MRN: [id number]  Account: [de-identified]   : 1960  (66 y.o.) Gender: male   AM-PAC score as of today's function. 19 1508   AM-PAC Mobility Inpatient    How much difficulty turning over in bed? 4   How much difficulty sitting down on / standing up from a chair with arms? 3   How much difficulty moving from lying on back to sitting on side of bed? 4   How much help from another person moving to and from a bed to a chair? 3   How much help from another person needed to walk in hospital room? 2   How much help from another person for climbing 3-5 steps with a railing?  1   AM-PAC Inpatient Mobility Raw Score  17   AM-PAC Inpatient T-Scale Score  42.13   Mobility Inpatient CMS 0-100% Score 50.57   Mobility Inpatient CMS G-Code Modifier  CK   Electronically signed by Rajan Booth PT on 2019 at 3:10 PM

## 2019-05-22 NOTE — PROGRESS NOTES
Spoke with RN, due to time of pt receiving a contraindicated drug the Pt's HIDA scan will be done in the am. Writer informed RN that pt did have HIDA scan done as recently as last month. Please keep Pt NPO and NO PAIN MEDS after 4am. Any questions please call [phone].  Electronically signed by Quinton Olivo on 5/22/2019 at 1:33 PM

## 2019-05-22 NOTE — CONSULTS
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    with cardiac arrest at [facility]      Past Surgical History:   Procedure Laterality Date    CORONARY ANGIOPLASTY WITH STENT PLACEMENT  11/2016    x1 stent    ENDOSCOPIC ULTRASOUND (LOWER) N/A 12/8/2017    RECTAL ENDOSCOPIC ULTRASOUND WITH PATHOLOGY performed by Isadora Rueda MD at [facility] LIVER BIOPSY Right 08/23/2018    CT guided Visceral Tissue Ablation    DE SIGMOIDOSCOPY FLX W/RMVL FOREIGN BODY N/A 5/25/2017    SIGMOIDOSCOPY BIOPSY FLEXIBLE performed by Isadora Rueda MD at [facility]    DE SIGMOIDOSCOPY FLX W/RMVL FOREIGN BODY  12/8/2017    SIGMOIDOSCOPY BIOPSY FLEXIBLE performed by Isadora Rueda MD at [facility]    TUNNELED VENOUS PORT PLACEMENT Right     right upper chest for cancer treatment          Admission Meds  No current facility-administered medications on file prior to encounter. Current Outpatient Medications on File Prior to Encounter   Medication Sig Dispense Refill    glucose monitoring kit (FREESTYLE) monitoring kit 1 kit by Does not apply route daily 1 kit 0    Lancets MISC 1 each by Does not apply route daily 100 each 3    insulin glargine (LANTUS) 100 UNIT/ML injection vial Inject 20 Units into the skin nightly      glimepiride (AMARYL) 4 MG tablet Take 1 tablet by mouth every morning 30 tablet 3    oxyCODONE HCl (OXY-IR) 10 MG immediate release tablet Take 20 mg by mouth 3 times daily as needed for Pain.  diphenhydrAMINE (BENADRYL) 25 MG capsule Take 1 capsule by mouth nightly as needed for Sleep 12 capsule 0    fentaNYL (DURAGESIC) 50 MCG/HR Place 1 patch onto the skin every 72 hours.        naloxone (NARCAN) 4 MG/0.1ML LIQD nasal spray Take 4 mg by mouth      potassium chloride (KLOR-CON M) 20 MEQ extended release tablet Take 1 tablet by mouth daily 30 tablet 3    metoprolol tartrate (LOPRESSOR) 25 MG tablet Take 1 tablet by mouth daily       Glucose Blood (BLOOD GLUCOSE TEST STRIPS) STRP 1 each by In Vitro route daily As needed. 100 strip 5    Insulin Pen Needle 29G X 12.7MM MISC 1 each by Does not apply route daily 100 each 0    nitroGLYCERIN (NITROSTAT) 0.4 MG SL tablet Place 1 tablet under the tongue every 5 minutes as needed for Chest pain Dissolve 1 tablet under tongue for chest pain and repeat every 5 min up to max of 3 total doses. If no relief after 3 doses call 911 25 tablet 3           Allergies  Allergies   Allergen Reactions    Morphine Itching and Rash        Social   Social History     Tobacco Use    Smoking status: Never Smoker    Smokeless tobacco: Never Used   Substance Use Topics    Alcohol use: Not Currently                Family History   Problem Relation Age of Onset    Heart Disease Mother     Stroke Mother     High Blood Pressure Mother     High Cholesterol Father           Review of Systems  Today  No fever / chills / sweats. No visual change, eye pain, eye discharge. No oral lesion, sore throat, dysphagia. Denies cough / sputum. Denies chest pain, palpitations. Denies n / v /No diarrhea. Denies dysuria or change in urinary function. Denies joint swelling or pain. Denies focal weakness,   Other than above 14 systems reviewed were negative . Tolerating antibiotics.          Physical Exam  BP (!) 96/48   Pulse 95   Temp 98.2 °F (36.8 °C) (Oral)   Resp 14   Ht 5' 10\" (1.778 m)   Wt 240 lb 4.8 oz (109 kg)   SpO2 95%   BMI 34.48 kg/m²           General Appearance: alert and oriented to person, place and time, well-developed and well-nourished, in no acute distress  Skin: warm and dry, no rash , jaundice  Head: normocephalic and atraumatic  Eyes: pupils equal, round, and reactive to light  ENT: hearing grossly normal bilaterally  Neck: neck supple and non tender without mass  Pulmonary/Chest: clear to auscultation bilaterally- no wheezes, rales or rhonchi, normal air movement, no respiratory distress  Cardiovascular: normal rate, regular rhythm, normal S1 and S2, no murmurs. Abdomen: soft,distended, normal bowel sounds, no masses or organomegaly  Extremities: no cyanosis, clubbing , bilateral lower extremity edema  Musculoskeletal: normal range of motion, no joint swelling, deformity or tenderness  Neurologic: no cranial nerve deficit and muscle strength normal    Data Review:    Recent Labs     05/20/19  0555 05/21/19  0536 05/22/19  0607   WBC 15.9* 14.3* 17.4*   HGB 7.5* 7.3* 7.6*   HCT 24.1* 23.7* 24.9*   MCV 83.0 83.7 85.7   PLT 92* 80* 75*     Recent Labs     05/20/19 0555 05/21/19  0536 05/22/19  0607   * 130* 128*   K 3.7 3.9 4.0   CL 91* 92* 88*   CO2 25 24 24   BUN 4* 5* 7   CREATININE <0.40* 0.67* 1.06         Imaging Studies:                           All appropriate imaging studies and reports reviewed: Yes       Xr Chest Standard (2 Vw)    Result Date: 5/10/2019  EXAMINATION: TWO XRAY VIEWS OF THE CHEST 5/10/2019 10:02 am COMPARISON: AP chest from 05/03/2019 HISTORY: ORDERING SYSTEM PROVIDED HISTORY: cough TECHNOLOGIST PROVIDED HISTORY: cough Ordering Physician Provided Reason for Exam: Cough congestion weakness today h/o cancer Acuity: Acute Type of Exam: Initial Additional signs and symptoms: Cough congestion weakness today h/o cancer Additional history includes anemia, colon carcinoma, hypertension, and diabetes. FINDINGS: Right-sided IJ port with unchanged tip position in the lower SVC. Overlying ECG monitor leads. Cardiomediastinal shadow stable. Low lung volumes with bibasilar atelectasis or perhaps scarring. No localized pulmonary opacity suspicious for infiltrate. No sizable pleural effusion. Bones and soft tissues intact. Gas-filled small bowel loops, best seen on the lateral projection, upper limits of normal.     No acute cardiopulmonary disease or interval change.   Bibasilar atelectasis or scarring     Ct Head Wo hemorrhage, mass effect or midline shift. No abnormal extra-axial fluid collection. The gray-white differentiation is maintained without evidence of an acute infarct. There is no evidence of hydrocephalus. ORBITS: The visualized portion of the orbits demonstrate no acute abnormality. SINUSES: The visualized paranasal sinuses and mastoid air cells demonstrate no acute abnormality. Small mucous retention cyst left maxillary antrum. SOFT TISSUES/SKULL:  No acute abnormality of the visualized skull or soft tissues. No acute intracranial abnormality. Ct Abdomen Pelvis W Iv Contrast Additional Contrast? None    Result Date: 5/19/2019  EXAMINATION: CT OF THE ABDOMEN AND PELVIS WITH CONTRAST 5/19/2019 3:46 am TECHNIQUE: CT of the abdomen and pelvis was performed with the administration of intravenous contrast. Multiplanar reformatted images are provided for review. Dose modulation, iterative reconstruction, and/or weight based adjustment of the mA/kV was utilized to reduce the radiation dose to as low as reasonably achievable. COMPARISON: 05/16/2019 HISTORY: ORDERING SYSTEM PROVIDED HISTORY: abd pain TECHNOLOGIST PROVIDED HISTORY: Ordering Physician Provided Reason for Exam: SOB, chills Acuity: Acute Relevant Medical/Surgical History: patient has a hx of diabetes, surgeries to area of interest include liver biopsy, hernia repair FINDINGS: Lower Chest: The heart size is normal.  Coronary artery vascular calcifications are noted. There are small bilateral pleural effusions, slightly increased on the left side. There is respiratory motion artifact in the lung bases with associated areas of atelectasis. Organs: There is cirrhosis with multifocal diffuse infiltrative lesions noted throughout the liver, compatible with the patient's clinical history of metastatic disease. One of the largest lesions is noted in the right lobe centrally measuring approximately 8.2 x 5.7 cm. The gallbladder is contracted.   The adrenal glands are unremarkable. The pancreas has several calcifications which may represent sequela of chronic pancreatitis. There is splenomegaly measuring 17.7 cm. No splenic lesion is identified. The kidneys are unremarkable. No hydronephrosis. GI/Bowel: The stomach and duodenal sweep are unremarkable. Stool is noted in the colon. There is respiratory motion artifact limiting evaluation of the hollow visceral organs. Pelvis: The bladder is partially distended with urine. The prostate and seminal vesicles are unremarkable. There is a right inguinal hernia containing fat. No inguinal or pelvic sidewall lymphadenopathy. Peritoneum/Retroperitoneum: Atherosclerotic plaque is noted in the aorta and its branch vessels. No retroperitoneal adenopathy. There is marked intra-abdominal ascites, similar to the previous study. No mesenteric adenopathy. No anterior abdominal wall defect. Bones/Soft Tissues: There is anasarca. Degenerative changes are noted along the spine and sacroiliac joints. 1. Cirrhosis with portal hypertension and marked ascites, not appreciably changed. 2. Infiltrative low-attenuation lesions are noted diffusely throughout the liver, similar to the previous study, compatible with diffuse metastatic disease. 3. Small bilateral pleural effusions, slightly increased in size on the left. Ct Abdomen Pelvis W Iv Contrast Additional Contrast? None    Result Date: 5/16/2019  EXAMINATION: CT OF THE ABDOMEN AND PELVIS WITH CONTRAST; CTA OF THE CHEST 5/16/2019 12:42 am TECHNIQUE: CT of the abdomen and pelvis was performed with the administration of intravenous contrast. Multiplanar reformatted images are provided for review.  Dose modulation, iterative reconstruction, and/or weight based adjustment of the mA/kV was utilized to reduce the radiation dose to as low as reasonably achievable.; CTA of the chest was performed after the administration of intravenous contrast.  Multiplanar reformatted images are provided for review. MIP images are provided for review. Dose modulation, iterative reconstruction, and/or weight based adjustment of the mA/kV was utilized to reduce the radiation dose to as low as reasonably achievable. COMPARISON: None HISTORY: ORDERING SYSTEM PROVIDED HISTORY: abd pain TECHNOLOGIST PROVIDED HISTORY: Ordering Physician Provided Reason for Exam: Abdominal pain, bloating Acuity: Acute Relevant Medical/Surgical History: surgeries to area of interest include hernia repair; ORDERING SYSTEM PROVIDED HISTORY: PE TECHNOLOGIST PROVIDED HISTORY: Ordering Physician Provided Reason for Exam: Patient denies any SOB or chest pain at this time Acuity: Acute FINDINGS: Chest: Mediastinum: Heart is mildly enlarged in size. Trace pericardial fluid versus pericardial thickening. Main pulmonary artery mildly enlarged measuring 3.1 cm in transverse dimension. No convincing evidence of central or lobar pulmonary emboli. Segmental subsegmental branches are less optimally evaluated due to motion artifacts and timing of contrast bolus. No suspicious mediastinal, hilar, or axillary not identified per CT criteria. Lungs/pleura: Small right-sided and trace left-sided pleural effusions. Pleuroparenchymal bands versus subsegmental atelectasis identified involving both lower lobes. Soft Tissues/Bones: There is redemonstration of the mixed sclerotic and lytic lesion involving the T2 vertebral body with surrounding pair spinal soft tissue attenuation. This extends into the pedicles and facets on the left. Additional mix additional sclerotic and lytic lesion identified involving T4 similar prior examination. Subtle areas sclerosis is suspected subacute nondisplaced fractures anterior left 5th, 7th and 8th ribs similar prior exam. Abdomen/Pelvis: Organs: There are innumerable hypodensities identified throughout the right and left hepatic lobes which have progressed from prior examination.   A suggestion of a Embolism W Contrast    Result Date: 5/19/2019  EXAMINATION: CTA OF THE CHEST 5/19/2019 3:46 am TECHNIQUE: CTA of the chest was performed after the administration of intravenous contrast.  Multiplanar reformatted images are provided for review. MIP images are provided for review. Dose modulation, iterative reconstruction, and/or weight based adjustment of the mA/kV was utilized to reduce the radiation dose to as low as reasonably achievable. COMPARISON: 05/16/2019 HISTORY: ORDERING SYSTEM PROVIDED HISTORY: PE TECHNOLOGIST PROVIDED HISTORY: Ordering Physician Provided Reason for Exam: SOB, elevated d-dimer Acuity: Acute Relevant Medical/Surgical History: Patient has a hx of diabetes, surgeries to area of interest include Coronary angioplasty with stent placement, port placement FINDINGS: Pulmonary Arteries: There is adequate opacification of the pulmonary arteries with intravenous contrast.  There is no evidence of a central pulmonary embolism. Segmental and subsegmental emboli cannot entirely be excluded given the motion artifact on this exam. Mediastinum: The heart size is normal.  Coronary artery vascular calcifications are noted. No pericardial effusion. The thyroid gland is unremarkable. No mediastinal or hilar adenopathy. Lungs/pleura: There are small bilateral pleural effusions which have slightly increased in size, particularly on the left. There is associated atelectasis in the lung bases. There is a noncalcified pulmonary nodule in the right lower lobe measuring 3 mm. This was not seen on the 04/07/2017 exam or the 07/02/2018 exam.  There is also a 4 x 4 mm nodule in the right lower lobe medially (series 2, image 171). There is a noncalcified pulmonary nodule in the left upper lobe measuring 5 x 5 mm (series 2, image 108). There is no focal lung infiltrate. No pneumothorax. Upper Abdomen: There is marked intra-abdominal ascites. There is cirrhosis with sequela of portal hypertension.   Please see the mA/kV was utilized to reduce the radiation dose to as low as reasonably achievable. COMPARISON: None HISTORY: ORDERING SYSTEM PROVIDED HISTORY: abd pain TECHNOLOGIST PROVIDED HISTORY: Ordering Physician Provided Reason for Exam: Abdominal pain, bloating Acuity: Acute Relevant Medical/Surgical History: surgeries to area of interest include hernia repair; ORDERING SYSTEM PROVIDED HISTORY: PE TECHNOLOGIST PROVIDED HISTORY: Ordering Physician Provided Reason for Exam: Patient denies any SOB or chest pain at this time Acuity: Acute FINDINGS: Chest: Mediastinum: Heart is mildly enlarged in size. Trace pericardial fluid versus pericardial thickening. Main pulmonary artery mildly enlarged measuring 3.1 cm in transverse dimension. No convincing evidence of central or lobar pulmonary emboli. Segmental subsegmental branches are less optimally evaluated due to motion artifacts and timing of contrast bolus. No suspicious mediastinal, hilar, or axillary not identified per CT criteria. Lungs/pleura: Small right-sided and trace left-sided pleural effusions. Pleuroparenchymal bands versus subsegmental atelectasis identified involving both lower lobes. Soft Tissues/Bones: There is redemonstration of the mixed sclerotic and lytic lesion involving the T2 vertebral body with surrounding pair spinal soft tissue attenuation. This extends into the pedicles and facets on the left. Additional mix additional sclerotic and lytic lesion identified involving T4 similar prior examination. Subtle areas sclerosis is suspected subacute nondisplaced fractures anterior left 5th, 7th and 8th ribs similar prior exam. Abdomen/Pelvis: Organs: There are innumerable hypodensities identified throughout the right and left hepatic lobes which have progressed from prior examination. A suggestion of a central ill-defined area of hypoattenuation which may represent areas of posterior related changes/fibrosis versus perfusion differences.  There is mild gallbladder wall thickening. No definite cholelithiasis. The spleen, adrenal glands, kidneys, pancreas unremarkable. GI/Bowel: Mild-to-moderate retained stool throughout the colon. Mild-to-moderate scattered colonic diverticulosis. Appendix not identified. Pelvis: Mild bladder wall thickening related to underdistention. Tiny fat containing inguinal hernias. Prostate unremarkable. Peritoneum/Retroperitoneum: Moderate volume of ascites. Similar prior examination. Bones/Soft Tissues: Multilevel degenerate changes of the lumbar spine. CTA Chest: No convincing evidence of central or lobar pulmonary emboli. Distal segmental subsegmental branches not well evaluated. Moderate size right-sided effusion and trace left-sided effusion. Stable osseous metastatic disease with peers spinal soft tissue attenuation involving the T2 vertebral body. CT of the abdomen pelvis: No significant change in the innumerable hepatic lesions worrisome for metastatic disease as well as moderate burden of ascites. Bladder wall thickening could be infectious inflammatory or could be related to underdistention. .  Please correlate with urinalysis findings. Ct Chest Pulmonary Embolism W Contrast    Result Date: 5/10/2019  EXAMINATION: CTA OF THE CHEST 5/10/2019 12:31 pm TECHNIQUE: CTA of the chest was performed after the administration of intravenous contrast.  Multiplanar reformatted images are provided for review. MIP images are provided for review. Dose modulation, iterative reconstruction, and/or weight based adjustment of the mA/kV was utilized to reduce the radiation dose to as low as reasonably achievable. COMPARISON: CT PE dated 04/08/2019 HISTORY: ORDERING SYSTEM PROVIDED HISTORY: cough, sob, tachycardia, cancer TECHNOLOGIST PROVIDED HISTORY: Ordering Physician Provided Reason for Exam: cough, sob, tachycardia, cancer. denies previous chest surgeries.  FINDINGS: Pulmonary Arteries: Distal segmental and subsegmental pulmonary sclerosis with suspected subacute nondisplaced fractures are noted at the anterior left 5th, 7th, and 8th ribs. Us Abdomen Limited    Result Date: 5/4/2019  EXAMINATION: RIGHT UPPER QUADRANT ULTRASOUND, 5/4/2019 9:39 am COMPARISON: 04/18/2019 HISTORY: ORDERING SYSTEM PROVIDED HISTORY: Check for ascites TECHNOLOGIST PROVIDED HISTORY: Check for ascites Abd distention, hx liver mets r/o cholecystitis Acuity: Acute Type of Exam: Initial FINDINGS: LIVER:  The liver demonstrates some abnormal echotexture as well as hepatomegaly with the liver measuring 27 cm. Liver nodularity noted which was also seen on the previous CT evaluation. BILIARY SYSTEM:  Gallbladder wall is severely thickened at 9.2 mm. There is evidence of gallbladder polyps. No evidence of gallbladder stones. Negative Iyer sign. Common bile duct is within normal limits measuring 0.61 cm. RIGHT KIDNEY: The right kidney is grossly unremarkable without evidence of hydronephrosis. OTHER: Moderate ascites noted in the abdomen and pelvis. 1. Moderate ascites. 2. Enlarged inhomogeneous nodular liver as previously noted on the CT evaluation. Neoplasm should be considered. 3. Thickened gallbladder wall as well as gallbladder polyps. The gallbladder does not appear distended however. No evidence of gallbladder stones. Acute cholecystitis is unlikely. Ir Us Guided Paracentesis    Result Date: 5/20/2019  PROCEDURE: ULTRASOUND GUIDED PARACENTESIS 5/20/2019 HISTORY: ORDERING SYSTEM PROVIDED HISTORY: ascites r/o sbp TECHNOLOGIST PROVIDED HISTORY: ascites r/o sbp Recurrent abdominal ascites TECHNIQUE: This procedure was performed by Dr. Zuleyka Tsai. Informed consent was obtained after a detailed explanation of the procedure including risks, benefits, and alternatives. Universal protocol was followed. Ultrasound shows a moderate large amount of ascites.   The right abdomen was prepped and draped in sterile fashion and local anesthesia was achieved with

## 2019-05-22 NOTE — CARE COORDINATION
Social Work-PT evaluated pt for 6-Click score. Updated Rand Sero. They can admit prior to precert. HENS completed.  Beba Franklin

## 2019-05-22 NOTE — PROGRESS NOTES
Modified independent  Comment: Pt demos good technique. Transfers  Sit to Stand: Contact guard assistance  Stand to sit: Contact guard assistance  Comment: Pt requires vc's for safe technique. Pt demos decrease eccentric control. All transfers completed with RW. Ambulation  Ambulation?: Yes  Ambulation 1  Surface: level tile  Device: Rolling Walker  Assistance: Minimal assistance  Quality of Gait: Pt demos decrease sima, decrease step length, and forward flexed posture. Pt's BLE's are unsteady  d/t weakness. BLE knees \"Bouncing\" while amb. Distance: 10'x2 and 5'x1     Balance  Posture: Fair  Sitting - Static: Good  Sitting - Dynamic: Fair  Standing - Static: Fair  Standing - Dynamic: Fair;-  Comments: standing balance assessed with RW  Other exercises  Other exercises?: Yes  Other exercises 1: seated BLE ex's without back support. Reps: 10-15 each  Other exercises 2: seated functional dynamic balance tasks x3  while reaching outside of L[place]. Pt requires unilateral UE support for balance. Assessment   Body structures, Functions, Activity limitations: Decreased functional mobility ; Decreased endurance;Decreased strength;Decreased balance  Assessment: Impaired mobility due to safety issues from balance and strength deficites and decreased tolerance to activity due to endurance deficites. Pt would benefit from additional PT to increase strength, endurance, and independence with functiona mobility. Patient Education: poc  REQUIRES PT FOLLOW UP: Yes     Goals  Short term goals  Time Frame for Short term goals: 2-3 days  Short term goal 1: transfers with SBA/CGA  Short term goal 2: gait with RW and [place] x 20-40ft  Short term goal 3:  Increase standing tolerance to 2-3min  Patient Goals   Patient goals : get stronger    Plan    Plan  Times per week: 5-7x/wk  Times per day: Daily  Current Treatment Recommendations: Strengthening, Balance Training, Functional Mobility Training, Endurance Training, Transfer Training, Gait Training  Safety Devices  Type of devices: Left in bed, Patient at risk for falls, Call light within reach     Therapy Time   Individual Concurrent Group Co-treatment   Time In 1314         Time Out [address] [place], [place]

## 2019-05-22 NOTE — PROGRESS NOTES
[facility] [address]   INPATIENT OCCUPATIONAL THERAPY  PROGRESS NOTE  Date: 2019  Patient Name: Bonnell Rubinstein      Room:   MRN: [id number]    : 1960  (66 y.o.) Gender: male     Discharge Recommendations:  [address]       Referring Practitioner: Cachorro Griffith MD  Diagnosis: Sepsis   General  Chart Reviewed: Yes  Patient assessed for rehabilitation services?: Yes  Additional Pertinent Hx: history of stage IV cancer with liver metastases presents for chief complaint of disorientation and shortness breath, was discharged from hospital on Saturday and back within a few hours   Family / Caregiver Present: No  Referring Practitioner: Cachorro Griffith MD  Diagnosis: Sepsis     Restrictions  Restrictions/Precautions: (port right chest wall)  Implants present? : Metal implants(cardiac stent)  Other position/activity restrictions: Stage IV colon cancer with liver mets, up with assistance       Subjective \"My @#%% kids are drug addicts, they need to help their father out\"   Patient Currently in Pain: No  Overall Orientation Status: Impaired  Orientation Level: Oriented to person          Objective Completed 6 to 8 reps in 4 planes requiring break after each set or every other set due to decreased endurance. Continue POC   Cognition  Overall Cognitive Status: Exceptions  Following Commands:  Follows one step commands with repetition(mod verbal and visual cues )  Problem Solving: Assistance required to correct errors made;Assistance required to implement solutions  Cognition Comment: decreased concentration to task   Bed mobility  Rolling to Left: Stand by assistance(with head of bed up )                     Type of ROM/Therapeutic Exercise  Type of ROM/Therapeutic Exercise: AROM  Exercises  Shoulder Flexion: x  Elbow Flexion: x  Elbow Extension: x  Wrist Flexion: x  Wrist Extension: x  Grasp/Release: x  Other: UEHEP with theraband Assessment  Performance deficits / Impairments: Decreased functional mobility ; Decreased strength;Decreased endurance;Decreased ADL status; Decreased safe awareness;Decreased balance  Prognosis: Fair  Activity Tolerance: Patient limited by fatigue  Activity Tolerance: declined standing activity this date   Safety Devices in place: Yes  Type of devices: Patient at risk for falls;Gait belt;Call light within reach; Left in bed             Patient Education:     Learner:patient  Method: explanation       Outcome: needs reinforcement     Plan  Safety Devices  Safety Devices in place: Yes  Type of devices: Patient at risk for falls, Gait belt, Call light within reach, Left in bed  Plan  Times per week: 5-7x/wk  Current Treatment Recommendations: Strengthening, Functional Mobility Training, Patient/Caregiver Education & Training, Endurance Training, Equipment Evaluation, Education, & procurement, Balance Training, Self-Care / ADL      Goals  Short term goals  Time Frame for Short term goals: by discharge, patient will  Short term goal 1: perform functional transfers/mobility throughout ADL routine with SBA and RW  Short term goal 2: perform all tasks with Good safety and balance  Short term goal 3: increase standing tolerance to 3-5 minutes with 0-1 UE support during functional task  Short term goal 4: actively participate in 15+ minutes of therapeutic exercise/activity to promote increased safety and independence with self care and mobility tasks     OT Individual Minutes  Time In: 5262  Time Out: Ashley Hummle  Minutes: 31      Electronically signed by KEYONNA Ingarm on 5/22/19 at 10:28 AM

## 2019-05-22 NOTE — CARE COORDINATION
Social Work-Contacted [facility]. They are requesting a 6-Click Score from PT.  Notified Chelsea Arredondo

## 2019-05-22 NOTE — PROGRESS NOTES
nausea/vomiting, change in bowel habits, abdominal pain, dysphagia/appetite loss, hematemesis, blood in stools. · Genitourinary:Negative for change in bladder habits, dysuria, trouble voiding, hematuria. · Musculoskeletal: Negative for gait disturbance, weakness, joint complaints. · Integumentary: Negative for rash, pruritis. · Neurological: Negative for headache, dizziness, change in muscle strength, numbness/tingling, change in gait, balance, coordination,   · Endocrine: negative for temperature intolerance, excessive thirst, fluid intake, or urination, tremor. · Hematologic/Lymphatic: negative for abnormal bruising or bleeding, blood clots, swollen lymph nodes. PHYSICAL EXAM      BP (!) 96/48   Pulse 95   Temp 98.2 °F (36.8 °C) (Oral)   Resp 14   Ht 5' 10\" (1.778 m)   Wt 240 lb 4.8 oz (109 kg)   SpO2 95%   BMI 34.48 kg/m²      · General appearance: well nourished  · HEENT: Head: Normocephalic, no lesions, without obvious abnormality. · Lungs: clear to auscultation bilaterally  · Heart: regular rate and rhythm, S1, S2 normal, no murmur, click, rub or gallop  · Abdomen:   ·  pos ascites; bowel sounds normal; no masses,  no organomegaly  · Extremities: extremities normal, atraumatic, no cyanosis or edema  · Neurological: Gait normal. Reflexes normal and symmetric. Sensation grossly normal  · Skin - no rash, no lump   · Eye no icterus no redness  · Psych-normal affect   · NEURO-no limb weakness  No facial droop  · Lymphatic system-no lymphadenopathy no splenomegaly     DIAGNOSTICS      Laboratory Testing:  CBC:   Recent Labs     05/22/19  0607   WBC 17.4*   HGB 7.6*   PLT 75*     BMP:    Recent Labs     05/20/19  0555 05/21/19  0536 05/22/19  0607   * 130* 128*   K 3.7 3.9 4.0   CL 91* 92* 88*   CO2 25 24 24   BUN 4* 5* 7   CREATININE <0.40* 0.67* 1.06   GLUCOSE 134* 157* 109*     S. Calcium:  Recent Labs     05/22/19  0607   CALCIUM 8.1*     S.  Ionized Calcium:No results for input(s): IONCA in the last 72 hours. S. Magnesium:No results for input(s): MG in the last 72 hours. S. Phosphorus:No results for input(s): PHOS in the last 72 hours. S. Glucose:  Recent Labs     05/21/19  0714 05/21/19  1123 05/21/19  1652   POCGLU 143* 185* 178*     Glycosylated hemoglobin A1C: No results for input(s): LABA1C in the last 72 hours. INR: No results for input(s): INR in the last 72 hours. Hepatic functions: No results for input(s): ALKPHOS, ALT, AST, PROT, BILITOT, BILIDIR, LABALBU in the last 72 hours. Pancreatic functions:  Recent Labs     05/19/19  1208   LACTA 4.6*     S. Lactic Acid:   Recent Labs     05/19/19  1208   LACTA 4.6*     Cardiac enzymes:No results for input(s): CKTOTAL, CKMB, CKMBINDEX, TROPONINI in the last 72 hours. BNP:No results for input(s): BNP in the last 72 hours. Lipid profile: No results for input(s): CHOL, TRIG, HDL, LDLCALC in the last 72 hours. Invalid input(s): LDL  Blood Gases: No results found for: PH, PCO2, PO2, HCO3, O2SAT  Thyroid functions:   Lab Results   Component Value Date    TSH 2.43 05/19/2019        Imaging/Diagonstics:  Xr Chest Standard (2 Vw)    Result Date: 5/10/2019  EXAMINATION: TWO XRAY VIEWS OF THE CHEST 5/10/2019 10:02 am COMPARISON: AP chest from 05/03/2019 HISTORY: ORDERING SYSTEM PROVIDED HISTORY: cough TECHNOLOGIST PROVIDED HISTORY: cough Ordering Physician Provided Reason for Exam: Cough congestion weakness today h/o cancer Acuity: Acute Type of Exam: Initial Additional signs and symptoms: Cough congestion weakness today h/o cancer Additional history includes anemia, colon carcinoma, hypertension, and diabetes. FINDINGS: Right-sided IJ port with unchanged tip position in the lower SVC. Overlying ECG monitor leads. Cardiomediastinal shadow stable. Low lung volumes with bibasilar atelectasis or perhaps scarring. No localized pulmonary opacity suspicious for infiltrate. No sizable pleural effusion. Bones and soft tissues intact.   Gas-filled small bowel loops, best seen on the lateral projection, upper limits of normal.     No acute cardiopulmonary disease or interval change. Bibasilar atelectasis or scarring     Ct Head Wo Contrast    Result Date: 5/19/2019  EXAMINATION: CT OF THE HEAD WITHOUT CONTRAST  5/19/2019 3:50 am TECHNIQUE: CT of the head was performed without the administration of intravenous contrast. Dose modulation, iterative reconstruction, and/or weight based adjustment of the mA/kV was utilized to reduce the radiation dose to as low as reasonably achievable. COMPARISON: 05/16/2019 HISTORY: ORDERING SYSTEM PROVIDED HISTORY: ams TECHNOLOGIST PROVIDED HISTORY: Ordering Physician Provided Reason for Exam: AMS Acuity: Acute FINDINGS: BRAIN/VENTRICLES: The cerebral and cerebellar parenchyma demonstrate normal volume. There are no areas of acute hemorrhage, mass, or midline shift. No abnormal extra-axial fluid collections. The ventricles are normal in size. Vascular calcifications are noted in the carotid siphons. Gray-white differentiation is maintained. ORBITS: The visualized portion of the orbits demonstrate no acute abnormality. SINUSES: There is a left maxillary mucous retention cyst.  The mastoid air cells are clear. Cerumen is noted in the external auditory canals. SOFT TISSUES/SKULL:  The calvarium is intact. No appreciable scalp soft tissue swelling. 1. No acute intracranial abnormality, stable. Ct Head Wo Contrast    Result Date: 5/16/2019  EXAMINATION: CT OF THE HEAD WITHOUT CONTRAST  5/16/2019 12:39 am TECHNIQUE: CT of the head was performed without the administration of intravenous contrast. Dose modulation, iterative reconstruction, and/or weight based adjustment of the mA/kV was utilized to reduce the radiation dose to as low as reasonably achievable.  COMPARISON: 04/01/2019 HISTORY: ORDERING SYSTEM PROVIDED HISTORY: ams TECHNOLOGIST PROVIDED HISTORY: Ordering Physician Provided Reason for Exam: AMS, patient denies any reduce the radiation dose to as low as reasonably achievable.; CTA of the chest was performed after the administration of intravenous contrast.  Multiplanar reformatted images are provided for review. MIP images are provided for review. Dose modulation, iterative reconstruction, and/or weight based adjustment of the mA/kV was utilized to reduce the radiation dose to as low as reasonably achievable. COMPARISON: None HISTORY: ORDERING SYSTEM PROVIDED HISTORY: abd pain TECHNOLOGIST PROVIDED HISTORY: Ordering Physician Provided Reason for Exam: Abdominal pain, bloating Acuity: Acute Relevant Medical/Surgical History: surgeries to area of interest include hernia repair; ORDERING SYSTEM PROVIDED HISTORY: PE TECHNOLOGIST PROVIDED HISTORY: Ordering Physician Provided Reason for Exam: Patient denies any SOB or chest pain at this time Acuity: Acute FINDINGS: Chest: Mediastinum: Heart is mildly enlarged in size. Trace pericardial fluid versus pericardial thickening. Main pulmonary artery mildly enlarged measuring 3.1 cm in transverse dimension. No convincing evidence of central or lobar pulmonary emboli. Segmental subsegmental branches are less optimally evaluated due to motion artifacts and timing of contrast bolus. No suspicious mediastinal, hilar, or axillary not identified per CT criteria. Lungs/pleura: Small right-sided and trace left-sided pleural effusions. Pleuroparenchymal bands versus subsegmental atelectasis identified involving both lower lobes. Soft Tissues/Bones: There is redemonstration of the mixed sclerotic and lytic lesion involving the T2 vertebral body with surrounding pair spinal soft tissue attenuation. This extends into the pedicles and facets on the left. Additional mix additional sclerotic and lytic lesion identified involving T4 similar prior examination.   Subtle areas sclerosis is suspected subacute nondisplaced fractures anterior left 5th, 7th and 8th ribs similar prior exam. Abdomen/Pelvis: Organs: There are innumerable hypodensities identified throughout the right and left hepatic lobes which have progressed from prior examination. A suggestion of a central ill-defined area of hypoattenuation which may represent areas of posterior related changes/fibrosis versus perfusion differences. There is mild gallbladder wall thickening. No definite cholelithiasis. The spleen, adrenal glands, kidneys, pancreas unremarkable. GI/Bowel: Mild-to-moderate retained stool throughout the colon. Mild-to-moderate scattered colonic diverticulosis. Appendix not identified. Pelvis: Mild bladder wall thickening related to underdistention. Tiny fat containing inguinal hernias. Prostate unremarkable. Peritoneum/Retroperitoneum: Moderate volume of ascites. Similar prior examination. Bones/Soft Tissues: Multilevel degenerate changes of the lumbar spine. CTA Chest: No convincing evidence of central or lobar pulmonary emboli. Distal segmental subsegmental branches not well evaluated. Moderate size right-sided effusion and trace left-sided effusion. Stable osseous metastatic disease with peers spinal soft tissue attenuation involving the T2 vertebral body. CT of the abdomen pelvis: No significant change in the innumerable hepatic lesions worrisome for metastatic disease as well as moderate burden of ascites. Bladder wall thickening could be infectious inflammatory or could be related to underdistention. .  Please correlate with urinalysis findings. Xr Chest Portable    Result Date: 5/3/2019  EXAMINATION: SINGLE XRAY VIEW OF THE CHEST 5/3/2019 11:12 am COMPARISON: April 17, 2019 HISTORY: ORDERING SYSTEM PROVIDED HISTORY: Chest Pain TECHNOLOGIST PROVIDED HISTORY: Chest Pain Ordering Physician Provided Reason for Exam: Pt states today chest pain. History of colon cancer and lung cancer Acuity: Unknown Type of Exam: Unknown FINDINGS: Right chest Port in good position. No kink. Non enlarged heart. Bibasilar atelectasis. Right basilar pleuroparenchymal scarring. No effusion. No pneumothorax. No airspace consolidation. No focal airspace consolidation. Ct Chest Pulmonary Embolism W Contrast    Result Date: 5/19/2019  EXAMINATION: CTA OF THE CHEST 5/19/2019 3:46 am TECHNIQUE: CTA of the chest was performed after the administration of intravenous contrast.  Multiplanar reformatted images are provided for review. MIP images are provided for review. Dose modulation, iterative reconstruction, and/or weight based adjustment of the mA/kV was utilized to reduce the radiation dose to as low as reasonably achievable. COMPARISON: 05/16/2019 HISTORY: ORDERING SYSTEM PROVIDED HISTORY: PE TECHNOLOGIST PROVIDED HISTORY: Ordering Physician Provided Reason for Exam: SOB, elevated d-dimer Acuity: Acute Relevant Medical/Surgical History: Patient has a hx of diabetes, surgeries to area of interest include Coronary angioplasty with stent placement, port placement FINDINGS: Pulmonary Arteries: There is adequate opacification of the pulmonary arteries with intravenous contrast.  There is no evidence of a central pulmonary embolism. Segmental and subsegmental emboli cannot entirely be excluded given the motion artifact on this exam. Mediastinum: The heart size is normal.  Coronary artery vascular calcifications are noted. No pericardial effusion. The thyroid gland is unremarkable. No mediastinal or hilar adenopathy. Lungs/pleura: There are small bilateral pleural effusions which have slightly increased in size, particularly on the left. There is associated atelectasis in the lung bases. There is a noncalcified pulmonary nodule in the right lower lobe measuring 3 mm. This was not seen on the 04/07/2017 exam or the 07/02/2018 exam.  There is also a 4 x 4 mm nodule in the right lower lobe medially (series 2, image 171). There is a noncalcified pulmonary nodule in the left upper lobe measuring 5 x 5 mm (series 2, image 108).  There is no focal Physician Provided Reason for Exam: cough, sob, tachycardia, cancer. denies previous chest surgeries. FINDINGS: Pulmonary Arteries: Distal segmental and subsegmental pulmonary arteries are limited by respiratory motion and suboptimal bolus timing, despite repeat imaging. No large central or proximal segmental pulmonary embolus. Mediastinum: The heart is normal in size. There is no pericardial effusion. Thoracic aorta is normal in caliber without obvious intimal dissection. Coronary artery calcifications and stents are noted. No mediastinal or hilar lymphadenopathy. No axillary lymphadenopathy. Right chest port is in place with distal tip at the cavoatrial junction. Lungs/pleura: There is a tiny right pleural effusion and trace left pleural fluid. There is mild bibasilar atelectasis. No focal airspace consolidation or pneumothorax. No evidence of pulmonary nodule or mass. Upper Abdomen: There is focal heterogeneous attenuation in the right hepatic dome. Irregular hypodense lesion is also noted along the anterior falciform ligament. There are several tiny hypodensities throughout the right liver, and overall attenuation is coarsened. There is a moderate volume of ascites in the upper abdomen. Spleen is enlarged, measuring 16.3 cm. Soft Tissues/Bones: There are mixed lytic and sclerotic lesions at T2 and T4. Both have soft tissue components which are not significantly changed from 04/08/2019. Subtle sclerosis is noted at the anterior left 5th, 7th, and 8th ribs. Underlying nondisplaced subacute fractures are suspected. 1.  Limited assessment of distal segmental and subsegmental pulmonary arteries due to respiratory motion and suboptimal bolus timing, despite repeat imaging. No central or proximal segmental pulmonary embolus. 2.  Tiny right pleural effusion and trace left pleural fluid with mild bibasilar atelectasis. 3.  Indistinct hypodense lesions in the liver, likely metastases.  4.  Moderate volume upper abdominal ascites, new from the previous study. 5.  Splenomegaly. 6.  Redemonstration of metastatic lesions at T2 and T4, similar to 04/08/2019. Subtle areas of sclerosis with suspected subacute nondisplaced fractures are noted at the anterior left 5th, 7th, and 8th ribs. Us Abdomen Limited    Result Date: 5/4/2019  EXAMINATION: RIGHT UPPER QUADRANT ULTRASOUND, 5/4/2019 9:39 am COMPARISON: 04/18/2019 HISTORY: ORDERING SYSTEM PROVIDED HISTORY: Check for ascites TECHNOLOGIST PROVIDED HISTORY: Check for ascites Abd distention, hx liver mets r/o cholecystitis Acuity: Acute Type of Exam: Initial FINDINGS: LIVER:  The liver demonstrates some abnormal echotexture as well as hepatomegaly with the liver measuring 27 cm. Liver nodularity noted which was also seen on the previous CT evaluation. BILIARY SYSTEM:  Gallbladder wall is severely thickened at 9.2 mm. There is evidence of gallbladder polyps. No evidence of gallbladder stones. Negative Iyer sign. Common bile duct is within normal limits measuring 0.61 cm. RIGHT KIDNEY: The right kidney is grossly unremarkable without evidence of hydronephrosis. OTHER: Moderate ascites noted in the abdomen and pelvis. 1. Moderate ascites. 2. Enlarged inhomogeneous nodular liver as previously noted on the CT evaluation. Neoplasm should be considered. 3. Thickened gallbladder wall as well as gallbladder polyps. The gallbladder does not appear distended however. No evidence of gallbladder stones. Acute cholecystitis is unlikely. Ir Us Guided Paracentesis    Result Date: 5/20/2019  PROCEDURE: ULTRASOUND GUIDED PARACENTESIS 5/20/2019 HISTORY: ORDERING SYSTEM PROVIDED HISTORY: ascites r/o sbp TECHNOLOGIST PROVIDED HISTORY: ascites r/o sbp Recurrent abdominal ascites TECHNIQUE: This procedure was performed by Dr. Maxx Jeffery. Informed consent was obtained after a detailed explanation of the procedure including risks, benefits, and alternatives.   Universal protocol was followed. Ultrasound shows a moderate large amount of ascites. The right abdomen was prepped and draped in sterile fashion and local anesthesia was achieved with lidocaine. Using realtime ultrasound guidance a 5 American centesis catheter/needle was advanced into the peritoneal fluid. Ultrasound images show a safe tract and access into the fluid. Fluid was collected in vacuum bottles. Little to no fluid remains on completion. The catheter was removed and a sterile dressing applied. There are no immediate complications. The patient left the department in stable condition. FINDINGS: A total of 3.7 L of clear dark yellow fluid was removed. A sample was sent for the requested studies. Successful ultrasound guided paracentesis. Ir Us Guided Paracentesis    Result Date: 5/17/2019  PROCEDURE: ULTRASOUND GUIDED PARACENTESIS 5/17/2019 HISTORY: ORDERING SYSTEM PROVIDED HISTORY: ascites TECHNOLOGIST PROVIDED HISTORY: ascites TECHNIQUE: This procedure was performed by Dr. Amina Catherine. Informed consent was obtained after a detailed explanation of the procedure including risks, benefits, and alternatives. Universal protocol was followed. Ultrasound shows a moderate amount of ascites. The right abdomen was prepped and draped in sterile fashion and local anesthesia was achieved with lidocaine. Using realtime ultrasound guidance a 5 American centesis catheter/needle was advanced into the peritoneal fluid. Ultrasound images show a safe tract and access into the fluid. Fluid was collected in vacuum bottles. Little to no fluid remains on completion. The catheter was removed and a sterile dressing applied. There are no immediate complications. The patient left the department in stable condition. FINDINGS: A total of 2.5 L of clear dark yellow fluid was removed. A sample was sent for the requested studies. Successful ultrasound guided paracentesis.      Ir Us Guided Paracentesis    Result Date: 5/6/2019  PROCEDURE: ULTRASOUND GUIDED PARACENTESIS 5/6/2019 HISTORY: ORDERING SYSTEM PROVIDED HISTORY: Suspected SBP TECHNOLOGIST PROVIDED HISTORY: Suspected SBP TECHNIQUE: This procedure was performed by Dr. Isaias Gaspar. Informed consent was obtained after a detailed explanation of the procedure including risks, benefits, and alternatives. Universal protocol was followed. Ultrasound shows a small to moderate amount of ascites. The right abdomen was prepped and draped in sterile fashion and local anesthesia was achieved with lidocaine. Using realtime ultrasound guidance a 6 Romanian pigtail catheter was advanced into the peritoneal fluid. Ultrasound images show a safe tract and access into the fluid. Fluid was collected in vacuum bottles. Little to no fluid remains on completion. The catheter was removed and a sterile dressing applied. There are no immediate complications. The patient left the department in stable condition. FINDINGS: A total of 850 mL of clear yellow fluid was removed. Fluid was sent for the requested studies. Successful ultrasound guided paracentesis.          ASSESSMENT     Patient Active Problem List   Diagnosis    Hypertension, essential    Mixed hyperlipidemia    Knee pain    Type 2 diabetes mellitus with hyperglycemia, without long-term current use of insulin (HCC)    HTN (hypertension)    Hypercholesteremia    Insomnia    Knee pain, bilateral    Depression    Cervical sprain    Lumbar sprain    Back pain    Opioid dependence (HCC)    Degeneration of lumbar or lumbosacral intervertebral disc    Chronic midline low back pain without sciatica    ST elevation myocardial infarction (STEMI) ([address]    S/P PTCA  BMS to RCA - 11/26/2016 Dr. Carolina Guerin    CAD S/P percutaneous coronary angioplasty    [facility])    Type 2 myocardial infarction without ST elevation ([address]    Rectal bleed    Liver metastasis ([address]    Colorectal cancer, stage IV (HCC)    Anemia    Iron deficiency anemia due to chronic blood loss    Neuropathy due to chemotherapeutic drug ([facility]    Right hip pain    Radiation cystitis    Septic shock (HCC)    Severe malnutrition (HCC)    Septicemia (HCC)    Elevated LFTs    Malignant neoplasm of colon (HCC)    Other ascites    Sepsis ([address]       PLAN      w/ liver mets. Received Paracentesis 5/17, 2.5 L removed, SAAG 1.1, same evening readmission post discharge, 5/18   Concern for sbp/sepsis. Elevated AG acidosis and lactic acidosis on admission, along with lethargy elevated wbc, LD.  afib rvr. Possibly hypovolemic shock 2/2 lactulose. Will hold.      GI consulted - appreciate recs. cx negative to date, peritoneal fluid analysis pmn < 250 SAAG > 1.1 consistent with no sbp again. 3.7 l out in LVP 5/20. On vanc rocephin currently, empiric coverage for sepsis. F/up cultures. Discussed code status. Pt wants experimental tx at [address] [place] [address]   Palliative care following, appreciate recs. SW following, coordinating discharge to [address] for rehab.        Anemia, iron deficiency, likely 2/2 bleeding from the tumor. And anemia of chronic disease. Iron 24, tibc, 89, uibc 65.      DM II   HbA1c: 11.5 (4/18)   Insulin sliding scale   Hypoglycemic protocol    1.        5/22   cont  Iv atb    No peritonitis    wbc still elevted   ? collitis     bs elevted     id to see       Gi signed off   poor prognosis      Will review oncology MD MADDIE Ruiz [address], [address] [place] [address].    Phone [phone]   Fax: [phone]  Answering Service: [phone]

## 2019-05-23 NOTE — PROGRESS NOTES
[address]    PROGRESS NOTE             Date:   5/23/2019  Patient name:  Audra Jean-Baptiste  Date of admission:  5/19/2019  2:40 AM  MRN:   [id number]  YOB: 1960    CHIEF COMPLAINT     History Obtained From:  Patient and chart review. HISTORY OF PRESENT ILLNESS      The patient is a 66 y.o.  male who is admitted to the hospital for  STAGE 4 COLON CA   ASCITES   WAS TAPPED    HAD SEPSIS WBC STILL ELEVAYRED         5/23    Still has ascites   no fever   no fever       PAST MEDICAL HISTORY       has a past medical history of Back pain, CAD S/P percutaneous coronary angioplasty, Carcinoma ([facility] [address] Colon cancer ([facility] [address] Degeneration of lumbar or lumbosacral intervertebral disc, Depression, Diabetes mellitus ([facility] [address] H/O cardiac catheterization, Hepatic metastases ([facility] [address] Hyperlipidemia, Hypertension, Knee pain, and MI, old. PAST SURGICAL HISTORY       has a past surgical history that includes Tunneled venous port placement (Right); pr sigmoidoscopy flx w/rmvl foreign body (N/A, 5/25/2017); hernia repair; Coronary angioplasty with stent (11/2016); pr sigmoidoscopy flx w/rmvl foreign body (12/8/2017); Endoscopic ultrasonography, GI (N/A, 12/8/2017); and liver biopsy (Right, 08/23/2018). HOME MEDICATIONS        Prior to Admission medications    Medication Sig Start Date End Date Taking?  Authorizing Provider   glucose monitoring kit (FREESTYLE) monitoring kit 1 kit by Does not apply route daily 4/22/19   Radha Lakhani MD   Lancets MISC 1 each by Does not apply route daily 4/22/19   Radha Lakhani MD   insulin glargine (LANTUS) 100 UNIT/ML injection vial Inject 20 Units into the skin nightly    Historical Provider, MD   glimepiride (AMARYL) 4 MG tablet Take 1 tablet by mouth every morning 4/22/19   Radha Lakhani MD   oxyCODONE HCl (OXY-IR) 10 MG immediate release tablet Take 20 mg by mouth 3 times daily as needed for Pain. Historical Provider, MD   diphenhydrAMINE (BENADRYL) 25 MG capsule Take 1 capsule by mouth nightly as needed for Sleep 4/8/19   Fortino Elmore DO   fentaNYL (DURAGESIC) 50 MCG/HR Place 1 patch onto the skin every 72 hours. 4/20/19   Historical Provider, MD   naloxone (NARCAN) 4 MG/0.1ML LIQD nasal spray Take 4 mg by mouth    Historical Provider, MD   potassium chloride (KLOR-CON M) 20 MEQ extended release tablet Take 1 tablet by mouth daily 10/9/18   Eduin Richter MD   metoprolol tartrate (LOPRESSOR) 25 MG tablet Take 1 tablet by mouth daily  3/12/18   Historical Provider, MD   Glucose Blood (BLOOD GLUCOSE TEST STRIPS) STRP 1 each by In Vitro route daily As needed. 8/24/17   Dionisio Fitzpatrick MD   Insulin Pen Needle 29G X 12.7MM MISC 1 each by Does not apply route daily 5/4/17   [facility], MD   nitroGLYCERIN (NITROSTAT) 0.4 MG SL tablet Place 1 tablet under the tongue every 5 minutes as needed for Chest pain Dissolve 1 tablet under tongue for chest pain and repeat every 5 min up to max of 3 total doses. If no relief after 3 doses call 911 11/29/16   ASIM Humphreys - AIME       ALLERGIES      Morphine    SOCIAL HISTORY       reports that he has never smoked. He has never used smokeless tobacco. He reports that he drank alcohol. He reports that he does not use drugs. FAMILY HISTORY      family history includes Heart Disease in his mother; High Blood Pressure in his mother; High Cholesterol in his father; Stroke in his mother. REVIEW OF SYSTEMS      · Constitutional: Negative for weight loss ppos fatigue  · Eyes: Negative for visual changes, diplopia, scleral icterus. · ENT: Negative for Headaches, hearing loss, vertigo, mouth sores, sore throat. · Cardiovascular: Negative for lightheadedness/orthostatic symptoms ,chest pain, dyspnea on exertion, palpitations or loss of consciousness.    · Respiratory: Negative for cough or wheezing, sputum production, hemoptysis, pleuritic pain.  · Gastrointestinal: Negative for nausea/vomiting, change in bowel habits, abdominal pain, dysphagia/appetite loss, hematemesis, blood in stools. · Genitourinary:Negative for change in bladder habits, dysuria, trouble voiding, hematuria. · Musculoskeletal: Negative for gait disturbance, weakness, joint complaints. · Integumentary: Negative for rash, pruritis. · Neurological: Negative for headache, dizziness, change in muscle strength, numbness/tingling, change in gait, balance, coordination,   · Endocrine: negative for temperature intolerance, excessive thirst, fluid intake, or urination, tremor. · Hematologic/Lymphatic: negative for abnormal bruising or bleeding, blood clots, swollen lymph nodes. PHYSICAL EXAM      /62   Pulse 98   Temp 98.2 °F (36.8 °C) (Oral)   Resp 16   Ht 5' 10\" (1.778 m)   Wt 240 lb 4.8 oz (109 kg)   SpO2 95%   BMI 34.48 kg/m²      · General appearance: well nourished  · HEENT: Head: Normocephalic, no lesions, without obvious abnormality. · Lungs: clear to auscultation bilaterally  · Heart: regular rate and rhythm, S1, S2 normal, no murmur, click, rub or gallop  · Abdomen:   ·  pos ascites; bowel sounds normal; no masses,  no organomegaly  · Extremities: extremities normal, atraumatic, no cyanosis or edema  · Neurological: Gait normal. Reflexes normal and symmetric. Sensation grossly normal  · Skin - no rash, no lump   · Eye no icterus no redness  · Psych-normal affect   · NEURO-no limb weakness  No facial droop  · Lymphatic system-no lymphadenopathy no splenomegaly     DIAGNOSTICS      Laboratory Testing:  CBC:   Recent Labs     05/22/19  0607   WBC 17.4*   HGB 7.6*   PLT 75*     BMP:    Recent Labs     05/21/19  0536 05/22/19  0607 05/23/19  0627   * 128*  --    K 3.9 4.0  --    CL 92* 88*  --    CO2 24 24  --    BUN 5* 7  --    CREATININE 0.67* 1.06 1.90*   GLUCOSE 157* 109*  --      S. Calcium:  Recent Labs     05/22/19  0607   CALCIUM 8.1*     S.  Ionized Calcium:No results for input(s): IONCA in the last 72 hours. S. Magnesium:No results for input(s): MG in the last 72 hours. S. Phosphorus:No results for input(s): PHOS in the last 72 hours. S. Glucose:  Recent Labs     05/21/19  0714 05/21/19  1123 05/21/19  1652   POCGLU 143* 185* 178*     Glycosylated hemoglobin A1C: No results for input(s): LABA1C in the last 72 hours. INR: No results for input(s): INR in the last 72 hours. Hepatic functions:   Recent Labs     05/22/19  1313   ALKPHOS 504*   ALT 17   *   PROT 5.2*   BILITOT 5.97*   BILIDIR 4.89*   LABALBU 1.9*     Pancreatic functions:  No results for input(s): LACTA, AMYLASE in the last 72 hours. S. Lactic Acid:   No results for input(s): LACTA in the last 72 hours. Cardiac enzymes:No results for input(s): CKTOTAL, CKMB, CKMBINDEX, TROPONINI in the last 72 hours. BNP:No results for input(s): BNP in the last 72 hours. Lipid profile: No results for input(s): CHOL, TRIG, HDL, LDLCALC in the last 72 hours. Invalid input(s): LDL  Blood Gases: No results found for: PH, PCO2, PO2, HCO3, O2SAT  Thyroid functions:   Lab Results   Component Value Date    TSH 2.43 05/19/2019        Imaging/Diagonstics:  Xr Chest Standard (2 Vw)    Result Date: 5/10/2019  EXAMINATION: TWO XRAY VIEWS OF THE CHEST 5/10/2019 10:02 am COMPARISON: AP chest from 05/03/2019 HISTORY: ORDERING SYSTEM PROVIDED HISTORY: cough TECHNOLOGIST PROVIDED HISTORY: cough Ordering Physician Provided Reason for Exam: Cough congestion weakness today h/o cancer Acuity: Acute Type of Exam: Initial Additional signs and symptoms: Cough congestion weakness today h/o cancer Additional history includes anemia, colon carcinoma, hypertension, and diabetes. FINDINGS: Right-sided IJ port with unchanged tip position in the lower SVC. Overlying ECG monitor leads. Cardiomediastinal shadow stable. Low lung volumes with bibasilar atelectasis or perhaps scarring.   No localized pulmonary opacity suspicious for infiltrate. No sizable pleural effusion. Bones and soft tissues intact. Gas-filled small bowel loops, best seen on the lateral projection, upper limits of normal.     No acute cardiopulmonary disease or interval change. Bibasilar atelectasis or scarring     Ct Head Wo Contrast    Result Date: 5/19/2019  EXAMINATION: CT OF THE HEAD WITHOUT CONTRAST  5/19/2019 3:50 am TECHNIQUE: CT of the head was performed without the administration of intravenous contrast. Dose modulation, iterative reconstruction, and/or weight based adjustment of the mA/kV was utilized to reduce the radiation dose to as low as reasonably achievable. COMPARISON: 05/16/2019 HISTORY: ORDERING SYSTEM PROVIDED HISTORY: [facility] TECHNOLOGIST PROVIDED HISTORY: Ordering Physician Provided Reason for Exam: AMS Acuity: Acute FINDINGS: BRAIN/VENTRICLES: The cerebral and cerebellar parenchyma demonstrate normal volume. There are no areas of acute hemorrhage, mass, or midline shift. No abnormal extra-axial fluid collections. The ventricles are normal in size. Vascular calcifications are noted in the carotid siphons. Gray-white differentiation is maintained. ORBITS: The visualized portion of the orbits demonstrate no acute abnormality. SINUSES: There is a left maxillary mucous retention cyst.  The mastoid air cells are clear. Cerumen is noted in the external auditory canals. SOFT TISSUES/SKULL:  The calvarium is intact. No appreciable scalp soft tissue swelling. 1. No acute intracranial abnormality, stable. Ct Head Wo Contrast    Result Date: 5/16/2019  EXAMINATION: CT OF THE HEAD WITHOUT CONTRAST  5/16/2019 12:39 am TECHNIQUE: CT of the head was performed without the administration of intravenous contrast. Dose modulation, iterative reconstruction, and/or weight based adjustment of the mA/kV was utilized to reduce the radiation dose to as low as reasonably achievable.  COMPARISON: 04/01/2019 HISTORY: ORDERING SYSTEM PROVIDED HISTORY: [facility] multifocal diffuse infiltrative lesions noted throughout the liver, compatible with the patient's clinical history of metastatic disease. One of the largest lesions is noted in the right lobe centrally measuring approximately 8.2 x 5.7 cm. The gallbladder is contracted. The adrenal glands are unremarkable. The pancreas has several calcifications which may represent sequela of chronic pancreatitis. There is splenomegaly measuring 17.7 cm. No splenic lesion is identified. The kidneys are unremarkable. No hydronephrosis. GI/Bowel: The stomach and duodenal sweep are unremarkable. Stool is noted in the colon. There is respiratory motion artifact limiting evaluation of the hollow visceral organs. Pelvis: The bladder is partially distended with urine. The prostate and seminal vesicles are unremarkable. There is a right inguinal hernia containing fat. No inguinal or pelvic sidewall lymphadenopathy. Peritoneum/Retroperitoneum: Atherosclerotic plaque is noted in the aorta and its branch vessels. No retroperitoneal adenopathy. There is marked intra-abdominal ascites, similar to the previous study. No mesenteric adenopathy. No anterior abdominal wall defect. Bones/Soft Tissues: There is anasarca. Degenerative changes are noted along the spine and sacroiliac joints. 1. Cirrhosis with portal hypertension and marked ascites, not appreciably changed. 2. Infiltrative low-attenuation lesions are noted diffusely throughout the liver, similar to the previous study, compatible with diffuse metastatic disease. 3. Small bilateral pleural effusions, slightly increased in size on the left.      Ct Abdomen Pelvis W Iv Contrast Additional Contrast? None    Result Date: 5/16/2019  EXAMINATION: CT OF THE ABDOMEN AND PELVIS WITH CONTRAST; CTA OF THE CHEST 5/16/2019 12:42 am TECHNIQUE: CT of the abdomen and pelvis was performed with the administration of intravenous contrast. Multiplanar reformatted images are provided for review. Dose modulation, iterative reconstruction, and/or weight based adjustment of the mA/kV was utilized to reduce the radiation dose to as low as reasonably achievable.; CTA of the chest was performed after the administration of intravenous contrast.  Multiplanar reformatted images are provided for review. MIP images are provided for review. Dose modulation, iterative reconstruction, and/or weight based adjustment of the mA/kV was utilized to reduce the radiation dose to as low as reasonably achievable. COMPARISON: None HISTORY: ORDERING SYSTEM PROVIDED HISTORY: abd pain TECHNOLOGIST PROVIDED HISTORY: Ordering Physician Provided Reason for Exam: Abdominal pain, bloating Acuity: Acute Relevant Medical/Surgical History: surgeries to area of interest include hernia repair; ORDERING SYSTEM PROVIDED HISTORY: PE TECHNOLOGIST PROVIDED HISTORY: Ordering Physician Provided Reason for Exam: Patient denies any SOB or chest pain at this time Acuity: Acute FINDINGS: Chest: Mediastinum: Heart is mildly enlarged in size. Trace pericardial fluid versus pericardial thickening. Main pulmonary artery mildly enlarged measuring 3.1 cm in transverse dimension. No convincing evidence of central or lobar pulmonary emboli. Segmental subsegmental branches are less optimally evaluated due to motion artifacts and timing of contrast bolus. No suspicious mediastinal, hilar, or axillary not identified per CT criteria. Lungs/pleura: Small right-sided and trace left-sided pleural effusions. Pleuroparenchymal bands versus subsegmental atelectasis identified involving both lower lobes. Soft Tissues/Bones: There is redemonstration of the mixed sclerotic and lytic lesion involving the T2 vertebral body with surrounding pair spinal soft tissue attenuation. This extends into the pedicles and facets on the left. Additional mix additional sclerotic and lytic lesion identified involving T4 similar prior examination.   Subtle areas sclerosis is suspected subacute nondisplaced fractures anterior left 5th, 7th and 8th ribs similar prior exam. Abdomen/Pelvis: Organs: There are innumerable hypodensities identified throughout the right and left hepatic lobes which have progressed from prior examination. A suggestion of a central ill-defined area of hypoattenuation which may represent areas of posterior related changes/fibrosis versus perfusion differences. There is mild gallbladder wall thickening. No definite cholelithiasis. The spleen, adrenal glands, kidneys, pancreas unremarkable. GI/Bowel: Mild-to-moderate retained stool throughout the colon. Mild-to-moderate scattered colonic diverticulosis. Appendix not identified. Pelvis: Mild bladder wall thickening related to underdistention. Tiny fat containing inguinal hernias. Prostate unremarkable. Peritoneum/Retroperitoneum: Moderate volume of ascites. Similar prior examination. Bones/Soft Tissues: Multilevel degenerate changes of the lumbar spine. CTA Chest: No convincing evidence of central or lobar pulmonary emboli. Distal segmental subsegmental branches not well evaluated. Moderate size right-sided effusion and trace left-sided effusion. Stable osseous metastatic disease with peers spinal soft tissue attenuation involving the T2 vertebral body. CT of the abdomen pelvis: No significant change in the innumerable hepatic lesions worrisome for metastatic disease as well as moderate burden of ascites. Bladder wall thickening could be infectious inflammatory or could be related to underdistention. .  Please correlate with urinalysis findings. Xr Chest Portable    Result Date: 5/3/2019  EXAMINATION: SINGLE XRAY VIEW OF THE CHEST 5/3/2019 11:12 am COMPARISON: April 17, 2019 HISTORY: ORDERING SYSTEM PROVIDED HISTORY: Chest Pain TECHNOLOGIST PROVIDED HISTORY: Chest Pain Ordering Physician Provided Reason for Exam: Pt states today chest pain.  History of colon cancer and lung cancer Acuity: Unknown Type of Exam: Unknown FINDINGS: Right chest Port in good position. No kink. Non enlarged heart. Bibasilar atelectasis. Right basilar pleuroparenchymal scarring. No effusion. No pneumothorax. No airspace consolidation. No focal airspace consolidation. Ct Chest Pulmonary Embolism W Contrast    Result Date: 5/19/2019  EXAMINATION: CTA OF THE CHEST 5/19/2019 3:46 am TECHNIQUE: CTA of the chest was performed after the administration of intravenous contrast.  Multiplanar reformatted images are provided for review. MIP images are provided for review. Dose modulation, iterative reconstruction, and/or weight based adjustment of the mA/kV was utilized to reduce the radiation dose to as low as reasonably achievable. COMPARISON: 05/16/2019 HISTORY: ORDERING SYSTEM PROVIDED HISTORY: PE TECHNOLOGIST PROVIDED HISTORY: Ordering Physician Provided Reason for Exam: SOB, elevated d-dimer Acuity: Acute Relevant Medical/Surgical History: Patient has a hx of diabetes, surgeries to area of interest include Coronary angioplasty with stent placement, port placement FINDINGS: Pulmonary Arteries: There is adequate opacification of the pulmonary arteries with intravenous contrast.  There is no evidence of a central pulmonary embolism. Segmental and subsegmental emboli cannot entirely be excluded given the motion artifact on this exam. Mediastinum: The heart size is normal.  Coronary artery vascular calcifications are noted. No pericardial effusion. The thyroid gland is unremarkable. No mediastinal or hilar adenopathy. Lungs/pleura: There are small bilateral pleural effusions which have slightly increased in size, particularly on the left. There is associated atelectasis in the lung bases. There is a noncalcified pulmonary nodule in the right lower lobe measuring 3 mm. This was not seen on the 04/07/2017 exam or the 07/02/2018 exam.  There is also a 4 x 4 mm nodule in the right lower lobe medially (series 2, image 171).   There is a noncalcified pulmonary nodule in the left upper lobe measuring 5 x 5 mm (series 2, image 108). There is no focal lung infiltrate. No pneumothorax. Upper Abdomen: There is marked intra-abdominal ascites. There is cirrhosis with sequela of portal hypertension. Please see the dedicated CT of the abdomen and pelvis for further details. Soft Tissues/Bones: There is anasarca. Intramuscular lipoma is noted along the right pectoralis muscle, benign. No axillary adenopathy. Permeative destructive lesions are noted in the T2 and T4 vertebral bodies. Degenerative changes are noted along the spine. Right chest MediPort is noted. Sclerotic lesions are noted along the anterior left 5th and 7th ribs. 1. Suboptimal examination secondary to respiratory motion artifact. No evidence of a central pulmonary embolism, however, segmental and subsegmental emboli cannot be excluded. 2. Noncalcified pulmonary nodules are noted in the right lower lobe and left upper lobe which are new compared to the 2017 and 2018 exams. While these could be related to an infectious etiology, metastatic disease cannot be excluded. 3. Permeative destructive lesions involving the T4 and T2 vertebral bodies are stable. Sclerotic lesions along the anterior left 5th and 7th ribs are also unchanged. 4. Cirrhosis with portal hypertension and marked intra-abdominal ascites, not appreciably changed. 5. Atherosclerotic disease. 6. Small bilateral pleural effusions, slightly increased on the left. Ct Chest Pulmonary Embolism W Contrast    Result Date: 5/16/2019  EXAMINATION: CT OF THE ABDOMEN AND PELVIS WITH CONTRAST; CTA OF THE CHEST 5/16/2019 12:42 am TECHNIQUE: CT of the abdomen and pelvis was performed with the administration of intravenous contrast. Multiplanar reformatted images are provided for review.  Dose modulation, iterative reconstruction, and/or weight based adjustment of the mA/kV was utilized to reduce the radiation dose to as low as reasonably achievable.; CTA of the chest was performed after the administration of intravenous contrast.  Multiplanar reformatted images are provided for review. MIP images are provided for review. Dose modulation, iterative reconstruction, and/or weight based adjustment of the mA/kV was utilized to reduce the radiation dose to as low as reasonably achievable. COMPARISON: None HISTORY: ORDERING SYSTEM PROVIDED HISTORY: abd pain TECHNOLOGIST PROVIDED HISTORY: Ordering Physician Provided Reason for Exam: Abdominal pain, bloating Acuity: Acute Relevant Medical/Surgical History: surgeries to area of interest include hernia repair; ORDERING SYSTEM PROVIDED HISTORY: PE TECHNOLOGIST PROVIDED HISTORY: Ordering Physician Provided Reason for Exam: Patient denies any SOB or chest pain at this time Acuity: Acute FINDINGS: Chest: Mediastinum: Heart is mildly enlarged in size. Trace pericardial fluid versus pericardial thickening. Main pulmonary artery mildly enlarged measuring 3.1 cm in transverse dimension. No convincing evidence of central or lobar pulmonary emboli. Segmental subsegmental branches are less optimally evaluated due to motion artifacts and timing of contrast bolus. No suspicious mediastinal, hilar, or axillary not identified per CT criteria. Lungs/pleura: Small right-sided and trace left-sided pleural effusions. Pleuroparenchymal bands versus subsegmental atelectasis identified involving both lower lobes. Soft Tissues/Bones: There is redemonstration of the mixed sclerotic and lytic lesion involving the T2 vertebral body with surrounding pair spinal soft tissue attenuation. This extends into the pedicles and facets on the left. Additional mix additional sclerotic and lytic lesion identified involving T4 similar prior examination. Subtle areas sclerosis is suspected subacute nondisplaced fractures anterior left 5th, 7th and 8th ribs similar prior exam. Abdomen/Pelvis: Organs:  There are innumerable hypodensities identified throughout the right and left hepatic lobes which have progressed from prior examination. A suggestion of a central ill-defined area of hypoattenuation which may represent areas of posterior related changes/fibrosis versus perfusion differences. There is mild gallbladder wall thickening. No definite cholelithiasis. The spleen, adrenal glands, kidneys, pancreas unremarkable. GI/Bowel: Mild-to-moderate retained stool throughout the colon. Mild-to-moderate scattered colonic diverticulosis. Appendix not identified. Pelvis: Mild bladder wall thickening related to underdistention. Tiny fat containing inguinal hernias. Prostate unremarkable. Peritoneum/Retroperitoneum: Moderate volume of ascites. Similar prior examination. Bones/Soft Tissues: Multilevel degenerate changes of the lumbar spine. CTA Chest: No convincing evidence of central or lobar pulmonary emboli. Distal segmental subsegmental branches not well evaluated. Moderate size right-sided effusion and trace left-sided effusion. Stable osseous metastatic disease with peers spinal soft tissue attenuation involving the T2 vertebral body. CT of the abdomen pelvis: No significant change in the innumerable hepatic lesions worrisome for metastatic disease as well as moderate burden of ascites. Bladder wall thickening could be infectious inflammatory or could be related to underdistention. .  Please correlate with urinalysis findings. Ct Chest Pulmonary Embolism W Contrast    Result Date: 5/10/2019  EXAMINATION: CTA OF THE CHEST 5/10/2019 12:31 pm TECHNIQUE: CTA of the chest was performed after the administration of intravenous contrast.  Multiplanar reformatted images are provided for review. MIP images are provided for review. Dose modulation, iterative reconstruction, and/or weight based adjustment of the mA/kV was utilized to reduce the radiation dose to as low as reasonably achievable.  COMPARISON: CT PE dated 04/08/2019 HISTORY: ORDERING SYSTEM PROVIDED HISTORY: cough, sob, tachycardia, cancer TECHNOLOGIST PROVIDED HISTORY: Ordering Physician Provided Reason for Exam: cough, sob, tachycardia, cancer. denies previous chest surgeries. FINDINGS: Pulmonary Arteries: Distal segmental and subsegmental pulmonary arteries are limited by respiratory motion and suboptimal bolus timing, despite repeat imaging. No large central or proximal segmental pulmonary embolus. Mediastinum: The heart is normal in size. There is no pericardial effusion. Thoracic aorta is normal in caliber without obvious intimal dissection. Coronary artery calcifications and stents are noted. No mediastinal or hilar lymphadenopathy. No axillary lymphadenopathy. Right chest port is in place with distal tip at the cavoatrial junction. Lungs/pleura: There is a tiny right pleural effusion and trace left pleural fluid. There is mild bibasilar atelectasis. No focal airspace consolidation or pneumothorax. No evidence of pulmonary nodule or mass. Upper Abdomen: There is focal heterogeneous attenuation in the right hepatic dome. Irregular hypodense lesion is also noted along the anterior falciform ligament. There are several tiny hypodensities throughout the right liver, and overall attenuation is coarsened. There is a moderate volume of ascites in the upper abdomen. Spleen is enlarged, measuring 16.3 cm. Soft Tissues/Bones: There are mixed lytic and sclerotic lesions at T2 and T4. Both have soft tissue components which are not significantly changed from 04/08/2019. Subtle sclerosis is noted at the anterior left 5th, 7th, and 8th ribs. Underlying nondisplaced subacute fractures are suspected. 1.  Limited assessment of distal segmental and subsegmental pulmonary arteries due to respiratory motion and suboptimal bolus timing, despite repeat imaging. No central or proximal segmental pulmonary embolus.  2.  Tiny right pleural effusion and trace left pleural fluid with mild bibasilar atelectasis. 3.  Indistinct hypodense lesions in the liver, likely metastases. 4.  Moderate volume upper abdominal ascites, new from the previous study. 5.  Splenomegaly. 6.  Redemonstration of metastatic lesions at T2 and T4, similar to 04/08/2019. Subtle areas of sclerosis with suspected subacute nondisplaced fractures are noted at the anterior left 5th, 7th, and 8th ribs. Us Abdomen Limited    Result Date: 5/4/2019  EXAMINATION: RIGHT UPPER QUADRANT ULTRASOUND, 5/4/2019 9:39 am COMPARISON: 04/18/2019 HISTORY: ORDERING SYSTEM PROVIDED HISTORY: Check for ascites TECHNOLOGIST PROVIDED HISTORY: Check for ascites Abd distention, hx liver mets r/o cholecystitis Acuity: Acute Type of Exam: Initial FINDINGS: LIVER:  The liver demonstrates some abnormal echotexture as well as hepatomegaly with the liver measuring 27 cm. Liver nodularity noted which was also seen on the previous CT evaluation. BILIARY SYSTEM:  Gallbladder wall is severely thickened at 9.2 mm. There is evidence of gallbladder polyps. No evidence of gallbladder stones. Negative Iyer sign. Common bile duct is within normal limits measuring 0.61 cm. RIGHT KIDNEY: The right kidney is grossly unremarkable without evidence of hydronephrosis. OTHER: Moderate ascites noted in the abdomen and pelvis. 1. Moderate ascites. 2. Enlarged inhomogeneous nodular liver as previously noted on the CT evaluation. Neoplasm should be considered. 3. Thickened gallbladder wall as well as gallbladder polyps. The gallbladder does not appear distended however. No evidence of gallbladder stones. Acute cholecystitis is unlikely. Ir Us Guided Paracentesis    Result Date: 5/20/2019  PROCEDURE: ULTRASOUND GUIDED PARACENTESIS 5/20/2019 HISTORY: ORDERING SYSTEM PROVIDED HISTORY: ascites r/o sbp TECHNOLOGIST PROVIDED HISTORY: ascites r/o sbp Recurrent abdominal ascites TECHNIQUE: This procedure was performed by Dr. Elio Peabody.   Informed consent was obtained after a detailed explanation of the procedure including risks, benefits, and alternatives. Universal protocol was followed. Ultrasound shows a moderate large amount of ascites. The right abdomen was prepped and draped in sterile fashion and local anesthesia was achieved with lidocaine. Using realtime ultrasound guidance a 5 Polish centesis catheter/needle was advanced into the peritoneal fluid. Ultrasound images show a safe tract and access into the fluid. Fluid was collected in vacuum bottles. Little to no fluid remains on completion. The catheter was removed and a sterile dressing applied. There are no immediate complications. The patient left the department in stable condition. FINDINGS: A total of 3.7 L of clear dark yellow fluid was removed. A sample was sent for the requested studies. Successful ultrasound guided paracentesis. Ir Us Guided Paracentesis    Result Date: 5/17/2019  PROCEDURE: ULTRASOUND GUIDED PARACENTESIS 5/17/2019 HISTORY: ORDERING SYSTEM PROVIDED HISTORY: ascites TECHNOLOGIST PROVIDED HISTORY: ascites TECHNIQUE: This procedure was performed by Dr. Benjamin Cummings. Informed consent was obtained after a detailed explanation of the procedure including risks, benefits, and alternatives. Universal protocol was followed. Ultrasound shows a moderate amount of ascites. The right abdomen was prepped and draped in sterile fashion and local anesthesia was achieved with lidocaine. Using realtime ultrasound guidance a 5 Polish centesis catheter/needle was advanced into the peritoneal fluid. Ultrasound images show a safe tract and access into the fluid. Fluid was collected in vacuum bottles. Little to no fluid remains on completion. The catheter was removed and a sterile dressing applied. There are no immediate complications. The patient left the department in stable condition. FINDINGS: A total of 2.5 L of clear dark yellow fluid was removed.   A sample was sent for the requested studies. Successful ultrasound guided paracentesis. Ir Us Guided Paracentesis    Result Date: 5/6/2019  PROCEDURE: ULTRASOUND GUIDED PARACENTESIS 5/6/2019 HISTORY: ORDERING SYSTEM PROVIDED HISTORY: Suspected SBP TECHNOLOGIST PROVIDED HISTORY: Suspected SBP TECHNIQUE: This procedure was performed by Dr. Shahid Conner. Informed consent was obtained after a detailed explanation of the procedure including risks, benefits, and alternatives. Universal protocol was followed. Ultrasound shows a small to moderate amount of ascites. The right abdomen was prepped and draped in sterile fashion and local anesthesia was achieved with lidocaine. Using realtime ultrasound guidance a 6 Yoruba pigtail catheter was advanced into the peritoneal fluid. Ultrasound images show a safe tract and access into the fluid. Fluid was collected in vacuum bottles. Little to no fluid remains on completion. The catheter was removed and a sterile dressing applied. There are no immediate complications. The patient left the department in stable condition. FINDINGS: A total of 850 mL of clear yellow fluid was removed. Fluid was sent for the requested studies. Successful ultrasound guided paracentesis.          ASSESSMENT     Patient Active Problem List   Diagnosis    Hypertension, essential    Mixed hyperlipidemia    Knee pain    Type 2 diabetes mellitus with hyperglycemia, without long-term current use of insulin (HCC)    HTN (hypertension)    Hypercholesteremia    Insomnia    Knee pain, bilateral    Depression    Cervical sprain    Lumbar sprain    Back pain    Opioid dependence (HCC)    Degeneration of lumbar or lumbosacral intervertebral disc    Chronic midline low back pain without sciatica    ST elevation myocardial infarction (STEMI) ([address]    S/P PTCA  BMS to RCA - 11/26/2016 Dr. Lan Gonzalez    CAD S/P percutaneous coronary angioplasty    Carcinoma [facility])    Type 2 myocardial infarction without ST elevation ([address]    Rectal bleed    Liver metastasis (HCC)    Colorectal cancer, stage IV (HCC)    Anemia    Iron deficiency anemia due to chronic blood loss    Neuropathy due to chemotherapeutic drug ([address]    Right hip pain    Radiation cystitis    Septic shock (HCC)    Severe malnutrition (HCC)    Septicemia (HCC)    Elevated LFTs    Malignant neoplasm of colon (HCC)    Other ascites    Sepsis ([address]       PLAN      w/ liver mets. Received Paracentesis 5/17, 2.5 L removed, SAAG 1.1, same evening readmission post discharge, 5/18   Concern for sbp/sepsis. Elevated AG acidosis and lactic acidosis on admission, along with lethargy elevated wbc, LD.  afib rvr. Possibly hypovolemic shock 2/2 lactulose. Will hold.      GI consulted - appreciate recs. cx negative to date, peritoneal fluid analysis pmn < 250 SAAG > 1.1 consistent with no sbp again. 3.7 l out in LVP 5/20. On vanc rocephin currently, empiric coverage for sepsis. F/up cultures. Discussed code status. Pt wants experimental tx at [address] [place] [address]   Palliative care following, appreciate recs. SW following, coordinating discharge to [address] for rehab.        Anemia, iron deficiency, likely 2/2 bleeding from the tumor. And anemia of chronic disease. Iron 24, tibc, 89, uibc 65.      DM II   HbA1c: 11.5 (4/18)   Insulin sliding scale   Hypoglycemic protocol    1.        5/22   cont  Iv atb    No peritonitis    wbc still elevted   ? collitis     bs elevted     id to see       Gi signed off   poor prognosis      Will review oncology eval           5/23      Malignant ascites            sepsis better   on rocephin    Flagyl added     hida pend      Gi seeing      Abnormal lft  Likely from liver mets   ? Causing biliary obstruction   ? Need for  Mri and stent      dimitri ck cbc   oncology to see      MD MADDIE Diamond [address], [address] [place] [address].    Phone [phone]   Fax: [phone]  Answering Service: [phone]

## 2019-05-23 NOTE — PLAN OF CARE
Problem: Risk for Impaired Skin Integrity  Goal: Tissue integrity - skin and mucous membranes  Description  Structural intactness and normal physiological function of skin and  mucous membranes.   5/23/2019 1757 by Ken Gonzalez RN  Outcome: Ongoing  5/23/2019 1757 by Ken Gonzalez RN  Outcome: Ongoing  5/23/2019 0414 by Indira Jones RN  Outcome: Ongoing     Problem: Falls - Risk of:  Goal: Will remain free from falls  Description  Will remain free from falls  5/23/2019 1757 by Ken Gonzalez RN  Outcome: Ongoing  5/23/2019 1757 by Ken Gonzalez RN  Outcome: Ongoing  5/23/2019 0414 by Indira Jones RN  Outcome: Ongoing  Goal: Absence of physical injury  Description  Absence of physical injury  5/23/2019 1757 by Ken Gonzalez RN  Outcome: Ongoing  5/23/2019 1757 by Ken Gonzalez RN  Outcome: Ongoing  5/23/2019 0414 by Indira Jones RN  Outcome: Ongoing     Problem: Coping:  Goal: Family's ability to cope with current situation will improve  Description  Family's ability to cope with current situation will improve  5/23/2019 1757 by Ken Gonzalez RN  Outcome: Ongoing  5/23/2019 1757 by Ken Gonzalez RN  Outcome: Ongoing  5/23/2019 0414 by Indira Jones RN  Outcome: Ongoing

## 2019-05-23 NOTE — PROGRESS NOTES
Physical Therapy  DATE: 2019    NAME: Sarah Sarabia  MRN: [id number]   : 1960    Patient not seen this date for Physical Therapy due to:  [] Blood transfusion in progress  [] Hemodialysis  []  Patient Declined  [] Spine Precautions   [] Strict Bedrest  [x] Surgery/ Procedure 15:15 p.m Pt unavailable having procedure per wife waiting in pt's room. Will attempt to see in a.m. [] Testing      [] Other        [] PT being discontinued at this time. Patient independent. No further needs. [] PT being discontinued at this time as the patient has been transferred to palliative care. No further needs.     Carlos Merida PTA   Electronically signed by Carlos Merida PTA on 2019 at 4:07 PM

## 2019-05-23 NOTE — CARE COORDINATION
DISCHARGE PLANNING NOTE:    LSW following for discharge to [facility], and per notes- pt can be accepted there prior to pre-cert being obtained. PT/OT rec SNF.       Per ID, continue IV Rocephin and Flagyl. Likely needs bilary stent. To discuss with GI. Colon cancer with liver mets.     [facility] to follow for d/c from SNF. Will continue to follow for additional d/c needs.     Electronically signed by Pinky Starks RN on 5/23/2019 at 1:12 PM

## 2019-05-23 NOTE — PLAN OF CARE
Problem: Risk for Impaired Skin Integrity  Goal: Tissue integrity - skin and mucous membranes  Description  Structural intactness and normal physiological function of skin and  mucous membranes.   5/23/2019 1757 by Kalani Anderson RN  Outcome: Ongoing  5/23/2019 0414 by Jc Merida RN  Outcome: Ongoing

## 2019-05-23 NOTE — PROGRESS NOTES
Infectious disease Consult Note      Patient: Boris Bowling  : 1960  Acct#:  [id number]     Date:  2019    Subjective:       History of Present Illness  Patient is a 66 y.o.  male admitted with Sepsis ([facility] [A41.9]  Sepsis ([facility] [A41.9] who is seen in consult for leukocytosis. Who presented to the hospital on 2019 with the confusion, diffuse abdominal discomfort for 2 days. Symptoms associated with generalized weakness. Symptoms are aggravated and alleviated by nothing. WBC on admission was 17, 71% neutrophils, creatinine normal, Bilirubin elevated at 5.5    lactic acid was 9.8 . He had a fever of 101.9 on , has been afebrile since. Has WBC has been elevated . Blood  cultures on  no growth to date  Urine cultures  no growth. Paracentesis was done on  with WBC of 240, neutrophil count 56% didn't meet the criteria for SBP, fluid cultures negative to date. CT chest and abdomen showed liver lesions, bilateral pleural effusions. He was on IV vancomycin and Rocephin initially, vancomycin on hold now. History of stage IV colon cancer with liver metastases status post radiation and chemotherapy that was  done 6 weeks ago, reportedly was not working , he was offered  experimental treatments at [facility] but he refused, ascites, coronary artery disease, diabetes, right upper chest port line. He was recently admitted for cholecystitis some refused to surgery reportedly. He was recently hospitalized , was discharged on     Interval history :  No new events ,no fever ,about the same .   Bilirubin 5.97 yesterday   WBC 15 today   Past Medical History:   Diagnosis Date    Back pain 2013    CAD S/P percutaneous coronary angioplasty 2016    cardiac stent    Carcinoma ([facility] 2016    colon, liver mets    Colon cancer ([facility]     Degeneration of lumbar or lumbosacral intervertebral disc 4/15/2014    Depression 5/10/2012    Diabetes mellitus ([facility]     H/O cardiac catheterization     with cardiac stent    Hepatic metastases ([facility] 12/4/2016    Hyperlipidemia     Hypertension     Knee pain     MI, 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    with cardiac arrest at [facility]      Past Surgical History:   Procedure Laterality Date    CORONARY ANGIOPLASTY WITH STENT PLACEMENT  11/2016    x1 stent    ENDOSCOPIC ULTRASOUND (LOWER) N/A 12/8/2017    RECTAL ENDOSCOPIC ULTRASOUND WITH PATHOLOGY performed by Korey Sanders MD at [address] LIVER BIOPSY Right 08/23/2018    CT guided Visceral Tissue Ablation    IL SIGMOIDOSCOPY FLX W/RMVL FOREIGN BODY N/A 5/25/2017    SIGMOIDOSCOPY BIOPSY FLEXIBLE performed by Korey Sanders MD at [facility]    IL SIGMOIDOSCOPY FLX W/RMVL FOREIGN BODY  12/8/2017    SIGMOIDOSCOPY BIOPSY FLEXIBLE performed by Korey Sanders MD at [facility]    TUNNELED VENOUS PORT PLACEMENT Right     right upper chest for cancer treatment          Admission Meds  No current facility-administered medications on file prior to encounter. Current Outpatient Medications on File Prior to Encounter   Medication Sig Dispense Refill    glucose monitoring kit (FREESTYLE) monitoring kit 1 kit by Does not apply route daily 1 kit 0    Lancets MISC 1 each by Does not apply route daily 100 each 3    insulin glargine (LANTUS) 100 UNIT/ML injection vial Inject 20 Units into the skin nightly      glimepiride (AMARYL) 4 MG tablet Take 1 tablet by mouth every morning 30 tablet 3    oxyCODONE HCl (OXY-IR) 10 MG immediate release tablet Take 20 mg by mouth 3 times daily as needed for Pain.  diphenhydrAMINE (BENADRYL) 25 MG capsule Take 1 capsule by mouth nightly as needed for Sleep 12 capsule 0    fentaNYL (DURAGESIC) 50 MCG/HR Place 1 patch onto the skin every 72 hours.        naloxone (NARCAN) 4 MG/0.1ML LIQD nasal spray Take 4 mg by mouth      potassium chloride (KLOR-CON M) 20 MEQ extended release 14.3* 17.4* 15.8*   HGB 7.3* 7.6* 7.5*   HCT 23.7* 24.9* 24.8*   MCV 83.7 85.7 87.0   PLT 80* 75* 76*     Recent Labs     05/21/19  0536 05/22/19  0607 05/23/19  0627   * 128*  --    K 3.9 4.0  --    CL 92* 88*  --    CO2 24 24  --    BUN 5* 7  --    CREATININE 0.67* 1.06 1.90*         Imaging Studies:                           All appropriate imaging studies and reports reviewed: Yes       Xr Chest Standard (2 Vw)    Result Date: 5/10/2019  EXAMINATION: TWO XRAY VIEWS OF THE CHEST 5/10/2019 10:02 am COMPARISON: AP chest from 05/03/2019 HISTORY: ORDERING SYSTEM PROVIDED HISTORY: cough TECHNOLOGIST PROVIDED HISTORY: cough Ordering Physician Provided Reason for Exam: Cough congestion weakness today h/o cancer Acuity: Acute Type of Exam: Initial Additional signs and symptoms: Cough congestion weakness today h/o cancer Additional history includes anemia, colon carcinoma, hypertension, and diabetes. FINDINGS: Right-sided IJ port with unchanged tip position in the lower SVC. Overlying ECG monitor leads. Cardiomediastinal shadow stable. Low lung volumes with bibasilar atelectasis or perhaps scarring. No localized pulmonary opacity suspicious for infiltrate. No sizable pleural effusion. Bones and soft tissues intact. Gas-filled small bowel loops, best seen on the lateral projection, upper limits of normal.     No acute cardiopulmonary disease or interval change. Bibasilar atelectasis or scarring     Ct Head Wo Contrast    Result Date: 5/19/2019  EXAMINATION: CT OF THE HEAD WITHOUT CONTRAST  5/19/2019 3:50 am TECHNIQUE: CT of the head was performed without the administration of intravenous contrast. Dose modulation, iterative reconstruction, and/or weight based adjustment of the mA/kV was utilized to reduce the radiation dose to as low as reasonably achievable.  COMPARISON: 05/16/2019 HISTORY: ORDERING SYSTEM PROVIDED HISTORY: ams TECHNOLOGIST PROVIDED HISTORY: Ordering Physician Provided Reason for Exam: AMS Acuity: Acute FINDINGS: BRAIN/VENTRICLES: The cerebral and cerebellar parenchyma demonstrate normal volume. There are no areas of acute hemorrhage, mass, or midline shift. No abnormal extra-axial fluid collections. The ventricles are normal in size. Vascular calcifications are noted in the carotid siphons. Gray-white differentiation is maintained. ORBITS: The visualized portion of the orbits demonstrate no acute abnormality. SINUSES: There is a left maxillary mucous retention cyst.  The mastoid air cells are clear. Cerumen is noted in the external auditory canals. SOFT TISSUES/SKULL:  The calvarium is intact. No appreciable scalp soft tissue swelling. 1. No acute intracranial abnormality, stable. Ct Head Wo Contrast    Result Date: 5/16/2019  EXAMINATION: CT OF THE HEAD WITHOUT CONTRAST  5/16/2019 12:39 am TECHNIQUE: CT of the head was performed without the administration of intravenous contrast. Dose modulation, iterative reconstruction, and/or weight based adjustment of the mA/kV was utilized to reduce the radiation dose to as low as reasonably achievable. COMPARISON: 04/01/2019 HISTORY: ORDERING SYSTEM PROVIDED HISTORY: ams TECHNOLOGIST PROVIDED HISTORY: Ordering Physician Provided Reason for Exam: AMS, patient denies any head complaints at this time Acuity: Acute Relevant Medical/Surgical History: patient denies any surgeries to brain FINDINGS: BRAIN/VENTRICLES: There is no acute intracranial hemorrhage, mass effect or midline shift. No abnormal extra-axial fluid collection. The gray-white differentiation is maintained without evidence of an acute infarct. There is no evidence of hydrocephalus. ORBITS: The visualized portion of the orbits demonstrate no acute abnormality. SINUSES: The visualized paranasal sinuses and mastoid air cells demonstrate no acute abnormality. Small mucous retention cyst left maxillary antrum.  SOFT TISSUES/SKULL:  No acute abnormality of the visualized skull or soft tissues. No acute intracranial abnormality. Ct Abdomen Pelvis W Iv Contrast Additional Contrast? None    Result Date: 5/19/2019  EXAMINATION: CT OF THE ABDOMEN AND PELVIS WITH CONTRAST 5/19/2019 3:46 am TECHNIQUE: CT of the abdomen and pelvis was performed with the administration of intravenous contrast. Multiplanar reformatted images are provided for review. Dose modulation, iterative reconstruction, and/or weight based adjustment of the mA/kV was utilized to reduce the radiation dose to as low as reasonably achievable. COMPARISON: 05/16/2019 HISTORY: ORDERING SYSTEM PROVIDED HISTORY: abd pain TECHNOLOGIST PROVIDED HISTORY: Ordering Physician Provided Reason for Exam: SOB, chills Acuity: Acute Relevant Medical/Surgical History: patient has a hx of diabetes, surgeries to area of interest include liver biopsy, hernia repair FINDINGS: Lower Chest: The heart size is normal.  Coronary artery vascular calcifications are noted. There are small bilateral pleural effusions, slightly increased on the left side. There is respiratory motion artifact in the lung bases with associated areas of atelectasis. Organs: There is cirrhosis with multifocal diffuse infiltrative lesions noted throughout the liver, compatible with the patient's clinical history of metastatic disease. One of the largest lesions is noted in the right lobe centrally measuring approximately 8.2 x 5.7 cm. The gallbladder is contracted. The adrenal glands are unremarkable. The pancreas has several calcifications which may represent sequela of chronic pancreatitis. There is splenomegaly measuring 17.7 cm. No splenic lesion is identified. The kidneys are unremarkable. No hydronephrosis. GI/Bowel: The stomach and duodenal sweep are unremarkable. Stool is noted in the colon. There is respiratory motion artifact limiting evaluation of the hollow visceral organs. Pelvis: The bladder is partially distended with urine.   The prostate and seminal vesicles are unremarkable. There is a right inguinal hernia containing fat. No inguinal or pelvic sidewall lymphadenopathy. Peritoneum/Retroperitoneum: Atherosclerotic plaque is noted in the aorta and its branch vessels. No retroperitoneal adenopathy. There is marked intra-abdominal ascites, similar to the previous study. No mesenteric adenopathy. No anterior abdominal wall defect. Bones/Soft Tissues: There is anasarca. Degenerative changes are noted along the spine and sacroiliac joints. 1. Cirrhosis with portal hypertension and marked ascites, not appreciably changed. 2. Infiltrative low-attenuation lesions are noted diffusely throughout the liver, similar to the previous study, compatible with diffuse metastatic disease. 3. Small bilateral pleural effusions, slightly increased in size on the left. Ct Abdomen Pelvis W Iv Contrast Additional Contrast? None    Result Date: 5/16/2019  EXAMINATION: CT OF THE ABDOMEN AND PELVIS WITH CONTRAST; CTA OF THE CHEST 5/16/2019 12:42 am TECHNIQUE: CT of the abdomen and pelvis was performed with the administration of intravenous contrast. Multiplanar reformatted images are provided for review. Dose modulation, iterative reconstruction, and/or weight based adjustment of the mA/kV was utilized to reduce the radiation dose to as low as reasonably achievable.; CTA of the chest was performed after the administration of intravenous contrast.  Multiplanar reformatted images are provided for review. MIP images are provided for review. Dose modulation, iterative reconstruction, and/or weight based adjustment of the mA/kV was utilized to reduce the radiation dose to as low as reasonably achievable.  COMPARISON: None HISTORY: ORDERING SYSTEM PROVIDED HISTORY: abd pain TECHNOLOGIST PROVIDED HISTORY: Ordering Physician Provided Reason for Exam: Abdominal pain, bloating Acuity: Acute Relevant Medical/Surgical History: surgeries to area of interest include hernia repair; ORDERING SYSTEM PROVIDED HISTORY: PE TECHNOLOGIST PROVIDED HISTORY: Ordering Physician Provided Reason for Exam: Patient denies any SOB or chest pain at this time Acuity: Acute FINDINGS: Chest: Mediastinum: Heart is mildly enlarged in size. Trace pericardial fluid versus pericardial thickening. Main pulmonary artery mildly enlarged measuring 3.1 cm in transverse dimension. No convincing evidence of central or lobar pulmonary emboli. Segmental subsegmental branches are less optimally evaluated due to motion artifacts and timing of contrast bolus. No suspicious mediastinal, hilar, or axillary not identified per CT criteria. Lungs/pleura: Small right-sided and trace left-sided pleural effusions. Pleuroparenchymal bands versus subsegmental atelectasis identified involving both lower lobes. Soft Tissues/Bones: There is redemonstration of the mixed sclerotic and lytic lesion involving the T2 vertebral body with surrounding pair spinal soft tissue attenuation. This extends into the pedicles and facets on the left. Additional mix additional sclerotic and lytic lesion identified involving T4 similar prior examination. Subtle areas sclerosis is suspected subacute nondisplaced fractures anterior left 5th, 7th and 8th ribs similar prior exam. Abdomen/Pelvis: Organs: There are innumerable hypodensities identified throughout the right and left hepatic lobes which have progressed from prior examination. A suggestion of a central ill-defined area of hypoattenuation which may represent areas of posterior related changes/fibrosis versus perfusion differences. There is mild gallbladder wall thickening. No definite cholelithiasis. The spleen, adrenal glands, kidneys, pancreas unremarkable. GI/Bowel: Mild-to-moderate retained stool throughout the colon. Mild-to-moderate scattered colonic diverticulosis. Appendix not identified. Pelvis: Mild bladder wall thickening related to underdistention.   Tiny fat containing inguinal hernias. Prostate unremarkable. Peritoneum/Retroperitoneum: Moderate volume of ascites. Similar prior examination. Bones/Soft Tissues: Multilevel degenerate changes of the lumbar spine. CTA Chest: No convincing evidence of central or lobar pulmonary emboli. Distal segmental subsegmental branches not well evaluated. Moderate size right-sided effusion and trace left-sided effusion. Stable osseous metastatic disease with peers spinal soft tissue attenuation involving the T2 vertebral body. CT of the abdomen pelvis: No significant change in the innumerable hepatic lesions worrisome for metastatic disease as well as moderate burden of ascites. Bladder wall thickening could be infectious inflammatory or could be related to underdistention. .  Please correlate with urinalysis findings. Xr Chest Portable    Result Date: 5/3/2019  EXAMINATION: SINGLE XRAY VIEW OF THE CHEST 5/3/2019 11:12 am COMPARISON: April 17, 2019 HISTORY: ORDERING SYSTEM PROVIDED HISTORY: Chest Pain TECHNOLOGIST PROVIDED HISTORY: Chest Pain Ordering Physician Provided Reason for Exam: Pt states today chest pain. History of colon cancer and lung cancer Acuity: Unknown Type of Exam: Unknown FINDINGS: Right chest Port in good position. No kink. Non enlarged heart. Bibasilar atelectasis. Right basilar pleuroparenchymal scarring. No effusion. No pneumothorax. No airspace consolidation. No focal airspace consolidation. Ct Chest Pulmonary Embolism W Contrast    Result Date: 5/19/2019  EXAMINATION: CTA OF THE CHEST 5/19/2019 3:46 am TECHNIQUE: CTA of the chest was performed after the administration of intravenous contrast.  Multiplanar reformatted images are provided for review. MIP images are provided for review. Dose modulation, iterative reconstruction, and/or weight based adjustment of the mA/kV was utilized to reduce the radiation dose to as low as reasonably achievable.  COMPARISON: 05/16/2019 HISTORY: ORDERING SYSTEM PROVIDED HISTORY: PE TECHNOLOGIST PROVIDED HISTORY: Ordering Physician Provided Reason for Exam: SOB, elevated d-dimer Acuity: Acute Relevant Medical/Surgical History: Patient has a hx of diabetes, surgeries to area of interest include Coronary angioplasty with stent placement, port placement FINDINGS: Pulmonary Arteries: There is adequate opacification of the pulmonary arteries with intravenous contrast.  There is no evidence of a central pulmonary embolism. Segmental and subsegmental emboli cannot entirely be excluded given the motion artifact on this exam. Mediastinum: The heart size is normal.  Coronary artery vascular calcifications are noted. No pericardial effusion. The thyroid gland is unremarkable. No mediastinal or hilar adenopathy. Lungs/pleura: There are small bilateral pleural effusions which have slightly increased in size, particularly on the left. There is associated atelectasis in the lung bases. There is a noncalcified pulmonary nodule in the right lower lobe measuring 3 mm. This was not seen on the 04/07/2017 exam or the 07/02/2018 exam.  There is also a 4 x 4 mm nodule in the right lower lobe medially (series 2, image 171). There is a noncalcified pulmonary nodule in the left upper lobe measuring 5 x 5 mm (series 2, image 108). There is no focal lung infiltrate. No pneumothorax. Upper Abdomen: There is marked intra-abdominal ascites. There is cirrhosis with sequela of portal hypertension. Please see the dedicated CT of the abdomen and pelvis for further details. Soft Tissues/Bones: There is anasarca. Intramuscular lipoma is noted along the right pectoralis muscle, benign. No axillary adenopathy. Permeative destructive lesions are noted in the T2 and T4 vertebral bodies. Degenerative changes are noted along the spine. Right chest MediPort is noted. Sclerotic lesions are noted along the anterior left 5th and 7th ribs. 1. Suboptimal examination secondary to respiratory motion artifact.   No evidence of a central pulmonary embolism, however, segmental and subsegmental emboli cannot be excluded. 2. Noncalcified pulmonary nodules are noted in the right lower lobe and left upper lobe which are new compared to the 2017 and 2018 exams. While these could be related to an infectious etiology, metastatic disease cannot be excluded. 3. Permeative destructive lesions involving the T4 and T2 vertebral bodies are stable. Sclerotic lesions along the anterior left 5th and 7th ribs are also unchanged. 4. Cirrhosis with portal hypertension and marked intra-abdominal ascites, not appreciably changed. 5. Atherosclerotic disease. 6. Small bilateral pleural effusions, slightly increased on the left. Ct Chest Pulmonary Embolism W Contrast    Result Date: 5/16/2019  EXAMINATION: CT OF THE ABDOMEN AND PELVIS WITH CONTRAST; CTA OF THE CHEST 5/16/2019 12:42 am TECHNIQUE: CT of the abdomen and pelvis was performed with the administration of intravenous contrast. Multiplanar reformatted images are provided for review. Dose modulation, iterative reconstruction, and/or weight based adjustment of the mA/kV was utilized to reduce the radiation dose to as low as reasonably achievable.; CTA of the chest was performed after the administration of intravenous contrast.  Multiplanar reformatted images are provided for review. MIP images are provided for review. Dose modulation, iterative reconstruction, and/or weight based adjustment of the mA/kV was utilized to reduce the radiation dose to as low as reasonably achievable.  COMPARISON: None HISTORY: ORDERING SYSTEM PROVIDED HISTORY: abd pain TECHNOLOGIST PROVIDED HISTORY: Ordering Physician Provided Reason for Exam: Abdominal pain, bloating Acuity: Acute Relevant Medical/Surgical History: surgeries to area of interest include hernia repair; ORDERING SYSTEM PROVIDED HISTORY: PE TECHNOLOGIST PROVIDED HISTORY: Ordering Physician Provided Reason for Exam: Patient denies any SOB or chest pain at this time Acuity: Acute FINDINGS: Chest: Mediastinum: Heart is mildly enlarged in size. Trace pericardial fluid versus pericardial thickening. Main pulmonary artery mildly enlarged measuring 3.1 cm in transverse dimension. No convincing evidence of central or lobar pulmonary emboli. Segmental subsegmental branches are less optimally evaluated due to motion artifacts and timing of contrast bolus. No suspicious mediastinal, hilar, or axillary not identified per CT criteria. Lungs/pleura: Small right-sided and trace left-sided pleural effusions. Pleuroparenchymal bands versus subsegmental atelectasis identified involving both lower lobes. Soft Tissues/Bones: There is redemonstration of the mixed sclerotic and lytic lesion involving the T2 vertebral body with surrounding pair spinal soft tissue attenuation. This extends into the pedicles and facets on the left. Additional mix additional sclerotic and lytic lesion identified involving T4 similar prior examination. Subtle areas sclerosis is suspected subacute nondisplaced fractures anterior left 5th, 7th and 8th ribs similar prior exam. Abdomen/Pelvis: Organs: There are innumerable hypodensities identified throughout the right and left hepatic lobes which have progressed from prior examination. A suggestion of a central ill-defined area of hypoattenuation which may represent areas of posterior related changes/fibrosis versus perfusion differences. There is mild gallbladder wall thickening. No definite cholelithiasis. The spleen, adrenal glands, kidneys, pancreas unremarkable. GI/Bowel: Mild-to-moderate retained stool throughout the colon. Mild-to-moderate scattered colonic diverticulosis. Appendix not identified. Pelvis: Mild bladder wall thickening related to underdistention. Tiny fat containing inguinal hernias. Prostate unremarkable. Peritoneum/Retroperitoneum: Moderate volume of ascites. Similar prior examination.  Bones/Soft Tissues: Multilevel Lungs/pleura: There is a tiny right pleural effusion and trace left pleural fluid. There is mild bibasilar atelectasis. No focal airspace consolidation or pneumothorax. No evidence of pulmonary nodule or mass. Upper Abdomen: There is focal heterogeneous attenuation in the right hepatic dome. Irregular hypodense lesion is also noted along the anterior falciform ligament. There are several tiny hypodensities throughout the right liver, and overall attenuation is coarsened. There is a moderate volume of ascites in the upper abdomen. Spleen is enlarged, measuring 16.3 cm. Soft Tissues/Bones: There are mixed lytic and sclerotic lesions at T2 and T4. Both have soft tissue components which are not significantly changed from 04/08/2019. Subtle sclerosis is noted at the anterior left 5th, 7th, and 8th ribs. Underlying nondisplaced subacute fractures are suspected. 1.  Limited assessment of distal segmental and subsegmental pulmonary arteries due to respiratory motion and suboptimal bolus timing, despite repeat imaging. No central or proximal segmental pulmonary embolus. 2.  Tiny right pleural effusion and trace left pleural fluid with mild bibasilar atelectasis. 3.  Indistinct hypodense lesions in the liver, likely metastases. 4.  Moderate volume upper abdominal ascites, new from the previous study. 5.  Splenomegaly. 6.  Redemonstration of metastatic lesions at T2 and T4, similar to 04/08/2019. Subtle areas of sclerosis with suspected subacute nondisplaced fractures are noted at the anterior left 5th, 7th, and 8th ribs.      Us Abdomen Limited    Result Date: 5/4/2019  EXAMINATION: RIGHT UPPER QUADRANT ULTRASOUND, 5/4/2019 9:39 am COMPARISON: 04/18/2019 HISTORY: ORDERING SYSTEM PROVIDED HISTORY: Check for ascites TECHNOLOGIST PROVIDED HISTORY: Check for ascites Abd distention, hx liver mets r/o cholecystitis Acuity: Acute Type of Exam: Initial FINDINGS: LIVER:  The liver demonstrates some abnormal echotexture as well as hepatomegaly with the liver measuring 27 cm. Liver nodularity noted which was also seen on the previous CT evaluation. BILIARY SYSTEM:  Gallbladder wall is severely thickened at 9.2 mm. There is evidence of gallbladder polyps. No evidence of gallbladder stones. Negative Iyer sign. Common bile duct is within normal limits measuring 0.61 cm. RIGHT KIDNEY: The right kidney is grossly unremarkable without evidence of hydronephrosis. OTHER: Moderate ascites noted in the abdomen and pelvis. 1. Moderate ascites. 2. Enlarged inhomogeneous nodular liver as previously noted on the CT evaluation. Neoplasm should be considered. 3. Thickened gallbladder wall as well as gallbladder polyps. The gallbladder does not appear distended however. No evidence of gallbladder stones. Acute cholecystitis is unlikely. Ir Us Guided Paracentesis    Result Date: 5/20/2019  PROCEDURE: ULTRASOUND GUIDED PARACENTESIS 5/20/2019 HISTORY: ORDERING SYSTEM PROVIDED HISTORY: ascites r/o sbp TECHNOLOGIST PROVIDED HISTORY: ascites r/o sbp Recurrent abdominal ascites TECHNIQUE: This procedure was performed by Dr. Zuleyka Tsai. Informed consent was obtained after a detailed explanation of the procedure including risks, benefits, and alternatives. Universal protocol was followed. Ultrasound shows a moderate large amount of ascites. The right abdomen was prepped and draped in sterile fashion and local anesthesia was achieved with lidocaine. Using realtime ultrasound guidance a 5 [place] centesis catheter/needle was advanced into the peritoneal fluid. Ultrasound images show a safe tract and access into the fluid. Fluid was collected in vacuum bottles. Little to no fluid remains on completion. The catheter was removed and a sterile dressing applied. There are no immediate complications. The patient left the department in stable condition. FINDINGS: A total of 3.7 L of clear dark yellow fluid was removed.   A sample was sent for

## 2019-05-23 NOTE — FLOWSHEET NOTE
05/23/19 1127   Encounter Summary   Services provided to: Patient   Referral/Consult From: Palliative Care   Complexity of Encounter Low   Length of Encounter 15 minutes   Spiritual/Jew   Type Spiritual support   Assessment Sleeping   Intervention Ritual;Prayer   Outcome Did not respond

## 2019-05-23 NOTE — PROGRESS NOTES
.. PALLIATIVE CARE NURSING ASSESSMENT    Patient: Jitendra Moyer  Room: 2062/2062-01    Reason For Consult   Goals of care evaluation  Distress management  Guidance and support  Facilitate communications  Assistance in coordinating care      Impression: Jitendra Moyer is a 66y.o. year old male  has a past medical history of Back pain, CAD S/P percutaneous coronary angioplasty, Carcinoma ([address] Colon cancer ([address] Degeneration of lumbar or lumbosacral intervertebral disc, Depression, Diabetes mellitus ([address] H/O cardiac catheterization, Hepatic metastases ([address] Hyperlipidemia, Hypertension, Knee pain, and MI, old. .  Currently hospitalized for the management of Sepsis. The Palliative Care Team is following to assist with goals of care and support. Vital Signs  Blood pressure 116/62, pulse 98, temperature 98.2 °F (36.8 °C), temperature source Oral, resp. rate 16, height 5' 10\" (1.778 m), weight 240 lb 4.8 oz (109 kg), SpO2 95 %.     Patient Active Problem List   Diagnosis    Hypertension, essential    Mixed hyperlipidemia    Knee pain    Type 2 diabetes mellitus with hyperglycemia, without long-term current use of insulin (HCC)    HTN (hypertension)    Hypercholesteremia    Insomnia    Knee pain, bilateral    Depression    Cervical sprain    Lumbar sprain    Back pain    Opioid dependence (HCC)    Degeneration of lumbar or lumbosacral intervertebral disc    Chronic midline low back pain without sciatica    ST elevation myocardial infarction (STEMI) ([address]    S/P PTCA  BMS to RCA - 11/26/2016 Dr. Abeba Maier    CAD S/P percutaneous coronary angioplasty    Carcinoma [facility])    Type 2 myocardial infarction without ST elevation ([address]    Rectal bleed    Liver metastasis ([address]    Colorectal cancer, stage IV ([address]    Anemia    Iron deficiency anemia due to chronic blood loss    Neuropathy due to chemotherapeutic drug ([address]    Right hip pain    Radiation cystitis    Septic shock ([address]    Severe malnutrition (HCC)    Septicemia ([facility]    Elevated LFTs    Malignant neoplasm of colon ([facility]    Other ascites    Sepsis ([address]    Cholangitis       Palliative Interaction:The patient is out of the room for a procedure and his wife Na Villa is present. We discussed the treatment plan as of the present , and I offered support to her. Na Villa told me that the patient was wanting to get some paperwork completed at home, and he had expressed some other goals that he had. Na Villa stated that the patient was going for rehab top get stronger, and he was ready to get that started. We discussed the patient's cancer journey , and Na Villa has hopes that he can have the time he wants at home, and the strength to finish the goals he has around the house. I offered wife Na Villa emotional support. I encouraged Shurly to also keep the communication strong with the patient, so that as time goes on that she is sure of his wishes for medical treatment and that she can honor his wishes. We talked about the focus to always be quality of life. Will follow for goals of care and support. Goals/Plan of care  Education/support to family  Discharge planning/helping to coordinate care  Providing support for coping/adaptation/distress of family  Discussing meaning/purpose   Continue with current plan of care  Code status clarified: Full Code  Continued communication updates  Offered support to wife Na Villa. She has contact information for any needs. Patient to go to SNF for rehab for strengthening. Patient wants to return home, and has goals to obtain. Ramona Brady [address] [place] [address]  Lucina Sanchez RN  [facility]: [phone]  [address]: [phone]  Work Cell: [phone]

## 2019-05-23 NOTE — FLOWSHEET NOTE
05/22/19 1300   Encounter Summary   Services provided to: Patient   Referral/Consult From: Rounding   Continue Visiting   (5/22/19V)   Volunteer Visit Yes   Complexity of Encounter Low   Length of Encounter 15 minutes   Routine   Type Follow up

## 2019-05-23 NOTE — CONSULTS
Today's Date: 5/23/2019  Patient Name: Michelle Presley  Date of admission: 5/19/2019  2:40 AM  Patient's age: 66 y. o., 1960  Admission Dx: Sepsis ([address] [A41.9]  Sepsis ([address] [A41.9]    Reason for Consult: management recommendations  Requesting Physician: Urban Jordan MD    CHIEF COMPLAINT:  Metastatic colon cancer     History Obtained From:  patient, electronic medical record    HISTORY OF PRESENT ILLNESS:      The patient is a 66 y.o.  male who is admitted to the hospital for chief complaint of weakness abdominal pain. Patient's oncologic history is as below. Patient was initially diagnosed with metastatic adenocarcinoma rectum and was treated by Dr. Amina Cloud. Patient was discharged from the practice due to  threatening Behavior towards his staff. Patient was recently started on Lonsurf however could not tolerated. Patient was then referred to [facility] clinic for consideration of clinical trial however he has not been able to make it so far. Labs show elevated bilirubin. Patient complains of weakness generalized aches and pains. Oncologic history:    66year old  man diagnosed with colon cancer in December 2016. It has been decided he is not a good surgical candidate by [facility] surgeons. He was started on FOLFOX, then proceeded to xeloda RT, then FOLFIRI. CEA was found to be rising on FOLFIRI and he was restarted on FOLFOX. S/P 10 cycles, when his CEA was found to be again rising (01/04/2019 554.8) He Then changed to lonsurf (02/2019), he completed one cycle but was unable to tolerate due to side effect of fatigue. He has been in and out of [facility] a few times since March.      Past Medical History:   has a past medical history of Back pain, CAD S/P percutaneous coronary angioplasty, Carcinoma ([address] Colon cancer ([address] Degeneration of lumbar or lumbosacral intervertebral disc, Depression, Diabetes mellitus ([address] H/O cardiac catheterization, Hepatic metastases ([facility] [address] Hyperlipidemia, Hypertension, Knee pain, and MI, old. Past Surgical History:   has a past surgical history that includes Tunneled venous port placement (Right); pr sigmoidoscopy flx w/rmvl foreign body (N/A, 5/25/2017); hernia repair; Coronary angioplasty with stent (11/2016); pr sigmoidoscopy flx w/rmvl foreign body (12/8/2017); Endoscopic ultrasonography, GI (N/A, 12/8/2017); and liver biopsy (Right, 08/23/2018). Medications:    Prior to Admission medications    Medication Sig Start Date End Date Taking? Authorizing Provider   glucose monitoring kit (FREESTYLE) monitoring kit 1 kit by Does not apply route daily 4/22/19   Paulina Teran MD   Lancets MISC 1 each by Does not apply route daily 4/22/19   Paulina Teran MD   insulin glargine (LANTUS) 100 UNIT/ML injection vial Inject 20 Units into the skin nightly    Historical Provider, MD   glimepiride (AMARYL) 4 MG tablet Take 1 tablet by mouth every morning 4/22/19   Paulina Teran MD   oxyCODONE HCl (OXY-IR) 10 MG immediate release tablet Take 20 mg by mouth 3 times daily as needed for Pain. Historical Provider, MD   diphenhydrAMINE (BENADRYL) 25 MG capsule Take 1 capsule by mouth nightly as needed for Sleep 4/8/19   Leni Parham DO   fentaNYL (DURAGESIC) 50 MCG/HR Place 1 patch onto the skin every 72 hours. 4/20/19   Historical Provider, MD   naloxone (NARCAN) 4 MG/0.1ML LIQD nasal spray Take 4 mg by mouth    Historical Provider, MD   potassium chloride (KLOR-CON M) 20 MEQ extended release tablet Take 1 tablet by mouth daily 10/9/18   Cindy Day MD   metoprolol tartrate (LOPRESSOR) 25 MG tablet Take 1 tablet by mouth daily  3/12/18   Historical Provider, MD   Glucose Blood (BLOOD GLUCOSE TEST STRIPS) STRP 1 each by In Vitro route daily As needed.  8/24/17   Dionisio Fitzpatrick MD   Insulin Pen Needle 29G X 12.7MM MISC 1 each by Does not apply route daily 5/4/17   Jam Hidalgo MD   nitroGLYCERIN (NITROSTAT) 0.4 MG SL tablet Place 1 tablet under the tongue every 5 minutes as needed for Chest pain Dissolve 1 tablet under tongue for chest pain and repeat every 5 min up to max of 3 total doses.   If no relief after 3 doses call 911 11/29/16   ASIM Dos Santos CNP     Current Facility-Administered Medications   Medication Dose Route Frequency Provider Last Rate Last Dose    sodium chloride flush 0.9 % injection 10 mL  10 mL Intravenous PRN Israel Gonzales MD   10 mL at 05/23/19 1311    sodium chloride flush 0.9 % injection 10 mL  10 mL Intravenous PRN Israel Gonzales MD   10 mL at 05/23/19 1447    metronidazole (FLAGYL) 500 mg in NaCl 100 mL IVPB premix  500 mg Intravenous Q8H Israel Gonzales  mL/hr at 05/23/19 1549 500 mg at 05/23/19 1549    sodium chloride flush 0.9 % injection 10 mL  10 mL Intravenous 2 times per day Jeri Hawk MD        sodium chloride flush 0.9 % injection 10 mL  10 mL Intravenous PRN Jeri Hawk MD        enoxaparin (LOVENOX) injection 30 mg  30 mg Subcutaneous BID Jeri Hawk MD   30 mg at 05/21/19 0908    sodium chloride flush 0.9 % injection 10 mL  10 mL Intravenous PRN Vicky Flores MD   10 mL at 05/19/19 0413    sodium chloride flush 0.9 % injection 10 mL  10 mL Intravenous 2 times per day Nba Stewart MD   10 mL at 05/19/19 1357    sodium chloride flush 0.9 % injection 10 mL  10 mL Intravenous PRN Nba Stewart MD        magnesium hydroxide (MILK OF MAGNESIA) 400 MG/5ML suspension 30 mL  30 mL Oral Daily PRN Nba Stewart MD        ondansetron (ZOFRAN) injection 4 mg  4 mg Intravenous Q6H PRN Nba Stewart MD        0.9 % sodium chloride infusion   Intravenous Continuous Nba Stewart MD 75 mL/hr at 05/22/19 1548      acetaminophen (TYLENOL) tablet 650 mg  650 mg Oral Q4H PRN Nba Stewart MD        cefTRIAXone (ROCEPHIN) 1 g IVPB in 50 mL D5W minibag  1 g Intravenous Q24H Nba Stewart MD   Stopped at 05/23/19 1229    fentaNYL (DURAGESIC) 50 MCG/HR 1 patch  1 patch Transdermal Q72H Mina Ríos MD        oxyCODONE HCl (OXY-IR) immediate release tablet 20 mg  20 mg Oral TID PRN Mina Ríos MD   20 mg at 19 [id number]       Allergies:  Morphine    Social History:   reports that he has never smoked. He has never used smokeless tobacco. He reports that he drank alcohol. He reports that he does not use drugs. Family History: family history includes Heart Disease in his mother; High Blood Pressure in his mother; High Cholesterol in his father; Stroke in his mother. REVIEW OF SYSTEMS:      Constitutional: Positive fatigue  Eyes: No eye discharge, double vision, or eye pain   HEENT: negative for sore mouth, sore throat, hoarseness and voice change   Respiratory: negative for cough , sputum, dyspnea, wheezing, hemoptysis, chest pain   Cardiovascular: negative for chest pain, dyspnea, palpitations, orthopnea, PND   Gastrointestinal: Abdominal distention  Genitourinary: negative for frequency, dysuria, nocturia, urinary incontinence, and hematuria   Integument: negative for rash, skin lesions, bruises.    Hematologic/Lymphatic: negative for easy bruising, bleeding, lymphadenopathy, or petechiae   Endocrine: negative for heat or cold intolerance,weight changes, change in bowel habits and hair loss   Musculoskeletal: negative for myalgias, arthralgias, pain, joint swelling,and bone pain   Neurological: negative for headaches, dizziness, seizures, weakness, numbness    PHYSICAL EXAM:        /62   Pulse 98   Temp 98.2 °F (36.8 °C) (Oral)   Resp 16   Ht 5' 10\" (1.778 m)   Wt 240 lb 4.8 oz (109 kg)   SpO2 95%   BMI 34.48 kg/m²    Temp (24hrs), Av.8 °F (36.6 °C), Min:97.3 °F (36.3 °C), Max:98.2 °F (36.8 °C)      General appearance - ill appearing, no in pain or distress   Mental status - alert and cooperative   Eyes - pupils equal and reactive, extraocular eye movements intact   Ears - bilateral TM's and external ear canals normal   Mouth - mucous membranes moist, pharynx normal without lesions   Neck - supple, no significant adenopathy   Lymphatics - no palpable lymphadenopathy, no hepatosplenomegaly   Chest - clear to auscultation, no wheezes, rales or rhonchi, symmetric air entry   Heart - normal rate, regular rhythm, normal S1, S2, no murmurs  Abdomen - abdominal distention   Neurological - alert, oriented, normal speech, no focal findings or movement disorder noted   Musculoskeletal - no joint tenderness, deformity or swelling   Extremities - peripheral pulses normal, no pedal edema, no clubbing or cyanosis   Skin - jaundice      DATA:      Labs:     CBC:   Recent Labs     05/22/19  0607 05/23/19  0627   WBC 17.4* 15.8*   HGB 7.6* 7.5*   HCT 24.9* 24.8*   PLT 75* 76*     BMP:   Recent Labs     05/21/19  0536 05/22/19  0607 05/23/19  0627   * 128*  --    K 3.9 4.0  --    CO2 24 24  --    BUN 5* 7  --    CREATININE 0.67* 1.06 1.90*   LABGLOM >60 >60 37*   GLUCOSE 157* 109*  --      PT/INR: No results for input(s): PROTIME, INR in the last 72 hours. APTT:No results for input(s): APTT in the last 72 hours. LIVER PROFILE:  Recent Labs     05/22/19  1313   *   ALT 17   LABALBU 1.9*     Results for orders placed or performed during the hospital encounter of 05/19/19   Cult,Blood #1   Result Value Ref Range    Specimen Description . BLOOD 10mlPURP 5mlRED LAC     Special Requests NOT REPORTED     Culture NO GROWTH 4 DAYS    Cult,Blood #1   Result Value Ref Range    Specimen Description . BLOOD 10mlRED 10mlPURP RW     Special Requests NOT REPORTED     Culture NO GROWTH 4 DAYS    Urine Culture   Result Value Ref Range    Specimen Description . CLEAN CATCH URINE     Special Requests NOT REPORTED     Culture NO GROWTH    Body Fluid Culture   Result Value Ref Range    Specimen Description . PARACENTESIS FLUID     Special Requests NOT REPORTED     Direct Exam MANY NEUTROPHILS (A)     Direct Exam FEW MONONUCLEAR WHITE BLOOD CELLS SEEN (A)     Direct Exam NO BACTERIA SEEN     Direct Exam       Gram stain made from cytocentrifuged specimen. Organisms and cells will be concentrated.     Culture NO GROWTH 3 DAYS    CBC Auto Differential   Result Value Ref Range    WBC 17.0 (H) 3.5 - 11.0 k/uL    RBC 3.34 (L) 4.5 - 5.9 m/uL    Hemoglobin 8.5 (L) 13.5 - 17.5 g/dL    Hematocrit 27.8 (L) 41 - 53 %    MCV 83.2 80 - 100 fL    MCH 25.5 (L) 26 - 34 pg    MCHC 30.7 (L) 31 - 37 g/dL    RDW 24.4 (H) 11.5 - 14.9 %    Platelets 166 (L) 242 - 450 k/uL    MPV 7.4 6.0 - 12.0 fL    NRBC Automated NOT REPORTED per 100 WBC    Differential Type NOT REPORTED     Immature Granulocytes NOT REPORTED 0 %    Absolute Immature Granulocyte NOT REPORTED 0.00 - 0.30 k/uL    WBC Morphology NOT REPORTED     RBC Morphology NOT REPORTED     Platelet Estimate NOT REPORTED     Seg Neutrophils 71 (H) 36 - 66 %    Lymphocytes 3 (L) 24 - 44 %    Monocytes 11 (H) 1 - 7 %    Eosinophils % 0 0 - 4 %    Basophils 0 0 - 2 %    Bands 15 (H) 0 - 10 %    Segs Absolute 12.07 (H) 1.3 - 9.1 k/uL    Absolute Lymph # 0.51 (L) 1.0 - 4.8 k/uL    Absolute Mono # 1.87 (H) 0.1 - 1.3 k/uL    Absolute Eos # 0.00 0.0 - 0.4 k/uL    Basophils # 0.00 0.0 - 0.2 k/uL    Absolute Bands # 2.55 (H) 0.0 - 1.0 k/uL    Morphology ANISOCYTOSIS PRESENT     Morphology HYPOCHROMIA PRESENT     Morphology 1+ POLYCHROMASIA    Comprehensive Metabolic Panel w/ Reflex to MG   Result Value Ref Range    Glucose 194 (H) 70 - 99 mg/dL    BUN 5 (L) 6 - 20 mg/dL    CREATININE 0.45 (L) 0.70 - 1.20 mg/dL    Bun/Cre Ratio NOT REPORTED 9 - 20    Calcium 8.3 (L) 8.6 - 10.4 mg/dL    Sodium 129 (L) 135 - 144 mmol/L    Potassium 3.6 (L) 3.7 - 5.3 mmol/L    Chloride 86 (L) 98 - 107 mmol/L    CO2 20 20 - 31 mmol/L    Anion Gap 23 (H) 9 - 17 mmol/L    Alkaline Phosphatase 766 (H) 40 - 129 U/L    ALT 19 5 - 41 U/L     (H) <40 U/L    Total Bilirubin 5.54 (H) 0.3 - 1.2 mg/dL    Total Protein 6.4 6.4 - 8.3 g/dL    Alb 2.4 (L) 3.5 - GFR Non- CANNOT BE CALCULATED >60 mL/min    GFR  CANNOT BE CALCULATED >60 mL/min    GFR Comment          GFR Staging NOT REPORTED    CBC   Result Value Ref Range    WBC 15.9 (H) 3.5 - 11.0 k/uL    RBC 2.91 (L) 4.5 - 5.9 m/uL    Hemoglobin 7.5 (L) 13.5 - 17.5 g/dL    Hematocrit 24.1 (L) 41 - 53 %    MCV 83.0 80 - 100 fL    MCH 25.9 (L) 26 - 34 pg    MCHC 31.2 31 - 37 g/dL    RDW 24.6 (H) 11.5 - 14.9 %    Platelets 92 (L) 366 - 450 k/uL    MPV 7.4 6.0 - 12.0 fL    NRBC Automated NOT REPORTED per 100 WBC   Body fluid cell count with differential   Result Value Ref Range    Color, Fluid YELLOW     Appearance, Fluid CLOUDY     WBC, Fluid 240 /mm3    RBC, Fluid 321 /mm3    Specimen Type . PARACENTESIS FLUID     Fluid Diff Comment TO BE REVIEWED BY PATHOLOGIST     Neutrophil Count, Fluid 56 (H) 0 %    Lymphocytes, Body Fluid 9 (H) 0 %    Monocyte Count, Fluid 35 (H) 0 %    Eos, Fluid      0 %    Basos, Fluid      0 %    Other Cells, Fluid      0 %   Protein, body fluid   Result Value Ref Range    Specimen Type . PARACENTESIS FLUID     Total Protein, Body Fluid 1.7 g/dL   Albumin, fluid   Result Value Ref Range    Specimen Type . PARACENTESIS FLUID     Albumin, Fluid 0.8 g/dL   Calcium, Ionized   Result Value Ref Range    Calcium, Ion 1.09 (L) 1.13 - 1.33 mmol/L   VANCOMYCIN, TROUGH   Result Value Ref Range    Vancomycin Tr 11.4 10.0 - 20.0 ug/mL    Vancomycin Trough Dose amount 1500 MG     Vancomycin Trough Date last dose 5,202,019     Vancomycin Trough Time last dose 776    Basic Metabolic Panel w/ Reflex to MG   Result Value Ref Range    Glucose 157 (H) 70 - 99 mg/dL    BUN 5 (L) 6 - 20 mg/dL    CREATININE 0.67 (L) 0.70 - 1.20 mg/dL    Bun/Cre Ratio NOT REPORTED 9 - 20    Calcium 7.9 (L) 8.6 - 10.4 mg/dL    Sodium 130 (L) 135 - 144 mmol/L    Potassium 3.9 3.7 - 5.3 mmol/L    Chloride 92 (L) 98 - 107 mmol/L    CO2 24 20 - 31 mmol/L    Anion Gap 14 9 - 17 mmol/L    GFR Non-African American >60 >60 mL/min    GFR African American >60 >60 mL/min    GFR Comment          GFR Staging NOT REPORTED    CBC   Result Value Ref Range    WBC 14.3 (H) 3.5 - 11.0 k/uL    RBC 2.83 (L) 4.5 - 5.9 m/uL    Hemoglobin 7.3 (L) 13.5 - 17.5 g/dL    Hematocrit 23.7 (L) 41 - 53 %    MCV 83.7 80 - 100 fL    MCH 25.9 (L) 26 - 34 pg    MCHC 30.9 (L) 31 - 37 g/dL    RDW 25.2 (H) 11.5 - 14.9 %    Platelets 80 (L) 416 - 450 k/uL    MPV 7.4 6.0 - 12.0 fL    NRBC Automated NOT REPORTED per 100 WBC   Basic Metabolic Panel w/ Reflex to MG   Result Value Ref Range    Glucose 109 (H) 70 - 99 mg/dL    BUN 7 6 - 20 mg/dL    CREATININE 1.06 0.70 - 1.20 mg/dL    Bun/Cre Ratio NOT REPORTED 9 - 20    Calcium 8.1 (L) 8.6 - 10.4 mg/dL    Sodium 128 (L) 135 - 144 mmol/L    Potassium 4.0 3.7 - 5.3 mmol/L    Chloride 88 (L) 98 - 107 mmol/L    CO2 24 20 - 31 mmol/L    Anion Gap 16 9 - 17 mmol/L    GFR Non-African American >60 >60 mL/min    GFR African American >60 >60 mL/min    GFR Comment          GFR Staging NOT REPORTED    CBC   Result Value Ref Range    WBC 17.4 (H) 3.5 - 11.0 k/uL    RBC 2.91 (L) 4.5 - 5.9 m/uL    Hemoglobin 7.6 (L) 13.5 - 17.5 g/dL    Hematocrit 24.9 (L) 41 - 53 %    MCV 85.7 80 - 100 fL    MCH 26.2 26 - 34 pg    MCHC 30.5 (L) 31 - 37 g/dL    RDW 25.7 (H) 11.5 - 14.9 %    Platelets 75 (L) 566 - 450 k/uL    MPV 7.8 6.0 - 12.0 fL    NRBC Automated NOT REPORTED per 100 WBC   HEPATIC FUNCTION PANEL   Result Value Ref Range    Alb 1.9 (L) 3.5 - 5.2 g/dL    Alkaline Phosphatase 504 (H) 40 - 129 U/L    ALT 17 5 - 41 U/L     (H) <40 U/L    Total Bilirubin 5.97 (H) 0.3 - 1.2 mg/dL    Bilirubin, Direct 4.89 (H) <0.31 mg/dL    Bilirubin, Indirect 1.08 (H) 0.00 - 1.00 mg/dL    Total Protein 5.2 (L) 6.4 - 8.3 g/dL    Globulin NOT REPORTED 1.5 - 3.8 g/dL    Albumin/Globulin Ratio NOT REPORTED 1.0 - 2.5   CREATININE, SERUM   Result Value Ref Range    CREATININE 1.90 (H) 0.70 - 1.20 mg/dL    GFR Non- 37 (L) >60 mL/min GFR  45 (L) >60 mL/min    GFR Comment          GFR Staging NOT REPORTED    CBC   Result Value Ref Range    WBC 15.8 (H) 3.5 - 11.0 k/uL    RBC 2.85 (L) 4.5 - 5.9 m/uL    Hemoglobin 7.5 (L) 13.5 - 17.5 g/dL    Hematocrit 24.8 (L) 41 - 53 %    MCV 87.0 80 - 100 fL    MCH 26.4 26 - 34 pg    MCHC 30.3 (L) 31 - 37 g/dL    RDW 26.2 (H) 11.5 - 14.9 %    Platelets 76 (L) 394 - 450 k/uL    MPV 8.1 6.0 - 12.0 fL    NRBC Automated NOT REPORTED per 100 WBC   POC Glucose Fingerstick   Result Value Ref Range    POC Glucose 174 (H) 75 - 110 mg/dL   POC Glucose Fingerstick   Result Value Ref Range    POC Glucose 178 (H) 75 - 110 mg/dL   POC Glucose Fingerstick   Result Value Ref Range    POC Glucose 150 (H) 75 - 110 mg/dL   POC Glucose Fingerstick   Result Value Ref Range    POC Glucose 216 (H) 75 - 110 mg/dL   POC Glucose Fingerstick   Result Value Ref Range    POC Glucose 150 (H) 75 - 110 mg/dL   POC Glucose Fingerstick   Result Value Ref Range    POC Glucose 143 (H) 75 - 110 mg/dL   POC Glucose Fingerstick   Result Value Ref Range    POC Glucose 185 (H) 75 - 110 mg/dL   POC Glucose Fingerstick   Result Value Ref Range    POC Glucose 178 (H) 75 - 110 mg/dL   EKG 12 Lead   Result Value Ref Range    Ventricular Rate 158 BPM    Atrial Rate 158 BPM    QRS Duration 100 ms    Q-T Interval 328 ms    QTc Calculation (Bazett) 531 ms    R Axis -29 degrees    T Axis 157 degrees       IMAGING DATA:    Xr Chest Standard (2 Vw)    Result Date: 5/10/2019  EXAMINATION: TWO XRAY VIEWS OF THE CHEST 5/10/2019 10:02 am COMPARISON: AP chest from 05/03/2019 HISTORY: ORDERING SYSTEM PROVIDED HISTORY: cough TECHNOLOGIST PROVIDED HISTORY: cough Ordering Physician Provided Reason for Exam: Cough congestion weakness today h/o cancer Acuity: Acute Type of Exam: Initial Additional signs and symptoms: Cough congestion weakness today h/o cancer Additional history includes anemia, colon carcinoma, hypertension, and diabetes.  FINDINGS: There is cirrhosis with multifocal diffuse infiltrative lesions noted throughout the liver, compatible with the patient's clinical history of metastatic disease. One of the largest lesions is noted in the right lobe centrally measuring approximately 8.2 x 5.7 cm. The gallbladder is contracted. The adrenal glands are unremarkable. The pancreas has several calcifications which may represent sequela of chronic pancreatitis. There is splenomegaly measuring 17.7 cm. No splenic lesion is identified. The kidneys are unremarkable. No hydronephrosis. GI/Bowel: The stomach and duodenal sweep are unremarkable. Stool is noted in the colon. There is respiratory motion artifact limiting evaluation of the hollow visceral organs. Pelvis: The bladder is partially distended with urine. The prostate and seminal vesicles are unremarkable. There is a right inguinal hernia containing fat. No inguinal or pelvic sidewall lymphadenopathy. Peritoneum/Retroperitoneum: Atherosclerotic plaque is noted in the aorta and its branch vessels. No retroperitoneal adenopathy. There is marked intra-abdominal ascites, similar to the previous study. No mesenteric adenopathy. No anterior abdominal wall defect. Bones/Soft Tissues: There is anasarca. Degenerative changes are noted along the spine and sacroiliac joints. 1. Cirrhosis with portal hypertension and marked ascites, not appreciably changed. 2. Infiltrative low-attenuation lesions are noted diffusely throughout the liver, similar to the previous study, compatible with diffuse metastatic disease. 3. Small bilateral pleural effusions, slightly increased in size on the left.      Ct Abdomen Pelvis W Iv Contrast Additional Contrast? None    Result Date: 5/16/2019  EXAMINATION: CT OF THE ABDOMEN AND PELVIS WITH CONTRAST; CTA OF THE CHEST 5/16/2019 12:42 am TECHNIQUE: CT of the abdomen and pelvis was performed with the administration of intravenous contrast. Multiplanar reformatted images are provided for review. Dose modulation, iterative reconstruction, and/or weight based adjustment of the mA/kV was utilized to reduce the radiation dose to as low as reasonably achievable.; CTA of the chest was performed after the administration of intravenous contrast.  Multiplanar reformatted images are provided for review. MIP images are provided for review. Dose modulation, iterative reconstruction, and/or weight based adjustment of the mA/kV was utilized to reduce the radiation dose to as low as reasonably achievable. COMPARISON: None HISTORY: ORDERING SYSTEM PROVIDED HISTORY: abd pain TECHNOLOGIST PROVIDED HISTORY: Ordering Physician Provided Reason for Exam: Abdominal pain, bloating Acuity: Acute Relevant Medical/Surgical History: surgeries to area of interest include hernia repair; ORDERING SYSTEM PROVIDED HISTORY: PE TECHNOLOGIST PROVIDED HISTORY: Ordering Physician Provided Reason for Exam: Patient denies any SOB or chest pain at this time Acuity: Acute FINDINGS: Chest: Mediastinum: Heart is mildly enlarged in size. Trace pericardial fluid versus pericardial thickening. Main pulmonary artery mildly enlarged measuring 3.1 cm in transverse dimension. No convincing evidence of central or lobar pulmonary emboli. Segmental subsegmental branches are less optimally evaluated due to motion artifacts and timing of contrast bolus. No suspicious mediastinal, hilar, or axillary not identified per CT criteria. Lungs/pleura: Small right-sided and trace left-sided pleural effusions. Pleuroparenchymal bands versus subsegmental atelectasis identified involving both lower lobes. Soft Tissues/Bones: There is redemonstration of the mixed sclerotic and lytic lesion involving the T2 vertebral body with surrounding pair spinal soft tissue attenuation. This extends into the pedicles and facets on the left. Additional mix additional sclerotic and lytic lesion identified involving T4 similar prior examination. Subtle areas sclerosis is suspected subacute nondisplaced fractures anterior left 5th, 7th and 8th ribs similar prior exam. Abdomen/Pelvis: Organs: There are innumerable hypodensities identified throughout the right and left hepatic lobes which have progressed from prior examination. A suggestion of a central ill-defined area of hypoattenuation which may represent areas of posterior related changes/fibrosis versus perfusion differences. There is mild gallbladder wall thickening. No definite cholelithiasis. The spleen, adrenal glands, kidneys, pancreas unremarkable. GI/Bowel: Mild-to-moderate retained stool throughout the colon. Mild-to-moderate scattered colonic diverticulosis. Appendix not identified. Pelvis: Mild bladder wall thickening related to underdistention. Tiny fat containing inguinal hernias. Prostate unremarkable. Peritoneum/Retroperitoneum: Moderate volume of ascites. Similar prior examination. Bones/Soft Tissues: Multilevel degenerate changes of the lumbar spine. CTA Chest: No convincing evidence of central or lobar pulmonary emboli. Distal segmental subsegmental branches not well evaluated. Moderate size right-sided effusion and trace left-sided effusion. Stable osseous metastatic disease with peers spinal soft tissue attenuation involving the T2 vertebral body. CT of the abdomen pelvis: No significant change in the innumerable hepatic lesions worrisome for metastatic disease as well as moderate burden of ascites. Bladder wall thickening could be infectious inflammatory or could be related to underdistention. .  Please correlate with urinalysis findings. Xr Chest Portable    Result Date: 5/3/2019  EXAMINATION: SINGLE XRAY VIEW OF THE CHEST 5/3/2019 11:12 am COMPARISON: April 17, 2019 HISTORY: ORDERING SYSTEM PROVIDED HISTORY: Chest Pain TECHNOLOGIST PROVIDED HISTORY: Chest Pain Ordering Physician Provided Reason for Exam: Pt states today chest pain.  History of colon cancer and lung the radiation dose to as low as reasonably achievable.; CTA of the chest was performed after the administration of intravenous contrast.  Multiplanar reformatted images are provided for review. MIP images are provided for review. Dose modulation, iterative reconstruction, and/or weight based adjustment of the mA/kV was utilized to reduce the radiation dose to as low as reasonably achievable. COMPARISON: None HISTORY: ORDERING SYSTEM PROVIDED HISTORY: abd pain TECHNOLOGIST PROVIDED HISTORY: Ordering Physician Provided Reason for Exam: Abdominal pain, bloating Acuity: Acute Relevant Medical/Surgical History: surgeries to area of interest include hernia repair; ORDERING SYSTEM PROVIDED HISTORY: PE TECHNOLOGIST PROVIDED HISTORY: Ordering Physician Provided Reason for Exam: Patient denies any SOB or chest pain at this time Acuity: Acute FINDINGS: Chest: Mediastinum: Heart is mildly enlarged in size. Trace pericardial fluid versus pericardial thickening. Main pulmonary artery mildly enlarged measuring 3.1 cm in transverse dimension. No convincing evidence of central or lobar pulmonary emboli. Segmental subsegmental branches are less optimally evaluated due to motion artifacts and timing of contrast bolus. No suspicious mediastinal, hilar, or axillary not identified per CT criteria. Lungs/pleura: Small right-sided and trace left-sided pleural effusions. Pleuroparenchymal bands versus subsegmental atelectasis identified involving both lower lobes. Soft Tissues/Bones: There is redemonstration of the mixed sclerotic and lytic lesion involving the T2 vertebral body with surrounding pair spinal soft tissue attenuation. This extends into the pedicles and facets on the left. Additional mix additional sclerotic and lytic lesion identified involving T4 similar prior examination.   Subtle areas sclerosis is suspected subacute nondisplaced fractures anterior left 5th, 7th and 8th ribs similar prior exam. Abdomen/Pelvis: Organs: There are innumerable hypodensities identified throughout the right and left hepatic lobes which have progressed from prior examination. A suggestion of a central ill-defined area of hypoattenuation which may represent areas of posterior related changes/fibrosis versus perfusion differences. There is mild gallbladder wall thickening. No definite cholelithiasis. The spleen, adrenal glands, kidneys, pancreas unremarkable. GI/Bowel: Mild-to-moderate retained stool throughout the colon. Mild-to-moderate scattered colonic diverticulosis. Appendix not identified. Pelvis: Mild bladder wall thickening related to underdistention. Tiny fat containing inguinal hernias. Prostate unremarkable. Peritoneum/Retroperitoneum: Moderate volume of ascites. Similar prior examination. Bones/Soft Tissues: Multilevel degenerate changes of the lumbar spine. CTA Chest: No convincing evidence of central or lobar pulmonary emboli. Distal segmental subsegmental branches not well evaluated. Moderate size right-sided effusion and trace left-sided effusion. Stable osseous metastatic disease with peers spinal soft tissue attenuation involving the T2 vertebral body. CT of the abdomen pelvis: No significant change in the innumerable hepatic lesions worrisome for metastatic disease as well as moderate burden of ascites. Bladder wall thickening could be infectious inflammatory or could be related to underdistention. .  Please correlate with urinalysis findings. Ct Chest Pulmonary Embolism W Contrast    Result Date: 5/10/2019  EXAMINATION: CTA OF THE CHEST 5/10/2019 12:31 pm TECHNIQUE: CTA of the chest was performed after the administration of intravenous contrast.  Multiplanar reformatted images are provided for review. MIP images are provided for review. Dose modulation, iterative reconstruction, and/or weight based adjustment of the mA/kV was utilized to reduce the radiation dose to as low as reasonably achievable.  COMPARISON: CT PE dated 04/08/2019 HISTORY: ORDERING SYSTEM PROVIDED HISTORY: cough, sob, tachycardia, cancer TECHNOLOGIST PROVIDED HISTORY: Ordering Physician Provided Reason for Exam: cough, sob, tachycardia, cancer. denies previous chest surgeries. FINDINGS: Pulmonary Arteries: Distal segmental and subsegmental pulmonary arteries are limited by respiratory motion and suboptimal bolus timing, despite repeat imaging. No large central or proximal segmental pulmonary embolus. Mediastinum: The heart is normal in size. There is no pericardial effusion. Thoracic aorta is normal in caliber without obvious intimal dissection. Coronary artery calcifications and stents are noted. No mediastinal or hilar lymphadenopathy. No axillary lymphadenopathy. Right chest port is in place with distal tip at the cavoatrial junction. Lungs/pleura: There is a tiny right pleural effusion and trace left pleural fluid. There is mild bibasilar atelectasis. No focal airspace consolidation or pneumothorax. No evidence of pulmonary nodule or mass. Upper Abdomen: There is focal heterogeneous attenuation in the right hepatic dome. Irregular hypodense lesion is also noted along the anterior falciform ligament. There are several tiny hypodensities throughout the right liver, and overall attenuation is coarsened. There is a moderate volume of ascites in the upper abdomen. Spleen is enlarged, measuring 16.3 cm. Soft Tissues/Bones: There are mixed lytic and sclerotic lesions at T2 and T4. Both have soft tissue components which are not significantly changed from 04/08/2019. Subtle sclerosis is noted at the anterior left 5th, 7th, and 8th ribs. Underlying nondisplaced subacute fractures are suspected. 1.  Limited assessment of distal segmental and subsegmental pulmonary arteries due to respiratory motion and suboptimal bolus timing, despite repeat imaging. No central or proximal segmental pulmonary embolus.  2.  Tiny right pleural effusion and trace left pleural fluid with mild bibasilar atelectasis. 3.  Indistinct hypodense lesions in the liver, likely metastases. 4.  Moderate volume upper abdominal ascites, new from the previous study. 5.  Splenomegaly. 6.  Redemonstration of metastatic lesions at T2 and T4, similar to 04/08/2019. Subtle areas of sclerosis with suspected subacute nondisplaced fractures are noted at the anterior left 5th, 7th, and 8th ribs. Us Abdomen Limited    Result Date: 5/4/2019  EXAMINATION: RIGHT UPPER QUADRANT ULTRASOUND, 5/4/2019 9:39 am COMPARISON: 04/18/2019 HISTORY: ORDERING SYSTEM PROVIDED HISTORY: Check for ascites TECHNOLOGIST PROVIDED HISTORY: Check for ascites Abd distention, hx liver mets r/o cholecystitis Acuity: Acute Type of Exam: Initial FINDINGS: LIVER:  The liver demonstrates some abnormal echotexture as well as hepatomegaly with the liver measuring 27 cm. Liver nodularity noted which was also seen on the previous CT evaluation. BILIARY SYSTEM:  Gallbladder wall is severely thickened at 9.2 mm. There is evidence of gallbladder polyps. No evidence of gallbladder stones. Negative Iyer sign. Common bile duct is within normal limits measuring 0.61 cm. RIGHT KIDNEY: The right kidney is grossly unremarkable without evidence of hydronephrosis. OTHER: Moderate ascites noted in the abdomen and pelvis. 1. Moderate ascites. 2. Enlarged inhomogeneous nodular liver as previously noted on the CT evaluation. Neoplasm should be considered. 3. Thickened gallbladder wall as well as gallbladder polyps. The gallbladder does not appear distended however. No evidence of gallbladder stones. Acute cholecystitis is unlikely. Ir Us Guided Paracentesis    Result Date: 5/20/2019  PROCEDURE: ULTRASOUND GUIDED PARACENTESIS 5/20/2019 HISTORY: ORDERING SYSTEM PROVIDED HISTORY: ascites r/o sbp TECHNOLOGIST PROVIDED HISTORY: ascites r/o sbp Recurrent abdominal ascites TECHNIQUE: This procedure was performed by Dr. Amina Catherine.   Informed requested studies. Successful ultrasound guided paracentesis. Ir Us Guided Paracentesis    Result Date: 5/6/2019  PROCEDURE: ULTRASOUND GUIDED PARACENTESIS 5/6/2019 HISTORY: ORDERING SYSTEM PROVIDED HISTORY: Suspected SBP TECHNOLOGIST PROVIDED HISTORY: Suspected SBP TECHNIQUE: This procedure was performed by Dr. Nita Ohara. Informed consent was obtained after a detailed explanation of the procedure including risks, benefits, and alternatives. Universal protocol was followed. Ultrasound shows a small to moderate amount of ascites. The right abdomen was prepped and draped in sterile fashion and local anesthesia was achieved with lidocaine. Using realtime ultrasound guidance a 6 [place] pigtail catheter was advanced into the peritoneal fluid. Ultrasound images show a safe tract and access into the fluid. Fluid was collected in vacuum bottles. Little to no fluid remains on completion. The catheter was removed and a sterile dressing applied. There are no immediate complications. The patient left the department in stable condition. FINDINGS: A total of 850 mL of clear yellow fluid was removed. Fluid was sent for the requested studies. Successful ultrasound guided paracentesis. Primary Problem  Sepsis [facility])    Active Hospital Problems    Diagnosis Date Noted    Cholangitis [K83.09]     Severe malnutrition ([facility] [address] [E43] 05/20/2019    Sepsis ([facility] [address] [A41.9] 05/16/2019    Malignant neoplasm of colon ([address] [C18.9]     Colorectal cancer, stage IV ([address] [C19]     Liver metastasis ([address] [C78.7] 12/04/2016    Type 2 diabetes mellitus with hyperglycemia, without long-term current use of insulin ([address] [E11.65] 05/10/2012    HTN (hypertension) [I10] 05/10/2012       RECOMMENDATIONS:    Personally reviewed results of lab workup and other relevant clinical data with the patient  Reviewed records from outside facility  Patient has progressed on 3 lines of treatment so far.   Choices limited however she has not developed direct hyperbilirubinemia most likely due to liver metastasis. Agree with GI evaluation to see if there is any external compression which can be relieved  Discussed goals and expectations. Patient is not ready for hospice yet. Continue symptom management and pain control  Will consider palliative care. Prognosis is guarded. Discussed with patient and Nurse. Thank you for asking us to see this patient. Christina Matos MD          This note is created with the assistance of a speech recognition program.  While intending to generate a document that actually reflects the content of the visit, the document can still have some errors including those of syntax and sound a like substitutions which may escape proof reading. It such instances, actual meaning can be extrapolated by contextual diversion.

## 2019-05-24 NOTE — PROGRESS NOTES
Pt signed AMA papers to go to [facility] at this time. The transfer was denied by insurance and Pt was very upset and refusing all care here. He wishes to be at [facility] d/t his oncologist being there. Pt's son, daughter and wife at bedside and agrees with him signing AMA. Pt was taken to car via wheelchair.

## 2019-05-24 NOTE — PROGRESS NOTES
[facility]    Gastroenterology Daily Progress Note      Patient:   Naveed Cameron   :    1960   Facility:   [facility]   Date:     2019  Consultant:   Jud Perez CNP      SUBJECTIVE  66 y.o. male admitted 2019 with Sepsis ([facility] [address] [A41.9]  Sepsis ([facility] [address] [A41.9] and seen for stage 4 colon cancer with mets to the liver, ascites. The pt was seen and examined. He is refusing the ordered MRI MRCP. His HIDA scan shows obstruction in the CBD and his LFT\"s continue to rise. He denies abdominal pain and nausea. He has not had a bowel movement in 24 hours. His WBC is 11.5, creat now up to 2.58. He has not had overt bleeding.          OBJECTIVE  Scheduled Meds:   metroNIDAZOLE  500 mg Intravenous Q8H    sodium chloride flush  10 mL Intravenous 2 times per day    enoxaparin  30 mg Subcutaneous BID    sodium chloride flush  10 mL Intravenous 2 times per day    cefTRIAXone (ROCEPHIN) IV  1 g Intravenous Q24H    fentaNYL  1 patch Transdermal Q72H       Vital Signs:  /67   Pulse 101   Temp 97.8 °F (36.6 °C) (Oral)   Resp 16   Ht 5' 10\" (1.778 m)   Wt 240 lb 4.8 oz (109 kg)   SpO2 98%   BMI 34.48 kg/m²      Physical Exam:   General appearance: Alert & oriented, NAD agitated malnourished  Lungs: CTA bilaterally    Heart: S1S2 RRR  Abdomen: Soft, Nontender,  distended, BS WNL  Skin:  jaundiced, No clubbing, No cyanosis    Lab and Imaging Review     CBC  Recent Labs     19  0619  0619  0634   WBC 17.4* 15.8* 11.5*   HGB 7.6* 7.5* 7.5*   HCT 24.9* 24.8* 24.9*   MCV 85.7 87.0 86.2   PLT 75* 76* 72*       BMP  Recent Labs     19  0619  0619  0634   *  --   --    K 4.0  --   --    CL 88*  --   --    CO2 24  --   --    BUN 7  --   --    CREATININE 1.06 1.90* 2.58*   GLUCOSE 109*  --   --    CALCIUM 8.1*  --   --        LFTS  Recent Labs     19  1313   ALKPHOS 504*   ALT 17   *   PROT 5.2*   BILITOT 5.97*   BILIDIR 4.89*   LABALBU 1.9*     Ct Abdomen Pelvis W Iv Contrast Additional Contrast? None     Result Date: 5/19/2019  EXAMINATION: CT OF THE ABDOMEN AND PELVIS WITH CONTRAST 5/19/2019 3:46 am TECHNIQUE: CT of the abdomen and pelvis was performed with the administration of intravenous contrast. Multiplanar reformatted images are provided for review. Dose modulation, iterative reconstruction, and/or weight based adjustment of the mA/kV was utilized to reduce the radiation dose to as low as reasonably achievable. COMPARISON: 05/16/2019 HISTORY: ORDERING SYSTEM PROVIDED HISTORY: abd pain TECHNOLOGIST PROVIDED HISTORY: Ordering Physician Provided Reason for Exam: SOB, chills Acuity: Acute Relevant Medical/Surgical History: patient has a hx of diabetes, surgeries to area of interest include liver biopsy, hernia repair FINDINGS: Lower Chest: The heart size is normal.  Coronary artery vascular calcifications are noted. There are small bilateral pleural effusions, slightly increased on the left side. There is respiratory motion artifact in the lung bases with associated areas of atelectasis. Organs: There is cirrhosis with multifocal diffuse infiltrative lesions noted throughout the liver, compatible with the patient's clinical history of metastatic disease. One of the largest lesions is noted in the right lobe centrally measuring approximately 8.2 x 5.7 cm. The gallbladder is contracted. The adrenal glands are unremarkable. The pancreas has several calcifications which may represent sequela of chronic pancreatitis. There is splenomegaly measuring 17.7 cm. No splenic lesion is identified. The kidneys are unremarkable. No hydronephrosis. GI/Bowel: The stomach and duodenal sweep are unremarkable. Stool is noted in the colon. There is respiratory motion artifact limiting evaluation of the hollow visceral organs. Pelvis: The bladder is partially distended with urine.   The prostate and seminal vesicles are unremarkable. There is a right inguinal hernia containing fat. No inguinal or pelvic sidewall lymphadenopathy. Peritoneum/Retroperitoneum: Atherosclerotic plaque is noted in the aorta and its branch vessels. No retroperitoneal adenopathy. There is marked intra-abdominal ascites, similar to the previous study. No mesenteric adenopathy. No anterior abdominal wall defect. Bones/Soft Tissues: There is anasarca. Degenerative changes are noted along the spine and sacroiliac joints.      1. Cirrhosis with portal hypertension and marked ascites, not appreciably changed. 2. Infiltrative low-attenuation lesions are noted diffusely throughout the liver, similar to the previous study, compatible with diffuse metastatic disease.  3. Small bilateral pleural effusions, slightly increased in size on the left.      FINDINGS:hida 5/23/19   Prompt, but heterogeneous uptake by the liver is noted with normal appearance   of radiotracer excretion into the gallbladder.  The common bile duct is not   visualized.  Clearance of blood pool activity appears slowed.       Gallbladder is visualized.  Small bowel is not visualized.       Following IV administration of CCK, gallbladder ejection fraction is 5.94%   consistent with gallbladder dyskinesis/dysfunction.           Impression   No convincing scintigraphic evidence of acute cholecystitis.  Extremely low   gallbladder ejection fraction consistent with gallbladder   dyskinesis/dysfunction.  Heterogeneous uptake of radiotracer in the liver   consistent with liver ablation and history of metastatic disease.  Common   bile duct is not visualized, and there is slow clearance of blood pool   activity.  Findings compatible with common bile duct obstruction and are   consistent with diagnosis of cholangitis.             ASSESSMENT/PLAN:  1. Stage 4 colon cancer with mets to the liver/ascites due to liver dysfunction  -no overt bleeding  -trend the h/h and transfuse for hgb <7  2. Cholangitis, pt continues to refuse MRI MRCP he also needs ERCP with stent placement in the CBD but he is currently refusing all procedures and wants to go to [facility] where his oncologist is for treatment. Discussed with Dr Annalee Acuna and Dr Klever Stewart  -antibiotics per ID  -trend the lft's      This plan was formulated in collaboration with Dr. Klever Stewart .     Electronically signed by: ASIM Miguel CNP on 5/24/2019 at 8:12 AM

## 2019-05-24 NOTE — PROGRESS NOTES
[address]    PROGRESS NOTE             Date:   5/24/2019  Patient name:  Lucian Tate  Date of admission:  5/19/2019  2:40 AM  MRN:   [id number]  YOB: 1960    CHIEF COMPLAINT     History Obtained From:  Patient and chart review. HISTORY OF PRESENT ILLNESS      The patient is a 66 y.o.  male who is admitted to the hospital for  STAGE 4 COLON CA   ASCITES   WAS TAPPED    HAD SEPSIS WBC STILL ELEVAYRED         5/23    Still has ascites   no fever   no fever        5/24     cr increased   hida consistent with cbd obstruction        PAST MEDICAL HISTORY       has a past medical history of Back pain, CAD S/P percutaneous coronary angioplasty, Carcinoma ([facility] [address] Colon cancer ([facility] [address] Degeneration of lumbar or lumbosacral intervertebral disc, Depression, Diabetes mellitus ([facility] [address] H/O cardiac catheterization, Hepatic metastases ([facility] [address] Hyperlipidemia, Hypertension, Knee pain, and MI, old. PAST SURGICAL HISTORY       has a past surgical history that includes Tunneled venous port placement (Right); pr sigmoidoscopy flx w/rmvl foreign body (N/A, 5/25/2017); hernia repair; Coronary angioplasty with stent (11/2016); pr sigmoidoscopy flx w/rmvl foreign body (12/8/2017); Endoscopic ultrasonography, GI (N/A, 12/8/2017); and liver biopsy (Right, 08/23/2018). HOME MEDICATIONS        Prior to Admission medications    Medication Sig Start Date End Date Taking?  Authorizing Provider   glucose monitoring kit (FREESTYLE) monitoring kit 1 kit by Does not apply route daily 4/22/19   Lashanda Farah MD   Lancets MISC 1 each by Does not apply route daily 4/22/19   Lashanda Farah MD   insulin glargine (LANTUS) 100 UNIT/ML injection vial Inject 20 Units into the skin nightly    Historical Provider, MD   glimepiride (AMARYL) 4 MG tablet Take 1 tablet by mouth every morning 4/22/19   Lashanda Farah MD   oxyCODONE HCl (OXY-IR) 10 MG immediate release tablet Take 20 mg by mouth 3 times daily as needed for Pain. Historical Provider, MD   diphenhydrAMINE (BENADRYL) 25 MG capsule Take 1 capsule by mouth nightly as needed for Sleep 4/8/19   Jennifer Fierro DO   fentaNYL (DURAGESIC) 50 MCG/HR Place 1 patch onto the skin every 72 hours. 4/20/19   Historical Provider, MD   naloxone (NARCAN) 4 MG/0.1ML LIQD nasal spray Take 4 mg by mouth    Historical Provider, MD   potassium chloride (KLOR-CON M) 20 MEQ extended release tablet Take 1 tablet by mouth daily 10/9/18   Dulce Tafoya MD   metoprolol tartrate (LOPRESSOR) 25 MG tablet Take 1 tablet by mouth daily  3/12/18   Historical Provider, MD   Glucose Blood (BLOOD GLUCOSE TEST STRIPS) STRP 1 each by In Vitro route daily As needed. 8/24/17   Dionisio Fitzpatrick MD   Insulin Pen Needle 29G X 12.7MM MISC 1 each by Does not apply route daily 5/4/17   [facility], MD   nitroGLYCERIN (NITROSTAT) 0.4 MG SL tablet Place 1 tablet under the tongue every 5 minutes as needed for Chest pain Dissolve 1 tablet under tongue for chest pain and repeat every 5 min up to max of 3 total doses. If no relief after 3 doses call 911 11/29/16   ASIM Leyva CNP       ALLERGIES      Morphine    SOCIAL HISTORY       reports that he has never smoked. He has never used smokeless tobacco. He reports that he drank alcohol. He reports that he does not use drugs. FAMILY HISTORY      family history includes Heart Disease in his mother; High Blood Pressure in his mother; High Cholesterol in his father; Stroke in his mother. REVIEW OF SYSTEMS      · Constitutional: Negative for weight loss ppos fatigue  · Eyes: Negative for visual changes, diplopia, scleral icterus. · ENT: Negative for Headaches, hearing loss, vertigo, mouth sores, sore throat. · Cardiovascular: Negative for lightheadedness/orthostatic symptoms ,chest pain, dyspnea on exertion, palpitations or loss of consciousness.    · Respiratory: Negative for cough or wheezing, sputum production, hemoptysis, pleuritic pain. · Gastrointestinal: Negative for nausea/vomiting, change in bowel habits, abdominal pain, dysphagia/appetite loss, hematemesis, blood in stools. · Genitourinary:Negative for change in bladder habits, dysuria, trouble voiding, hematuria. · Musculoskeletal: Negative for gait disturbance, weakness, joint complaints. · Integumentary: Negative for rash, pruritis. · Neurological: Negative for headache, dizziness, change in muscle strength, numbness/tingling, change in gait, balance, coordination,   · Endocrine: negative for temperature intolerance, excessive thirst, fluid intake, or urination, tremor. · Hematologic/Lymphatic: negative for abnormal bruising or bleeding, blood clots, swollen lymph nodes. PHYSICAL EXAM      /67   Pulse 101   Temp 97.8 °F (36.6 °C) (Oral)   Resp 16   Ht 5' 10\" (1.778 m)   Wt 240 lb 4.8 oz (109 kg)   SpO2 98%   BMI 34.48 kg/m²      · General appearance: well nourished  · HEENT: Head: Normocephalic, no lesions, without obvious abnormality. · Lungs: clear to auscultation bilaterally  · Heart: regular rate and rhythm, S1, S2 normal, no murmur, click, rub or gallop  · Abdomen:   ·  pos ascites; bowel sounds normal; no masses,  no organomegaly  · Extremities: extremities normal, atraumatic, no cyanosis or edema  · Neurological: Gait normal. Reflexes normal and symmetric.  Sensation grossly normal  · Skin - no rash, no lump   · Eye no icterus no redness  · Psych-normal affect   · NEURO-no limb weakness  No facial droop  · Lymphatic system-no lymphadenopathy no splenomegaly     DIAGNOSTICS      Laboratory Testing:  CBC:   Recent Labs     05/24/19  0634   WBC 11.5*   HGB 7.5*   PLT 72*     BMP:    Recent Labs     05/22/19  0607 05/23/19  0627 05/24/19  0634   *  --   --    K 4.0  --   --    CL 88*  --   --    CO2 24  --   --    BUN 7  --   --    CREATININE 1.06 1.90* 2.58*   GLUCOSE 109*  --   -- S. Calcium:  Recent Labs     05/22/19  0607   CALCIUM 8.1*     S. Ionized Calcium:No results for input(s): IONCA in the last 72 hours. S. Magnesium:No results for input(s): MG in the last 72 hours. S. Phosphorus:No results for input(s): PHOS in the last 72 hours. S. Glucose:  Recent Labs     05/21/19  1123 05/21/19  1652   POCGLU 185* 178*     Glycosylated hemoglobin A1C: No results for input(s): LABA1C in the last 72 hours. INR: No results for input(s): INR in the last 72 hours. Hepatic functions:   Recent Labs     05/24/19  0634   ALKPHOS 455*   ALT 17   *   PROT 5.5*   BILITOT 6.19*   BILIDIR 5.44*   LABALBU 2.1*     Pancreatic functions:  No results for input(s): LACTA, AMYLASE in the last 72 hours. S. Lactic Acid:   No results for input(s): LACTA in the last 72 hours. Cardiac enzymes:No results for input(s): CKTOTAL, CKMB, CKMBINDEX, TROPONINI in the last 72 hours. BNP:No results for input(s): BNP in the last 72 hours. Lipid profile: No results for input(s): CHOL, TRIG, HDL, LDLCALC in the last 72 hours. Invalid input(s): LDL  Blood Gases: No results found for: PH, PCO2, PO2, HCO3, O2SAT  Thyroid functions:   Lab Results   Component Value Date    TSH 2.43 05/19/2019        Imaging/Diagonstics:  Xr Chest Standard (2 Vw)    Result Date: 5/10/2019  EXAMINATION: TWO XRAY VIEWS OF THE CHEST 5/10/2019 10:02 am COMPARISON: AP chest from 05/03/2019 HISTORY: ORDERING SYSTEM PROVIDED HISTORY: cough TECHNOLOGIST PROVIDED HISTORY: cough Ordering Physician Provided Reason for Exam: Cough congestion weakness today h/o cancer Acuity: Acute Type of Exam: Initial Additional signs and symptoms: Cough congestion weakness today h/o cancer Additional history includes anemia, colon carcinoma, hypertension, and diabetes. FINDINGS: Right-sided IJ port with unchanged tip position in the lower SVC. Overlying ECG monitor leads. Cardiomediastinal shadow stable.  Low lung volumes with bibasilar atelectasis or 04/01/2019 HISTORY: ORDERING SYSTEM PROVIDED HISTORY: ams TECHNOLOGIST PROVIDED HISTORY: Ordering Physician Provided Reason for Exam: AMS, patient denies any head complaints at this time Acuity: Acute Relevant Medical/Surgical History: patient denies any surgeries to brain FINDINGS: BRAIN/VENTRICLES: There is no acute intracranial hemorrhage, mass effect or midline shift. No abnormal extra-axial fluid collection. The gray-white differentiation is maintained without evidence of an acute infarct. There is no evidence of hydrocephalus. ORBITS: The visualized portion of the orbits demonstrate no acute abnormality. SINUSES: The visualized paranasal sinuses and mastoid air cells demonstrate no acute abnormality. Small mucous retention cyst left maxillary antrum. SOFT TISSUES/SKULL:  No acute abnormality of the visualized skull or soft tissues. No acute intracranial abnormality. Ct Abdomen Pelvis W Iv Contrast Additional Contrast? None    Result Date: 5/19/2019  EXAMINATION: CT OF THE ABDOMEN AND PELVIS WITH CONTRAST 5/19/2019 3:46 am TECHNIQUE: CT of the abdomen and pelvis was performed with the administration of intravenous contrast. Multiplanar reformatted images are provided for review. Dose modulation, iterative reconstruction, and/or weight based adjustment of the mA/kV was utilized to reduce the radiation dose to as low as reasonably achievable. COMPARISON: 05/16/2019 HISTORY: ORDERING SYSTEM PROVIDED HISTORY: abd pain TECHNOLOGIST PROVIDED HISTORY: Ordering Physician Provided Reason for Exam: SOB, chills Acuity: Acute Relevant Medical/Surgical History: patient has a hx of diabetes, surgeries to area of interest include liver biopsy, hernia repair FINDINGS: Lower Chest: The heart size is normal.  Coronary artery vascular calcifications are noted. There are small bilateral pleural effusions, slightly increased on the left side.   There is respiratory motion artifact in the lung bases with associated areas of atelectasis. Organs: There is cirrhosis with multifocal diffuse infiltrative lesions noted throughout the liver, compatible with the patient's clinical history of metastatic disease. One of the largest lesions is noted in the right lobe centrally measuring approximately 8.2 x 5.7 cm. The gallbladder is contracted. The adrenal glands are unremarkable. The pancreas has several calcifications which may represent sequela of chronic pancreatitis. There is splenomegaly measuring 17.7 cm. No splenic lesion is identified. The kidneys are unremarkable. No hydronephrosis. GI/Bowel: The stomach and duodenal sweep are unremarkable. Stool is noted in the colon. There is respiratory motion artifact limiting evaluation of the hollow visceral organs. Pelvis: The bladder is partially distended with urine. The prostate and seminal vesicles are unremarkable. There is a right inguinal hernia containing fat. No inguinal or pelvic sidewall lymphadenopathy. Peritoneum/Retroperitoneum: Atherosclerotic plaque is noted in the aorta and its branch vessels. No retroperitoneal adenopathy. There is marked intra-abdominal ascites, similar to the previous study. No mesenteric adenopathy. No anterior abdominal wall defect. Bones/Soft Tissues: There is anasarca. Degenerative changes are noted along the spine and sacroiliac joints. 1. Cirrhosis with portal hypertension and marked ascites, not appreciably changed. 2. Infiltrative low-attenuation lesions are noted diffusely throughout the liver, similar to the previous study, compatible with diffuse metastatic disease. 3. Small bilateral pleural effusions, slightly increased in size on the left.      Ct Abdomen Pelvis W Iv Contrast Additional Contrast? None    Result Date: 5/16/2019  EXAMINATION: CT OF THE ABDOMEN AND PELVIS WITH CONTRAST; CTA OF THE CHEST 5/16/2019 12:42 am TECHNIQUE: CT of the abdomen and pelvis was performed with the administration of intravenous contrast. Multiplanar reformatted images are provided for review. Dose modulation, iterative reconstruction, and/or weight based adjustment of the mA/kV was utilized to reduce the radiation dose to as low as reasonably achievable.; CTA of the chest was performed after the administration of intravenous contrast.  Multiplanar reformatted images are provided for review. MIP images are provided for review. Dose modulation, iterative reconstruction, and/or weight based adjustment of the mA/kV was utilized to reduce the radiation dose to as low as reasonably achievable. COMPARISON: None HISTORY: ORDERING SYSTEM PROVIDED HISTORY: abd pain TECHNOLOGIST PROVIDED HISTORY: Ordering Physician Provided Reason for Exam: Abdominal pain, bloating Acuity: Acute Relevant Medical/Surgical History: surgeries to area of interest include hernia repair; ORDERING SYSTEM PROVIDED HISTORY: PE TECHNOLOGIST PROVIDED HISTORY: Ordering Physician Provided Reason for Exam: Patient denies any SOB or chest pain at this time Acuity: Acute FINDINGS: Chest: Mediastinum: Heart is mildly enlarged in size. Trace pericardial fluid versus pericardial thickening. Main pulmonary artery mildly enlarged measuring 3.1 cm in transverse dimension. No convincing evidence of central or lobar pulmonary emboli. Segmental subsegmental branches are less optimally evaluated due to motion artifacts and timing of contrast bolus. No suspicious mediastinal, hilar, or axillary not identified per CT criteria. Lungs/pleura: Small right-sided and trace left-sided pleural effusions. Pleuroparenchymal bands versus subsegmental atelectasis identified involving both lower lobes. Soft Tissues/Bones: There is redemonstration of the mixed sclerotic and lytic lesion involving the T2 vertebral body with surrounding pair spinal soft tissue attenuation. This extends into the pedicles and facets on the left.  Additional mix additional sclerotic and lytic lesion identified involving T4 similar cancer and lung cancer Acuity: Unknown Type of Exam: Unknown FINDINGS: Right chest Port in good position. No kink. Non enlarged heart. Bibasilar atelectasis. Right basilar pleuroparenchymal scarring. No effusion. No pneumothorax. No airspace consolidation. No focal airspace consolidation. Ct Chest Pulmonary Embolism W Contrast    Result Date: 5/19/2019  EXAMINATION: CTA OF THE CHEST 5/19/2019 3:46 am TECHNIQUE: CTA of the chest was performed after the administration of intravenous contrast.  Multiplanar reformatted images are provided for review. MIP images are provided for review. Dose modulation, iterative reconstruction, and/or weight based adjustment of the mA/kV was utilized to reduce the radiation dose to as low as reasonably achievable. COMPARISON: 05/16/2019 HISTORY: ORDERING SYSTEM PROVIDED HISTORY: PE TECHNOLOGIST PROVIDED HISTORY: Ordering Physician Provided Reason for Exam: SOB, elevated d-dimer Acuity: Acute Relevant Medical/Surgical History: Patient has a hx of diabetes, surgeries to area of interest include Coronary angioplasty with stent placement, port placement FINDINGS: Pulmonary Arteries: There is adequate opacification of the pulmonary arteries with intravenous contrast.  There is no evidence of a central pulmonary embolism. Segmental and subsegmental emboli cannot entirely be excluded given the motion artifact on this exam. Mediastinum: The heart size is normal.  Coronary artery vascular calcifications are noted. No pericardial effusion. The thyroid gland is unremarkable. No mediastinal or hilar adenopathy. Lungs/pleura: There are small bilateral pleural effusions which have slightly increased in size, particularly on the left. There is associated atelectasis in the lung bases. There is a noncalcified pulmonary nodule in the right lower lobe measuring 3 mm.   This was not seen on the 04/07/2017 exam or the 07/02/2018 exam.  There is also a 4 x 4 mm nodule in the right lower utilized to reduce the radiation dose to as low as reasonably achievable.; CTA of the chest was performed after the administration of intravenous contrast.  Multiplanar reformatted images are provided for review. MIP images are provided for review. Dose modulation, iterative reconstruction, and/or weight based adjustment of the mA/kV was utilized to reduce the radiation dose to as low as reasonably achievable. COMPARISON: None HISTORY: ORDERING SYSTEM PROVIDED HISTORY: abd pain TECHNOLOGIST PROVIDED HISTORY: Ordering Physician Provided Reason for Exam: Abdominal pain, bloating Acuity: Acute Relevant Medical/Surgical History: surgeries to area of interest include hernia repair; ORDERING SYSTEM PROVIDED HISTORY: PE TECHNOLOGIST PROVIDED HISTORY: Ordering Physician Provided Reason for Exam: Patient denies any SOB or chest pain at this time Acuity: Acute FINDINGS: Chest: Mediastinum: Heart is mildly enlarged in size. Trace pericardial fluid versus pericardial thickening. Main pulmonary artery mildly enlarged measuring 3.1 cm in transverse dimension. No convincing evidence of central or lobar pulmonary emboli. Segmental subsegmental branches are less optimally evaluated due to motion artifacts and timing of contrast bolus. No suspicious mediastinal, hilar, or axillary not identified per CT criteria. Lungs/pleura: Small right-sided and trace left-sided pleural effusions. Pleuroparenchymal bands versus subsegmental atelectasis identified involving both lower lobes. Soft Tissues/Bones: There is redemonstration of the mixed sclerotic and lytic lesion involving the T2 vertebral body with surrounding pair spinal soft tissue attenuation. This extends into the pedicles and facets on the left. Additional mix additional sclerotic and lytic lesion identified involving T4 similar prior examination.   Subtle areas sclerosis is suspected subacute nondisplaced fractures anterior left 5th, 7th and 8th ribs similar prior exam. Abdomen/Pelvis: Organs: There are innumerable hypodensities identified throughout the right and left hepatic lobes which have progressed from prior examination. A suggestion of a central ill-defined area of hypoattenuation which may represent areas of posterior related changes/fibrosis versus perfusion differences. There is mild gallbladder wall thickening. No definite cholelithiasis. The spleen, adrenal glands, kidneys, pancreas unremarkable. GI/Bowel: Mild-to-moderate retained stool throughout the colon. Mild-to-moderate scattered colonic diverticulosis. Appendix not identified. Pelvis: Mild bladder wall thickening related to underdistention. Tiny fat containing inguinal hernias. Prostate unremarkable. Peritoneum/Retroperitoneum: Moderate volume of ascites. Similar prior examination. Bones/Soft Tissues: Multilevel degenerate changes of the lumbar spine. CTA Chest: No convincing evidence of central or lobar pulmonary emboli. Distal segmental subsegmental branches not well evaluated. Moderate size right-sided effusion and trace left-sided effusion. Stable osseous metastatic disease with peers spinal soft tissue attenuation involving the T2 vertebral body. CT of the abdomen pelvis: No significant change in the innumerable hepatic lesions worrisome for metastatic disease as well as moderate burden of ascites. Bladder wall thickening could be infectious inflammatory or could be related to underdistention. .  Please correlate with urinalysis findings. Ct Chest Pulmonary Embolism W Contrast    Result Date: 5/10/2019  EXAMINATION: CTA OF THE CHEST 5/10/2019 12:31 pm TECHNIQUE: CTA of the chest was performed after the administration of intravenous contrast.  Multiplanar reformatted images are provided for review. MIP images are provided for review.  Dose modulation, iterative reconstruction, and/or weight based adjustment of the mA/kV was utilized to reduce the radiation dose to as low as reasonably achievable. COMPARISON: CT PE dated 04/08/2019 HISTORY: ORDERING SYSTEM PROVIDED HISTORY: cough, sob, tachycardia, cancer TECHNOLOGIST PROVIDED HISTORY: Ordering Physician Provided Reason for Exam: cough, sob, tachycardia, cancer. denies previous chest surgeries. FINDINGS: Pulmonary Arteries: Distal segmental and subsegmental pulmonary arteries are limited by respiratory motion and suboptimal bolus timing, despite repeat imaging. No large central or proximal segmental pulmonary embolus. Mediastinum: The heart is normal in size. There is no pericardial effusion. Thoracic aorta is normal in caliber without obvious intimal dissection. Coronary artery calcifications and stents are noted. No mediastinal or hilar lymphadenopathy. No axillary lymphadenopathy. Right chest port is in place with distal tip at the cavoatrial junction. Lungs/pleura: There is a tiny right pleural effusion and trace left pleural fluid. There is mild bibasilar atelectasis. No focal airspace consolidation or pneumothorax. No evidence of pulmonary nodule or mass. Upper Abdomen: There is focal heterogeneous attenuation in the right hepatic dome. Irregular hypodense lesion is also noted along the anterior falciform ligament. There are several tiny hypodensities throughout the right liver, and overall attenuation is coarsened. There is a moderate volume of ascites in the upper abdomen. Spleen is enlarged, measuring 16.3 cm. Soft Tissues/Bones: There are mixed lytic and sclerotic lesions at T2 and T4. Both have soft tissue components which are not significantly changed from 04/08/2019. Subtle sclerosis is noted at the anterior left 5th, 7th, and 8th ribs. Underlying nondisplaced subacute fractures are suspected. 1.  Limited assessment of distal segmental and subsegmental pulmonary arteries due to respiratory motion and suboptimal bolus timing, despite repeat imaging. No central or proximal segmental pulmonary embolus.  2.  Tiny right pleural effusion and trace left pleural fluid with mild bibasilar atelectasis. 3.  Indistinct hypodense lesions in the liver, likely metastases. 4.  Moderate volume upper abdominal ascites, new from the previous study. 5.  Splenomegaly. 6.  Redemonstration of metastatic lesions at T2 and T4, similar to 04/08/2019. Subtle areas of sclerosis with suspected subacute nondisplaced fractures are noted at the anterior left 5th, 7th, and 8th ribs. Us Abdomen Limited    Result Date: 5/4/2019  EXAMINATION: RIGHT UPPER QUADRANT ULTRASOUND, 5/4/2019 9:39 am COMPARISON: 04/18/2019 HISTORY: ORDERING SYSTEM PROVIDED HISTORY: Check for ascites TECHNOLOGIST PROVIDED HISTORY: Check for ascites Abd distention, hx liver mets r/o cholecystitis Acuity: Acute Type of Exam: Initial FINDINGS: LIVER:  The liver demonstrates some abnormal echotexture as well as hepatomegaly with the liver measuring 27 cm. Liver nodularity noted which was also seen on the previous CT evaluation. BILIARY SYSTEM:  Gallbladder wall is severely thickened at 9.2 mm. There is evidence of gallbladder polyps. No evidence of gallbladder stones. Negative Iyer sign. Common bile duct is within normal limits measuring 0.61 cm. RIGHT KIDNEY: The right kidney is grossly unremarkable without evidence of hydronephrosis. OTHER: Moderate ascites noted in the abdomen and pelvis. 1. Moderate ascites. 2. Enlarged inhomogeneous nodular liver as previously noted on the CT evaluation. Neoplasm should be considered. 3. Thickened gallbladder wall as well as gallbladder polyps. The gallbladder does not appear distended however. No evidence of gallbladder stones. Acute cholecystitis is unlikely.      Ir Us Guided Paracentesis    Result Date: 5/20/2019  PROCEDURE: ULTRASOUND GUIDED PARACENTESIS 5/20/2019 HISTORY: ORDERING SYSTEM PROVIDED HISTORY: ascites r/o sbp TECHNOLOGIST PROVIDED HISTORY: ascites r/o sbp Recurrent abdominal ascites TECHNIQUE: This procedure was performed by Dr. Je Gabriel. Informed consent was obtained after a detailed explanation of the procedure including risks, benefits, and alternatives. Universal protocol was followed. Ultrasound shows a moderate large amount of ascites. The right abdomen was prepped and draped in sterile fashion and local anesthesia was achieved with lidocaine. Using realtime ultrasound guidance a 5 Citizen of [place] centesis catheter/needle was advanced into the peritoneal fluid. Ultrasound images show a safe tract and access into the fluid. Fluid was collected in vacuum bottles. Little to no fluid remains on completion. The catheter was removed and a sterile dressing applied. There are no immediate complications. The patient left the department in stable condition. FINDINGS: A total of 3.7 L of clear dark yellow fluid was removed. A sample was sent for the requested studies. Successful ultrasound guided paracentesis. Ir Us Guided Paracentesis    Result Date: 5/17/2019  PROCEDURE: ULTRASOUND GUIDED PARACENTESIS 5/17/2019 HISTORY: ORDERING SYSTEM PROVIDED HISTORY: ascites TECHNOLOGIST PROVIDED HISTORY: ascites TECHNIQUE: This procedure was performed by Dr. Je Gabriel. Informed consent was obtained after a detailed explanation of the procedure including risks, benefits, and alternatives. Universal protocol was followed. Ultrasound shows a moderate amount of ascites. The right abdomen was prepped and draped in sterile fashion and local anesthesia was achieved with lidocaine. Using realtime ultrasound guidance a 5 Citizen of [place] centesis catheter/needle was advanced into the peritoneal fluid. Ultrasound images show a safe tract and access into the fluid. Fluid was collected in vacuum bottles. Little to no fluid remains on completion. The catheter was removed and a sterile dressing applied. There are no immediate complications. The patient left the department in stable condition.  FINDINGS: A total of 2.5 L of clear dark yellow fluid was removed. A sample was sent for the requested studies. Successful ultrasound guided paracentesis. Ir Us Guided Paracentesis    Result Date: 5/6/2019  PROCEDURE: ULTRASOUND GUIDED PARACENTESIS 5/6/2019 HISTORY: ORDERING SYSTEM PROVIDED HISTORY: Suspected SBP TECHNOLOGIST PROVIDED HISTORY: Suspected SBP TECHNIQUE: This procedure was performed by Dr. Elio Peabody. Informed consent was obtained after a detailed explanation of the procedure including risks, benefits, and alternatives. Universal protocol was followed. Ultrasound shows a small to moderate amount of ascites. The right abdomen was prepped and draped in sterile fashion and local anesthesia was achieved with lidocaine. Using realtime ultrasound guidance a 6 Greek pigtail catheter was advanced into the peritoneal fluid. Ultrasound images show a safe tract and access into the fluid. Fluid was collected in vacuum bottles. Little to no fluid remains on completion. The catheter was removed and a sterile dressing applied. There are no immediate complications. The patient left the department in stable condition. FINDINGS: A total of 850 mL of clear yellow fluid was removed. Fluid was sent for the requested studies. Successful ultrasound guided paracentesis.          ASSESSMENT     Patient Active Problem List   Diagnosis    Hypertension, essential    Mixed hyperlipidemia    Knee pain    Type 2 diabetes mellitus with hyperglycemia, without long-term current use of insulin (HCC)    HTN (hypertension)    Hypercholesteremia    Insomnia    Knee pain, bilateral    Depression    Cervical sprain    Lumbar sprain    Back pain    Opioid dependence (HCC)    Degeneration of lumbar or lumbosacral intervertebral disc    Chronic midline low back pain without sciatica    ST elevation myocardial infarction (STEMI) ([facility] [place] [address]    S/P PTCA  BMS to RCA - 11/26/2016 Dr. Heather Ren    CAD S/P percutaneous coronary angioplasty    [facility])    Type 2 myocardial rocephin   start meropenum     needs cbd stent   palliative   transfer to [facility]   MD MADDIE Porter[address], [address] [place] [address].    Phone [phone]   Fax: [phone]  Answering Service: [phone]

## 2019-05-24 NOTE — DISCHARGE SUMMARY
[facility] SERVICE       Discharge Summary     Patient ID: Dunia Thurman  :  1960   MRN: [id number]     ACCOUNT:  [de-identified]   Patient's PCP: No primary care provider on file. Admit Date: 2019   Discharge Date: 2019     Length of Stay: 5  Code Status:  Prior  Admitting Physician: Sophy White MD  Discharge Physician: Ashleigh Barreto MD     Active Discharge Diagnoses:     Hospital Problem Lists:  Principal Problem:    Sepsis [facility])  Active Problems:    Type 2 diabetes mellitus with hyperglycemia, without long-term current use of insulin (HCC)    HTN (hypertension)    Liver metastasis (HCC)    Colorectal cancer, stage IV (HCC)    Severe malnutrition ([facility]    Malignant neoplasm of colon ([facility] [address]    Cholangitis  Resolved Problems:    * No resolved hospital problems. *      Admission Condition:  poor     Discharged Condition: poor    Hospital Stay:     Hospital Course:  Dunia Thurman is a 66 y.o. male who was admitted for the management of   Sepsis [facility]) , presented to ER with Altered Mental Status and Tachycardia    The patient is a 66 y.o.  male who is admitted to the hospital for  STAGE 4 COLON CA   ASCITES   WAS TAPPED    HAD SEPSIS WBC STILL ELEVAYRED         w/ liver mets. Received Paracentesis , 2.5 L removed, SAAG 1.1, same evening readmission post discharge,    Concern for sbp/sepsis. Elevated AG acidosis and lactic acidosis on admission, along with lethargy elevated wbc, LD.  afib rvr. Possibly hypovolemic shock 2/2 lactulose. Will hold.      GI consulted - appreciate recs. cx negative to date, peritoneal fluid analysis pmn < 250 SAAG > 1.1 consistent with no sbp again. 3.7 l out in LVP . On vanc rocephin currently, empiric coverage for sepsis. F/up cultures. Discussed code status. Pt wants experimental tx at [address] care following, appreciate recs.    SW following, coordinating discharge to [address] for rehab.        Anemia, iron deficiency, likely 2/2 bleeding from the tumor. And anemia of chronic disease. Iron 24, tibc, 89, uibc 65.      DM II   HbA1c: 11.5 (4/18)   Insulin sliding scale   Hypoglycemic protocol     1.           5/22   cont  Iv atb    No peritonitis    wbc still elevted   ? collitis     bs elevted      id to see        Gi signed off   poor prognosis       Will review oncology eval             5/23       Malignant ascites              sepsis better   on rocephin    Flagyl added      hida pend       Gi seeing       Abnormal lft  Likely from liver mets   ? Causing biliary obstruction   ?  Need for  Mri and stent       dimitri ck cbc   oncology to see         5/24   biliary obstruction from  Liver metastasis from colon ca    calixto    Cr 2.5      wbc 11   ivf   d/c rocephin   start meropenum      needs cbd stent   palliative    Refused intervention   left ama needs hospice     Poor prognosis      Significant therapeutic interventions:     Significant Diagnostic Studies:   Labs / Micro:  CBC:   Lab Results   Component Value Date    WBC 11.5 05/24/2019    RBC 2.88 05/24/2019    HGB 7.5 05/24/2019    HCT 24.9 05/24/2019    MCV 86.2 05/24/2019    MCH 26.1 05/24/2019    MCHC 30.2 05/24/2019    RDW 25.6 05/24/2019    PLT 72 05/24/2019     BMP:    Lab Results   Component Value Date    GLUCOSE 109 05/22/2019    GLUCOSE 195 04/26/2012     05/24/2019    K 5.3 05/24/2019    CL 90 05/24/2019    CO2 21 05/24/2019    ANIONGAP 16 05/24/2019    BUN 7 05/22/2019    CREATININE 2.58 05/24/2019    BUNCRER NOT REPORTED 05/22/2019    CALCIUM 8.1 05/22/2019    LABGLOM 26 05/24/2019    GFRAA 31 05/24/2019    GFR      05/24/2019    GFR NOT REPORTED 05/24/2019             Radiology:    Xr Chest Standard (2 Vw)    Result Date: 5/10/2019  EXAMINATION: TWO XRAY VIEWS OF THE CHEST 5/10/2019 10:02 am COMPARISON: AP chest from 05/03/2019 HISTORY: ORDERING SYSTEM PROVIDED HISTORY: cough TECHNOLOGIST PROVIDED HISTORY: cough Ordering Physician Provided Reason for Exam: Cough congestion weakness today h/o cancer Acuity: Acute Type of Exam: Initial Additional signs and symptoms: Cough congestion weakness today h/o cancer Additional history includes anemia, colon carcinoma, hypertension, and diabetes. FINDINGS: Right-sided IJ port with unchanged tip position in the lower SVC. Overlying ECG monitor leads. Cardiomediastinal shadow stable. Low lung volumes with bibasilar atelectasis or perhaps scarring. No localized pulmonary opacity suspicious for infiltrate. No sizable pleural effusion. Bones and soft tissues intact. Gas-filled small bowel loops, best seen on the lateral projection, upper limits of normal.     No acute cardiopulmonary disease or interval change. Bibasilar atelectasis or scarring     Ct Head Wo Contrast    Result Date: 5/19/2019  EXAMINATION: CT OF THE HEAD WITHOUT CONTRAST  5/19/2019 3:50 am TECHNIQUE: CT of the head was performed without the administration of intravenous contrast. Dose modulation, iterative reconstruction, and/or weight based adjustment of the mA/kV was utilized to reduce the radiation dose to as low as reasonably achievable. COMPARISON: 05/16/2019 HISTORY: ORDERING SYSTEM PROVIDED HISTORY: ams TECHNOLOGIST PROVIDED HISTORY: Ordering Physician Provided Reason for Exam: AMS Acuity: Acute FINDINGS: BRAIN/VENTRICLES: The cerebral and cerebellar parenchyma demonstrate normal volume. There are no areas of acute hemorrhage, mass, or midline shift. No abnormal extra-axial fluid collections. The ventricles are normal in size. Vascular calcifications are noted in the carotid siphons. Gray-white differentiation is maintained. ORBITS: The visualized portion of the orbits demonstrate no acute abnormality. SINUSES: There is a left maxillary mucous retention cyst.  The mastoid air cells are clear. Cerumen is noted in the external auditory canals. SOFT TISSUES/SKULL:  The calvarium is intact.   No appreciable scalp soft tissue swelling. 1. No acute intracranial abnormality, stable. Ct Head Wo Contrast    Result Date: 5/16/2019  EXAMINATION: CT OF THE HEAD WITHOUT CONTRAST  5/16/2019 12:39 am TECHNIQUE: CT of the head was performed without the administration of intravenous contrast. Dose modulation, iterative reconstruction, and/or weight based adjustment of the mA/kV was utilized to reduce the radiation dose to as low as reasonably achievable. COMPARISON: 04/01/2019 HISTORY: ORDERING SYSTEM PROVIDED HISTORY: ams TECHNOLOGIST PROVIDED HISTORY: Ordering Physician Provided Reason for Exam: AMS, patient denies any head complaints at this time Acuity: Acute Relevant Medical/Surgical History: patient denies any surgeries to brain FINDINGS: BRAIN/VENTRICLES: There is no acute intracranial hemorrhage, mass effect or midline shift. No abnormal extra-axial fluid collection. The gray-white differentiation is maintained without evidence of an acute infarct. There is no evidence of hydrocephalus. ORBITS: The visualized portion of the orbits demonstrate no acute abnormality. SINUSES: The visualized paranasal sinuses and mastoid air cells demonstrate no acute abnormality. Small mucous retention cyst left maxillary antrum. SOFT TISSUES/SKULL:  No acute abnormality of the visualized skull or soft tissues. No acute intracranial abnormality. Nm Hepatobiliary    Result Date: 5/23/2019  EXAMINATION: NUCLEAR MEDICINE HEPATOBILIARY SCINTIGRAPHY (HIDA SCAN). 5/23/2019 1:18 pm TECHNIQUE: Approximately 6.2 millicuries Tc 40C Mebrofenin (Choletec) was administered IV. Then, dynamic images of the abdomen were obtained in the anterior projection for 60 mins. A right lateral view was also obtained at 60 mins. Next, the patient was administered 2.1 mcg of CCK IV per protocol. Using multigated technique and computer generated analysis, gallbladder ejection fraction was calculated.  COMPARISON: April 20, 2019 HISTORY: ORDERING SYSTEM PROVIDED HISTORY: CHOLANGITIS TECHNOLOGIST PROVIDED HISTORY: Ordering Physician Provided Reason for Exam: cholangitis Acuity: Unknown Type of Exam: Unknown Additional signs and symptoms: previous HIDA 4/20/19 Colorectal cancer. Previous hepatic ablation of metastasis. FINDINGS: Prompt, but heterogeneous uptake by the liver is noted with normal appearance of radiotracer excretion into the gallbladder. The common bile duct is not visualized. Clearance of blood pool activity appears slowed. Gallbladder is visualized. Small bowel is not visualized. Following IV administration of CCK, gallbladder ejection fraction is 5.94% consistent with gallbladder dyskinesis/dysfunction. No convincing scintigraphic evidence of acute cholecystitis. Extremely low gallbladder ejection fraction consistent with gallbladder dyskinesis/dysfunction. Heterogeneous uptake of radiotracer in the liver consistent with liver ablation and history of metastatic disease. Common bile duct is not visualized, and there is slow clearance of blood pool activity. Findings compatible with common bile duct obstruction and are consistent with diagnosis of cholangitis. Ct Abdomen Pelvis W Iv Contrast Additional Contrast? None    Result Date: 5/19/2019  EXAMINATION: CT OF THE ABDOMEN AND PELVIS WITH CONTRAST 5/19/2019 3:46 am TECHNIQUE: CT of the abdomen and pelvis was performed with the administration of intravenous contrast. Multiplanar reformatted images are provided for review. Dose modulation, iterative reconstruction, and/or weight based adjustment of the mA/kV was utilized to reduce the radiation dose to as low as reasonably achievable.  COMPARISON: 05/16/2019 HISTORY: ORDERING SYSTEM PROVIDED HISTORY: abd pain TECHNOLOGIST PROVIDED HISTORY: Ordering Physician Provided Reason for Exam: SOB, chills Acuity: Acute Relevant Medical/Surgical History: patient has a hx of diabetes, surgeries to area of interest include liver biopsy, hernia repair FINDINGS: Lower Chest: The heart size is normal.  Coronary artery vascular calcifications are noted. There are small bilateral pleural effusions, slightly increased on the left side. There is respiratory motion artifact in the lung bases with associated areas of atelectasis. Organs: There is cirrhosis with multifocal diffuse infiltrative lesions noted throughout the liver, compatible with the patient's clinical history of metastatic disease. One of the largest lesions is noted in the right lobe centrally measuring approximately 8.2 x 5.7 cm. The gallbladder is contracted. The adrenal glands are unremarkable. The pancreas has several calcifications which may represent sequela of chronic pancreatitis. There is splenomegaly measuring 17.7 cm. No splenic lesion is identified. The kidneys are unremarkable. No hydronephrosis. GI/Bowel: The stomach and duodenal sweep are unremarkable. Stool is noted in the colon. There is respiratory motion artifact limiting evaluation of the hollow visceral organs. Pelvis: The bladder is partially distended with urine. The prostate and seminal vesicles are unremarkable. There is a right inguinal hernia containing fat. No inguinal or pelvic sidewall lymphadenopathy. Peritoneum/Retroperitoneum: Atherosclerotic plaque is noted in the aorta and its branch vessels. No retroperitoneal adenopathy. There is marked intra-abdominal ascites, similar to the previous study. No mesenteric adenopathy. No anterior abdominal wall defect. Bones/Soft Tissues: There is anasarca. Degenerative changes are noted along the spine and sacroiliac joints. 1. Cirrhosis with portal hypertension and marked ascites, not appreciably changed. 2. Infiltrative low-attenuation lesions are noted diffusely throughout the liver, similar to the previous study, compatible with diffuse metastatic disease. 3. Small bilateral pleural effusions, slightly increased in size on the left.      Ct Abdomen Pelvis W Iv Contrast Additional Contrast? None    Result Date: 5/16/2019  EXAMINATION: CT OF THE ABDOMEN AND PELVIS WITH CONTRAST; CTA OF THE CHEST 5/16/2019 12:42 am TECHNIQUE: CT of the abdomen and pelvis was performed with the administration of intravenous contrast. Multiplanar reformatted images are provided for review. Dose modulation, iterative reconstruction, and/or weight based adjustment of the mA/kV was utilized to reduce the radiation dose to as low as reasonably achievable.; CTA of the chest was performed after the administration of intravenous contrast.  Multiplanar reformatted images are provided for review. MIP images are provided for review. Dose modulation, iterative reconstruction, and/or weight based adjustment of the mA/kV was utilized to reduce the radiation dose to as low as reasonably achievable. COMPARISON: None HISTORY: ORDERING SYSTEM PROVIDED HISTORY: abd pain TECHNOLOGIST PROVIDED HISTORY: Ordering Physician Provided Reason for Exam: Abdominal pain, bloating Acuity: Acute Relevant Medical/Surgical History: surgeries to area of interest include hernia repair; ORDERING SYSTEM PROVIDED HISTORY: PE TECHNOLOGIST PROVIDED HISTORY: Ordering Physician Provided Reason for Exam: Patient denies any SOB or chest pain at this time Acuity: Acute FINDINGS: Chest: Mediastinum: Heart is mildly enlarged in size. Trace pericardial fluid versus pericardial thickening. Main pulmonary artery mildly enlarged measuring 3.1 cm in transverse dimension. No convincing evidence of central or lobar pulmonary emboli. Segmental subsegmental branches are less optimally evaluated due to motion artifacts and timing of contrast bolus. No suspicious mediastinal, hilar, or axillary not identified per CT criteria. Lungs/pleura: Small right-sided and trace left-sided pleural effusions. Pleuroparenchymal bands versus subsegmental atelectasis identified involving both lower lobes. Soft Tissues/Bones:  There is redemonstration of the mixed sclerotic and lytic lesion involving the T2 vertebral body with surrounding pair spinal soft tissue attenuation. This extends into the pedicles and facets on the left. Additional mix additional sclerotic and lytic lesion identified involving T4 similar prior examination. Subtle areas sclerosis is suspected subacute nondisplaced fractures anterior left 5th, 7th and 8th ribs similar prior exam. Abdomen/Pelvis: Organs: There are innumerable hypodensities identified throughout the right and left hepatic lobes which have progressed from prior examination. A suggestion of a central ill-defined area of hypoattenuation which may represent areas of posterior related changes/fibrosis versus perfusion differences. There is mild gallbladder wall thickening. No definite cholelithiasis. The spleen, adrenal glands, kidneys, pancreas unremarkable. GI/Bowel: Mild-to-moderate retained stool throughout the colon. Mild-to-moderate scattered colonic diverticulosis. Appendix not identified. Pelvis: Mild bladder wall thickening related to underdistention. Tiny fat containing inguinal hernias. Prostate unremarkable. Peritoneum/Retroperitoneum: Moderate volume of ascites. Similar prior examination. Bones/Soft Tissues: Multilevel degenerate changes of the lumbar spine. CTA Chest: No convincing evidence of central or lobar pulmonary emboli. Distal segmental subsegmental branches not well evaluated. Moderate size right-sided effusion and trace left-sided effusion. Stable osseous metastatic disease with peers spinal soft tissue attenuation involving the T2 vertebral body. CT of the abdomen pelvis: No significant change in the innumerable hepatic lesions worrisome for metastatic disease as well as moderate burden of ascites. Bladder wall thickening could be infectious inflammatory or could be related to underdistention. .  Please correlate with urinalysis findings.      Xr Chest Portable    Result Date: 5/3/2019  EXAMINATION: SINGLE left.  There is associated atelectasis in the lung bases. There is a noncalcified pulmonary nodule in the right lower lobe measuring 3 mm. This was not seen on the 04/07/2017 exam or the 07/02/2018 exam.  There is also a 4 x 4 mm nodule in the right lower lobe medially (series 2, image 171). There is a noncalcified pulmonary nodule in the left upper lobe measuring 5 x 5 mm (series 2, image 108). There is no focal lung infiltrate. No pneumothorax. Upper Abdomen: There is marked intra-abdominal ascites. There is cirrhosis with sequela of portal hypertension. Please see the dedicated CT of the abdomen and pelvis for further details. Soft Tissues/Bones: There is anasarca. Intramuscular lipoma is noted along the right pectoralis muscle, benign. No axillary adenopathy. Permeative destructive lesions are noted in the T2 and T4 vertebral bodies. Degenerative changes are noted along the spine. Right chest MediPort is noted. Sclerotic lesions are noted along the anterior left 5th and 7th ribs. 1. Suboptimal examination secondary to respiratory motion artifact. No evidence of a central pulmonary embolism, however, segmental and subsegmental emboli cannot be excluded. 2. Noncalcified pulmonary nodules are noted in the right lower lobe and left upper lobe which are new compared to the 2017 and 2018 exams. While these could be related to an infectious etiology, metastatic disease cannot be excluded. 3. Permeative destructive lesions involving the T4 and T2 vertebral bodies are stable. Sclerotic lesions along the anterior left 5th and 7th ribs are also unchanged. 4. Cirrhosis with portal hypertension and marked intra-abdominal ascites, not appreciably changed. 5. Atherosclerotic disease. 6. Small bilateral pleural effusions, slightly increased on the left.      Ct Chest Pulmonary Embolism W Contrast    Result Date: 5/16/2019  EXAMINATION: CT OF THE ABDOMEN AND PELVIS WITH CONTRAST; CTA OF THE CHEST 5/16/2019 12:42 am TECHNIQUE: CT of the abdomen and pelvis was performed with the administration of intravenous contrast. Multiplanar reformatted images are provided for review. Dose modulation, iterative reconstruction, and/or weight based adjustment of the mA/kV was utilized to reduce the radiation dose to as low as reasonably achievable.; CTA of the chest was performed after the administration of intravenous contrast.  Multiplanar reformatted images are provided for review. MIP images are provided for review. Dose modulation, iterative reconstruction, and/or weight based adjustment of the mA/kV was utilized to reduce the radiation dose to as low as reasonably achievable. COMPARISON: None HISTORY: ORDERING SYSTEM PROVIDED HISTORY: abd pain TECHNOLOGIST PROVIDED HISTORY: Ordering Physician Provided Reason for Exam: Abdominal pain, bloating Acuity: Acute Relevant Medical/Surgical History: surgeries to area of interest include hernia repair; ORDERING SYSTEM PROVIDED HISTORY: PE TECHNOLOGIST PROVIDED HISTORY: Ordering Physician Provided Reason for Exam: Patient denies any SOB or chest pain at this time Acuity: Acute FINDINGS: Chest: Mediastinum: Heart is mildly enlarged in size. Trace pericardial fluid versus pericardial thickening. Main pulmonary artery mildly enlarged measuring 3.1 cm in transverse dimension. No convincing evidence of central or lobar pulmonary emboli. Segmental subsegmental branches are less optimally evaluated due to motion artifacts and timing of contrast bolus. No suspicious mediastinal, hilar, or axillary not identified per CT criteria. Lungs/pleura: Small right-sided and trace left-sided pleural effusions. Pleuroparenchymal bands versus subsegmental atelectasis identified involving both lower lobes. Soft Tissues/Bones: There is redemonstration of the mixed sclerotic and lytic lesion involving the T2 vertebral body with surrounding pair spinal soft tissue attenuation.   This extends into the pedicles and facets on the left. Additional mix additional sclerotic and lytic lesion identified involving T4 similar prior examination. Subtle areas sclerosis is suspected subacute nondisplaced fractures anterior left 5th, 7th and 8th ribs similar prior exam. Abdomen/Pelvis: Organs: There are innumerable hypodensities identified throughout the right and left hepatic lobes which have progressed from prior examination. A suggestion of a central ill-defined area of hypoattenuation which may represent areas of posterior related changes/fibrosis versus perfusion differences. There is mild gallbladder wall thickening. No definite cholelithiasis. The spleen, adrenal glands, kidneys, pancreas unremarkable. GI/Bowel: Mild-to-moderate retained stool throughout the colon. Mild-to-moderate scattered colonic diverticulosis. Appendix not identified. Pelvis: Mild bladder wall thickening related to underdistention. Tiny fat containing inguinal hernias. Prostate unremarkable. Peritoneum/Retroperitoneum: Moderate volume of ascites. Similar prior examination. Bones/Soft Tissues: Multilevel degenerate changes of the lumbar spine. CTA Chest: No convincing evidence of central or lobar pulmonary emboli. Distal segmental subsegmental branches not well evaluated. Moderate size right-sided effusion and trace left-sided effusion. Stable osseous metastatic disease with peers spinal soft tissue attenuation involving the T2 vertebral body. CT of the abdomen pelvis: No significant change in the innumerable hepatic lesions worrisome for metastatic disease as well as moderate burden of ascites. Bladder wall thickening could be infectious inflammatory or could be related to underdistention. .  Please correlate with urinalysis findings.      Ct Chest Pulmonary Embolism W Contrast    Result Date: 5/10/2019  EXAMINATION: CTA OF THE CHEST 5/10/2019 12:31 pm TECHNIQUE: CTA of the chest was performed after the administration of intravenous contrast. are suspected. 1.  Limited assessment of distal segmental and subsegmental pulmonary arteries due to respiratory motion and suboptimal bolus timing, despite repeat imaging. No central or proximal segmental pulmonary embolus. 2.  Tiny right pleural effusion and trace left pleural fluid with mild bibasilar atelectasis. 3.  Indistinct hypodense lesions in the liver, likely metastases. 4.  Moderate volume upper abdominal ascites, new from the previous study. 5.  Splenomegaly. 6.  Redemonstration of metastatic lesions at T2 and T4, similar to 04/08/2019. Subtle areas of sclerosis with suspected subacute nondisplaced fractures are noted at the anterior left 5th, 7th, and 8th ribs. Us Abdomen Limited    Result Date: 5/4/2019  EXAMINATION: RIGHT UPPER QUADRANT ULTRASOUND, 5/4/2019 9:39 am COMPARISON: 04/18/2019 HISTORY: ORDERING SYSTEM PROVIDED HISTORY: Check for ascites TECHNOLOGIST PROVIDED HISTORY: Check for ascites Abd distention, hx liver mets r/o cholecystitis Acuity: Acute Type of Exam: Initial FINDINGS: LIVER:  The liver demonstrates some abnormal echotexture as well as hepatomegaly with the liver measuring 27 cm. Liver nodularity noted which was also seen on the previous CT evaluation. BILIARY SYSTEM:  Gallbladder wall is severely thickened at 9.2 mm. There is evidence of gallbladder polyps. No evidence of gallbladder stones. Negative Iyer sign. Common bile duct is within normal limits measuring 0.61 cm. RIGHT KIDNEY: The right kidney is grossly unremarkable without evidence of hydronephrosis. OTHER: Moderate ascites noted in the abdomen and pelvis. 1. Moderate ascites. 2. Enlarged inhomogeneous nodular liver as previously noted on the CT evaluation. Neoplasm should be considered. 3. Thickened gallbladder wall as well as gallbladder polyps. The gallbladder does not appear distended however. No evidence of gallbladder stones. Acute cholecystitis is unlikely.      Ir Us Guided bottles. Little to no fluid remains on completion. The catheter was removed and a sterile dressing applied. There are no immediate complications. The patient left the department in stable condition. FINDINGS: A total of 2.5 L of clear dark yellow fluid was removed. A sample was sent for the requested studies. Successful ultrasound guided paracentesis. Ir Us Guided Paracentesis    Result Date: 5/6/2019  PROCEDURE: ULTRASOUND GUIDED PARACENTESIS 5/6/2019 HISTORY: ORDERING SYSTEM PROVIDED HISTORY: Suspected SBP TECHNOLOGIST PROVIDED HISTORY: Suspected SBP TECHNIQUE: This procedure was performed by Dr. Annabella Steele. Informed consent was obtained after a detailed explanation of the procedure including risks, benefits, and alternatives. Universal protocol was followed. Ultrasound shows a small to moderate amount of ascites. The right abdomen was prepped and draped in sterile fashion and local anesthesia was achieved with lidocaine. Using realtime ultrasound guidance a 6 Luxembourger pigtail catheter was advanced into the peritoneal fluid. Ultrasound images show a safe tract and access into the fluid. Fluid was collected in vacuum bottles. Little to no fluid remains on completion. The catheter was removed and a sterile dressing applied. There are no immediate complications. The patient left the department in stable condition. FINDINGS: A total of 850 mL of clear yellow fluid was removed. Fluid was sent for the requested studies. Successful ultrasound guided paracentesis. Consultations:    Consults:     Final Specialist Recommendations/Findings:   IP CONSULT TO GI  IP CONSULT TO SOCIAL WORK  IP CONSULT TO PALLIATIVE CARE  IP CONSULT TO INFECTIOUS DISEASES  IP CONSULT TO HEM/ONC  IP CONSULT TO PALLIATIVE CARE      The patient was seen and examined on day of discharge and this discharge summary is in conjunction with any daily progress note from day of discharge.     Discharge plan:     Disposition:

## 2019-05-24 NOTE — PROGRESS NOTES
potassium chloride (KLOR-CON M) 20 MEQ extended release tablet Take 1 tablet by mouth daily 30 tablet 3    metoprolol tartrate (LOPRESSOR) 25 MG tablet Take 1 tablet by mouth daily       Glucose Blood (BLOOD GLUCOSE TEST STRIPS) STRP 1 each by In Vitro route daily As needed. 100 strip 5    Insulin Pen Needle 29G X 12.7MM MISC 1 each by Does not apply route daily 100 each 0    nitroGLYCERIN (NITROSTAT) 0.4 MG SL tablet Place 1 tablet under the tongue every 5 minutes as needed for Chest pain Dissolve 1 tablet under tongue for chest pain and repeat every 5 min up to max of 3 total doses. If no relief after 3 doses call 911 25 tablet 3           Allergies  Allergies   Allergen Reactions    Morphine Itching and Rash        Social   Social History     Tobacco Use    Smoking status: Never Smoker    Smokeless tobacco: Never Used   Substance Use Topics    Alcohol use: Not Currently                Family History   Problem Relation Age of Onset    Heart Disease Mother     Stroke Mother     High Blood Pressure Mother     High Cholesterol Father           Review of Systems       Tolerating antibiotics. Physical Exam  /67   Pulse 101   Temp 97.8 °F (36.6 °C) (Oral)   Resp 16   Ht 5' 10\" (1.778 m)   Wt 240 lb 4.8 oz (109 kg)   SpO2 98%   BMI 34.48 kg/m²           General Appearance:  in no acute distress  Skin: warm and dry, no rash , jaundice  Head: normocephalic and atraumatic  He declined exam.      Data Review:    Recent Labs     05/22/19  0607 05/23/19  0627 05/24/19  0634   WBC 17.4* 15.8* 11.5*   HGB 7.6* 7.5* 7.5*   HCT 24.9* 24.8* 24.9*   MCV 85.7 87.0 86.2   PLT 75* 76* 72*     Recent Labs     05/22/19  0607 05/23/19 0627 05/24/19  0634   *  --  127*   K 4.0  --  5.3   CL 88*  --  90*   CO2 24  --  21   BUN 7  --   --    CREATININE 1.06 1.90* 2.58*         Imaging Studies:                           All appropriate imaging studies and reports reviewed:  Yes       Xr Chest Standard (2 Vw)    Result Date: 5/10/2019  EXAMINATION: TWO XRAY VIEWS OF THE CHEST 5/10/2019 10:02 am COMPARISON: AP chest from 05/03/2019 HISTORY: ORDERING SYSTEM PROVIDED HISTORY: cough TECHNOLOGIST PROVIDED HISTORY: cough Ordering Physician Provided Reason for Exam: Cough congestion weakness today h/o cancer Acuity: Acute Type of Exam: Initial Additional signs and symptoms: Cough congestion weakness today h/o cancer Additional history includes anemia, colon carcinoma, hypertension, and diabetes. FINDINGS: Right-sided IJ port with unchanged tip position in the lower SVC. Overlying ECG monitor leads. Cardiomediastinal shadow stable. Low lung volumes with bibasilar atelectasis or perhaps scarring. No localized pulmonary opacity suspicious for infiltrate. No sizable pleural effusion. Bones and soft tissues intact. Gas-filled small bowel loops, best seen on the lateral projection, upper limits of normal.     No acute cardiopulmonary disease or interval change. Bibasilar atelectasis or scarring     Ct Head Wo Contrast    Result Date: 5/19/2019  EXAMINATION: CT OF THE HEAD WITHOUT CONTRAST  5/19/2019 3:50 am TECHNIQUE: CT of the head was performed without the administration of intravenous contrast. Dose modulation, iterative reconstruction, and/or weight based adjustment of the mA/kV was utilized to reduce the radiation dose to as low as reasonably achievable. COMPARISON: 05/16/2019 HISTORY: ORDERING SYSTEM PROVIDED HISTORY: ams TECHNOLOGIST PROVIDED HISTORY: Ordering Physician Provided Reason for Exam: AMS Acuity: Acute FINDINGS: BRAIN/VENTRICLES: The cerebral and cerebellar parenchyma demonstrate normal volume. There are no areas of acute hemorrhage, mass, or midline shift. No abnormal extra-axial fluid collections. The ventricles are normal in size. Vascular calcifications are noted in the carotid siphons. Gray-white differentiation is maintained.  ORBITS: The visualized portion of the orbits demonstrate no acute abnormality. SINUSES: There is a left maxillary mucous retention cyst.  The mastoid air cells are clear. Cerumen is noted in the external auditory canals. SOFT TISSUES/SKULL:  The calvarium is intact. No appreciable scalp soft tissue swelling. 1. No acute intracranial abnormality, stable. Ct Head Wo Contrast    Result Date: 5/16/2019  EXAMINATION: CT OF THE HEAD WITHOUT CONTRAST  5/16/2019 12:39 am TECHNIQUE: CT of the head was performed without the administration of intravenous contrast. Dose modulation, iterative reconstruction, and/or weight based adjustment of the mA/kV was utilized to reduce the radiation dose to as low as reasonably achievable. COMPARISON: 04/01/2019 HISTORY: ORDERING SYSTEM PROVIDED HISTORY: ams TECHNOLOGIST PROVIDED HISTORY: Ordering Physician Provided Reason for Exam: AMS, patient denies any head complaints at this time Acuity: Acute Relevant Medical/Surgical History: patient denies any surgeries to brain FINDINGS: BRAIN/VENTRICLES: There is no acute intracranial hemorrhage, mass effect or midline shift. No abnormal extra-axial fluid collection. The gray-white differentiation is maintained without evidence of an acute infarct. There is no evidence of hydrocephalus. ORBITS: The visualized portion of the orbits demonstrate no acute abnormality. SINUSES: The visualized paranasal sinuses and mastoid air cells demonstrate no acute abnormality. Small mucous retention cyst left maxillary antrum. SOFT TISSUES/SKULL:  No acute abnormality of the visualized skull or soft tissues. No acute intracranial abnormality. Ct Abdomen Pelvis W Iv Contrast Additional Contrast? None    Result Date: 5/19/2019  EXAMINATION: CT OF THE ABDOMEN AND PELVIS WITH CONTRAST 5/19/2019 3:46 am TECHNIQUE: CT of the abdomen and pelvis was performed with the administration of intravenous contrast. Multiplanar reformatted images are provided for review.  Dose modulation, iterative reconstruction, and/or weight based adjustment of the mA/kV was utilized to reduce the radiation dose to as low as reasonably achievable. COMPARISON: 05/16/2019 HISTORY: ORDERING SYSTEM PROVIDED HISTORY: abd pain TECHNOLOGIST PROVIDED HISTORY: Ordering Physician Provided Reason for Exam: SOB, chills Acuity: Acute Relevant Medical/Surgical History: patient has a hx of diabetes, surgeries to area of interest include liver biopsy, hernia repair FINDINGS: Lower Chest: The heart size is normal.  Coronary artery vascular calcifications are noted. There are small bilateral pleural effusions, slightly increased on the left side. There is respiratory motion artifact in the lung bases with associated areas of atelectasis. Organs: There is cirrhosis with multifocal diffuse infiltrative lesions noted throughout the liver, compatible with the patient's clinical history of metastatic disease. One of the largest lesions is noted in the right lobe centrally measuring approximately 8.2 x 5.7 cm. The gallbladder is contracted. The adrenal glands are unremarkable. The pancreas has several calcifications which may represent sequela of chronic pancreatitis. There is splenomegaly measuring 17.7 cm. No splenic lesion is identified. The kidneys are unremarkable. No hydronephrosis. GI/Bowel: The stomach and duodenal sweep are unremarkable. Stool is noted in the colon. There is respiratory motion artifact limiting evaluation of the hollow visceral organs. Pelvis: The bladder is partially distended with urine. The prostate and seminal vesicles are unremarkable. There is a right inguinal hernia containing fat. No inguinal or pelvic sidewall lymphadenopathy. Peritoneum/Retroperitoneum: Atherosclerotic plaque is noted in the aorta and its branch vessels. No retroperitoneal adenopathy. There is marked intra-abdominal ascites, similar to the previous study. No mesenteric adenopathy. No anterior abdominal wall defect. Bones/Soft Tissues:  There is pulmonary emboli. Segmental subsegmental branches are less optimally evaluated due to motion artifacts and timing of contrast bolus. No suspicious mediastinal, hilar, or axillary not identified per CT criteria. Lungs/pleura: Small right-sided and trace left-sided pleural effusions. Pleuroparenchymal bands versus subsegmental atelectasis identified involving both lower lobes. Soft Tissues/Bones: There is redemonstration of the mixed sclerotic and lytic lesion involving the T2 vertebral body with surrounding pair spinal soft tissue attenuation. This extends into the pedicles and facets on the left. Additional mix additional sclerotic and lytic lesion identified involving T4 similar prior examination. Subtle areas sclerosis is suspected subacute nondisplaced fractures anterior left 5th, 7th and 8th ribs similar prior exam. Abdomen/Pelvis: Organs: There are innumerable hypodensities identified throughout the right and left hepatic lobes which have progressed from prior examination. A suggestion of a central ill-defined area of hypoattenuation which may represent areas of posterior related changes/fibrosis versus perfusion differences. There is mild gallbladder wall thickening. No definite cholelithiasis. The spleen, adrenal glands, kidneys, pancreas unremarkable. GI/Bowel: Mild-to-moderate retained stool throughout the colon. Mild-to-moderate scattered colonic diverticulosis. Appendix not identified. Pelvis: Mild bladder wall thickening related to underdistention. Tiny fat containing inguinal hernias. Prostate unremarkable. Peritoneum/Retroperitoneum: Moderate volume of ascites. Similar prior examination. Bones/Soft Tissues: Multilevel degenerate changes of the lumbar spine. CTA Chest: No convincing evidence of central or lobar pulmonary emboli. Distal segmental subsegmental branches not well evaluated. Moderate size right-sided effusion and trace left-sided effusion.  Stable osseous metastatic disease with peers spinal soft tissue attenuation involving the T2 vertebral body. CT of the abdomen pelvis: No significant change in the innumerable hepatic lesions worrisome for metastatic disease as well as moderate burden of ascites. Bladder wall thickening could be infectious inflammatory or could be related to underdistention. .  Please correlate with urinalysis findings. Xr Chest Portable    Result Date: 5/3/2019  EXAMINATION: SINGLE XRAY VIEW OF THE CHEST 5/3/2019 11:12 am COMPARISON: April 17, 2019 HISTORY: ORDERING SYSTEM PROVIDED HISTORY: Chest Pain TECHNOLOGIST PROVIDED HISTORY: Chest Pain Ordering Physician Provided Reason for Exam: Pt states today chest pain. History of colon cancer and lung cancer Acuity: Unknown Type of Exam: Unknown FINDINGS: Right chest Port in good position. No kink. Non enlarged heart. Bibasilar atelectasis. Right basilar pleuroparenchymal scarring. No effusion. No pneumothorax. No airspace consolidation. No focal airspace consolidation. Ct Chest Pulmonary Embolism W Contrast    Result Date: 5/19/2019  EXAMINATION: CTA OF THE CHEST 5/19/2019 3:46 am TECHNIQUE: CTA of the chest was performed after the administration of intravenous contrast.  Multiplanar reformatted images are provided for review. MIP images are provided for review. Dose modulation, iterative reconstruction, and/or weight based adjustment of the mA/kV was utilized to reduce the radiation dose to as low as reasonably achievable. COMPARISON: 05/16/2019 HISTORY: ORDERING SYSTEM PROVIDED HISTORY: PE TECHNOLOGIST PROVIDED HISTORY: Ordering Physician Provided Reason for Exam: SOB, elevated d-dimer Acuity: Acute Relevant Medical/Surgical History: Patient has a hx of diabetes, surgeries to area of interest include Coronary angioplasty with stent placement, port placement FINDINGS: Pulmonary Arteries:  There is adequate opacification of the pulmonary arteries with intravenous contrast.  There is no evidence of a mediastinal, hilar, or axillary not identified per CT criteria. Lungs/pleura: Small right-sided and trace left-sided pleural effusions. Pleuroparenchymal bands versus subsegmental atelectasis identified involving both lower lobes. Soft Tissues/Bones: There is redemonstration of the mixed sclerotic and lytic lesion involving the T2 vertebral body with surrounding pair spinal soft tissue attenuation. This extends into the pedicles and facets on the left. Additional mix additional sclerotic and lytic lesion identified involving T4 similar prior examination. Subtle areas sclerosis is suspected subacute nondisplaced fractures anterior left 5th, 7th and 8th ribs similar prior exam. Abdomen/Pelvis: Organs: There are innumerable hypodensities identified throughout the right and left hepatic lobes which have progressed from prior examination. A suggestion of a central ill-defined area of hypoattenuation which may represent areas of posterior related changes/fibrosis versus perfusion differences. There is mild gallbladder wall thickening. No definite cholelithiasis. The spleen, adrenal glands, kidneys, pancreas unremarkable. GI/Bowel: Mild-to-moderate retained stool throughout the colon. Mild-to-moderate scattered colonic diverticulosis. Appendix not identified. Pelvis: Mild bladder wall thickening related to underdistention. Tiny fat containing inguinal hernias. Prostate unremarkable. Peritoneum/Retroperitoneum: Moderate volume of ascites. Similar prior examination. Bones/Soft Tissues: Multilevel degenerate changes of the lumbar spine. CTA Chest: No convincing evidence of central or lobar pulmonary emboli. Distal segmental subsegmental branches not well evaluated. Moderate size right-sided effusion and trace left-sided effusion. Stable osseous metastatic disease with peers spinal soft tissue attenuation involving the T2 vertebral body.  CT of the abdomen pelvis: No significant change in the innumerable hepatic the right liver, and overall attenuation is coarsened. There is a moderate volume of ascites in the upper abdomen. Spleen is enlarged, measuring 16.3 cm. Soft Tissues/Bones: There are mixed lytic and sclerotic lesions at T2 and T4. Both have soft tissue components which are not significantly changed from 04/08/2019. Subtle sclerosis is noted at the anterior left 5th, 7th, and 8th ribs. Underlying nondisplaced subacute fractures are suspected. 1.  Limited assessment of distal segmental and subsegmental pulmonary arteries due to respiratory motion and suboptimal bolus timing, despite repeat imaging. No central or proximal segmental pulmonary embolus. 2.  Tiny right pleural effusion and trace left pleural fluid with mild bibasilar atelectasis. 3.  Indistinct hypodense lesions in the liver, likely metastases. 4.  Moderate volume upper abdominal ascites, new from the previous study. 5.  Splenomegaly. 6.  Redemonstration of metastatic lesions at T2 and T4, similar to 04/08/2019. Subtle areas of sclerosis with suspected subacute nondisplaced fractures are noted at the anterior left 5th, 7th, and 8th ribs. Us Abdomen Limited    Result Date: 5/4/2019  EXAMINATION: RIGHT UPPER QUADRANT ULTRASOUND, 5/4/2019 9:39 am COMPARISON: 04/18/2019 HISTORY: ORDERING SYSTEM PROVIDED HISTORY: Check for ascites TECHNOLOGIST PROVIDED HISTORY: Check for ascites Abd distention, hx liver mets r/o cholecystitis Acuity: Acute Type of Exam: Initial FINDINGS: LIVER:  The liver demonstrates some abnormal echotexture as well as hepatomegaly with the liver measuring 27 cm. Liver nodularity noted which was also seen on the previous CT evaluation. BILIARY SYSTEM:  Gallbladder wall is severely thickened at 9.2 mm. There is evidence of gallbladder polyps. No evidence of gallbladder stones. Negative Iyer sign. Common bile duct is within normal limits measuring 0.61 cm.  RIGHT KIDNEY: The right kidney is grossly unremarkable without evidence of hydronephrosis. OTHER: Moderate ascites noted in the abdomen and pelvis. 1. Moderate ascites. 2. Enlarged inhomogeneous nodular liver as previously noted on the CT evaluation. Neoplasm should be considered. 3. Thickened gallbladder wall as well as gallbladder polyps. The gallbladder does not appear distended however. No evidence of gallbladder stones. Acute cholecystitis is unlikely. Ir Us Guided Paracentesis    Result Date: 5/20/2019  PROCEDURE: ULTRASOUND GUIDED PARACENTESIS 5/20/2019 HISTORY: ORDERING SYSTEM PROVIDED HISTORY: ascites r/o sbp TECHNOLOGIST PROVIDED HISTORY: ascites r/o sbp Recurrent abdominal ascites TECHNIQUE: This procedure was performed by Dr. Francisco Garzon. Informed consent was obtained after a detailed explanation of the procedure including risks, benefits, and alternatives. Universal protocol was followed. Ultrasound shows a moderate large amount of ascites. The right abdomen was prepped and draped in sterile fashion and local anesthesia was achieved with lidocaine. Using realtime ultrasound guidance a 5 [place]i centesis catheter/needle was advanced into the peritoneal fluid. Ultrasound images show a safe tract and access into the fluid. Fluid was collected in vacuum bottles. Little to no fluid remains on completion. The catheter was removed and a sterile dressing applied. There are no immediate complications. The patient left the department in stable condition. FINDINGS: A total of 3.7 L of clear dark yellow fluid was removed. A sample was sent for the requested studies. Successful ultrasound guided paracentesis. Ir Us Guided Paracentesis    Result Date: 5/17/2019  PROCEDURE: ULTRASOUND GUIDED PARACENTESIS 5/17/2019 HISTORY: ORDERING SYSTEM PROVIDED HISTORY: ascites TECHNOLOGIST PROVIDED HISTORY: ascites TECHNIQUE: This procedure was performed by Dr. Francisco Garzon.   Informed consent was obtained after a detailed explanation of the procedure including risks, benefits, and alternatives. Universal protocol was followed. Ultrasound shows a moderate amount of ascites. The right abdomen was prepped and draped in sterile fashion and local anesthesia was achieved with lidocaine. Using realtime ultrasound guidance a 5 Slovenian centesis catheter/needle was advanced into the peritoneal fluid. Ultrasound images show a safe tract and access into the fluid. Fluid was collected in vacuum bottles. Little to no fluid remains on completion. The catheter was removed and a sterile dressing applied. There are no immediate complications. The patient left the department in stable condition. FINDINGS: A total of 2.5 L of clear dark yellow fluid was removed. A sample was sent for the requested studies. Successful ultrasound guided paracentesis. Ir Us Guided Paracentesis    Result Date: 5/6/2019  PROCEDURE: ULTRASOUND GUIDED PARACENTESIS 5/6/2019 HISTORY: ORDERING SYSTEM PROVIDED HISTORY: Suspected SBP TECHNOLOGIST PROVIDED HISTORY: Suspected SBP TECHNIQUE: This procedure was performed by Dr. Arsh Gallego. Informed consent was obtained after a detailed explanation of the procedure including risks, benefits, and alternatives. Universal protocol was followed. Ultrasound shows a small to moderate amount of ascites. The right abdomen was prepped and draped in sterile fashion and local anesthesia was achieved with lidocaine. Using realtime ultrasound guidance a 6 Slovenian pigtail catheter was advanced into the peritoneal fluid. Ultrasound images show a safe tract and access into the fluid. Fluid was collected in vacuum bottles. Little to no fluid remains on completion. The catheter was removed and a sterile dressing applied. There are no immediate complications. The patient left the department in stable condition. FINDINGS: A total of 850 mL of clear yellow fluid was removed. Fluid was sent for the requested studies. Successful ultrasound guided paracentesis.

## 2019-05-24 NOTE — CARE COORDINATION
INITIATED TRANSFER TO [facility] PER PT. REQUEST. DR. Felix Arciniega AGREES.  Electronically signed by Sj Caldera RN on 5/24/2019 at 11:20 AM

## 2019-05-24 NOTE — PROGRESS NOTES
medications for this encounter. Current Outpatient Medications:     glucose monitoring kit (FREESTYLE) monitoring kit, 1 kit by Does not apply route daily, Disp: 1 kit, Rfl: 0    Lancets MISC, 1 each by Does not apply route daily, Disp: 100 each, Rfl: 3    insulin glargine (LANTUS) 100 UNIT/ML injection vial, Inject 20 Units into the skin nightly, Disp: , Rfl:     glimepiride (AMARYL) 4 MG tablet, Take 1 tablet by mouth every morning, Disp: 30 tablet, Rfl: 3    oxyCODONE HCl (OXY-IR) 10 MG immediate release tablet, Take 20 mg by mouth 3 times daily as needed for Pain., Disp: , Rfl:     diphenhydrAMINE (BENADRYL) 25 MG capsule, Take 1 capsule by mouth nightly as needed for Sleep, Disp: 12 capsule, Rfl: 0    fentaNYL (DURAGESIC) 50 MCG/HR, Place 1 patch onto the skin every 72 hours. , Disp: , Rfl:     naloxone (NARCAN) 4 MG/0.1ML LIQD nasal spray, Take 4 mg by mouth, Disp: , Rfl:     potassium chloride (KLOR-CON M) 20 MEQ extended release tablet, Take 1 tablet by mouth daily, Disp: 30 tablet, Rfl: 3    metoprolol tartrate (LOPRESSOR) 25 MG tablet, Take 1 tablet by mouth daily , Disp: , Rfl:     Glucose Blood (BLOOD GLUCOSE TEST STRIPS) STRP, 1 each by In Vitro route daily As needed. , Disp: 100 strip, Rfl: 5    Insulin Pen Needle 29G X 12.7MM MISC, 1 each by Does not apply route daily, Disp: 100 each, Rfl: 0    nitroGLYCERIN (NITROSTAT) 0.4 MG SL tablet, Place 1 tablet under the tongue every 5 minutes as needed for Chest pain Dissolve 1 tablet under tongue for chest pain and repeat every 5 min up to max of 3 total doses. If no relief after 3 doses call 911, Disp: 25 tablet, Rfl: 3       ALLERGIES:      Allergies   Allergen Reactions    Morphine Itching and Rash          FAMILY HISTORY: The patient's family history was reviewed.         SOCIAL HISTORY:   Social History     Socioeconomic History    Marital status:      Spouse name: Not on file    Number of children: Not on file    Years of education: Not on file    Highest education level: Not on file   Occupational History    Not on file   Social Needs    Financial resource strain: Not on file    Food insecurity:     Worry: Not on file     Inability: Not on file    Transportation needs:     Medical: Not on file     Non-medical: Not on file   Tobacco Use    Smoking status: Never Smoker    Smokeless tobacco: Never Used   Substance and Sexual Activity    Alcohol use: Not Currently    Drug use: No    Sexual activity: Not on file   Lifestyle    Physical activity:     Days per week: Not on file     Minutes per session: Not on file    Stress: Not on file   Relationships    Social connections:     Talks on phone: Not on file     Gets together: Not on file     Attends Caodaism service: Not on file     Active member of club or organization: Not on file     Attends meetings of clubs or organizations: Not on file     Relationship status: Not on file    Intimate partner violence:     Fear of current or ex partner: Not on file     Emotionally abused: Not on file     Physically abused: Not on file     Forced sexual activity: Not on file   Other Topics Concern    Not on file   Social History Narrative    Not on file         REVIEW OF SYSTEMS: A 12-point review of systems was obtained and pertinent positives andnegatives were enumerated above in the history of present illness. All other reviewed systems / symptoms were negative. Review of Systems      PHYSICAL EXAMINATION: Vital signs reviewed per the nursing documentation. /67   Pulse 101   Temp 97.8 °F (36.6 °C) (Oral)   Resp 16   Ht 5' 10\" (1.778 m)   Wt 240 lb 4.8 oz (109 kg)   SpO2 98%   BMI 34.48 kg/m²    [unfilled]   Body mass index is 34.48 kg/m². General:  A O x 3 in mild distress   Psych: . Normal affect. Mentation normal   HEENT: PERRLA. Clear conjunctivae and sclerae. Moist oral mucosae, no lesions orulcers.    The neck is supple, without lymphadenopathy or jugular venous distension. No masses. Normal thyroid. Cardiovascular: S1 S2 RRR no rubs or murmurs. Pulmonary: clear BL. No accessory muscle usage. Abdominal Exam: Soft, NT ND, no hepato or spleno megaly, +BS, ++ascites. No groin masses or lymphadenopathy. Extremities: No edema. Skin: Warm skin. No skin rash. No spider nevi palmar erythema naildystrophy. Joint: No joint swelling or deformity. Neurological: intact sensory. DTR+. No asterixis     LABORATORY DATA: Reviewed   Lab Results   Component Value Date    WBC 11.5 (H) 05/24/2019    HGB 7.5 (L) 05/24/2019    HCT 24.9 (L) 05/24/2019    MCV 86.2 05/24/2019    PLT 72 (L) 05/24/2019     (L) 05/24/2019    K 5.3 05/24/2019    CL 90 (L) 05/24/2019    CO2 21 05/24/2019    BUN 7 05/22/2019    CREATININE 2.58 (H) 05/24/2019    LABALBU 2.1 (L) 05/24/2019    BILITOT 6.19 (H) 05/24/2019    ALKPHOS 455 (H) 05/24/2019     (H) 05/24/2019    ALT 17 05/24/2019    INR 1.6 05/19/2019      Lab Results   Component Value Date    RBC 2.88 (L) 05/24/2019    HGB 7.5 (L) 05/24/2019    MCV 86.2 05/24/2019    MCH 26.1 05/24/2019    MCHC 30.2 (L) 05/24/2019    RDW 25.6 (H) 05/24/2019    MPV 8.3 05/24/2019    BASOPCT 0 05/19/2019    LYMPHSABS 0.51 (L) 05/19/2019    MONOSABS 1.87 (H) 05/19/2019    NEUTROABS 12.07 (H) 05/19/2019    EOSABS 0.00 05/19/2019    BASOSABS 0.00 05/19/2019          DIAGNOSTIC TESTING:   Xr Chest Standard (2 Vw)    Result Date: 5/10/2019  EXAMINATION: TWO XRAY VIEWS OF THE CHEST 5/10/2019 10:02 am COMPARISON: AP chest from 05/03/2019 HISTORY: ORDERING SYSTEM PROVIDED HISTORY: cough TECHNOLOGIST PROVIDED HISTORY: cough Ordering Physician Provided Reason for Exam: Cough congestion weakness today h/o cancer Acuity: Acute Type of Exam: Initial Additional signs and symptoms: Cough congestion weakness today h/o cancer Additional history includes anemia, colon carcinoma, hypertension, and diabetes.  FINDINGS: Right-sided IJ port with unchanged tip position in the lower SVC. Overlying ECG monitor leads. Cardiomediastinal shadow stable. Low lung volumes with bibasilar atelectasis or perhaps scarring. No localized pulmonary opacity suspicious for infiltrate. No sizable pleural effusion. Bones and soft tissues intact. Gas-filled small bowel loops, best seen on the lateral projection, upper limits of normal.     No acute cardiopulmonary disease or interval change. Bibasilar atelectasis or scarring     Ct Head Wo Contrast    Result Date: 5/19/2019  EXAMINATION: CT OF THE HEAD WITHOUT CONTRAST  5/19/2019 3:50 am TECHNIQUE: CT of the head was performed without the administration of intravenous contrast. Dose modulation, iterative reconstruction, and/or weight based adjustment of the mA/kV was utilized to reduce the radiation dose to as low as reasonably achievable. COMPARISON: 05/16/2019 HISTORY: ORDERING SYSTEM PROVIDED HISTORY: ams TECHNOLOGIST PROVIDED HISTORY: Ordering Physician Provided Reason for Exam: AMS Acuity: Acute FINDINGS: BRAIN/VENTRICLES: The cerebral and cerebellar parenchyma demonstrate normal volume. There are no areas of acute hemorrhage, mass, or midline shift. No abnormal extra-axial fluid collections. The ventricles are normal in size. Vascular calcifications are noted in the carotid siphons. Gray-white differentiation is maintained. ORBITS: The visualized portion of the orbits demonstrate no acute abnormality. SINUSES: There is a left maxillary mucous retention cyst.  The mastoid air cells are clear. Cerumen is noted in the external auditory canals. SOFT TISSUES/SKULL:  The calvarium is intact. No appreciable scalp soft tissue swelling. 1. No acute intracranial abnormality, stable.      Ct Head Wo Contrast    Result Date: 5/16/2019  EXAMINATION: CT OF THE HEAD WITHOUT CONTRAST  5/16/2019 12:39 am TECHNIQUE: CT of the head was performed without the administration of intravenous contrast. Dose modulation, iterative reconstruction, and/or weight based adjustment of the mA/kV was utilized to reduce the radiation dose to as low as reasonably achievable. COMPARISON: 04/01/2019 HISTORY: ORDERING SYSTEM PROVIDED HISTORY: ams TECHNOLOGIST PROVIDED HISTORY: Ordering Physician Provided Reason for Exam: AMS, patient denies any head complaints at this time Acuity: Acute Relevant Medical/Surgical History: patient denies any surgeries to brain FINDINGS: BRAIN/VENTRICLES: There is no acute intracranial hemorrhage, mass effect or midline shift. No abnormal extra-axial fluid collection. The gray-white differentiation is maintained without evidence of an acute infarct. There is no evidence of hydrocephalus. ORBITS: The visualized portion of the orbits demonstrate no acute abnormality. SINUSES: The visualized paranasal sinuses and mastoid air cells demonstrate no acute abnormality. Small mucous retention cyst left maxillary antrum. SOFT TISSUES/SKULL:  No acute abnormality of the visualized skull or soft tissues. No acute intracranial abnormality. Nm Hepatobiliary    Result Date: 5/23/2019  EXAMINATION: NUCLEAR MEDICINE HEPATOBILIARY SCINTIGRAPHY (HIDA SCAN). 5/23/2019 1:18 pm TECHNIQUE: Approximately 6.2 millicuries Tc 16W Mebrofenin (Choletec) was administered IV. Then, dynamic images of the abdomen were obtained in the anterior projection for 60 mins. A right lateral view was also obtained at 60 mins. Next, the patient was administered 2.1 mcg of CCK IV per protocol. Using multigated technique and computer generated analysis, gallbladder ejection fraction was calculated. COMPARISON: April 20, 2019 HISTORY: ORDERING SYSTEM PROVIDED HISTORY: CHOLANGITIS TECHNOLOGIST PROVIDED HISTORY: Ordering Physician Provided Reason for Exam: cholangitis Acuity: Unknown Type of Exam: Unknown Additional signs and symptoms: previous HIDA 4/20/19 Colorectal cancer. Previous hepatic ablation of metastasis.  FINDINGS: Prompt, but heterogeneous uptake by the liver diffuse infiltrative lesions noted throughout the liver, compatible with the patient's clinical history of metastatic disease. One of the largest lesions is noted in the right lobe centrally measuring approximately 8.2 x 5.7 cm. The gallbladder is contracted. The adrenal glands are unremarkable. The pancreas has several calcifications which may represent sequela of chronic pancreatitis. There is splenomegaly measuring 17.7 cm. No splenic lesion is identified. The kidneys are unremarkable. No hydronephrosis. GI/Bowel: The stomach and duodenal sweep are unremarkable. Stool is noted in the colon. There is respiratory motion artifact limiting evaluation of the hollow visceral organs. Pelvis: The bladder is partially distended with urine. The prostate and seminal vesicles are unremarkable. There is a right inguinal hernia containing fat. No inguinal or pelvic sidewall lymphadenopathy. Peritoneum/Retroperitoneum: Atherosclerotic plaque is noted in the aorta and its branch vessels. No retroperitoneal adenopathy. There is marked intra-abdominal ascites, similar to the previous study. No mesenteric adenopathy. No anterior abdominal wall defect. Bones/Soft Tissues: There is anasarca. Degenerative changes are noted along the spine and sacroiliac joints. 1. Cirrhosis with portal hypertension and marked ascites, not appreciably changed. 2. Infiltrative low-attenuation lesions are noted diffusely throughout the liver, similar to the previous study, compatible with diffuse metastatic disease. 3. Small bilateral pleural effusions, slightly increased in size on the left. Ct Abdomen Pelvis W Iv Contrast Additional Contrast? None    Result Date: 5/16/2019  EXAMINATION: CT OF THE ABDOMEN AND PELVIS WITH CONTRAST; CTA OF THE CHEST 5/16/2019 12:42 am TECHNIQUE: CT of the abdomen and pelvis was performed with the administration of intravenous contrast. Multiplanar reformatted images are provided for review.  Dose modulation, iterative reconstruction, and/or weight based adjustment of the mA/kV was utilized to reduce the radiation dose to as low as reasonably achievable.; CTA of the chest was performed after the administration of intravenous contrast.  Multiplanar reformatted images are provided for review. MIP images are provided for review. Dose modulation, iterative reconstruction, and/or weight based adjustment of the mA/kV was utilized to reduce the radiation dose to as low as reasonably achievable. COMPARISON: None HISTORY: ORDERING SYSTEM PROVIDED HISTORY: abd pain TECHNOLOGIST PROVIDED HISTORY: Ordering Physician Provided Reason for Exam: Abdominal pain, bloating Acuity: Acute Relevant Medical/Surgical History: surgeries to area of interest include hernia repair; ORDERING SYSTEM PROVIDED HISTORY: PE TECHNOLOGIST PROVIDED HISTORY: Ordering Physician Provided Reason for Exam: Patient denies any SOB or chest pain at this time Acuity: Acute FINDINGS: Chest: Mediastinum: Heart is mildly enlarged in size. Trace pericardial fluid versus pericardial thickening. Main pulmonary artery mildly enlarged measuring 3.1 cm in transverse dimension. No convincing evidence of central or lobar pulmonary emboli. Segmental subsegmental branches are less optimally evaluated due to motion artifacts and timing of contrast bolus. No suspicious mediastinal, hilar, or axillary not identified per CT criteria. Lungs/pleura: Small right-sided and trace left-sided pleural effusions. Pleuroparenchymal bands versus subsegmental atelectasis identified involving both lower lobes. Soft Tissues/Bones: There is redemonstration of the mixed sclerotic and lytic lesion involving the T2 vertebral body with surrounding pair spinal soft tissue attenuation. This extends into the pedicles and facets on the left. Additional mix additional sclerotic and lytic lesion identified involving T4 similar prior examination.   Subtle areas sclerosis is suspected Unknown FINDINGS: Right chest Port in good position. No kink. Non enlarged heart. Bibasilar atelectasis. Right basilar pleuroparenchymal scarring. No effusion. No pneumothorax. No airspace consolidation. No focal airspace consolidation. Ct Chest Pulmonary Embolism W Contrast    Result Date: 5/19/2019  EXAMINATION: CTA OF THE CHEST 5/19/2019 3:46 am TECHNIQUE: CTA of the chest was performed after the administration of intravenous contrast.  Multiplanar reformatted images are provided for review. MIP images are provided for review. Dose modulation, iterative reconstruction, and/or weight based adjustment of the mA/kV was utilized to reduce the radiation dose to as low as reasonably achievable. COMPARISON: 05/16/2019 HISTORY: ORDERING SYSTEM PROVIDED HISTORY: PE TECHNOLOGIST PROVIDED HISTORY: Ordering Physician Provided Reason for Exam: SOB, elevated d-dimer Acuity: Acute Relevant Medical/Surgical History: Patient has a hx of diabetes, surgeries to area of interest include Coronary angioplasty with stent placement, port placement FINDINGS: Pulmonary Arteries: There is adequate opacification of the pulmonary arteries with intravenous contrast.  There is no evidence of a central pulmonary embolism. Segmental and subsegmental emboli cannot entirely be excluded given the motion artifact on this exam. Mediastinum: The heart size is normal.  Coronary artery vascular calcifications are noted. No pericardial effusion. The thyroid gland is unremarkable. No mediastinal or hilar adenopathy. Lungs/pleura: There are small bilateral pleural effusions which have slightly increased in size, particularly on the left. There is associated atelectasis in the lung bases. There is a noncalcified pulmonary nodule in the right lower lobe measuring 3 mm. This was not seen on the 04/07/2017 exam or the 07/02/2018 exam.  There is also a 4 x 4 mm nodule in the right lower lobe medially (series 2, image 171).   There is a noncalcified pulmonary nodule in the left upper lobe measuring 5 x 5 mm (series 2, image 108). There is no focal lung infiltrate. No pneumothorax. Upper Abdomen: There is marked intra-abdominal ascites. There is cirrhosis with sequela of portal hypertension. Please see the dedicated CT of the abdomen and pelvis for further details. Soft Tissues/Bones: There is anasarca. Intramuscular lipoma is noted along the right pectoralis muscle, benign. No axillary adenopathy. Permeative destructive lesions are noted in the T2 and T4 vertebral bodies. Degenerative changes are noted along the spine. Right chest MediPort is noted. Sclerotic lesions are noted along the anterior left 5th and 7th ribs. 1. Suboptimal examination secondary to respiratory motion artifact. No evidence of a central pulmonary embolism, however, segmental and subsegmental emboli cannot be excluded. 2. Noncalcified pulmonary nodules are noted in the right lower lobe and left upper lobe which are new compared to the 2017 and 2018 exams. While these could be related to an infectious etiology, metastatic disease cannot be excluded. 3. Permeative destructive lesions involving the T4 and T2 vertebral bodies are stable. Sclerotic lesions along the anterior left 5th and 7th ribs are also unchanged. 4. Cirrhosis with portal hypertension and marked intra-abdominal ascites, not appreciably changed. 5. Atherosclerotic disease. 6. Small bilateral pleural effusions, slightly increased on the left. Ct Chest Pulmonary Embolism W Contrast    Result Date: 5/16/2019  EXAMINATION: CT OF THE ABDOMEN AND PELVIS WITH CONTRAST; CTA OF THE CHEST 5/16/2019 12:42 am TECHNIQUE: CT of the abdomen and pelvis was performed with the administration of intravenous contrast. Multiplanar reformatted images are provided for review.  Dose modulation, iterative reconstruction, and/or weight based adjustment of the mA/kV was utilized to reduce the radiation dose to as low as hypodensities identified throughout the right and left hepatic lobes which have progressed from prior examination. A suggestion of a central ill-defined area of hypoattenuation which may represent areas of posterior related changes/fibrosis versus perfusion differences. There is mild gallbladder wall thickening. No definite cholelithiasis. The spleen, adrenal glands, kidneys, pancreas unremarkable. GI/Bowel: Mild-to-moderate retained stool throughout the colon. Mild-to-moderate scattered colonic diverticulosis. Appendix not identified. Pelvis: Mild bladder wall thickening related to underdistention. Tiny fat containing inguinal hernias. Prostate unremarkable. Peritoneum/Retroperitoneum: Moderate volume of ascites. Similar prior examination. Bones/Soft Tissues: Multilevel degenerate changes of the lumbar spine. CTA Chest: No convincing evidence of central or lobar pulmonary emboli. Distal segmental subsegmental branches not well evaluated. Moderate size right-sided effusion and trace left-sided effusion. Stable osseous metastatic disease with peers spinal soft tissue attenuation involving the T2 vertebral body. CT of the abdomen pelvis: No significant change in the innumerable hepatic lesions worrisome for metastatic disease as well as moderate burden of ascites. Bladder wall thickening could be infectious inflammatory or could be related to underdistention. .  Please correlate with urinalysis findings. Ct Chest Pulmonary Embolism W Contrast    Result Date: 5/10/2019  EXAMINATION: CTA OF THE CHEST 5/10/2019 12:31 pm TECHNIQUE: CTA of the chest was performed after the administration of intravenous contrast.  Multiplanar reformatted images are provided for review. MIP images are provided for review. Dose modulation, iterative reconstruction, and/or weight based adjustment of the mA/kV was utilized to reduce the radiation dose to as low as reasonably achievable.  COMPARISON: CT PE dated 04/08/2019 HISTORY: ORDERING SYSTEM PROVIDED HISTORY: cough, sob, tachycardia, cancer TECHNOLOGIST PROVIDED HISTORY: Ordering Physician Provided Reason for Exam: cough, sob, tachycardia, cancer. denies previous chest surgeries. FINDINGS: Pulmonary Arteries: Distal segmental and subsegmental pulmonary arteries are limited by respiratory motion and suboptimal bolus timing, despite repeat imaging. No large central or proximal segmental pulmonary embolus. Mediastinum: The heart is normal in size. There is no pericardial effusion. Thoracic aorta is normal in caliber without obvious intimal dissection. Coronary artery calcifications and stents are noted. No mediastinal or hilar lymphadenopathy. No axillary lymphadenopathy. Right chest port is in place with distal tip at the cavoatrial junction. Lungs/pleura: There is a tiny right pleural effusion and trace left pleural fluid. There is mild bibasilar atelectasis. No focal airspace consolidation or pneumothorax. No evidence of pulmonary nodule or mass. Upper Abdomen: There is focal heterogeneous attenuation in the right hepatic dome. Irregular hypodense lesion is also noted along the anterior falciform ligament. There are several tiny hypodensities throughout the right liver, and overall attenuation is coarsened. There is a moderate volume of ascites in the upper abdomen. Spleen is enlarged, measuring 16.3 cm. Soft Tissues/Bones: There are mixed lytic and sclerotic lesions at T2 and T4. Both have soft tissue components which are not significantly changed from 04/08/2019. Subtle sclerosis is noted at the anterior left 5th, 7th, and 8th ribs. Underlying nondisplaced subacute fractures are suspected. 1.  Limited assessment of distal segmental and subsegmental pulmonary arteries due to respiratory motion and suboptimal bolus timing, despite repeat imaging. No central or proximal segmental pulmonary embolus.  2.  Tiny right pleural effusion and trace left pleural fluid with mild bibasilar atelectasis. 3.  Indistinct hypodense lesions in the liver, likely metastases. 4.  Moderate volume upper abdominal ascites, new from the previous study. 5.  Splenomegaly. 6.  Redemonstration of metastatic lesions at T2 and T4, similar to 04/08/2019. Subtle areas of sclerosis with suspected subacute nondisplaced fractures are noted at the anterior left 5th, 7th, and 8th ribs. Us Abdomen Limited    Result Date: 5/4/2019  EXAMINATION: RIGHT UPPER QUADRANT ULTRASOUND, 5/4/2019 9:39 am COMPARISON: 04/18/2019 HISTORY: ORDERING SYSTEM PROVIDED HISTORY: Check for ascites TECHNOLOGIST PROVIDED HISTORY: Check for ascites Abd distention, hx liver mets r/o cholecystitis Acuity: Acute Type of Exam: Initial FINDINGS: LIVER:  The liver demonstrates some abnormal echotexture as well as hepatomegaly with the liver measuring 27 cm. Liver nodularity noted which was also seen on the previous CT evaluation. BILIARY SYSTEM:  Gallbladder wall is severely thickened at 9.2 mm. There is evidence of gallbladder polyps. No evidence of gallbladder stones. Negative Iyer sign. Common bile duct is within normal limits measuring 0.61 cm. RIGHT KIDNEY: The right kidney is grossly unremarkable without evidence of hydronephrosis. OTHER: Moderate ascites noted in the abdomen and pelvis. 1. Moderate ascites. 2. Enlarged inhomogeneous nodular liver as previously noted on the CT evaluation. Neoplasm should be considered. 3. Thickened gallbladder wall as well as gallbladder polyps. The gallbladder does not appear distended however. No evidence of gallbladder stones. Acute cholecystitis is unlikely. Ir Us Guided Paracentesis    Result Date: 5/20/2019  PROCEDURE: ULTRASOUND GUIDED PARACENTESIS 5/20/2019 HISTORY: ORDERING SYSTEM PROVIDED HISTORY: ascites r/o sbp TECHNOLOGIST PROVIDED HISTORY: ascites r/o sbp Recurrent abdominal ascites TECHNIQUE: This procedure was performed by Dr. Emani Serrato.   Informed consent was obtained after a detailed explanation of the procedure including risks, benefits, and alternatives. Universal protocol was followed. Ultrasound shows a moderate large amount of ascites. The right abdomen was prepped and draped in sterile fashion and local anesthesia was achieved with lidocaine. Using realtime ultrasound guidance a 5 Brazilian centesis catheter/needle was advanced into the peritoneal fluid. Ultrasound images show a safe tract and access into the fluid. Fluid was collected in vacuum bottles. Little to no fluid remains on completion. The catheter was removed and a sterile dressing applied. There are no immediate complications. The patient left the department in stable condition. FINDINGS: A total of 3.7 L of clear dark yellow fluid was removed. A sample was sent for the requested studies. Successful ultrasound guided paracentesis. Ir Us Guided Paracentesis    Result Date: 5/17/2019  PROCEDURE: ULTRASOUND GUIDED PARACENTESIS 5/17/2019 HISTORY: ORDERING SYSTEM PROVIDED HISTORY: ascites TECHNOLOGIST PROVIDED HISTORY: ascites TECHNIQUE: This procedure was performed by Dr. Rakesh Christianson. Informed consent was obtained after a detailed explanation of the procedure including risks, benefits, and alternatives. Universal protocol was followed. Ultrasound shows a moderate amount of ascites. The right abdomen was prepped and draped in sterile fashion and local anesthesia was achieved with lidocaine. Using realtime ultrasound guidance a 5 Brazilian centesis catheter/needle was advanced into the peritoneal fluid. Ultrasound images show a safe tract and access into the fluid. Fluid was collected in vacuum bottles. Little to no fluid remains on completion. The catheter was removed and a sterile dressing applied. There are no immediate complications. The patient left the department in stable condition. FINDINGS: A total of 2.5 L of clear dark yellow fluid was removed.   A sample was sent for the requested studies. Successful ultrasound guided paracentesis. Ir Us Guided Paracentesis    Result Date: 5/6/2019  PROCEDURE: ULTRASOUND GUIDED PARACENTESIS 5/6/2019 HISTORY: ORDERING SYSTEM PROVIDED HISTORY: Suspected SBP TECHNOLOGIST PROVIDED HISTORY: Suspected SBP TECHNIQUE: This procedure was performed by Dr. Ginger Barillas. Informed consent was obtained after a detailed explanation of the procedure including risks, benefits, and alternatives. Universal protocol was followed. Ultrasound shows a small to moderate amount of ascites. The right abdomen was prepped and draped in sterile fashion and local anesthesia was achieved with lidocaine. Using realtime ultrasound guidance a 6 Czech pigtail catheter was advanced into the peritoneal fluid. Ultrasound images show a safe tract and access into the fluid. Fluid was collected in vacuum bottles. Little to no fluid remains on completion. The catheter was removed and a sterile dressing applied. There are no immediate complications. The patient left the department in stable condition. FINDINGS: A total of 850 mL of clear yellow fluid was removed. Fluid was sent for the requested studies. Successful ultrasound guided paracentesis. IMPRESSION: Mr. Jaylin Morales is a 66 y.o. male with advanced colon cancer. Cirrhotic-appearing liver. Liver metastases. Ascites. Elevated LFTs. Concerning for intrahepatic obstructions. Paracentesis did not show evidence of SBP. We will obtain MRI of the liver to exclude major bile duct compression by metastases. Thank you for allowing me to participate in the care of Mr. Jaylin Morales. For any further questions please do not hesitate to contact me.        Lb Alfaro MD

## 2019-05-24 NOTE — PLAN OF CARE
Problem: Risk for Impaired Skin Integrity  Goal: Tissue integrity - skin and mucous membranes  Description  Structural intactness and normal physiological function of skin and  mucous membranes. 5/24/2019 2999 by Louie Arce RN  Outcome: Ongoing  Note:   Pt skin integrity remained intact, no new alterations noted. Head to toe completed, see chart assessment. 5/23/2019 1757 by Carmen Benitez RN  Outcome: Ongoing  5/23/2019 1757 by Carmen Benitez RN  Outcome: Ongoing     Problem: Falls - Risk of:  Goal: Will remain free from falls  Description  Will remain free from falls  5/24/2019 1583 by Louie Arce RN  Outcome: Ongoing  Note:   Call light in reach, 2 top side rails are up. Bed is locked and in lowest position. Uses call light appropriately. Safety maintained and will continue to monitor. No attempt to get out of bed  unassisted. 5/23/2019 1757 by Carmen Benitez RN  Outcome: Ongoing  5/23/2019 1757 by Carmen Benitez RN  Outcome: Ongoing  Goal: Absence of physical injury  Description  Absence of physical injury  5/24/2019 2995 by Louie Arce RN  Outcome: Ongoing  Note:   No physical injury occurance during shift.  Will continue to monitor and care for pt.   5/23/2019 1757 by Carmen Benitez RN  Outcome: Ongoing  5/23/2019 1757 by Carmen Benitez RN  Outcome: Ongoing     Problem: Coping:  Goal: Family's ability to cope with current situation will improve  Description  Family's ability to cope with current situation will improve  5/24/2019 0940 by Louie Arce RN  Outcome: Ongoing  5/23/2019 1757 by Carmen Benitez RN  Outcome: Ongoing  5/23/2019 1757 by Carmen Benitez RN  Outcome: Ongoing     Problem: Health Behavior:  Goal: Ability to identify and utilize available support systems will improve  Description  Ability to identify and utilize available support systems will improve  5/24/2019 2398 by Louie Arce RN  Outcome: Ongoing  5/23/2019 1757 by Camren Benitez RN  Outcome: Ongoing  5/23/2019 1757 by

## 2019-05-24 NOTE — PROGRESS NOTES
.. PALLIATIVE CARE NURSING ASSESSMENT    Patient: Bonnell Rubinstein  Room: 2062/2062-01    Reason For Consult   Goals of care evaluation  Distress management  Guidance and support  Facilitate communications  Assistance in coordinating care      Impression: Bonnell Rubinstein is a 66y.o. year old male  has a past medical history of Back pain, CAD S/P percutaneous coronary angioplasty, Carcinoma ([address] Colon cancer ([address] Degeneration of lumbar or lumbosacral intervertebral disc, Depression, Diabetes mellitus ([address] H/O cardiac catheterization, Hepatic metastases ([address] Hyperlipidemia, Hypertension, Knee pain, and MI, old. .  Currently hospitalized for the management of sepsis. The Palliative Care Team is following to assist with goals of care. Vital Signs  Blood pressure 107/67, pulse 101, temperature 97.8 °F (36.6 °C), temperature source Oral, resp. rate 16, height 5' 10\" (1.778 m), weight 240 lb 4.8 oz (109 kg), SpO2 98 %.     Patient Active Problem List   Diagnosis    Hypertension, essential    Mixed hyperlipidemia    Knee pain    Type 2 diabetes mellitus with hyperglycemia, without long-term current use of insulin (HCC)    HTN (hypertension)    Hypercholesteremia    Insomnia    Knee pain, bilateral    Depression    Cervical sprain    Lumbar sprain    Back pain    Opioid dependence (HCC)    Degeneration of lumbar or lumbosacral intervertebral disc    Chronic midline low back pain without sciatica    ST elevation myocardial infarction (STEMI) ([address]    S/P PTCA  BMS to RCA - 11/26/2016 Dr. 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

## 2021-08-01 NOTE — FLOWSHEET NOTE
05/16/19 1543   Provider Notification   Reason for Communication Review case   Provider Name Dr. Moses Bejarano   Provider Notification Resident   Method of Communication Call   Response Other (Comment)  (Regarding diet)   Notification Time [id number]     Spoke with Dr. Moses Bejarano regarding the patient's dietary order, okay to keep general diet at this time. No further questions or concerns at this time. None

## 2023-03-22 NOTE — PLAN OF CARE
Problem: Falls - Risk of:  Goal: Will remain free from falls  Description  Will remain free from falls  5/6/2019 0323 by Saniya Dean RN  Outcome: Ongoing  Note:   Pt remains free from falls this shift. Bed is locked, in lowest position, and call light within reach. Problem: Pain:  Description  Pain management should include both nonpharmacologic and pharmacologic interventions. Goal: Pain level will decrease  Description  Pain level will decrease  5/6/2019 0323 by Saniya Dean RN  Outcome: Ongoing  Note:   Pt complain of chronic back and abdominal pain. Pt receives pain medications as ordered. RW and assist occassionally as needed as per pt report/needs device

## 2023-06-21 NOTE — PROGRESS NOTES
Pt here for C3D1 5fu. Pt had ER visit this morning for pain in right hip. Pt states he also went to the ER last Thursday for pain as well. He states his pain is 8 on 1-10 scale. Pt describes this as constant and stabbing, and it is keeping him up at night. He states he will not accept treatment if Dr. Marylou Murray does not address his pain. He states he is taking 2 pills of percocet 10 every 5 hours, which he acknowledges is not how it was prescribed. Called Dr. Marylou Murray and discussed recent ER visits and pain medication. He states to have Dr. Adelaida Hicks see pt. Dr Adelaida Hicks agreeable and assessed pt at chairside. He states pt agreeable to be treated today and will keep f/u with Dr. Marylou Murray to discuss pain management. Labs drawn from port and results reviewed. Pt was treated without incident and d/c'd in stable condition. Pt will return 7/28 for pump d/c. Stable on low fat diet and statin.

## 2024-04-06 NOTE — PROGRESS NOTES
Writer junior served Dr. Emilie Vilchis and informed him Dr. Tami Cedillo had seen patient and said he may need a biliary stent. GI had already signed off. complains of pain/discomfort

## 2024-11-07 NOTE — PROGRESS NOTES
Last Office Visit   10/3/2024  Upcoming: Visit date not found    Last Refill    Outpatient Medication Detail       Disp Refills Start End    Tirzepatide-Weight Management (Zepbound) 7.5 MG/0.5ML Solution Auto-injector 2 mL 0 9/23/2024 --    Sig - Route: Inject 7.5 mg into the skin every 7 days. Indications: OBESITY - Subcutaneous    Sent to pharmacy as: Zepbound 7.5 MG/0.5ML Subcutaneous Solution Auto-injector (Tirzepatide-Weight Management)    Class: Eprescribe    E-Prescribing Status: Receipt confirmed by pharmacy (9/23/2024  8:30 AM CDT)      No Protocol  Medication has been pended for provider review.    Pt is here for CADD pump d/c. Denies any problems at this time. Pt was discharged in stable condition and will return on 5/23 for chemotherapy.

## (undated) DEVICE — FORCEP BX MESH TOOTH MIC 2.8 MMX240 CM NDL STRL RADIAL JAW 4